# Patient Record
Sex: FEMALE | Race: WHITE | Employment: FULL TIME | URBAN - METROPOLITAN AREA
[De-identification: names, ages, dates, MRNs, and addresses within clinical notes are randomized per-mention and may not be internally consistent; named-entity substitution may affect disease eponyms.]

---

## 2017-01-02 ENCOUNTER — APPOINTMENT (OUTPATIENT)
Dept: CT IMAGING | Facility: HOSPITAL | Age: 37
End: 2017-01-02
Attending: EMERGENCY MEDICINE

## 2017-01-02 ENCOUNTER — HOSPITAL ENCOUNTER (EMERGENCY)
Facility: HOSPITAL | Age: 37
Discharge: HOME | End: 2017-01-02
Attending: EMERGENCY MEDICINE

## 2017-01-02 VITALS
BODY MASS INDEX: 38.51 KG/M2 | RESPIRATION RATE: 17 BRPM | HEART RATE: 76 BPM | DIASTOLIC BLOOD PRESSURE: 72 MMHG | SYSTOLIC BLOOD PRESSURE: 100 MMHG | HEIGHT: 69 IN | TEMPERATURE: 98 F | OXYGEN SATURATION: 98 % | WEIGHT: 260 LBS

## 2017-01-02 DIAGNOSIS — R10.9 ABDOMINAL PAIN, RIGHT LATERAL: Primary | ICD-10-CM

## 2017-01-02 LAB
ALBUMIN SERPL-MCNC: 3.8 G/DL (ref 3.5–4.8)
ALP LIVER SERPL-CCNC: 94 U/L (ref 37–98)
ALT SERPL-CCNC: 17 U/L (ref 14–54)
AST SERPL-CCNC: 10 U/L (ref 15–41)
BASOPHILS # BLD AUTO: 0.04 X10(3) UL (ref 0–0.1)
BASOPHILS NFR BLD AUTO: 0.4 %
BILIRUB SERPL-MCNC: 0.3 MG/DL (ref 0.1–2)
BILIRUB UR QL STRIP.AUTO: NEGATIVE
BUN BLD-MCNC: 8 MG/DL (ref 8–20)
CALCIUM BLD-MCNC: 8.8 MG/DL (ref 8.3–10.3)
CHLORIDE: 104 MMOL/L (ref 101–111)
CLARITY UR REFRACT.AUTO: CLEAR
CO2: 29 MMOL/L (ref 22–32)
COLOR UR AUTO: YELLOW
CREAT BLD-MCNC: 0.69 MG/DL (ref 0.55–1.02)
EOSINOPHIL # BLD AUTO: 0.09 X10(3) UL (ref 0–0.3)
EOSINOPHIL NFR BLD AUTO: 0.8 %
ERYTHROCYTE [DISTWIDTH] IN BLOOD BY AUTOMATED COUNT: 12.1 % (ref 11.5–16)
GLUCOSE BLD-MCNC: 105 MG/DL (ref 70–99)
GLUCOSE UR STRIP.AUTO-MCNC: NEGATIVE MG/DL
HCT VFR BLD AUTO: 43.9 % (ref 34–50)
HGB BLD-MCNC: 14.7 G/DL (ref 12–16)
IMMATURE GRANULOCYTE COUNT: 0.02 X10(3) UL (ref 0–1)
IMMATURE GRANULOCYTE RATIO %: 0.2 %
KETONES UR STRIP.AUTO-MCNC: NEGATIVE MG/DL
LEUKOCYTE ESTERASE UR QL STRIP.AUTO: NEGATIVE
LIPASE: 234 U/L (ref 73–393)
LYMPHOCYTES # BLD AUTO: 1.91 X10(3) UL (ref 0.9–4)
LYMPHOCYTES NFR BLD AUTO: 17.6 %
M PROTEIN MFR SERPL ELPH: 8 G/DL (ref 6.1–8.3)
MCH RBC QN AUTO: 30.6 PG (ref 27–33.2)
MCHC RBC AUTO-ENTMCNC: 33.5 G/DL (ref 31–37)
MCV RBC AUTO: 91.3 FL (ref 81–100)
MONOCYTES # BLD AUTO: 0.51 X10(3) UL (ref 0.1–0.6)
MONOCYTES NFR BLD AUTO: 4.7 %
NEUTROPHIL ABS PRELIM: 8.28 X10 (3) UL (ref 1.3–6.7)
NEUTROPHILS # BLD AUTO: 8.28 X10(3) UL (ref 1.3–6.7)
NEUTROPHILS NFR BLD AUTO: 76.3 %
NITRITE UR QL STRIP.AUTO: NEGATIVE
PH UR STRIP.AUTO: 8 [PH] (ref 4.5–8)
PLATELET # BLD AUTO: 336 10(3)UL (ref 150–450)
POTASSIUM SERPL-SCNC: 3.8 MMOL/L (ref 3.6–5.1)
PROT UR STRIP.AUTO-MCNC: NEGATIVE MG/DL
RBC # BLD AUTO: 4.81 X10(6)UL (ref 3.8–5.1)
RBC UR QL AUTO: NEGATIVE
RED CELL DISTRIBUTION WIDTH-SD: 40.4 FL (ref 35.1–46.3)
SODIUM SERPL-SCNC: 139 MMOL/L (ref 136–144)
SP GR UR STRIP.AUTO: 1.02 (ref 1–1.03)
UROBILINOGEN UR STRIP.AUTO-MCNC: <2 MG/DL
WBC # BLD AUTO: 10.9 X10(3) UL (ref 4–13)

## 2017-01-02 PROCEDURE — 85025 COMPLETE CBC W/AUTO DIFF WBC: CPT | Performed by: EMERGENCY MEDICINE

## 2017-01-02 PROCEDURE — 74177 CT ABD & PELVIS W/CONTRAST: CPT

## 2017-01-02 PROCEDURE — 96361 HYDRATE IV INFUSION ADD-ON: CPT

## 2017-01-02 PROCEDURE — 96374 THER/PROPH/DIAG INJ IV PUSH: CPT

## 2017-01-02 PROCEDURE — 80053 COMPREHEN METABOLIC PANEL: CPT | Performed by: EMERGENCY MEDICINE

## 2017-01-02 PROCEDURE — 99284 EMERGENCY DEPT VISIT MOD MDM: CPT

## 2017-01-02 PROCEDURE — 99285 EMERGENCY DEPT VISIT HI MDM: CPT

## 2017-01-02 PROCEDURE — 96375 TX/PRO/DX INJ NEW DRUG ADDON: CPT

## 2017-01-02 PROCEDURE — 96376 TX/PRO/DX INJ SAME DRUG ADON: CPT

## 2017-01-02 PROCEDURE — 83690 ASSAY OF LIPASE: CPT | Performed by: EMERGENCY MEDICINE

## 2017-01-02 PROCEDURE — 81003 URINALYSIS AUTO W/O SCOPE: CPT | Performed by: EMERGENCY MEDICINE

## 2017-01-02 RX ORDER — ONDANSETRON 2 MG/ML
4 INJECTION INTRAMUSCULAR; INTRAVENOUS ONCE
Status: COMPLETED | OUTPATIENT
Start: 2017-01-02 | End: 2017-01-02

## 2017-01-02 RX ORDER — LEVOFLOXACIN 500 MG/1
500 TABLET, FILM COATED ORAL DAILY
Qty: 10 TABLET | Refills: 0 | Status: SHIPPED | OUTPATIENT
Start: 2017-01-02 | End: 2017-01-12

## 2017-01-02 RX ORDER — HYDROCODONE BITARTRATE AND ACETAMINOPHEN 5; 325 MG/1; MG/1
1 TABLET ORAL ONCE
Status: COMPLETED | OUTPATIENT
Start: 2017-01-02 | End: 2017-01-02

## 2017-01-02 RX ORDER — HYDROMORPHONE HYDROCHLORIDE 1 MG/ML
1 INJECTION, SOLUTION INTRAMUSCULAR; INTRAVENOUS; SUBCUTANEOUS EVERY 30 MIN PRN
Status: COMPLETED | OUTPATIENT
Start: 2017-01-02 | End: 2017-01-02

## 2017-01-02 RX ORDER — METRONIDAZOLE 500 MG/1
500 TABLET ORAL 3 TIMES DAILY
Qty: 30 TABLET | Refills: 0 | Status: SHIPPED | OUTPATIENT
Start: 2017-01-02 | End: 2017-01-12

## 2017-01-02 RX ORDER — ONDANSETRON 4 MG/1
8 TABLET, ORALLY DISINTEGRATING ORAL EVERY 8 HOURS PRN
Qty: 10 TABLET | Refills: 0 | Status: SHIPPED | OUTPATIENT
Start: 2017-01-02 | End: 2017-01-09

## 2017-01-02 RX ORDER — HYDROCODONE BITARTRATE AND ACETAMINOPHEN 5; 325 MG/1; MG/1
1-2 TABLET ORAL EVERY 4 HOURS PRN
Qty: 20 TABLET | Refills: 0 | Status: SHIPPED | OUTPATIENT
Start: 2017-01-02 | End: 2017-01-06

## 2017-01-02 NOTE — ED PROVIDER NOTES
Patient Seen in: [facility] Emergency Department    History   Patient presents with:  Abdomen/Flank Pain (GI/)    Stated Complaint: Abdominal pain    HPI    20-year-old female presents for evaluation of upper abdominal pain.   Patient has had persi SURGICAL HISTORY      Comment laparoscopies x2    SURGICAL STENT Bilateral March 2015.     Comment Bilateral iliac stents    ANKLE SCOPE,PART DEBRIDEMENT Left 6/19/2015    Comment Procedure: ARTHROSCOPY ANKLE WITH DEBRIDEMENT;  Surgeon: Albin Pester, Landrum Merlin Inhalation Aerosol,  Inhale 2 puffs into the lungs 2 (two) times daily.    TIROSINT 125 MCG Oral Cap,  Take 125 mcg by mouth every morning before breakfast.   OMEPRAZOLE 40 MG Oral Capsule Delayed Release,  TAKE 1 CAPSULE BY MOUTH DAILY   Cholecalciferol (V Pupils equal reactive. Extraocular motions intact. Oropharynx clear. Neck: Supple  Lungs: Clear to auscultation bilaterally. Heart: Regular rate and rhythm.   Abdomen: Soft, right upper quadrant tenderness, no rebound, no guarding, no tenderness at [facility] 0)     Spoke with Dr Vernell Du. He has reviewed the patient's history, current labs and suggest CT abdomen with IV contrast.  If no significant pathology, patient may be discharged to follow-up with their office.     CT of the abdomen and pelvis shows mi

## 2017-01-06 ENCOUNTER — OFFICE VISIT (OUTPATIENT)
Dept: FAMILY MEDICINE CLINIC | Facility: CLINIC | Age: 37
End: 2017-01-06

## 2017-01-06 ENCOUNTER — HOSPITAL ENCOUNTER (EMERGENCY)
Age: 37
Discharge: HOME | End: 2017-01-07
Attending: EMERGENCY MEDICINE
Payer: COMMERCIAL

## 2017-01-06 VITALS
HEART RATE: 112 BPM | WEIGHT: 265 LBS | TEMPERATURE: 98 F | BODY MASS INDEX: 39.25 KG/M2 | DIASTOLIC BLOOD PRESSURE: 70 MMHG | SYSTOLIC BLOOD PRESSURE: 98 MMHG | HEIGHT: 69 IN

## 2017-01-06 DIAGNOSIS — R10.84 ABDOMINAL PAIN, CHRONIC, GENERALIZED: Primary | ICD-10-CM

## 2017-01-06 DIAGNOSIS — G89.29 ABDOMINAL PAIN, CHRONIC, GENERALIZED: Primary | ICD-10-CM

## 2017-01-06 DIAGNOSIS — F11.21 HISTORY OF NARCOTIC ADDICTION (HCC): ICD-10-CM

## 2017-01-06 DIAGNOSIS — T50.905A DRUG-INDUCED NAUSEA AND VOMITING: Primary | ICD-10-CM

## 2017-01-06 DIAGNOSIS — R52 PAIN MANAGEMENT: ICD-10-CM

## 2017-01-06 DIAGNOSIS — R11.2 DRUG-INDUCED NAUSEA AND VOMITING: Primary | ICD-10-CM

## 2017-01-06 LAB
ALBUMIN SERPL-MCNC: 3.5 G/DL (ref 3.5–4.8)
ALP LIVER SERPL-CCNC: 75 U/L (ref 37–98)
ALT SERPL-CCNC: 31 U/L (ref 14–54)
AST SERPL-CCNC: 27 U/L (ref 15–41)
BASOPHILS # BLD AUTO: 0.04 X10(3) UL (ref 0–0.1)
BASOPHILS NFR BLD AUTO: 0.6 %
BILIRUB SERPL-MCNC: 0.2 MG/DL (ref 0.1–2)
BUN BLD-MCNC: 7 MG/DL (ref 8–20)
CALCIUM BLD-MCNC: 8.3 MG/DL (ref 8.3–10.3)
CHLORIDE: 104 MMOL/L (ref 101–111)
CO2: 30 MMOL/L (ref 22–32)
CREAT BLD-MCNC: 0.72 MG/DL (ref 0.55–1.02)
EOSINOPHIL # BLD AUTO: 0.19 X10(3) UL (ref 0–0.3)
EOSINOPHIL NFR BLD AUTO: 2.8 %
ERYTHROCYTE [DISTWIDTH] IN BLOOD BY AUTOMATED COUNT: 12.3 % (ref 11.5–16)
GLUCOSE BLD-MCNC: 99 MG/DL (ref 70–99)
HCT VFR BLD AUTO: 40.5 % (ref 34–50)
HGB BLD-MCNC: 13.2 G/DL (ref 12–16)
IMMATURE GRANULOCYTE COUNT: 0.01 X10(3) UL (ref 0–1)
IMMATURE GRANULOCYTE RATIO %: 0.1 %
LIPASE: 152 U/L (ref 73–393)
LYMPHOCYTES # BLD AUTO: 2.63 X10(3) UL (ref 0.9–4)
LYMPHOCYTES NFR BLD AUTO: 38.3 %
M PROTEIN MFR SERPL ELPH: 7 G/DL (ref 6.1–8.3)
MCH RBC QN AUTO: 30.2 PG (ref 27–33.2)
MCHC RBC AUTO-ENTMCNC: 32.6 G/DL (ref 31–37)
MCV RBC AUTO: 92.7 FL (ref 81–100)
MONOCYTES # BLD AUTO: 0.55 X10(3) UL (ref 0.1–0.6)
MONOCYTES NFR BLD AUTO: 8 %
NEUTROPHIL ABS PRELIM: 3.45 X10 (3) UL (ref 1.3–6.7)
NEUTROPHILS # BLD AUTO: 3.45 X10(3) UL (ref 1.3–6.7)
NEUTROPHILS NFR BLD AUTO: 50.2 %
PLATELET # BLD AUTO: 305 10(3)UL (ref 150–450)
POTASSIUM SERPL-SCNC: 3.9 MMOL/L (ref 3.6–5.1)
RBC # BLD AUTO: 4.37 X10(6)UL (ref 3.8–5.1)
RED CELL DISTRIBUTION WIDTH-SD: 42 FL (ref 35.1–46.3)
SODIUM SERPL-SCNC: 141 MMOL/L (ref 136–144)
WBC # BLD AUTO: 6.9 X10(3) UL (ref 4–13)

## 2017-01-06 PROCEDURE — 85025 COMPLETE CBC W/AUTO DIFF WBC: CPT | Performed by: EMERGENCY MEDICINE

## 2017-01-06 PROCEDURE — 99284 EMERGENCY DEPT VISIT MOD MDM: CPT | Performed by: EMERGENCY MEDICINE

## 2017-01-06 PROCEDURE — 96374 THER/PROPH/DIAG INJ IV PUSH: CPT | Performed by: EMERGENCY MEDICINE

## 2017-01-06 PROCEDURE — 96375 TX/PRO/DX INJ NEW DRUG ADDON: CPT | Performed by: EMERGENCY MEDICINE

## 2017-01-06 PROCEDURE — 80053 COMPREHEN METABOLIC PANEL: CPT | Performed by: EMERGENCY MEDICINE

## 2017-01-06 PROCEDURE — 83690 ASSAY OF LIPASE: CPT | Performed by: EMERGENCY MEDICINE

## 2017-01-06 PROCEDURE — 99214 OFFICE O/P EST MOD 30 MIN: CPT | Performed by: FAMILY MEDICINE

## 2017-01-06 RX ORDER — HALOPERIDOL 5 MG/ML
5 INJECTION INTRAMUSCULAR ONCE
Status: COMPLETED | OUTPATIENT
Start: 2017-01-06 | End: 2017-01-06

## 2017-01-06 RX ORDER — HYDROCODONE BITARTRATE AND ACETAMINOPHEN 5; 325 MG/1; MG/1
1 TABLET ORAL EVERY 4 HOURS PRN
Qty: 5 TABLET | Refills: 0 | Status: SHIPPED | OUTPATIENT
Start: 2017-01-06 | End: 2017-01-13

## 2017-01-06 RX ORDER — ONDANSETRON 2 MG/ML
4 INJECTION INTRAMUSCULAR; INTRAVENOUS ONCE
Status: COMPLETED | OUTPATIENT
Start: 2017-01-06 | End: 2017-01-06

## 2017-01-06 RX ORDER — LORAZEPAM 2 MG/ML
1 INJECTION INTRAMUSCULAR ONCE
Status: COMPLETED | OUTPATIENT
Start: 2017-01-06 | End: 2017-01-06

## 2017-01-06 RX ORDER — ONDANSETRON 2 MG/ML
INJECTION INTRAMUSCULAR; INTRAVENOUS
Status: COMPLETED
Start: 2017-01-06 | End: 2017-01-06

## 2017-01-06 RX ORDER — DICYCLOMINE HCL 20 MG
TABLET ORAL
Refills: 0 | COMMUNITY
Start: 2016-12-29 | End: 2017-01-06

## 2017-01-06 RX ORDER — HYDROMORPHONE HYDROCHLORIDE 1 MG/ML
1 INJECTION, SOLUTION INTRAMUSCULAR; INTRAVENOUS; SUBCUTANEOUS EVERY 30 MIN PRN
Status: DISCONTINUED | OUTPATIENT
Start: 2017-01-06 | End: 2017-01-07

## 2017-01-06 NOTE — PATIENT INSTRUCTIONS
SCHEDULING [facility] LAB APPOINTMENTS ONLINE    Lab appointments can now be scheduled online at [email or web address]    · Go to [email or web address]  · In Search type Lab  · Click \"Lab services\"  · Click \"Schedule Your Test Online\"  · Follow the prompts  · If you

## 2017-01-06 NOTE — PROGRESS NOTES
Alis Herndaez is a 39year old female. HPI:   Follow up on ER visit on Monday 1/2/17. He had a CT which was suspicious for colitis also elevated neutrophil count otherwise normal.  Tuesday started the antibiotics presumed colitis.   No C. difficile within the inferior pole of the left kidney which is too small to characterize but may represent a small cyst.            Dictated by: Gurjit Kumar MD on 1/02/2017   Component      Latest Ref Rng 1/2/2017   WBC      4.0-13.0 x10(3) uL 10.9   RBC      3. Negative   PH, URINE      4.5-8.0 8.0   PROTEIN (URINE DIPSTICK)      Negative mg/dl Negative   UROBILINOGEN,SEMI-QN      0.2-2.0 mg/dL <2.0   NITRITE, URINE      Negative Negative   LEUKOCYTES      Negative Negative   MICROSCOPIC EXAMINATION       Microsc • Organic hypersomnia, unspecified DMG DX 11-2-12     AHI 2 RDI 2 REM AHI 6 SaO2 curtis 89%   • Unspecified disorder of thyroid    • PONV (postoperative nausea and vomiting)    • Endometriosis    • OTHER DISEASES      rectal bleeding   • Anxiety    • Pneu management  History of narcotic addiction (hcc)    No orders of the defined types were placed in this encounter.        Meds & Refills for this Visit:  Signed Prescriptions Disp Refills    HYDROcodone-acetaminophen 5-325 MG Oral Tab 5 tablet 0      Sig: Og Cristobal

## 2017-01-07 VITALS
BODY MASS INDEX: 35 KG/M2 | TEMPERATURE: 98 F | SYSTOLIC BLOOD PRESSURE: 110 MMHG | HEART RATE: 78 BPM | WEIGHT: 240 LBS | RESPIRATION RATE: 18 BRPM | OXYGEN SATURATION: 98 % | DIASTOLIC BLOOD PRESSURE: 70 MMHG

## 2017-01-07 PROBLEM — R10.84 ABDOMINAL PAIN, CHRONIC, GENERALIZED: Status: ACTIVE | Noted: 2017-01-07

## 2017-01-07 PROBLEM — G89.29 ABDOMINAL PAIN, CHRONIC, GENERALIZED: Status: ACTIVE | Noted: 2017-01-07

## 2017-01-07 NOTE — ED PROVIDER NOTES
Patient Seen in: THE [facility] Emergency Department In [place]    History   Patient presents with:  Abdomen/Flank Pain (GI/)  Nausea/vomiting    Stated Complaint: Abd pain, N/V    HPI    Patient who has had cholecystectomy in the past and recurrent episode Comment Bilateral iliac stents    ANKLE SCOPE,PART DEBRIDEMENT Left 6/19/2015    Comment Procedure: ARTHROSCOPY ANKLE WITH DEBRIDEMENT;  Surgeon: Ryan Brock MD;  Location: [facility]    GASTROCNEMIUS RECESSION Left 6/19/2015    Comment Proce Capsule Delayed Release,  TAKE 1 CAPSULE BY MOUTH DAILY   Cholecalciferol (VITAMIN D) 1000 UNITS Oral Tab,  Take 1 tablet by mouth daily. Vitamin C 500 MG Oral Tab,  Take 500 mg by mouth daily.    alprazolam 0.25 MG Oral Tab,  Take 1 tablet by mouth judy murmur  Abdomen: No distention. Vague tenderness in the epigastrium and right upper quadrant. No guarding or rebound tenderness. No mass or HSM. No hernia. Extremities: No peripheral edema or evidence of DVT.        ED Course     Labs Reviewed   COMP M

## 2017-01-10 ENCOUNTER — OFFICE VISIT (OUTPATIENT)
Dept: FAMILY MEDICINE CLINIC | Facility: CLINIC | Age: 37
End: 2017-01-10

## 2017-01-10 VITALS
DIASTOLIC BLOOD PRESSURE: 76 MMHG | SYSTOLIC BLOOD PRESSURE: 100 MMHG | HEART RATE: 100 BPM | HEIGHT: 69 IN | WEIGHT: 263 LBS | BODY MASS INDEX: 38.95 KG/M2

## 2017-01-10 DIAGNOSIS — R10.84 ABDOMINAL PAIN, CHRONIC, GENERALIZED: Primary | ICD-10-CM

## 2017-01-10 DIAGNOSIS — R19.7 DIARRHEA, UNSPECIFIED TYPE: ICD-10-CM

## 2017-01-10 DIAGNOSIS — G89.29 ABDOMINAL PAIN, CHRONIC, GENERALIZED: Primary | ICD-10-CM

## 2017-01-10 DIAGNOSIS — F11.21 HISTORY OF NARCOTIC ADDICTION (HCC): ICD-10-CM

## 2017-01-10 DIAGNOSIS — R52 PAIN MANAGEMENT: ICD-10-CM

## 2017-01-10 DIAGNOSIS — E66.9 OBESITY (BMI 35.0-39.9 WITHOUT COMORBIDITY): ICD-10-CM

## 2017-01-10 PROCEDURE — 99214 OFFICE O/P EST MOD 30 MIN: CPT | Performed by: FAMILY MEDICINE

## 2017-01-10 RX ORDER — HYDROCODONE BITARTRATE AND ACETAMINOPHEN 5; 325 MG/1; MG/1
TABLET ORAL EVERY 6 HOURS PRN
Qty: 10 TABLET | Refills: 0 | Status: SHIPPED | OUTPATIENT
Start: 2017-01-10 | End: 2017-01-24

## 2017-01-10 NOTE — PROGRESS NOTES
Marcello Alvarenga is a 39year old female. HPI:   Patient is in for follow-up on severe abdominal pain that she has been experiencing.   Patient had been taking Flagyl and Levaquin for questionable colitis and ended up in the emergency room after going to metRONIDAZOLE 500 MG Oral Tab Take 1 tablet (500 mg total) by mouth 3 (three) times daily. Disp: 30 tablet Rfl: 0   calciTRIOL 0.25 MCG Oral Cap Take 1 capsule (0.25 mcg total) by mouth daily.  Disp: 60 capsule Rfl: 2   Levothyroxine Sodium (TIROSINT) 150 feels well otherwise  SKIN: denies any unusual skin lesions or rashes  RESPIRATORY: denies shortness of breath with exertion  CARDIOVASCULAR: denies chest pain on exertion  GI:see above  NEURO: denies headaches  Musculoskeletal: No motor deficits    EXAM: patient is to avoid using Narco unless severe pain. Did again discuss the pain mechanism that she is experiencing which is exaggerated due to her history of Norco  addiction.   Patient has lowered her Norco use down to 1 daily just at night so she can slee

## 2017-01-10 NOTE — PATIENT INSTRUCTIONS
SCHEDULING [facility] APPOINTMENTS ONLINE    Lab appointments can now be scheduled online at [email or web address]    · Go to [email or web address]  · In Search type Lab  · Click \"Lab services\"  · Click \"Schedule Your Test Online\"  · Follow the prompts  · If you It is advanced from the throat through the upper digestive tract, to the common bile duct opening. The endoscope lets the doctor see the common bile and pancreatic ducts on a video screen.   · A cut may be made where the common bile duct opens to the duoden

## 2017-01-13 ENCOUNTER — TELEPHONE (OUTPATIENT)
Dept: FAMILY MEDICINE CLINIC | Facility: CLINIC | Age: 37
End: 2017-01-13

## 2017-01-14 ENCOUNTER — APPOINTMENT (OUTPATIENT)
Dept: LAB | Facility: HOSPITAL | Age: 37
End: 2017-01-14
Attending: EMERGENCY MEDICINE
Payer: COMMERCIAL

## 2017-01-18 PROCEDURE — 87015 SPECIMEN INFECT AGNT CONCNTJ: CPT | Performed by: EMERGENCY MEDICINE

## 2017-01-18 PROCEDURE — 87427 SHIGA-LIKE TOXIN AG IA: CPT | Performed by: EMERGENCY MEDICINE

## 2017-01-18 PROCEDURE — 82272 OCCULT BLD FECES 1-3 TESTS: CPT | Performed by: EMERGENCY MEDICINE

## 2017-01-18 PROCEDURE — 87077 CULTURE AEROBIC IDENTIFY: CPT | Performed by: EMERGENCY MEDICINE

## 2017-01-18 PROCEDURE — 87045 FECES CULTURE AEROBIC BACT: CPT | Performed by: EMERGENCY MEDICINE

## 2017-01-18 PROCEDURE — 87046 STOOL CULTR AEROBIC BACT EA: CPT | Performed by: EMERGENCY MEDICINE

## 2017-01-19 NOTE — ED NOTES
Asif Sousa from lab called and states that unable to run C. Diff testing on stool because sample was formed.

## 2017-01-19 NOTE — ED NOTES
Attempted to contact Brian AMEZQUITA office, office closed for the day. Patient called, let her know that unable to run C. Diff sample on stool provided d/t it being formed and only able to run this test on diarrhea.  Made aware that typically C Diff is

## 2017-01-23 ENCOUNTER — TELEPHONE (OUTPATIENT)
Dept: FAMILY MEDICINE CLINIC | Facility: CLINIC | Age: 37
End: 2017-01-23

## 2017-01-23 NOTE — TELEPHONE ENCOUNTER
Lynn Arias from THE [facility] ER called to let us know that the order for c.dif was cancelled because the sample that was brought in was a formed stool  VERITO Suarez

## 2017-01-23 NOTE — ED NOTES
[address] [place], [place] office & spoke to [employer]. Discussed that lab canceled c-diff panel due to the patients formed stool specimen.  Herbie Bowen will notify Anisha Prieto [facility]

## 2017-01-24 ENCOUNTER — OFFICE VISIT (OUTPATIENT)
Dept: FAMILY MEDICINE CLINIC | Facility: CLINIC | Age: 37
End: 2017-01-24

## 2017-01-24 VITALS
DIASTOLIC BLOOD PRESSURE: 70 MMHG | WEIGHT: 268 LBS | HEIGHT: 69 IN | HEART RATE: 92 BPM | BODY MASS INDEX: 39.69 KG/M2 | SYSTOLIC BLOOD PRESSURE: 98 MMHG

## 2017-01-24 DIAGNOSIS — E66.9 OBESITY (BMI 35.0-39.9 WITHOUT COMORBIDITY): ICD-10-CM

## 2017-01-24 DIAGNOSIS — M94.0 COSTOCHONDRITIS: Primary | ICD-10-CM

## 2017-01-24 DIAGNOSIS — Z71.3 WEIGHT LOSS COUNSELING, ENCOUNTER FOR: ICD-10-CM

## 2017-01-24 DIAGNOSIS — G43.A1 INTRACTABLE CYCLICAL VOMITING, PRESENCE OF NAUSEA NOT SPECIFIED: ICD-10-CM

## 2017-01-24 DIAGNOSIS — F11.21 HISTORY OF NARCOTIC ADDICTION (HCC): ICD-10-CM

## 2017-01-24 DIAGNOSIS — F41.9 ANXIETY: ICD-10-CM

## 2017-01-24 PROCEDURE — 99214 OFFICE O/P EST MOD 30 MIN: CPT | Performed by: FAMILY MEDICINE

## 2017-01-24 RX ORDER — HYDROCODONE BITARTRATE AND ACETAMINOPHEN 5; 325 MG/1; MG/1
0.5 TABLET ORAL EVERY 6 HOURS PRN
Qty: 5 TABLET | Refills: 0 | Status: SHIPPED | OUTPATIENT
Start: 2017-01-24 | End: 2017-02-08 | Stop reason: ALTCHOICE

## 2017-01-24 RX ORDER — LIDOCAINE 50 MG/G
1 PATCH TOPICAL EVERY 24 HOURS
Qty: 30 PATCH | Refills: 0 | Status: SHIPPED | OUTPATIENT
Start: 2017-01-24 | End: 2017-02-07 | Stop reason: ALTCHOICE

## 2017-01-24 NOTE — PROGRESS NOTES
Lucien James is a 39year old female. HPI:   #1 follow-up abdominal pain and episodic emesis  Still emesis once weekly once it starts does not stop no help with Zofran and pancreatic enzymes yet but is probably not taking it correctly.   Saw torie no fevers chills or body aches. Has nasal congestion no cough yet. Current Outpatient Prescriptions:  Probiotic Product (SOLUBLE FIBER/PROBIOTICS OR) Take 1 capsule by mouth daily.  Disp:  Rfl:    lidocaine 5 % External Patch Place 1 patch onto the sk • OTHER DISEASES      rectal bleeding   • Anxiety    • Pneumonia august 2014   • Extrinsic asthma, unspecified      2014 was in ER and was hospitalized over night   • Shortness of breath    • Reflux    • Arthritis    • Asthma    • Esophageal reflux    • cyclical vomiting, presence of nausea not specified  History of narcotic addiction (hcc)  Anxiety  Obesity (bmi 35.0-39.9 without comorbidity)  Weight loss counseling, encounter for    No orders of the defined types were placed in this encounter.        Med and phentermine. Reviewed medication benefits and side effects. Patient was advised of side effects of phentermine including risk of pulmonary hypertension. Patient is to watch for side effects edema, shortness of breath, anxiety or chest pain.  Call off

## 2017-01-24 NOTE — PATIENT INSTRUCTIONS
SCHEDULING [facility] APPOINTMENTS ONLINE    Lab appointments can now be scheduled online at [email or web address]    · Go to [email or web address]  · In Search type Lab  · Click \"Lab services\"  · Click \"Schedule Your Test Online\"  · Follow the prompts  · If you serious side effects. We reviewed the research and talked to the experts to identify five things you need to know if you are considering taking an opioid for pain.    1. They don’t work well against long-term pain  Opioid drugs work very well to [place] of sleep-disordered breathing, like sleep apnea. 7. Opioid induced hyperalgesia(increased sensitivity to pain), which is when opioids contribute to brain sensitization and may actually worsen pain control in some patients    What to do:  For some types of unused pills. ). If you resume taking opioids after a break, talk to your doctor about starting with a lower dose.     3. Your nightly glass of wine should be off-limits  Many people who take an opioid pain killer don’t give much thought to what they combine longer and are typically stronger than short-acting opioids. The drugs allow patients to take fewer pills and help prevent breakthrough pain because of a missed dose.  Many doctors also believe that long-acting drugs are less likely to cause a drug “high” a anything where it’s important that you be fully alert until you know how an opioid will affect you. That’s especially important when you first start taking an opioid or whenever you change the type or dosage. .  • Put opioids in a locked drawer or cabinet t were dependent on the drugs. Factors that increase the risk of dependence include being younger, in poor health, or in severe pain, according to the study authors.  In addition, the study supports other research showing that several mental-health factors improve function. But it’s best to avoid high-impact activities, such as running or tennis, that might aggravate your symptoms. ? Fibromyalgia. Regular exercise can help reduce pain and fatigue.  Other options to consider include cognitive behavioral thera

## 2017-01-25 PROBLEM — Z71.3 WEIGHT LOSS COUNSELING, ENCOUNTER FOR: Status: ACTIVE | Noted: 2017-01-25

## 2017-01-25 PROBLEM — R11.15 INTRACTABLE CYCLICAL VOMITING: Status: ACTIVE | Noted: 2017-01-25

## 2017-01-26 ENCOUNTER — TELEPHONE (OUTPATIENT)
Dept: FAMILY MEDICINE CLINIC | Facility: CLINIC | Age: 37
End: 2017-01-26

## 2017-01-26 DIAGNOSIS — R10.11 CHRONIC RUQ PAIN: Primary | ICD-10-CM

## 2017-01-26 DIAGNOSIS — G89.29 CHRONIC RUQ PAIN: Primary | ICD-10-CM

## 2017-01-27 ENCOUNTER — TELEPHONE (OUTPATIENT)
Dept: SURGERY | Facility: CLINIC | Age: 37
End: 2017-01-27

## 2017-01-30 ENCOUNTER — OFFICE VISIT (OUTPATIENT)
Dept: FAMILY MEDICINE CLINIC | Facility: CLINIC | Age: 37
End: 2017-01-30

## 2017-01-30 VITALS
HEIGHT: 69 IN | RESPIRATION RATE: 18 BRPM | BODY MASS INDEX: 39.1 KG/M2 | HEART RATE: 88 BPM | TEMPERATURE: 98 F | SYSTOLIC BLOOD PRESSURE: 114 MMHG | WEIGHT: 264 LBS | DIASTOLIC BLOOD PRESSURE: 68 MMHG

## 2017-01-30 DIAGNOSIS — J01.10 ACUTE FRONTAL SINUSITIS, RECURRENCE NOT SPECIFIED: Primary | ICD-10-CM

## 2017-01-30 DIAGNOSIS — R05.9 COUGH: ICD-10-CM

## 2017-01-30 PROCEDURE — 99214 OFFICE O/P EST MOD 30 MIN: CPT | Performed by: FAMILY MEDICINE

## 2017-01-30 RX ORDER — AMOXICILLIN AND CLAVULANATE POTASSIUM 875; 125 MG/1; MG/1
1 TABLET, FILM COATED ORAL 2 TIMES DAILY
Qty: 14 TABLET | Refills: 0 | Status: SHIPPED | OUTPATIENT
Start: 2017-01-30 | End: 2017-03-29

## 2017-01-30 NOTE — PATIENT INSTRUCTIONS
--start augmentin  -tessalon as needed  -advil as needed  -humidifier, honey, lemon, rest  -call if not improving or worsening

## 2017-01-30 NOTE — PROGRESS NOTES
CC:  Marcello Alvarenga is a 39year old female here for Patient presents with:  Sinus Problem: The patient states that she has a sinus headache for a week, and ear pain on & off      HPI:     URI  -started about 1 wk ago  -associated with headache and ear Tab Take 1 tablet by mouth daily as needed. Disp:  Rfl: 0   aspirin 81 MG Oral Tab Take 81 mg by mouth daily. Disp:  Rfl:    Zolpidem Tartrate ER (AMBIEN CR) 12.5 MG Oral Tab CR Take 12.5 mg by mouth nightly.  Disp:  Rfl:    Escitalopram Oxalate (LEXAPRO) 2 • Esophageal reflux    • Pneumonia, organism unspecified    • Calculus of kidney    • Pancreatitis           Past Surgical History          D & C      Comment x4    THYROIDECTOMY  2011    OTHER  HYSTEROSCOPY    OTHER  NECK SCAR REVISION    TO Kiran Pizarro M.D.       Social History:      Smoking Status: Never Smoker                      Smokeless Status: Never Used                        Alcohol Use: No                    EXAM:   /68 mmHg  Pulse 88  Temp(Src) 97.8 °F (36.6 °C) (Oral)  Resp 1

## 2017-01-31 ENCOUNTER — TELEPHONE (OUTPATIENT)
Dept: FAMILY MEDICINE CLINIC | Facility: CLINIC | Age: 37
End: 2017-01-31

## 2017-02-03 NOTE — TELEPHONE ENCOUNTER
PA for Lidocaine patch was denied. [facility]#[id number]  lmom for pt regarding this and asked her to try the OTC Salonpas 4% as was advised previously. Asked pt after reviewing message to call office with any questions.

## 2017-02-07 ENCOUNTER — APPOINTMENT (OUTPATIENT)
Dept: GENERAL RADIOLOGY | Age: 37
End: 2017-02-07
Attending: EMERGENCY MEDICINE
Payer: COMMERCIAL

## 2017-02-07 ENCOUNTER — APPOINTMENT (OUTPATIENT)
Dept: ULTRASOUND IMAGING | Age: 37
End: 2017-02-07
Attending: EMERGENCY MEDICINE
Payer: COMMERCIAL

## 2017-02-07 ENCOUNTER — HOSPITAL ENCOUNTER (EMERGENCY)
Age: 37
Discharge: HOME | End: 2017-02-07
Attending: EMERGENCY MEDICINE
Payer: COMMERCIAL

## 2017-02-07 VITALS
BODY MASS INDEX: 38.51 KG/M2 | HEIGHT: 69 IN | OXYGEN SATURATION: 100 % | SYSTOLIC BLOOD PRESSURE: 105 MMHG | DIASTOLIC BLOOD PRESSURE: 70 MMHG | TEMPERATURE: 98 F | RESPIRATION RATE: 16 BRPM | WEIGHT: 260 LBS | HEART RATE: 76 BPM

## 2017-02-07 DIAGNOSIS — R10.9 FLANK PAIN: Primary | ICD-10-CM

## 2017-02-07 LAB
ALBUMIN SERPL-MCNC: 3.8 G/DL (ref 3.5–4.8)
ALP LIVER SERPL-CCNC: 72 U/L (ref 37–98)
ALT SERPL-CCNC: 19 U/L (ref 14–54)
AST SERPL-CCNC: 11 U/L (ref 15–41)
BASOPHILS # BLD AUTO: 0.03 X10(3) UL (ref 0–0.1)
BASOPHILS NFR BLD AUTO: 0.3 %
BILIRUB SERPL-MCNC: 0.4 MG/DL (ref 0.1–2)
BILIRUB UR QL STRIP.AUTO: NEGATIVE
BUN BLD-MCNC: 11 MG/DL (ref 8–20)
CALCIUM BLD-MCNC: 8.6 MG/DL (ref 8.3–10.3)
CHLORIDE: 105 MMOL/L (ref 101–111)
CLARITY UR REFRACT.AUTO: CLEAR
CO2: 26 MMOL/L (ref 22–32)
COLOR UR AUTO: YELLOW
CREAT BLD-MCNC: 0.79 MG/DL (ref 0.55–1.02)
EOSINOPHIL # BLD AUTO: 0.05 X10(3) UL (ref 0–0.3)
EOSINOPHIL NFR BLD AUTO: 0.5 %
ERYTHROCYTE [DISTWIDTH] IN BLOOD BY AUTOMATED COUNT: 12.6 % (ref 11.5–16)
GLUCOSE BLD-MCNC: 95 MG/DL (ref 70–99)
GLUCOSE UR STRIP.AUTO-MCNC: NEGATIVE MG/DL
HCT VFR BLD AUTO: 36.9 % (ref 34–50)
HGB BLD-MCNC: 12.4 G/DL (ref 12–16)
IMMATURE GRANULOCYTE COUNT: 0.01 X10(3) UL (ref 0–1)
IMMATURE GRANULOCYTE RATIO %: 0.1 %
KETONES UR STRIP.AUTO-MCNC: NEGATIVE MG/DL
LEUKOCYTE ESTERASE UR QL STRIP.AUTO: NEGATIVE
LIPASE: 226 U/L (ref 73–393)
LYMPHOCYTES # BLD AUTO: 1.92 X10(3) UL (ref 0.9–4)
LYMPHOCYTES NFR BLD AUTO: 20.8 %
M PROTEIN MFR SERPL ELPH: 7.5 G/DL (ref 6.1–8.3)
MCH RBC QN AUTO: 30.5 PG (ref 27–33.2)
MCHC RBC AUTO-ENTMCNC: 33.6 G/DL (ref 31–37)
MCV RBC AUTO: 90.7 FL (ref 81–100)
MONOCYTES # BLD AUTO: 0.43 X10(3) UL (ref 0.1–0.6)
MONOCYTES NFR BLD AUTO: 4.7 %
NEUTROPHIL ABS PRELIM: 6.8 X10 (3) UL (ref 1.3–6.7)
NEUTROPHILS # BLD AUTO: 6.8 X10(3) UL (ref 1.3–6.7)
NEUTROPHILS NFR BLD AUTO: 73.6 %
NITRITE UR QL STRIP.AUTO: NEGATIVE
PH UR STRIP.AUTO: 5.5 [PH] (ref 4.5–8)
PLATELET # BLD AUTO: 249 10(3)UL (ref 150–450)
POTASSIUM SERPL-SCNC: 3.9 MMOL/L (ref 3.6–5.1)
PROT UR STRIP.AUTO-MCNC: NEGATIVE MG/DL
RBC # BLD AUTO: 4.07 X10(6)UL (ref 3.8–5.1)
RBC UR QL AUTO: NEGATIVE
RED CELL DISTRIBUTION WIDTH-SD: 41.2 FL (ref 35.1–46.3)
SODIUM SERPL-SCNC: 140 MMOL/L (ref 136–144)
SP GR UR STRIP.AUTO: <=1.005 (ref 1–1.03)
UROBILINOGEN UR STRIP.AUTO-MCNC: 0.2 MG/DL
WBC # BLD AUTO: 9.2 X10(3) UL (ref 4–13)

## 2017-02-07 PROCEDURE — 96375 TX/PRO/DX INJ NEW DRUG ADDON: CPT

## 2017-02-07 PROCEDURE — 93975 VASCULAR STUDY: CPT

## 2017-02-07 PROCEDURE — 96361 HYDRATE IV INFUSION ADD-ON: CPT

## 2017-02-07 PROCEDURE — 83690 ASSAY OF LIPASE: CPT | Performed by: EMERGENCY MEDICINE

## 2017-02-07 PROCEDURE — 96376 TX/PRO/DX INJ SAME DRUG ADON: CPT

## 2017-02-07 PROCEDURE — 80053 COMPREHEN METABOLIC PANEL: CPT | Performed by: EMERGENCY MEDICINE

## 2017-02-07 PROCEDURE — 74000 XR ABDOMEN (KUB) (1 AP VIEW)  (CPT=74000): CPT

## 2017-02-07 PROCEDURE — 85025 COMPLETE CBC W/AUTO DIFF WBC: CPT | Performed by: EMERGENCY MEDICINE

## 2017-02-07 PROCEDURE — 76830 TRANSVAGINAL US NON-OB: CPT

## 2017-02-07 PROCEDURE — 76775 US EXAM ABDO BACK WALL LIM: CPT

## 2017-02-07 PROCEDURE — 99285 EMERGENCY DEPT VISIT HI MDM: CPT

## 2017-02-07 PROCEDURE — 76856 US EXAM PELVIC COMPLETE: CPT

## 2017-02-07 PROCEDURE — 81003 URINALYSIS AUTO W/O SCOPE: CPT | Performed by: EMERGENCY MEDICINE

## 2017-02-07 PROCEDURE — 96374 THER/PROPH/DIAG INJ IV PUSH: CPT

## 2017-02-07 RX ORDER — HYDROCODONE BITARTRATE AND ACETAMINOPHEN 5; 325 MG/1; MG/1
1 TABLET ORAL ONCE
Status: COMPLETED | OUTPATIENT
Start: 2017-02-07 | End: 2017-02-07

## 2017-02-07 RX ORDER — KETOROLAC TROMETHAMINE 30 MG/ML
30 INJECTION, SOLUTION INTRAMUSCULAR; INTRAVENOUS ONCE
Status: COMPLETED | OUTPATIENT
Start: 2017-02-07 | End: 2017-02-07

## 2017-02-07 RX ORDER — ONDANSETRON 2 MG/ML
4 INJECTION INTRAMUSCULAR; INTRAVENOUS ONCE
Status: COMPLETED | OUTPATIENT
Start: 2017-02-07 | End: 2017-02-07

## 2017-02-07 RX ORDER — HYDROMORPHONE HYDROCHLORIDE 1 MG/ML
0.5 INJECTION, SOLUTION INTRAMUSCULAR; INTRAVENOUS; SUBCUTANEOUS EVERY 30 MIN PRN
Status: DISCONTINUED | OUTPATIENT
Start: 2017-02-07 | End: 2017-02-07

## 2017-02-07 RX ORDER — HYDROMORPHONE HYDROCHLORIDE 1 MG/ML
1 INJECTION, SOLUTION INTRAMUSCULAR; INTRAVENOUS; SUBCUTANEOUS ONCE
Status: COMPLETED | OUTPATIENT
Start: 2017-02-07 | End: 2017-02-07

## 2017-02-07 NOTE — ED PROVIDER NOTES
Patient Seen in: [facility] Emergency Department In [place]    History   Patient presents with:  Abdomen/Flank Pain (GI/)  Nausea/Vomiting/Diarrhea (gastrointestinal)    Stated Complaint: abd pain/vomiting/flank pain    HPI    Patient is a 51-year-old w subsequent scar revision    OTHER SURGICAL HISTORY      Comment laparoscopies x2    SURGICAL STENT Bilateral March 2015.     Comment Bilateral iliac stents    ANKLE SCOPE,PART DEBRIDEMENT Left 6/19/2015    Comment Procedure: ARTHROSCOPY ANKLE WITH DEBRIDEME CR,  Take 12.5 mg by mouth nightly. HYDROcodone-acetaminophen (NORCO) 5-325 MG Oral Tab,  Take 0.5 tablets by mouth every 6 (six) hours as needed for Pain (taper off). Levothyroxine Sodium (TIROSINT) 150 MCG Oral Cap,  Take 1 tablet by mouth daily.    a No edema.   Skin: warm and dry, no diaphoresis    ED Course     Labs Reviewed   COMP METABOLIC PANEL (14) - Abnormal; Notable for the following:     AST 11 (*)     All other components within normal limits   CBC W/ DIFFERENTIAL - Abnormal; Notable for the f limits. Patient was given Toradol, IV fluids, Dilaudid, Zofran, norco  MDM     39year-old with right flank/abdominal pain with vomiting onset today. Urinalysis without blood or infection.   CBC with normal white count, normal hemoglobin  LFTs and b L PA [facility] MELODY [id number] [facility] [id number] 9/29/1980 1/6/2017 HYDROCODONE-ACETAMINOPHEN 5MG-325MG 5/1 Insurance [facility] CO./ BLANCA OLIVER [id number] [facility] 9/29/1980 1/6/2017 ROX Tobias Loya [address] [address] [id number] 9/29/1980 11/17/2016 ZOLPIDEM TARTRATE 12.5MG 30/30 Insurance [facility] CO./ BLANCA Loya [id number] [address] [phone] 9/29/1980 11/3/2016 TRAMADOL 50MG 60/15 Priv

## 2017-02-08 ENCOUNTER — OFFICE VISIT (OUTPATIENT)
Dept: FAMILY MEDICINE CLINIC | Facility: CLINIC | Age: 37
End: 2017-02-08

## 2017-02-08 VITALS
RESPIRATION RATE: 18 BRPM | DIASTOLIC BLOOD PRESSURE: 66 MMHG | WEIGHT: 267 LBS | SYSTOLIC BLOOD PRESSURE: 128 MMHG | HEIGHT: 69 IN | HEART RATE: 94 BPM | BODY MASS INDEX: 39.55 KG/M2

## 2017-02-08 DIAGNOSIS — R10.9 RIGHT FLANK PAIN: Primary | ICD-10-CM

## 2017-02-08 DIAGNOSIS — R21 RASH: ICD-10-CM

## 2017-02-08 PROCEDURE — 99214 OFFICE O/P EST MOD 30 MIN: CPT | Performed by: FAMILY MEDICINE

## 2017-02-08 NOTE — PROGRESS NOTES
Moises Tong is a 39year old female here for Patient presents with:  ER F/U: 02/07/2017 for Right side back pain, nausea & vomiting   Rash: on forehead, painful       HPI:     Back pain  -started yesterday in right mid-lower back  -comes and goes  - DEBRIDEMENT Left 6/19/2015    Comment Procedure: ARTHROSCOPY ANKLE WITH DEBRIDEMENT;  Surgeon: Qian Mon MD;  Location: [place] [address]    GASTROCNEMIUS RECESSION Left 6/19/2015    Comment Procedure: GASTROC RECESSION FOOT;  Surgeon: Liane Chin Disp: 60 capsule Rfl: 2   OMEPRAZOLE 40 MG Oral Capsule Delayed Release TAKE 1 CAPSULE BY MOUTH DAILY Disp: 90 capsule Rfl: 3   Cholecalciferol (VITAMIN D) 1000 UNITS Oral Tab Take 1 tablet by mouth daily.    Disp:  Rfl:    Vitamin C 500 MG Oral Tab Take 50 back with getting up and down off exam table  Skin: fine papular rash in forehead, more marked on right side but also on left crossing the midline - no surrounding erythema or crusting    ASSESSMENT/PLAN:     Right flank pain  -suspect msk in origin  -reas

## 2017-02-08 NOTE — PATIENT INSTRUCTIONS
-- benadryl as needed for rash  -- limit any makeup or product and allow rash to heal on its own  -- if lesions starting to get bigger and then crust over, then let me know  -- start heating pads and stretching for back  -- continue ibuprofen 600mg up to 4

## 2017-02-14 RX ORDER — PHENTERMINE HYDROCHLORIDE 15 MG/1
30 CAPSULE ORAL EVERY MORNING
Qty: 60 CAPSULE | Refills: 0 | OUTPATIENT
Start: 2017-02-14 | End: 2017-03-06 | Stop reason: DRUGHIGH

## 2017-02-14 NOTE — TELEPHONE ENCOUNTER
Chacha Blair, patient has been taking 15 mg bid since the 15 mg not working. She ran out of the 15 mg early. Do you want to prescribe the 37.5 mg or keep her on the 15 mg bid?   Patient does need a new rx

## 2017-02-14 NOTE — TELEPHONE ENCOUNTER
Pt called and said she is out of Phentermine 15mg. She said she spoke to Adriano and told her the 15mg wasn't working so Adriano told her to take 2 and when she was out to call the office for a refill. She is out of the medication and needs a refill.

## 2017-02-21 ENCOUNTER — OFFICE VISIT (OUTPATIENT)
Dept: FAMILY MEDICINE CLINIC | Facility: CLINIC | Age: 37
End: 2017-02-21

## 2017-02-21 VITALS
BODY MASS INDEX: 38.95 KG/M2 | HEIGHT: 69 IN | HEART RATE: 96 BPM | SYSTOLIC BLOOD PRESSURE: 96 MMHG | WEIGHT: 263 LBS | DIASTOLIC BLOOD PRESSURE: 70 MMHG

## 2017-02-21 DIAGNOSIS — N63.20 BREAST MASS, LEFT: ICD-10-CM

## 2017-02-21 DIAGNOSIS — E66.01 SEVERE OBESITY (BMI 35.0-39.9): Primary | ICD-10-CM

## 2017-02-21 DIAGNOSIS — Z80.3 FH: BREAST CANCER: ICD-10-CM

## 2017-02-21 DIAGNOSIS — Z71.3 WEIGHT LOSS COUNSELING, ENCOUNTER FOR: ICD-10-CM

## 2017-02-21 PROCEDURE — 99214 OFFICE O/P EST MOD 30 MIN: CPT | Performed by: FAMILY MEDICINE

## 2017-02-21 RX ORDER — PHENTERMINE HYDROCHLORIDE 37.5 MG/1
37.5 TABLET ORAL
Qty: 30 TABLET | Refills: 0 | Status: SHIPPED | OUTPATIENT
Start: 2017-02-21 | End: 2017-04-11 | Stop reason: ALTCHOICE

## 2017-02-21 RX ORDER — AMOXICILLIN AND CLAVULANATE POTASSIUM 875; 125 MG/1; MG/1
TABLET, FILM COATED ORAL
Refills: 0 | COMMUNITY
Start: 2017-01-30 | End: 2017-02-21 | Stop reason: ALTCHOICE

## 2017-02-21 RX ORDER — LEVOTHYROXINE SODIUM 150 UG/1
1 CAPSULE ORAL DAILY
Refills: 1 | COMMUNITY
Start: 2017-01-31 | End: 2017-03-31

## 2017-02-21 NOTE — PROGRESS NOTES
Alis Hernadez is a 39year old female. HPI:   Patient is in for follow-up on obesity and weight loss medication. She is essentially interested in doing bariatric surgery and is going to call for a consult with Dr Philip Darling a bariatric surgeon.  Patient before breakfast. Disp: 30 tablet Rfl: 0   Phentermine HCl 15 MG Oral Cap Take 2 capsules (30 mg total) by mouth every morning. Disp: 60 capsule Rfl: 0   Probiotic Product (SOLUBLE FIBER/PROBIOTICS OR) Take 1 capsule by mouth daily.  Disp:  Rfl:    calciTRI Status: Never Used                        Alcohol Use: No                 REVIEW OF SYSTEMS:   GENERAL HEALTH: feels well otherwise  SKIN: denies any unusual skin lesions or rashes  RESPIRATORY: denies shortness of breath with exertion  CARDIOVASCULAR: den benefits and side effects. Patient was advised of side effects of phentermine including risk of pulmonary hypertension. Patient is to watch for side effects edema, shortness of breath, anxiety or chest pain.  Call office if  experiences any of these sympt

## 2017-02-22 ENCOUNTER — TELEPHONE (OUTPATIENT)
Dept: FAMILY MEDICINE CLINIC | Facility: CLINIC | Age: 37
End: 2017-02-22

## 2017-02-22 PROBLEM — N63.20 BREAST MASS, LEFT: Status: ACTIVE | Noted: 2017-02-22

## 2017-02-22 NOTE — TELEPHONE ENCOUNTER
Amira Roque from [employer] is questioning medication fentermine was ordered yesterday. Pt recently rcvd this medication - overlapping?

## 2017-02-22 NOTE — TELEPHONE ENCOUNTER
We cahnged it to the 37.5 mg she is going to take that instead and will save other for when she tapers off

## 2017-02-22 NOTE — TELEPHONE ENCOUNTER
Spoke to Anthony and she was questioning the two phentermine scripts just got one on 2/14/17 for 15mg capsules and then another one on 2/21/17 for the tablets. Are you ok with this?   I believe she was told to take 2 of the 15mg capsules until she came in t

## 2017-02-24 ENCOUNTER — APPOINTMENT (OUTPATIENT)
Dept: LAB | Age: 37
End: 2017-02-24
Attending: OTOLARYNGOLOGY
Payer: COMMERCIAL

## 2017-02-24 DIAGNOSIS — E07.9 THYROID DYSFUNCTION: ICD-10-CM

## 2017-02-24 LAB
FREE T4: 1.2 NG/DL (ref 0.9–1.8)
TSI SER-ACNC: 0.25 MIU/ML (ref 0.35–5.5)

## 2017-02-24 PROCEDURE — 36415 COLL VENOUS BLD VENIPUNCTURE: CPT

## 2017-02-24 PROCEDURE — 84443 ASSAY THYROID STIM HORMONE: CPT

## 2017-02-24 PROCEDURE — 84439 ASSAY OF FREE THYROXINE: CPT

## 2017-02-25 NOTE — PROGRESS NOTES
Quick Note:    Please inform tsh ft4 look great keep with current dosing and repeat tsh ft4 in 6 months  ______

## 2017-03-01 ENCOUNTER — HOSPITAL ENCOUNTER (EMERGENCY)
Age: 37
Discharge: HOME | End: 2017-03-01
Payer: COMMERCIAL

## 2017-03-01 ENCOUNTER — APPOINTMENT (OUTPATIENT)
Dept: GENERAL RADIOLOGY | Age: 37
End: 2017-03-01
Attending: PHYSICIAN ASSISTANT
Payer: COMMERCIAL

## 2017-03-01 ENCOUNTER — TELEPHONE (OUTPATIENT)
Dept: FAMILY MEDICINE CLINIC | Facility: CLINIC | Age: 37
End: 2017-03-01

## 2017-03-01 VITALS
RESPIRATION RATE: 20 BRPM | SYSTOLIC BLOOD PRESSURE: 110 MMHG | HEIGHT: 69 IN | TEMPERATURE: 98 F | DIASTOLIC BLOOD PRESSURE: 53 MMHG | OXYGEN SATURATION: 100 % | BODY MASS INDEX: 25.03 KG/M2 | HEART RATE: 105 BPM | WEIGHT: 169 LBS

## 2017-03-01 DIAGNOSIS — R07.89 CHEST PAIN, ATYPICAL: Primary | ICD-10-CM

## 2017-03-01 LAB
ALBUMIN SERPL-MCNC: 3.3 G/DL (ref 3.5–4.8)
ALP LIVER SERPL-CCNC: 74 U/L (ref 37–98)
ALT SERPL-CCNC: 14 U/L (ref 14–54)
AST SERPL-CCNC: 7 U/L (ref 15–41)
ATRIAL RATE: 105 BPM
BASOPHILS # BLD AUTO: 0.05 X10(3) UL (ref 0–0.1)
BASOPHILS NFR BLD AUTO: 0.6 %
BILIRUB SERPL-MCNC: 0.2 MG/DL (ref 0.1–2)
BUN BLD-MCNC: 9 MG/DL (ref 8–20)
CALCIUM BLD-MCNC: 7.9 MG/DL (ref 8.3–10.3)
CHLORIDE: 107 MMOL/L (ref 101–111)
CO2: 28 MMOL/L (ref 22–32)
CREAT BLD-MCNC: 0.72 MG/DL (ref 0.55–1.02)
D-DIMER: <0.27 UG/ML FEU (ref 0–0.49)
EOSINOPHIL # BLD AUTO: 0.09 X10(3) UL (ref 0–0.3)
EOSINOPHIL NFR BLD AUTO: 1 %
ERYTHROCYTE [DISTWIDTH] IN BLOOD BY AUTOMATED COUNT: 12.6 % (ref 11.5–16)
GLUCOSE BLD-MCNC: 105 MG/DL (ref 70–99)
HCT VFR BLD AUTO: 38.6 % (ref 34–50)
HGB BLD-MCNC: 13 G/DL (ref 12–16)
IMMATURE GRANULOCYTE COUNT: 0.03 X10(3) UL (ref 0–1)
IMMATURE GRANULOCYTE RATIO %: 0.3 %
LYMPHOCYTES # BLD AUTO: 1.73 X10(3) UL (ref 0.9–4)
LYMPHOCYTES NFR BLD AUTO: 19.8 %
M PROTEIN MFR SERPL ELPH: 6.8 G/DL (ref 6.1–8.3)
MCH RBC QN AUTO: 30.4 PG (ref 27–33.2)
MCHC RBC AUTO-ENTMCNC: 33.7 G/DL (ref 31–37)
MCV RBC AUTO: 90.4 FL (ref 81–100)
MONOCYTES # BLD AUTO: 0.52 X10(3) UL (ref 0.1–0.6)
MONOCYTES NFR BLD AUTO: 5.9 %
NEUTROPHIL ABS PRELIM: 6.32 X10 (3) UL (ref 1.3–6.7)
NEUTROPHILS # BLD AUTO: 6.32 X10(3) UL (ref 1.3–6.7)
NEUTROPHILS NFR BLD AUTO: 72.4 %
P AXIS: 50 DEGREES
P-R INTERVAL: 144 MS
PLATELET # BLD AUTO: 263 10(3)UL (ref 150–450)
POTASSIUM SERPL-SCNC: 3.5 MMOL/L (ref 3.6–5.1)
Q-T INTERVAL: 364 MS
QRS DURATION: 116 MS
QTC CALCULATION (BEZET): 481 MS
R AXIS: -15 DEGREES
RBC # BLD AUTO: 4.27 X10(6)UL (ref 3.8–5.1)
RED CELL DISTRIBUTION WIDTH-SD: 41.2 FL (ref 35.1–46.3)
SODIUM SERPL-SCNC: 141 MMOL/L (ref 136–144)
T AXIS: 25 DEGREES
TROPONIN: <0.046 NG/ML (ref ?–0.05)
VENTRICULAR RATE: 105 BPM
WBC # BLD AUTO: 8.7 X10(3) UL (ref 4–13)

## 2017-03-01 PROCEDURE — 96374 THER/PROPH/DIAG INJ IV PUSH: CPT

## 2017-03-01 PROCEDURE — 84484 ASSAY OF TROPONIN QUANT: CPT | Performed by: PHYSICIAN ASSISTANT

## 2017-03-01 PROCEDURE — 96375 TX/PRO/DX INJ NEW DRUG ADDON: CPT

## 2017-03-01 PROCEDURE — 71020 XR CHEST PA + LAT CHEST (CPT=71020): CPT

## 2017-03-01 PROCEDURE — 93010 ELECTROCARDIOGRAM REPORT: CPT

## 2017-03-01 PROCEDURE — 80053 COMPREHEN METABOLIC PANEL: CPT | Performed by: PHYSICIAN ASSISTANT

## 2017-03-01 PROCEDURE — 99285 EMERGENCY DEPT VISIT HI MDM: CPT

## 2017-03-01 PROCEDURE — 96376 TX/PRO/DX INJ SAME DRUG ADON: CPT

## 2017-03-01 PROCEDURE — 84484 ASSAY OF TROPONIN QUANT: CPT | Performed by: EMERGENCY MEDICINE

## 2017-03-01 PROCEDURE — 93005 ELECTROCARDIOGRAM TRACING: CPT

## 2017-03-01 PROCEDURE — 94640 AIRWAY INHALATION TREATMENT: CPT

## 2017-03-01 PROCEDURE — 85378 FIBRIN DEGRADE SEMIQUANT: CPT | Performed by: PHYSICIAN ASSISTANT

## 2017-03-01 PROCEDURE — 85025 COMPLETE CBC W/AUTO DIFF WBC: CPT | Performed by: PHYSICIAN ASSISTANT

## 2017-03-01 PROCEDURE — 96361 HYDRATE IV INFUSION ADD-ON: CPT

## 2017-03-01 RX ORDER — PANTOPRAZOLE SODIUM 40 MG/1
40 TABLET, DELAYED RELEASE ORAL DAILY
Qty: 10 TABLET | Refills: 0 | Status: SHIPPED | OUTPATIENT
Start: 2017-03-01 | End: 2017-03-06 | Stop reason: ALTCHOICE

## 2017-03-01 RX ORDER — KETOROLAC TROMETHAMINE 30 MG/ML
30 INJECTION, SOLUTION INTRAMUSCULAR; INTRAVENOUS ONCE
Status: DISCONTINUED | OUTPATIENT
Start: 2017-03-01 | End: 2017-03-01

## 2017-03-01 RX ORDER — IBUPROFEN 600 MG/1
600 TABLET ORAL ONCE
Status: COMPLETED | OUTPATIENT
Start: 2017-03-01 | End: 2017-03-01

## 2017-03-01 RX ORDER — ONDANSETRON 2 MG/ML
4 INJECTION INTRAMUSCULAR; INTRAVENOUS ONCE
Status: COMPLETED | OUTPATIENT
Start: 2017-03-01 | End: 2017-03-01

## 2017-03-01 RX ORDER — IBUPROFEN 600 MG/1
600 TABLET ORAL EVERY 8 HOURS PRN
Qty: 30 TABLET | Refills: 0 | Status: SHIPPED | OUTPATIENT
Start: 2017-03-01 | End: 2017-03-08

## 2017-03-01 RX ORDER — MORPHINE SULFATE 4 MG/ML
4 INJECTION, SOLUTION INTRAMUSCULAR; INTRAVENOUS ONCE
Status: COMPLETED | OUTPATIENT
Start: 2017-03-01 | End: 2017-03-01

## 2017-03-01 RX ORDER — ALBUTEROL SULFATE 90 UG/1
AEROSOL, METERED RESPIRATORY (INHALATION) EVERY 6 HOURS PRN
COMMUNITY
End: 2017-07-28

## 2017-03-01 RX ORDER — ALBUTEROL SULFATE 2.5 MG/3ML
2.5 SOLUTION RESPIRATORY (INHALATION) ONCE
Status: COMPLETED | OUTPATIENT
Start: 2017-03-01 | End: 2017-03-01

## 2017-03-01 NOTE — ED PROVIDER NOTES
Patient Seen in: THE [facility] Emergency Department In [place]    History   Patient presents with:  Dyspnea EMILIA SOB (respiratory)    Stated Complaint: emilia/chest pain/left arm pain    HPI        Past Medical History   Diagnosis Date   • Hypothyroidism    • An Lizette Galan DO;  Location: [address] [place] [address] BY [facility] [address] [facility] [address]  N SVC 5+ YR Bilateral 12/14/2015    Comment Procedure: FACET INJECTION UNDER FLUOROSCOPY;  Surgeon: Lizette Galan DO;  Location: [place] [address] 40 mg by mouth nightly.          Family History   Problem Relation Age of Onset   • Other[other] [OTHER] Mother      ALLIE   • Heart Disorder Father    • Heart Disease Father    • Diabetes Paternal Grandmother    • Heart Disorder Paternal Grandmother    • Hea found.    Disposition:  There is no disposition on file for this visit. Follow-up:  No follow-up provider specified.     Medications Prescribed:  Current Discharge Medication List                The patient's case was discussed with me by physician's ass

## 2017-03-01 NOTE — TELEPHONE ENCOUNTER
Left message for patient recommending that she go to the ER based upon what she relayed to our office.   Asked her to call back if she had any questions

## 2017-03-01 NOTE — ED INITIAL ASSESSMENT (HPI)
Pt has had some sob and progressed to chest pain for a couple days. Pt states it worsened last night. No recent travel or leg pain.

## 2017-03-01 NOTE — ED PROVIDER NOTES
Patient Seen in: THE [facility] Emergency Department In [place]    History   Patient presents with:  Dyspnea EMILIA SOB (respiratory)    Stated Complaint: emilia/chest pain/left arm pain    HPI    51-year-old female presents to the emergency department for evaluati HYSTERECTOMY      COLONOSCOPY,DIAGNOSTIC  10/22/2013    Comment Procedure: COLONOSCOPY, POSSIBLE BIOPSY, POSSIBLE POLYPECTOMY 19771;  Surgeon: Baltazar Brennan MD;  Location: [facility]      OTHER SURGICAL HISTORY      Comment part (eight) hours as needed for Pain or Fever. Pantoprazole Sodium 40 MG Oral Tab EC,  Take 1 tablet (40 mg total) by mouth daily. Levothyroxine Sodium (TIROSINT) 150 MCG Oral Cap,  Take 1 tablet by mouth daily.    TIROSINT 150 MCG Oral Cap,  Take 1 capsule 03/01/17 1545 111/56 mmHg   Pulse 03/01/17 1428 116   Resp 03/01/17 1428 20   Temp 03/01/17 1428 97.9 °F (36.6 °C)   Temp src 03/01/17 1428 Temporal   SpO2 03/01/17 1428 100 %   O2 Device 03/01/17 1428 None (Room air)       Current:/53 mmHg  Pulse 10 (FEU)    3rd Trimester:  0.13-1.70 ug/mL (FEU)    In pregnant females, refer to the chart above.   No studies have been performed  on pregnant females exclusively to validate the 95% negative predictive value  for venous thromboembolism when the D-Dimer is Patient reevaluated and was well-appearing. She demonstrates good understanding of treatment plan and follow-up. Follow-up with primary care doctor in 2 days. Return to this department for new or worsening symptoms.   All questions answered prior to disc

## 2017-03-02 LAB
ATRIAL RATE: 105 BPM
P AXIS: 55 DEGREES
P-R INTERVAL: 138 MS
Q-T INTERVAL: 368 MS
QRS DURATION: 114 MS
QTC CALCULATION (BEZET): 486 MS
R AXIS: -17 DEGREES
T AXIS: 11 DEGREES
VENTRICULAR RATE: 105 BPM

## 2017-03-06 ENCOUNTER — OFFICE VISIT (OUTPATIENT)
Dept: FAMILY MEDICINE CLINIC | Facility: CLINIC | Age: 37
End: 2017-03-06

## 2017-03-06 VITALS
BODY MASS INDEX: 38.8 KG/M2 | RESPIRATION RATE: 20 BRPM | HEART RATE: 110 BPM | HEIGHT: 69 IN | WEIGHT: 262 LBS | DIASTOLIC BLOOD PRESSURE: 70 MMHG | OXYGEN SATURATION: 98 % | SYSTOLIC BLOOD PRESSURE: 116 MMHG

## 2017-03-06 DIAGNOSIS — R06.02 SHORTNESS OF BREATH: ICD-10-CM

## 2017-03-06 DIAGNOSIS — R07.89 ATYPICAL CHEST PAIN: Primary | ICD-10-CM

## 2017-03-06 PROCEDURE — 99214 OFFICE O/P EST MOD 30 MIN: CPT | Performed by: FAMILY MEDICINE

## 2017-03-06 NOTE — PATIENT INSTRUCTIONS
-- increase omeprazole to 40mg 2x/day for next 2 wks  -- continue to monitor symptoms - call us if worsening despite above  -- continue albuterol as needed  -- I will touch base with [facility] tomorrow

## 2017-03-06 NOTE — PROGRESS NOTES
Marcello Alvarenga is a 39year old female here for Patient presents with:  ER F/U: 03/01/2017 ER for chest pain & Left arm pain   Breathing Problem: The patient states that she has been using her inhailer 4x daily       HPI:     Chest pain  -had chest nella subsequent scar revision    OTHER SURGICAL HISTORY      Comment laparoscopies x2    SURGICAL STENT Bilateral March 2015.     Comment Bilateral iliac stents    ANKLE SCOPE,PART DEBRIDEMENT Left 6/19/2015    Comment Procedure: ARTHROSCOPY ANKLE WITH DEBRIDEME Prescriptions:  Albuterol Sulfate HFA (PROAIR HFA) 108 (90 Base) MCG/ACT Inhalation Aero Soln Inhale into the lungs every 6 (six) hours as needed for Wheezing.  Disp:  Rfl:    ibuprofen 600 MG Oral Tab Take 1 tablet (600 mg total) by mouth every 8 (eight) h Denies  --HEME/LYMPH/IMMUN: Denies  --ENDO: Denies  --SKIN: Denies  All other systems reviewed are negative    PHYSICAL EXAM:   /70 mmHg  Pulse 110  Resp 20  Ht 69\"  Wt 262 lb  BMI 38.67 kg/m2  SpO2 98%  LMP 03/03/2013    Gen: NAD, alert and oriente

## 2017-03-14 ENCOUNTER — TELEPHONE (OUTPATIENT)
Dept: FAMILY MEDICINE CLINIC | Facility: CLINIC | Age: 37
End: 2017-03-14

## 2017-03-14 NOTE — TELEPHONE ENCOUNTER
Pt called and said she has had a headache for 8 days. She has taken Ibuprofen and tylenol and also Excedrin migraine meds. She wants any other suggestions please.

## 2017-03-15 ENCOUNTER — OFFICE VISIT (OUTPATIENT)
Dept: FAMILY MEDICINE CLINIC | Facility: CLINIC | Age: 37
End: 2017-03-15

## 2017-03-15 VITALS
HEART RATE: 104 BPM | DIASTOLIC BLOOD PRESSURE: 66 MMHG | HEIGHT: 69 IN | TEMPERATURE: 97 F | WEIGHT: 265 LBS | BODY MASS INDEX: 39.25 KG/M2 | SYSTOLIC BLOOD PRESSURE: 114 MMHG | RESPIRATION RATE: 18 BRPM

## 2017-03-15 DIAGNOSIS — G43.019 INTRACTABLE MIGRAINE WITHOUT AURA AND WITHOUT STATUS MIGRAINOSUS: Primary | ICD-10-CM

## 2017-03-15 PROCEDURE — 99214 OFFICE O/P EST MOD 30 MIN: CPT | Performed by: FAMILY MEDICINE

## 2017-03-15 RX ORDER — SUMATRIPTAN 50 MG/1
50 TABLET, FILM COATED ORAL EVERY 2 HOUR PRN
Qty: 6 TABLET | Refills: 0 | Status: SHIPPED | OUTPATIENT
Start: 2017-03-15 | End: 2017-03-18

## 2017-03-15 RX ORDER — INDOMETHACIN 50 MG/1
50 CAPSULE ORAL 2 TIMES DAILY WITH MEALS
Qty: 30 CAPSULE | Refills: 0 | Status: SHIPPED | OUTPATIENT
Start: 2017-03-15 | End: 2017-04-11

## 2017-03-15 NOTE — PATIENT INSTRUCTIONS
-- take one dose of sumatriptan 50mg when you get home, can repeat in 2 hours if still having symptoms  -- start indomethacin up to 2-3x/day with food (start when due for next dose of ibuprofen)  -- call or followup if not improving in next couple of days

## 2017-03-15 NOTE — PROGRESS NOTES
Marcello Valentespencer is a 39year old female here for Patient presents with:  Headache: Left side started 9 days ago       HPI:     Headaches  -started 9 days ago  -started as a regular headache mostly in the front  -then progressed to primarily left sided - Procedure: ARTHROSCOPY ANKLE WITH DEBRIDEMENT;  Surgeon: Caprice Alcantar MD;  Location: [facility]    GASTROCNEMIUS RECESSION Left 6/19/2015    Comment Procedure: GASTROC RECESSION FOOT;  Surgeon: Caprice Alcantar MD;  Location: [facility] Oral Cap Take 1 capsule (50 mg total) by mouth 2 (two) times daily with meals. Disp: 30 capsule Rfl: 0   Albuterol Sulfate HFA (PROAIR HFA) 108 (90 Base) MCG/ACT Inhalation Aero Soln Inhale into the lungs every 6 (six) hours as needed for Wheezing.  Disp: EXAM:   /66 mmHg  Pulse 104  Temp(Src) 97 °F (36.1 °C) (Oral)  Resp 18  Ht 69\"  Wt 265 lb  BMI 39.12 kg/m2  LMP 03/03/2013    Gen: NAD, alert and oriented x 3  Head: NCAT, pupils equal and round  Pulm: CTAB, no wheezing  CV: RRR, no murmurs  Ext: fu

## 2017-03-16 ENCOUNTER — PATIENT MESSAGE (OUTPATIENT)
Dept: FAMILY MEDICINE CLINIC | Facility: CLINIC | Age: 37
End: 2017-03-16

## 2017-03-16 RX ORDER — AMITRIPTYLINE HYDROCHLORIDE 25 MG/1
25 TABLET, FILM COATED ORAL NIGHTLY
Qty: 30 TABLET | Refills: 0 | Status: SHIPPED | OUTPATIENT
Start: 2017-03-16 | End: 2017-05-15

## 2017-03-16 NOTE — TELEPHONE ENCOUNTER
Regarding: FW: Other  Contact: [phone]  Elavil 25 mg at night   ----- Message -----     From: Steve Westfall MD     Sent: 3/16/2017   2:11 PM       To:  Rene Jay PA-C  Subject: Other                                            ----- Message f

## 2017-03-16 NOTE — TELEPHONE ENCOUNTER
From: Marva Head  To: Aliza Fernandez PA-C  Sent: 3/16/2017 12:38 PM CDT  Subject: Other    Otis Moore,   I saw Dr. Laverne Rodríguez yesterday and he believes that I am experiencing a migraine.  He prescribed me Imitrex to take and also a stronger anti-inf

## 2017-03-17 ENCOUNTER — TELEPHONE (OUTPATIENT)
Dept: FAMILY MEDICINE CLINIC | Facility: CLINIC | Age: 37
End: 2017-03-17

## 2017-03-17 RX ORDER — ELETRIPTAN HYDROBROMIDE 40 MG/1
TABLET, FILM COATED ORAL
Qty: 10 TABLET | Refills: 0 | Status: SHIPPED | OUTPATIENT
Start: 2017-03-17 | End: 2017-03-29 | Stop reason: ALTCHOICE

## 2017-03-17 NOTE — TELEPHONE ENCOUNTER
Change from Imitrex  to Relpax 40 mg take one now and repeat 2 hours later if needed max is 2 in 24 hours. If not working then Medrol dose gilda to get the inflammation down.

## 2017-03-17 NOTE — TELEPHONE ENCOUNTER
Spoke to patient and she said she saw Dr. Cortez Smart for headache on Wednesday and got imitrex and anti inflammatories. Then patient called yesterday and she got Amitriptylline and she took one last night and woke up this AM with worse headache then ever.   Chester Hoffman

## 2017-03-18 ENCOUNTER — APPOINTMENT (OUTPATIENT)
Dept: CT IMAGING | Age: 37
End: 2017-03-18
Attending: EMERGENCY MEDICINE
Payer: COMMERCIAL

## 2017-03-18 ENCOUNTER — HOSPITAL ENCOUNTER (EMERGENCY)
Age: 37
Discharge: HOME | End: 2017-03-18
Attending: EMERGENCY MEDICINE
Payer: COMMERCIAL

## 2017-03-18 VITALS
BODY MASS INDEX: 38.51 KG/M2 | DIASTOLIC BLOOD PRESSURE: 59 MMHG | RESPIRATION RATE: 16 BRPM | SYSTOLIC BLOOD PRESSURE: 95 MMHG | WEIGHT: 260 LBS | TEMPERATURE: 99 F | HEIGHT: 69 IN | HEART RATE: 64 BPM | OXYGEN SATURATION: 100 %

## 2017-03-18 DIAGNOSIS — G43.809 OTHER MIGRAINE WITHOUT STATUS MIGRAINOSUS, NOT INTRACTABLE: Primary | ICD-10-CM

## 2017-03-18 LAB
ALBUMIN SERPL-MCNC: 3.3 G/DL (ref 3.5–4.8)
ALP LIVER SERPL-CCNC: 82 U/L (ref 37–98)
ALT SERPL-CCNC: 19 U/L (ref 14–54)
AST SERPL-CCNC: 14 U/L (ref 15–41)
BASOPHILS # BLD AUTO: 0.02 X10(3) UL (ref 0–0.1)
BASOPHILS NFR BLD AUTO: 0.3 %
BILIRUB SERPL-MCNC: 0.3 MG/DL (ref 0.1–2)
BUN BLD-MCNC: 11 MG/DL (ref 8–20)
CALCIUM BLD-MCNC: 8.3 MG/DL (ref 8.3–10.3)
CHLORIDE: 106 MMOL/L (ref 101–111)
CO2: 28 MMOL/L (ref 22–32)
CREAT BLD-MCNC: 0.71 MG/DL (ref 0.55–1.02)
EOSINOPHIL # BLD AUTO: 0.09 X10(3) UL (ref 0–0.3)
EOSINOPHIL NFR BLD AUTO: 1.3 %
ERYTHROCYTE [DISTWIDTH] IN BLOOD BY AUTOMATED COUNT: 12.6 % (ref 11.5–16)
GLUCOSE BLD-MCNC: 104 MG/DL (ref 70–99)
HCT VFR BLD AUTO: 41.7 % (ref 34–50)
HGB BLD-MCNC: 13.9 G/DL (ref 12–16)
IMMATURE GRANULOCYTE COUNT: 0.01 X10(3) UL (ref 0–1)
IMMATURE GRANULOCYTE RATIO %: 0.1 %
LYMPHOCYTES # BLD AUTO: 2 X10(3) UL (ref 0.9–4)
LYMPHOCYTES NFR BLD AUTO: 29.7 %
M PROTEIN MFR SERPL ELPH: 7 G/DL (ref 6.1–8.3)
MCH RBC QN AUTO: 30 PG (ref 27–33.2)
MCHC RBC AUTO-ENTMCNC: 33.3 G/DL (ref 31–37)
MCV RBC AUTO: 90.1 FL (ref 81–100)
MONOCYTES # BLD AUTO: 0.39 X10(3) UL (ref 0.1–0.6)
MONOCYTES NFR BLD AUTO: 5.8 %
NEUTROPHIL ABS PRELIM: 4.23 X10 (3) UL (ref 1.3–6.7)
NEUTROPHILS # BLD AUTO: 4.23 X10(3) UL (ref 1.3–6.7)
NEUTROPHILS NFR BLD AUTO: 62.8 %
PLATELET # BLD AUTO: 300 10(3)UL (ref 150–450)
POTASSIUM SERPL-SCNC: 3.9 MMOL/L (ref 3.6–5.1)
RBC # BLD AUTO: 4.63 X10(6)UL (ref 3.8–5.1)
RED CELL DISTRIBUTION WIDTH-SD: 41.2 FL (ref 35.1–46.3)
SODIUM SERPL-SCNC: 141 MMOL/L (ref 136–144)
WBC # BLD AUTO: 6.7 X10(3) UL (ref 4–13)

## 2017-03-18 PROCEDURE — 96376 TX/PRO/DX INJ SAME DRUG ADON: CPT

## 2017-03-18 PROCEDURE — 99284 EMERGENCY DEPT VISIT MOD MDM: CPT

## 2017-03-18 PROCEDURE — 96375 TX/PRO/DX INJ NEW DRUG ADDON: CPT

## 2017-03-18 PROCEDURE — 85025 COMPLETE CBC W/AUTO DIFF WBC: CPT | Performed by: EMERGENCY MEDICINE

## 2017-03-18 PROCEDURE — 96361 HYDRATE IV INFUSION ADD-ON: CPT

## 2017-03-18 PROCEDURE — 70450 CT HEAD/BRAIN W/O DYE: CPT

## 2017-03-18 PROCEDURE — 96374 THER/PROPH/DIAG INJ IV PUSH: CPT

## 2017-03-18 PROCEDURE — 80053 COMPREHEN METABOLIC PANEL: CPT | Performed by: EMERGENCY MEDICINE

## 2017-03-18 RX ORDER — HYDROMORPHONE HYDROCHLORIDE 1 MG/ML
1 INJECTION, SOLUTION INTRAMUSCULAR; INTRAVENOUS; SUBCUTANEOUS ONCE
Status: COMPLETED | OUTPATIENT
Start: 2017-03-18 | End: 2017-03-18

## 2017-03-18 RX ORDER — SODIUM CHLORIDE 9 MG/ML
INJECTION, SOLUTION INTRAVENOUS ONCE
Status: DISCONTINUED | OUTPATIENT
Start: 2017-03-18 | End: 2017-03-18

## 2017-03-18 RX ORDER — HYDROMORPHONE HYDROCHLORIDE 1 MG/ML
0.5 INJECTION, SOLUTION INTRAMUSCULAR; INTRAVENOUS; SUBCUTANEOUS ONCE
Status: COMPLETED | OUTPATIENT
Start: 2017-03-18 | End: 2017-03-18

## 2017-03-18 RX ORDER — DEXAMETHASONE SODIUM PHOSPHATE 4 MG/ML
10 VIAL (ML) INJECTION ONCE
Status: COMPLETED | OUTPATIENT
Start: 2017-03-18 | End: 2017-03-18

## 2017-03-18 RX ORDER — ONDANSETRON 2 MG/ML
4 INJECTION INTRAMUSCULAR; INTRAVENOUS ONCE
Status: COMPLETED | OUTPATIENT
Start: 2017-03-18 | End: 2017-03-18

## 2017-03-18 RX ORDER — SODIUM CHLORIDE 9 MG/ML
INJECTION, SOLUTION INTRAVENOUS ONCE
Status: COMPLETED | OUTPATIENT
Start: 2017-03-18 | End: 2017-03-18

## 2017-03-18 NOTE — ED PROVIDER NOTES
Patient Seen in: THE [facility] Emergency Department In [place]    History   Patient presents with:  Headache (neurologic)    Stated Complaint: migraine    HPI    Patient is a 51-year-old female with medical history as noted below who presents emergency room POSSIBLE BIOPSY, POSSIBLE POLYPECTOMY 15633;  Surgeon: Annella Homans, MD;  Location: [address] [place] [address]      OTHER SURGICAL HISTORY      Comment partial thyroidectomy with subsequent scar revision    OTHER SURGICAL HISTORY      Comment l times daily with meals. Albuterol Sulfate HFA (PROAIR HFA) 108 (90 Base) MCG/ACT Inhalation Aero Soln,  Inhale into the lungs every 6 (six) hours as needed for Wheezing. TIROSINT 150 MCG Oral Cap,  Take 1 capsule by mouth daily.    Phentermine HCl 37.5 03/18/17 1402 99 %   O2 Device 03/18/17 1402 None (Room air)       Current:BP 95/59 mmHg  Pulse 64  Temp(Src) 99.1 °F (37.3 °C) (Temporal)  Resp 16  Ht 175.3 cm (5' 9\")  Wt 117.935 kg  BMI 38.38 kg/m2  SpO2 100%  LMP 03/03/2013        Physical Exam    GEN -----------         ------                     CBC W/ DIFFERENTIAL[[id number]]                              Final result                 Please view results for these tests on the individual orders.    RAINBOW DRAW BLUE   RAINBOW DRAW GOLD   RAINBOW DRAW LAV Sherlyn Mcgraw [address]  [phone]    Call in 2 days  [facility]      Medications Prescribed:  Current Discharge Medication List

## 2017-03-19 ENCOUNTER — TELEPHONE (OUTPATIENT)
Dept: FAMILY MEDICINE CLINIC | Facility: CLINIC | Age: 37
End: 2017-03-19

## 2017-03-20 NOTE — TELEPHONE ENCOUNTER
Felice Rodrigues PA-C on-call, telephone call received 3/18/17 from patient in regards to headache.   She was in the emergency room got  Solu-Medrol and pain medication but wants to know what she is to do now she has no pain medication at home but has predn

## 2017-03-27 ENCOUNTER — HOSPITAL ENCOUNTER (OUTPATIENT)
Dept: MAMMOGRAPHY | Age: 37
Discharge: HOME | End: 2017-03-27
Attending: FAMILY MEDICINE
Payer: COMMERCIAL

## 2017-03-27 ENCOUNTER — HOSPITAL ENCOUNTER (OUTPATIENT)
Dept: ULTRASOUND IMAGING | Age: 37
Discharge: HOME | End: 2017-03-27
Attending: FAMILY MEDICINE
Payer: COMMERCIAL

## 2017-03-27 DIAGNOSIS — Z80.3 FH: BREAST CANCER: ICD-10-CM

## 2017-03-27 DIAGNOSIS — N63.20 BREAST MASS, LEFT: ICD-10-CM

## 2017-03-27 PROCEDURE — 76642 ULTRASOUND BREAST LIMITED: CPT

## 2017-03-27 PROCEDURE — 77062 BREAST TOMOSYNTHESIS BI: CPT

## 2017-03-27 PROCEDURE — 77066 DX MAMMO INCL CAD BI: CPT

## 2017-03-29 ENCOUNTER — OFFICE VISIT (OUTPATIENT)
Dept: FAMILY MEDICINE CLINIC | Facility: CLINIC | Age: 37
End: 2017-03-29

## 2017-03-29 VITALS
SYSTOLIC BLOOD PRESSURE: 118 MMHG | RESPIRATION RATE: 16 BRPM | DIASTOLIC BLOOD PRESSURE: 70 MMHG | HEIGHT: 69 IN | OXYGEN SATURATION: 98 % | BODY MASS INDEX: 40.43 KG/M2 | HEART RATE: 102 BPM | TEMPERATURE: 97 F | WEIGHT: 273 LBS

## 2017-03-29 DIAGNOSIS — J01.10 ACUTE FRONTAL SINUSITIS, RECURRENCE NOT SPECIFIED: Primary | ICD-10-CM

## 2017-03-29 DIAGNOSIS — R05.9 COUGH: ICD-10-CM

## 2017-03-29 PROCEDURE — 99214 OFFICE O/P EST MOD 30 MIN: CPT | Performed by: FAMILY MEDICINE

## 2017-03-29 RX ORDER — AMOXICILLIN AND CLAVULANATE POTASSIUM 875; 125 MG/1; MG/1
1 TABLET, FILM COATED ORAL 2 TIMES DAILY
Qty: 14 TABLET | Refills: 0 | Status: SHIPPED | OUTPATIENT
Start: 2017-03-29 | End: 2017-04-05

## 2017-03-29 NOTE — PROGRESS NOTES
CC:  Nia Yuan is a 39year old female here for Patient presents with:  URI: cough, congestion  Shortness Of Breath: x 1 week      HPI:     URI  -started 1 wk ago  -son was sick at that time  -associated with initially was congested, then progress mouth daily. Disp:  Rfl:    Zolpidem Tartrate ER (AMBIEN CR) 12.5 MG Oral Tab CR Take 12.5 mg by mouth nightly. Disp:  Rfl:    Escitalopram Oxalate (LEXAPRO) 20 MG Oral Tab Take 40 mg by mouth nightly.    Disp:  Rfl:        Allergies:    Cephalosporins COLONOSCOPY,DIAGNOSTIC  10/22/2013    Comment Procedure: COLONOSCOPY, POSSIBLE BIOPSY, POSSIBLE POLYPECTOMY 44025;  Surgeon: Clay Franco MD;  Location: [address] [place] [address]      OTHER SURGICAL HISTORY      Comment partial thyroidectomy BMI 40.30 kg/m2  SpO2 98%  LMP 03/03/2013    GENERAL: A&O well developed, well nourished,in no apparent distress  HEENT: atraumatic, pupils round and reactive to light, MMM, pharyngeal erythema without edema or exudates, TMs normal  NECK: supple, no thyrom

## 2017-03-29 NOTE — PATIENT INSTRUCTIONS
-- start otc plain mucinex-DM (without pseudophed or phenylephrine) as needed for congestion or cough - try the pills instead of the syrup  -- tessalon as needed for cough  -- if not improving, start prednisone 40-60mg daily for at least 3 days  -- if stil

## 2017-04-11 ENCOUNTER — OFFICE VISIT (OUTPATIENT)
Dept: FAMILY MEDICINE CLINIC | Facility: CLINIC | Age: 37
End: 2017-04-11

## 2017-04-11 VITALS
HEIGHT: 69 IN | BODY MASS INDEX: 41.18 KG/M2 | SYSTOLIC BLOOD PRESSURE: 104 MMHG | WEIGHT: 278 LBS | DIASTOLIC BLOOD PRESSURE: 78 MMHG | HEART RATE: 96 BPM

## 2017-04-11 DIAGNOSIS — Z90.710 H/O: HYSTERECTOMY: ICD-10-CM

## 2017-04-11 DIAGNOSIS — R60.1 GENERALIZED EDEMA: ICD-10-CM

## 2017-04-11 DIAGNOSIS — Z87.42 HISTORY OF ENDOMETRIOSIS: ICD-10-CM

## 2017-04-11 DIAGNOSIS — Z71.3 WEIGHT LOSS COUNSELING, ENCOUNTER FOR: ICD-10-CM

## 2017-04-11 DIAGNOSIS — E66.01 MORBID OBESITY, UNSPECIFIED OBESITY TYPE (HCC): Primary | ICD-10-CM

## 2017-04-11 PROCEDURE — 99214 OFFICE O/P EST MOD 30 MIN: CPT | Performed by: FAMILY MEDICINE

## 2017-04-11 PROCEDURE — 81003 URINALYSIS AUTO W/O SCOPE: CPT | Performed by: FAMILY MEDICINE

## 2017-04-11 RX ORDER — ELETRIPTAN HYDROBROMIDE 40 MG/1
TABLET, FILM COATED ORAL
Refills: 0 | COMMUNITY
Start: 2017-03-29 | End: 2017-06-01

## 2017-04-11 RX ORDER — SUMATRIPTAN 50 MG/1
TABLET, FILM COATED ORAL
Refills: 0 | COMMUNITY
Start: 2017-03-15 | End: 2017-04-11 | Stop reason: ALTCHOICE

## 2017-04-11 RX ORDER — HYDROCHLOROTHIAZIDE 12.5 MG/1
CAPSULE, GELATIN COATED ORAL DAILY
Qty: 10 CAPSULE | Refills: 0 | Status: SHIPPED | OUTPATIENT
Start: 2017-04-11 | End: 2017-05-15

## 2017-04-11 NOTE — PROGRESS NOTES
Lucien James is a 39year old female. HPI:   Scheduled for consultation with bariatric surgeon Dr Khushboo Dunlap. Has not had success with weight loss due to health problems.   Patient had to be on prednisone for migraine that would not resolve then shailesh HEADACHE. MAY REPEAT 1 T IN 2 HOURS. MAX DOSE 2 TS / 24 HOURS Disp:  Rfl: 0   hydrochlorothiazide 12.5 MG Oral Cap Take 1-2 capsules (12.5-25 mg total) by mouth daily. Disp: 10 capsule Rfl: 0   TIROSINT 150 MCG Oral Cap Take 1 capsule by mouth daily.  Disp: • Pneumonia, organism unspecified    • Calculus of kidney    • Pancreatitis    • Migraines       Social History:    Smoking Status: Never Smoker                      Smokeless Status: Never Used                        Alcohol Use: No                 REVI LEUKOCYTES Negative Negative   APPEARANCE Clear Clear   URINE-COLOR Pale Yellow Yellow   Multistix Lot# [id number] Numeric   Multistix Expiration Date 03/31/2018 Date     Wt Readings from Last 6 Encounters:  04/11/17 : 278 lb  03/29/17 : 273 lb  03/18/17 : 2 lower calorie diet  3.  History of endometriosis   H/O: hysterectomy  Per patient request wanted to check status of ovary functioning  Patient follows Dr. Elise Martinez for surgical consults has wanted to have ovary removed secondary to endometrial pain in the pas

## 2017-04-12 ENCOUNTER — APPOINTMENT (OUTPATIENT)
Dept: LAB | Age: 37
End: 2017-04-12
Attending: FAMILY MEDICINE
Payer: COMMERCIAL

## 2017-04-12 DIAGNOSIS — Z87.42 HISTORY OF ENDOMETRIOSIS: ICD-10-CM

## 2017-04-12 DIAGNOSIS — Z90.710 H/O: HYSTERECTOMY: ICD-10-CM

## 2017-04-12 DIAGNOSIS — R60.1 GENERALIZED EDEMA: ICD-10-CM

## 2017-04-12 PROBLEM — N63.20 BREAST MASS, LEFT: Status: RESOLVED | Noted: 2017-02-22 | Resolved: 2017-04-12

## 2017-04-12 PROBLEM — E66.01 MORBID OBESITY (HCC): Status: ACTIVE | Noted: 2017-04-12

## 2017-04-12 PROCEDURE — 36415 COLL VENOUS BLD VENIPUNCTURE: CPT

## 2017-04-12 PROCEDURE — 82670 ASSAY OF TOTAL ESTRADIOL: CPT

## 2017-04-12 PROCEDURE — 83001 ASSAY OF GONADOTROPIN (FSH): CPT

## 2017-04-12 PROCEDURE — 80053 COMPREHEN METABOLIC PANEL: CPT

## 2017-04-12 PROCEDURE — 83002 ASSAY OF GONADOTROPIN (LH): CPT

## 2017-04-13 NOTE — PROGRESS NOTES
Quick Note:    Kidney function looks great.  Hormones are working and ovaries still seem functional.   Sent to my chart  ______

## 2017-04-21 ENCOUNTER — TELEPHONE (OUTPATIENT)
Dept: SURGERY | Facility: CLINIC | Age: 37
End: 2017-04-21

## 2017-04-21 NOTE — TELEPHONE ENCOUNTER
4/21/17 @ 8:56am Spoke to Deny at [facility], [phone]. [facility]#[id number]. She verified that patient has following benefits for Bariatric services: No wgt mngmnt criteria. No ESAU/Blue Disctionction req'd.   ARNIE ([facility]#[id number]) DX E66.01 Pt has benefits for Die

## 2017-04-25 ENCOUNTER — APPOINTMENT (OUTPATIENT)
Dept: CT IMAGING | Age: 37
End: 2017-04-25
Attending: EMERGENCY MEDICINE
Payer: COMMERCIAL

## 2017-04-25 ENCOUNTER — TELEPHONE (OUTPATIENT)
Dept: FAMILY MEDICINE CLINIC | Facility: CLINIC | Age: 37
End: 2017-04-25

## 2017-04-25 ENCOUNTER — HOSPITAL ENCOUNTER (EMERGENCY)
Age: 37
Discharge: HOME | End: 2017-04-26
Attending: EMERGENCY MEDICINE
Payer: COMMERCIAL

## 2017-04-25 DIAGNOSIS — R10.9 ABDOMINAL PAIN OF UNKNOWN ETIOLOGY: Primary | ICD-10-CM

## 2017-04-25 PROCEDURE — 96375 TX/PRO/DX INJ NEW DRUG ADDON: CPT

## 2017-04-25 PROCEDURE — 80053 COMPREHEN METABOLIC PANEL: CPT | Performed by: EMERGENCY MEDICINE

## 2017-04-25 PROCEDURE — 96374 THER/PROPH/DIAG INJ IV PUSH: CPT

## 2017-04-25 PROCEDURE — 96376 TX/PRO/DX INJ SAME DRUG ADON: CPT

## 2017-04-25 PROCEDURE — 96361 HYDRATE IV INFUSION ADD-ON: CPT

## 2017-04-25 PROCEDURE — 81003 URINALYSIS AUTO W/O SCOPE: CPT | Performed by: EMERGENCY MEDICINE

## 2017-04-25 PROCEDURE — 85025 COMPLETE CBC W/AUTO DIFF WBC: CPT | Performed by: EMERGENCY MEDICINE

## 2017-04-25 PROCEDURE — 99284 EMERGENCY DEPT VISIT MOD MDM: CPT

## 2017-04-25 PROCEDURE — 74177 CT ABD & PELVIS W/CONTRAST: CPT

## 2017-04-25 PROCEDURE — 83690 ASSAY OF LIPASE: CPT | Performed by: EMERGENCY MEDICINE

## 2017-04-25 RX ORDER — HALOPERIDOL 5 MG/ML
5 INJECTION INTRAMUSCULAR ONCE
Status: DISCONTINUED | OUTPATIENT
Start: 2017-04-25 | End: 2017-04-26

## 2017-04-25 RX ORDER — HYDROMORPHONE HYDROCHLORIDE 1 MG/ML
1 INJECTION, SOLUTION INTRAMUSCULAR; INTRAVENOUS; SUBCUTANEOUS EVERY 30 MIN PRN
Status: DISCONTINUED | OUTPATIENT
Start: 2017-04-25 | End: 2017-04-26

## 2017-04-25 RX ORDER — ONDANSETRON 2 MG/ML
4 INJECTION INTRAMUSCULAR; INTRAVENOUS ONCE
Status: COMPLETED | OUTPATIENT
Start: 2017-04-25 | End: 2017-04-25

## 2017-04-25 RX ORDER — LORAZEPAM 2 MG/ML
1 INJECTION INTRAMUSCULAR ONCE
Status: COMPLETED | OUTPATIENT
Start: 2017-04-25 | End: 2017-04-25

## 2017-04-25 NOTE — Clinical Note
Rx called  In. Vipin Simmons! You do always get the interesting patients! Thank you for referring Ms. Sammi Shah for rheumatologic evaluation. Please see the discussion portion of my note and let me know if you have any questions.    Tony Dean,  [facility] 12/29/2022

## 2017-04-25 NOTE — TELEPHONE ENCOUNTER
Per Coleen Cancino PA-C, the patient was contacted in order to complete a repeat ACT over-the-phone. The patient scored a 21.

## 2017-04-26 VITALS
WEIGHT: 275 LBS | RESPIRATION RATE: 16 BRPM | DIASTOLIC BLOOD PRESSURE: 73 MMHG | BODY MASS INDEX: 40.73 KG/M2 | SYSTOLIC BLOOD PRESSURE: 121 MMHG | HEIGHT: 69 IN | HEART RATE: 78 BPM | OXYGEN SATURATION: 98 % | TEMPERATURE: 99 F

## 2017-04-26 RX ORDER — ONDANSETRON 8 MG/1
8 TABLET, ORALLY DISINTEGRATING ORAL EVERY 8 HOURS PRN
Qty: 10 TABLET | Refills: 0 | Status: SHIPPED | OUTPATIENT
Start: 2017-04-26 | End: 2017-05-03

## 2017-04-26 RX ORDER — HYDROCODONE BITARTRATE AND ACETAMINOPHEN 10; 325 MG/1; MG/1
1-2 TABLET ORAL EVERY 4 HOURS PRN
Qty: 20 TABLET | Refills: 0 | Status: SHIPPED | OUTPATIENT
Start: 2017-04-26 | End: 2017-05-03

## 2017-04-26 NOTE — ED PROVIDER NOTES
Patient Seen in: THE [facility] Emergency Department In [place]    History   Patient presents with:  Abdomen/Flank Pain (GI/)    Stated Complaint: Abd pain    HPI    Patient presents with pain in the left upper quadrant since morning.   Pain is been crampy i GASTROCNEMIUS RECESSION Left 6/19/2015    Comment Procedure: GASTROC RECESSION FOOT;  Surgeon: Kristen Kim MD;  Location: [facility]    OTHER      Comment stent to iliac crest bone    INJ PARAVERT F JNT L/S 1 LEV Bilateral 12/14/2015    Comment daily.   calciTRIOL 0.25 MCG Oral Cap,  Take 1 capsule (0.25 mcg total) by mouth daily. OMEPRAZOLE 40 MG Oral Capsule Delayed Release,  TAKE 1 CAPSULE BY MOUTH DAILY   Cholecalciferol (VITAMIN D) 1000 UNITS Oral Tab,  Take 1 tablet by mouth daily.      Vi Oropharynx moist  Neck: Supple without adenopathy  Lungs: Clear  Abdomen: Bowel sounds active. Vague tenderness left upper quadrant without guarding or rebound tenderness. No mass or HSM. No hernia. No CVA tenderness.   Extremities: No joint tenderness as needed for Pain., Script printed, Disp-20 tablet, R-0    ondansetron 8 MG Oral Tablet Dispersible  Take 1 tablet (8 mg total) by mouth every 8 (eight) hours as needed., Normal, Disp-10 tablet, R-0

## 2017-05-02 ENCOUNTER — TELEPHONE (OUTPATIENT)
Dept: SURGERY | Facility: CLINIC | Age: 37
End: 2017-05-02

## 2017-05-02 ENCOUNTER — OFFICE VISIT (OUTPATIENT)
Dept: SURGERY | Facility: CLINIC | Age: 37
End: 2017-05-02

## 2017-05-02 VITALS
HEIGHT: 68.5 IN | RESPIRATION RATE: 16 BRPM | BODY MASS INDEX: 41.5 KG/M2 | HEART RATE: 104 BPM | SYSTOLIC BLOOD PRESSURE: 135 MMHG | DIASTOLIC BLOOD PRESSURE: 87 MMHG | WEIGHT: 277 LBS

## 2017-05-02 NOTE — H&P
[address] [place] [address]  Dept: [phone]    5/2/2017  Bariatric New Patient Evaluation    Chief Complaint:  obesity     History of Present Illness:  Ronel Tamayo OTHER DISEASES      rectal bleeding   • Anxiety    • Pneumonia august 2014   • Extrinsic asthma, unspecified      2014 was in ER and was hospitalized over night   • Shortness of breath    • Reflux    • Arthritis    • Asthma    • Esophageal reflux    • Pneu [address]    HYSTEROSCOPY  2011    LAPAROSCOPIC CHOLECYSTECTOMY N/A 11/23/2016    Comment Procedure: LAPAROSCOPIC CHOLECYSTECTOMY;  Surgeon: John Jasso MD;  Location: [facility] MAIN OR    REMOVAL GALLBLADDER      UPPER GI ENDOSCOPY PERFORMED  01/ Release, TAKE 1 CAPSULE BY MOUTH DAILY, Disp: 90 capsule, Rfl: 3  •  Cholecalciferol (VITAMIN D) 1000 UNITS Oral Tab, Take 1 tablet by mouth daily. , Disp: , Rfl:   •  Vitamin C 500 MG Oral Tab, Take 500 mg by mouth daily. , Disp: , Rfl:   •  alprazolam 0. kg/m2  BMI:  Body mass index is 41.5 kg/(m^2). Patient looks well she can get onto the table easily she has no scleral icterus no cervical or supraclavicular lymphadenopathy no thyromegaly no carotid bruits she does have a well-healed cervical incision.

## 2017-05-11 ENCOUNTER — HOSPITAL ENCOUNTER (EMERGENCY)
Age: 37
Discharge: HOME | End: 2017-05-11
Attending: EMERGENCY MEDICINE
Payer: COMMERCIAL

## 2017-05-11 ENCOUNTER — APPOINTMENT (OUTPATIENT)
Dept: CT IMAGING | Age: 37
End: 2017-05-11
Attending: EMERGENCY MEDICINE
Payer: COMMERCIAL

## 2017-05-11 VITALS
OXYGEN SATURATION: 97 % | HEIGHT: 69 IN | RESPIRATION RATE: 16 BRPM | TEMPERATURE: 98 F | WEIGHT: 265 LBS | SYSTOLIC BLOOD PRESSURE: 125 MMHG | HEART RATE: 79 BPM | DIASTOLIC BLOOD PRESSURE: 67 MMHG | BODY MASS INDEX: 39.25 KG/M2

## 2017-05-11 DIAGNOSIS — R10.32 ABDOMINAL PAIN, LEFT LOWER QUADRANT: Primary | ICD-10-CM

## 2017-05-11 PROCEDURE — 96375 TX/PRO/DX INJ NEW DRUG ADDON: CPT

## 2017-05-11 PROCEDURE — 85025 COMPLETE CBC W/AUTO DIFF WBC: CPT | Performed by: EMERGENCY MEDICINE

## 2017-05-11 PROCEDURE — 83690 ASSAY OF LIPASE: CPT | Performed by: EMERGENCY MEDICINE

## 2017-05-11 PROCEDURE — 96374 THER/PROPH/DIAG INJ IV PUSH: CPT

## 2017-05-11 PROCEDURE — 99284 EMERGENCY DEPT VISIT MOD MDM: CPT

## 2017-05-11 PROCEDURE — 80053 COMPREHEN METABOLIC PANEL: CPT | Performed by: EMERGENCY MEDICINE

## 2017-05-11 PROCEDURE — 81025 URINE PREGNANCY TEST: CPT

## 2017-05-11 PROCEDURE — 74177 CT ABD & PELVIS W/CONTRAST: CPT | Performed by: EMERGENCY MEDICINE

## 2017-05-11 PROCEDURE — 81003 URINALYSIS AUTO W/O SCOPE: CPT | Performed by: EMERGENCY MEDICINE

## 2017-05-11 PROCEDURE — 96361 HYDRATE IV INFUSION ADD-ON: CPT

## 2017-05-11 RX ORDER — HYDROMORPHONE HYDROCHLORIDE 1 MG/ML
1 INJECTION, SOLUTION INTRAMUSCULAR; INTRAVENOUS; SUBCUTANEOUS EVERY 30 MIN PRN
Status: DISCONTINUED | OUTPATIENT
Start: 2017-05-11 | End: 2017-05-12

## 2017-05-11 RX ORDER — ONDANSETRON 4 MG/1
4 TABLET, ORALLY DISINTEGRATING ORAL EVERY 4 HOURS PRN
Qty: 10 TABLET | Refills: 0 | Status: SHIPPED | OUTPATIENT
Start: 2017-05-11 | End: 2017-05-18

## 2017-05-11 RX ORDER — POTASSIUM CHLORIDE 20 MEQ/1
40 TABLET, EXTENDED RELEASE ORAL ONCE
Status: COMPLETED | OUTPATIENT
Start: 2017-05-11 | End: 2017-05-11

## 2017-05-11 RX ORDER — ONDANSETRON 2 MG/ML
4 INJECTION INTRAMUSCULAR; INTRAVENOUS ONCE
Status: COMPLETED | OUTPATIENT
Start: 2017-05-11 | End: 2017-05-11

## 2017-05-11 RX ORDER — HYDROCODONE BITARTRATE AND ACETAMINOPHEN 5; 325 MG/1; MG/1
1-2 TABLET ORAL EVERY 4 HOURS PRN
Qty: 20 TABLET | Refills: 0 | Status: SHIPPED | OUTPATIENT
Start: 2017-05-11 | End: 2017-05-18

## 2017-05-12 NOTE — ED NOTES
Patient is resting on cart, appears comfortable, conversing easily w/RN. States pain at 6/10 after Dilaudid, c/o slight nausea remains after Zofran.

## 2017-05-12 NOTE — ED NOTES
Pt laying on cart, moaning, requesting additional pain medication prior to discharge. Pt informed previously by MD, no further IV medication would be provided in ER.  Pt informed by RN, no additional pain med in ER per MD, but that a Rx for pain medication

## 2017-05-12 NOTE — ED PROVIDER NOTES
Patient Seen in: THE [facility] Emergency Department In [place]    History   Patient presents with:  Abdomen/Flank Pain (GI/)  Nausea/Vomiting/Diarrhea (gastrointestinal)    Stated Complaint: lower ABD pain and vomiting, tingling down leg, \"ON and OFF\" \" laparoscopies x2    SURGICAL STENT Bilateral March 2015.     Comment Bilateral iliac stents    ANKLE SCOPE,PART DEBRIDEMENT Left 6/19/2015    Comment Procedure: ARTHROSCOPY ANKLE WITH DEBRIDEMENT;  Surgeon: Kristyn Fountain MD;  Location: [facility] Take 1 tablet (25 mg total) by mouth nightly. Albuterol Sulfate HFA (PROAIR HFA) 108 (90 Base) MCG/ACT Inhalation Aero Soln,  Inhale into the lungs every 6 (six) hours as needed for Wheezing.    Probiotic Product (SOLUBLE FIBER/PROBIOTICS OR),  Take 1 cap 05/11/17 2020 None (Room air)       Current:/67 mmHg  Pulse 88  Temp(Src) 98.4 °F (36.9 °C) (Temporal)  Resp 20  Ht 175.3 cm (5' 9\")  Wt 120.203 kg  BMI 39.12 kg/m2  SpO2 100%        Physical Exam    General: Alert, oriented  HEENT: Atraumatic, norm (primary encounter diagnosis)    Disposition:  There is no disposition on file for this visit.     Follow-up:  Madhu Mitchell 126  [facility]  [phone]    Call in 1 day        Medications Prescribed:  Current Dischar

## 2017-05-17 PROBLEM — M62.461 GASTROCNEMIUS EQUINUS, RIGHT: Status: ACTIVE | Noted: 2017-05-17

## 2017-05-17 PROBLEM — M21.861 GASTROCNEMIUS EQUINUS, RIGHT: Status: ACTIVE | Noted: 2017-05-17

## 2017-06-01 ENCOUNTER — OFFICE VISIT (OUTPATIENT)
Dept: FAMILY MEDICINE CLINIC | Facility: CLINIC | Age: 37
End: 2017-06-01

## 2017-06-01 VITALS
BODY MASS INDEX: 40.43 KG/M2 | HEART RATE: 108 BPM | DIASTOLIC BLOOD PRESSURE: 72 MMHG | TEMPERATURE: 98 F | SYSTOLIC BLOOD PRESSURE: 98 MMHG | WEIGHT: 273 LBS | HEIGHT: 69 IN

## 2017-06-01 DIAGNOSIS — Z01.818 PREOP EXAMINATION: Primary | ICD-10-CM

## 2017-06-01 DIAGNOSIS — F11.21 HISTORY OF NARCOTIC ADDICTION (HCC): ICD-10-CM

## 2017-06-01 DIAGNOSIS — M21.861 GASTROCNEMIUS EQUINUS, RIGHT: ICD-10-CM

## 2017-06-01 DIAGNOSIS — Z87.42 HISTORY OF ENDOMETRIOSIS: ICD-10-CM

## 2017-06-01 DIAGNOSIS — G89.29 ABDOMINAL PAIN, CHRONIC, GENERALIZED: ICD-10-CM

## 2017-06-01 DIAGNOSIS — R10.84 ABDOMINAL PAIN, CHRONIC, GENERALIZED: ICD-10-CM

## 2017-06-01 DIAGNOSIS — J45.20 MILD INTERMITTENT ASTHMA WITHOUT COMPLICATION: ICD-10-CM

## 2017-06-01 PROBLEM — R11.15 INTRACTABLE CYCLICAL VOMITING: Status: RESOLVED | Noted: 2017-01-25 | Resolved: 2017-06-01

## 2017-06-01 PROCEDURE — 99243 OFF/OP CNSLTJ NEW/EST LOW 30: CPT | Performed by: FAMILY MEDICINE

## 2017-06-01 RX ORDER — HYDROCODONE BITARTRATE AND ACETAMINOPHEN 10; 325 MG/1; MG/1
1-2 TABLET ORAL EVERY 4 HOURS PRN
Qty: 40 TABLET | Refills: 0 | Status: SHIPPED | OUTPATIENT
Start: 2017-06-01 | End: 2017-06-05

## 2017-06-01 RX ORDER — ELETRIPTAN HYDROBROMIDE 40 MG/1
TABLET, FILM COATED ORAL
Qty: 9 TABLET | Refills: 1 | Status: SHIPPED | OUTPATIENT
Start: 2017-06-01 | End: 2017-07-28 | Stop reason: ALTCHOICE

## 2017-06-01 RX ORDER — CELECOXIB 200 MG/1
CAPSULE ORAL
Refills: 0 | COMMUNITY
Start: 2017-06-02 | End: 2017-06-03

## 2017-06-01 NOTE — H&P
Sky Caldwell is a 39year old female who presents for a Pre-Op Exam: Right Gastroc Slide to be performed by Cathi Ascencio. Bruno Hope M.D. at the [address] [place] [address] on 06/06/2017    Prior anesthesia no prior reaction tolerates well.  Is Probiotic Product (SOLUBLE FIBER/PROBIOTICS OR) Take 1 capsule by mouth daily. Disp:  Rfl:    calciTRIOL 0.25 MCG Oral Cap Take 1 capsule (0.25 mcg total) by mouth daily.  Disp: 60 capsule Rfl: 2   OMEPRAZOLE 40 MG Oral Capsule Delayed Release TAKE 1 CAPS Shortness of breath    • Reflux    • Arthritis    • Asthma    • Esophageal reflux    • Pneumonia, organism unspecified(486)    • Calculus of kidney    • Pancreatitis    • Migraines    • Cancer [facility])           Past Surgical History          [facility] MAIN OR    REMOVAL GALLBLADDER      UPPER GI ENDOSCOPY PERFORMED  01/25/2017    Comment Sarah Nguyen M.D.       Family History   Problem Relation Age of Onset   • Other[other] [OTHER] Mother      ALLIE   • Heart Disorder Father    • Heart Disease Fathe deferred  MUSCULOSKELETAL: back is not tender,FROM of the back  EXTREMITIES: no cyanosis, clubbing or edema  NEURO: Oriented times three,cranial nerves are intact,motor and sensory are grossly intact    ASSESSMENT AND PLAN:   Lorena Catherine is a 39 yea medication out of hospital will be managed by primary care provider. Patient will start with Norco 10 mg conservative use advised 1-1-1/2 every 4-6 hours as needed for pain prescription provided #40 NR  will be in charge of dispensing medication.

## 2017-06-02 ENCOUNTER — TELEPHONE (OUTPATIENT)
Dept: FAMILY MEDICINE CLINIC | Facility: CLINIC | Age: 37
End: 2017-06-02

## 2017-06-02 ENCOUNTER — HOSPITAL (OUTPATIENT)
Dept: OTHER | Age: 37
End: 2017-06-02
Attending: OBSTETRICS & GYNECOLOGY

## 2017-06-02 ENCOUNTER — CHARTING TRANS (OUTPATIENT)
Dept: OTHER | Age: 37
End: 2017-06-02

## 2017-06-05 RX ORDER — HYDROCODONE BITARTRATE AND ACETAMINOPHEN 10; 325 MG/1; MG/1
1-2 TABLET ORAL EVERY 4 HOURS PRN
Qty: 40 TABLET | Refills: 0 | Status: SHIPPED | OUTPATIENT
Start: 2017-06-05 | End: 2017-06-13

## 2017-06-05 NOTE — TELEPHONE ENCOUNTER
in for Norcorefill for his wife he manages the pain RX at the house she is s/p ovary removal on 6/2/17 she is taking Norco 10 every 4 hours takes 1.5-2 and has 12.5 left needs RX for the week.  Surgery on for tomorrow for her foot we are managing al

## 2017-06-08 ENCOUNTER — OFFICE VISIT (OUTPATIENT)
Dept: FAMILY MEDICINE CLINIC | Facility: CLINIC | Age: 37
End: 2017-06-08

## 2017-06-08 VITALS — TEMPERATURE: 99 F | DIASTOLIC BLOOD PRESSURE: 64 MMHG | HEART RATE: 104 BPM | SYSTOLIC BLOOD PRESSURE: 92 MMHG

## 2017-06-08 DIAGNOSIS — R52 PAIN MANAGEMENT: ICD-10-CM

## 2017-06-08 DIAGNOSIS — IMO0002: Primary | ICD-10-CM

## 2017-06-08 DIAGNOSIS — M21.861 GASTROCNEMIUS EQUINUS, RIGHT: ICD-10-CM

## 2017-06-08 PROCEDURE — 99214 OFFICE O/P EST MOD 30 MIN: CPT | Performed by: FAMILY MEDICINE

## 2017-06-08 RX ORDER — HYDROCODONE BITARTRATE AND ACETAMINOPHEN 10; 325 MG/1; MG/1
1-2 TABLET ORAL EVERY 4 HOURS PRN
Qty: 22 TABLET | Refills: 0 | Status: SHIPPED | OUTPATIENT
Start: 2017-06-10 | End: 2017-06-12

## 2017-06-08 NOTE — PROGRESS NOTES
Galilea Vazquez is a 39year old female. HPI:   Issa Stuart follow up   --Has RX written from Dr Cristopher Sethi who did surgery on 6/6/17   --Also had ovary removed last week 6/1/17.     Pain levels abdomen feels less pain then it was before the surgery the origina (NORCO)  MG Oral Tab Take 1-2 tablets by mouth every 4 (four) hours as needed for Pain (she is s/p surgery). Disp: 40 tablet Rfl: 0   RELPAX 40 MG Oral Tab TK 1 T PO AOS OF HEADACHE. MAY REPEAT 1 T IN 2 HOURS.  MAX DOSE 2 TS / 24 HOURS Disp: 9 tablet • Calculus of kidney    • Pancreatitis    • Migraines    • Cancer [facility])       Social History:    Smoking Status: Never Smoker                      Smokeless Status: Never Used                        Alcohol Use: No                 REVIEW OF SYSTEMS:   GE Refills    HYDROcodone-acetaminophen (NORCO)  MG Oral Tab 22 tablet 0      Sig: Take 1-2 tablets by mouth every 4 (four) hours as needed for Pain (has had 2 surguries in 1 week will be tapering off RX). Imaging & Consults:  None  1.  Status

## 2017-06-12 ENCOUNTER — OFFICE VISIT (OUTPATIENT)
Dept: FAMILY MEDICINE CLINIC | Facility: CLINIC | Age: 37
End: 2017-06-12

## 2017-06-12 ENCOUNTER — OFFICE VISIT (OUTPATIENT)
Dept: SURGERY | Facility: CLINIC | Age: 37
End: 2017-06-12

## 2017-06-12 VITALS — BODY MASS INDEX: 42.01 KG/M2 | WEIGHT: 277.19 LBS | HEIGHT: 68 IN

## 2017-06-12 VITALS
WEIGHT: 273 LBS | DIASTOLIC BLOOD PRESSURE: 80 MMHG | HEART RATE: 76 BPM | SYSTOLIC BLOOD PRESSURE: 118 MMHG | BODY MASS INDEX: 42 KG/M2

## 2017-06-12 DIAGNOSIS — Z98.890 STATUS POST RIGHT FOOT SURGERY: ICD-10-CM

## 2017-06-12 DIAGNOSIS — R52 PAIN MANAGEMENT: Primary | ICD-10-CM

## 2017-06-12 DIAGNOSIS — IMO0002: ICD-10-CM

## 2017-06-12 DIAGNOSIS — E66.01 MORBID OBESITY WITH BMI OF 40.0-44.9, ADULT (HCC): Primary | ICD-10-CM

## 2017-06-12 DIAGNOSIS — F11.21 HISTORY OF NARCOTIC ADDICTION (HCC): ICD-10-CM

## 2017-06-12 PROCEDURE — 97802 MEDICAL NUTRITION INDIV IN: CPT | Performed by: DIETITIAN, REGISTERED

## 2017-06-12 PROCEDURE — 99213 OFFICE O/P EST LOW 20 MIN: CPT | Performed by: FAMILY MEDICINE

## 2017-06-12 RX ORDER — HYDROCODONE BITARTRATE AND ACETAMINOPHEN 10; 325 MG/1; MG/1
1 TABLET ORAL EVERY 4 HOURS PRN
Qty: 13 TABLET | Refills: 0 | Status: SHIPPED | OUTPATIENT
Start: 2017-06-12 | End: 2017-06-19 | Stop reason: ALTCHOICE

## 2017-06-12 NOTE — PATIENT INSTRUCTIONS
Norco 10 mg taper down to one every 4 hours for next 2 days (monday and Tuesday) take 800 mg Ibuprofen every 6-8 hours with food.    Wednesday every 6 hours

## 2017-06-12 NOTE — PROGRESS NOTES
[facility]  [address] [place] [address]  Dept: [phone]  Loc: [phone]    06/12/2017    Bariatric Initial Nutrition Assessment    Sky Caldwell is a 39year old female. Migraines    • Cancer (HCC)        Weight history:  Struggled with weight  most of adult life, Pt's highest adult weight 280 lbs at 38 y/o, Pt's lowest adult weight 170 lbs at 26 y/o and Reason given for weight gain:  Poor food choices, Sedentary lifestyle lb  06/01/17 : 273 lb  05/15/17 : 270 lb  05/11/17 : 265 lb      IBW:  Ideal body weight: 140 lb 14 oz  Adjusted ideal body weight: 195 lb 6.5 oz  BMI: Body mass index is 42.16 kg/(m^2).  Obesity Grade III, greater than or equal to 40    Diet Rx: 1400 marquis w (she is s/p surgery). , Disp: 40 tablet, Rfl: 0  •  RELPAX 40 MG Oral Tab, TK 1 T PO AOS OF HEADACHE. MAY REPEAT 1 T IN 2 HOURS. MAX DOSE 2 TS / 24 HOURS, Disp: 9 tablet, Rfl: 1  •  TIROSINT 150 MCG Oral Cap, Take 1 capsule by mouth daily. , Disp: 90 capsule cals  Excessive in: calories, fat, simple sugars and fast foods  Inadequate in:  milk, fruits, vegetables and fiber   Trigger Foods: sweets  Patient currently consumes caffeine: more often than 3 times/week  Patient currently consumes soda: 1-3 times/week labs, hx low Vit D    Additional RD visits required to review concepts? Yes, to review goals  Patient understands protein requirements? Introduced, set as goal  Patient understand fluid requirements (amount and method of intake)?  Introduced, needs to AMELIE Allen

## 2017-06-12 NOTE — PROGRESS NOTES
Rosanna Soto is a 39year old female. HPI:   Right foot surgery still has to be in the boot for 1 more week. Pain increases with the boot. Does feel when she gets off the boot she may have a lower amount of pain.   Has a history of Norco addiction mg by mouth daily. Disp:  Rfl:    alprazolam 0.25 MG Oral Tab Take 1 tablet by mouth daily as needed. Disp:  Rfl: 0   aspirin 81 MG Oral Tab Take 81 mg by mouth daily.  Disp:  Rfl:    Zolpidem Tartrate ER (AMBIEN CR) 12.5 MG Oral Tab CR Take 12.5 mg by mout briefly to examine sutures EXTREMITIES: no cyanosis, clubbing or edema  Musculoskeletal: And for swelling. Sutures are clean dry and intact no signs of infection tenderness to the soft tissue without signs of infection.   No calf tenderness negative Homans

## 2017-06-13 ENCOUNTER — OFFICE VISIT (OUTPATIENT)
Dept: NUTRITION/OBESITY MEDICINE | Facility: HOSPITAL | Age: 37
End: 2017-06-13
Attending: SURGERY
Payer: COMMERCIAL

## 2017-06-13 ENCOUNTER — OFFICE VISIT (OUTPATIENT)
Dept: SURGERY | Facility: CLINIC | Age: 37
End: 2017-06-13

## 2017-06-13 VITALS
RESPIRATION RATE: 16 BRPM | DIASTOLIC BLOOD PRESSURE: 81 MMHG | HEART RATE: 88 BPM | HEIGHT: 68 IN | WEIGHT: 274.63 LBS | BODY MASS INDEX: 41.62 KG/M2 | SYSTOLIC BLOOD PRESSURE: 122 MMHG

## 2017-06-13 DIAGNOSIS — E66.01 MORBID OBESITY DUE TO EXCESS CALORIES (HCC): Primary | ICD-10-CM

## 2017-06-13 PROCEDURE — 96101 PSYCL TSTG PR HR F2F TIME W/PT: CPT | Performed by: OTHER

## 2017-06-13 NOTE — PROGRESS NOTES
[address], [place] [address] Estelle Viveros [place] [address] [facility],[address] Floor  Dept: [phone]    6/13/2017    BARIATRIC EXISTING PATIENT/FOLLOW UP    HPI:  Angela Goodwin is a 39year old-year old female w operations available from a weight loss perspective either operation would be good. From a reflux perspective a gastric bypass may be better. My concern in this patient is her history of abdominal pain in various GI complaints.   I am concerned that after

## 2017-06-13 NOTE — PROGRESS NOTES
Psychological Evaluation    Patient Name: Nelly Espinal  Visit Date:  2017  :   1980    Reason for Referral:    Jian Dowell is a 39year old   female who was referred for a psychological evaluation prior to being scheduled for programs. Concerning medical issues, Ronel Tamayo noted that she suffers from endometriosis, spondylosis, hyperlipidemia, GERD, and hypothyroidism. Her current medications include: Omeprazole, Hydrocodone, Relpax, Tirosint, Cholecalciferol, and Zolpidem.  Reg conclusion that it was the best decision for her. She was able to inform this writer about what the surgery entailed, stating that she attended the seminar with her , and why she was interested in pursuing it.   Regarding the latter, she stated that decreased sleep, decreased appetite, and fatigue. It is of importance to note that she endorsed these items as being minimally problematic. Chanel Shea completed the [employer] Inventory (DIANNA).   The DIANNA was developed to address the need for an instrument is designed specifically to help providers perform a short dietary assessment as a part of the medical history at an initial or annual visit.   It includes 27 frequency questions answered with usually/often, sometimes, rarely/never, or does not apply to me, Helping, Love, Friends, Children, Relatives, Home, Neighborhood and Community. Leonce Seip endorsed items that indicate she is satisfied with all of the aforementioned facets, aside from her health.     Clinical Impression:    Leonce Seip agreed to participate i her lifestyle in an effort to prepare for the surgery. While she denied bingeing and emotional eating as being problematic, she related that her choices are generally the issue.   This was evidenced by her performance on the REAP, as she endorsed some probl

## 2017-06-15 ENCOUNTER — OFFICE VISIT (OUTPATIENT)
Dept: FAMILY MEDICINE CLINIC | Facility: CLINIC | Age: 37
End: 2017-06-15

## 2017-06-15 VITALS
HEIGHT: 68 IN | DIASTOLIC BLOOD PRESSURE: 60 MMHG | WEIGHT: 270 LBS | BODY MASS INDEX: 40.92 KG/M2 | SYSTOLIC BLOOD PRESSURE: 98 MMHG | HEART RATE: 88 BPM

## 2017-06-15 DIAGNOSIS — IMO0002: Primary | ICD-10-CM

## 2017-06-15 DIAGNOSIS — F11.21 HISTORY OF NARCOTIC ADDICTION (HCC): ICD-10-CM

## 2017-06-15 DIAGNOSIS — R52 PAIN MANAGEMENT: ICD-10-CM

## 2017-06-15 DIAGNOSIS — Z98.890 STATUS POST RIGHT FOOT SURGERY: ICD-10-CM

## 2017-06-15 PROCEDURE — 99213 OFFICE O/P EST LOW 20 MIN: CPT | Performed by: FAMILY MEDICINE

## 2017-06-15 RX ORDER — HYDROCODONE BITARTRATE AND ACETAMINOPHEN 5; 325 MG/1; MG/1
1 TABLET ORAL EVERY 6 HOURS PRN
Qty: 12 TABLET | Refills: 0 | Status: SHIPPED | OUTPATIENT
Start: 2017-06-15 | End: 2017-06-27

## 2017-06-15 RX ORDER — HYDROCODONE BITARTRATE AND ACETAMINOPHEN 7.5; 325 MG/1; MG/1
1 TABLET ORAL EVERY 6 HOURS PRN
Qty: 8 TABLET | Refills: 0 | Status: SHIPPED | OUTPATIENT
Start: 2017-06-15 | End: 2017-06-19 | Stop reason: ALTCHOICE

## 2017-06-15 NOTE — PROGRESS NOTES
Nia Yuan is a 39year old female. HPI:   Follow-up on pain management. Patient has reduced her Narco down from every 4 hours to every 6 hours. She has 3-1/2 left from last visit.  still is dispensing medication for her.   Has a history MG Oral Tab Take 1 tablet by mouth daily as needed. Disp:  Rfl: 0   aspirin 81 MG Oral Tab Take 81 mg by mouth daily. Disp:  Rfl:    Zolpidem Tartrate ER (AMBIEN CR) 12.5 MG Oral Tab CR Take 12.5 mg by mouth nightly.  Disp:  Rfl:    Escitalopram Oxalate (LE no cyanosis, clubbing or edema  Musculoskeletal: Using a right walking boot calf examined as well as incision no calf pain negative Homans sign.   Neurological: nerves II through XII grossly intact no sensorimotor deficit  Psychological: Mood and affect are

## 2017-06-19 ENCOUNTER — OFFICE VISIT (OUTPATIENT)
Dept: SURGERY | Facility: CLINIC | Age: 37
End: 2017-06-19

## 2017-06-19 VITALS
DIASTOLIC BLOOD PRESSURE: 77 MMHG | BODY MASS INDEX: 41.27 KG/M2 | HEART RATE: 90 BPM | RESPIRATION RATE: 18 BRPM | WEIGHT: 275.44 LBS | HEIGHT: 68.5 IN | SYSTOLIC BLOOD PRESSURE: 114 MMHG

## 2017-06-19 DIAGNOSIS — K21.9 GASTROESOPHAGEAL REFLUX DISEASE, ESOPHAGITIS PRESENCE NOT SPECIFIED: Primary | ICD-10-CM

## 2017-06-19 DIAGNOSIS — M19.90 OSTEOARTHRITIS, UNSPECIFIED OSTEOARTHRITIS TYPE, UNSPECIFIED SITE: ICD-10-CM

## 2017-06-19 DIAGNOSIS — E66.01 MORBID OBESITY WITH BMI OF 40.0-44.9, ADULT (HCC): ICD-10-CM

## 2017-06-19 DIAGNOSIS — R63.2 BINGE EATING: ICD-10-CM

## 2017-06-19 PROCEDURE — 99242 OFF/OP CONSLTJ NEW/EST SF 20: CPT | Performed by: INTERNAL MEDICINE

## 2017-06-19 NOTE — PROGRESS NOTES
The [facility]ness [facility] Consultation Note       Date of Consult:  2017    Patient:  Raheem Esteves  :      1980  MRN:      [id number]    Referring Provider: Dr. Scotty Aaron       Chief Complaint:  Patient presents with:  Consult  Pre-Op [START ON 7/19/2017] Lisdexamfetamine Dimesylate (VYVANSE) 20 MG Oral Cap Take 1 capsule (20 mg total) by mouth daily.  Disp: 30 capsule Rfl: 0   HYDROcodone-acetaminophen (NORCO) 5-325 MG Oral Tab Take 1 tablet by mouth every 6 (six) hours as needed for No    Sexual Activity: Not on file   Not on file  Other Topics Concern    Caffeine Concern No    Exercise No    Comment: active at work (on hold)     Social History Narrative    ** Merged History Encounter **         Surgical History:        Past Surgical Huber Sam MD;  Location: [facility] MAIN OR    REMOVAL GALLBLADDER      UPPER GI ENDOSCOPY PERFORMED  01/25/2017    Comment Luis Manuel Reddy M.D.        Family History:    Family History   Problem Relation Age of Onset   • Other[other] [OTHER] Mother atraumatic  Eyes: conjunctivae/corneas clear. PERRL, EOM's intact. Fundi benign.   Lungs: clear to auscultation bilaterally  Heart: S1, S2 normal, no murmur, click, rub or gallop, regular rate and rhythm  Abdomen: soft, non-tender; bowel sounds normal; no m

## 2017-06-20 ENCOUNTER — OFFICE VISIT (OUTPATIENT)
Dept: FAMILY MEDICINE CLINIC | Facility: CLINIC | Age: 37
End: 2017-06-20

## 2017-06-20 VITALS
BODY MASS INDEX: 42 KG/M2 | SYSTOLIC BLOOD PRESSURE: 108 MMHG | WEIGHT: 277 LBS | HEART RATE: 84 BPM | DIASTOLIC BLOOD PRESSURE: 62 MMHG

## 2017-06-20 DIAGNOSIS — R19.8 UMBILICUS DISCHARGE: Primary | ICD-10-CM

## 2017-06-20 DIAGNOSIS — Z98.890 STATUS POST RIGHT FOOT SURGERY: ICD-10-CM

## 2017-06-20 DIAGNOSIS — R52 PAIN MANAGEMENT: ICD-10-CM

## 2017-06-20 DIAGNOSIS — F11.21 HISTORY OF NARCOTIC ADDICTION (HCC): ICD-10-CM

## 2017-06-20 DIAGNOSIS — IMO0002: ICD-10-CM

## 2017-06-20 PROBLEM — G89.29 ABDOMINAL PAIN, CHRONIC, GENERALIZED: Status: RESOLVED | Noted: 2017-01-07 | Resolved: 2017-06-20

## 2017-06-20 PROBLEM — R10.84 ABDOMINAL PAIN, CHRONIC, GENERALIZED: Status: RESOLVED | Noted: 2017-01-07 | Resolved: 2017-06-20

## 2017-06-20 PROCEDURE — 87205 SMEAR GRAM STAIN: CPT | Performed by: FAMILY MEDICINE

## 2017-06-20 PROCEDURE — 87186 SC STD MICRODIL/AGAR DIL: CPT | Performed by: FAMILY MEDICINE

## 2017-06-20 PROCEDURE — 99213 OFFICE O/P EST LOW 20 MIN: CPT | Performed by: FAMILY MEDICINE

## 2017-06-20 PROCEDURE — 87070 CULTURE OTHR SPECIMN AEROBIC: CPT | Performed by: FAMILY MEDICINE

## 2017-06-20 RX ORDER — HYDROCODONE BITARTRATE AND ACETAMINOPHEN 5; 325 MG/1; MG/1
1 TABLET ORAL EVERY 8 HOURS PRN
Qty: 21 TABLET | Refills: 0 | Status: SHIPPED | OUTPATIENT
Start: 2017-06-20 | End: 2017-07-06 | Stop reason: ALTCHOICE

## 2017-06-20 NOTE — PROGRESS NOTES
Marcello Alvarenga is a 39year old female. HPI:   Patient is in for pain medication control. Patient has been on Norco for the last 3 weeks tapering down dose since she has had 2 surgeries.   One was a laparoscopy for ovary removal other was a right fo Disp: 60 capsule Rfl: 2   OMEPRAZOLE 40 MG Oral Capsule Delayed Release TAKE 1 CAPSULE BY MOUTH DAILY Disp: 90 capsule Rfl: 3   Cholecalciferol (VITAMIN D) 1000 UNITS Oral Tab Take 1 tablet by mouth daily.    Disp:  Rfl:    Vitamin C 500 MG Oral Tab Take 50 distress  SKIN: no rashes,no suspicious lesions  EYES: PERRLA, EOM intact, sclera clear without injection  NECK: supple,no adenopathy, thyroid normal to palpation  LUNGS: clear to auscultation no rales, rhonchi or wheezes  CARDIO: RRR without murmur  GI: N to the plan. The patient is asked to return in 1 week.

## 2017-06-22 ENCOUNTER — TELEPHONE (OUTPATIENT)
Dept: FAMILY MEDICINE CLINIC | Facility: CLINIC | Age: 37
End: 2017-06-22

## 2017-06-22 NOTE — TELEPHONE ENCOUNTER
Patient called stating that she is having tingling and numbness which starts in the knee area and goes into her toes. She is also complaining of pain traveling into her big toe.  She is wondering if this is normal because of the recent surgery and just remov

## 2017-06-22 NOTE — TELEPHONE ENCOUNTER
Patient is able to flex and point her toes.  Advised patient that if she did develop foot drop she should contact ortho

## 2017-06-23 ENCOUNTER — TELEPHONE (OUTPATIENT)
Dept: FAMILY MEDICINE CLINIC | Facility: CLINIC | Age: 37
End: 2017-06-23

## 2017-06-23 NOTE — TELEPHONE ENCOUNTER
Notes Recorded by Yesenia Ward RN on 6/23/2017 at 1:27 PM  Spoke to patient with results and she said she has had some shooting pains from umbilical area mild discomfort.  The surgery was 6/2/17. Kimberly Jessee started with diarrhea as well but not sure if relate

## 2017-06-23 NOTE — TELEPHONE ENCOUNTER
----- Message from Candy Narayan PA-C sent at 6/22/2017  7:06 PM CDT -----  MDRO--multidrug-resistant E. coli. Sensitive to only IV drugs. Patient is status post laparoscopy noticed odor @ umbilicus without pain or discharge.   Call infectious disease

## 2017-06-23 NOTE — PROGRESS NOTES
Quick Note:    MDRO--multidrug-resistant E. coli. Sensitive to only IV drugs. Patient is status post laparoscopy noticed odor @ umbilicus without pain or discharge. Call infectious disease for advice on treatment.  Notify patient after.  ______

## 2017-06-27 ENCOUNTER — OFFICE VISIT (OUTPATIENT)
Dept: FAMILY MEDICINE CLINIC | Facility: CLINIC | Age: 37
End: 2017-06-27

## 2017-06-27 VITALS
SYSTOLIC BLOOD PRESSURE: 90 MMHG | DIASTOLIC BLOOD PRESSURE: 60 MMHG | BODY MASS INDEX: 40.29 KG/M2 | HEART RATE: 92 BPM | WEIGHT: 272 LBS | HEIGHT: 69 IN

## 2017-06-27 DIAGNOSIS — Z16.24 MULTIPLE DRUG RESISTANT ORGANISM (MDRO) CULTURE POSITIVE: ICD-10-CM

## 2017-06-27 DIAGNOSIS — R52 PAIN MANAGEMENT: ICD-10-CM

## 2017-06-27 DIAGNOSIS — Z98.890 STATUS POST RIGHT FOOT SURGERY: ICD-10-CM

## 2017-06-27 DIAGNOSIS — F11.21 HISTORY OF NARCOTIC ADDICTION (HCC): ICD-10-CM

## 2017-06-27 DIAGNOSIS — R19.7 DIARRHEA, UNSPECIFIED TYPE: Primary | ICD-10-CM

## 2017-06-27 DIAGNOSIS — IMO0001: ICD-10-CM

## 2017-06-27 DIAGNOSIS — IMO0002: ICD-10-CM

## 2017-06-27 PROCEDURE — 99214 OFFICE O/P EST MOD 30 MIN: CPT | Performed by: FAMILY MEDICINE

## 2017-06-27 RX ORDER — HYDROCODONE BITARTRATE AND ACETAMINOPHEN 5; 325 MG/1; MG/1
TABLET ORAL
Qty: 9 TABLET | Refills: 0 | Status: SHIPPED | OUTPATIENT
Start: 2017-06-27 | End: 2017-07-06 | Stop reason: ALTCHOICE

## 2017-06-27 NOTE — PROGRESS NOTES
Alis Hernadez is a 39year old female. HPI:   #1 patient is in for follow-up on pain medication she admits that she overdid it yesterday and now has pain in her right leg postsurgical.    Pain in leg was bad due to yesterday overdoing it yesterday.  Davida Love neutrophils seen    Aerobic Smear Occasional Gram Negative Rods    *Culture results found, see result in Chart Review               Current Outpatient Prescriptions:  HYDROcodone-acetaminophen (NORCO) 5-325 MG Oral Tab Take 5 mg three times per day for 2 d Endometriosis    • Esophageal reflux    • Extrinsic asthma, unspecified     2014 was in ER and was hospitalized over night   • Hypothyroid    • Hypothyroidism    • Infertility, female    • Migraines    • Organic hypersomnia, unspecified DMG DX 11-2-12    A neurological: nerves II through XII grossly intact no sensorimotor deficit  Psychological: Mood and affect are normal.  Good communication skills.   ASSESSMENT AND PLAN:   Diarrhea, unspecified type  (primary encounter diagnosis)  Multiple drug resistant MG Oral Tab; Take 5 mg three times per day for 2 days then 1/2 three times per day through Friday. Dispense: 9 tablet; Refill: 0    6.  Status post right foot surgery  Healing well no complications besides localized pain managed with Norco and ibuprofen

## 2017-06-28 PROBLEM — R19.7 DIARRHEA: Status: ACTIVE | Noted: 2017-06-28

## 2017-06-28 PROBLEM — T81.49XA INFECTED SURGICAL WOUND: Status: ACTIVE | Noted: 2017-06-28

## 2017-06-28 PROBLEM — Z16.24 MULTIPLE DRUG RESISTANT ORGANISM (MDRO) CULTURE POSITIVE: Status: ACTIVE | Noted: 2017-06-28

## 2017-06-30 ENCOUNTER — OFFICE VISIT (OUTPATIENT)
Dept: FAMILY MEDICINE CLINIC | Facility: CLINIC | Age: 37
End: 2017-06-30

## 2017-06-30 VITALS
DIASTOLIC BLOOD PRESSURE: 70 MMHG | WEIGHT: 272 LBS | HEART RATE: 84 BPM | BODY MASS INDEX: 40.29 KG/M2 | HEIGHT: 69 IN | SYSTOLIC BLOOD PRESSURE: 104 MMHG

## 2017-06-30 DIAGNOSIS — F11.21 HISTORY OF NARCOTIC ADDICTION (HCC): ICD-10-CM

## 2017-06-30 DIAGNOSIS — Z98.890 STATUS POST RIGHT FOOT SURGERY: ICD-10-CM

## 2017-06-30 DIAGNOSIS — R52 PAIN MANAGEMENT: ICD-10-CM

## 2017-06-30 DIAGNOSIS — IMO0002: ICD-10-CM

## 2017-06-30 DIAGNOSIS — Z16.24 MULTIPLE DRUG RESISTANT ORGANISM (MDRO) CULTURE POSITIVE: ICD-10-CM

## 2017-06-30 DIAGNOSIS — R19.8 UMBILICAL DISCHARGE: Primary | ICD-10-CM

## 2017-06-30 PROBLEM — R19.7 DIARRHEA: Status: RESOLVED | Noted: 2017-06-28 | Resolved: 2017-06-30

## 2017-06-30 PROCEDURE — 87205 SMEAR GRAM STAIN: CPT | Performed by: FAMILY MEDICINE

## 2017-06-30 PROCEDURE — 99214 OFFICE O/P EST MOD 30 MIN: CPT | Performed by: FAMILY MEDICINE

## 2017-06-30 PROCEDURE — 87070 CULTURE OTHR SPECIMN AEROBIC: CPT | Performed by: FAMILY MEDICINE

## 2017-06-30 RX ORDER — CIPROFLOXACIN 500 MG/1
TABLET, FILM COATED ORAL
Refills: 0 | COMMUNITY
Start: 2017-06-27 | End: 2017-07-06 | Stop reason: ALTCHOICE

## 2017-06-30 RX ORDER — HYDROCODONE BITARTRATE AND ACETAMINOPHEN 5; 325 MG/1; MG/1
TABLET ORAL
Qty: 7 TABLET | Refills: 0 | Status: SHIPPED | OUTPATIENT
Start: 2017-06-30 | End: 2017-07-06 | Stop reason: ALTCHOICE

## 2017-06-30 NOTE — PROGRESS NOTES
Branden Larios is a 39year old female.   HPI:   Pain follow up   --some limping from the right leg pain is improving and has no withdrawal sxs  --Norco using 5 mg 1/2 three times per day through today no withdrawals   -- 5 mg take 1/2 twice daily will daily. Disp:  Rfl:    alprazolam 0.25 MG Oral Tab Take 1 tablet by mouth daily as needed. Disp:  Rfl: 0   aspirin 81 MG Oral Tab Take 81 mg by mouth daily. Disp:  Rfl:    Zolpidem Tartrate ER (AMBIEN CR) 12.5 MG Oral Tab CR Take 12.5 mg by mouth nightly.  D the area no longer has erythema no discharge mild odor mild tenderness along the umbilicus no guarding, rigidity or rebound very nonspecific discomfort.     NECK: supple,no adenopathy, thyroid normal to palpation  LUNGS: clear to auscultation no rales, rhon

## 2017-07-03 NOTE — PROGRESS NOTES
Light growth of normal bacteria do not start antibiotic call if any problems. Do light pressure  irrigation as discussed in office visit if still with odor.   Sent to my chart

## 2017-07-05 ENCOUNTER — HOSPITAL ENCOUNTER (EMERGENCY)
Age: 37
Discharge: HOME | End: 2017-07-06
Attending: EMERGENCY MEDICINE
Payer: COMMERCIAL

## 2017-07-05 ENCOUNTER — TELEPHONE (OUTPATIENT)
Dept: FAMILY MEDICINE CLINIC | Facility: CLINIC | Age: 37
End: 2017-07-05

## 2017-07-05 DIAGNOSIS — G43.809 OTHER MIGRAINE WITHOUT STATUS MIGRAINOSUS, NOT INTRACTABLE: Primary | ICD-10-CM

## 2017-07-05 PROCEDURE — 96376 TX/PRO/DX INJ SAME DRUG ADON: CPT

## 2017-07-05 PROCEDURE — 96375 TX/PRO/DX INJ NEW DRUG ADDON: CPT

## 2017-07-05 PROCEDURE — 96361 HYDRATE IV INFUSION ADD-ON: CPT

## 2017-07-05 PROCEDURE — 99285 EMERGENCY DEPT VISIT HI MDM: CPT

## 2017-07-05 PROCEDURE — 99284 EMERGENCY DEPT VISIT MOD MDM: CPT

## 2017-07-05 PROCEDURE — 96374 THER/PROPH/DIAG INJ IV PUSH: CPT

## 2017-07-05 RX ORDER — DIPHENHYDRAMINE HYDROCHLORIDE 50 MG/ML
25 INJECTION INTRAMUSCULAR; INTRAVENOUS ONCE
Status: COMPLETED | OUTPATIENT
Start: 2017-07-05 | End: 2017-07-06

## 2017-07-05 RX ORDER — ONDANSETRON 2 MG/ML
4 INJECTION INTRAMUSCULAR; INTRAVENOUS ONCE
Status: COMPLETED | OUTPATIENT
Start: 2017-07-05 | End: 2017-07-06

## 2017-07-05 RX ORDER — DEXAMETHASONE SODIUM PHOSPHATE 4 MG/ML
10 VIAL (ML) INJECTION ONCE
Status: COMPLETED | OUTPATIENT
Start: 2017-07-05 | End: 2017-07-06

## 2017-07-06 VITALS
SYSTOLIC BLOOD PRESSURE: 122 MMHG | HEIGHT: 68 IN | WEIGHT: 272 LBS | HEART RATE: 78 BPM | RESPIRATION RATE: 20 BRPM | DIASTOLIC BLOOD PRESSURE: 74 MMHG | TEMPERATURE: 98 F | BODY MASS INDEX: 41.22 KG/M2 | OXYGEN SATURATION: 99 %

## 2017-07-06 RX ORDER — ONDANSETRON 4 MG/1
4 TABLET, ORALLY DISINTEGRATING ORAL EVERY 4 HOURS PRN
Qty: 10 TABLET | Refills: 0 | Status: SHIPPED | OUTPATIENT
Start: 2017-07-06 | End: 2017-07-10

## 2017-07-06 RX ORDER — ONDANSETRON 2 MG/ML
4 INJECTION INTRAMUSCULAR; INTRAVENOUS ONCE
Status: COMPLETED | OUTPATIENT
Start: 2017-07-06 | End: 2017-07-06

## 2017-07-06 RX ORDER — HYDROMORPHONE HYDROCHLORIDE 1 MG/ML
1 INJECTION, SOLUTION INTRAMUSCULAR; INTRAVENOUS; SUBCUTANEOUS ONCE
Status: COMPLETED | OUTPATIENT
Start: 2017-07-06 | End: 2017-07-06

## 2017-07-06 RX ORDER — BUTALBITAL, ACETAMINOPHEN AND CAFFEINE 50; 325; 40 MG/1; MG/1; MG/1
1-2 TABLET ORAL EVERY 4 HOURS PRN
Qty: 20 TABLET | Refills: 0 | Status: SHIPPED | OUTPATIENT
Start: 2017-07-06 | End: 2017-07-10

## 2017-07-06 NOTE — TELEPHONE ENCOUNTER
Ibuprofen 800 mg every 6 hours?    Immediate care center or appointment here if not resolving may need medrol dose gilda

## 2017-07-06 NOTE — TELEPHONE ENCOUNTER
Spoke to patient with instructions.   Pt verbalized understanding and she said she has an appt on Monday 7/10 to discuss this with Reta Bumpers

## 2017-07-06 NOTE — ED NOTES
Medications administered slowly IV, pt denies discomfort. POC explained to pt/family. Room lights reduced for pt comfort. SR up x2.

## 2017-07-06 NOTE — ED INITIAL ASSESSMENT (HPI)
Onset of headache on Saturday, states intensified today w/nausea.  States no longer takes Norco, states MD \"weaned\" her off it

## 2017-07-06 NOTE — ED PROVIDER NOTES
Patient Seen in: THE [facility] Emergency Department In [place]    History   Patient presents with:  Headache (neurologic)    Stated Complaint: migraine    HPI    51-year-old female with a history of anxiety, history of asthma, arthritis, kidney calculus, his COLONOSCOPY,DIAGNOSTIC      Comment: Procedure: COLONOSCOPY, POSSIBLE BIOPSY,                POSSIBLE POLYPECTOMY 88053;  Surgeon: Tae Garcia MD;  Location: [address] [place] [address] date: D & C      Comment: x4  6/19/2015: Jacquelyn Sherman Dispersible,  Take 1 tablet (4 mg total) by mouth every 4 (four) hours as needed for Nausea. Butalbital-APAP-Caffeine -40 MG Oral Tab,  Take 1-2 tablets by mouth every 4 (four) hours as needed for Pain.    Budesonide-Formoterol Fumarate (SYMBICORT) Comment: Rarely      Review of Systems    Positive for stated complaint: migraine  Other systems are as noted in HPI. Constitutional and vital signs reviewed. All other systems reviewed and negative except as noted above.     PSFH elements revi purposes of treating  [acute migraine headache]. The patient had good response to these medications. Patient continued to be observed here in the emergency department. Patient remained stable throughout the emergency department observation period.

## 2017-07-10 ENCOUNTER — OFFICE VISIT (OUTPATIENT)
Dept: SURGERY | Facility: CLINIC | Age: 37
End: 2017-07-10

## 2017-07-10 ENCOUNTER — OFFICE VISIT (OUTPATIENT)
Dept: FAMILY MEDICINE CLINIC | Facility: CLINIC | Age: 37
End: 2017-07-10

## 2017-07-10 VITALS — BODY MASS INDEX: 42.03 KG/M2 | HEIGHT: 68 IN | WEIGHT: 277.31 LBS

## 2017-07-10 VITALS
BODY MASS INDEX: 41.03 KG/M2 | DIASTOLIC BLOOD PRESSURE: 66 MMHG | SYSTOLIC BLOOD PRESSURE: 94 MMHG | WEIGHT: 277 LBS | HEART RATE: 104 BPM | HEIGHT: 69 IN

## 2017-07-10 DIAGNOSIS — G44.209 MUSCLE TENSION HEADACHE: Primary | ICD-10-CM

## 2017-07-10 DIAGNOSIS — E66.01 MORBID OBESITY WITH BMI OF 40.0-44.9, ADULT (HCC): Primary | ICD-10-CM

## 2017-07-10 DIAGNOSIS — Z86.69 HISTORY OF MIGRAINE: ICD-10-CM

## 2017-07-10 PROCEDURE — 97803 MED NUTRITION INDIV SUBSEQ: CPT | Performed by: DIETITIAN, REGISTERED

## 2017-07-10 PROCEDURE — 0358T BIA WHOLE BODY: CPT | Performed by: DIETITIAN, REGISTERED

## 2017-07-10 PROCEDURE — 99214 OFFICE O/P EST MOD 30 MIN: CPT | Performed by: FAMILY MEDICINE

## 2017-07-10 RX ORDER — METHYLPREDNISOLONE 4 MG/1
TABLET ORAL
Qty: 1 KIT | Refills: 0 | Status: SHIPPED | OUTPATIENT
Start: 2017-07-10 | End: 2017-07-18

## 2017-07-10 RX ORDER — CYCLOBENZAPRINE HCL 10 MG
10 TABLET ORAL 3 TIMES DAILY
Qty: 30 TABLET | Refills: 1 | Status: SHIPPED | OUTPATIENT
Start: 2017-07-10 | End: 2017-08-19

## 2017-07-10 RX ORDER — BUTALBITAL, ACETAMINOPHEN AND CAFFEINE 50; 325; 40 MG/1; MG/1; MG/1
1-2 TABLET ORAL EVERY 4 HOURS PRN
Qty: 20 TABLET | Refills: 1 | Status: SHIPPED | OUTPATIENT
Start: 2017-07-10 | End: 2017-07-17

## 2017-07-10 NOTE — PROGRESS NOTES
[facility]  [address] [place] [address]  Dept: [phone]  Loc: [phone]    07/10/17      Bariatric Follow-up Nutrition Session    Lucien Erika is a 39year old female. 12/31/2015       Lab Results  Component Value Date   VITD 39.3 12/31/2015     No results found for: THIAMINE   No results found for: VITB1    Meds:     Current Outpatient Prescriptions:   •  methylPREDNISolone (MEDROL) 4 MG Oral Tablet Therapy Pack, As dir nightly.  , Disp: , Rfl:   •  Escitalopram Oxalate (LEXAPRO) 20 MG Oral Tab, Take 40 mg by mouth nightly.  , Disp: , Rfl:     Height:  Ht Readings from Last 1 Encounters:  07/10/17 : 5' 9\" (1.753 m)    Weight:  Wt Readings from Last 6 Encounters:  07/10/1 daily  3.  Walk 2-3 x week, do wt lifting per booklet 2 x week (goal 2 reps 15)      Monitor/Evaluate     Anthropometric measurements, Food/fluid intake/choices, Food intolerances, Activity level, Vitamin/mineral supplementation, Reinforce goals, Calorie/pr

## 2017-07-10 NOTE — PROGRESS NOTES
Jef Betancourt is a 39year old female. HPI:   HA for 8 days  --trying Relpax, ibuprofen with no relief.   --Was given prescription from the emergency room on Wednesday for Fioricet does seem to be helping.  --location top of the head L>R   --pain scal MCG Oral Cap Take 1 capsule (0.25 mcg total) by mouth daily.  Disp: 60 capsule Rfl: 2   OMEPRAZOLE 40 MG Oral Capsule Delayed Release TAKE 1 CAPSULE BY MOUTH DAILY Disp: 90 capsule Rfl: 3   Cholecalciferol (VITAMIN D) 1000 UNITS Oral Tab Take 1 tablet by mo exertion  CARDIOVASCULAR: denies chest pain on exertion  GI: denies abdominal pain and denies heartburn  NEURO: See above headaches  Musculoskeletal: See above  EXAM:   BP 94/66 (BP Location: Left arm, Patient Position: Sitting, Cuff Size: large)   Pulse 1 the make sure she takes it with food, cyclobenzaprine 10 mg at night 5 mg during the day  Conservative use of Fioricet instructions given and taking medication patient is going away on vacation for a week and would like medication just in case headache perla

## 2017-07-17 ENCOUNTER — TELEPHONE (OUTPATIENT)
Dept: FAMILY MEDICINE CLINIC | Facility: CLINIC | Age: 37
End: 2017-07-17

## 2017-07-17 NOTE — TELEPHONE ENCOUNTER
Nancy Drilling last week for headaches and put on a steroid, she went on vacation and now she is home, she is still having the headaches and they seem more intense because she is done with steroid.  Please call Dayami Greenfield at [phone]

## 2017-07-17 NOTE — TELEPHONE ENCOUNTER
Patient states that her migraine is getting worse since she completed the steroid on Saturday. She did take 2 fioricet today but did not get relief. Only takes muscle relaxer at night because it makes her sleepy.  She states that the HA is not sinus related

## 2017-07-18 ENCOUNTER — HOSPITAL ENCOUNTER (EMERGENCY)
Age: 37
Discharge: HOME | End: 2017-07-18
Attending: EMERGENCY MEDICINE
Payer: COMMERCIAL

## 2017-07-18 VITALS
HEART RATE: 96 BPM | SYSTOLIC BLOOD PRESSURE: 125 MMHG | RESPIRATION RATE: 18 BRPM | DIASTOLIC BLOOD PRESSURE: 72 MMHG | HEIGHT: 67 IN | OXYGEN SATURATION: 99 % | TEMPERATURE: 98 F | WEIGHT: 225 LBS | BODY MASS INDEX: 35.31 KG/M2

## 2017-07-18 DIAGNOSIS — G43.101 MIGRAINE WITH AURA AND WITH STATUS MIGRAINOSUS, NOT INTRACTABLE: Primary | ICD-10-CM

## 2017-07-18 PROCEDURE — 96376 TX/PRO/DX INJ SAME DRUG ADON: CPT

## 2017-07-18 PROCEDURE — 96365 THER/PROPH/DIAG IV INF INIT: CPT

## 2017-07-18 PROCEDURE — 99284 EMERGENCY DEPT VISIT MOD MDM: CPT

## 2017-07-18 PROCEDURE — 96375 TX/PRO/DX INJ NEW DRUG ADDON: CPT

## 2017-07-18 RX ORDER — DIPHENHYDRAMINE HYDROCHLORIDE 50 MG/ML
25 INJECTION INTRAMUSCULAR; INTRAVENOUS ONCE
Status: COMPLETED | OUTPATIENT
Start: 2017-07-18 | End: 2017-07-18

## 2017-07-18 RX ORDER — ONDANSETRON 2 MG/ML
4 INJECTION INTRAMUSCULAR; INTRAVENOUS ONCE
Status: COMPLETED | OUTPATIENT
Start: 2017-07-18 | End: 2017-07-18

## 2017-07-18 RX ORDER — MAGNESIUM SULFATE HEPTAHYDRATE 500 MG/ML
1 INJECTION, SOLUTION INTRAMUSCULAR; INTRAVENOUS ONCE
Status: COMPLETED | OUTPATIENT
Start: 2017-07-18 | End: 2017-07-18

## 2017-07-18 RX ORDER — METHYLPREDNISOLONE 4 MG/1
TABLET ORAL
Qty: 1 KIT | Refills: 0 | Status: SHIPPED | OUTPATIENT
Start: 2017-07-18 | End: 2017-07-28 | Stop reason: ALTCHOICE

## 2017-07-18 RX ORDER — DEXAMETHASONE SODIUM PHOSPHATE 4 MG/ML
10 VIAL (ML) INJECTION ONCE
Status: COMPLETED | OUTPATIENT
Start: 2017-07-18 | End: 2017-07-18

## 2017-07-18 RX ORDER — ONDANSETRON 4 MG/1
4 TABLET, ORALLY DISINTEGRATING ORAL EVERY 4 HOURS PRN
Qty: 10 TABLET | Refills: 0 | Status: SHIPPED | OUTPATIENT
Start: 2017-07-18 | End: 2017-07-25

## 2017-07-18 NOTE — ED NOTES
Patient crying in room stating that her pain is not getting any better. Lights turned off per request. Dr. Janelle MCKINNEY made aware patient's pain is not getting any better.

## 2017-07-18 NOTE — ED PROVIDER NOTES
Patient Seen in: [facility] In [place]    History   Patient presents with:  Headache (neurologic)    Stated Complaint: headache w/vomiting     HPI    Patient is a 49-year-old with a history of anxiety, depression, GERD, endometriosis, Comment: Procedure: ARTHROSCOPY ANKLE WITH DEBRIDEMENT;               Surgeon: Basilia Oneal MD;  Location: [facility] date:   10/22/2013: COLONOSCOPY,DIAGNOSTIC      Comment: Procedure: COLONOSCOPY, POSSIBLE BIOPSY, POSSIBLE POLYPECTOMY 78488;                 Surgeon: Anusha Herrmann MD;  Location: [address] [place] [address]    Medications :   methylPREDNISolone (MEDROL) 4 MG Oral Tablet Therapy Pack,  As directed.    ondansetron 4 MG Oral Tablet Disper Cancer Maternal Grandmother 76   • Cancer Neg    • Stroke Neg        Smoking status: Never Smoker                                                              Smokeless tobacco: Never Used                      Alcohol use:  No               Comment: Rarely depakote  ============================================================  [facility]     Patient reevaluated at 1815 no significant improvements with additional medications were added    Patient reevaluated at 1900.   Still having nausea Zofran and additional Vinhr

## 2017-07-18 NOTE — TELEPHONE ENCOUNTER
Left message for patient that Uriel Torres wants her to repeat the medrol dose pack. Rx will be sent to [facility] in [place].   Asked patient to call back if she has any questions    Patient had also noted that she was seeing \"jumping dots\" in her visual f

## 2017-07-18 NOTE — ED INITIAL ASSESSMENT (HPI)
Patient to ER with c/o headache for the past 10 days. Patient states that she just finished a medrol dose pack on Sunday and her PMD called in another RX for medrol dose pack today to be started.  Patient states that she is taking Fioricet for the pain and

## 2017-07-19 NOTE — ED NOTES
Patient updated with additional orders. Denies any questions or concerns at this time. Patient denies any questions or concerns at this time.

## 2017-07-25 ENCOUNTER — OFFICE VISIT (OUTPATIENT)
Dept: SURGERY | Facility: CLINIC | Age: 37
End: 2017-07-25

## 2017-07-25 VITALS
DIASTOLIC BLOOD PRESSURE: 82 MMHG | WEIGHT: 279 LBS | HEART RATE: 95 BPM | RESPIRATION RATE: 16 BRPM | HEIGHT: 68 IN | SYSTOLIC BLOOD PRESSURE: 115 MMHG | BODY MASS INDEX: 42.28 KG/M2

## 2017-07-25 NOTE — PROGRESS NOTES
[address], [address] [place]  [address]  Dept: [phone]    7/25/2017    BARIATRIC EXISTING PATIENT/FOLLOW UP    HPI:  Agusto Jaimes is a 39year old-year old female w infection, conversion to an open procedure very small chance of damage to surrounding structures leak or catastrophic outcome.     Plan: Patient is going to follow-up with me in 1 month she wishes to have surgery during her E[place] break likely December 1

## 2017-07-28 ENCOUNTER — OFFICE VISIT (OUTPATIENT)
Dept: FAMILY MEDICINE CLINIC | Facility: CLINIC | Age: 37
End: 2017-07-28

## 2017-07-28 DIAGNOSIS — M54.2 CHRONIC NECK PAIN: ICD-10-CM

## 2017-07-28 DIAGNOSIS — J45.31 MILD PERSISTENT ASTHMA WITH ACUTE EXACERBATION: ICD-10-CM

## 2017-07-28 DIAGNOSIS — G89.29 CHRONIC NECK PAIN: ICD-10-CM

## 2017-07-28 DIAGNOSIS — G44.209 TENSION HEADACHE: Primary | ICD-10-CM

## 2017-07-28 PROCEDURE — 99214 OFFICE O/P EST MOD 30 MIN: CPT | Performed by: FAMILY MEDICINE

## 2017-07-28 RX ORDER — ALBUTEROL SULFATE 90 UG/1
2 AEROSOL, METERED RESPIRATORY (INHALATION) EVERY 4 HOURS PRN
Qty: 1 G | Refills: 0 | Status: SHIPPED | OUTPATIENT
Start: 2017-07-28 | End: 2017-10-10

## 2017-07-28 NOTE — PROGRESS NOTES
Galilea Vazquez is a 39year old female.   HPI:   Patient presents with:  ER F/U: Rm. 10: Jn. The patient had an asthma attack on Sunday, 07/23/17 went to [facility]  Migraine    Patient was admitted into [facility] for Capsule Delayed Release TAKE 1 CAPSULE BY MOUTH DAILY Disp: 90 capsule Rfl: 3   Cholecalciferol (VITAMIN D) 1000 UNITS Oral Tab Take 1 tablet by mouth daily. Disp:  Rfl:    Vitamin C 500 MG Oral Tab Take 500 mg by mouth daily.  Disp:  Rfl:    alprazolam 0 heartburn  NEURO: denies headaches  Musculoskeletal: No motor deficits  EXAM:   /60 (BP Location: Right arm, Patient Position: Sitting, Cuff Size: large)   Pulse 80   Ht 68\"   Wt 280 lb   SpO2 98%    L/min   BMI 42.57 kg/m²    GENERAL: well de Symbicort has prescription at home rinse mouth after use advised to use for the next 2-4 weeks continue through allergy season if symptoms return.   Flonase 2 sprays to each nostril daily    The patient indicates understanding of these issues and agrees to

## 2017-07-30 VITALS
SYSTOLIC BLOOD PRESSURE: 108 MMHG | BODY MASS INDEX: 42.44 KG/M2 | HEART RATE: 80 BPM | OXYGEN SATURATION: 98 % | DIASTOLIC BLOOD PRESSURE: 60 MMHG | WEIGHT: 280 LBS | HEIGHT: 68 IN

## 2017-07-30 PROBLEM — Z16.24 MULTIPLE DRUG RESISTANT ORGANISM (MDRO) CULTURE POSITIVE: Status: RESOLVED | Noted: 2017-06-28 | Resolved: 2017-07-30

## 2017-07-30 PROBLEM — T81.49XA INFECTED SURGICAL WOUND: Status: RESOLVED | Noted: 2017-06-28 | Resolved: 2017-07-30

## 2017-07-31 ENCOUNTER — TELEPHONE (OUTPATIENT)
Dept: FAMILY MEDICINE CLINIC | Facility: CLINIC | Age: 37
End: 2017-07-31

## 2017-07-31 ENCOUNTER — HOSPITAL ENCOUNTER (OUTPATIENT)
Dept: ULTRASOUND IMAGING | Age: 37
Discharge: HOME | End: 2017-07-31
Attending: OTOLARYNGOLOGY
Payer: COMMERCIAL

## 2017-07-31 ENCOUNTER — LAB ENCOUNTER (OUTPATIENT)
Dept: LAB | Age: 37
End: 2017-07-31
Attending: OTOLARYNGOLOGY
Payer: COMMERCIAL

## 2017-07-31 ENCOUNTER — OFFICE VISIT (OUTPATIENT)
Dept: SURGERY | Facility: CLINIC | Age: 37
End: 2017-07-31

## 2017-07-31 VITALS
BODY MASS INDEX: 42.45 KG/M2 | DIASTOLIC BLOOD PRESSURE: 87 MMHG | HEIGHT: 68 IN | WEIGHT: 280.13 LBS | HEART RATE: 111 BPM | OXYGEN SATURATION: 99 % | SYSTOLIC BLOOD PRESSURE: 117 MMHG

## 2017-07-31 DIAGNOSIS — E07.9 THYROID DYSFUNCTION: ICD-10-CM

## 2017-07-31 DIAGNOSIS — E89.0 S/P THYROIDECTOMY: ICD-10-CM

## 2017-07-31 DIAGNOSIS — K21.9 GASTROESOPHAGEAL REFLUX DISEASE, ESOPHAGITIS PRESENCE NOT SPECIFIED: Primary | ICD-10-CM

## 2017-07-31 DIAGNOSIS — E83.51 HYPOCALCEMIA: ICD-10-CM

## 2017-07-31 DIAGNOSIS — C73 PAPILLARY THYROID CARCINOMA (HCC): ICD-10-CM

## 2017-07-31 DIAGNOSIS — M19.90 OSTEOARTHRITIS, UNSPECIFIED OSTEOARTHRITIS TYPE, UNSPECIFIED SITE: ICD-10-CM

## 2017-07-31 DIAGNOSIS — E78.2 MIXED HYPERLIPIDEMIA: ICD-10-CM

## 2017-07-31 DIAGNOSIS — E66.01 MORBID OBESITY WITH BMI OF 40.0-44.9, ADULT (HCC): ICD-10-CM

## 2017-07-31 LAB
FREE T4: 1.2 NG/DL (ref 0.9–1.8)
TSI SER-ACNC: 1.87 MIU/ML (ref 0.35–5.5)

## 2017-07-31 PROCEDURE — 99214 OFFICE O/P EST MOD 30 MIN: CPT | Performed by: INTERNAL MEDICINE

## 2017-07-31 PROCEDURE — 84439 ASSAY OF FREE THYROXINE: CPT

## 2017-07-31 PROCEDURE — 76536 US EXAM OF HEAD AND NECK: CPT | Performed by: OTOLARYNGOLOGY

## 2017-07-31 PROCEDURE — 84443 ASSAY THYROID STIM HORMONE: CPT

## 2017-07-31 PROCEDURE — 36415 COLL VENOUS BLD VENIPUNCTURE: CPT

## 2017-07-31 RX ORDER — BUTALBITAL, ACETAMINOPHEN AND CAFFEINE 50; 325; 40 MG/1; MG/1; MG/1
TABLET ORAL
Qty: 15 TABLET | Refills: 0 | OUTPATIENT
Start: 2017-07-31

## 2017-07-31 RX ORDER — BUTALBITAL, ACETAMINOPHEN AND CAFFEINE 50; 325; 40 MG/1; MG/1; MG/1
TABLET ORAL
Qty: 15 TABLET | Refills: 0 | Status: SHIPPED | OUTPATIENT
Start: 2017-07-31 | End: 2017-08-25

## 2017-07-31 RX ORDER — BUTALBITAL, ACETAMINOPHEN AND CAFFEINE 50; 325; 40 MG/1; MG/1; MG/1
1-2 TABLET ORAL EVERY 4 HOURS PRN
Qty: 20 TABLET | Refills: 1 | Status: CANCELLED | OUTPATIENT
Start: 2017-07-31 | End: 2017-08-07

## 2017-07-31 RX ORDER — TOPIRAMATE 25 MG/1
25 TABLET ORAL EVERY EVENING
Qty: 30 TABLET | Refills: 1 | Status: SHIPPED | OUTPATIENT
Start: 2017-07-31 | End: 2017-09-11

## 2017-07-31 NOTE — TELEPHONE ENCOUNTER
Smitha Lopez is calling for the status on her refill request for her BUTALBITAL-APAP-CAFFEINE -40 MG Oral Tab

## 2017-07-31 NOTE — TELEPHONE ENCOUNTER
Pt got #20 on 7/10/17 with 1 refill. Spoke to patient and yes she already refilled the 1 additional as well. Ok to refill?

## 2017-07-31 NOTE — TELEPHONE ENCOUNTER
This does have a controlled substance quality she can have #15 more but needs to be takes more conservatively for the HA

## 2017-07-31 NOTE — PROGRESS NOTES
[address], [address] [facility] [address]  Dept: [phone]     Date:   2017    Patient:  Schuyler Guevara  :      1980  MRN:      [id number]    Chief Complaint Lisdexamfetamine Dimesylate (VYVANSE) 30 MG Oral Cap Take 1 capsule (30 mg total) by mouth daily. Disp: 30 capsule Rfl: 0   topiramate 25 MG Oral Tab Take 1 tablet (25 mg total) by mouth every evening.  Disp: 30 tablet Rfl: 1   Albuterol Sulfate HFA (PROAIR Never Used    Alcohol use No    Comment: Rarely    Drug use: No    Sexual activity: Not on file     Other Topics Concern    Caffeine Concern Yes    Comment: 1 iced tea a day    Exercise No    Comment: active at work (on hold)     Social History Narrative 2015.: SURGICAL STENT Bilateral      Comment: Bilateral iliac stents  4/2011: THYROIDECTOMY  No date: TOTAL ABDOM HYSTERECTOMY  12/15: UPPER GI ENDO NO BARRETTS      Comment: normal  01/25/2017: UPPER GI ENDOSCOPY PERFORMED      Comment: Shelia Caballero manage cues and Eat slowly and take 20 to 30 minutes to complete each meal    Exercise Goals Reviewed and Discussed    Walk for  45 Minutes    ROS:    Constitutional: positive for fatigue  Respiratory: negative  Cardiovascular: negative  Gastrointestinal: exercises, walk 10 minutes per day. 4. Increase fruit and vegetable servings to 5-6 per day. Tolerating vyvanse, but does not feel like it is strong enough.   Will increase to 30 mg    Has good support at home    Excited about going back to work  Team[employer]

## 2017-08-01 NOTE — PROGRESS NOTES
Please inform tsh is 1.87 goal is tsh less than 1.0 recommend if still on tirosint 150mcg increase to 175mcg and repeat tsh ft4 in 6 weeks due for tumor marker with next lab draw as well,

## 2017-08-01 NOTE — PROGRESS NOTES
LMOM per HIPAA with lab results and recommendations. Requested patient to call back to confirm current dosing.

## 2017-08-15 ENCOUNTER — OFFICE VISIT (OUTPATIENT)
Dept: PHYSICAL THERAPY | Age: 37
End: 2017-08-15
Attending: FAMILY MEDICINE
Payer: COMMERCIAL

## 2017-08-15 DIAGNOSIS — M54.2 CHRONIC NECK PAIN: ICD-10-CM

## 2017-08-15 DIAGNOSIS — G89.29 CHRONIC NECK PAIN: ICD-10-CM

## 2017-08-15 DIAGNOSIS — G44.209 TENSION HEADACHE: ICD-10-CM

## 2017-08-15 PROCEDURE — 97110 THERAPEUTIC EXERCISES: CPT | Performed by: PHYSICAL THERAPIST

## 2017-08-15 PROCEDURE — 97162 PT EVAL MOD COMPLEX 30 MIN: CPT | Performed by: PHYSICAL THERAPIST

## 2017-08-15 RX ORDER — ELETRIPTAN HYDROBROMIDE 40 MG/1
TABLET, FILM COATED ORAL
Qty: 9 TABLET | Refills: 0 | Status: SHIPPED | OUTPATIENT
Start: 2017-08-15 | End: 2017-10-10 | Stop reason: ALTCHOICE

## 2017-08-15 NOTE — PROGRESS NOTES
SPINE EVALUATION:   Referring Provider: Anisha Prieto PA-C  Diagnosis: Tension headache (G44.209), Chronic neck pain (M54.2,G89.29) Date of Service: 8/15/2017     PATIENT SUMMARY   Musa Ty is a 39year old y/o female who presents to [facility]  LF - L     WNL   Increased right neck/ UT pain 4/10, NW  Rotation - R    75%   Increased right neck/ UT pain  4/10, NW  Rotation- L    WNL   NE  Retraction and Rotation- R  75%   Increased right neck pain 4/10, NW  Repeated Retraction and LF - R  75% ROM to allow her to look up, bend, and turn head to drive without difficulty or pain (8 visits)  Patient will be independent with home program for symptom management and recovery of function (8 visits)    Frequency / Duration: Patient will be seen for 2 x/

## 2017-08-17 ENCOUNTER — OFFICE VISIT (OUTPATIENT)
Dept: PHYSICAL THERAPY | Age: 37
End: 2017-08-17
Attending: FAMILY MEDICINE
Payer: COMMERCIAL

## 2017-08-17 PROCEDURE — 97140 MANUAL THERAPY 1/> REGIONS: CPT | Performed by: PHYSICAL THERAPIST

## 2017-08-17 PROCEDURE — 97110 THERAPEUTIC EXERCISES: CPT | Performed by: PHYSICAL THERAPIST

## 2017-08-17 NOTE — PROGRESS NOTES
Dx: Tension headache (G44.209), Chronic neck pain (M54.2,G89.29) Authorized # of Visits:  2/8 Next MD visit: none scheduled  Fall Risk: standard Precautions: n/a    Subjective: Patient reports bilateral occipital and temporal headache 4/10 and right UT nella

## 2017-08-21 RX ORDER — CYCLOBENZAPRINE HCL 10 MG
TABLET ORAL
Qty: 30 TABLET | Refills: 0 | Status: SHIPPED | OUTPATIENT
Start: 2017-08-21 | End: 2017-10-10 | Stop reason: ALTCHOICE

## 2017-08-22 ENCOUNTER — OFFICE VISIT (OUTPATIENT)
Dept: PHYSICAL THERAPY | Age: 37
End: 2017-08-22
Attending: FAMILY MEDICINE
Payer: COMMERCIAL

## 2017-08-22 ENCOUNTER — OFFICE VISIT (OUTPATIENT)
Dept: SURGERY | Facility: CLINIC | Age: 37
End: 2017-08-22

## 2017-08-22 ENCOUNTER — TELEPHONE (OUTPATIENT)
Dept: SURGERY | Facility: CLINIC | Age: 37
End: 2017-08-22

## 2017-08-22 VITALS
BODY MASS INDEX: 41.08 KG/M2 | HEART RATE: 93 BPM | SYSTOLIC BLOOD PRESSURE: 114 MMHG | RESPIRATION RATE: 16 BRPM | WEIGHT: 274.19 LBS | HEIGHT: 68.5 IN | DIASTOLIC BLOOD PRESSURE: 83 MMHG

## 2017-08-22 PROCEDURE — 97110 THERAPEUTIC EXERCISES: CPT | Performed by: PHYSICAL THERAPIST

## 2017-08-22 PROCEDURE — 97140 MANUAL THERAPY 1/> REGIONS: CPT | Performed by: PHYSICAL THERAPIST

## 2017-08-22 NOTE — PROGRESS NOTES
Dx: Tension headache (G44.209), Chronic neck pain (M54.2,G89.29) Authorized # of Visits: 3/8 Next MD visit: none scheduled  Fall Risk: standard Precautions: n/a    Subjective: Patient reports occipital pain today 4.5/10, frontal 3/10 and right UT pain 2.5/

## 2017-08-22 NOTE — PROGRESS NOTES
[address], [place] [address] Estelle Red  [address]  Dept: [phone]    8/22/2017    BARIATRIC EXISTING PATIENT/FOLLOW UP    HPI:  Lucien James is a 39year old-year old female w sleeve gastrectomy. If this were the case she is comfortable with that.     Plan: Follow-up with me in 1 month she is scheduled for Mingo-en-Y gastric bypass December 18    Mega Charles MD  8/22/2017

## 2017-08-24 ENCOUNTER — OFFICE VISIT (OUTPATIENT)
Dept: PHYSICAL THERAPY | Age: 37
End: 2017-08-24
Attending: FAMILY MEDICINE
Payer: COMMERCIAL

## 2017-08-24 PROCEDURE — 97140 MANUAL THERAPY 1/> REGIONS: CPT | Performed by: PHYSICAL THERAPIST

## 2017-08-24 PROCEDURE — 97110 THERAPEUTIC EXERCISES: CPT | Performed by: PHYSICAL THERAPIST

## 2017-08-24 NOTE — PROGRESS NOTES
Dx: Tension headache (G44.209), Chronic neck pain (M54.2,G89.29) Authorized # of Visits: 4/8 Next MD visit: none scheduled  Fall Risk: standard Precautions: n/a    Subjective: Patient reports \"stress headache\" in frontal/ parietal region 2.5/10 that krystal Diagnosis and Therapy (MDT) of cervical spine, manual traction with repeated movements, TPM    Charges: Lynette 2 (35 min) MT 1 (15 min)  Total Timed Treatment: 50 min Total Treatment Time: 50 min

## 2017-08-25 ENCOUNTER — APPOINTMENT (OUTPATIENT)
Dept: CT IMAGING | Age: 37
End: 2017-08-25
Attending: EMERGENCY MEDICINE
Payer: COMMERCIAL

## 2017-08-25 ENCOUNTER — TELEPHONE (OUTPATIENT)
Dept: FAMILY MEDICINE CLINIC | Facility: CLINIC | Age: 37
End: 2017-08-25

## 2017-08-25 ENCOUNTER — HOSPITAL ENCOUNTER (EMERGENCY)
Age: 37
Discharge: HOME | End: 2017-08-25
Attending: EMERGENCY MEDICINE
Payer: COMMERCIAL

## 2017-08-25 VITALS
TEMPERATURE: 97 F | WEIGHT: 271 LBS | HEART RATE: 88 BPM | DIASTOLIC BLOOD PRESSURE: 72 MMHG | OXYGEN SATURATION: 96 % | BODY MASS INDEX: 41 KG/M2 | SYSTOLIC BLOOD PRESSURE: 110 MMHG | RESPIRATION RATE: 16 BRPM

## 2017-08-25 DIAGNOSIS — G89.29 CHRONIC ABDOMINAL PAIN: Primary | ICD-10-CM

## 2017-08-25 DIAGNOSIS — R10.9 CHRONIC ABDOMINAL PAIN: Primary | ICD-10-CM

## 2017-08-25 LAB
BASOPHILS # BLD AUTO: 0.04 X10(3) UL (ref 0–0.1)
BASOPHILS NFR BLD AUTO: 0.5 %
BILIRUB UR QL STRIP.AUTO: NEGATIVE
BUN BLD-MCNC: 12 MG/DL (ref 8–20)
CALCIUM BLD-MCNC: 8.6 MG/DL (ref 8.3–10.3)
CHLORIDE: 108 MMOL/L (ref 101–111)
CO2: 28 MMOL/L (ref 22–32)
COLOR UR AUTO: YELLOW
CREAT BLD-MCNC: 0.72 MG/DL (ref 0.55–1.02)
EOSINOPHIL # BLD AUTO: 0.1 X10(3) UL (ref 0–0.3)
EOSINOPHIL NFR BLD AUTO: 1.2 %
ERYTHROCYTE [DISTWIDTH] IN BLOOD BY AUTOMATED COUNT: 13.1 % (ref 11.5–16)
GLUCOSE BLD-MCNC: 114 MG/DL (ref 70–99)
GLUCOSE UR STRIP.AUTO-MCNC: NEGATIVE MG/DL
HCT VFR BLD AUTO: 39.8 % (ref 34–50)
HGB BLD-MCNC: 13.7 G/DL (ref 12–16)
IMMATURE GRANULOCYTE COUNT: 0.04 X10(3) UL (ref 0–1)
IMMATURE GRANULOCYTE RATIO %: 0.5 %
KETONES UR STRIP.AUTO-MCNC: NEGATIVE MG/DL
LEUKOCYTE ESTERASE UR QL STRIP.AUTO: NEGATIVE
LYMPHOCYTES # BLD AUTO: 1.79 X10(3) UL (ref 0.9–4)
LYMPHOCYTES NFR BLD AUTO: 21.6 %
MCH RBC QN AUTO: 30.9 PG (ref 27–33.2)
MCHC RBC AUTO-ENTMCNC: 34.4 G/DL (ref 31–37)
MCV RBC AUTO: 89.8 FL (ref 81–100)
MONOCYTES # BLD AUTO: 0.48 X10(3) UL (ref 0.1–0.6)
MONOCYTES NFR BLD AUTO: 5.8 %
NEUTROPHIL ABS PRELIM: 5.85 X10 (3) UL (ref 1.3–6.7)
NEUTROPHILS # BLD AUTO: 5.85 X10(3) UL (ref 1.3–6.7)
NEUTROPHILS NFR BLD AUTO: 70.4 %
NITRITE UR QL STRIP.AUTO: NEGATIVE
PH UR STRIP.AUTO: 5 [PH] (ref 4.5–8)
PLATELET # BLD AUTO: 299 10(3)UL (ref 150–450)
POTASSIUM SERPL-SCNC: 3.9 MMOL/L (ref 3.6–5.1)
PROT UR STRIP.AUTO-MCNC: NEGATIVE MG/DL
RBC # BLD AUTO: 4.43 X10(6)UL (ref 3.8–5.1)
RBC UR QL AUTO: NEGATIVE
RED CELL DISTRIBUTION WIDTH-SD: 43 FL (ref 35.1–46.3)
SODIUM SERPL-SCNC: 142 MMOL/L (ref 136–144)
SP GR UR STRIP.AUTO: <=1.005 (ref 1–1.03)
UROBILINOGEN UR STRIP.AUTO-MCNC: 0.2 MG/DL
WBC # BLD AUTO: 8.3 X10(3) UL (ref 4–13)

## 2017-08-25 PROCEDURE — 96361 HYDRATE IV INFUSION ADD-ON: CPT

## 2017-08-25 PROCEDURE — 96374 THER/PROPH/DIAG INJ IV PUSH: CPT

## 2017-08-25 PROCEDURE — 74176 CT ABD & PELVIS W/O CONTRAST: CPT | Performed by: EMERGENCY MEDICINE

## 2017-08-25 PROCEDURE — 87086 URINE CULTURE/COLONY COUNT: CPT | Performed by: EMERGENCY MEDICINE

## 2017-08-25 PROCEDURE — 85025 COMPLETE CBC W/AUTO DIFF WBC: CPT | Performed by: EMERGENCY MEDICINE

## 2017-08-25 PROCEDURE — 80048 BASIC METABOLIC PNL TOTAL CA: CPT | Performed by: EMERGENCY MEDICINE

## 2017-08-25 PROCEDURE — 99284 EMERGENCY DEPT VISIT MOD MDM: CPT

## 2017-08-25 PROCEDURE — 96375 TX/PRO/DX INJ NEW DRUG ADDON: CPT

## 2017-08-25 PROCEDURE — 81003 URINALYSIS AUTO W/O SCOPE: CPT | Performed by: EMERGENCY MEDICINE

## 2017-08-25 RX ORDER — DIPHENHYDRAMINE HYDROCHLORIDE 50 MG/ML
25 INJECTION INTRAMUSCULAR; INTRAVENOUS ONCE
Status: COMPLETED | OUTPATIENT
Start: 2017-08-25 | End: 2017-08-25

## 2017-08-25 RX ORDER — ONDANSETRON 2 MG/ML
4 INJECTION INTRAMUSCULAR; INTRAVENOUS ONCE
Status: COMPLETED | OUTPATIENT
Start: 2017-08-25 | End: 2017-08-25

## 2017-08-25 RX ORDER — METOCLOPRAMIDE HYDROCHLORIDE 5 MG/ML
10 INJECTION INTRAMUSCULAR; INTRAVENOUS ONCE
Status: DISCONTINUED | OUTPATIENT
Start: 2017-08-25 | End: 2017-08-25

## 2017-08-25 RX ORDER — METOCLOPRAMIDE 10 MG/1
10 TABLET ORAL 3 TIMES DAILY PRN
Qty: 20 TABLET | Refills: 0 | Status: SHIPPED | OUTPATIENT
Start: 2017-08-25 | End: 2017-09-24

## 2017-08-25 NOTE — ED INITIAL ASSESSMENT (HPI)
States lower back pain right greater than left, nausea onset this morning.  Now is vomiting and worse pain

## 2017-08-25 NOTE — TELEPHONE ENCOUNTER
Garrett Villalobos is calling in regards to Jaú, she is in meetings all day today, but she is having a lot of kidney pain and she thinks it may be a poss kidney stone, she is trying to avoid going to the emergency room because of her busy day, she is just looking fo

## 2017-08-25 NOTE — ED PROVIDER NOTES
Patient Seen in: [facility] In [place]    History   Patient presents with:  Back Pain (musculoskeletal)    Stated Complaint: back pain, vomiting    HPI    27-year-old woman who comes emerged department complaining of right flank pain Kelin [place] MD;  Location: [facility]  No date: HYSTERECTOMY  2011: HYSTEROSCOPY  12/14/2015: INJ PARAVERT F JNT L/S 1 LEV Bilateral      Comment: Procedure: FACET INJECTION UNDER FLUOROSCOPY;                Surgeon: RELPAX 40 MG Oral Tab,  TAKE 1 TABLET BY MOUTH AT ONSET OF HEADACHE. MAY REPEAT 1 TABLET IN 2 HOURS. MAX DOSE 2 TABLETS/ 24 HOURS   TIROSINT 150 MCG Oral Cap,  Take 1 capsule by mouth daily before breakfast with 1 capsule Tirosint 25 mcg to total 175 mcg. Positive for stated complaint: back pain, vomiting  Other systems are as noted in HPI. Constitutional and vital signs reviewed. All other systems reviewed and negative except as noted above.     PSFH elements reviewed from today and agreed except as o RAINBOW DRAW LAVENDER   RAINBOW DRAW LIGHT GREEN   URINE CULTURE, ROUTINE     Us Thyroid (CLO=[id number])    Result Date: 7/31/2017  CONCLUSION:  Thyroidectomy with no residual thyroid tissue identified.  Previously identified nodules within the thyroid bed are

## 2017-08-29 ENCOUNTER — APPOINTMENT (OUTPATIENT)
Dept: PHYSICAL THERAPY | Age: 37
End: 2017-08-29
Attending: FAMILY MEDICINE
Payer: COMMERCIAL

## 2017-08-31 ENCOUNTER — OFFICE VISIT (OUTPATIENT)
Dept: PHYSICAL THERAPY | Age: 37
End: 2017-08-31
Attending: FAMILY MEDICINE
Payer: COMMERCIAL

## 2017-08-31 PROCEDURE — 97110 THERAPEUTIC EXERCISES: CPT | Performed by: PHYSICAL THERAPIST

## 2017-08-31 PROCEDURE — 97140 MANUAL THERAPY 1/> REGIONS: CPT | Performed by: PHYSICAL THERAPIST

## 2017-08-31 RX ORDER — BUTALBITAL, ACETAMINOPHEN AND CAFFEINE 50; 325; 40 MG/1; MG/1; MG/1
TABLET ORAL
Qty: 15 TABLET | Refills: 0 | Status: SHIPPED | OUTPATIENT
Start: 2017-08-31 | End: 2017-10-10 | Stop reason: ALTCHOICE

## 2017-08-31 NOTE — TELEPHONE ENCOUNTER
rx has been faxed to [facility] @ [phone]  Mychart message has been sent to patient regarding need for appt for further refills and #15 should last 2-3 months

## 2017-08-31 NOTE — TELEPHONE ENCOUNTER
Needs follow up for any further this is not for regular use due to the Butalbital only #15 should last 2-3 months

## 2017-08-31 NOTE — PROGRESS NOTES
Dx: Tension headache (G44.209), Chronic neck pain (M54.2,G89.29) Authorized # of Visits: 5/8 Next MD visit: none scheduled  Fall Risk: standard Precautions: n/a    Subjective: Patient reports frontal headache 3/10, right suboccipital pain 2/10 and right UT

## 2017-09-05 ENCOUNTER — APPOINTMENT (OUTPATIENT)
Dept: PHYSICAL THERAPY | Age: 37
End: 2017-09-05
Attending: FAMILY MEDICINE
Payer: COMMERCIAL

## 2017-09-07 ENCOUNTER — APPOINTMENT (OUTPATIENT)
Dept: PHYSICAL THERAPY | Age: 37
End: 2017-09-07
Attending: FAMILY MEDICINE
Payer: COMMERCIAL

## 2017-09-11 ENCOUNTER — OFFICE VISIT (OUTPATIENT)
Dept: SURGERY | Facility: CLINIC | Age: 37
End: 2017-09-11

## 2017-09-11 VITALS
TEMPERATURE: 97 F | OXYGEN SATURATION: 97 % | HEART RATE: 111 BPM | HEIGHT: 68 IN | WEIGHT: 270.5 LBS | BODY MASS INDEX: 41 KG/M2 | RESPIRATION RATE: 18 BRPM | DIASTOLIC BLOOD PRESSURE: 91 MMHG | SYSTOLIC BLOOD PRESSURE: 138 MMHG

## 2017-09-11 DIAGNOSIS — E78.2 MIXED HYPERLIPIDEMIA: Primary | ICD-10-CM

## 2017-09-11 DIAGNOSIS — E66.01 MORBID OBESITY WITH BMI OF 40.0-44.9, ADULT (HCC): ICD-10-CM

## 2017-09-11 DIAGNOSIS — R63.2 BINGE EATING: ICD-10-CM

## 2017-09-11 DIAGNOSIS — K21.9 GASTROESOPHAGEAL REFLUX DISEASE, ESOPHAGITIS PRESENCE NOT SPECIFIED: ICD-10-CM

## 2017-09-11 DIAGNOSIS — R73.09 ABNORMAL BLOOD SUGAR: ICD-10-CM

## 2017-09-11 DIAGNOSIS — E55.9 VITAMIN D DEFICIENCY: ICD-10-CM

## 2017-09-11 PROCEDURE — 99214 OFFICE O/P EST MOD 30 MIN: CPT | Performed by: INTERNAL MEDICINE

## 2017-09-11 RX ORDER — TOPIRAMATE 25 MG/1
25 TABLET ORAL EVERY EVENING
Qty: 30 TABLET | Refills: 1 | Status: SHIPPED | OUTPATIENT
Start: 2017-09-11 | End: 2017-11-22 | Stop reason: ALTCHOICE

## 2017-09-11 NOTE — PROGRESS NOTES
[address], [address] [facility] [address]  Dept: [phone]     Date:   2017    Patient:  Frank Torres  :      1980  MRN:      [id number]    Chief Complaint by mouth every morning. Disp: 30 capsule Rfl: 0   Metoclopramide HCl 10 MG Oral Tab Take 1 tablet (10 mg total) by mouth 3 (three) times daily as needed.  Disp: 20 tablet Rfl: 0   CYCLOBENZAPRINE HCL 10 MG Oral Tab TAKE 1 TABLET(10 MG) BY MOUTH THREE TIMES [Guaifenesin-Codeine]; Chocolate; Doxycycline; Mold; Norco [Apap-Fd&C Yellow #10 [place]jo-Hydrocodone]; Reglan [Metoclopramide]; Toradol [Ketorolac Tromethamine];  Tramadol; Azithromycin     Social History:      Social History  Social History   Marital statu Procedure: FACET INJECTION UNDER FLUOROSCOPY;                Surgeon: Melo Esparza DO;  Location: [address] [place] [address]  HYSTEROSCOPY: OTHER  NECK SCAR REVISION: OTHER  No date: OTHER      Comment: stent to iliac crest bone  No date: carbohydrates to 100 gms per day, Eat 100-200 calories within 1 hour of waking  and Eat 3-4 cups of fresh fruits or vegetables daily    Behavior Modifications Reviewed and Discussed  Eat breakfast, Eat 3 meals per day, Plan meals in advance, Read nutrition topiramate    GERD: The patient believes her reflux is somewhat better of at least no worse on current medication. She was encouraged to avoid caffeine products, elevated head of bed and not eat for 1 hour before bedtime.  No interval changes were made in h

## 2017-09-12 ENCOUNTER — OFFICE VISIT (OUTPATIENT)
Dept: PHYSICAL THERAPY | Age: 37
End: 2017-09-12
Attending: FAMILY MEDICINE
Payer: COMMERCIAL

## 2017-09-12 PROCEDURE — 97110 THERAPEUTIC EXERCISES: CPT | Performed by: PHYSICAL THERAPIST

## 2017-09-12 NOTE — PROGRESS NOTES
Dx: Tension headache (G44.209), Chronic neck pain (M54.2,G89.29) Authorized # of Visits: 6/8 Next MD visit: none scheduled  Fall Risk: standard Precautions: n/a    Subjective: Patient states that she has 1 day left of steroids for sinus infection.   She rep

## 2017-09-18 ENCOUNTER — OFFICE VISIT (OUTPATIENT)
Dept: SURGERY | Facility: CLINIC | Age: 37
End: 2017-09-18

## 2017-09-18 VITALS — BODY MASS INDEX: 41.8 KG/M2 | HEIGHT: 68 IN | WEIGHT: 275.81 LBS

## 2017-09-18 DIAGNOSIS — E66.01 MORBID OBESITY WITH BMI OF 40.0-44.9, ADULT (HCC): Primary | ICD-10-CM

## 2017-09-18 PROCEDURE — 97803 MED NUTRITION INDIV SUBSEQ: CPT | Performed by: DIETITIAN, REGISTERED

## 2017-09-18 NOTE — PROGRESS NOTES
[address] [place] [address] [facility] CLIN[facility]  [address] [place] [address]  Dept: [phone]  Loc: [phone]    09/18/17      Bariatric Follow-up Nutrition Session    Cori Awad is a 39year old female. 12/31/2015       Lab Results  Component Value Date   VITD 39.3 12/31/2015     No results found for: THIAMINE   No results found for: VITB1  No results found for: FOLIC     Meds:     Current Outpatient Prescriptions:   •  Lisdexamfetamine Dimesylate (VYVANS calciTRIOL 0.25 MCG Oral Cap, Take 1 capsule (0.25 mcg total) by mouth daily. , Disp: 60 capsule, Rfl: 2  •  OMEPRAZOLE 40 MG Oral Capsule Delayed Release, TAKE 1 CAPSULE BY MOUTH DAILY, Disp: 90 capsule, Rfl: 3  •  Cholecalciferol (VITAMIN D) 1000 UNITS Or includes carb brks and snacks, though less. Successful at including fruit regularly, less so veggies. Likes protein shake. Reset calorie goal to 2000, will reduce 200 marquis/month to 1600 marquis in November and then start liquid protein diet early December.     Modesta Buchanan

## 2017-09-23 ENCOUNTER — LAB ENCOUNTER (OUTPATIENT)
Dept: LAB | Age: 37
End: 2017-09-23
Attending: INTERNAL MEDICINE
Payer: COMMERCIAL

## 2017-09-23 DIAGNOSIS — E07.9 THYROID DYSFUNCTION: ICD-10-CM

## 2017-09-23 DIAGNOSIS — E78.2 MIXED HYPERLIPIDEMIA: ICD-10-CM

## 2017-09-23 DIAGNOSIS — E66.01 MORBID OBESITY WITH BMI OF 40.0-44.9, ADULT (HCC): ICD-10-CM

## 2017-09-23 DIAGNOSIS — E55.9 VITAMIN D DEFICIENCY: ICD-10-CM

## 2017-09-23 DIAGNOSIS — K21.9 GASTROESOPHAGEAL REFLUX DISEASE, ESOPHAGITIS PRESENCE NOT SPECIFIED: ICD-10-CM

## 2017-09-23 DIAGNOSIS — R73.09 ABNORMAL BLOOD SUGAR: ICD-10-CM

## 2017-09-23 DIAGNOSIS — R63.2 BINGE EATING: ICD-10-CM

## 2017-09-23 LAB
25-HYDROXYVITAMIN D (TOTAL): 31 NG/ML (ref 30–100)
ALBUMIN SERPL-MCNC: 3.4 G/DL (ref 3.5–4.8)
ALP LIVER SERPL-CCNC: 81 U/L (ref 37–98)
ALT SERPL-CCNC: 32 U/L (ref 14–54)
AST SERPL-CCNC: 13 U/L (ref 15–41)
BASOPHILS # BLD AUTO: 0.04 X10(3) UL (ref 0–0.1)
BASOPHILS NFR BLD AUTO: 0.6 %
BILIRUB SERPL-MCNC: 0.4 MG/DL (ref 0.1–2)
BUN BLD-MCNC: 9 MG/DL (ref 8–20)
CALCIUM BLD-MCNC: 8.5 MG/DL (ref 8.3–10.3)
CHLORIDE: 110 MMOL/L (ref 101–111)
CHOLEST SMN-MCNC: 179 MG/DL (ref ?–200)
CO2: 28 MMOL/L (ref 22–32)
CREAT BLD-MCNC: 0.67 MG/DL (ref 0.55–1.02)
EOSINOPHIL # BLD AUTO: 0.22 X10(3) UL (ref 0–0.3)
EOSINOPHIL NFR BLD AUTO: 3.2 %
ERYTHROCYTE [DISTWIDTH] IN BLOOD BY AUTOMATED COUNT: 13 % (ref 11.5–16)
EST. AVERAGE GLUCOSE BLD GHB EST-MCNC: 108 MG/DL (ref 68–126)
FREE T4: 1.1 NG/DL (ref 0.9–1.8)
GLUCOSE BLD-MCNC: 94 MG/DL (ref 70–99)
HAV AB SERPL IA-ACNC: 342 PG/ML (ref 193–986)
HAV IGM SER QL: 2.2 MG/DL (ref 1.7–3)
HBA1C MFR BLD HPLC: 5.4 % (ref ?–5.7)
HCT VFR BLD AUTO: 42.6 % (ref 34–50)
HDLC SERPL-MCNC: 62 MG/DL (ref 45–?)
HDLC SERPL: 2.89 {RATIO} (ref ?–4.44)
HGB BLD-MCNC: 13.9 G/DL (ref 12–16)
IMMATURE GRANULOCYTE COUNT: 0.03 X10(3) UL (ref 0–1)
IMMATURE GRANULOCYTE RATIO %: 0.4 %
IRON SATURATION: 22 % (ref 13–45)
IRON: 75 UG/DL (ref 28–170)
LDLC SERPL CALC-MCNC: 98 MG/DL (ref ?–130)
LDLC SERPL-MCNC: 19 MG/DL (ref 5–40)
LYMPHOCYTES # BLD AUTO: 1.83 X10(3) UL (ref 0.9–4)
LYMPHOCYTES NFR BLD AUTO: 26.7 %
M PROTEIN MFR SERPL ELPH: 7 G/DL (ref 6.1–8.3)
MCH RBC QN AUTO: 29.8 PG (ref 27–33.2)
MCHC RBC AUTO-ENTMCNC: 32.6 G/DL (ref 31–37)
MCV RBC AUTO: 91.2 FL (ref 81–100)
MONOCYTES # BLD AUTO: 0.53 X10(3) UL (ref 0.1–0.6)
MONOCYTES NFR BLD AUTO: 7.7 %
NEUTROPHIL ABS PRELIM: 4.2 X10 (3) UL (ref 1.3–6.7)
NEUTROPHILS # BLD AUTO: 4.2 X10(3) UL (ref 1.3–6.7)
NEUTROPHILS NFR BLD AUTO: 61.4 %
NONHDLC SERPL-MCNC: 117 MG/DL (ref ?–130)
PHOSPHATE SERPL-MCNC: 2.8 MG/DL (ref 2.5–4.9)
PLATELET # BLD AUTO: 285 10(3)UL (ref 150–450)
POTASSIUM SERPL-SCNC: 3.9 MMOL/L (ref 3.6–5.1)
RBC # BLD AUTO: 4.67 X10(6)UL (ref 3.8–5.1)
RED CELL DISTRIBUTION WIDTH-SD: 42.8 FL (ref 35.1–46.3)
SODIUM SERPL-SCNC: 142 MMOL/L (ref 136–144)
TOTAL IRON BINDING CAPACITY: 347 UG/DL (ref 298–536)
TRANSFERRIN: 233 MG/DL (ref 200–360)
TRIGLYCERIDES: 94 MG/DL (ref ?–150)
TSI SER-ACNC: 0.04 MIU/ML (ref 0.35–5.5)
WBC # BLD AUTO: 6.9 X10(3) UL (ref 4–13)

## 2017-09-23 PROCEDURE — 85025 COMPLETE CBC W/AUTO DIFF WBC: CPT

## 2017-09-23 PROCEDURE — 83036 HEMOGLOBIN GLYCOSYLATED A1C: CPT

## 2017-09-23 PROCEDURE — 83735 ASSAY OF MAGNESIUM: CPT

## 2017-09-23 PROCEDURE — 80053 COMPREHEN METABOLIC PANEL: CPT

## 2017-09-23 PROCEDURE — 84425 ASSAY OF VITAMIN B-1: CPT

## 2017-09-23 PROCEDURE — 80061 LIPID PANEL: CPT

## 2017-09-23 PROCEDURE — 86800 THYROGLOBULIN ANTIBODY: CPT

## 2017-09-23 PROCEDURE — 84100 ASSAY OF PHOSPHORUS: CPT

## 2017-09-23 PROCEDURE — 82306 VITAMIN D 25 HYDROXY: CPT

## 2017-09-23 PROCEDURE — 36415 COLL VENOUS BLD VENIPUNCTURE: CPT

## 2017-09-23 PROCEDURE — 82607 VITAMIN B-12: CPT

## 2017-09-23 PROCEDURE — 83550 IRON BINDING TEST: CPT

## 2017-09-23 PROCEDURE — 84439 ASSAY OF FREE THYROXINE: CPT

## 2017-09-23 PROCEDURE — 84443 ASSAY THYROID STIM HORMONE: CPT

## 2017-09-23 PROCEDURE — 83540 ASSAY OF IRON: CPT

## 2017-09-23 PROCEDURE — 84432 ASSAY OF THYROGLOBULIN: CPT

## 2017-09-24 LAB
THYROGLOBULIN AB: <0.9 IU/ML
THYROGLOBULIN, SERUM OR PLASMA: 0.1 NG/ML

## 2017-09-26 ENCOUNTER — OFFICE VISIT (OUTPATIENT)
Dept: PHYSICAL THERAPY | Age: 37
End: 2017-09-26
Attending: FAMILY MEDICINE
Payer: COMMERCIAL

## 2017-09-26 LAB — VITAMIN B1 (THIAMINE), WHOLE B: 172 NMOL/L

## 2017-09-26 PROCEDURE — 97110 THERAPEUTIC EXERCISES: CPT | Performed by: PHYSICAL THERAPIST

## 2017-09-26 PROCEDURE — 97140 MANUAL THERAPY 1/> REGIONS: CPT | Performed by: PHYSICAL THERAPIST

## 2017-09-26 NOTE — PROGRESS NOTES
Dx: Tension headache (G44.209), Chronic neck pain (M54.2,G89.29) Authorized # of Visits: 7/8 Next MD visit: none scheduled  Fall Risk: standard Precautions: n/a    Subjective: Patient reports occasional headaches, none at present.   She reports right UT tig

## 2017-10-02 ENCOUNTER — OFFICE VISIT (OUTPATIENT)
Dept: PHYSICAL THERAPY | Age: 37
End: 2017-10-02
Attending: ORTHOPAEDIC SURGERY
Payer: COMMERCIAL

## 2017-10-02 DIAGNOSIS — M21.861 GASTROCNEMIUS EQUINUS, RIGHT: ICD-10-CM

## 2017-10-02 PROCEDURE — 97162 PT EVAL MOD COMPLEX 30 MIN: CPT | Performed by: PHYSICAL THERAPIST

## 2017-10-02 PROCEDURE — 97110 THERAPEUTIC EXERCISES: CPT | Performed by: PHYSICAL THERAPIST

## 2017-10-02 NOTE — PROGRESS NOTES
LOWER EXTREMITY EVALUATION:   Referring Physician: Dr. Blu Aguirre  Diagnosis:  Gastrocnemius equinus, right (M21.6X1) Date of Service: 10/2/2017     PATIENT SUMMARY   Stephanie Mckeon is a 40year old y/o female who presents to therapy today with complain inversion but after walking within the clinic the tape began to loosen so it was removed. Patient was instructed in and issued a HEP for: calf stretching on step.   She was advised that there should be no lasting pain provocation after exercise, otherwise [phone].     Sincerely,  Electronically signed by therapist: Ric Mackay PT

## 2017-10-09 ENCOUNTER — OFFICE VISIT (OUTPATIENT)
Dept: PHYSICAL THERAPY | Age: 37
End: 2017-10-09
Attending: FAMILY MEDICINE
Payer: COMMERCIAL

## 2017-10-09 PROCEDURE — 97110 THERAPEUTIC EXERCISES: CPT | Performed by: PHYSICAL THERAPIST

## 2017-10-09 PROCEDURE — 97140 MANUAL THERAPY 1/> REGIONS: CPT | Performed by: PHYSICAL THERAPIST

## 2017-10-09 NOTE — PROGRESS NOTES
Dx: Gastrocnemius equinus, right (M21.6X1) Authorized # of Visits: 2/8 Next MD visit: none scheduled  Fall Risk: standard Precautions: n/a    Subjective: Patient reports right heel pain 5/10 with getting out of bed in the morning, 4/10 after prolonged sitt

## 2017-10-10 ENCOUNTER — OFFICE VISIT (OUTPATIENT)
Dept: FAMILY MEDICINE CLINIC | Facility: CLINIC | Age: 37
End: 2017-10-10

## 2017-10-10 ENCOUNTER — OFFICE VISIT (OUTPATIENT)
Dept: SURGERY | Facility: CLINIC | Age: 37
End: 2017-10-10

## 2017-10-10 ENCOUNTER — APPOINTMENT (OUTPATIENT)
Dept: PHYSICAL THERAPY | Age: 37
End: 2017-10-10
Attending: FAMILY MEDICINE
Payer: COMMERCIAL

## 2017-10-10 VITALS
TEMPERATURE: 99 F | HEIGHT: 68 IN | BODY MASS INDEX: 39.4 KG/M2 | OXYGEN SATURATION: 98 % | WEIGHT: 260 LBS | SYSTOLIC BLOOD PRESSURE: 132 MMHG | HEART RATE: 108 BPM | DIASTOLIC BLOOD PRESSURE: 74 MMHG

## 2017-10-10 VITALS
DIASTOLIC BLOOD PRESSURE: 85 MMHG | HEIGHT: 68 IN | SYSTOLIC BLOOD PRESSURE: 120 MMHG | HEART RATE: 95 BPM | WEIGHT: 260.13 LBS | RESPIRATION RATE: 16 BRPM | BODY MASS INDEX: 39.42 KG/M2

## 2017-10-10 DIAGNOSIS — R05.9 COUGH: Primary | ICD-10-CM

## 2017-10-10 DIAGNOSIS — J45.20 MILD INTERMITTENT ASTHMA WITHOUT COMPLICATION: ICD-10-CM

## 2017-10-10 DIAGNOSIS — J20.8 VIRAL BRONCHITIS: ICD-10-CM

## 2017-10-10 PROCEDURE — 99214 OFFICE O/P EST MOD 30 MIN: CPT | Performed by: FAMILY MEDICINE

## 2017-10-10 RX ORDER — BENZONATATE 200 MG/1
200 CAPSULE ORAL 3 TIMES DAILY PRN
Qty: 20 CAPSULE | Refills: 0 | Status: SHIPPED | OUTPATIENT
Start: 2017-10-10 | End: 2017-11-07

## 2017-10-10 RX ORDER — ALBUTEROL SULFATE 90 UG/1
2 AEROSOL, METERED RESPIRATORY (INHALATION) EVERY 4 HOURS PRN
Qty: 1 G | Refills: 0 | Status: SHIPPED | OUTPATIENT
Start: 2017-10-10 | End: 2017-10-29

## 2017-10-10 RX ORDER — BUDESONIDE AND FORMOTEROL FUMARATE DIHYDRATE 160; 4.5 UG/1; UG/1
2 AEROSOL RESPIRATORY (INHALATION) 2 TIMES DAILY PRN
COMMUNITY
End: 2019-03-19

## 2017-10-10 RX ORDER — METHYLPREDNISOLONE 4 MG/1
TABLET ORAL
Qty: 1 KIT | Refills: 0 | Status: SHIPPED | OUTPATIENT
Start: 2017-10-10 | End: 2017-11-07

## 2017-10-10 NOTE — PROGRESS NOTES
CC:  Marva Head is a 40year old female here for Patient presents with:  Cough: Started on Saturday  Chest Pressure  Shortness Of Breath      HPI:     URI  -started 3 days  -associated with dry nagging cough, sob and chest pressure  -previous treat 25 mcg to total 175 mcg. Disp: 30 capsule Rfl: 1   calciTRIOL 0.25 MCG Oral Cap Take 1 capsule (0.25 mcg total) by mouth daily. Disp: 60 capsule Rfl: 2   Cholecalciferol (VITAMIN D) 1000 UNITS Oral Tab Take 1 tablet by mouth daily.    Disp:  Rfl:    Vitamin 11-2-12    AHI 2 RDI 2 REM AHI 6 SaO2 curtis 89%   • OTHER DISEASES     rectal bleeding   • Pancreatitis    • Pneumonia august 2014   • Pneumonia, organism unspecified(486)    • PONV (postoperative nausea and vomiting)    • Reflux    • Shortness of breath STENT Bilateral      Comment: Bilateral iliac stents  4/2011: THYROIDECTOMY  No date: TOTAL ABDOM HYSTERECTOMY  12/15: UPPER GI ENDO NO BARRETTS      Comment: normal  01/25/2017: UPPER GI ENDOSCOPY PERFORMED      Comment: Karyna Gasca M.D.  12/19/20 Cap 20 capsule 0      Sig: Take 1 capsule (200 mg total) by mouth 3 (three) times daily as needed for cough. methylPREDNISolone (MEDROL) 4 MG Oral Tablet Therapy Pack 1 kit 0      Sig: As directed.            Imaging & Referrals:  None     The patient

## 2017-10-10 NOTE — PATIENT INSTRUCTIONS
-- start albuterol inahler (or neb) 3x/day at least, more if needed  -- tessalon as needed  -- start steroids if not improving or worsening  -- call or followup if not improving after that

## 2017-10-10 NOTE — PROGRESS NOTES
[address] [place] [address] Estelle Red  [address]  Dept: [phone]    10/10/2017    BARIATRIC EXISTING PATIENT/FOLLOW UP    HPI:  Bryson Walters is a 40year old-year old female general she looks well she is appropriate and answers all the questions right.   Her breathing is unlabored her pulse is normal she has trace peripheral edema in her lower extremities the abdomen is soft she has faint laparoscopic scars throughout her abdom

## 2017-10-16 ENCOUNTER — OFFICE VISIT (OUTPATIENT)
Dept: PHYSICAL THERAPY | Age: 37
End: 2017-10-16
Attending: FAMILY MEDICINE
Payer: COMMERCIAL

## 2017-10-16 ENCOUNTER — TELEPHONE (OUTPATIENT)
Dept: FAMILY MEDICINE CLINIC | Facility: CLINIC | Age: 37
End: 2017-10-16

## 2017-10-16 DIAGNOSIS — Z90.721 H/O UNILATERAL OOPHORECTOMY: Primary | ICD-10-CM

## 2017-10-16 PROCEDURE — 97140 MANUAL THERAPY 1/> REGIONS: CPT | Performed by: PHYSICAL THERAPIST

## 2017-10-16 PROCEDURE — 97110 THERAPEUTIC EXERCISES: CPT | Performed by: PHYSICAL THERAPIST

## 2017-10-16 NOTE — TELEPHONE ENCOUNTER
Eulogio Marquez is calling to see if Anna Scheuermann can order some bloodwork for her, she had a ovary removed and she would like some levels checked, she would like a phone call before the orders are placed in the system, [phone]

## 2017-10-16 NOTE — PROGRESS NOTES
Dx: Gastrocnemius equinus, right (M21.6X1) Authorized # of Visits: 3/8 Next MD visit: none scheduled  Fall Risk: standard Precautions: n/a    Subjective: Patient reports bilateral heel pain 3/10 with getting out of bed in the morning, 2/10 after prolonged

## 2017-10-19 ENCOUNTER — OFFICE VISIT (OUTPATIENT)
Dept: PHYSICAL THERAPY | Age: 37
End: 2017-10-19
Attending: ORTHOPAEDIC SURGERY
Payer: COMMERCIAL

## 2017-10-19 PROCEDURE — 97140 MANUAL THERAPY 1/> REGIONS: CPT | Performed by: PHYSICAL THERAPIST

## 2017-10-19 PROCEDURE — 97112 NEUROMUSCULAR REEDUCATION: CPT | Performed by: PHYSICAL THERAPIST

## 2017-10-19 PROCEDURE — 97110 THERAPEUTIC EXERCISES: CPT | Performed by: PHYSICAL THERAPIST

## 2017-10-19 RX ORDER — BUTALBITAL, ACETAMINOPHEN AND CAFFEINE 50; 325; 40 MG/1; MG/1; MG/1
TABLET ORAL
Qty: 15 TABLET | Refills: 0 | Status: SHIPPED | OUTPATIENT
Start: 2017-10-19 | End: 2017-11-03

## 2017-10-19 NOTE — TELEPHONE ENCOUNTER
Pt would like a refill on Butalbital/Acetaminophen/caffeine, she said her last refill was on 8/31. She said the headaches are not going away.

## 2017-10-19 NOTE — PROGRESS NOTES
Dx: Gastrocnemius equinus, right (M21.6X1) Authorized # of Visits: 4/8 Next MD visit: none scheduled  Fall Risk: standard Precautions: n/a    Subjective: Patient reports bilateral heel pain 2.5/10 at present.     Assessment: Patient has improved right heel

## 2017-10-20 ENCOUNTER — LAB ENCOUNTER (OUTPATIENT)
Dept: LAB | Age: 37
End: 2017-10-20
Attending: FAMILY MEDICINE
Payer: COMMERCIAL

## 2017-10-20 DIAGNOSIS — Z90.721 H/O UNILATERAL OOPHORECTOMY: ICD-10-CM

## 2017-10-20 PROCEDURE — 82670 ASSAY OF TOTAL ESTRADIOL: CPT | Performed by: FAMILY MEDICINE

## 2017-10-20 PROCEDURE — 83001 ASSAY OF GONADOTROPIN (FSH): CPT | Performed by: FAMILY MEDICINE

## 2017-10-20 PROCEDURE — 83002 ASSAY OF GONADOTROPIN (LH): CPT | Performed by: FAMILY MEDICINE

## 2017-10-20 PROCEDURE — 36415 COLL VENOUS BLD VENIPUNCTURE: CPT | Performed by: FAMILY MEDICINE

## 2017-10-21 NOTE — PROGRESS NOTES
No signs of menopause LH and FSH are normal and estradiol still elevated at a normal level.   Sent to my chart

## 2017-10-26 ENCOUNTER — APPOINTMENT (OUTPATIENT)
Dept: PHYSICAL THERAPY | Age: 37
End: 2017-10-26
Attending: ORTHOPAEDIC SURGERY
Payer: COMMERCIAL

## 2017-11-03 ENCOUNTER — OFFICE VISIT (OUTPATIENT)
Dept: PHYSICAL THERAPY | Age: 37
End: 2017-11-03
Attending: ORTHOPAEDIC SURGERY
Payer: COMMERCIAL

## 2017-11-03 ENCOUNTER — TELEPHONE (OUTPATIENT)
Dept: FAMILY MEDICINE CLINIC | Facility: CLINIC | Age: 37
End: 2017-11-03

## 2017-11-03 PROCEDURE — 97140 MANUAL THERAPY 1/> REGIONS: CPT | Performed by: PHYSICAL THERAPIST

## 2017-11-03 PROCEDURE — 97110 THERAPEUTIC EXERCISES: CPT | Performed by: PHYSICAL THERAPIST

## 2017-11-03 RX ORDER — BUTALBITAL, ACETAMINOPHEN AND CAFFEINE 50; 325; 40 MG/1; MG/1; MG/1
TABLET ORAL
Qty: 15 TABLET | Refills: 0 | Status: CANCELLED
Start: 2017-11-03

## 2017-11-03 NOTE — PROGRESS NOTES
Dx: Gastrocnemius equinus, right (M21.6X1) Authorized # of Visits: 5/8 Next MD visit: none scheduled  Fall Risk: standard Precautions: n/a    Subjective: Patient states that she has had a headache every day for the past 2 weeks,  She reports bilateral johana min Total Treatment Time: 40 min

## 2017-11-03 NOTE — TELEPHONE ENCOUNTER
Ketty is calling the office to see if Katherine Yoder or one of the nurses can call her before we leave today regarding a medication, she did not say what medication it is regarding. Please call her at [phone].

## 2017-11-03 NOTE — TELEPHONE ENCOUNTER
Patient is looking for a refill of the fioricet. Please advise. Patient also sent mychart message.   Last rx 10/19/17 qty 15 NR

## 2017-11-04 RX ORDER — BUTALBITAL, ACETAMINOPHEN AND CAFFEINE 50; 325; 40 MG/1; MG/1; MG/1
TABLET ORAL
Qty: 15 TABLET | Refills: 0 | OUTPATIENT
Start: 2017-11-04 | End: 2017-11-22 | Stop reason: ALTCHOICE

## 2017-11-06 ENCOUNTER — TELEPHONE (OUTPATIENT)
Dept: SURGERY | Facility: CLINIC | Age: 37
End: 2017-11-06

## 2017-11-07 ENCOUNTER — OFFICE VISIT (OUTPATIENT)
Dept: PHYSICAL THERAPY | Age: 37
End: 2017-11-07
Attending: ORTHOPAEDIC SURGERY
Payer: COMMERCIAL

## 2017-11-07 ENCOUNTER — OFFICE VISIT (OUTPATIENT)
Dept: SURGERY | Facility: CLINIC | Age: 37
End: 2017-11-07

## 2017-11-07 VITALS
HEART RATE: 95 BPM | BODY MASS INDEX: 38.75 KG/M2 | HEIGHT: 68 IN | WEIGHT: 255.69 LBS | RESPIRATION RATE: 16 BRPM | DIASTOLIC BLOOD PRESSURE: 91 MMHG | SYSTOLIC BLOOD PRESSURE: 137 MMHG

## 2017-11-07 VITALS — HEIGHT: 68 IN | WEIGHT: 255.69 LBS | BODY MASS INDEX: 38.75 KG/M2

## 2017-11-07 DIAGNOSIS — E66.09 CLASS 2 OBESITY DUE TO EXCESS CALORIES WITHOUT SERIOUS COMORBIDITY WITH BODY MASS INDEX (BMI) OF 38.0 TO 38.9 IN ADULT: Primary | ICD-10-CM

## 2017-11-07 PROCEDURE — 97803 MED NUTRITION INDIV SUBSEQ: CPT | Performed by: DIETITIAN, REGISTERED

## 2017-11-07 PROCEDURE — 97140 MANUAL THERAPY 1/> REGIONS: CPT | Performed by: PHYSICAL THERAPIST

## 2017-11-07 PROCEDURE — 97110 THERAPEUTIC EXERCISES: CPT | Performed by: PHYSICAL THERAPIST

## 2017-11-07 NOTE — PROGRESS NOTES
[facility]  [address] [place] [id number]  Dept: [phone]  Loc: [phone]    11/07/17      Bariatric Follow-up Nutrition Session    Joya Burton is a 40year old female. 09/23/2017        Vitamins/Minerals:    Lab Results  Component Value Date   B12 342 09/23/2017       Lab Results  Component Value Date   VITD 31.0 09/23/2017       Lab Results  Component Value Date/Time   THIAMINE 172 09/23/2017 11:05 AM      No results fo 30 capsule, Rfl: 1  •  calciTRIOL 0.25 MCG Oral Cap, Take 1 capsule (0.25 mcg total) by mouth daily. , Disp: 60 capsule, Rfl: 2  •  Cholecalciferol (VITAMIN D) 1000 UNITS Oral Tab, Take 1 tablet by mouth daily.   , Disp: , Rfl:   •  Vitamin C 500 MG Oral Tab has successfully dropped calorie levels. Getting anxious about upcoming surgery, trying to be as prepared as possible. Gave overall guidelines for liquid protein diet, patient able to assess shakes to meet marquis/pro/carb goals.     Nutrition Diagnosis     Nut

## 2017-11-07 NOTE — PROGRESS NOTES
Dx: Gastrocnemius equinus, right (M21.6X1)  Authorized # of Visits: 6/8  Next MD visit: none scheduled  Fall Risk: standard  Precautions: n/a    Subjective: Patient reports bilateral parietal headache 4/10.   She reports bilateral heel pain 1/10 at present, Progression of exercises, ISATM/ scar mobilization, patient education    Charges: Lynette 1 (15 min), MT 1 (15 min)  Total Timed Treatment: 30 min Total Treatment Time: 40 min

## 2017-11-07 NOTE — PROGRESS NOTES
[address], [address]  Dept: [phone]    11/7/2017    BARIATRIC EXISTING PATIENT/FOLLOW UP    HPI:  Nunu Starkey is a 40year old-year old female w MD  11/7/2017

## 2017-11-07 NOTE — PROGRESS NOTES
Schuyler Guevara is a 40year old female. HPI:   #1 patient wants to discuss bariatric surgery planned for 12/18/17. Patient has a history of hydrocodone addiction and will need pain management after having bariatric surgery.   Patient prefers her prima past couple of weeks and dealing with tension headaches bariatric surgeon suggesteds suggested she call her psychiatrist to get an increase in the Xanax presently is only prescribed 0.25 mg. And given #20 to last 1 month.   Patient does admit the last pres Rfl: 0   TiZANidine HCl 4 MG Oral Cap Take 1 capsule (4 mg total) by mouth nightly as needed for Muscle spasms.  Disp: 30 capsule Rfl: 0   Butalbital-APAP-Caffeine -40 MG Oral Tab TAKE 1  TABLETS BY MOUTH EVERY 6 HOURS AS NEEDED FOR PAIN Disp: 15 tabl unspecified(486)    • PONV (postoperative nausea and vomiting)    • Reflux    • Shortness of breath    • Unspecified disorder of thyroid       Social History:  Smoking status: Never Smoker                                                              Smokel Oral Tab 20 tablet 0      Sig: Take 0.5 tablets (0.25 mg total) by mouth 2 (two) times daily as needed for Sleep or Anxiety (can take 0.5 if panic attack).       TiZANidine HCl 4 MG Oral Cap 30 capsule 0      Sig: Take 1 capsule (4 mg total) by mouth nightl mouth nightly as needed.; Refill: 2    3.  Major depression in remission [facility])  Patient is to continue with psychologist.  She no longer is comfortable with her psychiatrist eScription for Lexapro will be continued at the 40 mg patient has been stable on th was 55 minutes more than 50% was spent on counseling regarding medical conditions and treatment. Reviewed medication benefits and side effects. The patient indicates understanding of these issues and agrees to the plan.   The patient is asked to retur

## 2017-11-08 PROBLEM — G44.221 CHRONIC TENSION-TYPE HEADACHE, INTRACTABLE: Status: ACTIVE | Noted: 2017-11-08

## 2017-11-08 PROBLEM — F41.1 GAD (GENERALIZED ANXIETY DISORDER): Status: ACTIVE | Noted: 2017-11-08

## 2017-11-08 PROBLEM — F32.5 MAJOR DEPRESSION IN REMISSION (HCC): Status: ACTIVE | Noted: 2017-11-08

## 2017-11-08 PROBLEM — F41.1 ANXIETY IN ACUTE STRESS REACTION: Status: ACTIVE | Noted: 2017-11-08

## 2017-11-08 PROBLEM — F32.5 MAJOR DEPRESSION IN REMISSION: Status: ACTIVE | Noted: 2017-11-08

## 2017-11-08 PROBLEM — Z79.899 MEDICATION MANAGEMENT: Status: ACTIVE | Noted: 2017-11-08

## 2017-11-08 PROBLEM — F43.0 ANXIETY IN ACUTE STRESS REACTION: Status: ACTIVE | Noted: 2017-11-08

## 2017-11-08 PROBLEM — Z71.3 WEIGHT LOSS COUNSELING, ENCOUNTER FOR: Status: RESOLVED | Noted: 2017-01-25 | Resolved: 2017-11-08

## 2017-11-13 ENCOUNTER — PATIENT MESSAGE (OUTPATIENT)
Dept: FAMILY MEDICINE CLINIC | Facility: CLINIC | Age: 37
End: 2017-11-13

## 2017-11-13 DIAGNOSIS — F51.01 PRIMARY INSOMNIA: ICD-10-CM

## 2017-11-13 RX ORDER — ZOLPIDEM TARTRATE 6.25 MG/1
6.25 TABLET, FILM COATED, EXTENDED RELEASE ORAL NIGHTLY PRN
Qty: 30 TABLET | Refills: 2 | OUTPATIENT
Start: 2017-11-13 | End: 2017-12-20 | Stop reason: DRUGHIGH

## 2017-11-13 NOTE — TELEPHONE ENCOUNTER
Mariluz Bull has been phoned in to pharmacy qty 30 + 2 refills  Daniela,can you sign off on this rx?

## 2017-11-13 NOTE — TELEPHONE ENCOUNTER
From: Xuan Pastor  To: Gonzalo Huerta PA-C  Sent: 11/13/2017 10:29 AM CST  Subject: Prescription Question    Hi Elva Good,   I wanted to touch base and see if you could call in the Ambien CR 6.25? Will you put refills on it?  I called the pharmacy

## 2017-11-14 ENCOUNTER — TELEPHONE (OUTPATIENT)
Dept: FAMILY MEDICINE CLINIC | Facility: CLINIC | Age: 37
End: 2017-11-14

## 2017-11-14 ENCOUNTER — OFFICE VISIT (OUTPATIENT)
Dept: PHYSICAL THERAPY | Age: 37
End: 2017-11-14
Attending: ORTHOPAEDIC SURGERY
Payer: COMMERCIAL

## 2017-11-14 PROCEDURE — 97112 NEUROMUSCULAR REEDUCATION: CPT | Performed by: PHYSICAL THERAPIST

## 2017-11-14 PROCEDURE — 97140 MANUAL THERAPY 1/> REGIONS: CPT | Performed by: PHYSICAL THERAPIST

## 2017-11-14 PROCEDURE — 97110 THERAPEUTIC EXERCISES: CPT | Performed by: PHYSICAL THERAPIST

## 2017-11-14 RX ORDER — ALBUTEROL SULFATE 2.5 MG/3ML
2.5 SOLUTION RESPIRATORY (INHALATION) 4 TIMES DAILY
Qty: 25 AMPULE | Refills: 2 | Status: SHIPPED | OUTPATIENT
Start: 2017-11-14 | End: 2017-12-26 | Stop reason: ALTCHOICE

## 2017-11-14 NOTE — PROGRESS NOTES
Discharge Summary    Dx: Gastrocnemius equinus, right (M21.6X1)  Authorized # of Visits: 7/8  Next MD visit: none scheduled  Fall Risk: standard  Precautions: n/a    Dear Dr. Morenita Brice,    Thank you for the referral of Marcus Sosa to physical therapy.   She has 8 min TM to 2.8 MPH X 10 min TM to 2.6 MPH X 10 min    Calf stretches on step 20 sec X 3 each left & right Calf stretches on step 20 sec X 3 each left & right Calf stretches on step 20 sec X 2 right, 1 left Calf stretches on step 20 sec X 2 right, 1 left C

## 2017-11-14 NOTE — TELEPHONE ENCOUNTER
Jesus Boo would like to see if Vince Mccoy can call in albuteral for her nebulizer, to Constantin at [phone]

## 2017-11-15 ENCOUNTER — OFFICE VISIT (OUTPATIENT)
Dept: FAMILY MEDICINE CLINIC | Facility: CLINIC | Age: 37
End: 2017-11-15

## 2017-11-15 VITALS
HEIGHT: 68 IN | OXYGEN SATURATION: 96 % | WEIGHT: 256 LBS | HEART RATE: 102 BPM | SYSTOLIC BLOOD PRESSURE: 126 MMHG | DIASTOLIC BLOOD PRESSURE: 72 MMHG | BODY MASS INDEX: 38.8 KG/M2 | TEMPERATURE: 99 F

## 2017-11-15 DIAGNOSIS — R05.9 COUGH: Primary | ICD-10-CM

## 2017-11-15 PROCEDURE — 99214 OFFICE O/P EST MOD 30 MIN: CPT | Performed by: FAMILY MEDICINE

## 2017-11-15 RX ORDER — FLUTICASONE PROPIONATE 50 MCG
2 SPRAY, SUSPENSION (ML) NASAL DAILY
COMMUNITY
End: 2017-11-22 | Stop reason: ALTCHOICE

## 2017-11-15 RX ORDER — BENZONATATE 100 MG/1
200 CAPSULE ORAL 3 TIMES DAILY PRN
COMMUNITY
End: 2017-11-22 | Stop reason: ALTCHOICE

## 2017-11-15 RX ORDER — PREDNISONE 20 MG/1
TABLET ORAL
Refills: 0 | COMMUNITY
Start: 2017-11-13 | End: 2017-11-22 | Stop reason: ALTCHOICE

## 2017-11-15 RX ORDER — ACETAMINOPHEN AND CODEINE PHOSPHATE 300; 30 MG/1; MG/1
1 TABLET ORAL EVERY 8 HOURS PRN
Qty: 20 TABLET | Refills: 0 | Status: SHIPPED | OUTPATIENT
Start: 2017-11-15 | End: 2017-11-22 | Stop reason: ALTCHOICE

## 2017-11-15 RX ORDER — OXYBUTYNIN CHLORIDE 5 MG/1
TABLET ORAL
Refills: 0 | COMMUNITY
Start: 2017-11-13 | End: 2017-11-15 | Stop reason: ALTCHOICE

## 2017-11-15 RX ORDER — ACETAMINOPHEN 325 MG/1
325 TABLET ORAL EVERY 6 HOURS PRN
COMMUNITY
End: 2017-11-22 | Stop reason: ALTCHOICE

## 2017-11-15 RX ORDER — IBUPROFEN 400 MG/1
400 TABLET ORAL EVERY 6 HOURS PRN
COMMUNITY
End: 2017-11-22 | Stop reason: ALTCHOICE

## 2017-11-15 RX ORDER — PREDNISONE 20 MG/1
TABLET ORAL
Qty: 15 TABLET | Refills: 0 | Status: SHIPPED | OUTPATIENT
Start: 2017-11-15 | End: 2017-11-22 | Stop reason: ALTCHOICE

## 2017-11-15 RX ORDER — GUAIFENESIN AND CODEINE PHOSPHATE 100; 10 MG/5ML; MG/5ML
5 SOLUTION ORAL 3 TIMES DAILY PRN
Qty: 118 ML | Refills: 0 | Status: SHIPPED | OUTPATIENT
Start: 2017-11-15 | End: 2017-11-15 | Stop reason: ALTCHOICE

## 2017-11-15 NOTE — PROGRESS NOTES
CC:  Joya Burton is a 40year old female here for Patient presents with:  Cough: dry cough with green phlegm       HPI:     URI  -started 5 days ago  -associated with congested, green phlegm, sore throat  -went to IC 2 days ago - given steroid and c needed.  Disp: 30 tablet Rfl: 2   CALCITRIOL 0.25 MCG Oral Cap TAKE 1 CAPSULE(0.25 MCG) BY MOUTH TWICE DAILY Disp: 60 capsule Rfl: 11   VYVANSE 50 MG Oral Cap TK 1 C PO D Disp:  Rfl: 0   ALPRAZolam 0.5 MG Oral Tab Take 0.5 tablets (0.25 mg total) by mouth 2 Comment:Reglan with benadryl, feels like she is going to             jump out of her skin  Toradol [Ketorolac *    Rash  Tramadol                Rash  Azithromycin            Restless    Comment:tingling    Family History   Problem Relation Age of Onse HYSTERECTOMY  2011: HYSTEROSCOPY  12/14/2015: INJ PARAVERT F JNT L/S 1 LEV Bilateral      Comment: Procedure: FACET INJECTION UNDER FLUOROSCOPY;                Surgeon: Jaime Melchor DO;  Location: [address] [place] [address]  11/23/2016: ASHLEY distress  HEENT: atraumatic, pupils round and reactive to light, MMM, pharyngeal erythema without edema or exudates, TMs normal  NECK: supple, no thyromegaly, no LAD  LUNGS: clear to auscultation bilateraly, no c/w/r  CARDIO: RRR without g/m/r  EXTREMITIES

## 2017-11-22 ENCOUNTER — OFFICE VISIT (OUTPATIENT)
Dept: SURGERY | Facility: CLINIC | Age: 37
End: 2017-11-22

## 2017-11-22 VITALS
RESPIRATION RATE: 18 BRPM | HEART RATE: 93 BPM | OXYGEN SATURATION: 99 % | SYSTOLIC BLOOD PRESSURE: 115 MMHG | BODY MASS INDEX: 39.25 KG/M2 | DIASTOLIC BLOOD PRESSURE: 73 MMHG | HEIGHT: 68 IN | WEIGHT: 259 LBS | TEMPERATURE: 97 F

## 2017-11-22 DIAGNOSIS — F32.5 MAJOR DEPRESSION IN REMISSION (HCC): ICD-10-CM

## 2017-11-22 DIAGNOSIS — F51.01 PRIMARY INSOMNIA: ICD-10-CM

## 2017-11-22 DIAGNOSIS — E78.2 MIXED HYPERLIPIDEMIA: Primary | ICD-10-CM

## 2017-11-22 DIAGNOSIS — R63.2 BINGE EATING: ICD-10-CM

## 2017-11-22 DIAGNOSIS — E66.01 MORBID OBESITY WITH BMI OF 40.0-44.9, ADULT (HCC): ICD-10-CM

## 2017-11-22 PROCEDURE — 99214 OFFICE O/P EST MOD 30 MIN: CPT | Performed by: INTERNAL MEDICINE

## 2017-11-22 NOTE — PROGRESS NOTES
[address], [address] [facility] [address]  Dept: [phone]     Date:   2017    Patient:  Cara Travis  :      1980  MRN:      [id number]    Chief Complaint 6.25 MG Oral Tab CR Take 1 tablet (6.25 mg total) by mouth nightly as needed.  Disp: 30 tablet Rfl: 2   CALCITRIOL 0.25 MCG Oral Cap TAKE 1 CAPSULE(0.25 MCG) BY MOUTH TWICE DAILY Disp: 60 capsule Rfl: 11   ALPRAZolam 0.5 MG Oral Tab Take 0.5 tablets (0.25 m Topics Concern    Caffeine Concern Yes    Comment: 1 iced tea a day    Exercise Yes    Comment: 11,000 steps daily     Social History Narrative    ** Merged History Encounter **         Surgical History:  Past Surgical History:  6/19/2015: Tiny Prasad HYSTERECTOMY  12/15: UPPER GI ENDO NO BARRETTS      Comment: normal  01/25/2017: UPPER GI ENDOSCOPY PERFORMED      Comment: Norman Delacruz M.D.  12/19/2015: UPPER GI ENDOSCOPY,BIOPSY N/A      Comment: Procedure: ESOPHAGOGASTRODUODENOSCOPY, 39 Minutes    ROS:    Constitutional: positive for fatigue  Respiratory: negative  Cardiovascular: negative  Gastrointestinal: negative  Musculoskeletal:negative  Neurological: negative  Behavioral/Psych: negative  Endocrine: negative  All other systems w surgery in December  Saw surgeon  Saw psych  Saw SARA  Saw pulmonary (cleared)  Saw cards (She is cleared for surgery)    Lower vyvanse to 30   Then stop two weeks prior to surgery    Blood work done      Victoria Kilpatrick MD

## 2017-11-23 DIAGNOSIS — F43.0 ANXIETY IN ACUTE STRESS REACTION: ICD-10-CM

## 2017-11-23 DIAGNOSIS — F41.1 ANXIETY IN ACUTE STRESS REACTION: ICD-10-CM

## 2017-11-24 RX ORDER — ALPRAZOLAM 0.5 MG/1
TABLET ORAL
Qty: 20 TABLET | Refills: 0 | Status: SHIPPED | OUTPATIENT
Start: 2017-11-24 | End: 2017-12-28

## 2017-11-24 NOTE — TELEPHONE ENCOUNTER
Tell her the goal is #20 every 20-30 days and as she gets done with surgery we will try to start weaning her off.

## 2017-11-28 NOTE — PROGRESS NOTES
Spent 60 minutes with patient - (4 15-minute increments for f/u MNT).     Enrico Paredes, MPH, RD/LDN

## 2017-12-04 ENCOUNTER — TELEPHONE (OUTPATIENT)
Dept: SURGERY | Facility: CLINIC | Age: 37
End: 2017-12-04

## 2017-12-07 ENCOUNTER — TELEPHONE (OUTPATIENT)
Dept: SURGERY | Facility: CLINIC | Age: 37
End: 2017-12-07

## 2017-12-11 RX ORDER — ACETAMINOPHEN AND CODEINE PHOSPHATE 300; 30 MG/1; MG/1
TABLET ORAL
Refills: 0 | COMMUNITY
Start: 2017-11-15 | End: 2017-12-12

## 2017-12-14 ENCOUNTER — LAB ENCOUNTER (OUTPATIENT)
Dept: LAB | Age: 37
End: 2017-12-14
Attending: SURGERY
Payer: COMMERCIAL

## 2017-12-14 DIAGNOSIS — Z01.818 PREOP TESTING: ICD-10-CM

## 2017-12-14 PROCEDURE — 86901 BLOOD TYPING SEROLOGIC RH(D): CPT

## 2017-12-14 PROCEDURE — 86900 BLOOD TYPING SEROLOGIC ABO: CPT

## 2017-12-14 PROCEDURE — 86850 RBC ANTIBODY SCREEN: CPT

## 2017-12-14 PROCEDURE — 36415 COLL VENOUS BLD VENIPUNCTURE: CPT

## 2017-12-18 ENCOUNTER — ANESTHESIA EVENT (OUTPATIENT)
Dept: SURGERY | Facility: HOSPITAL | Age: 37
End: 2017-12-18

## 2017-12-18 ENCOUNTER — HOSPITAL ENCOUNTER (INPATIENT)
Facility: HOSPITAL | Age: 37
LOS: 2 days | Discharge: HOME | DRG: 621 | End: 2017-12-20
Attending: SURGERY | Admitting: SURGERY
Payer: COMMERCIAL

## 2017-12-18 ENCOUNTER — ANESTHESIA (OUTPATIENT)
Dept: SURGERY | Facility: HOSPITAL | Age: 37
End: 2017-12-18

## 2017-12-18 ENCOUNTER — SURGERY (OUTPATIENT)
Age: 37
End: 2017-12-18

## 2017-12-18 DIAGNOSIS — Z01.818 PREOP TESTING: Primary | ICD-10-CM

## 2017-12-18 DIAGNOSIS — E66.01 MORBID OBESITY (HCC): ICD-10-CM

## 2017-12-18 PROBLEM — E66.9 OBESITY: Status: ACTIVE | Noted: 2017-12-18

## 2017-12-18 PROCEDURE — 0DJ08ZZ INSPECTION OF UPPER INTESTINAL TRACT, VIA NATURAL OR ARTIFICIAL OPENING ENDOSCOPIC: ICD-10-PCS | Performed by: SURGERY

## 2017-12-18 PROCEDURE — 0D164ZA BYPASS STOMACH TO JEJUNUM, PERCUTANEOUS ENDOSCOPIC APPROACH: ICD-10-PCS | Performed by: SURGERY

## 2017-12-18 PROCEDURE — 99232 SBSQ HOSP IP/OBS MODERATE 35: CPT | Performed by: HOSPITALIST

## 2017-12-18 RX ORDER — HYDROMORPHONE HYDROCHLORIDE 1 MG/ML
0.4 INJECTION, SOLUTION INTRAMUSCULAR; INTRAVENOUS; SUBCUTANEOUS EVERY 5 MIN PRN
Status: DISCONTINUED | OUTPATIENT
Start: 2017-12-18 | End: 2017-12-18 | Stop reason: HOSPADM

## 2017-12-18 RX ORDER — HEPARIN SODIUM 5000 [USP'U]/ML
5000 INJECTION, SOLUTION INTRAVENOUS; SUBCUTANEOUS ONCE
Status: COMPLETED | OUTPATIENT
Start: 2017-12-18 | End: 2017-12-18

## 2017-12-18 RX ORDER — SODIUM CHLORIDE, SODIUM LACTATE, POTASSIUM CHLORIDE, CALCIUM CHLORIDE 600; 310; 30; 20 MG/100ML; MG/100ML; MG/100ML; MG/100ML
INJECTION, SOLUTION INTRAVENOUS CONTINUOUS
Status: DISCONTINUED | OUTPATIENT
Start: 2017-12-18 | End: 2017-12-18 | Stop reason: ALTCHOICE

## 2017-12-18 RX ORDER — HYDROMORPHONE HYDROCHLORIDE 1 MG/ML
0.6 INJECTION, SOLUTION INTRAMUSCULAR; INTRAVENOUS; SUBCUTANEOUS EVERY 5 MIN PRN
Status: DISCONTINUED | OUTPATIENT
Start: 2017-12-18 | End: 2017-12-18 | Stop reason: HOSPADM

## 2017-12-18 RX ORDER — LEVOTHYROXINE SODIUM 175 UG/1
175 TABLET ORAL
Status: DISCONTINUED | OUTPATIENT
Start: 2017-12-19 | End: 2017-12-20

## 2017-12-18 RX ORDER — BUPIVACAINE HYDROCHLORIDE 2.5 MG/ML
INJECTION, SOLUTION EPIDURAL; INFILTRATION; INTRACAUDAL AS NEEDED
Status: DISCONTINUED | OUTPATIENT
Start: 2017-12-18 | End: 2017-12-18 | Stop reason: HOSPADM

## 2017-12-18 RX ORDER — HYDROCODONE BITARTRATE AND ACETAMINOPHEN 5; 325 MG/1; MG/1
2 TABLET ORAL AS NEEDED
Status: DISCONTINUED | OUTPATIENT
Start: 2017-12-18 | End: 2017-12-18

## 2017-12-18 RX ORDER — CLINDAMYCIN PHOSPHATE 900 MG/50ML
900 INJECTION INTRAVENOUS ONCE
Status: DISCONTINUED | OUTPATIENT
Start: 2017-12-18 | End: 2017-12-18 | Stop reason: HOSPADM

## 2017-12-18 RX ORDER — ONDANSETRON 2 MG/ML
4 INJECTION INTRAMUSCULAR; INTRAVENOUS EVERY 6 HOURS PRN
Status: DISCONTINUED | OUTPATIENT
Start: 2017-12-18 | End: 2017-12-20

## 2017-12-18 RX ORDER — DEXAMETHASONE SODIUM PHOSPHATE 4 MG/ML
VIAL (ML) INJECTION AS NEEDED
Status: DISCONTINUED | OUTPATIENT
Start: 2017-12-18 | End: 2017-12-18 | Stop reason: SURG

## 2017-12-18 RX ORDER — CALCITRIOL 0.25 UG/1
0.25 CAPSULE, LIQUID FILLED ORAL 2 TIMES DAILY
Status: DISCONTINUED | OUTPATIENT
Start: 2017-12-19 | End: 2017-12-20

## 2017-12-18 RX ORDER — GLYCOPYRROLATE 0.2 MG/ML
INJECTION INTRAMUSCULAR; INTRAVENOUS AS NEEDED
Status: DISCONTINUED | OUTPATIENT
Start: 2017-12-18 | End: 2017-12-18 | Stop reason: SURG

## 2017-12-18 RX ORDER — ESCITALOPRAM OXALATE 10 MG/1
40 TABLET ORAL EVERY MORNING
Status: DISCONTINUED | OUTPATIENT
Start: 2017-12-19 | End: 2017-12-20

## 2017-12-18 RX ORDER — SODIUM CHLORIDE, SODIUM LACTATE, POTASSIUM CHLORIDE, CALCIUM CHLORIDE 600; 310; 30; 20 MG/100ML; MG/100ML; MG/100ML; MG/100ML
INJECTION, SOLUTION INTRAVENOUS CONTINUOUS
Status: DISCONTINUED | OUTPATIENT
Start: 2017-12-18 | End: 2017-12-18 | Stop reason: HOSPADM

## 2017-12-18 RX ORDER — NEOSTIGMINE METHYLSULFATE 0.5 MG/ML
INJECTION INTRAVENOUS AS NEEDED
Status: DISCONTINUED | OUTPATIENT
Start: 2017-12-18 | End: 2017-12-18 | Stop reason: SURG

## 2017-12-18 RX ORDER — HYDROMORPHONE HYDROCHLORIDE 1 MG/ML
0.4 INJECTION, SOLUTION INTRAMUSCULAR; INTRAVENOUS; SUBCUTANEOUS EVERY 2 HOUR PRN
Status: DISCONTINUED | OUTPATIENT
Start: 2017-12-18 | End: 2017-12-20

## 2017-12-18 RX ORDER — ALBUTEROL SULFATE 90 UG/1
1 AEROSOL, METERED RESPIRATORY (INHALATION) EVERY 6 HOURS PRN
Status: DISCONTINUED | OUTPATIENT
Start: 2017-12-18 | End: 2017-12-20

## 2017-12-18 RX ORDER — ACETAMINOPHEN 500 MG
1000 TABLET ORAL ONCE
Status: COMPLETED | OUTPATIENT
Start: 2017-12-18 | End: 2017-12-18

## 2017-12-18 RX ORDER — HYDROMORPHONE HYDROCHLORIDE 1 MG/ML
0.2 INJECTION, SOLUTION INTRAMUSCULAR; INTRAVENOUS; SUBCUTANEOUS EVERY 5 MIN PRN
Status: DISCONTINUED | OUTPATIENT
Start: 2017-12-18 | End: 2017-12-18 | Stop reason: HOSPADM

## 2017-12-18 RX ORDER — HYDROMORPHONE HYDROCHLORIDE 1 MG/ML
0.2 INJECTION, SOLUTION INTRAMUSCULAR; INTRAVENOUS; SUBCUTANEOUS EVERY 2 HOUR PRN
Status: DISCONTINUED | OUTPATIENT
Start: 2017-12-18 | End: 2017-12-20

## 2017-12-18 RX ORDER — FAMOTIDINE 20 MG/1
20 TABLET ORAL ONCE
Status: DISCONTINUED | OUTPATIENT
Start: 2017-12-18 | End: 2017-12-18 | Stop reason: HOSPADM

## 2017-12-18 RX ORDER — CLINDAMYCIN PHOSPHATE 150 MG/ML
INJECTION, SOLUTION INTRAVENOUS AS NEEDED
Status: DISCONTINUED | OUTPATIENT
Start: 2017-12-18 | End: 2017-12-18 | Stop reason: SURG

## 2017-12-18 RX ORDER — MORPHINE SULFATE 4 MG/ML
4 INJECTION, SOLUTION INTRAMUSCULAR; INTRAVENOUS EVERY 10 MIN PRN
Status: DISCONTINUED | OUTPATIENT
Start: 2017-12-18 | End: 2017-12-18 | Stop reason: HOSPADM

## 2017-12-18 RX ORDER — KETOROLAC TROMETHAMINE 15 MG/ML
15 INJECTION, SOLUTION INTRAMUSCULAR; INTRAVENOUS EVERY 6 HOURS
Status: COMPLETED | OUTPATIENT
Start: 2017-12-18 | End: 2017-12-20

## 2017-12-18 RX ORDER — ROCURONIUM BROMIDE 10 MG/ML
INJECTION, SOLUTION INTRAVENOUS AS NEEDED
Status: DISCONTINUED | OUTPATIENT
Start: 2017-12-18 | End: 2017-12-18 | Stop reason: SURG

## 2017-12-18 RX ORDER — MIDAZOLAM HYDROCHLORIDE 1 MG/ML
INJECTION INTRAMUSCULAR; INTRAVENOUS AS NEEDED
Status: DISCONTINUED | OUTPATIENT
Start: 2017-12-18 | End: 2017-12-18 | Stop reason: SURG

## 2017-12-18 RX ORDER — HYDROMORPHONE HYDROCHLORIDE 1 MG/ML
0.8 INJECTION, SOLUTION INTRAMUSCULAR; INTRAVENOUS; SUBCUTANEOUS EVERY 2 HOUR PRN
Status: DISCONTINUED | OUTPATIENT
Start: 2017-12-18 | End: 2017-12-20

## 2017-12-18 RX ORDER — SODIUM CHLORIDE 0.9 % (FLUSH) 0.9 %
10 SYRINGE (ML) INJECTION AS NEEDED
Status: DISCONTINUED | OUTPATIENT
Start: 2017-12-18 | End: 2017-12-20

## 2017-12-18 RX ORDER — HEPARIN SODIUM 5000 [USP'U]/ML
5000 INJECTION, SOLUTION INTRAVENOUS; SUBCUTANEOUS EVERY 8 HOURS SCHEDULED
Status: DISCONTINUED | OUTPATIENT
Start: 2017-12-18 | End: 2017-12-20

## 2017-12-18 RX ORDER — ALBUTEROL SULFATE 2.5 MG/3ML
2.5 SOLUTION RESPIRATORY (INHALATION) EVERY 4 HOURS PRN
Status: DISCONTINUED | OUTPATIENT
Start: 2017-12-18 | End: 2017-12-20

## 2017-12-18 RX ORDER — HALOPERIDOL 5 MG/ML
0.25 INJECTION INTRAMUSCULAR ONCE AS NEEDED
Status: DISCONTINUED | OUTPATIENT
Start: 2017-12-18 | End: 2017-12-18 | Stop reason: HOSPADM

## 2017-12-18 RX ORDER — LIDOCAINE HYDROCHLORIDE 10 MG/ML
INJECTION, SOLUTION EPIDURAL; INFILTRATION; INTRACAUDAL; PERINEURAL AS NEEDED
Status: DISCONTINUED | OUTPATIENT
Start: 2017-12-18 | End: 2017-12-18 | Stop reason: SURG

## 2017-12-18 RX ORDER — KETOROLAC TROMETHAMINE 30 MG/ML
30 INJECTION, SOLUTION INTRAMUSCULAR; INTRAVENOUS EVERY 6 HOURS
Status: COMPLETED | OUTPATIENT
Start: 2017-12-18 | End: 2017-12-20

## 2017-12-18 RX ORDER — MORPHINE SULFATE 2 MG/ML
2 INJECTION, SOLUTION INTRAMUSCULAR; INTRAVENOUS EVERY 10 MIN PRN
Status: DISCONTINUED | OUTPATIENT
Start: 2017-12-18 | End: 2017-12-18 | Stop reason: HOSPADM

## 2017-12-18 RX ORDER — ONDANSETRON 2 MG/ML
4 INJECTION INTRAMUSCULAR; INTRAVENOUS ONCE AS NEEDED
Status: DISCONTINUED | OUTPATIENT
Start: 2017-12-18 | End: 2017-12-18 | Stop reason: HOSPADM

## 2017-12-18 RX ORDER — FAMOTIDINE 10 MG/ML
20 INJECTION, SOLUTION INTRAVENOUS 2 TIMES DAILY
Status: DISCONTINUED | OUTPATIENT
Start: 2017-12-18 | End: 2017-12-20

## 2017-12-18 RX ORDER — BACITRACIN 50000 [USP'U]/1
INJECTION, POWDER, LYOPHILIZED, FOR SOLUTION INTRAMUSCULAR AS NEEDED
Status: DISCONTINUED | OUTPATIENT
Start: 2017-12-18 | End: 2017-12-18 | Stop reason: HOSPADM

## 2017-12-18 RX ORDER — ACETAMINOPHEN 10 MG/ML
1000 INJECTION, SOLUTION INTRAVENOUS EVERY 6 HOURS
Status: COMPLETED | OUTPATIENT
Start: 2017-12-18 | End: 2017-12-19

## 2017-12-18 RX ORDER — ONDANSETRON 2 MG/ML
INJECTION INTRAMUSCULAR; INTRAVENOUS AS NEEDED
Status: DISCONTINUED | OUTPATIENT
Start: 2017-12-18 | End: 2017-12-18 | Stop reason: SURG

## 2017-12-18 RX ORDER — SODIUM CHLORIDE, SODIUM LACTATE, POTASSIUM CHLORIDE, CALCIUM CHLORIDE 600; 310; 30; 20 MG/100ML; MG/100ML; MG/100ML; MG/100ML
INJECTION, SOLUTION INTRAVENOUS CONTINUOUS
Status: DISCONTINUED | OUTPATIENT
Start: 2017-12-18 | End: 2017-12-20

## 2017-12-18 RX ORDER — MORPHINE SULFATE 10 MG/ML
6 INJECTION, SOLUTION INTRAMUSCULAR; INTRAVENOUS EVERY 10 MIN PRN
Status: DISCONTINUED | OUTPATIENT
Start: 2017-12-18 | End: 2017-12-18 | Stop reason: HOSPADM

## 2017-12-18 RX ORDER — HYDROCODONE BITARTRATE AND ACETAMINOPHEN 5; 325 MG/1; MG/1
1 TABLET ORAL AS NEEDED
Status: DISCONTINUED | OUTPATIENT
Start: 2017-12-18 | End: 2017-12-18

## 2017-12-18 RX ORDER — CLINDAMYCIN PHOSPHATE 900 MG/50ML
900 INJECTION INTRAVENOUS EVERY 8 HOURS
Status: COMPLETED | OUTPATIENT
Start: 2017-12-18 | End: 2017-12-19

## 2017-12-18 RX ORDER — NALOXONE HYDROCHLORIDE 0.4 MG/ML
80 INJECTION, SOLUTION INTRAMUSCULAR; INTRAVENOUS; SUBCUTANEOUS AS NEEDED
Status: DISCONTINUED | OUTPATIENT
Start: 2017-12-18 | End: 2017-12-18 | Stop reason: HOSPADM

## 2017-12-18 RX ADMIN — MIDAZOLAM HYDROCHLORIDE 2 MG: 1 INJECTION INTRAMUSCULAR; INTRAVENOUS at 12:27:00

## 2017-12-18 RX ADMIN — ROCURONIUM BROMIDE 40 MG: 10 INJECTION, SOLUTION INTRAVENOUS at 12:40:00

## 2017-12-18 RX ADMIN — LIDOCAINE HYDROCHLORIDE 50 MG: 10 INJECTION, SOLUTION EPIDURAL; INFILTRATION; INTRACAUDAL; PERINEURAL at 12:34:00

## 2017-12-18 RX ADMIN — ROCURONIUM BROMIDE 10 MG: 10 INJECTION, SOLUTION INTRAVENOUS at 12:58:00

## 2017-12-18 RX ADMIN — GLYCOPYRROLATE 0.4 MG: 0.2 INJECTION INTRAMUSCULAR; INTRAVENOUS at 14:01:00

## 2017-12-18 RX ADMIN — CLINDAMYCIN PHOSPHATE 900 MG: 150 INJECTION, SOLUTION INTRAVENOUS at 12:51:00

## 2017-12-18 RX ADMIN — SODIUM CHLORIDE, SODIUM LACTATE, POTASSIUM CHLORIDE, CALCIUM CHLORIDE: 600; 310; 30; 20 INJECTION, SOLUTION INTRAVENOUS at 14:17:00

## 2017-12-18 RX ADMIN — ONDANSETRON 4 MG: 2 INJECTION INTRAMUSCULAR; INTRAVENOUS at 14:01:00

## 2017-12-18 RX ADMIN — SODIUM CHLORIDE, SODIUM LACTATE, POTASSIUM CHLORIDE, CALCIUM CHLORIDE: 600; 310; 30; 20 INJECTION, SOLUTION INTRAVENOUS at 13:23:00

## 2017-12-18 RX ADMIN — GLYCOPYRROLATE 0.2 MG: 0.2 INJECTION INTRAMUSCULAR; INTRAVENOUS at 12:29:00

## 2017-12-18 RX ADMIN — NEOSTIGMINE METHYLSULFATE 3 MG: 0.5 INJECTION INTRAVENOUS at 14:01:00

## 2017-12-18 RX ADMIN — SODIUM CHLORIDE, SODIUM LACTATE, POTASSIUM CHLORIDE, CALCIUM CHLORIDE: 600; 310; 30; 20 INJECTION, SOLUTION INTRAVENOUS at 12:29:00

## 2017-12-18 RX ADMIN — ROCURONIUM BROMIDE 10 MG: 10 INJECTION, SOLUTION INTRAVENOUS at 12:34:00

## 2017-12-18 RX ADMIN — ROCURONIUM BROMIDE 10 MG: 10 INJECTION, SOLUTION INTRAVENOUS at 13:35:00

## 2017-12-18 RX ADMIN — DEXAMETHASONE SODIUM PHOSPHATE 8 MG: 4 MG/ML VIAL (ML) INJECTION at 12:48:00

## 2017-12-18 RX ADMIN — ROCURONIUM BROMIDE 10 MG: 10 INJECTION, SOLUTION INTRAVENOUS at 13:14:00

## 2017-12-18 NOTE — ANESTHESIA POSTPROCEDURE EVALUATION
Patient: Mark Priest    Procedure Summary     Date:  12/18/17 Room / Location:  [facility] OR 05 / [facility] OR    Anesthesia Start:  6742 Anesthesia Stop:  8310    Procedure:  BARIATRIC GASTRIC BYPASS LAPAROSCOPIC MARCIO-EN-Y (N/A Abdomen) Diagnosis:  (

## 2017-12-18 NOTE — ANESTHESIA PREPROCEDURE EVALUATION
Anesthesia PreOp Note    HPI:     Angela Goodwin is a 40year old female who presents for preoperative consultation requested by: Delaney Mcgarry MD    Date of Surgery: 12/18/2017    Procedure(s):  BARIATRIC GASTRIC BYPASS LAPAROSCOPIC MARCIO-EN-Y  Ind Endometriosis of pelvic peritoneum         Date Noted: 06/25/2013      Primary insomnia         Date Noted: 08/14/2012        Past Medical History:   Diagnosis Date   • Anxiety    • Anxiety state, unspecified    • Arthritis    • Asthma    • Calculus of kid Precious Baxter MD;  Location: [address] [place] [address] OR  12/14/2015: M-GISELLAJ BY  KARL OLIVER [facility] 5+ YR Bilateral      Comment: Procedure: FACET INJECTION UNDER FLUOROSCOPY;                Surgeon: Binh Landa DO;  Location: [facility] Rfl: 2 12/17/2017 at 2200   CALCITRIOL 0.25 MCG Oral Cap TAKE 1 CAPSULE(0.25 MCG) BY MOUTH TWICE DAILY Disp: 60 capsule Rfl: 11 12/17/2017 at 2200   PROAIR  (90 Base) MCG/ACT Inhalation Aero Soln INHALE 2 PUFFS BY MOUTH EVERY 4 HOURS AS NEEDED FOR W • Diabetes Paternal Grandmother    • Heart Disorder Paternal Grandmother    • Heart Disease Paternal Grandmother    • Breast Cancer Maternal Grandmother 76   • Cancer Neg    • Stroke Neg        Social History  Social History   Marital status:   S Cardiovascular - negative ROS and normal exam  Exercise tolerance: good    Neuro/Psych - negative ROS     GI/Hepatic/Renal - negative ROS     Endo/Other - negative ROS   Abdominal              Anesthesia Plan:   ASA:  2  Plan:   General  Post-op Pain Manag

## 2017-12-18 NOTE — PROGRESS NOTES
[facility] - [facility]    Progress Note    Bryson Walters Patient Status:  Surgery Admit    1980 MRN [id number]   Location [facility] Attending Jennifer Magaña MD   Hosp Day # 0 PCP Stefano Back MD (generalized anxiety disorder)  CONT HOME MEDS.          Results:     Lab Results  Component Value Date   WBC 6.9 09/23/2017   HGB 13.9 09/23/2017   HCT 42.6 09/23/2017   .0 09/23/2017   CREATSERUM 0.67 09/23/2017   BUN 9 09/23/2017    09/23/20

## 2017-12-18 NOTE — H&P
[facility] Patient Status:  Surgery Admit    1980 MRN [id number]   Location [facility] Attending Nolvia Briceño MD   Hosp Day # 0 PCP Jere Lin MD     Uvaldo Comment: Procedure: ARTHROSCOPY ANKLE WITH DEBRIDEMENT;               Surgeon: Heike Rangel MD;  Location: [facility]  No date:   No date: CHOLECYSTECTOMY  10/22/2013: COLONOSCOPY,DIAGNOSTIC      Comment: Procedure: COLO POSSIBLE BIOPSY, POSSIBLE POLYPECTOMY 61137;                 Surgeon: Patti Livingston MD;  Location: [address] [place] [address]  Family History   Problem Relation Age of Onset   • Other[other] [OTHER] Mother      ALLIE   • Heart Dis Disp: 30 tablet Rfl: 2 12/17/2017 at 2200   CALCITRIOL 0.25 MCG Oral Cap TAKE 1 CAPSULE(0.25 MCG) BY MOUTH TWICE DAILY Disp: 60 capsule Rfl: 11 12/17/2017 at 2200   PROAIR  (90 Base) MCG/ACT Inhalation Aero Soln INHALE 2 PUFFS BY MOUTH EVERY 4 HOURS palpable. Extremities:  No lower extremity edema noted. Without clubbing or cyanosis. Skin: Normal texture and turgor. Lymphatic:  No palpable cervical lymphadenopathy. Neurologic: Cranial nerves are grossly intact.   Motor strength and sensory exa acute stress reaction     OMAR (generalized anxiety disorder)      For gastric bypass, discussed operation, anatomy, technique, anticipated recovery (should be ok for Mallory 12/29) and potential risks.   Wishes to proceed    Time spent on counseling/coordinati

## 2017-12-18 NOTE — BRIEF OP NOTE
Pre-Operative Diagnosis: Morbid obesity, hyperthyroid, gerd, infertility      Post-Operative Diagnosis: Morbid obesity, hyperthyroid, gerd, infertility      Procedure Performed:   Procedure(s):  Laparoscopic  magy-en-y gastric bypass     Surgeon(s) and

## 2017-12-18 NOTE — OPERATIVE REPORT
Mayra Palencia / Chris Hameed / Dallin Greene / Digna Dimas / Karina Woodruff / Karen Milian - [phone]  Answering Service [phone]        Preop Diagnosis: Morbid Obesity secondary to excess calories   Postop Diagnosis: Morbid trocar, and a 5 mm right upper quadrant trocar were all inserted under direct vision. We started by elevating the omentum up towards the head, allowing us to elevate the transverse mesocolon. The ligament of Treitz was identified.   It was carried out t lateral trocar site. The stitch was cut leaving only the anvil and the gastric pouch in good position. We then identified our Mingo limb which reached up to this area without any tension. A 2-0 silk antimesenteric stay suture was placed.   Enterotomy w well and was brought to the postanesthesia recovery unit in stable condition. Tano Greenfield

## 2017-12-19 ENCOUNTER — TELEPHONE (OUTPATIENT)
Dept: FAMILY MEDICINE CLINIC | Facility: CLINIC | Age: 37
End: 2017-12-19

## 2017-12-19 PROCEDURE — 99232 SBSQ HOSP IP/OBS MODERATE 35: CPT | Performed by: HOSPITALIST

## 2017-12-19 RX ORDER — BUDESONIDE AND FORMOTEROL FUMARATE DIHYDRATE 160; 4.5 UG/1; UG/1
2 AEROSOL RESPIRATORY (INHALATION)
Status: DISCONTINUED | OUTPATIENT
Start: 2017-12-19 | End: 2017-12-20

## 2017-12-19 RX ORDER — ZOLPIDEM TARTRATE 5 MG/1
5 TABLET ORAL NIGHTLY
Status: DISCONTINUED | OUTPATIENT
Start: 2017-12-19 | End: 2017-12-20

## 2017-12-19 NOTE — TELEPHONE ENCOUNTER
Pt's  called and wants to come in today to  a prescription, there was nothing in the drawer. Please let me know when it's ready so I can call pt's  to come and pick it up.   Harley said Reta Bumpers knows that Maurisio Cabrera is in the hospital but

## 2017-12-19 NOTE — PROGRESS NOTES
[facility] - [facility]    Progress Note    Dotty Castillo Patient Status:  Inpatient    1980 MRN [id number]   Location [facility] 4W/SW/SE Attending Julian Zeng MD   Hosp Day # 1 PCP Dae Henderson MD       Subjective:   Tamie Ordonez (TORADOL) injection  30 mg Intravenous Q6H   • famoTIDine  20 mg Oral BID    Or   • famoTIDine  20 mg Intravenous BID   • calciTRIOL  0.25 mcg Oral BID   • escitalopram  40 mg Oral QAM   • Levothyroxine Sodium  175 mcg Oral Before breakfast             Ass

## 2017-12-19 NOTE — PROGRESS NOTES
Hillary Groves / Paty Echols / Cande Roa / Karen Bae / Yue Enriquez / Chadd [facility] - [phone]  Answering Service [phone]      Progress Note    Frank Torres Patient Status:  Inpatient    1980 MRN  HCl PF (DILAUDID) 1 MG/ML injection 0.8 mg 0.8 mg Intravenous Q2H PRN   ondansetron HCl (ZOFRAN) injection 4 mg 4 mg Intravenous Q6H PRN   famoTIDine (PEPCID) 8mg/ml suspension 20 mg 20 mg Oral BID   Or      famoTIDine (PEPCID) injection 20 mg 20 mg [facility] Carlo

## 2017-12-19 NOTE — TELEPHONE ENCOUNTER
Umang Suarez,   I found out today that I can not be on Er medication right now. That means I can't take the Ambien CR anymore right now. So, can you call in the regular Ambien 6.25? That way I have it when I get home.    Thank you,   Evelia Mtot   This came over

## 2017-12-19 NOTE — PLAN OF CARE
GASTROINTESTINAL - ADULT    • Achieves appropriate nutritional intake (bariatric) Not Progressing          GASTROINTESTINAL - ADULT    • Minimal or absence of nausea and vomiting Progressing    • Maintains or returns to baseline bowel function Progressing

## 2017-12-20 VITALS
TEMPERATURE: 99 F | BODY MASS INDEX: 37.74 KG/M2 | RESPIRATION RATE: 16 BRPM | SYSTOLIC BLOOD PRESSURE: 108 MMHG | OXYGEN SATURATION: 93 % | WEIGHT: 249 LBS | HEIGHT: 68 IN | DIASTOLIC BLOOD PRESSURE: 61 MMHG | HEART RATE: 101 BPM

## 2017-12-20 PROCEDURE — 99239 HOSP IP/OBS DSCHRG MGMT >30: CPT | Performed by: HOSPITALIST

## 2017-12-20 RX ORDER — ZOLPIDEM TARTRATE 5 MG/1
5 TABLET ORAL NIGHTLY PRN
Qty: 30 TABLET | Refills: 0 | OUTPATIENT
Start: 2017-12-20 | End: 2018-01-15

## 2017-12-20 NOTE — TELEPHONE ENCOUNTER
Patient had bariatric surgery and is being released in the AM on 12/20/17. Patient needed 2 doses of liquid Norco in the afternoon on 12/19/17 @ the hospital and prior to that was given Dilaudid. Has follow-up in the office on 12/26/17.   All pain medicat

## 2017-12-20 NOTE — PROGRESS NOTES
Radha Santana / Jen Bronson / Jovana Mirza / Lars Oscar / Mk Chao / Vito [facility] - [phone]  Answering Service [phone]      Progress Note    Nia Yuan Patient Status:  Inpatient    1980 MRN  sulfate (VENTOLIN) (2.5 MG/3ML) 0.083% nebulizer solution 2.5 mg 2.5 mg Nebulization Q4H PRN   calciTRIOL (ROCALTROL) cap 0.25 mcg 0.25 mcg Oral BID   escitalopram (LEXAPRO) tablet 40 mg 40 mg Oral QAM   Albuterol Sulfate  (90 Base) MCG/ACT inhaler

## 2017-12-20 NOTE — DISCHARGE SUMMARY
[facility] ([place], [place]) HOSPITALIST  DISCHARGE SUMMARY     Brandenremi Larios Patient Status:  Inpatient    1980 MRN [id number]   Location [facility] 4W/SW/SE Attending Barak Wagoner MD   Hosp Day # 2 PCP Poornima Lindo MD     Date of Admission: /  (90 Base) MCG/ACT Aers  Generic drug:  Albuterol Sulfate HFA  What changed:  Another medication with the same name was changed. Make sure you understand how and when to take each.       INHALE 2 PUFFS BY MOUTH EVERY 4 HOURS AS NEEDED FOR WHEEZING Vitamin D 1000 units Tabs      Take 1 tablet by mouth daily.    Refills:  0           Where to Get Your Medications      Please  your prescriptions at the location directed by your doctor or nurse    Bring a paper prescription for each of these med food, or eating too fast, may cause unpleasant symptoms; such as nausea, vomiting, or pain in your upper belly (abdomen). You'll need to make lifelong behavior changes to keep off the weight you lost through bariatric surgery.     Changes in How You Eat  · effects of dumping syndrome, which can happen after the gastric bypass procedure. The syndrome refers to abdominal pain, nausea, sweating, and bloating that occur after eating foods high in sugar; such as ice cream and milkshakes.  Diarrhea sometimes happen Ask your doctor about what changes you should make to your medications. · If you use a CPAP or BiPAP machine for sleep apnea, don't stop using it without talking to your doctor. · Learn to take your own pulse. Keep a record of your results.  Ask your doct

## 2017-12-20 NOTE — PAYOR COMM NOTE
REQUESTING 1 ADDITIONAL DAY    DISCHARGE REVIEW    Payor: [employer]  Subscriber #:  [id number]  Authorization Number: [id number]    Admit date: 12/18/17  Admit time:  8868  Discharge Date: 12/20/2017  2:14 PM     Admitting Physician: Ligia Cobb MD [id number] 12/18/17 BARIATRIC GASTRIC BYPASS LAPAROSCOPIC MARCIO-EN-Y Te Wilks MD [facility] MAIN OR Comp      ?        Things to follow up on:  ·     Consultants:  Consultants     Provider Role Specialty    Abrahan Ramos MD Consulting Physician  [facility] Refills:  0     calciTRIOL 0.25 MCG Caps  Commonly known as:  ROCALTROL      TAKE 1 CAPSULE(0.25 MCG) BY MOUTH TWICE DAILY   Quantity:  60 capsule  Refills:  11     escitalopram 20 MG Tabs  Commonly known as:  LEXAPRO      Take 2 tablets (40 mg total) by -----------------------------------------------------------------------------------------------  PATIENT DISCHARGE INSTRUCTIONS:       Other Discharge Instructions:       Discharge Instructions for Bariatric Surgery    You have had a procedure called cristian · Drink liquids in smaller amounts than you used to. This will make it easier for your body to digest liquids. But, it is important that you continue to drink liquids (in small amounts) throughout the day so that you do not become dehydrated.  Aim for 59 ou · Start an exercise program 1 to 2 weeks after discharge. You can benefit from simple activities, such as walking or gardening.  Ask your doctor how and when to get started, or speak to the Exercise Physiologist.  · Ask your doctor when you can expect to re · Persistent pain, nausea, or left shoulder  · Persistent hiccups  · Confusion, depression, or unusual fatigue  · Signs of bladder infection (urinating more often than usual, burning, pain, bleeding, or hesitancy when you urinate)              Hospital Dis Blood pressure (!) 89/50, pulse 85, temperature 98.4 °F (36.9 °C), temperature source Oral, resp. rate 18, height 5' 8\" (1.727 m), weight 249 lb (112.9 kg), SpO2 94 %, not currently breastfeeding.     Physical Exam:    General: No acute distress.    Respir S/P Laparoscopic  magy-en-y gastric bypass, PAIN CONTROL, NPO, IV FLUIDS, MONITOR HEMODYNAMIC STATUS, DVT PROPHYLAXIS,.        Hypothyroidism  CONT HOME MEDS.      OMAR (generalized anxiety disorder)  CONT HOME MEDS.             Quality:  · DVT Prophylaxis

## 2017-12-20 NOTE — PLAN OF CARE
PAIN - ADULT    • Verbalizes/displays adequate comfort level or patient's stated pain goal Not Progressing    Patient continues to experience 7-8/10 pain post operatively, repeatedly asking for IV dilaudid.  Lortab started early at [phone], patient still reque

## 2017-12-20 NOTE — TELEPHONE ENCOUNTER
Constantin called to verify the patient's prescription for Zolpidem 5 mg as the patient recently had Zac SUAZO filled.

## 2017-12-21 ENCOUNTER — TELEPHONE (OUTPATIENT)
Dept: SURGERY | Facility: CLINIC | Age: 37
End: 2017-12-21

## 2017-12-26 ENCOUNTER — OFFICE VISIT (OUTPATIENT)
Dept: FAMILY MEDICINE CLINIC | Facility: CLINIC | Age: 37
End: 2017-12-26

## 2017-12-26 ENCOUNTER — TELEPHONE (OUTPATIENT)
Dept: SURGERY | Facility: CLINIC | Age: 37
End: 2017-12-26

## 2017-12-26 VITALS
BODY MASS INDEX: 37 KG/M2 | HEART RATE: 88 BPM | DIASTOLIC BLOOD PRESSURE: 70 MMHG | WEIGHT: 244 LBS | SYSTOLIC BLOOD PRESSURE: 100 MMHG

## 2017-12-26 DIAGNOSIS — F51.01 PRIMARY INSOMNIA: ICD-10-CM

## 2017-12-26 DIAGNOSIS — Z98.84 HISTORY OF ROUX-EN-Y GASTRIC BYPASS: ICD-10-CM

## 2017-12-26 DIAGNOSIS — R52 PAIN MANAGEMENT: Primary | ICD-10-CM

## 2017-12-26 DIAGNOSIS — Z09 FOLLOW-UP EXAMINATION AFTER GASTROINTESTINAL SURGERY: ICD-10-CM

## 2017-12-26 DIAGNOSIS — F41.1 GAD (GENERALIZED ANXIETY DISORDER): ICD-10-CM

## 2017-12-26 PROBLEM — Z90.3 HISTORY OF SLEEVE GASTRECTOMY: Status: ACTIVE | Noted: 2017-12-26

## 2017-12-26 PROCEDURE — 99214 OFFICE O/P EST MOD 30 MIN: CPT | Performed by: FAMILY MEDICINE

## 2017-12-26 RX ORDER — CODEINE PHOSPHATE AND GUAIFENESIN 10; 100 MG/5ML; MG/5ML
5 SOLUTION ORAL
COMMUNITY
Start: 2017-07-24 | End: 2017-12-26 | Stop reason: ALTCHOICE

## 2017-12-26 RX ORDER — ALPRAZOLAM 0.25 MG/1
TABLET ORAL
Qty: 30 TABLET | Refills: 0 | Status: SHIPPED | OUTPATIENT
Start: 2017-12-26 | End: 2018-01-20

## 2017-12-26 NOTE — TELEPHONE ENCOUNTER
Pt doing well 1 week s/p RYGB. Great fluid intake ~64 oz/day. Tolerating full liquids. Slightly low protein intake (~50 gm from shake and yogurt). Encouraged to increase protein intake from shakes/yogurt as able. Denies any fever, sob, chest pain, tachy.  A

## 2017-12-26 NOTE — PROGRESS NOTES
Xuan Pastor is a 40year old female. HPI:   Follow-up on pain control. Patient is status post bariatric surgery on 12/18/17.   Had gastric bypass laparoscopic Mingo-en-Y procedure with Dr. Lesly Baker at [facility].  Patient is in for pain medi (10 mg total) by mouth nightly as needed for Sleep. Disp: 30 tablet Rfl: 2   NON FORMULARY 2 tablets daily. Disp:  Rfl:    escitalopram (LEXAPRO) 20 MG Oral Tab Take 2 tablets (40 mg total) by mouth nightly.  Disp: 60 tablet Rfl: 1   ALPRAZOLAM 0.5 MG Oral Social History:  Smoking status: Never Smoker                                                              Smokeless tobacco: Never Used                      Alcohol use: Yes           0.0 oz/week     Comment: Rarely       REVIEW OF SYSTEMS:   GENERAL HE for the following 6 days then follow up in the office      ALPRAZolam 0.25 MG Oral Tab 30 tablet 0      Sig: Take 1/2--1 twice daily as needed for anxiety           Imaging & Consults:  None  1.  Pain management  History of Mingo-en-Y gastric bypass  Follow-

## 2017-12-26 NOTE — PROGRESS NOTES
Mayra  / Chris Hameed / Dallin Greene / Digna Dimas / Gabe Burton  Office - [phone]  Answering Service [phone]    HPI:  Liborio Roger is a 40year old-year old female who presents 1.5 weeks s/p laparoscop bit more, but should improve with time. Advance diet per protocol.      OK to advance activity slowly as tolerated.    OK to return to work. Has followup with Dr. Tiara Gutiérrez and dietician. Followup 1 month with Dr. Rosalva Sims.

## 2017-12-27 PROBLEM — E66.01 MORBID OBESITY (HCC): Status: RESOLVED | Noted: 2017-04-12 | Resolved: 2017-12-27

## 2017-12-27 PROBLEM — Z98.84 HISTORY OF ROUX-EN-Y GASTRIC BYPASS: Status: ACTIVE | Noted: 2017-12-27

## 2017-12-27 PROBLEM — Z90.3 HISTORY OF SLEEVE GASTRECTOMY: Status: RESOLVED | Noted: 2017-12-26 | Resolved: 2017-12-27

## 2017-12-28 ENCOUNTER — OFFICE VISIT (OUTPATIENT)
Dept: SURGERY | Facility: CLINIC | Age: 37
End: 2017-12-28

## 2017-12-28 VITALS
HEART RATE: 81 BPM | WEIGHT: 244.19 LBS | HEIGHT: 68 IN | RESPIRATION RATE: 16 BRPM | BODY MASS INDEX: 37.01 KG/M2 | DIASTOLIC BLOOD PRESSURE: 74 MMHG | SYSTOLIC BLOOD PRESSURE: 108 MMHG

## 2017-12-28 DIAGNOSIS — Z90.3 HISTORY OF SLEEVE GASTRECTOMY: Primary | ICD-10-CM

## 2018-01-04 ENCOUNTER — OFFICE VISIT (OUTPATIENT)
Dept: FAMILY MEDICINE CLINIC | Facility: CLINIC | Age: 38
End: 2018-01-04

## 2018-01-04 VITALS
WEIGHT: 238 LBS | SYSTOLIC BLOOD PRESSURE: 90 MMHG | HEART RATE: 88 BPM | HEIGHT: 68.5 IN | BODY MASS INDEX: 35.66 KG/M2 | DIASTOLIC BLOOD PRESSURE: 70 MMHG

## 2018-01-04 DIAGNOSIS — Z98.84 HISTORY OF ROUX-EN-Y GASTRIC BYPASS: ICD-10-CM

## 2018-01-04 DIAGNOSIS — R52 PAIN MANAGEMENT: Primary | ICD-10-CM

## 2018-01-04 DIAGNOSIS — Z09 FOLLOW-UP EXAMINATION AFTER GASTROINTESTINAL SURGERY: ICD-10-CM

## 2018-01-04 DIAGNOSIS — R10.12 LEFT UPPER QUADRANT PAIN: ICD-10-CM

## 2018-01-04 PROCEDURE — 99214 OFFICE O/P EST MOD 30 MIN: CPT | Performed by: FAMILY MEDICINE

## 2018-01-04 NOTE — PROGRESS NOTES
Musa Ty is a 40year old female. HPI:     Patient is status post bariatric surgery on 12/18/17.   Had gastric bypass laparoscopic Mingo-en-Y procedure with Dr. Reather Libman at [facility].  Patient has a history of hydrocodone addiction after be FORMULARY 2 tablets daily. Disp:  Rfl:    escitalopram (LEXAPRO) 20 MG Oral Tab Take 2 tablets (40 mg total) by mouth nightly.  Disp: 60 tablet Rfl: 1   CALCITRIOL 0.25 MCG Oral Cap TAKE 1 CAPSULE(0.25 MCG) BY MOUTH TWICE DAILY Disp: 60 capsule Rfl: 11   UT exertion  CARDIOVASCULAR: denies chest pain on exertion  GI: See above abdominal pain heartburn well controlled with medication    no burning urgency or frequency  NEURO: denies headaches  Musculoskeletal: No motor deficits  EXAM:   BP 90/70 (BP Location mL's every 6-8 hours with a goal of tapering off completely. - Hydrocodone-acetaminophen  MG/15ML Oral Solution; Thursday, Friday and Saturday every 4-6 hours at 7.5 ml, then Sunday, Monday and Tuesday 7.5 ml every 6-8 hours.   Then Wednesday, Thursd

## 2018-01-08 ENCOUNTER — TELEPHONE (OUTPATIENT)
Dept: FAMILY MEDICINE CLINIC | Facility: CLINIC | Age: 38
End: 2018-01-08

## 2018-01-08 NOTE — TELEPHONE ENCOUNTER
Nadege Gross from [facility] is calling to let Dr Jessica Masters know that she has tried numerous times to get a hold of Shukri Carbine, she has left messages and called numerous times, to offer case management assistance.  If Dr Jessica Masters has any questions for Celicia please call her at

## 2018-01-11 ENCOUNTER — TELEPHONE (OUTPATIENT)
Dept: SURGERY | Facility: CLINIC | Age: 38
End: 2018-01-11

## 2018-01-11 NOTE — TELEPHONE ENCOUNTER
1/11/18 @ 9:14am Spoke to Guzman Nicholson at [facility]. She verified that patient has following benefits for Bariatric services: No weight management criteria, No ESAU/Blue Distinction required.  ARNIE (GN#[id number]) DX E66.01 Pt has benefits for

## 2018-01-16 ENCOUNTER — OFFICE VISIT (OUTPATIENT)
Dept: SURGERY | Facility: CLINIC | Age: 38
End: 2018-01-16

## 2018-01-16 VITALS
SYSTOLIC BLOOD PRESSURE: 113 MMHG | WEIGHT: 230.63 LBS | HEIGHT: 68 IN | BODY MASS INDEX: 34.95 KG/M2 | RESPIRATION RATE: 16 BRPM | HEART RATE: 93 BPM | DIASTOLIC BLOOD PRESSURE: 78 MMHG

## 2018-01-16 NOTE — PROGRESS NOTES
[address], [address]  Dept: [phone]    1/16/2018    BARIATRIC EXISTING PATIENT/FOLLOW UP    HPI:  Nilson Guerrero is a 40year old-year old female w

## 2018-01-16 NOTE — PROGRESS NOTES
Greg Rosales is a 40year old female. HPI:     Patient is in for follow-up on pain management status post bariatric surgery. Patient does have follow-up with her bariatric surgeon tomorrow 1/16/18.   States she was getting some redness on one of her Rfl: 0   OMEPRAZOLE 40 MG Oral Capsule Delayed Release TAKE 1 CAPSULE(40 MG) BY MOUTH EVERY DAY Disp: 90 capsule Rfl: 3   UNITHROID 175 MCG Oral Tab Take 1 tablet (175 mcg total) by mouth before breakfast. Disp: 30 tablet Rfl: 5   Budesonide-Formoterol Fum distress  SKIN: no rashes,no suspicious lesions  EYES: PERRLA, EOM intact, sclera clear without injection  NECK: supple,no adenopathy, thyroid normal to palpation  LUNGS: clear to auscultation no rales, rhonchi or wheezes  CARDIO: RRR without murmur  GI: N patient will talk with bariatric surgeon further to see if this is appropriate  Reviewed diet patient does need to maintain a certain caloric expectation right now as below at probably 500-600 marquis per day. Reviewed modality of small frequent meals  2.  Ana

## 2018-01-18 ENCOUNTER — TELEPHONE (OUTPATIENT)
Dept: SURGERY | Facility: CLINIC | Age: 38
End: 2018-01-18

## 2018-01-18 ENCOUNTER — HOSPITAL ENCOUNTER (EMERGENCY)
Age: 38
Discharge: HOME | End: 2018-01-18
Attending: EMERGENCY MEDICINE
Payer: COMMERCIAL

## 2018-01-18 ENCOUNTER — APPOINTMENT (OUTPATIENT)
Dept: CT IMAGING | Age: 38
End: 2018-01-18
Attending: EMERGENCY MEDICINE
Payer: COMMERCIAL

## 2018-01-18 VITALS
WEIGHT: 228 LBS | BODY MASS INDEX: 34.56 KG/M2 | TEMPERATURE: 98 F | DIASTOLIC BLOOD PRESSURE: 58 MMHG | HEART RATE: 72 BPM | HEIGHT: 68 IN | RESPIRATION RATE: 24 BRPM | OXYGEN SATURATION: 100 % | SYSTOLIC BLOOD PRESSURE: 102 MMHG

## 2018-01-18 DIAGNOSIS — R11.2 NAUSEA AND VOMITING, INTRACTABILITY OF VOMITING NOT SPECIFIED, UNSPECIFIED VOMITING TYPE: Primary | ICD-10-CM

## 2018-01-18 LAB
ALBUMIN SERPL-MCNC: 3.4 G/DL (ref 3.5–4.8)
ALP LIVER SERPL-CCNC: 76 U/L (ref 37–98)
ALT SERPL-CCNC: 41 U/L (ref 14–54)
AST SERPL-CCNC: 22 U/L (ref 15–41)
BASOPHILS # BLD AUTO: 0.04 X10(3) UL (ref 0–0.1)
BASOPHILS NFR BLD AUTO: 0.6 %
BILIRUB SERPL-MCNC: 0.2 MG/DL (ref 0.1–2)
BUN BLD-MCNC: 7 MG/DL (ref 8–20)
CALCIUM BLD-MCNC: 8.3 MG/DL (ref 8.3–10.3)
CHLORIDE: 108 MMOL/L (ref 101–111)
CLARITY UR REFRACT.AUTO: CLEAR
CO2: 27 MMOL/L (ref 22–32)
COLOR UR AUTO: YELLOW
CREAT BLD-MCNC: 0.62 MG/DL (ref 0.55–1.02)
EOSINOPHIL # BLD AUTO: 0.21 X10(3) UL (ref 0–0.3)
EOSINOPHIL NFR BLD AUTO: 3.3 %
ERYTHROCYTE [DISTWIDTH] IN BLOOD BY AUTOMATED COUNT: 13.1 % (ref 11.5–16)
GLUCOSE BLD-MCNC: 86 MG/DL (ref 70–99)
GLUCOSE UR STRIP.AUTO-MCNC: NEGATIVE MG/DL
HCT VFR BLD AUTO: 40.1 % (ref 34–50)
HGB BLD-MCNC: 13.4 G/DL (ref 12–16)
IMMATURE GRANULOCYTE COUNT: 0.02 X10(3) UL (ref 0–1)
IMMATURE GRANULOCYTE RATIO %: 0.3 %
LEUKOCYTE ESTERASE UR QL STRIP.AUTO: NEGATIVE
LYMPHOCYTES # BLD AUTO: 1.52 X10(3) UL (ref 0.9–4)
LYMPHOCYTES NFR BLD AUTO: 24.1 %
M PROTEIN MFR SERPL ELPH: 6.7 G/DL (ref 6.1–8.3)
MCH RBC QN AUTO: 29.8 PG (ref 27–33.2)
MCHC RBC AUTO-ENTMCNC: 33.4 G/DL (ref 31–37)
MCV RBC AUTO: 89.1 FL (ref 81–100)
MONOCYTES # BLD AUTO: 0.58 X10(3) UL (ref 0.1–0.6)
MONOCYTES NFR BLD AUTO: 9.2 %
NEUTROPHIL ABS PRELIM: 3.95 X10 (3) UL (ref 1.3–6.7)
NEUTROPHILS # BLD AUTO: 3.95 X10(3) UL (ref 1.3–6.7)
NEUTROPHILS NFR BLD AUTO: 62.5 %
NITRITE UR QL STRIP.AUTO: NEGATIVE
PH UR STRIP.AUTO: 5 [PH] (ref 4.5–8)
PLATELET # BLD AUTO: 232 10(3)UL (ref 150–450)
POTASSIUM SERPL-SCNC: 3.6 MMOL/L (ref 3.6–5.1)
PROT UR STRIP.AUTO-MCNC: NEGATIVE MG/DL
RBC # BLD AUTO: 4.5 X10(6)UL (ref 3.8–5.1)
RBC UR QL AUTO: NEGATIVE
RED CELL DISTRIBUTION WIDTH-SD: 42.5 FL (ref 35.1–46.3)
SODIUM SERPL-SCNC: 141 MMOL/L (ref 136–144)
SP GR UR STRIP.AUTO: 1.02 (ref 1–1.03)
TROPONIN: <0.046 NG/ML (ref ?–0.05)
UROBILINOGEN UR STRIP.AUTO-MCNC: 1 MG/DL
WBC # BLD AUTO: 6.3 X10(3) UL (ref 4–13)

## 2018-01-18 PROCEDURE — 84484 ASSAY OF TROPONIN QUANT: CPT | Performed by: EMERGENCY MEDICINE

## 2018-01-18 PROCEDURE — 99285 EMERGENCY DEPT VISIT HI MDM: CPT

## 2018-01-18 PROCEDURE — 96374 THER/PROPH/DIAG INJ IV PUSH: CPT

## 2018-01-18 PROCEDURE — 71275 CT ANGIOGRAPHY CHEST: CPT | Performed by: EMERGENCY MEDICINE

## 2018-01-18 PROCEDURE — 93010 ELECTROCARDIOGRAM REPORT: CPT

## 2018-01-18 PROCEDURE — 96376 TX/PRO/DX INJ SAME DRUG ADON: CPT

## 2018-01-18 PROCEDURE — 96361 HYDRATE IV INFUSION ADD-ON: CPT

## 2018-01-18 PROCEDURE — 74177 CT ABD & PELVIS W/CONTRAST: CPT | Performed by: EMERGENCY MEDICINE

## 2018-01-18 PROCEDURE — 85025 COMPLETE CBC W/AUTO DIFF WBC: CPT | Performed by: EMERGENCY MEDICINE

## 2018-01-18 PROCEDURE — 80053 COMPREHEN METABOLIC PANEL: CPT | Performed by: EMERGENCY MEDICINE

## 2018-01-18 PROCEDURE — 93005 ELECTROCARDIOGRAM TRACING: CPT

## 2018-01-18 PROCEDURE — 81003 URINALYSIS AUTO W/O SCOPE: CPT | Performed by: EMERGENCY MEDICINE

## 2018-01-18 RX ORDER — ONDANSETRON 2 MG/ML
4 INJECTION INTRAMUSCULAR; INTRAVENOUS ONCE
Status: COMPLETED | OUTPATIENT
Start: 2018-01-18 | End: 2018-01-18

## 2018-01-18 RX ORDER — ACETAMINOPHEN 500 MG
1000 TABLET ORAL ONCE
Status: COMPLETED | OUTPATIENT
Start: 2018-01-18 | End: 2018-01-18

## 2018-01-18 NOTE — TELEPHONE ENCOUNTER
Appointment cancelled    Light headed    Does not feel well.   Does not feel safe driving    No pain    Met with Dr Giles Witt earlier this week  Some dry heaving    No diarrhea since Sunday    Took Zofran with no relief    December 18 surgery    Recommend goi

## 2018-01-18 NOTE — TELEPHONE ENCOUNTER
Patient feeling light headed since Monday of this week. She was here on Tuesday for a f/u with Dr. Blair Ramirez still feeling the same.  please advise

## 2018-01-18 NOTE — ED PROVIDER NOTES
Patient Seen in: [facility] Emergency Department In [facility]    History   Patient presents with:  Dizziness (neurologic)    Stated Complaint: gastric bypass on 12/18 now with nauseated, dizzy, light headed, 'tingling all *    HPI    This is a pleasant 44-y Marilou Aceves MD;  Location: [address] [place] [address]  No date: D & C      Comment: x4  12/18/2017: GASTRIC BYPASS,OBESE<100CM MARCIO-EN-Y      Comment: DR. Chano Gong  6/19/2015: GASTROCNEMIUS RECESSION Left      Comment: Procedure: GASTROC RECESSION FOOT;  Surgeon: Used                      Alcohol use: Yes           0.0 oz/week     Comment: Rarely      Review of Systems    Positive for stated complaint: gastric bypass on 12/18 now with nauseated, dizzy, light headed, 'tingling all *  Other systems are as noted in HP as noted on EKG Report. Rate: 79  Rhythm: Sinus Rhythm  Reading: Right bundle branch block        MDM   CT scan was done to rule out pulmonary embolism which there is known as well as gastric bypass rupture which we did not see.   There is no signs of obst

## 2018-01-18 NOTE — ED INITIAL ASSESSMENT (HPI)
C/o dizziness/lightheaded and tingling sensation to whole body since Monday. Had gastric bypass 12/18.    \"I just don't feel good\"     states she thinks she ate too much on Sunday and began with vomiting and diarrhea and has not felt well since

## 2018-01-19 ENCOUNTER — OFFICE VISIT (OUTPATIENT)
Dept: SURGERY | Facility: CLINIC | Age: 38
End: 2018-01-19

## 2018-01-19 VITALS
DIASTOLIC BLOOD PRESSURE: 60 MMHG | HEIGHT: 68 IN | HEART RATE: 80 BPM | RESPIRATION RATE: 16 BRPM | SYSTOLIC BLOOD PRESSURE: 110 MMHG | BODY MASS INDEX: 34.77 KG/M2 | OXYGEN SATURATION: 98 % | WEIGHT: 229.44 LBS

## 2018-01-19 DIAGNOSIS — Z98.84 HISTORY OF ROUX-EN-Y GASTRIC BYPASS: ICD-10-CM

## 2018-01-19 DIAGNOSIS — R11.0 NAUSEA: ICD-10-CM

## 2018-01-19 DIAGNOSIS — E44.1 MILD PROTEIN-CALORIE MALNUTRITION (HCC): ICD-10-CM

## 2018-01-19 DIAGNOSIS — E86.0 DEHYDRATION: Primary | ICD-10-CM

## 2018-01-19 LAB
ATRIAL RATE: 79 BPM
P AXIS: 4 DEGREES
P-R INTERVAL: 126 MS
Q-T INTERVAL: 402 MS
QRS DURATION: 120 MS
QTC CALCULATION (BEZET): 460 MS
R AXIS: -9 DEGREES
T AXIS: 10 DEGREES
VENTRICULAR RATE: 79 BPM

## 2018-01-19 PROCEDURE — 99214 OFFICE O/P EST MOD 30 MIN: CPT | Performed by: INTERNAL MEDICINE

## 2018-01-19 NOTE — PROGRESS NOTES
[address], [address] [address]  Dept: [phone]    Date: 2018    Patient:  Cara Travis  :      1980  MRN:      [id number]    Referring Provider (90 Base) MCG/ACT Inhalation Aero Soln, INHALE 2 PUFFS BY MOUTH EVERY 4 HOURS AS NEEDED FOR WHEEZING, Disp: 8.5 g, Rfl: 0  •  OMEPRAZOLE 40 MG Oral Capsule Delayed Release, TAKE 1 CAPSULE(40 MG) BY MOUTH EVERY DAY, Disp: 90 capsule, Rfl: 3  •  UNITHROID 17 [address]  11/23/2016: LAPAROSCOPIC CHOLECYSTECTOMY N/A      Comment: Procedure: LAPAROSCOPIC CHOLECYSTECTOMY;                 Surgeon: Alexey Person MD;  Location: [facility]                MAIN OR  12/14/2015: ABIGAIL BY RENITA AHUMADA 5+ YR B EOM's intact. Fundi benign.   Lungs: clear to auscultation bilaterally  Heart: S1, S2 normal, no murmur, click, rub or gallop, regular rate and rhythm  Abdomen: soft, incision on top of umbilicus red and inflammed  Extremities: extremities normal, atraumati

## 2018-01-20 DIAGNOSIS — F41.1 GAD (GENERALIZED ANXIETY DISORDER): ICD-10-CM

## 2018-01-22 DIAGNOSIS — F41.1 GAD (GENERALIZED ANXIETY DISORDER): ICD-10-CM

## 2018-01-22 RX ORDER — ALPRAZOLAM 0.25 MG/1
TABLET ORAL
Qty: 30 TABLET | Refills: 0 | Status: SHIPPED | OUTPATIENT
Start: 2018-01-22 | End: 2018-02-20

## 2018-01-22 RX ORDER — ALPRAZOLAM 0.25 MG/1
TABLET ORAL
Qty: 30 TABLET | Refills: 0
Start: 2018-01-22

## 2018-01-22 RX ORDER — ESCITALOPRAM OXALATE 20 MG/1
TABLET ORAL
Qty: 60 TABLET | Refills: 1 | Status: SHIPPED | OUTPATIENT
Start: 2018-01-22 | End: 2018-04-02

## 2018-01-22 NOTE — TELEPHONE ENCOUNTER
From: Crystal Ding  Sent: 1/22/2018 10:59 AM CST  Subject: Medication Renewal Request    Debbi Johnson would like a refill of the following medications:     ALPRAZolam 0.25 MG Oral Tab Jamison Xie, ONEAL]   Patient Comment: I sent it through to the [facility] and Lexapro went through but this one is still pending approval     Preferred pharmacy: [address] [place] [address], [phone], [phone]

## 2018-01-23 ENCOUNTER — OFFICE VISIT (OUTPATIENT)
Dept: SURGERY | Facility: CLINIC | Age: 38
End: 2018-01-23

## 2018-01-23 VITALS — HEIGHT: 68 IN | BODY MASS INDEX: 34.42 KG/M2 | WEIGHT: 227.13 LBS

## 2018-01-23 DIAGNOSIS — E66.09 CLASS 1 OBESITY DUE TO EXCESS CALORIES WITH SERIOUS COMORBIDITY AND BODY MASS INDEX (BMI) OF 34.0 TO 34.9 IN ADULT: Primary | ICD-10-CM

## 2018-01-23 PROCEDURE — 97803 MED NUTRITION INDIV SUBSEQ: CPT | Performed by: DIETITIAN, REGISTERED

## 2018-01-23 NOTE — PROGRESS NOTES
[facility]  [address] [place] [id number]  Dept: [phone]  Loc: [phone]    01/23/18      Bariatric Follow-up Nutrition Session    Marcello Alvarenga is a 40year old female. 09/23/2017   VLDL 19 09/23/2017   TCHDLRATIO 2.89 09/23/2017   NONHDLC 117 09/23/2017        Vitamins/Minerals:    Lab Results  Component Value Date   B12 342 09/23/2017       Lab Results  Component Value Date   VITD 31.0 09/23/2017       Lab Results  Comp Encounters:  01/23/18 : 227 lb 1.6 oz  01/19/18 : 229 lb 7 oz  01/18/18 : 228 lb  01/16/18 : 230 lb 9.6 oz  01/15/18 : 230 lb  01/04/18 : 238 lb      Weight change:  Down 21.9 lbs since day of surgery  Post-Op Excess Body Weight Loss: 25.9% EBWL  BMI: Body for [place] IV infusion clinic if needed  3. Add calcium to daily ProCare  4.  Rest as needed per low energy level & nausea, for the future resume active schedule      Monitor/Evaluate     Anthropometric measurements, Food/fluid intake/choices, Food intole

## 2018-01-26 ENCOUNTER — TELEPHONE (OUTPATIENT)
Dept: SURGERY | Facility: CLINIC | Age: 38
End: 2018-01-26

## 2018-01-27 ENCOUNTER — APPOINTMENT (OUTPATIENT)
Dept: CT IMAGING | Facility: HOSPITAL | Age: 38
DRG: 392 | End: 2018-01-27
Attending: EMERGENCY MEDICINE
Payer: COMMERCIAL

## 2018-01-27 ENCOUNTER — HOSPITAL ENCOUNTER (INPATIENT)
Facility: HOSPITAL | Age: 38
LOS: 3 days | Discharge: HOME | DRG: 392 | End: 2018-01-30
Attending: EMERGENCY MEDICINE | Admitting: INTERNAL MEDICINE
Payer: COMMERCIAL

## 2018-01-27 DIAGNOSIS — K28.9 ULCER AT SITE OF SURGICAL ANASTOMOSIS FOLLOWING BYPASS OF STOMACH: Primary | ICD-10-CM

## 2018-01-27 DIAGNOSIS — R10.11 ABDOMINAL PAIN, RIGHT UPPER QUADRANT: ICD-10-CM

## 2018-01-27 DIAGNOSIS — R11.0 NAUSEA: ICD-10-CM

## 2018-01-27 DIAGNOSIS — R10.9 INTRACTABLE ABDOMINAL PAIN: ICD-10-CM

## 2018-01-27 DIAGNOSIS — T85.898A ULCER AT SITE OF SURGICAL ANASTOMOSIS FOLLOWING BYPASS OF STOMACH: Primary | ICD-10-CM

## 2018-01-27 LAB
ALBUMIN SERPL BCP-MCNC: 4 G/DL (ref 3.5–4.8)
ALP SERPL-CCNC: 70 U/L (ref 32–100)
ALT SERPL-CCNC: 28 U/L (ref 14–54)
ANION GAP SERPL CALC-SCNC: 9 MMOL/L (ref 0–18)
AST SERPL-CCNC: 26 U/L (ref 15–41)
BASOPHILS # BLD: 0.1 K/UL (ref 0–0.2)
BASOPHILS NFR BLD: 1 %
BILIRUB DIRECT SERPL-MCNC: 0 MG/DL (ref 0–0.2)
BILIRUB SERPL-MCNC: 0.5 MG/DL (ref 0.3–1.2)
BILIRUB UR QL: NEGATIVE
BUN SERPL-MCNC: 5 MG/DL (ref 8–20)
BUN/CREAT SERPL: 7.6 (ref 10–20)
CALCIUM SERPL-MCNC: 9.2 MG/DL (ref 8.5–10.5)
CHLORIDE SERPL-SCNC: 106 MMOL/L (ref 95–110)
CO2 SERPL-SCNC: 24 MMOL/L (ref 22–32)
COLOR UR: YELLOW
CREAT SERPL-MCNC: 0.66 MG/DL (ref 0.5–1.5)
EOSINOPHIL # BLD: 0.1 K/UL (ref 0–0.7)
EOSINOPHIL NFR BLD: 1 %
ERYTHROCYTE [DISTWIDTH] IN BLOOD BY AUTOMATED COUNT: 14.4 % (ref 11–15)
GLUCOSE SERPL-MCNC: 109 MG/DL (ref 70–99)
GLUCOSE UR-MCNC: NEGATIVE MG/DL
HCT VFR BLD AUTO: 43.7 % (ref 35–48)
HGB BLD-MCNC: 14.5 G/DL (ref 12–16)
HGB UR QL STRIP.AUTO: NEGATIVE
KETONES UR-MCNC: NEGATIVE MG/DL
LEUKOCYTE ESTERASE UR QL STRIP.AUTO: NEGATIVE
LIPASE SERPL-CCNC: 59 U/L (ref 22–51)
LYMPHOCYTES # BLD: 1.8 K/UL (ref 1–4)
LYMPHOCYTES NFR BLD: 24 %
MCH RBC QN AUTO: 29.8 PG (ref 27–32)
MCHC RBC AUTO-ENTMCNC: 33.2 G/DL (ref 32–37)
MCV RBC AUTO: 89.8 FL (ref 80–100)
MONOCYTES # BLD: 0.7 K/UL (ref 0–1)
MONOCYTES NFR BLD: 9 %
NEUTROPHILS # BLD AUTO: 5 K/UL (ref 1.8–7.7)
NEUTROPHILS NFR BLD: 65 %
NITRITE UR QL STRIP.AUTO: NEGATIVE
OSMOLALITY UR CALC.SUM OF ELEC: 286 MOSM/KG (ref 275–295)
PH UR: 6 [PH] (ref 5–8)
PLATELET # BLD AUTO: 251 K/UL (ref 140–400)
PMV BLD AUTO: 10.1 FL (ref 7.4–10.3)
POTASSIUM SERPL-SCNC: 3.6 MMOL/L (ref 3.3–5.1)
PROT SERPL-MCNC: 6.9 G/DL (ref 5.9–8.4)
PROT UR-MCNC: NEGATIVE MG/DL
RBC # BLD AUTO: 4.87 M/UL (ref 3.7–5.4)
RBC #/AREA URNS AUTO: 1 /HPF
SODIUM SERPL-SCNC: 139 MMOL/L (ref 136–144)
SP GR UR STRIP: 1.01 (ref 1–1.03)
UROBILINOGEN UR STRIP-ACNC: <2
VIT C UR-MCNC: 40 MG/DL
WBC # BLD AUTO: 7.6 K/UL (ref 4–11)
WBC #/AREA URNS AUTO: <1 /HPF

## 2018-01-27 PROCEDURE — 99221 1ST HOSP IP/OBS SF/LOW 40: CPT | Performed by: INTERNAL MEDICINE

## 2018-01-27 PROCEDURE — 74177 CT ABD & PELVIS W/CONTRAST: CPT | Performed by: EMERGENCY MEDICINE

## 2018-01-27 RX ORDER — METOCLOPRAMIDE HYDROCHLORIDE 5 MG/ML
10 INJECTION INTRAMUSCULAR; INTRAVENOUS EVERY 8 HOURS PRN
Status: DISCONTINUED | OUTPATIENT
Start: 2018-01-27 | End: 2018-01-30

## 2018-01-27 RX ORDER — DEXTROSE AND SODIUM CHLORIDE 5; .45 G/100ML; G/100ML
INJECTION, SOLUTION INTRAVENOUS CONTINUOUS
Status: ACTIVE | OUTPATIENT
Start: 2018-01-27 | End: 2018-01-28

## 2018-01-27 RX ORDER — SODIUM CHLORIDE 9 MG/ML
125 INJECTION, SOLUTION INTRAVENOUS CONTINUOUS
Status: DISCONTINUED | OUTPATIENT
Start: 2018-01-27 | End: 2018-01-30

## 2018-01-27 RX ORDER — CALCITRIOL 0.25 UG/1
0.25 CAPSULE, LIQUID FILLED ORAL 2 TIMES DAILY
Status: DISCONTINUED | OUTPATIENT
Start: 2018-01-27 | End: 2018-01-30

## 2018-01-27 RX ORDER — MORPHINE SULFATE 2 MG/ML
2 INJECTION, SOLUTION INTRAMUSCULAR; INTRAVENOUS ONCE
Status: COMPLETED | OUTPATIENT
Start: 2018-01-27 | End: 2018-01-27

## 2018-01-27 RX ORDER — MORPHINE SULFATE 2 MG/ML
1 INJECTION, SOLUTION INTRAMUSCULAR; INTRAVENOUS EVERY 2 HOUR PRN
Status: DISCONTINUED | OUTPATIENT
Start: 2018-01-27 | End: 2018-01-30

## 2018-01-27 RX ORDER — SODIUM CHLORIDE 9 MG/ML
INJECTION, SOLUTION INTRAVENOUS
Status: DISPENSED
Start: 2018-01-27 | End: 2018-01-28

## 2018-01-27 RX ORDER — SODIUM CHLORIDE 0.9 % (FLUSH) 0.9 %
3 SYRINGE (ML) INJECTION AS NEEDED
Status: DISCONTINUED | OUTPATIENT
Start: 2018-01-27 | End: 2018-01-30

## 2018-01-27 RX ORDER — DEXTROSE AND SODIUM CHLORIDE 5; .45 G/100ML; G/100ML
INJECTION, SOLUTION INTRAVENOUS CONTINUOUS
Status: DISCONTINUED | OUTPATIENT
Start: 2018-01-27 | End: 2018-01-30

## 2018-01-27 RX ORDER — ZOLPIDEM TARTRATE 5 MG/1
5 TABLET ORAL NIGHTLY PRN
Status: DISCONTINUED | OUTPATIENT
Start: 2018-01-27 | End: 2018-01-30

## 2018-01-27 RX ORDER — CHLORPROMAZINE HYDROCHLORIDE 25 MG/ML
12.5 INJECTION INTRAMUSCULAR ONCE
Status: COMPLETED | OUTPATIENT
Start: 2018-01-27 | End: 2018-01-27

## 2018-01-27 RX ORDER — ONDANSETRON 2 MG/ML
4 INJECTION INTRAMUSCULAR; INTRAVENOUS ONCE
Status: COMPLETED | OUTPATIENT
Start: 2018-01-27 | End: 2018-01-27

## 2018-01-27 RX ORDER — HEPARIN SODIUM 5000 [USP'U]/ML
5000 INJECTION, SOLUTION INTRAVENOUS; SUBCUTANEOUS EVERY 12 HOURS SCHEDULED
Status: DISCONTINUED | OUTPATIENT
Start: 2018-01-27 | End: 2018-01-30

## 2018-01-27 RX ORDER — ONDANSETRON 2 MG/ML
4 INJECTION INTRAMUSCULAR; INTRAVENOUS EVERY 6 HOURS PRN
Status: DISCONTINUED | OUTPATIENT
Start: 2018-01-27 | End: 2018-01-30

## 2018-01-27 RX ORDER — MORPHINE SULFATE 4 MG/ML
4 INJECTION, SOLUTION INTRAMUSCULAR; INTRAVENOUS EVERY 2 HOUR PRN
Status: DISCONTINUED | OUTPATIENT
Start: 2018-01-27 | End: 2018-01-30

## 2018-01-27 RX ORDER — MORPHINE SULFATE 2 MG/ML
2 INJECTION, SOLUTION INTRAMUSCULAR; INTRAVENOUS EVERY 2 HOUR PRN
Status: DISCONTINUED | OUTPATIENT
Start: 2018-01-27 | End: 2018-01-30

## 2018-01-27 RX ORDER — SODIUM CHLORIDE 9 MG/ML
1000 INJECTION, SOLUTION INTRAVENOUS ONCE
Status: COMPLETED | OUTPATIENT
Start: 2018-01-27 | End: 2018-01-27

## 2018-01-27 RX ORDER — ACETAMINOPHEN 325 MG/1
650 TABLET ORAL EVERY 6 HOURS PRN
Status: DISCONTINUED | OUTPATIENT
Start: 2018-01-27 | End: 2018-01-30

## 2018-01-27 RX ORDER — SUCRALFATE ORAL 1 G/10ML
1 SUSPENSION ORAL ONCE
Status: COMPLETED | OUTPATIENT
Start: 2018-01-27 | End: 2018-01-27

## 2018-01-27 RX ORDER — ESCITALOPRAM OXALATE 10 MG/1
40 TABLET ORAL EVERY EVENING
Status: DISCONTINUED | OUTPATIENT
Start: 2018-01-27 | End: 2018-01-30

## 2018-01-27 RX ORDER — ONDANSETRON 2 MG/ML
4 INJECTION INTRAMUSCULAR; INTRAVENOUS EVERY 4 HOURS PRN
Status: COMPLETED | OUTPATIENT
Start: 2018-01-27 | End: 2018-01-29

## 2018-01-27 NOTE — ED PROVIDER NOTES
1647: Case d/w bariatric surgery Dr. Stefan Loya - given ongoing pain/emesis, will admit for ongoing management and GI evaluation for possible ulcerative change to GJ anastomotic junction. Recent [facility] admission to hospitalist service - admitted to Dr. Delia Baugh KIDNEYS:   Symmetric enhancement is seen without evidence of hydronephrosis or underlying solid masses. Along the course of the distal right ureter, there is a calcification which is difficult to separate from the right ureteral course.  GI/MESENTERY:  The uropathy/hydroureteronephrosis. 3. Postoperative changes of Mingo-en-Y gastric bypass. 4. Nonspecific fluid-filled loops of small bowel may represent gastritis appropriate clinical setting.   5. Status post cholecystectomy without significant hepatobiliary

## 2018-01-27 NOTE — ED PROVIDER NOTES
Patient Seen in: [facility] AND [facility] Emergency Department    History   Patient presents with:  Abdomen/Flank Pain (GI/)  Nausea/Vomiting/Diarrhea (gastrointestinal)    Stated Complaint: vomitng ruq pain    HPI    Patient is a 66-year-old female who under Left      Comment: Procedure: ARTHROSCOPY ANKLE WITH DEBRIDEMENT;               Surgeon: Nicolle Cardenas MD;  Location: [facility]  No date:   No date: CHOLECYSTECTOMY  10/22/2013: COLONOSCOPY,DIAGNOSTIC      Comment: Procedur UPPER GI ENDOSCOPY,BIOPSY N/A      Comment: Procedure: ESOPHAGOGASTRODUODENOSCOPY,                POSSIBLE BIOPSY, POSSIBLE POLYPECTOMY 38486;                 Surgeon: Jorge Luis Marcus MD;  Location: [address] [place] [address] Dr        Smoking No motor os sensory defecits noted Coordination normal.   Skin: Skin is warm and dry. Psychiatric: Normal mood and affect. Behavior is normal. Judgment and thought content normal.   Nursing note and vitals reviewed.           ED Course     Labs Reviewed possible for a visit in 2 days          Medications Prescribed:  Current Discharge Medication List

## 2018-01-27 NOTE — PROGRESS NOTES
Received call from patient regarding worsening nausea, vomiting and PO intolerance for the past week. She is about 5 weeks s/p RYGB, has lost around 20 lbs, and was doing pretty well post-operatively so this was a significant change.   Pt's complaint sort

## 2018-01-27 NOTE — H&P
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    CALCITRIOL 0.25 MCG Oral Cap   No No   Sig: TAKE 1 CAPSULE(0.25 MCG) BY MOUTH TWICE DAILY   ESCITALOPRAM 20 MG Oral Tab   No No   Sig: TAKE 2 TABLETS(40 MG) BY MOUTH EVERY NIGHT atraumatic. Neck:  Supple, non-tender, no carotid bruit, no jugular venous distention, no lymphadenopathy, no thyromegaly.   Respiratory:  Lungs are clear to auscultation, respirations are non-labored, breath sounds are equal, symmetrical chest wall expans examination quality is degraded by beam hardening artifact throughout the imaged volume secondary to patient body habitus and contact of the right lateral body wall with the scanner gantry. LUNG BASES: The heart is normal in size.  There is dependent subse thoracolumbar spine is present. There are mild multilevel degenerative changes of the spine. ABDOMINAL WALL: No mass or hernia is perceived.    OTHER: No free air or fluid is seen in the abdomen or pelvis.          Dictated by (CST): Tayler Guerrero MD on profile    Anxiety and depression  Continue home meds    Insomnia  Continue Ambien      Prophylaxis  Subcutaneous heparin    CODE STATUS  Full    Primary care physician  Darryle Mons, MD    Disposition  Clinical course will dictate outcome      Lance Michel Is

## 2018-01-27 NOTE — ED NOTES
Pt given 50 ml of oral barium. Waiting for lab results. Then will give 30 ml more of barium just prior to cat scan.

## 2018-01-28 LAB
ALBUMIN SERPL BCP-MCNC: 3 G/DL (ref 3.5–4.8)
ALBUMIN/GLOB SERPL: 1.4 {RATIO} (ref 1–2)
ALP SERPL-CCNC: 54 U/L (ref 32–100)
ALT SERPL-CCNC: 24 U/L (ref 14–54)
ANION GAP SERPL CALC-SCNC: 1 MMOL/L (ref 0–18)
AST SERPL-CCNC: 28 U/L (ref 15–41)
BASOPHILS # BLD: 0 K/UL (ref 0–0.2)
BASOPHILS NFR BLD: 1 %
BILIRUB SERPL-MCNC: 0.5 MG/DL (ref 0.3–1.2)
BUN SERPL-MCNC: 3 MG/DL (ref 8–20)
BUN/CREAT SERPL: 4.3 (ref 10–20)
CALCIUM SERPL-MCNC: 8.1 MG/DL (ref 8.5–10.5)
CHLORIDE SERPL-SCNC: 112 MMOL/L (ref 95–110)
CHOLEST SERPL-MCNC: 106 MG/DL (ref 110–200)
CO2 SERPL-SCNC: 27 MMOL/L (ref 22–32)
CREAT SERPL-MCNC: 0.7 MG/DL (ref 0.5–1.5)
EOSINOPHIL # BLD: 0.2 K/UL (ref 0–0.7)
EOSINOPHIL NFR BLD: 5 %
ERYTHROCYTE [DISTWIDTH] IN BLOOD BY AUTOMATED COUNT: 14.1 % (ref 11–15)
ERYTHROCYTE [SEDIMENTATION RATE] IN BLOOD: 9 MM/HR (ref 0–20)
GLOBULIN PLAS-MCNC: 2.2 G/DL (ref 2.5–3.7)
GLUCOSE SERPL-MCNC: 85 MG/DL (ref 70–99)
HCT VFR BLD AUTO: 37.6 % (ref 35–48)
HDLC SERPL-MCNC: 37 MG/DL
HGB BLD-MCNC: 12.4 G/DL (ref 12–16)
INR BLD: 1.1 (ref 0.9–1.2)
LDLC SERPL CALC-MCNC: 52 MG/DL (ref 0–99)
LYMPHOCYTES # BLD: 1.8 K/UL (ref 1–4)
LYMPHOCYTES NFR BLD: 39 %
MAGNESIUM SERPL-MCNC: 1.8 MG/DL (ref 1.8–2.5)
MCH RBC QN AUTO: 29.8 PG (ref 27–32)
MCHC RBC AUTO-ENTMCNC: 32.9 G/DL (ref 32–37)
MCV RBC AUTO: 90.5 FL (ref 80–100)
MONOCYTES # BLD: 0.5 K/UL (ref 0–1)
MONOCYTES NFR BLD: 10 %
NEUTROPHILS # BLD AUTO: 2.1 K/UL (ref 1.8–7.7)
NEUTROPHILS NFR BLD: 45 %
NONHDLC SERPL-MCNC: 69 MG/DL
OSMOLALITY UR CALC.SUM OF ELEC: 286 MOSM/KG (ref 275–295)
PLATELET # BLD AUTO: 181 K/UL (ref 140–400)
PMV BLD AUTO: 9.9 FL (ref 7.4–10.3)
POTASSIUM SERPL-SCNC: 3.5 MMOL/L (ref 3.3–5.1)
POTASSIUM SERPL-SCNC: 4.1 MMOL/L (ref 3.3–5.1)
PROT SERPL-MCNC: 5.2 G/DL (ref 5.9–8.4)
PROTHROMBIN TIME: 13.9 SECONDS (ref 11.8–14.5)
RBC # BLD AUTO: 4.15 M/UL (ref 3.7–5.4)
SODIUM SERPL-SCNC: 140 MMOL/L (ref 136–144)
T3 SERPL-MCNC: 0.78 NG/ML (ref 0.87–1.78)
T4 FREE SERPL-MCNC: 1.3 NG/DL (ref 0.58–1.64)
TRIGL SERPL-MCNC: 84 MG/DL (ref 1–149)
TSH SERPL-ACNC: 0.01 UIU/ML (ref 0.45–5.33)
WBC # BLD AUTO: 4.6 K/UL (ref 4–11)

## 2018-01-28 PROCEDURE — 99232 SBSQ HOSP IP/OBS MODERATE 35: CPT | Performed by: HOSPITALIST

## 2018-01-28 PROCEDURE — 99254 IP/OBS CNSLTJ NEW/EST MOD 60: CPT | Performed by: INTERNAL MEDICINE

## 2018-01-28 RX ORDER — 0.9 % SODIUM CHLORIDE 0.9 %
VIAL (ML) INJECTION
Status: COMPLETED
Start: 2018-01-28 | End: 2018-01-28

## 2018-01-28 RX ORDER — ALPRAZOLAM 0.25 MG/1
0.25 TABLET ORAL NIGHTLY PRN
Status: DISCONTINUED | OUTPATIENT
Start: 2018-01-28 | End: 2018-01-30

## 2018-01-28 RX ORDER — ASPIRIN 81 MG/1
81 TABLET ORAL DAILY
Status: DISCONTINUED | OUTPATIENT
Start: 2018-01-28 | End: 2018-01-30

## 2018-01-28 RX ORDER — POTASSIUM CHLORIDE 20 MEQ/1
40 TABLET, EXTENDED RELEASE ORAL EVERY 4 HOURS
Status: COMPLETED | OUTPATIENT
Start: 2018-01-28 | End: 2018-01-28

## 2018-01-28 RX ORDER — ALBUTEROL SULFATE 90 UG/1
1 AEROSOL, METERED RESPIRATORY (INHALATION) EVERY 4 HOURS PRN
Status: DISCONTINUED | OUTPATIENT
Start: 2018-01-28 | End: 2018-01-30

## 2018-01-28 RX ORDER — ZOLPIDEM TARTRATE 5 MG/1
5 TABLET ORAL NIGHTLY PRN
Status: DISCONTINUED | OUTPATIENT
Start: 2018-01-28 | End: 2018-01-28

## 2018-01-28 RX ORDER — SODIUM CHLORIDE 9 MG/ML
INJECTION, SOLUTION INTRAVENOUS
Status: DISPENSED
Start: 2018-01-28 | End: 2018-01-28

## 2018-01-28 NOTE — PROGRESS NOTES
[facility]    Progress Note    Lenice Muss Patient Status:  Inpatient    1980 MRN [id number]   Location [facility] 4W/SW/SE Attending Abdirashid Lacy Orin Day # 1 PCP Stefano Back MD     Subjective: STATUS  Full     Primary care physician  Jane Prince MD     Disposition  Poss dc after scope    Ct images reviewed  28 min spent on pt of which 15 min spent coordinating care with gi and counseling pt and family with his permission about ulcers

## 2018-01-28 NOTE — PLAN OF CARE
PAIN - ADULT    • Verbalizes/displays adequate comfort level or patient's stated pain goal Progressing    Abdominal pain- controlled with morphine IV-intermit      Patient/Family Goals    • Patient/Family Long Term Goal Progressing    • Patient/Family Yenifer Score

## 2018-01-28 NOTE — PLAN OF CARE
This RN took over care at approximately 1500, VSS, Morphine given for PRN pain control, zofran for Nausea, clear liquid diet, plan for scope in AM. IV fluids as ordered.

## 2018-01-28 NOTE — CONSULTS
[facility]  Report of GI Consultation    Stephanie Mckeon Patient Status:  Emergency    1980 MRN [id number]   Location [address] Attending Shakira Reyes MD   Hosp Day # 0 PCP Nicole Ramirez MD     Date o obstruction. Postsurgical changes described with no acute finding. ?? \"There is oval infiltration of the fat measuring 3.4 x 2.7 x 1 cm along the mesenteric aspect of the hepatic flexure of the colon either representing fat necrosis related to previous s with severe postprandial nausea for approximately 4-6 weeks. The patient also describes fatty food intolerance, postprandial abdominal bloating, early satiety and diarrhea. Extensive workup thus far has been negative.   The patient was seen in consultatio assess for patency of the surgical anastomoses, anastomotic ulceration  · Consider increasing omeprazole dose to 40 mg twice daily after EGD examination. · If no culprit findings on EGD examination, may need to consider trial of a promotility medication. [address]  No date: D & C      Comment: x4  12/18/2017: GASTRIC BYPASS,OBESE<100CM MARCIO-EN-Y      Comment: DR. Naseem Vázquez  6/19/2015: GASTROCNEMIUS RECESSION Left      Comment: Procedure: GASTROC RECESSION FOOT;  Surgeon:                Svetlana Moreland Disease Father    • Diabetes Paternal Grandmother    • Heart Disorder Paternal Grandmother    • Heart Disease Paternal Grandmother    • Breast Cancer Maternal Grandmother 76   • Cancer Neg    • Stroke Neg        Social History  Patient Guardian Status    M WBC 7.6 01/27/2018   HGB 14.5 01/27/2018    01/27/2018   CREATSERUM 0.66 01/27/2018   BUN 5 (L) 01/27/2018    01/27/2018   K 3.6 01/27/2018   CO2 24 01/27/2018   CA 9.2 01/27/2018   ALB 4.0 01/27/2018   ALKPHO 70 01/27/2018   TP 6.9 01/27/20

## 2018-01-28 NOTE — CONSULTS
Carlo Neil / Leida Hare / Kamlesh Leo / Tanner Soto / Mervat Perera / Fernando Bryantin - [phone]  Answering Service [phone]        Rosanna Soto Patient Status:  Inpatient    1980 MRN [id number]   Jailene Filter complication         Date Noted: 06/01/2017      Sx 6/6/17, CPT 97665, Right Gastroc Slide w/ Dr. Verduzco Randolph Exp 9/4/17         Date Noted: 05/17/2017      Status post laparoscopic cholecystectomy         Date Noted: 12/20/2016      Pain management ANKLE SCOPE,PART DEBRIDEMENT Left      Comment: Procedure: ARTHROSCOPY ANKLE WITH DEBRIDEMENT;               Surgeon: Shonda Perez MD;  Location: [facility]  No date:   No date: CHOLECYSTECTOMY  10/22/2013: Kiley Khan Yassine Bullard M.D.  12/19/2015: UPPER GI ENDOSCOPY,BIOPSY N/A      Comment: Procedure: ESOPHAGOGASTRODUODENOSCOPY,                POSSIBLE BIOPSY, POSSIBLE POLYPECTOMY 73947;                 Surgeon: Henri Bansal MD;  Location: [facility]               SURGICAL Comment:Reglan with benadryl, feels like she is going to             jump out of her skin  Tramadol                Rash  Azithromycin            Restless    Comment:tingling      Smoking status: Never Smoker    Smokeless tobacco: Never Used    Alcohol use internal hernia, leak, bowel obstruction, or abscess. However, CT is unable to give much information regarding marginal ulcer or gastrojejunal anastomotic stricture.    Review of office notes suggest she was perhaps starting to have issues with solids at l

## 2018-01-29 ENCOUNTER — ANESTHESIA (OUTPATIENT)
Dept: ENDOSCOPY | Facility: HOSPITAL | Age: 38
DRG: 392 | End: 2018-01-29
Payer: COMMERCIAL

## 2018-01-29 ENCOUNTER — ANESTHESIA EVENT (OUTPATIENT)
Dept: ENDOSCOPY | Facility: HOSPITAL | Age: 38
DRG: 392 | End: 2018-01-29
Payer: COMMERCIAL

## 2018-01-29 LAB
ANION GAP SERPL CALC-SCNC: 7 MMOL/L (ref 0–18)
BASOPHILS # BLD: 0 K/UL (ref 0–0.2)
BASOPHILS NFR BLD: 1 %
BUN SERPL-MCNC: 2 MG/DL (ref 8–20)
BUN/CREAT SERPL: 3.1 (ref 10–20)
CALCIUM SERPL-MCNC: 8.1 MG/DL (ref 8.5–10.5)
CHLORIDE SERPL-SCNC: 102 MMOL/L (ref 95–110)
CO2 SERPL-SCNC: 28 MMOL/L (ref 22–32)
CREAT SERPL-MCNC: 0.65 MG/DL (ref 0.5–1.5)
EOSINOPHIL # BLD: 0.2 K/UL (ref 0–0.7)
EOSINOPHIL NFR BLD: 4 %
ERYTHROCYTE [DISTWIDTH] IN BLOOD BY AUTOMATED COUNT: 14.1 % (ref 11–15)
GLUCOSE SERPL-MCNC: 93 MG/DL (ref 70–99)
HCT VFR BLD AUTO: 37 % (ref 35–48)
HGB BLD-MCNC: 12.3 G/DL (ref 12–16)
LYMPHOCYTES # BLD: 1.5 K/UL (ref 1–4)
LYMPHOCYTES NFR BLD: 31 %
MAGNESIUM SERPL-MCNC: 1.5 MG/DL (ref 1.8–2.5)
MCH RBC QN AUTO: 29.9 PG (ref 27–32)
MCHC RBC AUTO-ENTMCNC: 33.3 G/DL (ref 32–37)
MCV RBC AUTO: 89.9 FL (ref 80–100)
MONOCYTES # BLD: 0.5 K/UL (ref 0–1)
MONOCYTES NFR BLD: 10 %
NEUTROPHILS # BLD AUTO: 2.6 K/UL (ref 1.8–7.7)
NEUTROPHILS NFR BLD: 54 %
OSMOLALITY UR CALC.SUM OF ELEC: 280 MOSM/KG (ref 275–295)
PHOSPHATE SERPL-MCNC: 3.7 MG/DL (ref 2.4–4.7)
PLATELET # BLD AUTO: 183 K/UL (ref 140–400)
PMV BLD AUTO: 9.6 FL (ref 7.4–10.3)
POTASSIUM SERPL-SCNC: 3.5 MMOL/L (ref 3.3–5.1)
RBC # BLD AUTO: 4.11 M/UL (ref 3.7–5.4)
SODIUM SERPL-SCNC: 137 MMOL/L (ref 136–144)
WBC # BLD AUTO: 4.8 K/UL (ref 4–11)

## 2018-01-29 PROCEDURE — 0DBA8ZX EXCISION OF JEJUNUM, VIA NATURAL OR ARTIFICIAL OPENING ENDOSCOPIC, DIAGNOSTIC: ICD-10-PCS | Performed by: INTERNAL MEDICINE

## 2018-01-29 PROCEDURE — 99232 SBSQ HOSP IP/OBS MODERATE 35: CPT | Performed by: HOSPITALIST

## 2018-01-29 PROCEDURE — 43239 EGD BIOPSY SINGLE/MULTIPLE: CPT | Performed by: INTERNAL MEDICINE

## 2018-01-29 RX ORDER — NALOXONE HYDROCHLORIDE 0.4 MG/ML
80 INJECTION, SOLUTION INTRAMUSCULAR; INTRAVENOUS; SUBCUTANEOUS AS NEEDED
Status: DISCONTINUED | OUTPATIENT
Start: 2018-01-29 | End: 2018-01-29 | Stop reason: HOSPADM

## 2018-01-29 RX ORDER — HYDROMORPHONE HYDROCHLORIDE 1 MG/ML
0.6 INJECTION, SOLUTION INTRAMUSCULAR; INTRAVENOUS; SUBCUTANEOUS EVERY 5 MIN PRN
Status: DISCONTINUED | OUTPATIENT
Start: 2018-01-29 | End: 2018-01-29 | Stop reason: HOSPADM

## 2018-01-29 RX ORDER — HYDROMORPHONE HYDROCHLORIDE 1 MG/ML
0.2 INJECTION, SOLUTION INTRAMUSCULAR; INTRAVENOUS; SUBCUTANEOUS EVERY 5 MIN PRN
Status: DISCONTINUED | OUTPATIENT
Start: 2018-01-29 | End: 2018-01-29 | Stop reason: HOSPADM

## 2018-01-29 RX ORDER — ONDANSETRON 2 MG/ML
4 INJECTION INTRAMUSCULAR; INTRAVENOUS ONCE AS NEEDED
Status: DISCONTINUED | OUTPATIENT
Start: 2018-01-29 | End: 2018-01-29 | Stop reason: HOSPADM

## 2018-01-29 RX ORDER — MORPHINE SULFATE 10 MG/ML
6 INJECTION, SOLUTION INTRAMUSCULAR; INTRAVENOUS EVERY 10 MIN PRN
Status: DISCONTINUED | OUTPATIENT
Start: 2018-01-29 | End: 2018-01-29 | Stop reason: HOSPADM

## 2018-01-29 RX ORDER — MIDAZOLAM HYDROCHLORIDE 1 MG/ML
INJECTION INTRAMUSCULAR; INTRAVENOUS AS NEEDED
Status: DISCONTINUED | OUTPATIENT
Start: 2018-01-29 | End: 2018-01-29 | Stop reason: SURG

## 2018-01-29 RX ORDER — LIDOCAINE HYDROCHLORIDE 10 MG/ML
INJECTION, SOLUTION EPIDURAL; INFILTRATION; INTRACAUDAL; PERINEURAL AS NEEDED
Status: DISCONTINUED | OUTPATIENT
Start: 2018-01-29 | End: 2018-01-29 | Stop reason: SURG

## 2018-01-29 RX ORDER — HALOPERIDOL 5 MG/ML
0.25 INJECTION INTRAMUSCULAR ONCE AS NEEDED
Status: DISCONTINUED | OUTPATIENT
Start: 2018-01-29 | End: 2018-01-29 | Stop reason: HOSPADM

## 2018-01-29 RX ORDER — SODIUM CHLORIDE, SODIUM LACTATE, POTASSIUM CHLORIDE, CALCIUM CHLORIDE 600; 310; 30; 20 MG/100ML; MG/100ML; MG/100ML; MG/100ML
INJECTION, SOLUTION INTRAVENOUS CONTINUOUS
Status: DISCONTINUED | OUTPATIENT
Start: 2018-01-29 | End: 2018-01-30

## 2018-01-29 RX ORDER — MORPHINE SULFATE 4 MG/ML
4 INJECTION, SOLUTION INTRAMUSCULAR; INTRAVENOUS EVERY 10 MIN PRN
Status: DISCONTINUED | OUTPATIENT
Start: 2018-01-29 | End: 2018-01-29 | Stop reason: HOSPADM

## 2018-01-29 RX ORDER — HYDROCODONE BITARTRATE AND ACETAMINOPHEN 5; 325 MG/1; MG/1
2 TABLET ORAL AS NEEDED
Status: DISCONTINUED | OUTPATIENT
Start: 2018-01-29 | End: 2018-01-29 | Stop reason: HOSPADM

## 2018-01-29 RX ORDER — HYDROMORPHONE HYDROCHLORIDE 1 MG/ML
0.4 INJECTION, SOLUTION INTRAMUSCULAR; INTRAVENOUS; SUBCUTANEOUS EVERY 5 MIN PRN
Status: DISCONTINUED | OUTPATIENT
Start: 2018-01-29 | End: 2018-01-29 | Stop reason: HOSPADM

## 2018-01-29 RX ORDER — MORPHINE SULFATE 4 MG/ML
2 INJECTION, SOLUTION INTRAMUSCULAR; INTRAVENOUS EVERY 10 MIN PRN
Status: DISCONTINUED | OUTPATIENT
Start: 2018-01-29 | End: 2018-01-29 | Stop reason: HOSPADM

## 2018-01-29 RX ORDER — SUCRALFATE ORAL 1 G/10ML
1 SUSPENSION ORAL
Status: DISCONTINUED | OUTPATIENT
Start: 2018-01-29 | End: 2018-01-30

## 2018-01-29 RX ORDER — HYDROCODONE BITARTRATE AND ACETAMINOPHEN 5; 325 MG/1; MG/1
1 TABLET ORAL AS NEEDED
Status: DISCONTINUED | OUTPATIENT
Start: 2018-01-29 | End: 2018-01-29 | Stop reason: HOSPADM

## 2018-01-29 RX ORDER — SODIUM CHLORIDE, SODIUM LACTATE, POTASSIUM CHLORIDE, CALCIUM CHLORIDE 600; 310; 30; 20 MG/100ML; MG/100ML; MG/100ML; MG/100ML
INJECTION, SOLUTION INTRAVENOUS CONTINUOUS PRN
Status: DISCONTINUED | OUTPATIENT
Start: 2018-01-29 | End: 2018-01-29 | Stop reason: SURG

## 2018-01-29 RX ADMIN — SODIUM CHLORIDE, SODIUM LACTATE, POTASSIUM CHLORIDE, CALCIUM CHLORIDE: 600; 310; 30; 20 INJECTION, SOLUTION INTRAVENOUS at 14:44:00

## 2018-01-29 RX ADMIN — LIDOCAINE HYDROCHLORIDE 25 MG: 10 INJECTION, SOLUTION EPIDURAL; INFILTRATION; INTRACAUDAL; PERINEURAL at 14:44:00

## 2018-01-29 RX ADMIN — MIDAZOLAM HYDROCHLORIDE 2 MG: 1 INJECTION INTRAMUSCULAR; INTRAVENOUS at 14:52:00

## 2018-01-29 NOTE — OPERATIVE REPORT
EGD PROCEDURE REPORT    DATE OF PROCEDURE:  1/29/2018    PCP: Wilfredo Alexander MD     PREOPERATIVE DIAGNOSIS:  Post prandial vomiting, epigastric abdominal pain     POSTOPERATIVE DIAGNOSIS:  See impression. SURGEON:  [facility]en [facility] je[facility]. No culprit findings on today's exam for such severe pain and vomiting. RECOMMENDATIONS:    · Add Carafate to the PPI. · Increase PPI to every 12 hours, twice daily dosing  · Start low-fat diet  · Followup above jejunal mucosal biopsy results.

## 2018-01-29 NOTE — ANESTHESIA POSTPROCEDURE EVALUATION
Patient: Ben Kendall    Procedure Summary     Date:  01/29/18 Room / Location:  [address] [place] [address] ENDOSCOPY 01 / [address] [place] [address] ENDOSCOPY    Anesthesia Start:  9341 Anesthesia Stop:      Procedure:  ESOPHAGOGASTRODUODENOSCOPY (EGD) (N/A ) Diagnosis:  (anastomotic ulcer)

## 2018-01-29 NOTE — PROGRESS NOTES
Bell De La O / Peggy Espinoza / Chaim Warren / Carrie Vega / Amanda Bee / Oj Liner - [phone]  Answering Service [phone]        Vimal White Patient Status:  Inpatient    1980 MRN [id number]   Pierre Love Rash  Azithromycin            Restless    Comment:tingling  Principal Problem:    Nausea  Active Problems:    Abdominal pain, right upper quadrant    Intractable abdominal pain    Blood pressure 115/67, pulse 100, temperature 98.4 °F (36.9 °C), temperature

## 2018-01-29 NOTE — ANESTHESIA PREPROCEDURE EVALUATION
Anesthesia PreOp Note    HPI:     Raheem Esteves is a 40year old female who presents for preoperative consultation requested by: Román Timmons MD    Date of Surgery: 1/27/2018 - 1/29/2018    Procedure(s):  ESOPHAGOGASTRODUODENOSCOPY (EGD) hypertrophy         Date Noted: 02/23/2016      Morbid obesity with BMI of 40.0-44.9, adult [facility])         Date Noted: 12/09/2015      History of endometriosis         Date Noted: 12/09/2015      Lumbosacral spondylosis without myelopathy         Date Noted GASTRIC BYPASS,OBESE<100CM MARCIO-EN-Y      Comment: DR. Mae Cornea  6/19/2015: GASTROCNEMIUS RECESSION Left      Comment: Procedure: GASTROC RECESSION FOOT;  Surgeon:                Mercedes Thomas MD;  Location: [facility]  No date: [facility] TABLET BY MOUTH TWICE DAILY AS NEEDED FOR ANXIETY Disp: 30 tablet Rfl: 0    Zolpidem Tartrate 5 MG Oral Tab Take 1 tablet (5 mg total) by mouth nightly as needed for Sleep.  Disp: 30 tablet Rfl: 2 Taking   Ondansetron HCl (ZOFRAN) 4 mg tablet Take 1 tablet ondansetron HCl (ZOFRAN) injection 4 mg 4 mg Intravenous Q4H PRN Omi Martínez MD 4 mg at 01/28/18 0854    Normal Saline Flush 0.9 % injection 3 mL 3 mL Intravenous PRN Erlinda Altman MD 3 mL at 01/28/18 1723    dextrose 5 %-0.45 % NaCl infusion  Intr jump out of her skin  Tramadol                Rash  Azithromycin            Restless    Comment:tingling    Family History   Problem Relation Age of Onset   • Other[other] [OTHER] Mother      ALLIE   • Heart Disorder Father    • Heart Disease Father Signs: Body mass index is 33.91 kg/m². height is 1.727 m (5' 8\") and weight is 101.2 kg (223 lb). Her oral temperature is 98.4 °F (36.9 °C). Her blood pressure is 108/75 and her pulse is 81. Her respiration is 10 and oxygen saturation is 98%.     01/28/

## 2018-01-29 NOTE — PROGRESS NOTES
[facility]    Progress Note    Frank Torres Patient Status:  Inpatient    1980 MRN [id number]   Location [facility] 4W/SW/SE Attending Abdirashid Lacy Day # 2 PCP Jere Lin MD     Subjective: codeine, no problems with morphine, try lortab        Prophylaxis  Subcutaneous heparin     CODE STATUS  Full     Primary care physician  Stefano Back MD     Disposition  [facility] tues     26 min spent on pt of which 15 min spent coordinating care with dragan Cain Mcmullen MD  1/29/2018

## 2018-01-30 VITALS
WEIGHT: 223 LBS | HEART RATE: 70 BPM | SYSTOLIC BLOOD PRESSURE: 119 MMHG | BODY MASS INDEX: 33.8 KG/M2 | TEMPERATURE: 98 F | DIASTOLIC BLOOD PRESSURE: 71 MMHG | RESPIRATION RATE: 17 BRPM | HEIGHT: 68 IN | OXYGEN SATURATION: 97 %

## 2018-01-30 LAB
ANION GAP SERPL CALC-SCNC: 7 MMOL/L (ref 0–18)
BASOPHILS # BLD: 0 K/UL (ref 0–0.2)
BASOPHILS NFR BLD: 1 %
BUN SERPL-MCNC: 2 MG/DL (ref 8–20)
BUN/CREAT SERPL: 2.8 (ref 10–20)
CALCIUM SERPL-MCNC: 8.4 MG/DL (ref 8.5–10.5)
CHLORIDE SERPL-SCNC: 104 MMOL/L (ref 95–110)
CO2 SERPL-SCNC: 28 MMOL/L (ref 22–32)
CREAT SERPL-MCNC: 0.72 MG/DL (ref 0.5–1.5)
EOSINOPHIL # BLD: 0.2 K/UL (ref 0–0.7)
EOSINOPHIL NFR BLD: 5 %
ERYTHROCYTE [DISTWIDTH] IN BLOOD BY AUTOMATED COUNT: 13.6 % (ref 11–15)
GLUCOSE SERPL-MCNC: 100 MG/DL (ref 70–99)
HCT VFR BLD AUTO: 39.5 % (ref 35–48)
HGB BLD-MCNC: 13.2 G/DL (ref 12–16)
LYMPHOCYTES # BLD: 1.2 K/UL (ref 1–4)
LYMPHOCYTES NFR BLD: 30 %
MAGNESIUM SERPL-MCNC: 1.8 MG/DL (ref 1.8–2.5)
MCH RBC QN AUTO: 29.9 PG (ref 27–32)
MCHC RBC AUTO-ENTMCNC: 33.5 G/DL (ref 32–37)
MCV RBC AUTO: 89.3 FL (ref 80–100)
MONOCYTES # BLD: 0.5 K/UL (ref 0–1)
MONOCYTES NFR BLD: 12 %
NEUTROPHILS # BLD AUTO: 2.2 K/UL (ref 1.8–7.7)
NEUTROPHILS NFR BLD: 53 %
OSMOLALITY UR CALC.SUM OF ELEC: 284 MOSM/KG (ref 275–295)
PLATELET # BLD AUTO: 199 K/UL (ref 140–400)
PMV BLD AUTO: 9.6 FL (ref 7.4–10.3)
POTASSIUM SERPL-SCNC: 3.3 MMOL/L (ref 3.3–5.1)
POTASSIUM SERPL-SCNC: 3.3 MMOL/L (ref 3.3–5.1)
RBC # BLD AUTO: 4.42 M/UL (ref 3.7–5.4)
SODIUM SERPL-SCNC: 139 MMOL/L (ref 136–144)
WBC # BLD AUTO: 4.2 K/UL (ref 4–11)

## 2018-01-30 PROCEDURE — 99239 HOSP IP/OBS DSCHRG MGMT >30: CPT | Performed by: HOSPITALIST

## 2018-01-30 RX ORDER — ONDANSETRON 4 MG/1
4 TABLET, FILM COATED ORAL EVERY 6 HOURS PRN
Qty: 30 TABLET | Refills: 2 | Status: SHIPPED | OUTPATIENT
Start: 2018-01-30 | End: 2018-02-13 | Stop reason: ALTCHOICE

## 2018-01-30 RX ORDER — SUCRALFATE ORAL 1 G/10ML
1 SUSPENSION ORAL
Qty: 1000 ML | Refills: 0 | Status: SHIPPED | OUTPATIENT
Start: 2018-01-30 | End: 2018-02-20

## 2018-01-30 RX ORDER — OMEPRAZOLE 40 MG/1
40 CAPSULE, DELAYED RELEASE ORAL 2 TIMES DAILY WITH MEALS
Qty: 90 CAPSULE | Refills: 3 | Status: SHIPPED | OUTPATIENT
Start: 2018-01-30 | End: 2018-03-05 | Stop reason: DRUGHIGH

## 2018-01-30 RX ORDER — NALOXONE HYDROCHLORIDE 4 MG/.1ML
4 SPRAY, METERED NASAL AS NEEDED
Qty: 1 EACH | Refills: 0 | Status: SHIPPED | OUTPATIENT
Start: 2018-01-30 | End: 2018-02-13 | Stop reason: ALTCHOICE

## 2018-01-30 RX ORDER — MAGNESIUM SULFATE HEPTAHYDRATE 40 MG/ML
2 INJECTION, SOLUTION INTRAVENOUS ONCE
Status: COMPLETED | OUTPATIENT
Start: 2018-01-30 | End: 2018-01-30

## 2018-01-30 NOTE — BH NUTRITION NOTE
Bariatric Inpatient Nutrition Note      Vimal White is a 40year old female.  Emeses 6 weeks s/p RYGB     Procedure: s/p 6 weeks  Laparoscopic gastric bypass surgery  Surgery Date: 12/18/2017  Medical Diagnosis: Morbid obesity    Height:  Ht Readi 01/30/2018       Phosphorus (mg/dL)   Date Value   01/29/2018 3.7   09/23/2017 2.8   ----------    HGBA1C:    Lab Results  Component Value Date   A1C 5.4 09/23/2017    09/23/2017       Vitamins/Minerals:    Lab Results  Component Value Date   B12 34 (ISOVUE-M) 76 % injection 100 mL 100 mL Intravenous ONCE PRN   [COMPLETED] morphINE sulfate (PF) 2 MG/ML injection 2 mg 2 mg Intravenous Once   [COMPLETED] ondansetron HCl (ZOFRAN) injection 4 mg 4 mg Intravenous Once   [COMPLETED] Pantoprazole Sodium (PRO dietitian for questions and return to clinic 2-3 weeks post-op with food record and noted intolerances     Monitor/Evaluate: Monitor/Evaluate: Anthropometric measurements, Food/fluid intake/choices, Food intolerances, Activity level, Vitamin/mineral supple

## 2018-01-30 NOTE — PROGRESS NOTES
Mayra  / Chris Common  Primo Hameed / Dallin Greene / Digna Dimas / Karina Woodruff / Karen Maicol  [phone]  Answering Service [phone]      Progress Note    Laura Garcia Patient Status:  Inpatient    1980 MRN  Continuous   Normal Saline Flush 0.9 % injection 3 mL 3 mL Intravenous PRN   dextrose 5 %-0.45 % NaCl infusion  Intravenous Continuous   Heparin Sodium (Porcine) 5000 UNIT/ML injection 5,000 Units 5,000 Units Subcutaneous 2 times per day   acetaminophen (T

## 2018-01-30 NOTE — DISCHARGE SUMMARY
More than 30 min spent on dc  Dc summary # [id number]  Hospital Discharge Diagnoses: emesis post gastric bypass    Lace+ Score: 61  59-90 High Risk  29-58 Medium Risk  0-28   Low Risk. TCM Follow-Up Recommendation:  LACE 29-58:  Moderate Risk of readmissio

## 2018-01-31 ENCOUNTER — TELEPHONE (OUTPATIENT)
Dept: FAMILY MEDICINE CLINIC | Facility: CLINIC | Age: 38
End: 2018-01-31

## 2018-01-31 ENCOUNTER — PATIENT OUTREACH (OUTPATIENT)
Dept: CASE MANAGEMENT | Age: 38
End: 2018-01-31

## 2018-01-31 DIAGNOSIS — E86.0 DEHYDRATION: ICD-10-CM

## 2018-01-31 DIAGNOSIS — R10.11 ABDOMINAL PAIN, RIGHT UPPER QUADRANT: ICD-10-CM

## 2018-01-31 DIAGNOSIS — Z98.84 HISTORY OF ROUX-EN-Y GASTRIC BYPASS: ICD-10-CM

## 2018-01-31 DIAGNOSIS — R11.0 NAUSEA: ICD-10-CM

## 2018-01-31 NOTE — TELEPHONE ENCOUNTER
Patient discharged home from [facility] on 1/30/18 and recommended to have a TCM HFU by 2/13/18. NCM attempted to schedule TCM HFU patient states she will call herself to schedule.     Triage: Please notify Dr. Kelsy Velazquez and then try to call the patient

## 2018-01-31 NOTE — PROGRESS NOTES
Initial Post Discharge Follow Up   Discharge Date: 1/30/18  Contact Date: 1/31/2018    Consent Verification:  Assessment Completed With: Patient  HIPAA Verified?   Yes    Discharge Dx:   Nausea, vomiting, RUQ pain    General:   • How have you been since Rfl: 0   Naloxone HCl 4 MG/0.1ML Nasal Liquid 4 mg by Nasal route as needed. Disp: 1 each Rfl: 0   scopolamine 1 MG/3DAYS Transdermal Patch 72 Hr Place 1 patch onto the skin every third day.  Disp: 10 patch Rfl: 1   ESCITALOPRAM 20 MG Oral Tab TAKE 2 TABLET addressed before your next visit with your PCP?  (DME, meds, disease concerns, Etc): No     Follow up appointments:       Your appointments     Date & Time Appointment Department [facility])    Feb 20, 2018  4:00 PM CST Physical - Established Patient with [facility] understanding of these. NCM instructed patient to call PCP with any questions or needs, she states s will. [x]  Discharge Summary, Discharge medications reviewed/discussed/and reconciled with patient, and orders reviewed and discussed.  Any changes or up

## 2018-02-01 ENCOUNTER — TELEPHONE (OUTPATIENT)
Dept: FAMILY MEDICINE CLINIC | Facility: CLINIC | Age: 38
End: 2018-02-01

## 2018-02-01 NOTE — TELEPHONE ENCOUNTER
Orin Timothy called and said she can't take any more time off of work. She said you offered her an appt but she ill be in school at that time. She is out of sick days.

## 2018-02-01 NOTE — DISCHARGE SUMMARY
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 Patient warned about that. CONDITION UPON DISCHARGE:  Stable. CODE STATUS:  FULL CODE. DIET:  Full liquid.     ACTIVITY:  No he

## 2018-02-01 NOTE — TELEPHONE ENCOUNTER
[id number] Zuleima Montalvo noted for the hospital f/u/TCM vist but  Pt does have an appt on 2/20/18 for a physical with David Spotted

## 2018-02-13 ENCOUNTER — OFFICE VISIT (OUTPATIENT)
Dept: FAMILY MEDICINE CLINIC | Facility: CLINIC | Age: 38
End: 2018-02-13

## 2018-02-13 VITALS
DIASTOLIC BLOOD PRESSURE: 66 MMHG | WEIGHT: 216 LBS | BODY MASS INDEX: 32.74 KG/M2 | HEART RATE: 66 BPM | SYSTOLIC BLOOD PRESSURE: 124 MMHG | HEIGHT: 68 IN

## 2018-02-13 DIAGNOSIS — E44.1 MILD PROTEIN-CALORIE MALNUTRITION (HCC): Primary | ICD-10-CM

## 2018-02-13 DIAGNOSIS — G44.209 ACUTE NON INTRACTABLE TENSION-TYPE HEADACHE: ICD-10-CM

## 2018-02-13 DIAGNOSIS — R11.0 NAUSEA: ICD-10-CM

## 2018-02-13 PROCEDURE — 99214 OFFICE O/P EST MOD 30 MIN: CPT | Performed by: FAMILY MEDICINE

## 2018-02-13 NOTE — PROGRESS NOTES
Deadra Barthel is a 40year old female here for Patient presents with:  Headache      HPI:     Headache/Fatigue/Abd pain/Nausea  -had gastric bypass surgery 2 months ago  -since then, still hasn't been able to eat more than 2 bites of anything  -has be [address]  No date: D & C      Comment: x4  No date: DILATION/CURETTAGE,DIAGNOSTIC  12/18/2017: GASTRIC BYPASS,OBESE<100CM MARCIO-EN-Y      Comment: DR. Kevyn Song  6/19/2015: GASTROCNEMIUS RECESSION Left      Comment: Procedure: Jean Carlos Low Mother      ALLIE   • Heart Disorder Father    • Heart Disease Father    • Diabetes Paternal Grandmother    • Heart Disorder Paternal Grandmother    • Heart Disease Paternal Grandmother    • Breast Cancer Maternal Grandmother 76   • Cancer Neg    • Stroke Ne Comment:Verified by skin testing  Reglan [Metoclopram*        Comment:Reglan with benadryl, feels like she is going to             jump out of her skin  Toradol [Ketorolac *    Rash  Tramadol                Rash  Azithromycin            Res

## 2018-02-20 ENCOUNTER — OFFICE VISIT (OUTPATIENT)
Dept: FAMILY MEDICINE CLINIC | Facility: CLINIC | Age: 38
End: 2018-02-20

## 2018-02-20 ENCOUNTER — OFFICE VISIT (OUTPATIENT)
Dept: SURGERY | Facility: CLINIC | Age: 38
End: 2018-02-20

## 2018-02-20 VITALS
HEIGHT: 68.54 IN | HEART RATE: 76 BPM | BODY MASS INDEX: 32.06 KG/M2 | WEIGHT: 214 LBS | DIASTOLIC BLOOD PRESSURE: 62 MMHG | SYSTOLIC BLOOD PRESSURE: 100 MMHG

## 2018-02-20 VITALS
BODY MASS INDEX: 32.46 KG/M2 | HEIGHT: 68 IN | DIASTOLIC BLOOD PRESSURE: 81 MMHG | RESPIRATION RATE: 16 BRPM | SYSTOLIC BLOOD PRESSURE: 120 MMHG | HEART RATE: 102 BPM | WEIGHT: 214.19 LBS

## 2018-02-20 DIAGNOSIS — F50.89 PSYCHOGENIC VOMITING WITH NAUSEA: ICD-10-CM

## 2018-02-20 DIAGNOSIS — F41.1 GAD (GENERALIZED ANXIETY DISORDER): ICD-10-CM

## 2018-02-20 DIAGNOSIS — E46 CALORIC MALNUTRITION (HCC): ICD-10-CM

## 2018-02-20 DIAGNOSIS — Z00.00 LABORATORY EXAMINATION ORDERED AS PART OF A ROUTINE GENERAL MEDICAL EXAMINATION: ICD-10-CM

## 2018-02-20 DIAGNOSIS — E89.0 H/O THYROIDECTOMY: ICD-10-CM

## 2018-02-20 DIAGNOSIS — Z85.850 HISTORY OF THYROID CANCER: ICD-10-CM

## 2018-02-20 DIAGNOSIS — Z98.84 H/O GASTRIC BYPASS: ICD-10-CM

## 2018-02-20 DIAGNOSIS — R68.81 EARLY SATIETY: ICD-10-CM

## 2018-02-20 DIAGNOSIS — Z00.00 WELL FEMALE EXAM WITHOUT GYNECOLOGICAL EXAM: Primary | ICD-10-CM

## 2018-02-20 DIAGNOSIS — G44.221 CHRONIC TENSION-TYPE HEADACHE, INTRACTABLE: ICD-10-CM

## 2018-02-20 PROCEDURE — 99213 OFFICE O/P EST LOW 20 MIN: CPT | Performed by: FAMILY MEDICINE

## 2018-02-20 PROCEDURE — 99395 PREV VISIT EST AGE 18-39: CPT | Performed by: FAMILY MEDICINE

## 2018-02-20 RX ORDER — ONDANSETRON 4 MG/1
1 TABLET, FILM COATED ORAL EVERY 8 HOURS PRN
Refills: 2 | COMMUNITY
Start: 2018-02-10 | End: 2018-03-25

## 2018-02-20 RX ORDER — BUTALBITAL, ACETAMINOPHEN AND CAFFEINE 50; 325; 40 MG/1; MG/1; MG/1
1 TABLET ORAL EVERY 6 HOURS PRN
Qty: 20 TABLET | Refills: 0 | Status: SHIPPED | OUTPATIENT
Start: 2018-02-20 | End: 2018-03-13 | Stop reason: ALTCHOICE

## 2018-02-20 RX ORDER — ALPRAZOLAM 0.25 MG/1
TABLET ORAL
Qty: 45 TABLET | Refills: 2 | Status: SHIPPED | OUTPATIENT
Start: 2018-02-20 | End: 2018-05-01 | Stop reason: DRUGHIGH

## 2018-02-20 NOTE — PROGRESS NOTES
[address], [address]  Dept: [phone]    2/20/2018    BARIATRIC EXISTING PATIENT/FOLLOW UP    HPI:  Cori Awad is a 40year old-year old female w again counseled the patient on different forms of protein the normal amount of food to eat. I encouraged her to keep a food journal so that we can review this at the next visit.   She does have follow-up with the dietitian in 1 month and she will see me at

## 2018-02-20 NOTE — PROGRESS NOTES
HPI:   Crow Lopez is a 40year old female who presents for a complete physical exam.   Symptoms: is S/P ALBINO c unilateral BSO. Still has one ovary.   Abnormal pap never abnormal  Sexually active no issues with her   Last colonoscopy no FH col 136 - 144 mmol/L   Potassium 3.6 3.3 - 5.1 mmol/L   Chloride 106 95 - 110 mmol/L   CO2 24 22 - 32 mmol/L   BUN 5 (L) 8 - 20 mg/dL   Creatinine 0.66 0.50 - 1.50 mg/dL   Calcium, Total 9.2 8.5 - 10.5 mg/dL   BUN/CREA Ratio 7.6 (L) 10.0 - 20.0   Anion Gap 9 0 Ratio 1.4 1.0 - 2.0   Anion Gap 1 0 - 18 mmol/L   BUN/CREA Ratio 4.3 (L) 10.0 - 20.0   Calculated Osmolality 286 275 - 295 mOsm/kg   GFR, Non-African American >60 >=60   GFR, -American >60 >=60   -LIPID PANEL   Result Value Ref Range   HDL Cholester mg/dL   Calcium, Total 8.4 (L) 8.5 - 10.5 mg/dL   BUN/CREA Ratio 2.8 (L) 10.0 - 20.0   Anion Gap 7 0 - 18 mmol/L   Calculated Osmolality 284 275 - 295 mOsm/kg   GFR, Non-African American >60 >=60   GFR, -American >60 >=60   -MAGNESIUM   Result Value entirely in cassette A1.  (aa)  Deandra Champion M.D./[facility] Cesilia Nipple REMOVED]    Interpretation Benign    -RAINBOW DRAW BLUE   Result Value Ref Range   Hold Blue Auto Resulted    -RAINBOW DRAW DARK GREEN   Result Value Ref Range   Hold Lt Green Auto Resulte Neutrophil Absolute 2.6 1.8 - 7.7 K/UL   Lymphocyte Absolute 1.5 1.0 - 4.0 K/UL   Monocyte Absolute 0.5 0.0 - 1.0 K/UL   Eosinophil Absolute 0.2 0.0 - 0.7 K/UL   Basophil Absolute 0.0 0.0 - 0.2 K/UL   -CBC W/ DIFFERENTIAL   Result Value Ref Range   WBC 4 8.5 g Rfl: 0   aspirin 81 MG Oral Tab Take 81 mg by mouth daily. Disp:  Rfl:    Ondansetron HCl (ZOFRAN) 4 mg tablet Take 1 tablet by mouth every 8 (eight) hours as needed.  Disp:  Rfl: 2   Budesonide-Formoterol Fumarate 160-4.5 MCG/ACT Inhalation Aerosol I HYSTEROSCOPY  12/14/2015: INJ PARAVERT F JNT L/S 1 LEV Bilateral      Comment: Procedure: FACET INJECTION UNDER FLUOROSCOPY;                Surgeon: Hien Diamond DO;  Location: [address] [place] [address]  11/23/2016: Moises Parrish tobacco: Never Used                      Alcohol use: No              Occ: Teacher. :  yes. Children: 2. Exercise: none.   Diet: doesn't watch and Difficulty eating secondary to recent gastric bypass     REVIEW OF SYSTEMS:   GENERAL: denies fevers, RRR without murmur normal S1S2  ABD:  normal bowel sounds,soft, non tender, no masses, HSM or tenderness  MUSCULOSKELETAL: gait ritesh,l no gross M/S defect.   EXTREMITIES: no clubbing, cyanosis, or edema  NEURO: oriented times three, cranial nerves are karena Tab; TAKE 1/2 TO 1 TABLET BY MOUTH TWICE DAILY AS NEEDED FOR ANXIETY  Dispense: 45 tablet; Refill: 2    3. Chronic tension-type headache, intractable  Use, risks, benefits and precautions of fioricet discussed.  Including not to drive, operate machinery or

## 2018-02-21 PROBLEM — E46 CALORIC MALNUTRITION (HCC): Status: ACTIVE | Noted: 2018-01-19

## 2018-02-21 PROBLEM — F50.89 PSYCHOGENIC VOMITING WITH NAUSEA: Status: ACTIVE | Noted: 2018-02-21

## 2018-02-21 PROBLEM — Z90.89 H/O THYROIDECTOMY: Status: ACTIVE | Noted: 2018-02-21

## 2018-02-21 PROBLEM — R11.0 NAUSEA: Status: RESOLVED | Noted: 2018-01-19 | Resolved: 2018-02-21

## 2018-02-21 PROBLEM — R68.81 EARLY SATIETY: Status: ACTIVE | Noted: 2018-02-21

## 2018-02-21 PROBLEM — R10.9 INTRACTABLE ABDOMINAL PAIN: Status: RESOLVED | Noted: 2018-01-27 | Resolved: 2018-02-21

## 2018-02-21 PROBLEM — E89.0 H/O THYROIDECTOMY: Status: ACTIVE | Noted: 2018-02-21

## 2018-02-21 PROBLEM — Z85.850 HISTORY OF THYROID CANCER: Status: ACTIVE | Noted: 2018-02-21

## 2018-02-21 PROBLEM — R10.11 ABDOMINAL PAIN, RIGHT UPPER QUADRANT: Status: RESOLVED | Noted: 2018-01-27 | Resolved: 2018-02-21

## 2018-02-21 PROBLEM — Z98.890 H/O THYROIDECTOMY: Status: ACTIVE | Noted: 2018-02-21

## 2018-03-05 ENCOUNTER — OFFICE VISIT (OUTPATIENT)
Dept: SURGERY | Facility: CLINIC | Age: 38
End: 2018-03-05

## 2018-03-05 ENCOUNTER — NURSE ONLY (OUTPATIENT)
Dept: SURGERY | Facility: CLINIC | Age: 38
End: 2018-03-05

## 2018-03-05 ENCOUNTER — APPOINTMENT (OUTPATIENT)
Dept: LAB | Facility: HOSPITAL | Age: 38
End: 2018-03-05
Attending: INTERNAL MEDICINE
Payer: COMMERCIAL

## 2018-03-05 VITALS
SYSTOLIC BLOOD PRESSURE: 133 MMHG | RESPIRATION RATE: 16 BRPM | HEART RATE: 90 BPM | WEIGHT: 210 LBS | OXYGEN SATURATION: 98 % | HEIGHT: 68 IN | DIASTOLIC BLOOD PRESSURE: 81 MMHG | BODY MASS INDEX: 31.83 KG/M2

## 2018-03-05 DIAGNOSIS — E55.9 VITAMIN D DEFICIENCY: ICD-10-CM

## 2018-03-05 DIAGNOSIS — E55.9 VITAMIN D DEFICIENCY: Primary | ICD-10-CM

## 2018-03-05 DIAGNOSIS — R11.2 NAUSEA AND VOMITING, INTRACTABILITY OF VOMITING NOT SPECIFIED, UNSPECIFIED VOMITING TYPE: ICD-10-CM

## 2018-03-05 DIAGNOSIS — E87.8 ELECTROLYTE ABNORMALITY: ICD-10-CM

## 2018-03-05 DIAGNOSIS — R19.7 DIARRHEA, UNSPECIFIED TYPE: ICD-10-CM

## 2018-03-05 DIAGNOSIS — E51.9 THIAMINE DEFICIENCY: ICD-10-CM

## 2018-03-05 DIAGNOSIS — E46 CALORIC MALNUTRITION (HCC): ICD-10-CM

## 2018-03-05 DIAGNOSIS — E46 CALORIC MALNUTRITION (HCC): Primary | ICD-10-CM

## 2018-03-05 LAB — VIT B12 SERPL-MCNC: 685 PG/ML (ref 181–914)

## 2018-03-05 PROCEDURE — 82607 VITAMIN B-12: CPT

## 2018-03-05 PROCEDURE — 96372 THER/PROPH/DIAG INJ SC/IM: CPT | Performed by: INTERNAL MEDICINE

## 2018-03-05 PROCEDURE — 36415 COLL VENOUS BLD VENIPUNCTURE: CPT

## 2018-03-05 PROCEDURE — 99214 OFFICE O/P EST MOD 30 MIN: CPT | Performed by: INTERNAL MEDICINE

## 2018-03-05 PROCEDURE — 84425 ASSAY OF VITAMIN B-1: CPT

## 2018-03-05 PROCEDURE — 82306 VITAMIN D 25 HYDROXY: CPT

## 2018-03-05 RX ORDER — ONDANSETRON 8 MG/1
8 TABLET, ORALLY DISINTEGRATING ORAL EVERY 8 HOURS PRN
Qty: 90 TABLET | Refills: 3 | Status: SHIPPED | OUTPATIENT
Start: 2018-03-05 | End: 2018-04-04

## 2018-03-05 RX ORDER — THIAMINE HYDROCHLORIDE 100 MG/ML
100 INJECTION, SOLUTION INTRAMUSCULAR; INTRAVENOUS ONCE
Status: COMPLETED | OUTPATIENT
Start: 2018-03-05 | End: 2018-03-05

## 2018-03-05 RX ORDER — PANTOPRAZOLE SODIUM 40 MG/1
40 TABLET, DELAYED RELEASE ORAL
Qty: 60 TABLET | Refills: 3 | Status: SHIPPED | OUTPATIENT
Start: 2018-03-05 | End: 2018-06-27

## 2018-03-05 RX ORDER — CYANOCOBALAMIN 1000 UG/ML
1000 INJECTION INTRAMUSCULAR; SUBCUTANEOUS ONCE
Status: COMPLETED | OUTPATIENT
Start: 2018-03-05 | End: 2018-03-05

## 2018-03-05 RX ADMIN — CYANOCOBALAMIN 1000 MCG: 1000 INJECTION INTRAMUSCULAR; SUBCUTANEOUS at 16:05:00

## 2018-03-05 RX ADMIN — THIAMINE HYDROCHLORIDE 100 MG: 100 INJECTION, SOLUTION INTRAMUSCULAR; INTRAVENOUS at 16:09:00

## 2018-03-05 NOTE — PROGRESS NOTES
[address], [address]  Dept: [phone]        Patient:  Bryson Walters  :      1980  MRN:      [id number]    Referring Provider: Mildred Nicholson TWICE DAILY AS NEEDED FOR ANXIETY, Disp: 45 tablet, Rfl: 2  •  UNITHROID 150 MCG Oral Tab, Take one tab Monday, Tuesday, Wednesday, Thursday, Friday, Disp: 30 tablet, Rfl: 2  •  UNITHROID 175 MCG Oral Tab, Take one tab on Saturday and Sunday only, Disp: 30 DILATION/CURETTAGE,DIAGNOSTIC  12/18/2017: GASTRIC BYPASS,OBESE<100CM MARCIO-EN-Y      Comment: DR. Giovani Ventura  6/19/2015: GASTROCNEMIUS RECESSION Left      Comment: Procedure: GASTROC RECESSION FOOT;  Surgeon:                Niesha Abraham MD;  Location: S Heart Disease Father    • Diabetes Paternal Grandmother    • Heart Disorder Paternal Grandmother    • Heart Disease Paternal Grandmother    • Breast Cancer Maternal Grandmother 76   • Cancer Neg    • Stroke Neg        Physical Exam:  Vital signs: Blood pre

## 2018-03-07 LAB — 25(OH)D3 SERPL-MCNC: 39.6 NG/ML

## 2018-03-09 LAB — VITAMIN B1 (THIAMINE), WHOLE B: 193 NMOL/L

## 2018-03-11 ENCOUNTER — HOSPITAL ENCOUNTER (EMERGENCY)
Age: 38
Discharge: HOME | End: 2018-03-12
Attending: EMERGENCY MEDICINE
Payer: COMMERCIAL

## 2018-03-11 ENCOUNTER — APPOINTMENT (OUTPATIENT)
Dept: CT IMAGING | Age: 38
End: 2018-03-11
Attending: EMERGENCY MEDICINE
Payer: COMMERCIAL

## 2018-03-11 VITALS
WEIGHT: 209 LBS | TEMPERATURE: 98 F | SYSTOLIC BLOOD PRESSURE: 114 MMHG | HEIGHT: 68 IN | BODY MASS INDEX: 31.67 KG/M2 | DIASTOLIC BLOOD PRESSURE: 65 MMHG | RESPIRATION RATE: 16 BRPM | OXYGEN SATURATION: 97 % | HEART RATE: 83 BPM

## 2018-03-11 DIAGNOSIS — K56.0 PARALYTIC ILEUS (HCC): Primary | ICD-10-CM

## 2018-03-11 LAB
BASOPHILS # BLD AUTO: 0.04 X10(3) UL (ref 0–0.1)
BASOPHILS NFR BLD AUTO: 0.4 %
BILIRUB UR QL STRIP.AUTO: NEGATIVE
BUN BLD-MCNC: 9 MG/DL (ref 8–20)
CALCIUM BLD-MCNC: 8.2 MG/DL (ref 8.3–10.3)
CHLORIDE: 107 MMOL/L (ref 101–111)
CLARITY UR REFRACT.AUTO: CLEAR
CO2: 26 MMOL/L (ref 22–32)
COLOR UR AUTO: YELLOW
CREAT BLD-MCNC: 0.61 MG/DL (ref 0.55–1.02)
EOSINOPHIL # BLD AUTO: 0.22 X10(3) UL (ref 0–0.3)
EOSINOPHIL NFR BLD AUTO: 2.4 %
ERYTHROCYTE [DISTWIDTH] IN BLOOD BY AUTOMATED COUNT: 13.2 % (ref 11.5–16)
GLUCOSE BLD-MCNC: 91 MG/DL (ref 70–99)
GLUCOSE UR STRIP.AUTO-MCNC: NEGATIVE MG/DL
HCT VFR BLD AUTO: 38.4 % (ref 34–50)
HGB BLD-MCNC: 13.1 G/DL (ref 12–16)
IMMATURE GRANULOCYTE COUNT: 0.03 X10(3) UL (ref 0–1)
IMMATURE GRANULOCYTE RATIO %: 0.3 %
KETONES UR STRIP.AUTO-MCNC: NEGATIVE MG/DL
LEUKOCYTE ESTERASE UR QL STRIP.AUTO: NEGATIVE
LYMPHOCYTES # BLD AUTO: 2.1 X10(3) UL (ref 0.9–4)
LYMPHOCYTES NFR BLD AUTO: 22.5 %
MCH RBC QN AUTO: 30.4 PG (ref 27–33.2)
MCHC RBC AUTO-ENTMCNC: 34.1 G/DL (ref 31–37)
MCV RBC AUTO: 89.1 FL (ref 81–100)
MONOCYTES # BLD AUTO: 0.66 X10(3) UL (ref 0.1–1)
MONOCYTES NFR BLD AUTO: 7.1 %
NEUTROPHIL ABS PRELIM: 6.3 X10 (3) UL (ref 1.3–6.7)
NEUTROPHILS # BLD AUTO: 6.3 X10(3) UL (ref 1.3–6.7)
NEUTROPHILS NFR BLD AUTO: 67.3 %
NITRITE UR QL STRIP.AUTO: NEGATIVE
PH UR STRIP.AUTO: 6 [PH] (ref 4.5–8)
PLATELET # BLD AUTO: 265 10(3)UL (ref 150–450)
POTASSIUM SERPL-SCNC: 3.5 MMOL/L (ref 3.6–5.1)
PROT UR STRIP.AUTO-MCNC: NEGATIVE MG/DL
RBC # BLD AUTO: 4.31 X10(6)UL (ref 3.8–5.1)
RBC UR QL AUTO: NEGATIVE
RED CELL DISTRIBUTION WIDTH-SD: 43.3 FL (ref 35.1–46.3)
SODIUM SERPL-SCNC: 141 MMOL/L (ref 136–144)
SP GR UR STRIP.AUTO: <=1.005 (ref 1–1.03)
UROBILINOGEN UR STRIP.AUTO-MCNC: 0.2 MG/DL
WBC # BLD AUTO: 9.4 X10(3) UL (ref 4–13)

## 2018-03-11 PROCEDURE — 80048 BASIC METABOLIC PNL TOTAL CA: CPT | Performed by: EMERGENCY MEDICINE

## 2018-03-11 PROCEDURE — 96375 TX/PRO/DX INJ NEW DRUG ADDON: CPT

## 2018-03-11 PROCEDURE — 85025 COMPLETE CBC W/AUTO DIFF WBC: CPT

## 2018-03-11 PROCEDURE — 99285 EMERGENCY DEPT VISIT HI MDM: CPT

## 2018-03-11 PROCEDURE — 80048 BASIC METABOLIC PNL TOTAL CA: CPT

## 2018-03-11 PROCEDURE — 99284 EMERGENCY DEPT VISIT MOD MDM: CPT

## 2018-03-11 PROCEDURE — 96374 THER/PROPH/DIAG INJ IV PUSH: CPT

## 2018-03-11 PROCEDURE — 81003 URINALYSIS AUTO W/O SCOPE: CPT | Performed by: EMERGENCY MEDICINE

## 2018-03-11 PROCEDURE — 74177 CT ABD & PELVIS W/CONTRAST: CPT | Performed by: EMERGENCY MEDICINE

## 2018-03-11 PROCEDURE — 81003 URINALYSIS AUTO W/O SCOPE: CPT

## 2018-03-11 PROCEDURE — 85025 COMPLETE CBC W/AUTO DIFF WBC: CPT | Performed by: EMERGENCY MEDICINE

## 2018-03-11 RX ORDER — MORPHINE SULFATE 4 MG/ML
4 INJECTION, SOLUTION INTRAMUSCULAR; INTRAVENOUS EVERY 30 MIN PRN
Status: DISCONTINUED | OUTPATIENT
Start: 2018-03-11 | End: 2018-03-12

## 2018-03-11 RX ORDER — ONDANSETRON 2 MG/ML
4 INJECTION INTRAMUSCULAR; INTRAVENOUS ONCE
Status: COMPLETED | OUTPATIENT
Start: 2018-03-11 | End: 2018-03-11

## 2018-03-11 RX ORDER — MORPHINE SULFATE 2 MG/ML
2 INJECTION, SOLUTION INTRAMUSCULAR; INTRAVENOUS ONCE
Status: COMPLETED | OUTPATIENT
Start: 2018-03-11 | End: 2018-03-11

## 2018-03-12 ENCOUNTER — TELEPHONE (OUTPATIENT)
Dept: SURGERY | Facility: CLINIC | Age: 38
End: 2018-03-12

## 2018-03-12 RX ORDER — HYDROCODONE BITARTRATE AND ACETAMINOPHEN 5; 325 MG/1; MG/1
1-2 TABLET ORAL EVERY 4 HOURS PRN
Qty: 20 TABLET | Refills: 0 | Status: SHIPPED | OUTPATIENT
Start: 2018-03-12 | End: 2018-03-13 | Stop reason: ALTCHOICE

## 2018-03-12 NOTE — ED INITIAL ASSESSMENT (HPI)
Mid lower abdominal pain since Monday, worse with bloating in past few hours. Denies urinary symptoms.  Had gastric bypass surgery in december

## 2018-03-12 NOTE — ED PROVIDER NOTES
Patient Seen in: [facility] [place]    History   Patient presents with:  Abdomen/Flank Pain (GI/)    Stated Complaint: Abd pain    HPI    Patient is a 27-year-old woman with abdominal pain going on for about a week.   She had a ga Comment: Procedure: GASTROC RECESSION FOOT;  Surgeon:                Niesha Abraham MD;  Location: [facility] date: HYSTERECTOMY  2011: HYSTEROSCOPY  12/14/2015: INJ PARAVERT F JNT L/S 1 LEV Bilateral      Comment: Procedure: Constitutional and vital signs reviewed. All other systems reviewed and negative except as noted above.     Physical Exam   ED Triage Vitals [03/11/18 2120]  BP: 133/78  Pulse: 97  Resp: 14  Temp: 98.1 °F (36.7 °C)  Temp src: Oral  SpO2: 96 %  O2 Devic CBC W/ DIFFERENTIAL[[id number]]                              Final result                 Please view results for these tests on the individual orders.    RAINBOW DRAW BLUE   RAINBOW DRAW LAVENDER   RAINBOW DRAW LIGHT GREEN   RAINBOW DRAW GOLD   CBC W/ DIFFE abdominal pain since Monday. Worse bloating in past few hours. History of     gastric bypass in DEC.           CONTRAST USED:  100cc of Omnipaque 350         FINDINGS:    LUNG BASE:  Atelectasis/scarring    LIVER:  Fatty infiltration of the liver along th Clinical Impression:  Paralytic ileus [facility])  (primary encounter diagnosis)    Disposition:  Discharge  3/12/2018 12:20 am    Follow-up:  Billy Rebolledo MD  [address] [place] [address]  [phone]    In 1 day  Return to the ER if you f

## 2018-03-13 ENCOUNTER — TELEPHONE (OUTPATIENT)
Dept: FAMILY MEDICINE CLINIC | Facility: CLINIC | Age: 38
End: 2018-03-13

## 2018-03-13 ENCOUNTER — TELEPHONE (OUTPATIENT)
Dept: SURGERY | Facility: CLINIC | Age: 38
End: 2018-03-13

## 2018-03-13 ENCOUNTER — TELEPHONE (OUTPATIENT)
Dept: GASTROENTEROLOGY | Facility: CLINIC | Age: 38
End: 2018-03-13

## 2018-03-13 NOTE — TELEPHONE ENCOUNTER
Pt called and said she would like to speak directly to [facility]. She said there has been some changes in her health. She said she had a CT Scan and they found something \"funky\" on the CT Scan and they want to send her to gastro.   She would like to to

## 2018-03-13 NOTE — TELEPHONE ENCOUNTER
Pt states she has Enlarged Spleen and inflammation of intestine. Currently has appt to see CB in June but requesting to be seen sooner. Pls call. Thank you.

## 2018-03-13 NOTE — TELEPHONE ENCOUNTER
Spoke to the pt. appt scheduled for tomorrow. Pt states Dr Param López and Luis Angel Butler would like her seen as soon as posible.     Future Appointments  Date Time Provider Abdirashid Kumari   3/14/2018 10:30 AM Orest Habermann, MD [facility] Jeet SCHNEIDER

## 2018-03-14 ENCOUNTER — OFFICE VISIT (OUTPATIENT)
Dept: GASTROENTEROLOGY | Facility: CLINIC | Age: 38
End: 2018-03-14

## 2018-03-14 ENCOUNTER — APPOINTMENT (OUTPATIENT)
Dept: LAB | Facility: HOSPITAL | Age: 38
End: 2018-03-14
Attending: INTERNAL MEDICINE
Payer: COMMERCIAL

## 2018-03-14 VITALS
SYSTOLIC BLOOD PRESSURE: 106 MMHG | WEIGHT: 213 LBS | HEART RATE: 81 BPM | BODY MASS INDEX: 32.28 KG/M2 | HEIGHT: 68 IN | DIASTOLIC BLOOD PRESSURE: 70 MMHG

## 2018-03-14 DIAGNOSIS — R19.7 DIARRHEA, UNSPECIFIED TYPE: ICD-10-CM

## 2018-03-14 DIAGNOSIS — Z98.84 HISTORY OF ROUX-EN-Y GASTRIC BYPASS: Primary | ICD-10-CM

## 2018-03-14 DIAGNOSIS — R11.2 NON-INTRACTABLE VOMITING WITH NAUSEA, UNSPECIFIED VOMITING TYPE: ICD-10-CM

## 2018-03-14 LAB — C DIFF TOX B STL QL: NEGATIVE

## 2018-03-14 PROCEDURE — 87338 HPYLORI STOOL AG IA: CPT

## 2018-03-14 PROCEDURE — 99215 OFFICE O/P EST HI 40 MIN: CPT | Performed by: INTERNAL MEDICINE

## 2018-03-14 PROCEDURE — 87493 C DIFF AMPLIFIED PROBE: CPT

## 2018-03-14 PROCEDURE — 99212 OFFICE O/P EST SF 10 MIN: CPT | Performed by: INTERNAL MEDICINE

## 2018-03-14 NOTE — PROGRESS NOTES
HPI:    Patient ID: Schuyler Guevara is a 40year old woman recovering from recent bariatric gastric bypass surgery December 18, 2017 as below, known to me from recent  inpatient consultation 1/27/2018 regarding severe vomiting and pain.   Reassuring EGD mother and wife. Last saw Dr. Mahesh Cm 3/5/2018. Not currently on weight loss medication. Abdominal pain became severe and she returned to the ER on 3/11/2018.     By my count, Dayami Greenfield has undergone 7 CT scans of her chest, abdomen/pelvis here since [place] normal BMP and renal function, normal LFTs and albumin 4.0; normal CBC with WBC 7600 hemoglobin 14.5 g.     Uneventful admission so far.   Ms. Effie Cohen is feeling much better fasting, on clear liquids only.     CTA of the chest was performed on the initial postprandial nausea, apparently symptoms reproduced by CCK injection. She describes normal intraoperative cholangiogram with free flow of contrast into the duodenum.     From Dr Jaelyn Melton op report: \"History of Present Illness:     The patient is a 40-year appendicitis of uncertain significance.     Previous history of cholecystectomy for similar symptoms November 4082 of uncertain significance.     Recommendations:  · Both recent CT scans and labs reviewed as above.   · Continue PPI medication, clear liquid GE junction: Entirely normal esophagus and GE junction. No reflux changes. Stomach: 6 cm gastric remnant with an end-to-side anastomosis of the gastric pouch against a limb of jejunum. Healthy anastomosis. No stenosis whatsoever.   There is a very thin total) by mouth every 8 (eight) hours as needed for Nausea. Disp: 90 tablet Rfl: 3   Pantoprazole Sodium 40 MG Oral Tab EC Take 1 tablet (40 mg total) by mouth 2 (two) times daily before meals.  Disp: 60 tablet Rfl: 3   ALPRAZolam 0.25 MG Oral Tab TAKE 1/2 and IgA serologies 12/2/2015  Over 10 normal lipase levels on review of labs here. No documented pancreatitis here. Suggest:    1. Trial of rifaximin antibiotic 550 mg twice daily ×14 days.   I explained the rationale and benefits of antibiotic for pos

## 2018-03-15 LAB — HELICOBACTER PYLORI AG, FECAL: NEGATIVE

## 2018-03-16 ENCOUNTER — TELEPHONE (OUTPATIENT)
Dept: GASTROENTEROLOGY | Facility: CLINIC | Age: 38
End: 2018-03-16

## 2018-03-20 ENCOUNTER — TELEPHONE (OUTPATIENT)
Dept: SURGERY | Facility: CLINIC | Age: 38
End: 2018-03-20

## 2018-03-20 NOTE — TELEPHONE ENCOUNTER
Spoke to patient    She states her pain medicine spilled in her work bag, due to the pharmacy not closing it properly. She has pain, nausea. Work up done by several teams. I reassured her.     Advised her to get off her pain medicine and lets obser

## 2018-03-20 NOTE — TELEPHONE ENCOUNTER
OV 03/14/18    4. We discussed colonoscopy examination to evaluate this diffuse left upper quadrant, periumbilical/upper abdominal pain complaint and the diarrhea; nonspecific colonic abnormalities on previous imaging.   This would be to look for fairly un

## 2018-03-20 NOTE — TELEPHONE ENCOUNTER
GI RN's - please advise on orders for pt, she is calling for a CLN. Looks like it was discussed with CB before, but no exact orders were placed. Thank you!

## 2018-03-21 NOTE — TELEPHONE ENCOUNTER
Schedule colonoscopy exam at [facility] ([facility]    BMI 32    This patient IS NOT appropriate for the [facility] endoscopy center.     MAC anesthesia    Suprep small volume bowel prep (sent in) if covered by insurance, otherwis

## 2018-03-23 RX ORDER — BUTALBITAL, ACETAMINOPHEN AND CAFFEINE 50; 325; 40 MG/1; MG/1; MG/1
TABLET ORAL
Qty: 20 TABLET | Refills: 0 | OUTPATIENT
Start: 2018-03-23

## 2018-03-23 NOTE — TELEPHONE ENCOUNTER
Scheduled for: Colonoscopy 38423   Provider Name: Dr. Cathrine Cranker  Date:  3/29/18  Location:  [facility]  Sedation:  MAC  Time:  0800 (pt is aware that [facility] will call the day before to confirm arrival time)   Prep:  Suprep, sent via [address] today  Meds/Allergies Reconc

## 2018-03-25 ENCOUNTER — HOSPITAL ENCOUNTER (EMERGENCY)
Age: 38
Discharge: HOME | End: 2018-03-25
Attending: EMERGENCY MEDICINE
Payer: COMMERCIAL

## 2018-03-25 ENCOUNTER — TELEPHONE (OUTPATIENT)
Dept: GASTROENTEROLOGY | Facility: CLINIC | Age: 38
End: 2018-03-25

## 2018-03-25 VITALS
HEART RATE: 65 BPM | WEIGHT: 202 LBS | TEMPERATURE: 98 F | HEIGHT: 68 IN | RESPIRATION RATE: 18 BRPM | DIASTOLIC BLOOD PRESSURE: 50 MMHG | SYSTOLIC BLOOD PRESSURE: 101 MMHG | BODY MASS INDEX: 30.62 KG/M2 | OXYGEN SATURATION: 100 %

## 2018-03-25 DIAGNOSIS — R11.2 NAUSEA AND VOMITING, INTRACTABILITY OF VOMITING NOT SPECIFIED, UNSPECIFIED VOMITING TYPE: Primary | ICD-10-CM

## 2018-03-25 DIAGNOSIS — R10.13 EPIGASTRIC PAIN: ICD-10-CM

## 2018-03-25 LAB
ALBUMIN SERPL-MCNC: 3.2 G/DL (ref 3.5–4.8)
ALP LIVER SERPL-CCNC: 80 U/L (ref 37–98)
ALT SERPL-CCNC: 17 U/L (ref 14–54)
AST SERPL-CCNC: 12 U/L (ref 15–41)
BASOPHILS # BLD AUTO: 0.05 X10(3) UL (ref 0–0.1)
BASOPHILS NFR BLD AUTO: 0.7 %
BILIRUB SERPL-MCNC: 0.2 MG/DL (ref 0.1–2)
BUN BLD-MCNC: 10 MG/DL (ref 8–20)
CALCIUM BLD-MCNC: 8.2 MG/DL (ref 8.3–10.3)
CHLORIDE: 107 MMOL/L (ref 101–111)
CO2: 26 MMOL/L (ref 22–32)
CREAT BLD-MCNC: 0.66 MG/DL (ref 0.55–1.02)
EOSINOPHIL # BLD AUTO: 0.26 X10(3) UL (ref 0–0.3)
EOSINOPHIL NFR BLD AUTO: 3.7 %
ERYTHROCYTE [DISTWIDTH] IN BLOOD BY AUTOMATED COUNT: 13.6 % (ref 11.5–16)
GLUCOSE BLD-MCNC: 88 MG/DL (ref 70–99)
HCT VFR BLD AUTO: 39 % (ref 34–50)
HGB BLD-MCNC: 13.1 G/DL (ref 12–16)
IMMATURE GRANULOCYTE COUNT: 0.01 X10(3) UL (ref 0–1)
IMMATURE GRANULOCYTE RATIO %: 0.1 %
LIPASE: 417 U/L (ref 73–393)
LYMPHOCYTES # BLD AUTO: 2.16 X10(3) UL (ref 0.9–4)
LYMPHOCYTES NFR BLD AUTO: 31 %
M PROTEIN MFR SERPL ELPH: 6.4 G/DL (ref 6.1–8.3)
MCH RBC QN AUTO: 30.4 PG (ref 27–33.2)
MCHC RBC AUTO-ENTMCNC: 33.6 G/DL (ref 31–37)
MCV RBC AUTO: 90.5 FL (ref 81–100)
MONOCYTES # BLD AUTO: 0.65 X10(3) UL (ref 0.1–1)
MONOCYTES NFR BLD AUTO: 9.3 %
NEUTROPHIL ABS PRELIM: 3.83 X10 (3) UL (ref 1.3–6.7)
NEUTROPHILS # BLD AUTO: 3.83 X10(3) UL (ref 1.3–6.7)
NEUTROPHILS NFR BLD AUTO: 55.2 %
PLATELET # BLD AUTO: 277 10(3)UL (ref 150–450)
POTASSIUM SERPL-SCNC: 3.8 MMOL/L (ref 3.6–5.1)
RBC # BLD AUTO: 4.31 X10(6)UL (ref 3.8–5.1)
RED CELL DISTRIBUTION WIDTH-SD: 45.3 FL (ref 35.1–46.3)
SODIUM SERPL-SCNC: 140 MMOL/L (ref 136–144)
WBC # BLD AUTO: 7 X10(3) UL (ref 4–13)

## 2018-03-25 PROCEDURE — 80053 COMPREHEN METABOLIC PANEL: CPT | Performed by: EMERGENCY MEDICINE

## 2018-03-25 PROCEDURE — 85025 COMPLETE CBC W/AUTO DIFF WBC: CPT | Performed by: EMERGENCY MEDICINE

## 2018-03-25 PROCEDURE — 96375 TX/PRO/DX INJ NEW DRUG ADDON: CPT

## 2018-03-25 PROCEDURE — 96361 HYDRATE IV INFUSION ADD-ON: CPT

## 2018-03-25 PROCEDURE — 96376 TX/PRO/DX INJ SAME DRUG ADON: CPT

## 2018-03-25 PROCEDURE — 99284 EMERGENCY DEPT VISIT MOD MDM: CPT

## 2018-03-25 PROCEDURE — 96374 THER/PROPH/DIAG INJ IV PUSH: CPT

## 2018-03-25 PROCEDURE — 83690 ASSAY OF LIPASE: CPT | Performed by: EMERGENCY MEDICINE

## 2018-03-25 RX ORDER — ONDANSETRON 2 MG/ML
4 INJECTION INTRAMUSCULAR; INTRAVENOUS ONCE
Status: COMPLETED | OUTPATIENT
Start: 2018-03-25 | End: 2018-03-25

## 2018-03-25 RX ORDER — METOCLOPRAMIDE HYDROCHLORIDE 5 MG/ML
10 INJECTION INTRAMUSCULAR; INTRAVENOUS ONCE
Status: COMPLETED | OUTPATIENT
Start: 2018-03-25 | End: 2018-03-25

## 2018-03-25 RX ORDER — DIPHENHYDRAMINE HYDROCHLORIDE 50 MG/ML
25 INJECTION INTRAMUSCULAR; INTRAVENOUS ONCE
Status: COMPLETED | OUTPATIENT
Start: 2018-03-25 | End: 2018-03-25

## 2018-03-25 RX ORDER — MORPHINE SULFATE 4 MG/ML
4 INJECTION, SOLUTION INTRAMUSCULAR; INTRAVENOUS EVERY 30 MIN PRN
Status: DISCONTINUED | OUTPATIENT
Start: 2018-03-25 | End: 2018-03-26

## 2018-03-26 NOTE — ED NOTES
Patient states that she feels like she crawls out of her skin with reglan, but symptoms go away with benadryl.  Patient ok to try benadryl first and wait 10 minutes for reglan to be given

## 2018-03-26 NOTE — ED NOTES
Patient denies feeling like she is crawling out of her skin. States that she does not have any symptoms from the Reglan. Patient resting comfortably on cart watching TV. Awaiting further orders.

## 2018-03-26 NOTE — TELEPHONE ENCOUNTER
Last OV 03/14/18. Pt scheduled for colonoscopy      3/29/2018 8:00 AM STEFAN, PROCEDURE ECWMOGIPROC None   Pt was in ER last night for continued N/V. And epigastric pain. Do you want to add an EGD to the Colonoscopy?  Pt was wondering if you can add

## 2018-03-26 NOTE — ED PROVIDER NOTES
Patient Seen in: [facility] Emergency Department In [place]    History   Patient presents with:  Nausea/Vomiting/Diarrhea (gastrointestinal)    Stated Complaint: VOMITING / ABD PAIN    HPI    Patient is a 40-year-old female who has a history of gastric by ARTHROSCOPY ANKLE WITH DEBRIDEMENT;               Surgeon: Adalberto Ordonez MD;  Location: [facility]  No date:   No date: CHOLECYSTECTOMY  10/22/2013: COLONOSCOPY,DIAGNOSTIC      Comment: Procedure: COLONOSCOPY, POSSIBLE BIOPS Hawk Schulte M.D.  12/19/2015: UPPER GI ENDOSCOPY,BIOPSY N/A      Comment: Procedure: ESOPHAGOGASTRODUODENOSCOPY,                POSSIBLE BIOPSY, POSSIBLE POLYPECTOMY 77873;                 Surgeon: Juan Conway MD;  Location: [facility] within normal limits   LIPASE - Abnormal; Notable for the following:     Lipase 417 (*)     All other components within normal limits   CBC WITH DIFFERENTIAL WITH PLATELET    Narrative:      The following orders were created for panel order CBC W

## 2018-03-26 NOTE — ED INITIAL ASSESSMENT (HPI)
Patient to er with c/o left upper abdominal pain with vomiting. Patient states that she has been vomiting daily or twice a day for a while now, but today has been vomiting more.

## 2018-03-26 NOTE — TELEPHONE ENCOUNTER
Patient of CB, presented to [facility] with n/v abdominal pain. I spoke with Dr. Farzana King who feels patient can go home without admission.  Follow up phone call or visit to CB recommended this week

## 2018-03-27 ENCOUNTER — TELEPHONE (OUTPATIENT)
Dept: SURGERY | Facility: CLINIC | Age: 38
End: 2018-03-27

## 2018-03-27 ENCOUNTER — HOSPITAL ENCOUNTER (EMERGENCY)
Facility: HOSPITAL | Age: 38
Discharge: HOME | End: 2018-03-28
Attending: EMERGENCY MEDICINE
Payer: COMMERCIAL

## 2018-03-27 ENCOUNTER — OFFICE VISIT (OUTPATIENT)
Dept: SURGERY | Facility: CLINIC | Age: 38
End: 2018-03-27

## 2018-03-27 ENCOUNTER — APPOINTMENT (OUTPATIENT)
Dept: GENERAL RADIOLOGY | Facility: HOSPITAL | Age: 38
End: 2018-03-27
Attending: EMERGENCY MEDICINE
Payer: COMMERCIAL

## 2018-03-27 ENCOUNTER — TELEPHONE (OUTPATIENT)
Dept: GASTROENTEROLOGY | Facility: CLINIC | Age: 38
End: 2018-03-27

## 2018-03-27 ENCOUNTER — LAB ENCOUNTER (OUTPATIENT)
Dept: LAB | Facility: HOSPITAL | Age: 38
End: 2018-03-27
Attending: OTOLARYNGOLOGY
Payer: COMMERCIAL

## 2018-03-27 VITALS — HEIGHT: 68 IN | WEIGHT: 205.5 LBS | BODY MASS INDEX: 31.14 KG/M2

## 2018-03-27 DIAGNOSIS — R10.9 CHRONIC ABDOMINAL PAIN: ICD-10-CM

## 2018-03-27 DIAGNOSIS — E78.2 MIXED HYPERLIPIDEMIA: ICD-10-CM

## 2018-03-27 DIAGNOSIS — E07.9 THYROID DYSFUNCTION: Primary | ICD-10-CM

## 2018-03-27 DIAGNOSIS — E66.09 CLASS 1 OBESITY DUE TO EXCESS CALORIES WITH SERIOUS COMORBIDITY AND BODY MASS INDEX (BMI) OF 31.0 TO 31.9 IN ADULT: Primary | ICD-10-CM

## 2018-03-27 DIAGNOSIS — K21.9 GASTROESOPHAGEAL REFLUX DISEASE, ESOPHAGITIS PRESENCE NOT SPECIFIED: ICD-10-CM

## 2018-03-27 DIAGNOSIS — C73 MALIGNANT NEOPLASM OF THYROID GLAND (HCC): ICD-10-CM

## 2018-03-27 DIAGNOSIS — E66.01 MORBID OBESITY (HCC): ICD-10-CM

## 2018-03-27 DIAGNOSIS — G89.29 CHRONIC ABDOMINAL PAIN: ICD-10-CM

## 2018-03-27 DIAGNOSIS — Z00.00 LABORATORY EXAMINATION ORDERED AS PART OF A ROUTINE GENERAL MEDICAL EXAMINATION: ICD-10-CM

## 2018-03-27 DIAGNOSIS — R11.2 NAUSEA AND VOMITING IN ADULT: Primary | ICD-10-CM

## 2018-03-27 LAB
ALBUMIN SERPL BCP-MCNC: 3.4 G/DL (ref 3.5–4.8)
ALBUMIN SERPL BCP-MCNC: 3.9 G/DL (ref 3.5–4.8)
ALBUMIN/GLOB SERPL: 1.4 {RATIO} (ref 1–2)
ALP SERPL-CCNC: 64 U/L (ref 32–100)
ALP SERPL-CCNC: 77 U/L (ref 32–100)
ALT SERPL-CCNC: 14 U/L (ref 14–54)
ALT SERPL-CCNC: 17 U/L (ref 14–54)
ANION GAP SERPL CALC-SCNC: 8 MMOL/L (ref 0–18)
ANION GAP SERPL CALC-SCNC: 8 MMOL/L (ref 0–18)
AST SERPL-CCNC: 19 U/L (ref 15–41)
AST SERPL-CCNC: 26 U/L (ref 15–41)
BASOPHILS # BLD: 0.1 K/UL (ref 0–0.2)
BASOPHILS NFR BLD: 1 %
BILIRUB DIRECT SERPL-MCNC: 0.1 MG/DL (ref 0–0.2)
BILIRUB SERPL-MCNC: 0.3 MG/DL (ref 0.3–1.2)
BILIRUB SERPL-MCNC: 0.3 MG/DL (ref 0.3–1.2)
BUN SERPL-MCNC: 7 MG/DL (ref 8–20)
BUN SERPL-MCNC: 9 MG/DL (ref 8–20)
BUN/CREAT SERPL: 10.6 (ref 10–20)
BUN/CREAT SERPL: 11.4 (ref 10–20)
CALCIUM SERPL-MCNC: 8.5 MG/DL (ref 8.5–10.5)
CALCIUM SERPL-MCNC: 8.9 MG/DL (ref 8.5–10.5)
CHLORIDE SERPL-SCNC: 102 MMOL/L (ref 95–110)
CHLORIDE SERPL-SCNC: 104 MMOL/L (ref 95–110)
CO2 SERPL-SCNC: 26 MMOL/L (ref 22–32)
CO2 SERPL-SCNC: 29 MMOL/L (ref 22–32)
CREAT SERPL-MCNC: 0.66 MG/DL (ref 0.5–1.5)
CREAT SERPL-MCNC: 0.79 MG/DL (ref 0.5–1.5)
EOSINOPHIL # BLD: 0.2 K/UL (ref 0–0.7)
EOSINOPHIL NFR BLD: 3 %
ERYTHROCYTE [DISTWIDTH] IN BLOOD BY AUTOMATED COUNT: 14.4 % (ref 11–15)
FOLATE SERPL-MCNC: 12.7 NG/ML
GLOBULIN PLAS-MCNC: 2.5 G/DL (ref 2.5–3.7)
GLUCOSE SERPL-MCNC: 74 MG/DL (ref 70–99)
GLUCOSE SERPL-MCNC: 75 MG/DL (ref 70–99)
HCT VFR BLD AUTO: 41.7 % (ref 35–48)
HGB BLD-MCNC: 14.2 G/DL (ref 12–16)
LIPASE SERPL-CCNC: 45 U/L (ref 22–51)
LYMPHOCYTES # BLD: 2.6 K/UL (ref 1–4)
LYMPHOCYTES NFR BLD: 30 %
MAGNESIUM SERPL-MCNC: 1.6 MG/DL (ref 1.8–2.5)
MCH RBC QN AUTO: 31.1 PG (ref 27–32)
MCHC RBC AUTO-ENTMCNC: 34.1 G/DL (ref 32–37)
MCV RBC AUTO: 91.3 FL (ref 80–100)
MONOCYTES # BLD: 0.8 K/UL (ref 0–1)
MONOCYTES NFR BLD: 10 %
NEUTROPHILS # BLD AUTO: 5 K/UL (ref 1.8–7.7)
NEUTROPHILS NFR BLD: 58 %
OSMOLALITY UR CALC.SUM OF ELEC: 283 MOSM/KG (ref 275–295)
OSMOLALITY UR CALC.SUM OF ELEC: 285 MOSM/KG (ref 275–295)
PATIENT FASTING: NO
PLATELET # BLD AUTO: 280 K/UL (ref 140–400)
PMV BLD AUTO: 9.8 FL (ref 7.4–10.3)
POTASSIUM SERPL-SCNC: 3.7 MMOL/L (ref 3.3–5.1)
POTASSIUM SERPL-SCNC: 4.1 MMOL/L (ref 3.3–5.1)
PROT SERPL-MCNC: 5.9 G/DL (ref 5.9–8.4)
PROT SERPL-MCNC: 6.8 G/DL (ref 5.9–8.4)
RBC # BLD AUTO: 4.57 M/UL (ref 3.7–5.4)
SODIUM SERPL-SCNC: 138 MMOL/L (ref 136–144)
SODIUM SERPL-SCNC: 139 MMOL/L (ref 136–144)
T3FREE SERPL-MCNC: 3.22 PG/ML (ref 2.53–4.29)
T4 FREE SERPL-MCNC: 1.15 NG/DL (ref 0.58–1.64)
TSH SERPL-ACNC: 0.02 UIU/ML (ref 0.45–5.33)
WBC # BLD AUTO: 8.6 K/UL (ref 4–11)

## 2018-03-27 PROCEDURE — 83735 ASSAY OF MAGNESIUM: CPT

## 2018-03-27 PROCEDURE — 36415 COLL VENOUS BLD VENIPUNCTURE: CPT

## 2018-03-27 PROCEDURE — 97803 MED NUTRITION INDIV SUBSEQ: CPT | Performed by: DIETITIAN, REGISTERED

## 2018-03-27 PROCEDURE — 82306 VITAMIN D 25 HYDROXY: CPT

## 2018-03-27 PROCEDURE — 96374 THER/PROPH/DIAG INJ IV PUSH: CPT

## 2018-03-27 PROCEDURE — 84481 FREE ASSAY (FT-3): CPT

## 2018-03-27 PROCEDURE — 82248 BILIRUBIN DIRECT: CPT

## 2018-03-27 PROCEDURE — 80053 COMPREHEN METABOLIC PANEL: CPT

## 2018-03-27 PROCEDURE — 83690 ASSAY OF LIPASE: CPT | Performed by: EMERGENCY MEDICINE

## 2018-03-27 PROCEDURE — 74021 RADEX ABDOMEN 3+ VIEWS: CPT | Performed by: EMERGENCY MEDICINE

## 2018-03-27 PROCEDURE — 82746 ASSAY OF FOLIC ACID SERUM: CPT

## 2018-03-27 PROCEDURE — 84443 ASSAY THYROID STIM HORMONE: CPT

## 2018-03-27 PROCEDURE — 96375 TX/PRO/DX INJ NEW DRUG ADDON: CPT

## 2018-03-27 PROCEDURE — 99284 EMERGENCY DEPT VISIT MOD MDM: CPT

## 2018-03-27 PROCEDURE — 84439 ASSAY OF FREE THYROXINE: CPT

## 2018-03-27 PROCEDURE — 96361 HYDRATE IV INFUSION ADD-ON: CPT

## 2018-03-27 PROCEDURE — 80050 GENERAL HEALTH PANEL: CPT

## 2018-03-27 RX ORDER — ONDANSETRON 2 MG/ML
4 INJECTION INTRAMUSCULAR; INTRAVENOUS EVERY 6 HOURS PRN
Status: DISCONTINUED | OUTPATIENT
Start: 2018-03-27 | End: 2018-03-28

## 2018-03-27 RX ORDER — DIPHENHYDRAMINE HYDROCHLORIDE 50 MG/ML
50 INJECTION INTRAMUSCULAR; INTRAVENOUS ONCE
Status: COMPLETED | OUTPATIENT
Start: 2018-03-27 | End: 2018-03-27

## 2018-03-27 RX ORDER — ONDANSETRON 2 MG/ML
INJECTION INTRAMUSCULAR; INTRAVENOUS
Status: COMPLETED
Start: 2018-03-27 | End: 2018-03-27

## 2018-03-27 RX ORDER — METOCLOPRAMIDE HYDROCHLORIDE 5 MG/ML
5 INJECTION INTRAMUSCULAR; INTRAVENOUS ONCE
Status: COMPLETED | OUTPATIENT
Start: 2018-03-27 | End: 2018-03-27

## 2018-03-27 NOTE — TELEPHONE ENCOUNTER
I called this patient and L/M to inform her that we will be adding an EGD to her procedure and that the date (3/29/18) will not change, she is also to call the [facility] (gave the number) if they do not contact her by 3pm and ask for the arrival and procedure ti

## 2018-03-27 NOTE — TELEPHONE ENCOUNTER
Please advise Patient to all PCP, if PCP is out of town, another physician should be covering for them. If headache is worsening or intolerable, please advise Ariana Tariq to go to ER.  Thank you

## 2018-03-27 NOTE — TELEPHONE ENCOUNTER
Call was given to me from [facility].      Patient has had a headache since last Thursday, today is the worst. Not a migrane headache but tension. On a scale from 1-10, 10 the worst pain is a 6.    Her migranes are taken care by her PCP Guy Tabares)  but she is out

## 2018-03-27 NOTE — PROGRESS NOTES
[facility]  [address] [place] [address]  Dept: [phone]  Loc: [phone]    03/27/18      Bariatric Follow-up Nutrition Session    Kiara Carolina is a 40year old female. 84 01/28/2018   HDL 37 01/28/2018   LDL 52 01/28/2018   VLDL 19 09/23/2017   TCHDLRATIO 2.89 09/23/2017   NONHDLC 69 01/28/2018        Vitamins/Minerals:    Lab Results  Component Value Date   B12 685 03/05/2018       Lab Results  Component Value Date   VI 2  •  NON FORMULARY, Take 1 tablet by mouth daily.   , Disp: , Rfl:   •  CALCITRIOL 0.25 MCG Oral Cap, TAKE 1 CAPSULE(0.25 MCG) BY MOUTH TWICE DAILY, Disp: 60 capsule, Rfl: 11  •  PROAIR  (90 Base) MCG/ACT Inhalation Aero Soln, INHALE 2 PUFFS BY MOUT Last ED visit 2 days ago, with Reglan and IV felt better. Working with GI doctor to assess possible diagnoses to explain continued nausea and vomiting. Rate of weight loss high for 3 mos post-op at 51.4%, goal to reach 65-80% at the end of the first year.

## 2018-03-28 VITALS
RESPIRATION RATE: 16 BRPM | SYSTOLIC BLOOD PRESSURE: 93 MMHG | HEART RATE: 69 BPM | DIASTOLIC BLOOD PRESSURE: 50 MMHG | OXYGEN SATURATION: 97 % | TEMPERATURE: 98 F

## 2018-03-28 LAB — 25(OH)D3 SERPL-MCNC: 36 NG/ML

## 2018-03-28 NOTE — ED PROVIDER NOTES
Patient Seen in: [facility] Emergency Department    History   Patient presents with:  Abdomen/Flank Pain (GI/)    Stated Complaint: abd pain    HPI    Patient presents emergency department complaining of abdominal pain and intractable vomiting. Dameon Almaraz MD;  Location: [facility]  No date: HYSTERECTOMY  2011: HYSTEROSCOPY  12/14/2015: INJ PARAVERT F JNT L/S 1 LEV Bilateral      Comment: Procedure: FACET INJECTION UNDER FLUOROSCOPY;                Surgeon: Anthony Lord DO; noted above.     Physical Exam   ED Triage Vitals [03/27/18 2100]  BP: 144/80  Pulse: 118  Resp: 20  Temp: 98.3 °F (36.8 °C)  Temp src: Oral  SpO2: 96 %  O2 Device: None (Room air)    Current:/80   Pulse 91   Temp 98.3 °F (36.8 °C) (Oral)   Resp 16 0001  ------------------------------------------------------------       MDM     Pulse Ox: 98%, Normal,     Cardiac Monitor: Pulse Readings from Last 1 Encounters:  03/27/18 : 91  , sinus,      Radiology findings: Xr Abdomen, Obstructive Series (cpt=74021)

## 2018-03-28 NOTE — PROGRESS NOTES
Per Erin Rodrigues PA-C, called and spoke to pt to inform of test results and recommendations. Pt verbalized understanding and stated she is still taking a magnesium supplement but is unsure of the dosage. She will forward information via my chart.    Pt expr

## 2018-03-28 NOTE — ED INITIAL ASSESSMENT (HPI)
Abd pain and vomiting. Recent gastric bypass surgery. Seen at [facility] ED on Sunday for same symptoms.

## 2018-03-28 NOTE — TELEPHONE ENCOUNTER
ON CALL NOTE:    -Patient called complaining of vomiting for 2 hours, she is seeking some specs of blood. Feels dehydrated. -She feels quite miserable, despite taking zofran. I asked her to go to ER for evaluation, fluids and anti-emetics.  May need admiss

## 2018-03-28 NOTE — PROGRESS NOTES
Please inform thyroid levels look great repeat in 1 year, mg level is low, please make sure patient is taking supplementation and please confirm dose she is taking

## 2018-03-28 NOTE — PROGRESS NOTES
CMP normal albumin is improving. Keep trying to get adequate protein/calories. Calcium normal as well as liver and kidney function.   Sent to my chart

## 2018-03-29 ENCOUNTER — LAB REQUISITION (OUTPATIENT)
Dept: LAB | Facility: HOSPITAL | Age: 38
End: 2018-03-29
Payer: COMMERCIAL

## 2018-03-29 ENCOUNTER — HOSPITAL ENCOUNTER (EMERGENCY)
Facility: HOSPITAL | Age: 38
Discharge: HOME | End: 2018-03-29
Attending: EMERGENCY MEDICINE
Payer: COMMERCIAL

## 2018-03-29 ENCOUNTER — TELEPHONE (OUTPATIENT)
Dept: GASTROENTEROLOGY | Facility: CLINIC | Age: 38
End: 2018-03-29

## 2018-03-29 ENCOUNTER — TELEPHONE (OUTPATIENT)
Dept: FAMILY MEDICINE CLINIC | Facility: CLINIC | Age: 38
End: 2018-03-29

## 2018-03-29 ENCOUNTER — APPOINTMENT (OUTPATIENT)
Dept: CT IMAGING | Facility: HOSPITAL | Age: 38
End: 2018-03-29
Attending: EMERGENCY MEDICINE
Payer: COMMERCIAL

## 2018-03-29 VITALS
DIASTOLIC BLOOD PRESSURE: 80 MMHG | RESPIRATION RATE: 18 BRPM | HEART RATE: 56 BPM | SYSTOLIC BLOOD PRESSURE: 118 MMHG | OXYGEN SATURATION: 100 %

## 2018-03-29 DIAGNOSIS — Z01.89 ENCOUNTER FOR OTHER SPECIFIED SPECIAL EXAMINATIONS: ICD-10-CM

## 2018-03-29 DIAGNOSIS — R10.9 ABDOMINAL PAIN, ACUTE: Primary | ICD-10-CM

## 2018-03-29 LAB
ALBUMIN SERPL BCP-MCNC: 3.3 G/DL (ref 3.5–4.8)
ALP SERPL-CCNC: 64 U/L (ref 32–100)
ALT SERPL-CCNC: 17 U/L (ref 14–54)
ANION GAP SERPL CALC-SCNC: 5 MMOL/L (ref 0–18)
AST SERPL-CCNC: 21 U/L (ref 15–41)
BASOPHILS # BLD: 0 K/UL (ref 0–0.2)
BASOPHILS NFR BLD: 1 %
BILIRUB DIRECT SERPL-MCNC: 0.1 MG/DL (ref 0–0.2)
BILIRUB SERPL-MCNC: 0.4 MG/DL (ref 0.3–1.2)
BUN SERPL-MCNC: 3 MG/DL (ref 8–20)
BUN/CREAT SERPL: 4.7 (ref 10–20)
CALCIUM SERPL-MCNC: 8.2 MG/DL (ref 8.5–10.5)
CHLORIDE SERPL-SCNC: 108 MMOL/L (ref 95–110)
CO2 SERPL-SCNC: 27 MMOL/L (ref 22–32)
CREAT SERPL-MCNC: 0.64 MG/DL (ref 0.5–1.5)
EOSINOPHIL # BLD: 0.2 K/UL (ref 0–0.7)
EOSINOPHIL NFR BLD: 4 %
ERYTHROCYTE [DISTWIDTH] IN BLOOD BY AUTOMATED COUNT: 14.4 % (ref 11–15)
GLUCOSE SERPL-MCNC: 92 MG/DL (ref 70–99)
HCT VFR BLD AUTO: 38.1 % (ref 35–48)
HGB BLD-MCNC: 12.7 G/DL (ref 12–16)
LYMPHOCYTES # BLD: 1.9 K/UL (ref 1–4)
LYMPHOCYTES NFR BLD: 41 %
MCH RBC QN AUTO: 30.5 PG (ref 27–32)
MCHC RBC AUTO-ENTMCNC: 33.3 G/DL (ref 32–37)
MCV RBC AUTO: 91.6 FL (ref 80–100)
MONOCYTES # BLD: 0.4 K/UL (ref 0–1)
MONOCYTES NFR BLD: 9 %
NEUTROPHILS # BLD AUTO: 2.1 K/UL (ref 1.8–7.7)
NEUTROPHILS NFR BLD: 45 %
OSMOLALITY UR CALC.SUM OF ELEC: 286 MOSM/KG (ref 275–295)
PLATELET # BLD AUTO: 218 K/UL (ref 140–400)
PMV BLD AUTO: 9.6 FL (ref 7.4–10.3)
POTASSIUM SERPL-SCNC: 4.1 MMOL/L (ref 3.3–5.1)
PROT SERPL-MCNC: 5.5 G/DL (ref 5.9–8.4)
RBC # BLD AUTO: 4.16 M/UL (ref 3.7–5.4)
SODIUM SERPL-SCNC: 140 MMOL/L (ref 136–144)
WBC # BLD AUTO: 4.8 K/UL (ref 4–11)

## 2018-03-29 PROCEDURE — 96376 TX/PRO/DX INJ SAME DRUG ADON: CPT

## 2018-03-29 PROCEDURE — 80048 BASIC METABOLIC PNL TOTAL CA: CPT | Performed by: EMERGENCY MEDICINE

## 2018-03-29 PROCEDURE — 80076 HEPATIC FUNCTION PANEL: CPT

## 2018-03-29 PROCEDURE — 74176 CT ABD & PELVIS W/O CONTRAST: CPT | Performed by: EMERGENCY MEDICINE

## 2018-03-29 PROCEDURE — 85025 COMPLETE CBC W/AUTO DIFF WBC: CPT | Performed by: EMERGENCY MEDICINE

## 2018-03-29 PROCEDURE — 80048 BASIC METABOLIC PNL TOTAL CA: CPT

## 2018-03-29 PROCEDURE — 80076 HEPATIC FUNCTION PANEL: CPT | Performed by: EMERGENCY MEDICINE

## 2018-03-29 PROCEDURE — 88312 SPECIAL STAINS GROUP 1: CPT | Performed by: INTERNAL MEDICINE

## 2018-03-29 PROCEDURE — 99284 EMERGENCY DEPT VISIT MOD MDM: CPT

## 2018-03-29 PROCEDURE — 96374 THER/PROPH/DIAG INJ IV PUSH: CPT

## 2018-03-29 PROCEDURE — 88305 TISSUE EXAM BY PATHOLOGIST: CPT | Performed by: INTERNAL MEDICINE

## 2018-03-29 PROCEDURE — 85025 COMPLETE CBC W/AUTO DIFF WBC: CPT

## 2018-03-29 RX ORDER — MORPHINE SULFATE 4 MG/ML
4 INJECTION, SOLUTION INTRAMUSCULAR; INTRAVENOUS ONCE
Status: COMPLETED | OUTPATIENT
Start: 2018-03-29 | End: 2018-03-29

## 2018-03-29 NOTE — ED NOTES
Pt received via ambulance, pt came from Dr. Cruz Hearing office and had EGD and colonoscopy. Pt is c/o upper abdominal pain which she states is new pain. Pt is awake and alert. Pt reports being here in the ER Tuesday for abdominal pain, nausea, and vomiting.  P

## 2018-03-29 NOTE — ED INITIAL ASSESSMENT (HPI)
Via medics from outpatient GI lab--patient had endoscopy and colonoscopy done today, now with LUQ abdominal and nausea    r/o perf.  MD requesting CT scan

## 2018-03-29 NOTE — PROGRESS NOTES
Per Filemon Parra PA-C, called and spoke to pt's  to inform of recommendations.  Per Dr Hodgson Shape recommendation goal is to have her tsh less than 2.0 but he wants to keep her ft4 greater than 1.0 she is currently at 1.15, we can try 150mcg daily and

## 2018-03-29 NOTE — TELEPHONE ENCOUNTER
Pt called c/o 6-7/10 LUQ abd pain. Situation had gastric bypass done 12/18/18. Then has been having pain so and EGD and c=scope were done today.  Unsure of results of these, but pt states the scope was inserted further than in the past with pain after the p

## 2018-03-29 NOTE — TELEPHONE ENCOUNTER
[facility] note  Late entry    EGD and CLN exams w gastric/jejunal/ileum biopsies performed this morning. Unremarkable exams except for post bariatric surgery gastric remnant/anatomy.     Ms Blanco Nagy awoke from MAC sedation crying in pain, hol

## 2018-03-29 NOTE — ED PROVIDER NOTES
Patient Seen in: [facility] Emergency Department    History   Patient presents with:  Abdominal Pain    Stated Complaint:     HPI    Patient is a 77-year-old female who presents to the emergency department after having an endoscopy and gastroscopy MYLA                ASC  No date: HYSTERECTOMY  2011: HYSTEROSCOPY  12/14/2015: INJ PARAVERT F JNT L/S 1 LEV Bilateral      Comment: Procedure: FACET INJECTION UNDER FLUOROSCOPY;                Surgeon: Danny Escalona DO;  Location: [facility] [place] [place] Triage Vitals [03/29/18 1310]  BP: 124/82  Pulse: 65  Resp: 18  Temp: n/a  Temp src: n/a  SpO2: 99 %  O2 Device: None (Room air)    Current:/80   Pulse 56   Resp 18   SpO2 100%         Physical Exam   Constitutional: She is oriented to person, place, ED Course as of Mar 29 1447  ------------------------------------------------------------  CT scan appears normal without evidence of perforation.   Review of patient's records reveals long history of recurrent abdominal pain and significant amount of

## 2018-03-29 NOTE — ED NOTES
Patient came up to this RN and states she removed her own IV, requesting bandaid. Catheter intact per patient.

## 2018-03-29 NOTE — PROGRESS NOTES
Per Dr Daquan Brown recommendation goal is to have her tsh less than 2.0 but he wants to keep her ft4 greater than 1.0 she is currently at 1.15, we can try 150mcg daily and see how she feels on that dose with repeat tsh ft3 ft4 in 6 weeks to re evaluate what

## 2018-03-30 ENCOUNTER — PATIENT MESSAGE (OUTPATIENT)
Dept: FAMILY MEDICINE CLINIC | Facility: CLINIC | Age: 38
End: 2018-03-30

## 2018-03-30 RX ORDER — ACETAMINOPHEN AND CODEINE PHOSPHATE 300; 30 MG/1; MG/1
1 TABLET ORAL 3 TIMES DAILY PRN
Qty: 20 TABLET | Refills: 0 | OUTPATIENT
Start: 2018-03-30 | End: 2018-04-03

## 2018-03-30 NOTE — TELEPHONE ENCOUNTER
From: Rosanna Soto  To: Abraham Elizabeth MD  Sent: 3/30/2018 11:51 AM CDT  Subject: Prescription Question    dr. Darylene Hews  I had an egd/ colonoscopy yesterday For consistent throwing up since the bypass surgery.  The ended up having to transfer my to the

## 2018-04-03 RX ORDER — ACETAMINOPHEN AND CODEINE PHOSPHATE 300; 30 MG/1; MG/1
1 TABLET ORAL 3 TIMES DAILY PRN
Qty: 20 TABLET | Refills: 0
Start: 2018-04-03

## 2018-04-03 RX ORDER — ZOLPIDEM TARTRATE 5 MG/1
TABLET ORAL
Qty: 30 TABLET | Refills: 0 | Status: SHIPPED | OUTPATIENT
Start: 2018-04-03 | End: 2018-04-27

## 2018-04-03 RX ORDER — ESCITALOPRAM OXALATE 20 MG/1
TABLET ORAL
Qty: 60 TABLET | Refills: 0 | Status: SHIPPED | OUTPATIENT
Start: 2018-04-03 | End: 2018-04-27

## 2018-04-04 RX ORDER — ACETAMINOPHEN AND CODEINE PHOSPHATE 300; 30 MG/1; MG/1
TABLET ORAL
Qty: 20 TABLET | Refills: 0 | OUTPATIENT
Start: 2018-04-04 | End: 2018-04-10 | Stop reason: ALTCHOICE

## 2018-04-10 ENCOUNTER — OFFICE VISIT (OUTPATIENT)
Dept: FAMILY MEDICINE CLINIC | Facility: CLINIC | Age: 38
End: 2018-04-10

## 2018-04-10 VITALS
TEMPERATURE: 99 F | BODY MASS INDEX: 30.26 KG/M2 | OXYGEN SATURATION: 98 % | SYSTOLIC BLOOD PRESSURE: 110 MMHG | DIASTOLIC BLOOD PRESSURE: 68 MMHG | HEART RATE: 96 BPM | HEIGHT: 68.5 IN | WEIGHT: 202 LBS

## 2018-04-10 DIAGNOSIS — R11.15 INTRACTABLE CYCLICAL VOMITING WITH NAUSEA: Primary | ICD-10-CM

## 2018-04-10 DIAGNOSIS — F41.1 ANXIETY IN ACUTE STRESS REACTION: ICD-10-CM

## 2018-04-10 DIAGNOSIS — J06.9 URI WITH COUGH AND CONGESTION: ICD-10-CM

## 2018-04-10 DIAGNOSIS — F41.1 GAD (GENERALIZED ANXIETY DISORDER): ICD-10-CM

## 2018-04-10 DIAGNOSIS — F43.0 ANXIETY IN ACUTE STRESS REACTION: ICD-10-CM

## 2018-04-10 DIAGNOSIS — R10.13 INTERMITTENT EPIGASTRIC ABDOMINAL PAIN: ICD-10-CM

## 2018-04-10 PROCEDURE — 99214 OFFICE O/P EST MOD 30 MIN: CPT | Performed by: FAMILY MEDICINE

## 2018-04-10 RX ORDER — ALPRAZOLAM 0.5 MG/1
0.5 TABLET ORAL 2 TIMES DAILY PRN
Qty: 20 TABLET | Refills: 0 | Status: SHIPPED | OUTPATIENT
Start: 2018-04-10 | End: 2018-04-16

## 2018-04-10 RX ORDER — ACETAMINOPHEN AND CODEINE PHOSPHATE 300; 30 MG/1; MG/1
TABLET ORAL EVERY 6 HOURS PRN
Qty: 15 TABLET | Refills: 0 | Status: SHIPPED | OUTPATIENT
Start: 2018-04-10 | End: 2018-05-01 | Stop reason: ALTCHOICE

## 2018-04-10 RX ORDER — ONDANSETRON 8 MG/1
1 TABLET, ORALLY DISINTEGRATING ORAL 2 TIMES DAILY
Refills: 3 | COMMUNITY
Start: 2018-03-25 | End: 2018-05-01

## 2018-04-10 NOTE — PROGRESS NOTES
Alis Hernadez is a 40year old female. HPI:   Patient is status post gastric bypass and has been having chronic left-sided umbilical pain with episodes of nausea and vomiting multiple times a week since surgery.   Patient has been to the emergency [facility] 20 tablet Rfl: 0   Acetaminophen-Codeine 300-30 MG Oral Tab Take 0.5-1 tablets by mouth every 6 (six) hours as needed for Pain.  Disp: 15 tablet Rfl: 0   ZOLPIDEM TARTRATE 5 MG Oral Tab TAKE 1 TABLET BY MOUTH EVERY DAY AT BEDTIME AS NEEDED FOR SLEEP Disp: 3 SaO2 curtis 89%   • OTHER DISEASES     rectal bleeding   • Pancreatitis    • Pneumonia august 2014   • Pneumonia, organism unspecified(486)    • PONV (postoperative nausea and vomiting)    • Reflux    • Unspecified disorder of thyroid    • Visual impairment (generalized anxiety disorder)  Joshua Iniguez with cough and congestion    No orders of the defined types were placed in this encounter.       Meds & Refills for this Visit:  Signed Prescriptions Disp Refills    ALPRAZolam (XANAX) 0.5 MG Oral Tab 20 tablet 0      Sig

## 2018-04-12 ENCOUNTER — TELEPHONE (OUTPATIENT)
Dept: GASTROENTEROLOGY | Facility: CLINIC | Age: 38
End: 2018-04-12

## 2018-04-12 NOTE — TELEPHONE ENCOUNTER
Candida Goodson MD  P  Gi Clinical Staff             GI RNs - 1.  Please print and mail this letter to patient      Letter mailed out to pt and sent via [address]

## 2018-04-16 ENCOUNTER — OFFICE VISIT (OUTPATIENT)
Dept: SURGERY | Facility: CLINIC | Age: 38
End: 2018-04-16

## 2018-04-16 VITALS
WEIGHT: 202 LBS | HEIGHT: 68 IN | DIASTOLIC BLOOD PRESSURE: 82 MMHG | SYSTOLIC BLOOD PRESSURE: 116 MMHG | BODY MASS INDEX: 30.62 KG/M2

## 2018-04-16 DIAGNOSIS — R11.2 NON-INTRACTABLE VOMITING WITH NAUSEA, UNSPECIFIED VOMITING TYPE: ICD-10-CM

## 2018-04-16 DIAGNOSIS — E46 CALORIC MALNUTRITION (HCC): ICD-10-CM

## 2018-04-16 DIAGNOSIS — K21.9 GASTROESOPHAGEAL REFLUX DISEASE, ESOPHAGITIS PRESENCE NOT SPECIFIED: Primary | ICD-10-CM

## 2018-04-16 DIAGNOSIS — Z98.84 HISTORY OF ROUX-EN-Y GASTRIC BYPASS: ICD-10-CM

## 2018-04-16 PROBLEM — R63.2 BINGE EATING: Status: RESOLVED | Noted: 2017-06-19 | Resolved: 2018-04-16

## 2018-04-16 PROCEDURE — 99214 OFFICE O/P EST MOD 30 MIN: CPT | Performed by: INTERNAL MEDICINE

## 2018-04-16 RX ORDER — ONDANSETRON HYDROCHLORIDE 8 MG/1
8 TABLET, FILM COATED ORAL EVERY 8 HOURS PRN
Qty: 90 TABLET | Refills: 2 | Status: SHIPPED | OUTPATIENT
Start: 2018-04-16 | End: 2018-09-24

## 2018-04-16 RX ORDER — ALPRAZOLAM 0.5 MG/1
0.5 TABLET ORAL 2 TIMES DAILY PRN
Qty: 40 TABLET | Refills: 0 | Status: SHIPPED | OUTPATIENT
Start: 2018-04-16 | End: 2018-05-01

## 2018-04-16 NOTE — TELEPHONE ENCOUNTER
Xanax helping for nausea, vomiting and abdominal pain. Refill sent to pharmacy [facility].

## 2018-04-16 NOTE — PROGRESS NOTES
[address], [address]  Dept: [phone]        Patient:  Cara Travis  :      1980  MRN:      [id number]    Referring Provider: Sri Hong tablet, Rfl: 0  •  Pantoprazole Sodium 40 MG Oral Tab EC, Take 1 tablet (40 mg total) by mouth 2 (two) times daily before meals. , Disp: 60 tablet, Rfl: 3  •  ALPRAZolam 0.25 MG Oral Tab, TAKE 1/2 TO 1 TABLET BY MOUTH TWICE DAILY AS NEEDED FOR ANXIETY, Disp GASTRIC BYPASS,OBESE<100CM MARCIO-EN-Y      Comment: DR. Darnell Ruffin  6/19/2015: GASTROCNEMIUS RECESSION Left      Comment: Procedure: GASTROC RECESSION FOOT;  Surgeon:                Kristen Kim MD;  Location: [facility] Resources date: [facility] Grandmother    • Heart Disorder Paternal Grandmother    • Heart Disease Paternal Grandmother    • Breast Cancer Maternal Grandmother 76   • Cancer Neg    • Stroke Neg        Physical Exam:  Vital signs: Blood pressure 116/82, height 5' 8\" (1.727 m), weigh

## 2018-04-17 ENCOUNTER — LAB ENCOUNTER (OUTPATIENT)
Dept: LAB | Facility: HOSPITAL | Age: 38
End: 2018-04-17
Attending: FAMILY MEDICINE
Payer: COMMERCIAL

## 2018-04-17 ENCOUNTER — OFFICE VISIT (OUTPATIENT)
Dept: SURGERY | Facility: CLINIC | Age: 38
End: 2018-04-17

## 2018-04-17 VITALS
RESPIRATION RATE: 16 BRPM | WEIGHT: 200.31 LBS | BODY MASS INDEX: 30.36 KG/M2 | HEIGHT: 68 IN | SYSTOLIC BLOOD PRESSURE: 114 MMHG | HEART RATE: 76 BPM | DIASTOLIC BLOOD PRESSURE: 71 MMHG

## 2018-04-17 DIAGNOSIS — Z00.00 LABORATORY EXAMINATION ORDERED AS PART OF A ROUTINE GENERAL MEDICAL EXAMINATION: ICD-10-CM

## 2018-04-17 PROCEDURE — 80061 LIPID PANEL: CPT

## 2018-04-17 PROCEDURE — 36415 COLL VENOUS BLD VENIPUNCTURE: CPT

## 2018-04-17 RX ORDER — VALACYCLOVIR HYDROCHLORIDE 1 G/1
TABLET, FILM COATED ORAL
Refills: 0 | COMMUNITY
Start: 2018-04-12 | End: 2018-05-15

## 2018-04-17 NOTE — PROGRESS NOTES
[address], [place] [address] Estelle Red  [address]  Dept: [phone]    4/17/2018    BARIATRIC EXISTING PATIENT/FOLLOW UP    HPI:  Xuan Pastor is a 40year old-year old female w believe there is a significant anxiety component to many of her symptoms. She has a well-documented history of visiting the emergency room for various complaints she has had multiple CAT scans of many parts of her body over the last couple of years.   When

## 2018-04-22 RX ORDER — BUTALBITAL, ACETAMINOPHEN AND CAFFEINE 50; 325; 40 MG/1; MG/1; MG/1
TABLET ORAL
Qty: 20 TABLET | Refills: 0 | Status: CANCELLED
Start: 2018-04-22

## 2018-04-24 RX ORDER — BUTALBITAL, ACETAMINOPHEN AND CAFFEINE 50; 325; 40 MG/1; MG/1; MG/1
TABLET ORAL
Qty: 20 TABLET | Refills: 0 | Status: SHIPPED | OUTPATIENT
Start: 2018-04-24 | End: 2018-05-01

## 2018-04-24 NOTE — TELEPHONE ENCOUNTER
She is not to take this more than 2-3 times per week total of #3 per week   Tell her that and ok to refill   A [facility]   Patient Medication Renewal Request Pool Yesterday (2:15 PM)         Jesus Harris would like a refill of the following medicati

## 2018-04-28 ENCOUNTER — HOSPITAL ENCOUNTER (EMERGENCY)
Facility: HOSPITAL | Age: 38
Discharge: HOME | End: 2018-04-28
Attending: EMERGENCY MEDICINE
Payer: COMMERCIAL

## 2018-04-28 ENCOUNTER — TELEPHONE (OUTPATIENT)
Dept: FAMILY MEDICINE CLINIC | Facility: CLINIC | Age: 38
End: 2018-04-28

## 2018-04-28 VITALS
BODY MASS INDEX: 30.31 KG/M2 | DIASTOLIC BLOOD PRESSURE: 62 MMHG | TEMPERATURE: 98 F | HEIGHT: 68 IN | WEIGHT: 200 LBS | SYSTOLIC BLOOD PRESSURE: 110 MMHG | RESPIRATION RATE: 17 BRPM | HEART RATE: 78 BPM | OXYGEN SATURATION: 99 %

## 2018-04-28 DIAGNOSIS — B02.9 HERPES ZOSTER WITHOUT COMPLICATION: Primary | ICD-10-CM

## 2018-04-28 PROCEDURE — 99283 EMERGENCY DEPT VISIT LOW MDM: CPT

## 2018-04-28 RX ORDER — PREGABALIN 75 MG/1
75 CAPSULE ORAL 2 TIMES DAILY
Qty: 28 CAPSULE | Refills: 0 | Status: SHIPPED | OUTPATIENT
Start: 2018-04-28 | End: 2018-05-15

## 2018-04-28 RX ORDER — ACETAMINOPHEN AND CODEINE PHOSPHATE 300; 30 MG/1; MG/1
2 TABLET ORAL ONCE
Status: COMPLETED | OUTPATIENT
Start: 2018-04-28 | End: 2018-04-28

## 2018-04-28 RX ORDER — ACETAMINOPHEN AND CODEINE PHOSPHATE 300; 30 MG/1; MG/1
1-2 TABLET ORAL EVERY 6 HOURS PRN
Qty: 15 TABLET | Refills: 0 | Status: SHIPPED | OUTPATIENT
Start: 2018-04-28 | End: 2018-05-01 | Stop reason: ALTCHOICE

## 2018-04-28 NOTE — ED INITIAL ASSESSMENT (HPI)
Pt to er with anshul collinsual changes pt states she has shingles, pt on lyrica and started having symptoms after taking medication.

## 2018-04-28 NOTE — ED PROVIDER NOTES
Patient Seen in: [facility] Emergency Department    History   Patient presents with:   Eye Visual Problem (opthalmic)    Stated Complaint: double vision     HPI    Patient is a 70-year-old female who presents for evaluation of binocular diplopia, ons Comment: Procedure: ARTHROSCOPY ANKLE WITH DEBRIDEMENT;               Surgeon: Gina Junior MD;  Location: [facility]  No date:   No date: CHOLECYSTECTOMY  10/22/2013: COLONOSCOPY,DIAGNOSTIC      Comment: Procedure: Auther Claude PERFORMED      Comment: Deshawn Vieyra M.D.  12/19/2015: UPPER GI ENDOSCOPY,BIOPSY N/A      Comment: Procedure: ESOPHAGOGASTRODUODENOSCOPY,                POSSIBLE BIOPSY, POSSIBLE POLYPECTOMY 69388;                 Surgeon: Dawit Pereira MD;  L suggestive of superinfection. Psychiatric: She has a normal mood and affect. Nursing note and vitals reviewed.          ED Course   Labs Reviewed - No data to display    ED Course as of Apr 28 0157  ------------------------------------------------------ capsule Refills: 0    !! Acetaminophen-Codeine #3 300-30 MG Oral Tab  Take 1-2 tablets by mouth every 6 (six) hours as needed for Pain. Qty: 15 tablet Refills: 0    !! - Potential duplicate medications found. Please discuss with provider.

## 2018-04-28 NOTE — TELEPHONE ENCOUNTER
Called by patient on 4/27 at 11pm  -diagnosed with shingles  -started on lyrica for neuropathic pain  -at 75mg bid had visual changes which resolved  -increased to 150mg bid for increasing pain  -since then, notes increased vision changes, especially after

## 2018-04-30 RX ORDER — ZOLPIDEM TARTRATE 5 MG/1
TABLET ORAL
Qty: 30 TABLET | Refills: 0 | Status: SHIPPED | OUTPATIENT
Start: 2018-04-30 | End: 2018-05-28

## 2018-04-30 RX ORDER — ESCITALOPRAM OXALATE 20 MG/1
TABLET ORAL
Qty: 180 TABLET | Refills: 0 | Status: SHIPPED | OUTPATIENT
Start: 2018-04-30 | End: 2018-07-17

## 2018-05-01 ENCOUNTER — TELEPHONE (OUTPATIENT)
Dept: SURGERY | Facility: CLINIC | Age: 38
End: 2018-05-01

## 2018-05-01 ENCOUNTER — OFFICE VISIT (OUTPATIENT)
Dept: FAMILY MEDICINE CLINIC | Facility: CLINIC | Age: 38
End: 2018-05-01

## 2018-05-01 VITALS
WEIGHT: 205 LBS | DIASTOLIC BLOOD PRESSURE: 70 MMHG | TEMPERATURE: 99 F | BODY MASS INDEX: 31.07 KG/M2 | HEIGHT: 68 IN | SYSTOLIC BLOOD PRESSURE: 92 MMHG

## 2018-05-01 DIAGNOSIS — F41.1 ANXIETY IN ACUTE STRESS REACTION: ICD-10-CM

## 2018-05-01 DIAGNOSIS — R21 FACIAL RASH: ICD-10-CM

## 2018-05-01 DIAGNOSIS — F41.1 GAD (GENERALIZED ANXIETY DISORDER): ICD-10-CM

## 2018-05-01 DIAGNOSIS — F43.0 ANXIETY IN ACUTE STRESS REACTION: ICD-10-CM

## 2018-05-01 DIAGNOSIS — M79.2 NEUROPATHIC PAIN: Primary | ICD-10-CM

## 2018-05-01 DIAGNOSIS — G44.221 CHRONIC TENSION-TYPE HEADACHE, INTRACTABLE: ICD-10-CM

## 2018-05-01 DIAGNOSIS — Z79.899 MEDICATION MANAGEMENT: ICD-10-CM

## 2018-05-01 PROCEDURE — 99215 OFFICE O/P EST HI 40 MIN: CPT | Performed by: FAMILY MEDICINE

## 2018-05-01 RX ORDER — BUTALBITAL, ACETAMINOPHEN AND CAFFEINE 50; 325; 40 MG/1; MG/1; MG/1
TABLET ORAL
Qty: 20 TABLET | Refills: 0 | Status: SHIPPED | OUTPATIENT
Start: 2018-05-01 | End: 2018-06-06 | Stop reason: ALTCHOICE

## 2018-05-01 RX ORDER — ALPRAZOLAM 0.5 MG/1
0.5 TABLET ORAL 2 TIMES DAILY PRN
Qty: 40 TABLET | Refills: 0 | Status: SHIPPED | OUTPATIENT
Start: 2018-05-01 | End: 2018-05-21

## 2018-05-01 NOTE — TELEPHONE ENCOUNTER
Spoke to patient    On lyrica    States shes gaining weight with Lyrica    Upset with weight gain. I reassured her again. Patient understands she can not continue to lose weight every day. I explained there will be cycles when losing weight.     She un

## 2018-05-01 NOTE — PROGRESS NOTES
Greg Rosales is a 40year old female. HPI:   Patient is in for evaluation of what she has been diagnosed with as shingles. Patient states that she started experiencing a rash with blistering on the right side of her lip on 4/12/18.   Due to the benjie Butalbital-APAP-Caffeine -40 MG Oral Tab TAKE 1  TABLET BY MOUTH up to 5 TIMES PER WEEK FOR TOTAL OF 6 TABLETS PER WEEK.  Disp: 20 tablet Rfl: 0   ALPRAZolam (XANAX) 0.5 MG Oral Tab Take 1 tablet (0.5 mg total) by mouth 2 (two) times daily as needed unspecified     2014 was in ER and was hospitalized over night   • Hx of gastric bypass 12/18/2017   • Hypothyroid    • Hypothyroidism    • Infertility, female    • Organic hypersomnia, unspecified DMG DX 11-2-12    AHI 2 RDI 2 REM AHI 6 SaO2 curtis 89%   • management    No orders of the defined types were placed in this encounter.       Meds & Refills for this Visit:  Signed Prescriptions Disp Refills    Lidocaine Viscous 2 % Mouth/Throat Solution 200 mL 1      Sig: Take 10 mL by mouth every 3 (three) hours a neuropathic pain will presume shingles/neuropathy and try lidocaine.  - Lidocaine Viscous 2 % Mouth/Throat Solution; Take 10 mL by mouth every 3 (three) hours as needed for Pain.   Dispense: 200 mL; Refill: 1  - Diclofenac Sodium (VOLTAREN) 1 % Transdermal

## 2018-05-02 PROBLEM — R11.2 NAUSEA & VOMITING: Status: RESOLVED | Noted: 2018-01-19 | Resolved: 2018-05-02

## 2018-05-02 PROBLEM — E87.8 ELECTROLYTE ABNORMALITY: Status: RESOLVED | Noted: 2018-03-05 | Resolved: 2018-05-02

## 2018-05-02 PROBLEM — F41.1 ANXIETY IN ACUTE STRESS REACTION: Status: RESOLVED | Noted: 2017-11-08 | Resolved: 2018-05-02

## 2018-05-02 PROBLEM — F43.0 ANXIETY IN ACUTE STRESS REACTION: Status: RESOLVED | Noted: 2017-11-08 | Resolved: 2018-05-02

## 2018-05-02 PROBLEM — E86.0 DEHYDRATION: Status: RESOLVED | Noted: 2018-01-19 | Resolved: 2018-05-02

## 2018-05-02 PROBLEM — M21.861 GASTROCNEMIUS EQUINUS, RIGHT: Status: RESOLVED | Noted: 2017-05-17 | Resolved: 2018-05-02

## 2018-05-02 PROBLEM — M62.461 GASTROCNEMIUS EQUINUS, RIGHT: Status: RESOLVED | Noted: 2017-05-17 | Resolved: 2018-05-02

## 2018-05-02 PROBLEM — E46 CALORIC MALNUTRITION (HCC): Status: RESOLVED | Noted: 2018-01-19 | Resolved: 2018-05-02

## 2018-05-02 NOTE — PROGRESS NOTES
Per pharmacist at [facility], they will check into the patient's insurance coverage for Acyclovir and if covered will typically prescribe that separate from the compounded Gabapentin and Lidocaine compounded topical therapy.   If the patient does not have coverage

## 2018-05-03 ENCOUNTER — TELEPHONE (OUTPATIENT)
Dept: FAMILY MEDICINE CLINIC | Facility: CLINIC | Age: 38
End: 2018-05-03

## 2018-05-03 NOTE — TELEPHONE ENCOUNTER
Per Elkin Mcneal, a Pharmacist at [facility], [facility] is not contracted with them. She stated that she could transfer the prescription to another compunding pharmacy, but [employer] cannot even run a claim to check into pricing since they're not contracted.   They d

## 2018-05-03 NOTE — TELEPHONE ENCOUNTER
Suresh Villagomez at [facility] was notified that Antionette Lopes would like the prescription to be forwarded to another compounding company.

## 2018-05-12 ENCOUNTER — HOSPITAL ENCOUNTER (EMERGENCY)
Facility: HOSPITAL | Age: 38
Discharge: HOME | End: 2018-05-12
Attending: EMERGENCY MEDICINE
Payer: COMMERCIAL

## 2018-05-12 ENCOUNTER — APPOINTMENT (OUTPATIENT)
Dept: GENERAL RADIOLOGY | Facility: HOSPITAL | Age: 38
End: 2018-05-12
Attending: EMERGENCY MEDICINE
Payer: COMMERCIAL

## 2018-05-12 VITALS
SYSTOLIC BLOOD PRESSURE: 127 MMHG | BODY MASS INDEX: 29.86 KG/M2 | OXYGEN SATURATION: 99 % | RESPIRATION RATE: 19 BRPM | HEART RATE: 89 BPM | DIASTOLIC BLOOD PRESSURE: 82 MMHG | HEIGHT: 68 IN | TEMPERATURE: 99 F | WEIGHT: 197 LBS

## 2018-05-12 DIAGNOSIS — M25.511 ACUTE PAIN OF RIGHT SHOULDER: ICD-10-CM

## 2018-05-12 DIAGNOSIS — I73.00 RAYNAUD'S PHENOMENON WITHOUT GANGRENE: Primary | ICD-10-CM

## 2018-05-12 PROCEDURE — 36415 COLL VENOUS BLD VENIPUNCTURE: CPT

## 2018-05-12 PROCEDURE — 82550 ASSAY OF CK (CPK): CPT | Performed by: EMERGENCY MEDICINE

## 2018-05-12 PROCEDURE — 99284 EMERGENCY DEPT VISIT MOD MDM: CPT

## 2018-05-12 PROCEDURE — 80048 BASIC METABOLIC PNL TOTAL CA: CPT | Performed by: EMERGENCY MEDICINE

## 2018-05-12 PROCEDURE — 73030 X-RAY EXAM OF SHOULDER: CPT | Performed by: EMERGENCY MEDICINE

## 2018-05-12 RX ORDER — LIDOCAINE 50 MG/G
1 PATCH TOPICAL ONCE
Status: DISCONTINUED | OUTPATIENT
Start: 2018-05-12 | End: 2018-05-12

## 2018-05-12 NOTE — ED NOTES
Patient reports right 5th finger \"swelling sensation\" since 3 pm today that progressed to shooting pain up the right arm to the right shoulder. The patient denies chest pain, shortness of breath or palpitations.

## 2018-05-12 NOTE — ED PROVIDER NOTES
Patient Seen in: [facility] Emergency Department    History   Patient presents with:  Pain (neurologic)    Stated Complaint: right 5th finger numbness and discoloration     HPI    41 yo F with PMH thyroid CA s/p thyroidectomy, anxiety presenting for DILATION/CURETTAGE,DIAGNOSTIC  12/18/2017: GASTRIC BYPASS,OBESE<100CM MARCIO-EN-Y      Comment: DR. Kristen Molina  6/19/2015: GASTROCNEMIUS RECESSION Left      Comment: Procedure: GASTROC RECESSION FOOT;  Surgeon:                Rafaela Woodruff MD;  Location: S Sodium (VOLTAREN) 1 % Transdermal Gel,  Apply 2 g topically 4 (four) times daily. Butalbital-APAP-Caffeine -40 MG Oral Tab,  TAKE 1  TABLET BY MOUTH up to 5 TIMES PER WEEK FOR TOTAL OF 6 TABLETS PER WEEK.    ALPRAZolam (XANAX) 0.5 MG Oral Tab,  Take Never Used                      Alcohol use: No                Review of Systems :  Constitutional: As per HPI  Musculoskeletal: Negative for joint swelling and arthralgias. (+) right fifth finger pallor. Skin: Negative for rash.   No itching      Positive (CPT=73030)  COMPARISON: None. INDICATIONS: Generalized right shoulder pain today. No trauma. TECHNIQUE:  3 views were obtained. FINDINGS:  BONES: Normal.  No significant arthropathy, fracture, or acute abnormality. SOFT TISSUES: Negative.   No visible ONEAL  [address] [facility], [address] [facility]  [phone]    Call  For followup and re-evaluation.       Medications Prescribed:  Discharge Medication List as of 5/12/2018  6:12 PM    START taking these medications    acetaminophen-codeine 120-12 MG/

## 2018-05-15 ENCOUNTER — OFFICE VISIT (OUTPATIENT)
Dept: FAMILY MEDICINE CLINIC | Facility: CLINIC | Age: 38
End: 2018-05-15

## 2018-05-15 VITALS
HEIGHT: 68.15 IN | TEMPERATURE: 99 F | BODY MASS INDEX: 29.55 KG/M2 | WEIGHT: 195 LBS | DIASTOLIC BLOOD PRESSURE: 60 MMHG | SYSTOLIC BLOOD PRESSURE: 92 MMHG | HEART RATE: 96 BPM

## 2018-05-15 DIAGNOSIS — S46.811A STRAIN OF RIGHT TRAPEZIUS MUSCLE, INITIAL ENCOUNTER: ICD-10-CM

## 2018-05-15 DIAGNOSIS — M54.12 CERVICAL RADICULOPATHY AT C7: Primary | ICD-10-CM

## 2018-05-15 PROCEDURE — 99213 OFFICE O/P EST LOW 20 MIN: CPT | Performed by: FAMILY MEDICINE

## 2018-05-15 RX ORDER — ACETAMINOPHEN AND CODEINE PHOSPHATE 300; 30 MG/1; MG/1
TABLET ORAL
Refills: 0 | COMMUNITY
Start: 2018-04-28 | End: 2018-05-15

## 2018-05-15 NOTE — PROGRESS NOTES
Rosanna Soto is a 40year old female. HPI:   Patient was seen in the emergency room for right neck, shoulder pain along with fifth finger turning blue.   Patient states that the pain was more located in the shoulder than in the fingers but because th tablet Rfl: 3   UNITHROID 150 MCG Oral Tab Take one tab Monday, Tuesday, Wednesday, Thursday, Friday (Patient taking differently: Take 150 mcg by mouth before breakfast.  ) Disp: 30 tablet Rfl: 2   NON FORMULARY Take 1 tablet by mouth daily.    Disp:  Rfl: headaches  Musculoskeletal: see above  EXAM:   BP 92/60 (BP Location: Left arm, Patient Position: Sitting, Cuff Size: adult)   Pulse 96   Temp 98.5 °F (36.9 °C) (Oral)   Ht 68.15\"   Wt 195 lb   BMI 29.52 kg/m²   GENERAL: well developed, well nourished,in

## 2018-05-21 DIAGNOSIS — F41.1 GAD (GENERALIZED ANXIETY DISORDER): ICD-10-CM

## 2018-05-22 DIAGNOSIS — F41.1 GAD (GENERALIZED ANXIETY DISORDER): ICD-10-CM

## 2018-05-22 RX ORDER — ALPRAZOLAM 0.5 MG/1
TABLET ORAL
Qty: 40 TABLET | Refills: 0 | Status: SHIPPED | OUTPATIENT
Start: 2018-05-22 | End: 2018-06-15 | Stop reason: DRUGHIGH

## 2018-05-22 RX ORDER — ALPRAZOLAM 0.5 MG/1
0.5 TABLET ORAL 2 TIMES DAILY PRN
Qty: 40 TABLET | Refills: 0
Start: 2018-05-22

## 2018-05-23 NOTE — TELEPHONE ENCOUNTER
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

## 2018-05-29 RX ORDER — ZOLPIDEM TARTRATE 5 MG/1
TABLET ORAL
Qty: 30 TABLET | Refills: 0 | Status: SHIPPED | OUTPATIENT
Start: 2018-05-29 | End: 2018-06-24

## 2018-06-05 ENCOUNTER — OFFICE VISIT (OUTPATIENT)
Dept: SURGERY | Facility: CLINIC | Age: 38
End: 2018-06-05

## 2018-06-05 ENCOUNTER — TELEPHONE (OUTPATIENT)
Dept: FAMILY MEDICINE CLINIC | Facility: CLINIC | Age: 38
End: 2018-06-05

## 2018-06-05 ENCOUNTER — HOSPITAL ENCOUNTER (EMERGENCY)
Age: 38
Discharge: HOME | End: 2018-06-05
Attending: EMERGENCY MEDICINE
Payer: COMMERCIAL

## 2018-06-05 ENCOUNTER — APPOINTMENT (OUTPATIENT)
Dept: CT IMAGING | Age: 38
End: 2018-06-05
Attending: EMERGENCY MEDICINE
Payer: COMMERCIAL

## 2018-06-05 VITALS
BODY MASS INDEX: 28.55 KG/M2 | RESPIRATION RATE: 16 BRPM | DIASTOLIC BLOOD PRESSURE: 74 MMHG | SYSTOLIC BLOOD PRESSURE: 117 MMHG | HEIGHT: 68 IN | HEART RATE: 106 BPM | WEIGHT: 188.38 LBS

## 2018-06-05 VITALS
OXYGEN SATURATION: 100 % | DIASTOLIC BLOOD PRESSURE: 53 MMHG | TEMPERATURE: 98 F | BODY MASS INDEX: 28.49 KG/M2 | HEIGHT: 68 IN | RESPIRATION RATE: 14 BRPM | HEART RATE: 66 BPM | WEIGHT: 188 LBS | SYSTOLIC BLOOD PRESSURE: 95 MMHG

## 2018-06-05 VITALS — WEIGHT: 188.5 LBS | BODY MASS INDEX: 28.57 KG/M2 | HEIGHT: 68 IN

## 2018-06-05 DIAGNOSIS — K59.00 CONSTIPATION, UNSPECIFIED CONSTIPATION TYPE: ICD-10-CM

## 2018-06-05 DIAGNOSIS — E66.3 OVERWEIGHT (BMI 25.0-29.9): Primary | ICD-10-CM

## 2018-06-05 DIAGNOSIS — N83.201 CYST OF RIGHT OVARY: ICD-10-CM

## 2018-06-05 DIAGNOSIS — R10.9 ABDOMINAL PAIN, ACUTE: Primary | ICD-10-CM

## 2018-06-05 PROCEDURE — 96361 HYDRATE IV INFUSION ADD-ON: CPT

## 2018-06-05 PROCEDURE — 83690 ASSAY OF LIPASE: CPT | Performed by: EMERGENCY MEDICINE

## 2018-06-05 PROCEDURE — 74177 CT ABD & PELVIS W/CONTRAST: CPT | Performed by: EMERGENCY MEDICINE

## 2018-06-05 PROCEDURE — 99284 EMERGENCY DEPT VISIT MOD MDM: CPT

## 2018-06-05 PROCEDURE — 81003 URINALYSIS AUTO W/O SCOPE: CPT | Performed by: EMERGENCY MEDICINE

## 2018-06-05 PROCEDURE — 99285 EMERGENCY DEPT VISIT HI MDM: CPT

## 2018-06-05 PROCEDURE — 97803 MED NUTRITION INDIV SUBSEQ: CPT | Performed by: DIETITIAN, REGISTERED

## 2018-06-05 PROCEDURE — 80053 COMPREHEN METABOLIC PANEL: CPT | Performed by: EMERGENCY MEDICINE

## 2018-06-05 PROCEDURE — 85025 COMPLETE CBC W/AUTO DIFF WBC: CPT | Performed by: EMERGENCY MEDICINE

## 2018-06-05 PROCEDURE — 96375 TX/PRO/DX INJ NEW DRUG ADDON: CPT

## 2018-06-05 PROCEDURE — 96374 THER/PROPH/DIAG INJ IV PUSH: CPT

## 2018-06-05 PROCEDURE — 0358T BIA WHOLE BODY: CPT | Performed by: DIETITIAN, REGISTERED

## 2018-06-05 RX ORDER — DOCUSATE SODIUM 100 MG/1
100 CAPSULE, LIQUID FILLED ORAL 2 TIMES DAILY
Qty: 30 CAPSULE | Refills: 0 | Status: SHIPPED | OUTPATIENT
Start: 2018-06-05 | End: 2018-06-15 | Stop reason: ALTCHOICE

## 2018-06-05 RX ORDER — MULTIVITAMIN WITH IRON
250 TABLET ORAL DAILY
COMMUNITY

## 2018-06-05 RX ORDER — ONDANSETRON 2 MG/ML
4 INJECTION INTRAMUSCULAR; INTRAVENOUS ONCE
Status: COMPLETED | OUTPATIENT
Start: 2018-06-05 | End: 2018-06-05

## 2018-06-05 RX ORDER — AMOXICILLIN 250 MG
1 CAPSULE ORAL DAILY
COMMUNITY
End: 2018-10-01 | Stop reason: ALTCHOICE

## 2018-06-05 RX ORDER — HYDROMORPHONE HYDROCHLORIDE 1 MG/ML
1 INJECTION, SOLUTION INTRAMUSCULAR; INTRAVENOUS; SUBCUTANEOUS ONCE
Status: COMPLETED | OUTPATIENT
Start: 2018-06-05 | End: 2018-06-05

## 2018-06-05 NOTE — PROGRESS NOTES
[address], [place] [address] Estelle Red  [address]  Dept: [phone]    6/5/2018    BARIATRIC EXISTING PATIENT/FOLLOW UP    HPI:  Agusto Jaimes is a 40year old-year old female wh

## 2018-06-05 NOTE — TELEPHONE ENCOUNTER
Spoke to patient and discussed about having her come in to see Dr. Cecilio Ragsdale tomorrow. Pt agreed and did make an 8am appt but she states she also wants to keep the appt on Thursday with Vince Mccoy because it is totally a separate thing.   Did advise that if florencio

## 2018-06-05 NOTE — PROGRESS NOTES
[address] [place] [address] [facility]  [address] [place] [id number]  Dept: [phone]  Loc: [phone]    06/05/18      Bariatric Follow-up Nutrition Session    Agusto Jaimes is a 40year old female. 72 04/17/2018   HDL 61 04/17/2018   LDL 68 04/17/2018   VLDL 19 09/23/2017   TCHDLRATIO 2.89 09/23/2017   NONHDLC 82 04/17/2018        Vitamins/Minerals:    Lab Results  Component Value Date   B12 685 03/05/2018       Lab Results  Component Value Date   VI PROAIR  (90 Base) MCG/ACT Inhalation Aero Soln, INHALE 2 PUFFS BY MOUTH EVERY 4 HOURS AS NEEDED FOR WHEEZING, Disp: 8.5 g, Rfl: 0  •  Budesonide-Formoterol Fumarate 160-4.5 MCG/ACT Inhalation Aerosol, Inhale 2 puffs into the lungs 2 (two) times stevie sources of protein.  Redid body comp and found drop in BMR from 1643 cals to 1496, percentage body fat from 53.2% to 39% and drop in skeletal muscle mass from 72.5 lbs to 62.4 lbs; upper body strength dropped approx 5%, trunk 3% and legs actually increased

## 2018-06-05 NOTE — TELEPHONE ENCOUNTER
Pt called about her Left Side pain below her ribs. States the pain started yesterday and is getting worse. She is doing what she was instructed to do but none of those things are working and patient does not want to go to the ER.  Pt has an appointment Wetzel Barthel

## 2018-06-06 ENCOUNTER — OFFICE VISIT (OUTPATIENT)
Dept: FAMILY MEDICINE CLINIC | Facility: CLINIC | Age: 38
End: 2018-06-06

## 2018-06-06 ENCOUNTER — LABORATORY ENCOUNTER (OUTPATIENT)
Dept: LAB | Age: 38
End: 2018-06-06
Attending: OTOLARYNGOLOGY
Payer: COMMERCIAL

## 2018-06-06 VITALS
TEMPERATURE: 98 F | BODY MASS INDEX: 28.64 KG/M2 | DIASTOLIC BLOOD PRESSURE: 60 MMHG | HEIGHT: 68 IN | HEART RATE: 88 BPM | SYSTOLIC BLOOD PRESSURE: 118 MMHG | WEIGHT: 189 LBS

## 2018-06-06 DIAGNOSIS — R10.31 RIGHT LOWER QUADRANT ABDOMINAL PAIN: Primary | ICD-10-CM

## 2018-06-06 DIAGNOSIS — R11.0 NAUSEA: ICD-10-CM

## 2018-06-06 DIAGNOSIS — E07.9 THYROID DISORDER: ICD-10-CM

## 2018-06-06 PROCEDURE — 84481 FREE ASSAY (FT-3): CPT

## 2018-06-06 PROCEDURE — 36415 COLL VENOUS BLD VENIPUNCTURE: CPT

## 2018-06-06 PROCEDURE — 99214 OFFICE O/P EST MOD 30 MIN: CPT | Performed by: FAMILY MEDICINE

## 2018-06-06 PROCEDURE — 84443 ASSAY THYROID STIM HORMONE: CPT

## 2018-06-06 PROCEDURE — 84439 ASSAY OF FREE THYROXINE: CPT

## 2018-06-06 RX ORDER — ACETAMINOPHEN AND CODEINE PHOSPHATE 300; 30 MG/1; MG/1
1 TABLET ORAL EVERY 8 HOURS PRN
Qty: 20 TABLET | Refills: 0 | Status: SHIPPED | OUTPATIENT
Start: 2018-06-06 | End: 2018-06-15 | Stop reason: ALTCHOICE

## 2018-06-06 NOTE — PATIENT INSTRUCTIONS
-- tylenol with codeine as needed only for most severe pain  -- miralax daily - can space out if getting loose stools  -- followup with coco tomorrow  -- if pain not improving or worsening, let us know - would consider pelvic US if needed

## 2018-06-06 NOTE — ED PROVIDER NOTES
Patient Seen in: [facility] In [place]    History   Patient presents with:  Abdomen/Flank Pain (GI/)    Stated Complaint: abdominal pain    HPI    71-year-old female presents reporting left-sided abdominal pain that began 1 day ago. Natalie Irby MD;  Location: [address] [place] [address]  No date: D & C      Comment: x4  No date: DILATION/CURETTAGE,DIAGNOSTIC  12/18/2017: GASTRIC BYPASS,OBESE<100CM MARCIO-EN-Y      Comment: DR. Steffanie Vivar  6/19/2015: GASTROCNEMIUS RECESSION Left [address]        Smoking status: Never Smoker                                                              Smokeless tobacco: Never Used                      Alcohol use:  No                Review of Systems    Positive for stated complaint: terrence ------                     CBC W/ DIFFERENTIAL[[id number]]                              Final result                 Please view results for these tests on the individual orders.    CBC W/ DIFFERENTIAL       ED Course as of Jun 05 2212  ---------------- hydronephrosis or focal renal mass. ADRENALS:  Normal. AORTA/VASCULAR:  No aortic aneurysm. Bilateral common iliac vein stents. RETROPERITONEUM:  No enlarged adenopathy. BOWEL/MESENTERY:  Normal caliber appendix. No dilated small bowel loops.   There is a reasonable clinical confidence and prior to discharge, that an emergency medical condition was not or was no longer present. There was no indication for further evaluation, treatment or admission on an emergency basis.   Comprehensive verbal and written di

## 2018-06-07 ENCOUNTER — OFFICE VISIT (OUTPATIENT)
Dept: FAMILY MEDICINE CLINIC | Facility: CLINIC | Age: 38
End: 2018-06-07

## 2018-06-07 VITALS
HEART RATE: 68 BPM | HEIGHT: 68 IN | DIASTOLIC BLOOD PRESSURE: 64 MMHG | WEIGHT: 187 LBS | BODY MASS INDEX: 28.34 KG/M2 | SYSTOLIC BLOOD PRESSURE: 110 MMHG

## 2018-06-07 DIAGNOSIS — G44.221 CHRONIC TENSION-TYPE HEADACHE, INTRACTABLE: Primary | ICD-10-CM

## 2018-06-07 DIAGNOSIS — Z79.899 MEDICATION MANAGEMENT: ICD-10-CM

## 2018-06-07 DIAGNOSIS — F41.1 GAD (GENERALIZED ANXIETY DISORDER): ICD-10-CM

## 2018-06-07 PROCEDURE — 99214 OFFICE O/P EST MOD 30 MIN: CPT | Performed by: FAMILY MEDICINE

## 2018-06-07 RX ORDER — BUTALBITAL, ACETAMINOPHEN AND CAFFEINE 50; 325; 40 MG/1; MG/1; MG/1
TABLET ORAL
Qty: 20 TABLET | Refills: 2 | Status: SHIPPED | OUTPATIENT
Start: 2018-06-07 | End: 2018-08-10 | Stop reason: ALTCHOICE

## 2018-06-07 RX ORDER — ALPRAZOLAM 0.25 MG/1
TABLET ORAL 2 TIMES DAILY PRN
Qty: 120 TABLET | Refills: 1 | Status: SHIPPED | OUTPATIENT
Start: 2018-06-07 | End: 2018-07-07

## 2018-06-07 NOTE — PROGRESS NOTES
Please inform tsh ft4 is normal ft3 is just slightly low please find out how patient is doing, recommend repeat tsh ft3 ft4 in 6months to closely monitor

## 2018-06-07 NOTE — PROGRESS NOTES
Nia Yuan is a 40year old female. HPI:   #1 anxiety follow-up  Patient is in for follow-up on anxiety was increased to 0.5 mg Xanax twice a day secondary to increased abdominal pain caused by psychological anxiety in regards to food.   Eating is TABLET BY MOUTH ON MONDAY THROUGH FRIDAY AS DIRECTED Disp: 30 tablet Rfl: 0   ALPRAZOLAM 0.5 MG Oral Tab TAKE 1 TABLET BY MOUTH TWICE DAILY AS NEEDED FOR ANXIETY, FOR NAUSEA AND VOMITING Disp: 40 tablet Rfl: 0   Diclofenac Sodium (VOLTAREN) 1 % Transdermal • Visual impairment     glasses      Social History:  Smoking status: Never Smoker                                                              Smokeless tobacco: Never Used                      Alcohol use:  No                 REVIEW OF SYSTEMS:   GENERA Butalbital-APAP-Caffeine -40 MG Oral Tab 20 tablet 2      Sig: TAKE 1-2  TABLET BY MOUTH up to 3-4 TIMES PER WEEK FOR TOTAL OF 6 TABLETS PER WEEK. Imaging & Consults:  None  1.  Chronic tension-type headache, intractable  Use, risks, benefit

## 2018-06-08 NOTE — PROGRESS NOTES
Per phone consent [phone] notified and verbalized understanding. 20 lb weight loss in 2 months due to weight loss surgery. light headed and fatigue recent 2 months.    Refill per pt request #30/5    Pt request for KO to consult with Aydin Crowley regarding labs

## 2018-06-10 NOTE — PROGRESS NOTES
Kiara Carolina is a 40year old female here for Patient presents with:  ER F/U: abdominal pain, nausea       HPI:       1. Right lower quadrant abdominal pain  2.  Nausea  -here for ER followup  -had CT scan which showed ruptured ovarian cyst on right GASTROCNEMIUS RECESSION Left      Comment: Procedure: GASTROC RECESSION FOOT;  Surgeon:                Janneth White MD;  Location: [facility]  No date: HYSTERECTOMY  2011: HYSTEROSCOPY  12/14/2015: INJ PARAVERT F JNT L/S 1 LEV Bila Grandmother    • Heart Disease Paternal Grandmother    • Breast Cancer Maternal Grandmother 76   • Cancer Neg    • Stroke Neg       Social History: Smoking status: Never Smoker                                                              Smokeless tobacco: (two) times daily as needed. Disp:  Rfl:    aspirin 81 MG Oral Tab Take 81 mg by mouth daily.  Disp:  Rfl:    UNITHROID 150 MCG Oral Tab Take 1 tablet (150 mcg total) by mouth before breakfast. Disp: 30 tablet Rfl: 5   ALPRAZolam (XANAX) 0.25 MG Oral Tab helps  -f/u with PCP as planned for recheck            The patient is asked to return in 1 wk, sooner prn. Orders This Visit:  No orders of the defined types were placed in this encounter.       Meds This Visit:    Signed Prescriptions Disp Refills    Ac

## 2018-06-11 NOTE — PROGRESS NOTES
Since levels overall look stable recommend to continue on current dose and repeat tsh ft3 ft4 in 3 months

## 2018-06-15 ENCOUNTER — LAB ENCOUNTER (OUTPATIENT)
Dept: LAB | Age: 38
End: 2018-06-15
Attending: FAMILY MEDICINE
Payer: COMMERCIAL

## 2018-06-15 ENCOUNTER — OFFICE VISIT (OUTPATIENT)
Dept: FAMILY MEDICINE CLINIC | Facility: CLINIC | Age: 38
End: 2018-06-15

## 2018-06-15 VITALS
TEMPERATURE: 98 F | DIASTOLIC BLOOD PRESSURE: 60 MMHG | HEART RATE: 92 BPM | BODY MASS INDEX: 26.8 KG/M2 | WEIGHT: 183 LBS | HEIGHT: 69.29 IN | SYSTOLIC BLOOD PRESSURE: 90 MMHG

## 2018-06-15 DIAGNOSIS — R10.12 ABDOMINAL PAIN, LUQ: ICD-10-CM

## 2018-06-15 DIAGNOSIS — R11.2 NON-INTRACTABLE VOMITING WITH NAUSEA, UNSPECIFIED VOMITING TYPE: ICD-10-CM

## 2018-06-15 DIAGNOSIS — Z98.84 HISTORY OF GASTRIC BYPASS: ICD-10-CM

## 2018-06-15 DIAGNOSIS — F41.1 GAD (GENERALIZED ANXIETY DISORDER): ICD-10-CM

## 2018-06-15 DIAGNOSIS — M94.0 COSTOCHONDRITIS: ICD-10-CM

## 2018-06-15 DIAGNOSIS — R10.12 ABDOMINAL PAIN, LUQ: Primary | ICD-10-CM

## 2018-06-15 PROCEDURE — 99214 OFFICE O/P EST MOD 30 MIN: CPT | Performed by: FAMILY MEDICINE

## 2018-06-15 PROCEDURE — 80053 COMPREHEN METABOLIC PANEL: CPT | Performed by: FAMILY MEDICINE

## 2018-06-15 PROCEDURE — 83690 ASSAY OF LIPASE: CPT | Performed by: FAMILY MEDICINE

## 2018-06-15 PROCEDURE — 81003 URINALYSIS AUTO W/O SCOPE: CPT | Performed by: FAMILY MEDICINE

## 2018-06-15 PROCEDURE — 36415 COLL VENOUS BLD VENIPUNCTURE: CPT | Performed by: FAMILY MEDICINE

## 2018-06-15 RX ORDER — DICYCLOMINE HYDROCHLORIDE 10 MG/1
10 CAPSULE ORAL 2 TIMES DAILY
Refills: 0 | COMMUNITY
Start: 2018-06-13 | End: 2018-07-09 | Stop reason: ALTCHOICE

## 2018-06-15 RX ORDER — DIAZEPAM 5 MG/1
5 TABLET ORAL EVERY 12 HOURS PRN
Qty: 10 TABLET | Refills: 0 | Status: SHIPPED | OUTPATIENT
Start: 2018-06-15 | End: 2018-06-20

## 2018-06-15 NOTE — PATIENT INSTRUCTIONS
Lidocaine patch OTC   Bentyl 10 mg take two three times per day  Valium 5 mg twice daily no xanax while on this.           Abdominal Pain, Adhesions from Surgery  Surgery on the abdomen can cause bands of fibrous scar tissue (also known as adhesions) to for lower abdomen  · New or worsening vomiting or diarrhea  · Swelling of the abdomen  · Unable to pass stool for more than 3 days  · New fever over 100.4ºF (38ºC), or rising fever  · Blood in vomit or bowel movements (dark red or black color)  · Weakness, diz

## 2018-06-15 NOTE — PROGRESS NOTES
Mark Priest is a 40year old female. HPI:   Pain under the left rib for 1.5 week Dr Yogi Irvin last week took Miralax and BM are now loose 4 times per day.   Patient had also prior been seen in the emergency room had a CT scan which was essentially ritesh by kristin rahman svc 5+ yr Bilateral 12/14/2015    Procedure: FACET INJECTION UNDER FLUOROSCOPY;  Surgeon: Danny Escalona DO;  Location: [address] [place] [address]   • Other  HYSTEROSCOPY   • Other  NECK SCAR REVISION   • Other      stent to iliac crest tablet Rfl: 0   Diclofenac Sodium (VOLTAREN) 1 % Transdermal Gel Apply 2 g topically 4 (four) times daily.  Disp: 2 Tube Rfl: 2   ESCITALOPRAM 20 MG Oral Tab TAKE 2 TABLETS(40 MG) BY MOUTH EVERY NIGHT Disp: 180 tablet Rfl: 0   Ondansetron HCl (ZOFRAN) 8 MG Alcohol use:  No                 REVIEW OF SYSTEMS:   GENERAL HEALTH: feels well otherwise  SKIN: denies any unusual skin lesions or rashes  RESPIRATORY: denies shortness of breath with exertion  CARDIOVASCULAR: denies chest pain on exertion  GI:see above mg 3  times a day as needed patient was made aware of side effects including cut  Use, risks, benefits and precautions of valium discussed. Including not to drive, operate machinery or drink alcohol when taking this medication.   STOP xanax while on Valium

## 2018-06-16 NOTE — PROGRESS NOTES
CMP and lipase are normal take medications as directed follow-up next week with update. Also  lidocaine patches and heating pad sent to my chart.

## 2018-06-25 RX ORDER — ZOLPIDEM TARTRATE 5 MG/1
TABLET ORAL
Qty: 30 TABLET | Refills: 0 | Status: SHIPPED | OUTPATIENT
Start: 2018-06-25 | End: 2018-07-17

## 2018-06-28 RX ORDER — PANTOPRAZOLE SODIUM 40 MG/1
TABLET, DELAYED RELEASE ORAL
Qty: 60 TABLET | Refills: 0 | Status: SHIPPED | OUTPATIENT
Start: 2018-06-28 | End: 2018-07-22

## 2018-07-09 ENCOUNTER — OFFICE VISIT (OUTPATIENT)
Dept: FAMILY MEDICINE CLINIC | Facility: CLINIC | Age: 38
End: 2018-07-09

## 2018-07-09 VITALS
TEMPERATURE: 98 F | DIASTOLIC BLOOD PRESSURE: 64 MMHG | HEIGHT: 69.29 IN | HEART RATE: 80 BPM | SYSTOLIC BLOOD PRESSURE: 122 MMHG | BODY MASS INDEX: 26.21 KG/M2 | WEIGHT: 179 LBS | OXYGEN SATURATION: 98 %

## 2018-07-09 DIAGNOSIS — J40 BRONCHITIS: Primary | ICD-10-CM

## 2018-07-09 PROCEDURE — 99214 OFFICE O/P EST MOD 30 MIN: CPT | Performed by: FAMILY MEDICINE

## 2018-07-09 RX ORDER — METHYLPREDNISOLONE 4 MG/1
TABLET ORAL
Qty: 1 KIT | Refills: 0 | Status: SHIPPED | OUTPATIENT
Start: 2018-07-09 | End: 2018-07-30 | Stop reason: ALTCHOICE

## 2018-07-09 RX ORDER — OXYBUTYNIN CHLORIDE 5 MG/1
TABLET ORAL
Refills: 0 | COMMUNITY
Start: 2018-06-28 | End: 2018-07-30

## 2018-07-09 RX ORDER — ALBUTEROL SULFATE 90 UG/1
AEROSOL, METERED RESPIRATORY (INHALATION)
Qty: 8.5 G | Refills: 2 | Status: SHIPPED | OUTPATIENT
Start: 2018-07-09 | End: 2018-09-23

## 2018-07-09 RX ORDER — BENZONATATE 200 MG/1
CAPSULE ORAL
Refills: 0 | COMMUNITY
Start: 2018-07-03 | End: 2018-07-30

## 2018-07-09 RX ORDER — DEXTROMETHORPHAN HYDROBROMIDE AND PROMETHAZINE HYDROCHLORIDE 15; 6.25 MG/5ML; MG/5ML
SYRUP ORAL
Refills: 0 | COMMUNITY
Start: 2018-07-03 | End: 2018-07-30

## 2018-07-09 RX ORDER — HYDROCODONE POLISTIREX AND CHLORPHENIRAMINE POLISTIREX 10; 8 MG/5ML; MG/5ML
SUSPENSION, EXTENDED RELEASE ORAL
Refills: 0 | COMMUNITY
Start: 2018-07-05 | End: 2018-07-30

## 2018-07-09 NOTE — PATIENT INSTRUCTIONS
-- start medrol dose pack  -- continue albuterol inhaler as needed  --honey as needed  --otc throat lozenges as needed  -- start symbicort again on last 2 days of steroid pack (can use sooner on as needed basis if needed)  -- call or followup later this

## 2018-07-09 NOTE — PROGRESS NOTES
CC:  Bryson Walters is a 40year old female here for Patient presents with:  URI: cough, nasal and chest congestion x 2 weeks      HPI:     URI  -started almost 2 wks ago  -started with cough and sore throat  -was given flonase and cough syrup (states Rfl: 2   Magnesium 100 MG Oral Tab Take 1 tablet by mouth daily. Disp:  Rfl:    Multiple Vitamins-Minerals (BIOTIN PLUS/CALCIUM/VIT D3) Oral Tab Take 1 tablet by mouth daily.    Disp:  Rfl:    Diclofenac Sodium (VOLTAREN) 1 % Transdermal Gel Apply 2 g top Cancer Maternal Grandmother 76   • Cancer Neg    • Stroke Neg       Past Medical History:   Diagnosis Date   • Anxiety    • Anxiety state, unspecified    • Arthritis    • Asthma    • Calculus of kidney    • Cancer ([facility] [address]     thyroid   • Diverticulosis of la Comment: Procedure: LAPAROSCOPIC CHOLECYSTECTOMY;                 Surgeon: Red Weiss MD;  Location: [address] [place] [address]                MAIN OR  No date: LAPAROSCOPY,DIAGNOSTIC  12/14/2015: M-ELAN BY RENITA OLIVER [facility] 5+ YR Bilateral      Comment: Procedure: FACE g/m/r  EXTREMITIES: no cyanosis, clubbing or edema    ASSESSMENT AND PLAN:     URI   - likely bronchitis given length of symptoms, exam unremarkable  - encouraged supportive care (rest, fluids, honey, humidifier, tylenol prn)  - continue albuterol prn  -st

## 2018-07-16 ENCOUNTER — PATIENT MESSAGE (OUTPATIENT)
Dept: FAMILY MEDICINE CLINIC | Facility: CLINIC | Age: 38
End: 2018-07-16

## 2018-07-17 RX ORDER — ESCITALOPRAM OXALATE 20 MG/1
TABLET ORAL
Qty: 180 TABLET | Refills: 0 | Status: SHIPPED | OUTPATIENT
Start: 2018-07-17 | End: 2018-09-11

## 2018-07-17 RX ORDER — ZOLPIDEM TARTRATE 5 MG/1
TABLET ORAL
Qty: 30 TABLET | Refills: 0 | Status: SHIPPED | OUTPATIENT
Start: 2018-07-17 | End: 2018-08-12

## 2018-07-19 NOTE — TELEPHONE ENCOUNTER
----- Message from Bertis Litten, PA-C sent at 7/16/2018  2:30 PM CDT -----  Regarding: FW: Prescription Question  Contact: [phone]  OK to refill early due to vacation   ----- Message -----  From: Avelino Gong RN  Sent: 7/16/2018   8:47 AM  To:  Henry

## 2018-07-24 RX ORDER — PANTOPRAZOLE SODIUM 40 MG/1
TABLET, DELAYED RELEASE ORAL
Qty: 60 TABLET | Refills: 0 | Status: SHIPPED | OUTPATIENT
Start: 2018-07-24 | End: 2018-08-26

## 2018-07-30 DIAGNOSIS — G44.221 CHRONIC TENSION-TYPE HEADACHE, INTRACTABLE: ICD-10-CM

## 2018-07-30 RX ORDER — BUTALBITAL, ACETAMINOPHEN AND CAFFEINE 50; 325; 40 MG/1; MG/1; MG/1
TABLET ORAL
Qty: 20 TABLET | Refills: 0 | OUTPATIENT
Start: 2018-07-30

## 2018-07-30 NOTE — TELEPHONE ENCOUNTER
rx not approved per Eddie Costa  An rx for muscle relaxer was sent today at [facility]

## 2018-07-30 NOTE — PROGRESS NOTES
Shante Dyson is a 40year old female. HPI:   Patient is in for follow-up on anxiety. Has been using Xanax 0.25 mg usually needing to twice a day.   Patient states that with the Xanax she is able to eat slightly better without as much abdominal pain needed.  Disp: 60 tablet Rfl: 1   PANTOPRAZOLE SODIUM 40 MG Oral Tab EC TAKE 1 TABLET(40 MG) BY MOUTH TWICE DAILY BEFORE MEALS Disp: 60 tablet Rfl: 0   ESCITALOPRAM 20 MG Oral Tab TAKE 2 TABLETS(40 MG) BY MOUTH EVERY NIGHT Disp: 180 tablet Rfl: 0   ZOLPIDEM unspecified DMG DX 11-2-12    AHI 2 RDI 2 REM AHI 6 SaO2 curtis 89%   • OTHER DISEASES     rectal bleeding   • Pancreatitis    • Pneumonia august 2014   • Pneumonia, organism unspecified(486)    • PONV (postoperative nausea and vomiting)    • Reflux    • Un encounter. Meds & Refills for this Visit:  Signed Prescriptions Disp Refills    ALPRAZolam (XANAX) 0.5 MG Oral Tab 60 tablet 0      Sig: Take 0.5-1 tablets (0.25-0.5 mg total) by mouth 2 (two) times daily as needed for Anxiety.  Taper down as tolerated

## 2018-08-08 ENCOUNTER — HOSPITAL (OUTPATIENT)
Dept: OTHER | Age: 38
End: 2018-08-08
Attending: OBSTETRICS & GYNECOLOGY

## 2018-08-08 ENCOUNTER — CHARTING TRANS (OUTPATIENT)
Dept: OTHER | Age: 38
End: 2018-08-08

## 2018-08-10 ENCOUNTER — OFFICE VISIT (OUTPATIENT)
Dept: FAMILY MEDICINE CLINIC | Facility: CLINIC | Age: 38
End: 2018-08-10
Payer: COMMERCIAL

## 2018-08-10 VITALS
SYSTOLIC BLOOD PRESSURE: 86 MMHG | HEART RATE: 88 BPM | BODY MASS INDEX: 26.51 KG/M2 | WEIGHT: 179 LBS | DIASTOLIC BLOOD PRESSURE: 60 MMHG | TEMPERATURE: 99 F | HEIGHT: 69 IN

## 2018-08-10 DIAGNOSIS — N80.3 ENDOMETRIOSIS OF PELVIC PERITONEUM: Primary | Chronic | ICD-10-CM

## 2018-08-10 DIAGNOSIS — R10.9 POSTOPERATIVE ABDOMINAL PAIN: ICD-10-CM

## 2018-08-10 DIAGNOSIS — Z98.890 STATUS POST LAPAROSCOPIC PROCEDURE: ICD-10-CM

## 2018-08-10 DIAGNOSIS — R52 PAIN MANAGEMENT: ICD-10-CM

## 2018-08-10 DIAGNOSIS — F11.21 HISTORY OF NARCOTIC ADDICTION (HCC): ICD-10-CM

## 2018-08-10 DIAGNOSIS — G89.18 POSTOPERATIVE ABDOMINAL PAIN: ICD-10-CM

## 2018-08-10 PROCEDURE — 99214 OFFICE O/P EST MOD 30 MIN: CPT | Performed by: FAMILY MEDICINE

## 2018-08-10 RX ORDER — CIPROFLOXACIN 500 MG/1
1 TABLET, FILM COATED ORAL 2 TIMES DAILY
Refills: 0 | COMMUNITY
Start: 2018-08-09 | End: 2018-08-18 | Stop reason: ALTCHOICE

## 2018-08-10 RX ORDER — HYDROCODONE BITARTRATE AND ACETAMINOPHEN 10; 325 MG/1; MG/1
1 TABLET ORAL EVERY 4 HOURS PRN
Qty: 39 TABLET | Refills: 0 | Status: SHIPPED | OUTPATIENT
Start: 2018-08-10 | End: 2018-08-18 | Stop reason: ALTCHOICE

## 2018-08-10 NOTE — PROGRESS NOTES
Musa Ty is a 40year old female. HPI:   Patient is in for postop medication management of narcotic. Patient had surgery on Wednesday at noon had multiple endo-metria ptosis adhesions removed.   Patient in general has poor pain tolerance and is HYDROcodone-acetaminophen (NORCO)  MG Oral Tab Take 1 tablet by mouth every 4 (four) hours as needed (s/p surgery).  Taper down as instructed Disp: 39 tablet Rfl: 0   ALPRAZolam (XANAX) 0.5 MG Oral Tab Take 0.5-1 tablets (0.25-0.5 mg total) by mouth Diverticulosis of large intestine    • Endometriosis    • Esophageal reflux    • Extrinsic asthma, unspecified     2014 was in ER and was hospitalized over night   • Hx of gastric bypass 12/18/2017   • Hypothyroid    • Hypothyroidism    • Infertility, fema well-healed patient has bandages present there is no surrounding swelling, erythema or tenderness superficially.     EXTREMITIES: no cyanosis, clubbing or edema  Musculoskeletal: No gross deficit  Neurological: nerves II through XII grossly intact no sensor (four) hours as needed (s/p surgery). Taper down as instructed  Dispense: 39 tablet; Refill: 0  Time spent was 30 minutes more than 50% was spent on counseling regarding medical conditions and treatment.    The patient indicates understanding of these issue

## 2018-08-10 NOTE — PATIENT INSTRUCTIONS
Friday, Saturday, Sunday and Monday every 4 hours max 6 per day  Tuesday, Wed, Thursday every 6 hours 4 max per day  Friday every 8 hours 3 max per day

## 2018-08-11 PROBLEM — R10.9 POSTOPERATIVE ABDOMINAL PAIN: Status: ACTIVE | Noted: 2018-08-11

## 2018-08-11 PROBLEM — Z98.890 STATUS POST LAPAROSCOPIC PROCEDURE: Status: ACTIVE | Noted: 2018-08-11

## 2018-08-11 PROBLEM — G89.18 POSTOPERATIVE ABDOMINAL PAIN: Status: ACTIVE | Noted: 2018-08-11

## 2018-08-13 RX ORDER — ZOLPIDEM TARTRATE 5 MG/1
TABLET ORAL
Qty: 30 TABLET | Refills: 0 | Status: SHIPPED | OUTPATIENT
Start: 2018-08-13 | End: 2018-09-11

## 2018-08-18 ENCOUNTER — OFFICE VISIT (OUTPATIENT)
Dept: FAMILY MEDICINE CLINIC | Facility: CLINIC | Age: 38
End: 2018-08-18
Payer: COMMERCIAL

## 2018-08-18 VITALS
HEIGHT: 69 IN | WEIGHT: 180.63 LBS | SYSTOLIC BLOOD PRESSURE: 118 MMHG | BODY MASS INDEX: 26.75 KG/M2 | HEART RATE: 88 BPM | RESPIRATION RATE: 16 BRPM | DIASTOLIC BLOOD PRESSURE: 64 MMHG

## 2018-08-18 DIAGNOSIS — G89.18 POSTOPERATIVE ABDOMINAL PAIN: ICD-10-CM

## 2018-08-18 DIAGNOSIS — F41.1 GAD (GENERALIZED ANXIETY DISORDER): ICD-10-CM

## 2018-08-18 DIAGNOSIS — R10.9 POSTOPERATIVE ABDOMINAL PAIN: ICD-10-CM

## 2018-08-18 DIAGNOSIS — Z98.890 STATUS POST LAPAROSCOPIC PROCEDURE: Primary | ICD-10-CM

## 2018-08-18 DIAGNOSIS — R19.8 UMBILICAL DISCHARGE: ICD-10-CM

## 2018-08-18 DIAGNOSIS — R52 ENCOUNTER FOR PAIN MANAGEMENT: ICD-10-CM

## 2018-08-18 PROCEDURE — 99213 OFFICE O/P EST LOW 20 MIN: CPT | Performed by: FAMILY MEDICINE

## 2018-08-18 RX ORDER — MUPIROCIN CALCIUM 20 MG/G
CREAM TOPICAL
Qty: 15 G | Refills: 0 | Status: SHIPPED | OUTPATIENT
Start: 2018-08-18 | End: 2018-09-11 | Stop reason: ALTCHOICE

## 2018-08-18 RX ORDER — HYDROCODONE BITARTRATE AND ACETAMINOPHEN 5; 325 MG/1; MG/1
1 TABLET ORAL EVERY 8 HOURS PRN
Qty: 23 TABLET | Refills: 0 | Status: SHIPPED | OUTPATIENT
Start: 2018-08-18 | End: 2018-09-11 | Stop reason: ALTCHOICE

## 2018-08-18 NOTE — PROGRESS NOTES
Cara Travis is a 40year old female. HPI:     Patient is in for postop medication management of narcotic. Patient had surgery on Wednesday 8/8/18 for  multiple endometriosis  adhesions removed.   Patient in general has poor pain tolerance and is wo 0   Mupirocin Calcium 2 % External Cream Thin film three times a day for 5 days Disp: 15 g Rfl: 0   ZOLPIDEM TARTRATE 5 MG Oral Tab TAKE 1 TABLET BY MOUTH EVERY DAY AT BEDTIME AS NEEDED FOR SLEEP Disp: 30 tablet Rfl: 0   ALPRAZolam (XANAX) 0.5 MG Oral Tab reflux    • Extrinsic asthma, unspecified     2014 was in ER and was hospitalized over night   • Hx of gastric bypass 12/18/2017   • Hypothyroid    • Hypothyroidism    • Infertility, female    • Organic hypersomnia, unspecified DMG DX 11-2-12    AHI 2 RDI edema  Musculoskeletal: No gross deficit  Neurological: nerves II through XII grossly intact no sensorimotor deficit  Psychological: Mood improved patient is smiling and happy moving around the room more easily is able to get up on the table without wincin

## 2018-08-18 NOTE — PATIENT INSTRUCTIONS
SAT, Sunday, Monday 5 mg every 6 hours  Tues and Wednesday every 8 hours   Thursday and Friday every 12 hours  Saturday one then done.

## 2018-08-26 ENCOUNTER — TELEPHONE (OUTPATIENT)
Dept: FAMILY MEDICINE CLINIC | Facility: CLINIC | Age: 38
End: 2018-08-26

## 2018-08-26 NOTE — TELEPHONE ENCOUNTER
Coleen Cancino PA-C on call 8/25/18 patient called the has some blurring of the vision since taking Norflex for headache. Told her this is a possible side effect to dilation of pupils. Discontinue medication.

## 2018-08-27 ENCOUNTER — TELEPHONE (OUTPATIENT)
Dept: FAMILY MEDICINE CLINIC | Facility: CLINIC | Age: 38
End: 2018-08-27

## 2018-08-27 ENCOUNTER — PATIENT MESSAGE (OUTPATIENT)
Dept: FAMILY MEDICINE CLINIC | Facility: CLINIC | Age: 38
End: 2018-08-27

## 2018-08-27 RX ORDER — ALPRAZOLAM 0.25 MG/1
TABLET ORAL
Qty: 120 TABLET | Refills: 0 | Status: SHIPPED | OUTPATIENT
Start: 2018-08-27 | End: 2018-09-23

## 2018-08-27 RX ORDER — PANTOPRAZOLE SODIUM 40 MG/1
TABLET, DELAYED RELEASE ORAL
Qty: 60 TABLET | Refills: 0 | Status: SHIPPED | OUTPATIENT
Start: 2018-08-27 | End: 2018-09-23

## 2018-08-27 NOTE — TELEPHONE ENCOUNTER
From: Rosanna Soto  To: Vicky Rosales PA-C  Sent: 8/27/2018 10:05 AM CDT  Subject: Prescription Question    Umang Alvarado,     I wanted to let you know that I put in a request for Xanax . 25, I think I am ready to go down to 1 twice a day.  I want t

## 2018-08-27 NOTE — TELEPHONE ENCOUNTER
Carol Reddy is calling to see if she can get her prescription for her xanex sent over to her [address] on file.

## 2018-08-31 ENCOUNTER — PATIENT MESSAGE (OUTPATIENT)
Dept: FAMILY MEDICINE CLINIC | Facility: CLINIC | Age: 38
End: 2018-08-31

## 2018-08-31 DIAGNOSIS — R10.12 LUQ ABDOMINAL PAIN: Primary | ICD-10-CM

## 2018-08-31 NOTE — TELEPHONE ENCOUNTER
From: Marva Head  To: Aliza Fernandez PA-C  Sent: 8/31/2018 11:33 AM CDT  Subject: Non-Urgent Medical Question    Otis Moore,     I am wondering if you can put in an order for me to have my Lipase checked?  For the last 3-4 days, I have had a lo

## 2018-09-06 ENCOUNTER — LAB ENCOUNTER (OUTPATIENT)
Dept: LAB | Age: 38
End: 2018-09-06
Attending: FAMILY MEDICINE
Payer: COMMERCIAL

## 2018-09-06 ENCOUNTER — TELEPHONE (OUTPATIENT)
Dept: SURGERY | Facility: CLINIC | Age: 38
End: 2018-09-06

## 2018-09-06 DIAGNOSIS — C73 THYROID CANCER (HCC): ICD-10-CM

## 2018-09-06 DIAGNOSIS — E07.9 THYROID DYSFUNCTION: ICD-10-CM

## 2018-09-06 DIAGNOSIS — R10.12 LUQ ABDOMINAL PAIN: ICD-10-CM

## 2018-09-06 DIAGNOSIS — C73 MALIGNANT NEOPLASM OF THYROID GLAND (HCC): ICD-10-CM

## 2018-09-06 LAB
LIPASE: 190 U/L (ref 73–393)
T3FREE SERPL-MCNC: 2.66 PG/ML (ref 2.3–4.2)
T4 FREE SERPL-MCNC: 1.4 NG/DL (ref 0.9–1.8)
TSI SER-ACNC: 0.07 MIU/ML (ref 0.35–5.5)

## 2018-09-06 PROCEDURE — 83690 ASSAY OF LIPASE: CPT | Performed by: FAMILY MEDICINE

## 2018-09-06 NOTE — TELEPHONE ENCOUNTER
Patient had bariatric surgery, has been experiencing fatigue and easily bruises. Please follow up with her, phone# [phone].

## 2018-09-07 LAB
THYROGLOBULIN AB: <0.9 IU/ML
THYROGLOBULIN, SERUM OR PLASMA: 0.1 NG/ML

## 2018-09-07 NOTE — TELEPHONE ENCOUNTER
Spoke to patient    Increased fatigue    Blood work checked recently    I recommend she waits till December when she is at her one year suzette after bariatric surgery.     Also recommend she increases PO protein intake

## 2018-09-10 RX ORDER — ZOLPIDEM TARTRATE 5 MG/1
TABLET ORAL
Qty: 30 TABLET | Refills: 0 | OUTPATIENT
Start: 2018-09-10

## 2018-09-11 ENCOUNTER — OFFICE VISIT (OUTPATIENT)
Dept: FAMILY MEDICINE CLINIC | Facility: CLINIC | Age: 38
End: 2018-09-11
Payer: COMMERCIAL

## 2018-09-11 VITALS
HEART RATE: 80 BPM | TEMPERATURE: 98 F | HEIGHT: 68.47 IN | DIASTOLIC BLOOD PRESSURE: 64 MMHG | SYSTOLIC BLOOD PRESSURE: 102 MMHG | BODY MASS INDEX: 25.32 KG/M2 | WEIGHT: 169 LBS

## 2018-09-11 DIAGNOSIS — G44.221 CHRONIC TENSION-TYPE HEADACHE, INTRACTABLE: Primary | ICD-10-CM

## 2018-09-11 DIAGNOSIS — R10.9 CHRONIC ABDOMINAL PAIN: ICD-10-CM

## 2018-09-11 DIAGNOSIS — F51.01 PRIMARY INSOMNIA: ICD-10-CM

## 2018-09-11 DIAGNOSIS — F41.1 GAD (GENERALIZED ANXIETY DISORDER): ICD-10-CM

## 2018-09-11 DIAGNOSIS — F50.89 PSYCHOGENIC VOMITING WITH NAUSEA: ICD-10-CM

## 2018-09-11 DIAGNOSIS — K66.0 ABDOMINAL ADHESIONS: ICD-10-CM

## 2018-09-11 DIAGNOSIS — G89.29 CHRONIC ABDOMINAL PAIN: ICD-10-CM

## 2018-09-11 PROCEDURE — 99214 OFFICE O/P EST MOD 30 MIN: CPT | Performed by: FAMILY MEDICINE

## 2018-09-11 RX ORDER — BUTALBITAL, ACETAMINOPHEN AND CAFFEINE 50; 325; 40 MG/1; MG/1; MG/1
TABLET ORAL
Qty: 20 TABLET | Refills: 0 | Status: SHIPPED | OUTPATIENT
Start: 2018-09-11 | End: 2018-10-20

## 2018-09-11 RX ORDER — ESCITALOPRAM OXALATE 20 MG/1
30 TABLET ORAL DAILY
Qty: 180 TABLET | Refills: 0 | COMMUNITY
Start: 2018-09-11 | End: 2018-10-20 | Stop reason: ALTCHOICE

## 2018-09-11 RX ORDER — HYDROCODONE BITARTRATE AND ACETAMINOPHEN 5; 325 MG/1; MG/1
1-2 TABLET ORAL
COMMUNITY
Start: 2018-09-02 | End: 2018-09-11 | Stop reason: ALTCHOICE

## 2018-09-11 RX ORDER — ZOLPIDEM TARTRATE 5 MG/1
TABLET ORAL NIGHTLY
Qty: 30 TABLET | Refills: 2 | Status: SHIPPED | OUTPATIENT
Start: 2018-09-11 | End: 2018-11-23

## 2018-09-11 NOTE — PROGRESS NOTES
Cara Travis is a 40year old female.   HPI:   Patient presents with:  #1 fatigue  Thyroid testing recently done no dose changes needed  Spoke with bariatric center who feels her multivitamins are adequate and her last labs in April were normal  Patie TAKE 1 TABLET(40 MG) BY MOUTH TWICE DAILY BEFORE MEALS Disp: 60 tablet Rfl: 0   ALPRAZOLAM 0.25 MG Oral Tab TAKE 1 TO 2 TABLETS BY MOUTH TWICE DAILY AS NEEDED FOR ANXIETY Disp: 120 tablet Rfl: 0   Albuterol Sulfate HFA (PROAIR HFA) 108 (90 Base) MCG/ACT In Social History:  Social History    Tobacco Use      Smoking status: Never Smoker      Smokeless tobacco: Never Used    Alcohol use: No      Alcohol/week: 0.0 oz      Frequency: Never      Binge frequency: Never    Drug use: No       REVIEW OF SYSTEMS: BY MOUTH up to 5 TIMES PER WEEK FOR TOTAL OF 6 TABLETS PER WEEK. Imaging & Consults:  OP REFERRAL PAIN MANGEMENT  1.  Chronic tension-type headache, intractable  Patient agreed to see Dr. Milka Parnell psychologist pain management therapist.    Dominique Sapp regarding medical conditions and treatment. The patient indicates understanding of these issues and agrees to the plan. The patient is asked to return in 2-3 months.

## 2018-09-18 ENCOUNTER — TELEPHONE (OUTPATIENT)
Dept: FAMILY MEDICINE CLINIC | Facility: CLINIC | Age: 38
End: 2018-09-18

## 2018-09-18 RX ORDER — PREGABALIN 50 MG/1
CAPSULE ORAL
Qty: 60 CAPSULE | Refills: 1 | OUTPATIENT
Start: 2018-09-18 | End: 2018-10-20

## 2018-09-18 NOTE — TELEPHONE ENCOUNTER
Per pt she spoke to [facility] via [facility] and [facility] will be sending Lyrica in for her but pt wants it to be sent to [facility] on [address] in [place].

## 2018-09-24 RX ORDER — ALBUTEROL SULFATE 90 UG/1
AEROSOL, METERED RESPIRATORY (INHALATION)
Qty: 8.5 G | Refills: 0 | Status: SHIPPED | OUTPATIENT
Start: 2018-09-24 | End: 2019-07-29

## 2018-09-24 RX ORDER — PANTOPRAZOLE SODIUM 40 MG/1
TABLET, DELAYED RELEASE ORAL
Qty: 60 TABLET | Refills: 0 | Status: SHIPPED | OUTPATIENT
Start: 2018-09-24 | End: 2018-10-16

## 2018-09-24 RX ORDER — ALPRAZOLAM 0.25 MG/1
TABLET ORAL
Qty: 120 TABLET | Refills: 0 | Status: SHIPPED | OUTPATIENT
Start: 2018-09-24 | End: 2018-10-16

## 2018-09-28 ENCOUNTER — TELEPHONE (OUTPATIENT)
Dept: GASTROENTEROLOGY | Facility: CLINIC | Age: 38
End: 2018-09-28

## 2018-09-28 ENCOUNTER — HOSPITAL ENCOUNTER (EMERGENCY)
Age: 38
Discharge: HOME | End: 2018-09-28
Attending: EMERGENCY MEDICINE
Payer: COMMERCIAL

## 2018-09-28 VITALS
HEIGHT: 68 IN | SYSTOLIC BLOOD PRESSURE: 90 MMHG | RESPIRATION RATE: 16 BRPM | TEMPERATURE: 99 F | DIASTOLIC BLOOD PRESSURE: 48 MMHG | BODY MASS INDEX: 25.31 KG/M2 | HEART RATE: 76 BPM | WEIGHT: 167 LBS | OXYGEN SATURATION: 97 %

## 2018-09-28 DIAGNOSIS — R11.2 NON-INTRACTABLE VOMITING WITH NAUSEA, UNSPECIFIED VOMITING TYPE: Primary | ICD-10-CM

## 2018-09-28 PROCEDURE — 96374 THER/PROPH/DIAG INJ IV PUSH: CPT

## 2018-09-28 PROCEDURE — 99285 EMERGENCY DEPT VISIT HI MDM: CPT

## 2018-09-28 PROCEDURE — S0028 INJECTION, FAMOTIDINE, 20 MG: HCPCS | Performed by: EMERGENCY MEDICINE

## 2018-09-28 PROCEDURE — 81003 URINALYSIS AUTO W/O SCOPE: CPT | Performed by: EMERGENCY MEDICINE

## 2018-09-28 PROCEDURE — 85025 COMPLETE CBC W/AUTO DIFF WBC: CPT | Performed by: EMERGENCY MEDICINE

## 2018-09-28 PROCEDURE — 83690 ASSAY OF LIPASE: CPT | Performed by: EMERGENCY MEDICINE

## 2018-09-28 PROCEDURE — 93010 ELECTROCARDIOGRAM REPORT: CPT

## 2018-09-28 PROCEDURE — 80053 COMPREHEN METABOLIC PANEL: CPT | Performed by: EMERGENCY MEDICINE

## 2018-09-28 PROCEDURE — 96375 TX/PRO/DX INJ NEW DRUG ADDON: CPT

## 2018-09-28 PROCEDURE — 93005 ELECTROCARDIOGRAM TRACING: CPT

## 2018-09-28 PROCEDURE — 96361 HYDRATE IV INFUSION ADD-ON: CPT

## 2018-09-28 RX ORDER — MORPHINE SULFATE 4 MG/ML
4 INJECTION, SOLUTION INTRAMUSCULAR; INTRAVENOUS ONCE
Status: COMPLETED | OUTPATIENT
Start: 2018-09-28 | End: 2018-09-28

## 2018-09-28 RX ORDER — MAGNESIUM HYDROXIDE/ALUMINUM HYDROXICE/SIMETHICONE 120; 1200; 1200 MG/30ML; MG/30ML; MG/30ML
30 SUSPENSION ORAL ONCE
Status: COMPLETED | OUTPATIENT
Start: 2018-09-28 | End: 2018-09-28

## 2018-09-28 RX ORDER — ONDANSETRON 2 MG/ML
4 INJECTION INTRAMUSCULAR; INTRAVENOUS ONCE
Status: COMPLETED | OUTPATIENT
Start: 2018-09-28 | End: 2018-09-28

## 2018-09-28 RX ORDER — FAMOTIDINE 10 MG/ML
20 INJECTION, SOLUTION INTRAVENOUS ONCE
Status: COMPLETED | OUTPATIENT
Start: 2018-09-28 | End: 2018-09-28

## 2018-09-28 RX ORDER — SUCRALFATE ORAL 1 G/10ML
1 SUSPENSION ORAL EVERY 6 HOURS
Qty: 560 ML | Refills: 0 | Status: ON HOLD | OUTPATIENT
Start: 2018-09-28 | End: 2018-10-03

## 2018-09-28 RX ORDER — ESCITALOPRAM OXALATE 20 MG/1
TABLET ORAL
Qty: 180 TABLET | Refills: 0 | OUTPATIENT
Start: 2018-09-28

## 2018-09-28 NOTE — ED PROVIDER NOTES
Patient Seen in: [address] [facility] In [place]    History   Patient presents with:  GI Bleeding (gastrointestinal)    Stated Complaint: abd pain, vomiting     HPI    Patient is a 51-year-old female with a history of gastric bypass surgery Dec • Reflux    • Unspecified disorder of thyroid    • Visual impairment     glasses       Past Surgical History:  6/19/2015: ANKLE SCOPE,PART DEBRIDEMENT;  Left      Comment:  Procedure: ARTHROSCOPY ANKLE WITH DEBRIDEMENT;  Surgeon:               Bin Overall Verena Mccoy MD;  Location: [facility] date: HYSTERECTOMY  2011: HYSTEROSCOPY  12/14/2015: INJ PARAVERT F JNT L/S 1 LEV; Bilateral      Comment:  Procedure: FACET INJECTION UNDER FLUOROSCOPY;  Surgeon:                Barbra Soliman DO;  Location:  Location: [address] [place] [address]        Social History    Tobacco Use      Smoking status: Never Smoker      Smokeless tobacco: Never Used    Alcohol use: No      Alcohol/week: 0.0 oz      Frequency: Never      Binge frequency: Never    Drug use:  No METABOLIC PANEL (14) - Abnormal; Notable for the following components:       Result Value    Calcium, Total 8.2 (*)     Alkaline Phosphatase 102 (*)     All other components within normal limits   LIPASE - Normal   URINALYSIS WITH CULTURE REFLEX   CBC WITH vomiting seems very dramatic, but there is only a small amount of saliva coming up. She has continued to be nontoxic in appearance. I did give her 1 dose of IV morphine. She was wanting to avoid CT imaging.   So I did discuss the patient with her GI do

## 2018-09-29 NOTE — TELEPHONE ENCOUNTER
[facility] paged me today to inform of Ms. Tracy Velazquez presentation over there. Presented to ED in [place] today complaining of severe epigastric abdominal pain, loud violent retching. Retching repeatedly in ED, not bringing much up.     A

## 2018-09-29 NOTE — TELEPHONE ENCOUNTER
[place]  database:                        Last Name First Name Date of Birth Date Filled Label Name Strength Metric Qty/Days [facility] Prescriber Name   Joan Quiñones 9/29/1980 9/25/2018 VIRTUSSIN A/C 10 MG/5 ML-100 MG/5  120/2 [employer] [facility] 9/29/1980 6/28/2018 VIRTUSSIN A/C 10 MG/5 ML-100 MG/5  180/9 [employer] BLANCA LAGUNA MD   [facility] 9/29/1980 6/15/2018 DIAZEPAM 5MG 10/5 [employer] LAURA SOLO   [facility] 9/29/1980 [facility] 9/29/1980 4/18/2018 ALPRAZOLAM 0.5MG 40/20 [facility]./ LAURA SOLO   [facility] 9/29/1980 4/16/2018 LYRICA 75MG 20/15 [facility] MOIRA KNOWLES   [facility] 9/29/1980 4/15/2018 LYRICA 75MG

## 2018-10-01 ENCOUNTER — OFFICE VISIT (OUTPATIENT)
Dept: FAMILY MEDICINE CLINIC | Facility: CLINIC | Age: 38
End: 2018-10-01
Payer: COMMERCIAL

## 2018-10-01 ENCOUNTER — TELEPHONE (OUTPATIENT)
Dept: SURGERY | Facility: CLINIC | Age: 38
End: 2018-10-01

## 2018-10-01 VITALS
HEART RATE: 58 BPM | SYSTOLIC BLOOD PRESSURE: 122 MMHG | BODY MASS INDEX: 25.92 KG/M2 | HEIGHT: 68.47 IN | DIASTOLIC BLOOD PRESSURE: 66 MMHG | WEIGHT: 173 LBS

## 2018-10-01 DIAGNOSIS — R11.2 NON-INTRACTABLE VOMITING WITH NAUSEA, UNSPECIFIED VOMITING TYPE: Primary | ICD-10-CM

## 2018-10-01 PROCEDURE — 99215 OFFICE O/P EST HI 40 MIN: CPT | Performed by: FAMILY MEDICINE

## 2018-10-01 NOTE — TELEPHONE ENCOUNTER
Spoke with patient over the phone      Nausea at school    Throwing up \"bile with blood\"  Light headed and dizzy    She spoke to her PCP and went to ER Rachael Cruz)    Continued to throw up blood while in ER    ECG done in ER  zofran given       Currently na

## 2018-10-01 NOTE — PATIENT INSTRUCTIONS
-- 1-2 cups oatmeal to 1-2 cups milk, 3-4 tablespoons yogurt, then any seasoning (brown sugar, honey, cinnamon, protein powder if interested)  -- mix, leave in fridge overnight  -- add fruit, granola  -- try small amounts frequently throughout the day

## 2018-10-01 NOTE — PROGRESS NOTES
Vimal White is a 45year old female here for Patient presents with:  Vomiting      HPI:       1.  Non-intractable vomiting with nausea, unspecified vomiting type  -she is here to followup recent ER visit  -she was vomiting small amounts of blood afte MARCIO-EN-Y; N/A      Comment:  Performed by Billy Rebolledo MD at [address] [place] [address] MAIN OR  No date:   No date: CHOLECYSTECTOMY  10/22/2013: COLONOSCOPY, POSSIBLE BIOPSY, POSSIBLE POLYPECTOMY 12271;    N/A      Comment:  Performed by Mendoza Russo MD at [facility] SURG LAPAROSCOPIC CHOLECYSTECTOMY; N/A      Comment:  Performed by Natalie Ma MD at [address]  No date: LAPAROSCOPY,DIAGNOSTIC  2/18/2015: LAPAROSCOPY-GYNE; N/A      Comment:  Performed by Karyle Stanley, MD at [address]  12/14/2015: ABIGAIL BY  Smokeless tobacco: Never Used    Alcohol use: No      Alcohol/week: 0.0 oz      Frequency: Never      Binge frequency: Never    Drug use: No       Medications (Active prior to today's visit):    Current Outpatient Medications:  ALPRAZOLAM 0.25 MG Oral Tab mL Rfl: 0       Allergies:    Cephalosporins          HIVES  Cheratussin Ac [Gua*    NAUSEA ONLY  Chocolate               HIVES    Comment:Dark chocolate  Doxycycline                 Comment:Hives and fever  Mold                        Comment:Verified by No prescriptions requested or ordered in this encounter       Imaging & Referrals:  None     Dae Henderson MD    I spent a total of 40 minutes, half of which was spent counseling/coordinating care regarding nausea and vomiting

## 2018-10-02 ENCOUNTER — TELEPHONE (OUTPATIENT)
Dept: GASTROENTEROLOGY | Facility: CLINIC | Age: 38
End: 2018-10-02

## 2018-10-02 ENCOUNTER — TELEPHONE (OUTPATIENT)
Dept: MEDSURG UNIT | Facility: HOSPITAL | Age: 38
End: 2018-10-02

## 2018-10-02 NOTE — TELEPHONE ENCOUNTER
Dr Jefferson Casillas,    Did you want this pt to come and see you or do you want her to schedule an EGD. See phone encounter from 09/28/18.  You spoke with the ER MD at [facility]    Please advise

## 2018-10-02 NOTE — TELEPHONE ENCOUNTER
Called patient, no answer, left message. She spoke with my partner last night Rx for phenergen called in.   I also left [facility] with Dr Priscilla Varela to discuss pt

## 2018-10-02 NOTE — TELEPHONE ENCOUNTER
Pt states she was in the ER 9/28 at [facility] and on 10/1 at [address] [place] [address] Dr. Blair Ramirez recommended pt have EGD with CB due to hiatal hernia.  Please call thank you [phone]

## 2018-10-02 NOTE — TELEPHONE ENCOUNTER
Thank you Ghassan Rebolledo for taking the phone call. Ms. Kramer  needs to come see me for an office visit next 2-4 weeks. I would not proceed directly to EGD exam.    Unfortunately, this is chronic pain and it is not an emergency.   I will need to explain to her

## 2018-10-02 NOTE — TELEPHONE ENCOUNTER
Spoke with sunil, feeling a bit better. Went to ed at [facility] last night and had another CT- now she states she has a hiatal henria. Her  will bring in CT for me to see. I reviewed her last ct here 6/2018, maybe small h/h there.   Told her t

## 2018-10-02 NOTE — TELEPHONE ENCOUNTER
Patient called again this morning, was seen in the ED and would like to add more information. She can be reached at [phone].

## 2018-10-03 ENCOUNTER — APPOINTMENT (OUTPATIENT)
Dept: GENERAL RADIOLOGY | Facility: HOSPITAL | Age: 38
End: 2018-10-03
Attending: EMERGENCY MEDICINE
Payer: COMMERCIAL

## 2018-10-03 ENCOUNTER — HOSPITAL ENCOUNTER (OUTPATIENT)
Facility: HOSPITAL | Age: 38
Setting detail: OBSERVATION
Discharge: HOME | End: 2018-10-05
Attending: EMERGENCY MEDICINE | Admitting: HOSPITALIST
Payer: COMMERCIAL

## 2018-10-03 DIAGNOSIS — R11.2 INTRACTABLE VOMITING WITH NAUSEA, UNSPECIFIED VOMITING TYPE: Primary | ICD-10-CM

## 2018-10-03 PROCEDURE — 74246 X-RAY XM UPR GI TRC 2CNTRST: CPT | Performed by: EMERGENCY MEDICINE

## 2018-10-03 PROCEDURE — 99220 INITIAL OBSERVATION CARE,LEVL III: CPT | Performed by: HOSPITALIST

## 2018-10-03 PROCEDURE — 99244 OFF/OP CNSLTJ NEW/EST MOD 40: CPT | Performed by: INTERNAL MEDICINE

## 2018-10-03 RX ORDER — ONDANSETRON 2 MG/ML
4 INJECTION INTRAMUSCULAR; INTRAVENOUS ONCE
Status: COMPLETED | OUTPATIENT
Start: 2018-10-03 | End: 2018-10-03

## 2018-10-03 RX ORDER — LORAZEPAM 2 MG/ML
0.5 INJECTION INTRAMUSCULAR EVERY 4 HOURS PRN
Status: DISCONTINUED | OUTPATIENT
Start: 2018-10-03 | End: 2018-10-05

## 2018-10-03 RX ORDER — ONDANSETRON 2 MG/ML
4 INJECTION INTRAMUSCULAR; INTRAVENOUS EVERY 6 HOURS PRN
Status: DISCONTINUED | OUTPATIENT
Start: 2018-10-03 | End: 2018-10-05

## 2018-10-03 RX ORDER — MORPHINE SULFATE 2 MG/ML
1 INJECTION, SOLUTION INTRAMUSCULAR; INTRAVENOUS EVERY 2 HOUR PRN
Status: DISCONTINUED | OUTPATIENT
Start: 2018-10-03 | End: 2018-10-05

## 2018-10-03 RX ORDER — MORPHINE SULFATE 4 MG/ML
4 INJECTION, SOLUTION INTRAMUSCULAR; INTRAVENOUS EVERY 2 HOUR PRN
Status: DISCONTINUED | OUTPATIENT
Start: 2018-10-03 | End: 2018-10-04

## 2018-10-03 RX ORDER — MORPHINE SULFATE 4 MG/ML
4 INJECTION, SOLUTION INTRAMUSCULAR; INTRAVENOUS ONCE
Status: COMPLETED | OUTPATIENT
Start: 2018-10-03 | End: 2018-10-03

## 2018-10-03 RX ORDER — LORAZEPAM 2 MG/ML
1 INJECTION INTRAMUSCULAR ONCE
Status: COMPLETED | OUTPATIENT
Start: 2018-10-03 | End: 2018-10-03

## 2018-10-03 RX ORDER — MORPHINE SULFATE 2 MG/ML
2 INJECTION, SOLUTION INTRAMUSCULAR; INTRAVENOUS EVERY 2 HOUR PRN
Status: DISCONTINUED | OUTPATIENT
Start: 2018-10-03 | End: 2018-10-04

## 2018-10-03 RX ORDER — SODIUM CHLORIDE 0.9 % (FLUSH) 0.9 %
3 SYRINGE (ML) INJECTION AS NEEDED
Status: DISCONTINUED | OUTPATIENT
Start: 2018-10-03 | End: 2018-10-05

## 2018-10-03 RX ORDER — SODIUM CHLORIDE, SODIUM LACTATE, POTASSIUM CHLORIDE, CALCIUM CHLORIDE 600; 310; 30; 20 MG/100ML; MG/100ML; MG/100ML; MG/100ML
INJECTION, SOLUTION INTRAVENOUS CONTINUOUS
Status: DISCONTINUED | OUTPATIENT
Start: 2018-10-03 | End: 2018-10-05

## 2018-10-03 RX ORDER — ACETAMINOPHEN 325 MG/1
650 TABLET ORAL EVERY 6 HOURS PRN
Status: DISCONTINUED | OUTPATIENT
Start: 2018-10-03 | End: 2018-10-05

## 2018-10-03 NOTE — ED PROVIDER NOTES
Pt given to me in signout. Pt refused initial xray. Given ativan for re-attempt at second xray but pt still unable to tolerate contrast and xray. D/w dr Inge Han, will place in observation for IVF, protonix IV.  D/w dr Ulices Sutton, likely will scope patient if a

## 2018-10-03 NOTE — ED INITIAL ASSESSMENT (HPI)
Pt c/o upper abdominal pain with nausea. States, \"This is my third ER visit this week for this. I can't keep anything down\". History of bariatric surgery.

## 2018-10-03 NOTE — TELEPHONE ENCOUNTER
Dr. Erwin Solo -     I will keep an eye on this pt's chart. If she gets discharged, I will call her to schedule an EGD with MAC at [address] [place] [address]. Thanks!

## 2018-10-03 NOTE — TELEPHONE ENCOUNTER
Mr Tamra Telles is back in the ED today. There is a good chance that she will be admitted due to multiple ED visits in the past week. If admitted, we will likely do the EGD examination Thursday or Friday.       If they do discharge her home, reasonable to se

## 2018-10-03 NOTE — ED PROVIDER NOTES
Patient Seen in: [facility] Emergency Department    History   Patient presents with:  Abdomen/Flank Pain (GI/)    Stated Complaint:     HPI    60-year-old female with past medical history significant for anxiety, arthritis, asthma, thyroid cance [facility]  6/19/2015: ARTHROSCOPY ANKLE WITH DEBRIDEMENT;  Left      Comment:  Performed by Rad Woodson MD at [place] [address]  12/18/2017: BARIATRIC GASTRIC BYPASS LAPAROSCOPIC MARCIO-EN-Y; N/A      Comment:  Performed by Ronen Lyn MD at [facility]  11/23/2016: LAPAROSCOPIC CHOLECYSTECTOMY; N/A      Comment:  Procedure: LAPAROSCOPIC CHOLECYSTECTOMY;  Surgeon:                Phillip Arce MD;  Location: [address] [place] [address] OR  11/23/2016: LAPAROSCOPIC CHOLECYSTECTOMY; N/A      Comment:  Performed by [employer]    Positive for stated complaint:   Other systems are as noted in HPI. Constitutional and vital signs reviewed. All other systems reviewed and negative except as noted above.     Physical Exam     ED Triage Vitals [10/03/18 1157]   /88 URINALYSIS WITH CULTURE REFLEX   CBC WITH DIFFERENTIAL WITH PLATELET    Narrative: The following orders were created for panel order CBC WITH DIFFERENTIAL WITH PLATELET.   Procedure                               Abnormality         Status disposition patient. Disposition and Plan     Clinical Impression:  Intractable vomiting with nausea, unspecified vomiting type  (primary encounter diagnosis)    Disposition:  There is no disposition on file for this visit.   There is no disposit

## 2018-10-03 NOTE — TELEPHONE ENCOUNTER
Dr Jean Baptiste Listen,    The pt does not want to wait for an OV. Pt appears anxious    Dr Rachana Montanez told her yesterday she needs an EGD. See his note below:      Spoke with sunil, feeling a bit better.   Went to ed at [facility] last night and had another CT- now

## 2018-10-03 NOTE — TELEPHONE ENCOUNTER
Spoke to patient    Has hiatal hernia according to CT scan    States she spoke to surgical team.    Still has nausea, exhausted and frustrated.        Plans to have EGD, needs to speak with GI team.

## 2018-10-04 ENCOUNTER — ANESTHESIA (OUTPATIENT)
Dept: ENDOSCOPY | Facility: HOSPITAL | Age: 38
End: 2018-10-04
Payer: COMMERCIAL

## 2018-10-04 ENCOUNTER — ANESTHESIA EVENT (OUTPATIENT)
Dept: ENDOSCOPY | Facility: HOSPITAL | Age: 38
End: 2018-10-04
Payer: COMMERCIAL

## 2018-10-04 PROCEDURE — 99226 SUBSEQUENT OBSERVATION CARE: CPT | Performed by: HOSPITALIST

## 2018-10-04 PROCEDURE — 43239 EGD BIOPSY SINGLE/MULTIPLE: CPT | Performed by: INTERNAL MEDICINE

## 2018-10-04 PROCEDURE — 0DB68ZX EXCISION OF STOMACH, VIA NATURAL OR ARTIFICIAL OPENING ENDOSCOPIC, DIAGNOSTIC: ICD-10-PCS | Performed by: INTERNAL MEDICINE

## 2018-10-04 RX ORDER — NALOXONE HYDROCHLORIDE 0.4 MG/ML
80 INJECTION, SOLUTION INTRAMUSCULAR; INTRAVENOUS; SUBCUTANEOUS AS NEEDED
Status: DISCONTINUED | OUTPATIENT
Start: 2018-10-04 | End: 2018-10-04 | Stop reason: HOSPADM

## 2018-10-04 RX ORDER — MAGNESIUM SULFATE HEPTAHYDRATE 40 MG/ML
2 INJECTION, SOLUTION INTRAVENOUS ONCE
Status: COMPLETED | OUTPATIENT
Start: 2018-10-04 | End: 2018-10-04

## 2018-10-04 RX ORDER — SODIUM CHLORIDE, SODIUM LACTATE, POTASSIUM CHLORIDE, CALCIUM CHLORIDE 600; 310; 30; 20 MG/100ML; MG/100ML; MG/100ML; MG/100ML
INJECTION, SOLUTION INTRAVENOUS CONTINUOUS
Status: DISCONTINUED | OUTPATIENT
Start: 2018-10-04 | End: 2018-10-04

## 2018-10-04 RX ORDER — LIDOCAINE HYDROCHLORIDE 10 MG/ML
INJECTION, SOLUTION EPIDURAL; INFILTRATION; INTRACAUDAL; PERINEURAL AS NEEDED
Status: DISCONTINUED | OUTPATIENT
Start: 2018-10-04 | End: 2018-10-04 | Stop reason: SURG

## 2018-10-04 RX ADMIN — LIDOCAINE HYDROCHLORIDE 25 MG: 10 INJECTION, SOLUTION EPIDURAL; INFILTRATION; INTRACAUDAL; PERINEURAL at 13:00:00

## 2018-10-04 NOTE — CONSULTS
[facility]    PATIENT'S NAME: Davi Langley ESTEFANÍA   ATTENDING PHYSICIAN: Zara Hodges MD   CONSULTING PHYSICIAN: Dena Raya.  Lars Oscar MD   PATIENT ACCOUNT#:   [id number]    LOCATION:  [address] [place] [address] Loop #:   [id number]       DATE OF BIRTH: and Toradol. SOCIAL HISTORY:  She denies smoking, drinking, or illicit drug use. She notes recently taking a pulse steroid pack for some bronchitis, but this was back in August, a couple months ago.     PHYSICAL EXAMINATION:    GENERAL:  She is awake, a Giselle Butler MD  d: 10/04/2018 10:43:04  t: 10/04/2018 10:48:00  Job [id number]  MSC/    cc: Yovani Rush MD

## 2018-10-04 NOTE — ANESTHESIA PREPROCEDURE EVALUATION
Anesthesia PreOp Note    HPI:     Lucien James is a 45year old female who presents for preoperative consultation requested by: Connie Hobson MD    Date of Surgery: 10/3/2018 - 10/4/2018    Procedure(s):  ESOPHAGOGASTRODUODENOSCOPY (EGD) Date Noted: 04/17/2016      History of endometriosis         Date Noted: 12/09/2015      Lumbosacral spondylosis without myelopathy         Date Noted: 12/02/2015      ANS (autonomic nervous system) disease         Date Noted: 04/17/2014      Endomet Maggie Galo MD;  Location:                [address] [place] [address]  No date: D & C      Comment:  x4  No date: DILATION/CURETTAGE,DIAGNOSTIC  1/29/2018: ESOPHAGOGASTRODUODENOSCOPY (EGD); N/A      Comment:  Performed by Celeste Whitaker MD at [facility], [facility]  HYSTEROSCOPY: OTHER  NECK SCAR REVISION: OTHER  No date: OTHER      Comment:  stent to iliac crest bone  No date: OTHER      Comment:  gastric bypass  No date: OTHER SURGICAL HISTORY      Comment:  partial thyroidectomy with subsequent scar re mouth before breakfast. Disp: 30 tablet Rfl: 5 10/3/2018 at Unknown time   Magnesium 100 MG Oral Tab Take 1 tablet by mouth daily. Disp:  Rfl:  10/3/2018 at Unknown time   NON FORMULARY Take 1 tablet by mouth daily.    Disp:  Rfl:  10/3/2018 at Unknown ti Intravenous Q2H PRN Estela Serrano MD 4 mg at 10/04/18 0911    Pantoprazole Sodium (PROTONIX) 40 mg in Sodium Chloride 0.9 % 10 mL IV push 40 mg Intravenous Daily Estela Serrano MD 40 mg at 10/04/18 1042    LORazepam (ATIVAN) injection 0.5 mg 0.5 mg In Smoker      Smokeless tobacco: Never Used    Substance and Sexual Activity      Alcohol use: No        Alcohol/week: 0.0 oz        Frequency: Never        Binge frequency: Never      Drug use: No      Sexual activity: Not on file    Other Topics      Amber Her oral temperature is 98 °F (36.7 °C). Her blood pressure is 105/41 and her pulse is 61. Her respiration is 10 and oxygen saturation is 99%.     10/03/18  1949 10/04/18  0118 10/04/18  0830 10/04/18  1120   BP: 102/57 105/73 108/67 105/41   BP Location: L

## 2018-10-04 NOTE — OPERATIVE REPORT
EGD PROCEDURE REPORT    DATE OF PROCEDURE:  10/4/2018    PCP: Gerry Gallego MD     PREOPERATIVE DIAGNOSIS:  Epigastric abdominal pain, intractable nausea/vomiting; history gastric bariatric surgery     POSTOPERATIVE DIAGNOSIS:  See impression.      Joaquin Roger tolerated, continue anti-emetics. · Wean off narcotic pain medications. Doris Thomas has received 20 mg of morphine IV since 10 PM last night. · See previous EGD examination procedure reports of 1/29/2018 and 3/29/2018.   · Discharge planning

## 2018-10-04 NOTE — PLAN OF CARE
Problem: Patient/Family Goals  Goal: Patient/Family Short Term Goal  Patient's Short Term Goal: No pain    Interventions:   - PRN pain meds  - Evaluate for effectiveness of pain medicine  -Encourage use of relaxation techniques    - See additional Care Urmila and/or relaxation techniques  - Monitor for opioid side effects  - Notify MD/LIP if interventions unsuccessful or patient reports new pain  - Anticipate increased pain with activity and pre-medicate as appropriate  Outcome: Progressing

## 2018-10-04 NOTE — H&P
[facility]    PATIENT'S NAME: Idris Hills   ATTENDING PHYSICIAN: Petra Gutiérrez MD   PATIENT ACCOUNT#:   [id number]    LOCATION:  [address] [place] [address] RECORD #:   [id number]       YOB: 1980  ADMISSION DATE:       10/03/ intake for the last 4 to 5 days. Denies NSAID use over the counter. No fever or chills. She had her last bowel movement today. Other 12-point review of systems negative.       PHYSICAL EXAMINATION:    GENERAL:  Alert and oriented to time, place, and per

## 2018-10-04 NOTE — ANESTHESIA POSTPROCEDURE EVALUATION
Patient: Stephanie Mckeon    Procedure Summary     Date:  10/04/18 Room / Location:  [address] [place] [address] ENDOSCOPY 05 / [address] [place] [address] ENDOSCOPY    Anesthesia Start:  5707 Anesthesia Stop:      Procedure:  ESOPHAGOGASTRODUODENOSCOPY (EGD) (N/A ) Diagnosis:  (gastritis)    Surgeon:

## 2018-10-04 NOTE — CONSULTS
[facility]  Report of GI Consultation    Agusto Jaimes Patient Status:  Observation    1980 MRN [id number]   Location [facility] 5SW/SE Attending Wayne El MD   Hosp Day # 0 PCP Rosemarie Reveles MD     Date of Admi In the past week, Ms Sukh Crook describes severe daily vomiting. She states that she has been taking 24 mg of ondansetron per day without relief.   She describes some blood, hematemesis with the vomiting and worsening upper abdominal epigastric pain after t I was paged last week by the [place] [facility] when she was over there with especially loud and violent retching, making loud vomiting sounds but not bringing much up. She was discharged from the ED. She has been in touch with Primo pate · Imaging studies reviewed. Most recent CT scan here was reassuring on 6/5/2018. Apparently CT scan at [facility] on 10/1/2018 was reassuring, with minor incidental findings only. · Reassuring CBC and CMP today.   Reassuring albumin level of 3 TREY OLIVER 9/29/1980 8/18/2018 HYDROCODON-ACETAMINOPHEN 5MG-325MG 23/7 [place]./ LAURA LARA 9/29/1980 8/13/2018 ZOLPIDEM TARTRATE 5MG 30/ 30 [place] LAURA SOLO • Hypothyroidism    • Infertility, female    • Organic hypersomnia, unspecified DMG DX 11-2-12    AHI 2 RDI 2 REM AHI 6 SaO2 curtis 89%   • OTHER DISEASES     rectal bleeding   • Pancreatitis    • Pneumonia august 2014   • Pneumonia, organism unspecified(48 Comment:  Performed by Ted Saeed MD at [address] [place] [address],[address]  6/19/2015: [facility]; Left      Comment:  Performed by Ted Saeed MD at [place] [address]  [facility]/19/[facility]: [facility];  Left      Comment: 12/15: UPPER GI ENDO NO BARRETTS      Comment:  normal  01/25/2017: UPPER GI ENDOSCOPY PERFORMED      Comment:  Carol Mills M.D.  12/19/2015: UPPER GI ENDOSCOPY,BIOPSY; N/A      Comment:  Procedure: ESOPHAGOGASTRODUODENOSCOPY, POSSIBLE BIOPSY, ALPRAZOLAM 0.25 MG Oral Tab TAKE 1 OR 2 TABLETS BY MOUTH TWICE DAILY AS NEEDED FOR ANXIETY   PANTOPRAZOLE SODIUM 40 MG Oral Tab EC TAKE 1 TABLET(40 MG) BY MOUTH TWICE DAILY BEFORE MEALS   Albuterol Sulfate HFA (PROAIR HFA) 108 (90 Base) MCG/ACT Inhalation Review of Systems:     Some hematemesis with the vomiting. Baseline of constipation. No fevers. No neurologic events. 12 point review of systems is as above otherwise negative.     Physical Exam:   Blood pressure 102/57, pulse 80, temperature 98.5 °F (3 CONCLUSION:   * The patient could not tolerate the examination. * Patient could not drink contrast material. * Suggest additional attempt when the patient's clinical condition permits. * Postoperative changes are seen in the upper abdomen.  * Bilateral comm

## 2018-10-04 NOTE — TELEPHONE ENCOUNTER
Thanks Vinicius Casarez did end up getting admitted and we will do her EGD examination on 10/4/2018.

## 2018-10-04 NOTE — PLAN OF CARE
Problem: Patient/Family Goals  Goal: Patient/Family Long Term Goal  Patient's Long Term Goal: no nausea/vomitting    Interventions:  - Assess tolerance to diet  -GI consult  -Zofran as needed  -EGD  - See additional Care Plan goals for specific interventio Minimal or absence of nausea and vomiting  INTERVENTIONS:  - Maintain adequate hydration with IV or PO as ordered and tolerated  - Evaluate effectiveness of ordered antiemetic medications  - Provide nonpharmacologic comfort measures as appropriate  - Chas rounding performed.

## 2018-10-04 NOTE — BH PROGRESS NOTE
Behavioral Health Note  CHIEF COMPLAINT  Intractable nausea and vomiting    REASON FOR ADMISSION  Intractable nausea and vomiting    SOCIAL HISTORY  Ketty lives with  and 2 children, but is currently living with her parents while her home is being the injury and reports issues with dependence and addiction at that time.    No SI/HI  MENTAL STATUS EXAM    Appearance: normal grooming  Attitude/Coorperation: coorperative  Behavior: no psychomotor abnormality  Speech: normal rate, rhythm, and volume  Moo

## 2018-10-05 VITALS
TEMPERATURE: 98 F | BODY MASS INDEX: 25.89 KG/M2 | OXYGEN SATURATION: 100 % | HEART RATE: 58 BPM | WEIGHT: 170.81 LBS | RESPIRATION RATE: 18 BRPM | SYSTOLIC BLOOD PRESSURE: 110 MMHG | DIASTOLIC BLOOD PRESSURE: 62 MMHG | HEIGHT: 68 IN

## 2018-10-05 PROCEDURE — 99217 OBSERVATION CARE DISCHARGE: CPT | Performed by: HOSPITALIST

## 2018-10-05 PROCEDURE — 90792 PSYCH DIAG EVAL W/MED SRVCS: CPT | Performed by: OTHER

## 2018-10-05 RX ORDER — ONDANSETRON HYDROCHLORIDE 8 MG/1
8 TABLET, FILM COATED ORAL EVERY 8 HOURS PRN
Qty: 30 TABLET | Refills: 0 | Status: SHIPPED | OUTPATIENT
Start: 2018-10-05 | End: 2019-02-06

## 2018-10-05 RX ORDER — MAGNESIUM SULFATE HEPTAHYDRATE 40 MG/ML
2 INJECTION, SOLUTION INTRAVENOUS ONCE
Status: COMPLETED | OUTPATIENT
Start: 2018-10-05 | End: 2018-10-05

## 2018-10-05 NOTE — PLAN OF CARE
Problem: Patient/Family Goals  Goal: Patient/Family Long Term Goal  Patient's Long Term Goal: no nausea/vomitting    Interventions:  - Assess tolerance to diet  -GI consult  -Zofran as needed  -EGD  - See additional Care Plan goals for specific interventio Discharge      Problem: GASTROINTESTINAL - ADULT  Goal: Minimal or absence of nausea and vomiting  INTERVENTIONS:  - Maintain adequate hydration with IV or PO as ordered and tolerated  - Evaluate effectiveness of ordered antiemetic medications  - Provide n

## 2018-10-05 NOTE — PLAN OF CARE
Problem: Patient/Family Goals  Goal: Patient/Family Long Term Goal  Patient's Long Term Goal: no nausea/vomitting    Interventions:  - Assess tolerance to diet  -GI consult  -Zofran as needed  -EGD  - See additional Care Plan goals for specific interventio ordered and tolerated  - Evaluate effectiveness of GI medications  - Encourage mobilization and activity  - Obtain nutritional consult as needed  - Establish a toileting routine/schedule  - Consider collaborating with pharmacy to review patient's medicatio

## 2018-10-05 NOTE — PROGRESS NOTES
[facility]  Hospitalist Progress Note     Ben Kendall Patient Status:  Observation    1980  45year old [facility] [id number]   Location 527/527-A Attending Nate Suresh MD   Hosp Day # 0 PCP Arlene Feliz MD     ASSESSMENT/PLAN 10/05/18   0543   GLU  86  90  81   --    BUN  11  5*  5*   --    CREATSERUM  0.78  0.79  0.77   --    GFRAA  112  >60  >60   --    GFRNAA  97  >60  >60   --    CA  8.2*  8.5  8.0*   --    ALB  3.6  3.7   --    --    NA  142  140  137   --    K  3.6  4.0

## 2018-10-05 NOTE — PROGRESS NOTES
[facility]    Progress Note    Alis Hernadez Patient Status:  Observation    1980 MRN [id number]   Location [facility] 5SW/SE Attending Gorge Guardado MD   Hosp Day # 0 PCP [place] MD       SUBJECTIVE:  Very * Bilateral common iliac artery stents noted. * Moderate amount of fecal material is seen throughout the colon. No evidence of obstruction. .         Dictated by (CST): Steph Torrez MD on 10/03/2018 at 16:22     Approved by (CST):  Steph Torrez MD with nausea     Intractable vomiting with nausea, unspecified vomiting type      Plan:     Intractable nausea and vomiting  - GI and Bariatric surgery consulted  - s/p endoscopy today with no acute findings  - may be a component of psychiatric etiology  -

## 2018-10-06 NOTE — CONSULTS
[facility] HOSP - [facility]    Report of Consultation    Ben Madsenma Patient Status:  Observation    1980 MRN [id number]   Location [facility] 5SW/SE Attending Nate Suresh MD   Hosp Day # 0 PCP Arlene Feliz MD     Date of Admis worry, difficulty sleeping, occasional panicky and emotional.  Patient reported that she believe she went to through a lot of traumatic event including fall at work and school and becoming addicted on narcotic medication which she does not take anymore. Anxiety state, unspecified    • Arthritis    • Asthma    • Calculus of kidney    • Cancer [facility])     thyroid   • Diverticulosis of large intestine    • Endometriosis    • Esophageal reflux    • Extrinsic asthma, unspecified     2014 was in ER and was hospit ENDOSCOPY  12/19/2015: ESOPHAGOGASTRODUODENOSCOPY, POSSIBLE BIOPSY, POSSIBLE   POLYPECTOMY 90036; N/A      Comment:  Performed by Branden Juarez MD at [address] [place] [address],[address]  12/14/2015: FACET INJECTION UNDER FLUOROSCOPY;  Bilateral date: OTHER SURGICAL HISTORY      Comment:  laparoscopies x2  No date: REMOVAL GALLBLADDER  6/26/2013: ROBOT-ASSISTED LAPAROSCOPIC HYSTERECTOMY; N/A      Comment:  Performed by Gamal Rhodes MD at [address]  March 2015.: SURGICAL STENT; Bilateral Comment:Reglan with benadryl, feels like she is going to             jump out of her skin  Toradol [Ketorolac *    RASH  Tramadol                RASH  Azithromycin            RESTLESSNESS    Comment:tingling      Review of Systems:     As by Admitting/Atte but cooperative with no psychomotor agitation or retardation. Patient was very talkative with circumstantial thought process.   The patient otherwise admitted that she has been anxious and her anxiety presented and physical symptoms such vomiting and abdom Pathology, Tissue      Meds This Visit:  Requested Prescriptions     Signed Prescriptions Disp Refills   • Ondansetron HCl (ZOFRAN) 8 MG tablet 30 tablet 0     Sig: Take 1 tablet (8 mg total) by mouth every 8 (eight) hours as needed for Nausea.            G

## 2018-10-08 ENCOUNTER — PATIENT OUTREACH (OUTPATIENT)
Dept: CASE MANAGEMENT | Age: 38
End: 2018-10-08

## 2018-10-08 ENCOUNTER — PATIENT MESSAGE (OUTPATIENT)
Dept: CASE MANAGEMENT | Age: 38
End: 2018-10-08

## 2018-10-09 ENCOUNTER — TELEPHONE (OUTPATIENT)
Dept: FAMILY MEDICINE CLINIC | Facility: CLINIC | Age: 38
End: 2018-10-09

## 2018-10-09 RX ORDER — MIRTAZAPINE 7.5 MG/1
7.5 TABLET, FILM COATED ORAL NIGHTLY
Qty: 30 TABLET | Refills: 1 | Status: SHIPPED | OUTPATIENT
Start: 2018-10-09 | End: 2018-11-23

## 2018-10-10 NOTE — DISCHARGE SUMMARY
[place] HOSPITALIST  DISCHARGE SUMMARY     Dotty Castillo Patient Status:  Observation    1980 MRN [id number]   Location [facility] 5SW/SE Attending Tito Burris MD   Hosp Day # 0 PCP Dae Henderson MD     DATE OF ADMISSION: 10/3/2018  D HEENT: NCAT, neck supple, no carotid bruit. CV: RRR, S1S2, and intact distal pulses. No gallop, rub, murmur. Pulm: Effort and breath sounds normal. No distress, wheezes, rales, rhonchi. Abd: Soft, NTND, BS normal, no mass, no HSM, no rebound/guarding. Magnesium 100 MG Tabs      Take 1 tablet by mouth daily. Refills:  0     NON FORMULARY      Take 1 tablet by mouth daily.    Refills:  0     Ondansetron HCl 8 MG tablet  Commonly known as:  ZOFRAN  Notes to patient:  May take anytime when needed      Ta symptoms. Greater than 30 minutes spent on discharge.  -----------------------    Hospital Discharge Diagnoses:   Intractable vomiting with nausea    Lace+ Score: 77  59-90 High Risk  29-58 Medium Risk  0-28   Low Risk.     TCM Follow-Up Recommendation:  L

## 2018-10-16 RX ORDER — PANTOPRAZOLE SODIUM 40 MG/1
TABLET, DELAYED RELEASE ORAL
Qty: 60 TABLET | Refills: 0 | Status: SHIPPED | OUTPATIENT
Start: 2018-10-16 | End: 2018-11-15

## 2018-10-18 RX ORDER — ALPRAZOLAM 0.25 MG/1
TABLET ORAL
Qty: 120 TABLET | Refills: 0 | OUTPATIENT
Start: 2018-10-18

## 2018-10-18 RX ORDER — ALPRAZOLAM 0.25 MG/1
TABLET ORAL 2 TIMES DAILY PRN
Qty: 120 TABLET | Refills: 0 | Status: SHIPPED | OUTPATIENT
Start: 2018-10-23 | End: 2018-10-20

## 2018-10-20 ENCOUNTER — OFFICE VISIT (OUTPATIENT)
Dept: FAMILY MEDICINE CLINIC | Facility: CLINIC | Age: 38
End: 2018-10-20
Payer: COMMERCIAL

## 2018-10-20 VITALS
HEART RATE: 78 BPM | BODY MASS INDEX: 26.67 KG/M2 | HEIGHT: 68.47 IN | OXYGEN SATURATION: 98 % | SYSTOLIC BLOOD PRESSURE: 118 MMHG | DIASTOLIC BLOOD PRESSURE: 72 MMHG | RESPIRATION RATE: 16 BRPM | WEIGHT: 178 LBS | TEMPERATURE: 98 F

## 2018-10-20 DIAGNOSIS — R10.9 CHRONIC ABDOMINAL PAIN: ICD-10-CM

## 2018-10-20 DIAGNOSIS — F41.1 GAD (GENERALIZED ANXIETY DISORDER): ICD-10-CM

## 2018-10-20 DIAGNOSIS — G44.221 CHRONIC TENSION-TYPE HEADACHE, INTRACTABLE: Primary | ICD-10-CM

## 2018-10-20 DIAGNOSIS — F41.9 ANXIETY WITH SOMATIZATION: ICD-10-CM

## 2018-10-20 DIAGNOSIS — F45.0 ANXIETY WITH SOMATIZATION: ICD-10-CM

## 2018-10-20 DIAGNOSIS — G89.29 CHRONIC ABDOMINAL PAIN: ICD-10-CM

## 2018-10-20 PROCEDURE — 99214 OFFICE O/P EST MOD 30 MIN: CPT | Performed by: FAMILY MEDICINE

## 2018-10-20 RX ORDER — PREGABALIN 25 MG/1
25 CAPSULE ORAL 2 TIMES DAILY
Qty: 60 CAPSULE | Refills: 1 | Status: SHIPPED | OUTPATIENT
Start: 2018-10-20 | End: 2018-12-18

## 2018-10-20 RX ORDER — BUTALBITAL, ACETAMINOPHEN AND CAFFEINE 50; 325; 40 MG/1; MG/1; MG/1
TABLET ORAL
Qty: 18 TABLET | Refills: 1 | Status: SHIPPED | OUTPATIENT
Start: 2018-10-20 | End: 2018-11-23

## 2018-10-20 RX ORDER — ALPRAZOLAM 0.25 MG/1
TABLET ORAL 2 TIMES DAILY PRN
Qty: 120 TABLET | Refills: 0 | Status: SHIPPED | OUTPATIENT
Start: 2018-10-22 | End: 2018-11-15

## 2018-10-20 RX ORDER — ALPRAZOLAM 0.25 MG/1
TABLET ORAL 2 TIMES DAILY PRN
Qty: 120 TABLET | Refills: 0 | Status: SHIPPED | OUTPATIENT
Start: 2018-10-23 | End: 2018-10-20

## 2018-10-22 ENCOUNTER — TELEPHONE (OUTPATIENT)
Dept: FAMILY MEDICINE CLINIC | Facility: CLINIC | Age: 38
End: 2018-10-22

## 2018-10-22 NOTE — TELEPHONE ENCOUNTER
Tammy Gaxiola    Per Cover my meds either of these 2 meds will have a better outcome: Will you change the lyrica to either: gabapentin or Duloxetine? Please advise.   thanks

## 2018-10-22 NOTE — TELEPHONE ENCOUNTER
Spoke to Herbert and she put in for a dosage change/override request for this medication to account management. If they approve this she said they will contact the pharmacy directly to have it filled.    The Case# is [id number]

## 2018-11-06 ENCOUNTER — OFFICE VISIT (OUTPATIENT)
Dept: FAMILY MEDICINE CLINIC | Facility: CLINIC | Age: 38
End: 2018-11-06
Payer: COMMERCIAL

## 2018-11-06 VITALS
SYSTOLIC BLOOD PRESSURE: 118 MMHG | HEART RATE: 81 BPM | BODY MASS INDEX: 28.02 KG/M2 | DIASTOLIC BLOOD PRESSURE: 70 MMHG | HEIGHT: 68.47 IN | OXYGEN SATURATION: 96 % | TEMPERATURE: 98 F | WEIGHT: 187 LBS | RESPIRATION RATE: 16 BRPM

## 2018-11-06 DIAGNOSIS — J45.901 MILD ASTHMA EXACERBATION: Primary | ICD-10-CM

## 2018-11-06 PROCEDURE — 99214 OFFICE O/P EST MOD 30 MIN: CPT | Performed by: FAMILY MEDICINE

## 2018-11-06 RX ORDER — ACETAMINOPHEN AND CODEINE PHOSPHATE 300; 30 MG/1; MG/1
1 TABLET ORAL EVERY 8 HOURS PRN
Qty: 10 TABLET | Refills: 0 | Status: SHIPPED | OUTPATIENT
Start: 2018-11-06 | End: 2018-11-23 | Stop reason: ALTCHOICE

## 2018-11-06 NOTE — PATIENT INSTRUCTIONS
-- increase albuterol to 3x/day during the day for next 2-3 days  -- tylenol with codeine for most severe coughing  -- if worsening despite above, start medrol dose pack  -- followup with coco in 2 wks as planned, sooner if needed

## 2018-11-06 NOTE — PROGRESS NOTES
CC:  Lucien James is a 45year old female here for Patient presents with:  ER F/U: patient went to Er for asthma attack, patient states that she feels a little better      HPI:     URI  -started several weeks ago  -associated with coughing and wheezi Tab Take 0.5-1 tablets (2.5-5 mg total) by mouth nightly. Disp: 30 tablet Rfl: 2   UNITHROID 150 MCG Oral Tab Take 1 tablet (150 mcg total) by mouth before breakfast. Disp: 30 tablet Rfl: 5   Magnesium 100 MG Oral Tab Take 1 tablet by mouth daily.    Disp: 12/18/2017   • Hypothyroid    • Hypothyroidism    • Infertility, female    • Organic hypersomnia, unspecified DMG DX 11-2-12    AHI 2 RDI 2 REM AHI 6 SaO2 curtis 89%   • OTHER DISEASES     rectal bleeding   • Pancreatitis    • Pneumonia august 2014   • Pneu Monster Ray MD at [facility] MAIN OR   • OTHER  HYSTEROSCOPY   • OTHER  NECK SCAR REVISION   • OTHER      stent to iliac crest bone   • OTHER      gastric bypass   • OTHER SURGICAL HISTORY      partial thyroidectomy with subsequent scar revision   • OTHER SURGICAL Visit:  Requested Prescriptions     Signed Prescriptions Disp Refills   • Acetaminophen-Codeine (TYLENOL WITH CODEINE #3) 300-30 MG Oral Tab 10 tablet 0     Sig: Take 1 tablet by mouth every 8 (eight) hours as needed for Pain.        Imaging & Referrals:  N

## 2018-11-15 DIAGNOSIS — G89.29 CHRONIC ABDOMINAL PAIN: ICD-10-CM

## 2018-11-15 DIAGNOSIS — F41.1 GAD (GENERALIZED ANXIETY DISORDER): ICD-10-CM

## 2018-11-15 DIAGNOSIS — R10.9 CHRONIC ABDOMINAL PAIN: ICD-10-CM

## 2018-11-15 RX ORDER — PANTOPRAZOLE SODIUM 40 MG/1
TABLET, DELAYED RELEASE ORAL
Qty: 60 TABLET | Refills: 0 | Status: SHIPPED | OUTPATIENT
Start: 2018-11-15 | End: 2018-12-11

## 2018-11-16 RX ORDER — ALPRAZOLAM 0.25 MG/1
TABLET ORAL
Qty: 120 TABLET | Refills: 0 | Status: SHIPPED | OUTPATIENT
Start: 2018-11-16 | End: 2019-01-07

## 2018-11-23 ENCOUNTER — OFFICE VISIT (OUTPATIENT)
Dept: FAMILY MEDICINE CLINIC | Facility: CLINIC | Age: 38
End: 2018-11-23
Payer: COMMERCIAL

## 2018-11-23 VITALS
WEIGHT: 181 LBS | HEART RATE: 96 BPM | DIASTOLIC BLOOD PRESSURE: 60 MMHG | SYSTOLIC BLOOD PRESSURE: 106 MMHG | HEIGHT: 68.47 IN | BODY MASS INDEX: 27.12 KG/M2

## 2018-11-23 DIAGNOSIS — G44.221 CHRONIC TENSION-TYPE HEADACHE, INTRACTABLE: Primary | ICD-10-CM

## 2018-11-23 DIAGNOSIS — F45.0 ANXIETY WITH SOMATIZATION: ICD-10-CM

## 2018-11-23 DIAGNOSIS — F41.1 GAD (GENERALIZED ANXIETY DISORDER): ICD-10-CM

## 2018-11-23 DIAGNOSIS — F32.5 MAJOR DEPRESSION IN REMISSION (HCC): ICD-10-CM

## 2018-11-23 DIAGNOSIS — F51.01 PRIMARY INSOMNIA: ICD-10-CM

## 2018-11-23 DIAGNOSIS — F41.9 ANXIETY WITH SOMATIZATION: ICD-10-CM

## 2018-11-23 DIAGNOSIS — Z79.899 MEDICATION MANAGEMENT: ICD-10-CM

## 2018-11-23 DIAGNOSIS — Z87.09 HX OF ACUTE BRONCHITIS WITH BRONCHOSPASM: ICD-10-CM

## 2018-11-23 PROCEDURE — 99215 OFFICE O/P EST HI 40 MIN: CPT | Performed by: FAMILY MEDICINE

## 2018-11-23 RX ORDER — IPRATROPIUM BROMIDE AND ALBUTEROL SULFATE 2.5; .5 MG/3ML; MG/3ML
SOLUTION RESPIRATORY (INHALATION)
Refills: 0 | COMMUNITY
Start: 2018-10-29 | End: 2018-12-07 | Stop reason: ALTCHOICE

## 2018-11-23 RX ORDER — BUTALBITAL, ACETAMINOPHEN AND CAFFEINE 50; 325; 40 MG/1; MG/1; MG/1
TABLET ORAL
Qty: 18 TABLET | Refills: 1 | Status: SHIPPED | OUTPATIENT
Start: 2018-11-23 | End: 2018-12-17

## 2018-11-23 RX ORDER — MIRTAZAPINE 7.5 MG/1
7.5 TABLET, FILM COATED ORAL NIGHTLY
Qty: 30 TABLET | Refills: 5 | Status: SHIPPED | OUTPATIENT
Start: 2018-11-23 | End: 2019-05-15

## 2018-11-23 RX ORDER — ZOLPIDEM TARTRATE 5 MG/1
TABLET ORAL NIGHTLY
Qty: 30 TABLET | Refills: 2 | Status: SHIPPED | OUTPATIENT
Start: 2018-11-23 | End: 2019-02-28

## 2018-11-23 RX ORDER — METHYLPREDNISOLONE 4 MG/1
TABLET ORAL
Refills: 0 | COMMUNITY
Start: 2018-10-29 | End: 2018-12-07 | Stop reason: ALTCHOICE

## 2018-11-23 RX ORDER — AMOXICILLIN AND CLAVULANATE POTASSIUM 875; 125 MG/1; MG/1
1 TABLET, FILM COATED ORAL 2 TIMES DAILY
Refills: 0 | COMMUNITY
Start: 2018-11-18 | End: 2018-12-07 | Stop reason: ALTCHOICE

## 2018-11-23 RX ORDER — ESCITALOPRAM OXALATE 20 MG/1
10 TABLET ORAL DAILY
COMMUNITY
Start: 2016-02-25 | End: 2019-05-31

## 2018-11-23 NOTE — PROGRESS NOTES
Marva Prasad is a 45year old female. HPI:   #1 follow-up on anxiety, depression remission, panic attack, and  insomnia  Patient is seen to psychologist now 1 for pain management another for routine psychology counseling.   She is a regular psycholog and anxiety. #4 gets intermittent pain left ovary near ovary. Believes it is from the endometriosis and ovulation is on a cyclic basis now does not disrupt her life when it occurs.     #5 tension/stress headaches possible rebound headaches  Patient has Soln INHALE 2 PUFFS BY MOUTH EVERY 4 HOURS AS NEEDED FOR WHEEZING Disp: 8.5 g Rfl: 0   UNITHROID 150 MCG Oral Tab Take 1 tablet (150 mcg total) by mouth before breakfast. Disp: 30 tablet Rfl: 5   Magnesium 100 MG Oral Tab Take 1 tablet by mouth daily.    Nasreen Cortés Size: adult)   Pulse 96   Ht 68.47\"   Wt 181 lb   BMI 27.14 kg/m²   GENERAL: well developed, well nourished,in no apparent distress no active coughing during office visit  SKIN: no rashes,no suspicious lesions  HEENT: TM clear bilaterally, nose clear shelia continue at a conservative level for her headaches.   It advised that this can trigger rebound headaches if done regularly patient verbalized understanding.  - Butalbital-APAP-Caffeine -40 MG Oral Tab; TAKE 1  TABLET BY MOUTH up to 5 TIMES PER WEEK FO

## 2018-11-24 PROBLEM — R11.2 INTRACTABLE VOMITING WITH NAUSEA: Status: RESOLVED | Noted: 2018-10-03 | Resolved: 2018-11-24

## 2018-11-24 PROBLEM — Z98.890 STATUS POST RIGHT FOOT SURGERY: Status: RESOLVED | Noted: 2017-06-12 | Resolved: 2018-11-24

## 2018-11-24 PROBLEM — R10.9 POSTOPERATIVE ABDOMINAL PAIN: Status: RESOLVED | Noted: 2018-08-11 | Resolved: 2018-11-24

## 2018-11-24 PROBLEM — E66.9 OBESITY: Status: RESOLVED | Noted: 2017-12-18 | Resolved: 2018-11-24

## 2018-11-24 PROBLEM — R11.2 INTRACTABLE VOMITING WITH NAUSEA, UNSPECIFIED VOMITING TYPE: Status: RESOLVED | Noted: 2018-10-03 | Resolved: 2018-11-24

## 2018-11-24 PROBLEM — G89.18 POSTOPERATIVE ABDOMINAL PAIN: Status: RESOLVED | Noted: 2018-08-11 | Resolved: 2018-11-24

## 2018-11-29 ENCOUNTER — PATIENT MESSAGE (OUTPATIENT)
Dept: FAMILY MEDICINE CLINIC | Facility: CLINIC | Age: 38
End: 2018-11-29

## 2018-11-30 RX ORDER — ALPRAZOLAM 1 MG/1
TABLET ORAL
Qty: 60 TABLET | Refills: 0 | OUTPATIENT
Start: 2018-11-30 | End: 2019-01-15 | Stop reason: ALTCHOICE

## 2018-11-30 NOTE — TELEPHONE ENCOUNTER
Jan Allan PA-C 11/30/2018 8:27 AM CST    She has a recent RX of the 0.25 mg did she use all of it I am fine with changing RX to xanax 1 mg twice daily for 1 month then she will need to taper down. Give her only #60 of the 1 mg BID NO REFILL.    ---

## 2018-12-03 ENCOUNTER — PATIENT MESSAGE (OUTPATIENT)
Dept: FAMILY MEDICINE CLINIC | Facility: CLINIC | Age: 38
End: 2018-12-03

## 2018-12-03 RX ORDER — BUTALBITAL, ACETAMINOPHEN AND CAFFEINE 300; 40; 50 MG/1; MG/1; MG/1
1 CAPSULE ORAL 3 TIMES DAILY PRN
Qty: 40 CAPSULE | Refills: 0 | OUTPATIENT
Start: 2018-12-03 | End: 2018-12-17

## 2018-12-03 NOTE — TELEPHONE ENCOUNTER
Tram Quevedo PA-C 12/3/2018 2:34 PM CST    For 2 weeks only ok to take the Fioricet (Acetaminophen / Butalbital / Caffeine) 3 times a day if needed for 2 weeks. See if she is ok to change to the acetaminophen from the one with aspirin.  If she is ta

## 2018-12-04 ENCOUNTER — PATIENT MESSAGE (OUTPATIENT)
Dept: FAMILY MEDICINE CLINIC | Facility: CLINIC | Age: 38
End: 2018-12-04

## 2018-12-04 NOTE — PROGRESS NOTES
Spoke to patient with Daniela's recommendations and she states she will take the rest of the week off from work and that she will need a note for when she goes back. She said she will go back on Monday 12/10/18.   She will send a message thru her mychart

## 2018-12-07 ENCOUNTER — TELEPHONE (OUTPATIENT)
Dept: FAMILY MEDICINE CLINIC | Facility: CLINIC | Age: 38
End: 2018-12-07

## 2018-12-07 ENCOUNTER — OFFICE VISIT (OUTPATIENT)
Dept: FAMILY MEDICINE CLINIC | Facility: CLINIC | Age: 38
End: 2018-12-07
Payer: COMMERCIAL

## 2018-12-07 VITALS
HEART RATE: 92 BPM | DIASTOLIC BLOOD PRESSURE: 64 MMHG | HEIGHT: 68.47 IN | BODY MASS INDEX: 27.27 KG/M2 | SYSTOLIC BLOOD PRESSURE: 94 MMHG | WEIGHT: 182 LBS | TEMPERATURE: 98 F

## 2018-12-07 DIAGNOSIS — F45.0 ANXIETY WITH SOMATIZATION: ICD-10-CM

## 2018-12-07 DIAGNOSIS — E16.1 HYPOGLYCEMIC REACTION: ICD-10-CM

## 2018-12-07 DIAGNOSIS — F41.9 ANXIETY WITH SOMATIZATION: ICD-10-CM

## 2018-12-07 DIAGNOSIS — S06.0X0A CONCUSSION WITHOUT LOSS OF CONSCIOUSNESS, INITIAL ENCOUNTER: Primary | ICD-10-CM

## 2018-12-07 PROCEDURE — 1111F DSCHRG MED/CURRENT MED MERGE: CPT | Performed by: FAMILY MEDICINE

## 2018-12-07 PROCEDURE — 99215 OFFICE O/P EST HI 40 MIN: CPT | Performed by: FAMILY MEDICINE

## 2018-12-07 RX ORDER — BLOOD-GLUCOSE METER
1 KIT MISCELLANEOUS AS NEEDED
Qty: 1 KIT | Refills: 0 | Status: SHIPPED | OUTPATIENT
Start: 2018-12-07 | End: 2019-02-18 | Stop reason: ALTCHOICE

## 2018-12-07 RX ORDER — BLOOD SUGAR DIAGNOSTIC
STRIP MISCELLANEOUS
Qty: 100 STRIP | Refills: 0 | Status: SHIPPED | OUTPATIENT
Start: 2018-12-07 | End: 2019-02-18 | Stop reason: ALTCHOICE

## 2018-12-07 NOTE — PROGRESS NOTES
Angela Goodwin is a 45year old female. HPI:   Follow-up concussion  This past Sunday patient was out to lunch with her mom went to the bathroom due to nausea states that she just ate a little bit too much.   While in the bathroom she threw up, got up multiple testing. Patient's anxiety usually exacerbates the discomfort or symptoms of nausea and vomiting. Patient has been told multiple times to avoid emergency room visits and is to tell providers that she cannot do any more CT scans.     Patient has i Cap Take 1 capsule (25 mg total) by mouth 2 (two) times daily.  Disp: 60 capsule Rfl: 1   CALCITRIOL 0.25 MCG Oral Cap TAKE 1 CAPSULE(0.25 MCG) BY MOUTH TWICE DAILY Disp: 60 capsule Rfl: 0   Ondansetron HCl (ZOFRAN) 8 MG tablet Take 1 tablet (8 mg total) by Alcohol use: No      Alcohol/week: 0.0 oz      Frequency: Never      Binge frequency: Never    Drug use: No       REVIEW OF SYSTEMS:   GENERAL HEALTH: feels well otherwise  SKIN: denies any unusual skin lesions or rashes  RESPIRATORY: denies shortness of b (FREESTYLE TEST) In Vitro Strip 100 strip 0     Sig: Use as directed for hypoglycemia       Imaging & Consults:  None  1.  Concussion without loss of consciousness, initial encounter  Patient is to rest throughout the weekend limit activity, limit electroni to return in 1 month.

## 2018-12-07 NOTE — TELEPHONE ENCOUNTER
[facility] called stating they needed to know how often patient test a day and need to change the strips to Contour due to insurance.

## 2018-12-08 PROBLEM — S06.0X0A CONCUSSION WITH NO LOSS OF CONSCIOUSNESS: Status: ACTIVE | Noted: 2018-12-08

## 2018-12-08 PROBLEM — E16.1 HYPOGLYCEMIC REACTION: Status: ACTIVE | Noted: 2018-12-08

## 2018-12-10 ENCOUNTER — TELEPHONE (OUTPATIENT)
Dept: FAMILY MEDICINE CLINIC | Facility: CLINIC | Age: 38
End: 2018-12-10

## 2018-12-11 ENCOUNTER — OFFICE VISIT (OUTPATIENT)
Dept: SURGERY | Facility: CLINIC | Age: 38
End: 2018-12-11
Payer: COMMERCIAL

## 2018-12-11 VITALS
HEART RATE: 90 BPM | BODY MASS INDEX: 27.96 KG/M2 | DIASTOLIC BLOOD PRESSURE: 74 MMHG | WEIGHT: 184.5 LBS | SYSTOLIC BLOOD PRESSURE: 118 MMHG | RESPIRATION RATE: 16 BRPM | HEIGHT: 68 IN

## 2018-12-11 RX ORDER — PANTOPRAZOLE SODIUM 40 MG/1
TABLET, DELAYED RELEASE ORAL
Qty: 60 TABLET | Refills: 0 | Status: SHIPPED | OUTPATIENT
Start: 2018-12-11 | End: 2019-03-11

## 2018-12-11 NOTE — PROGRESS NOTES
[address], [place] [address] Estelle Red  [address]  Dept: [phone]    12/11/2018    BARIATRIC EXISTING PATIENT/FOLLOW UP    HPI:  Xuan Pastor is a 45year old-year old female index is 28.05 kg/m².   Patient looks and feels good she moves easily the abdomen is soft her incisions are clean and healed    ASSESSMENT:  Status post gastric bypass in general doing quite well at this time I think this is the best she is looks from a phy

## 2018-12-13 ENCOUNTER — PATIENT MESSAGE (OUTPATIENT)
Dept: FAMILY MEDICINE CLINIC | Facility: CLINIC | Age: 38
End: 2018-12-13

## 2018-12-14 RX ORDER — BUTALBITAL, ACETAMINOPHEN AND CAFFEINE 300; 40; 50 MG/1; MG/1; MG/1
1 CAPSULE ORAL 2 TIMES DAILY PRN
Qty: 20 CAPSULE | Refills: 0 | Status: CANCELLED | OUTPATIENT
Start: 2018-12-14 | End: 2018-12-24

## 2018-12-14 NOTE — TELEPHONE ENCOUNTER
Perry Greenfield PA-C 12/14/2018 8:43 AM CST    Give her BID with the tablets for 10 days she needs to ween off she is probably already getting rebound headaches.   Thanks,   Anthony Giang     ----- Message -----  From: Allen Campbell  Sent: 12/14/201

## 2018-12-16 RX ORDER — BUTALBITAL, ACETAMINOPHEN AND CAFFEINE 50; 325; 40 MG/1; MG/1; MG/1
1 TABLET ORAL 2 TIMES DAILY PRN
Qty: 20 TABLET | Refills: 0 | OUTPATIENT
Start: 2018-12-16 | End: 2019-01-15

## 2018-12-17 ENCOUNTER — OFFICE VISIT (OUTPATIENT)
Dept: SURGERY | Facility: CLINIC | Age: 38
End: 2018-12-17
Payer: COMMERCIAL

## 2018-12-17 VITALS
BODY MASS INDEX: 28.05 KG/M2 | DIASTOLIC BLOOD PRESSURE: 82 MMHG | WEIGHT: 185.06 LBS | HEIGHT: 68 IN | SYSTOLIC BLOOD PRESSURE: 113 MMHG | RESPIRATION RATE: 18 BRPM | OXYGEN SATURATION: 99 % | HEART RATE: 92 BPM

## 2018-12-17 DIAGNOSIS — E55.9 VITAMIN D DEFICIENCY: ICD-10-CM

## 2018-12-17 DIAGNOSIS — Z98.84 HISTORY OF ROUX-EN-Y GASTRIC BYPASS: ICD-10-CM

## 2018-12-17 DIAGNOSIS — L30.4 INTERTRIGO: ICD-10-CM

## 2018-12-17 DIAGNOSIS — E66.3 OVERWEIGHT (BMI 25.0-29.9): ICD-10-CM

## 2018-12-17 DIAGNOSIS — K21.9 GASTROESOPHAGEAL REFLUX DISEASE, ESOPHAGITIS PRESENCE NOT SPECIFIED: Primary | ICD-10-CM

## 2018-12-17 PROCEDURE — 99214 OFFICE O/P EST MOD 30 MIN: CPT | Performed by: INTERNAL MEDICINE

## 2018-12-17 RX ORDER — PHENTERMINE HYDROCHLORIDE 37.5 MG/1
37.5 TABLET ORAL
Qty: 30 TABLET | Refills: 2 | Status: SHIPPED | OUTPATIENT
Start: 2018-12-17 | End: 2019-03-18 | Stop reason: ALTCHOICE

## 2018-12-17 NOTE — PROGRESS NOTES
[address], [address]  Dept: [phone]        Patient:  Alis Hernadez  :      1980  MRN:      [id number]    Referring Provider: Crystal Santos UNITHROID 150 MCG Oral Tab, TAKE 1 TABLET BY MOUTH EVERY DAY BEFORE BREAKFAST, Disp: 90 tablet, Rfl: 3  •  ALPRAZolam 1 MG Oral Tab, Take 1 tablet twice daily, Disp: 60 tablet, Rfl: 0  •  escitalopram 20 MG Oral Tab, Take 10 mg by mouth daily. , Disp: , Rfl Past Surgical History:   Procedure Laterality Date   • ARTHROSCOPY ANKLE WITH DEBRIDEMENT Left 6/19/2015    Performed by Caprice Alcantar MD at [address] [place] [address] N/A 12/18/2017    Performed by Corby Dixon • SURGICAL STENT Bilateral March 2015.     Bilateral iliac stents   • THYROIDECTOMY  4/2011   • THYROIDECTOMY Left 8/8/2016    Performed by Rene Mena MD at [facility] MAIN OR   • TOTAL ABDOM HYSTERECTOMY     • UPPER GI ENDO NO BARRETTS  12/15    normal and vomiting   zofran to 8 mg [id number]    Continue PPI    Increased xanax by PCP   May help with nausea    Orders Placed This Encounter      Vitamin B12          Standing Status: Future          Standing Expiration Date: 12/17/2019      Vitamin D, 25-Hydrox

## 2018-12-18 DIAGNOSIS — F41.1 GAD (GENERALIZED ANXIETY DISORDER): ICD-10-CM

## 2018-12-18 DIAGNOSIS — G44.221 CHRONIC TENSION-TYPE HEADACHE, INTRACTABLE: ICD-10-CM

## 2018-12-27 ENCOUNTER — OFFICE VISIT (OUTPATIENT)
Dept: SURGERY | Facility: CLINIC | Age: 38
End: 2018-12-27
Payer: COMMERCIAL

## 2018-12-27 VITALS — HEIGHT: 68 IN | BODY MASS INDEX: 27.22 KG/M2 | WEIGHT: 179.63 LBS

## 2018-12-27 DIAGNOSIS — E66.3 OVERWEIGHT (BMI 25.0-29.9): Primary | ICD-10-CM

## 2018-12-27 PROCEDURE — 97803 MED NUTRITION INDIV SUBSEQ: CPT | Performed by: DIETITIAN, REGISTERED

## 2018-12-27 PROCEDURE — 0358T BIA WHOLE BODY: CPT | Performed by: DIETITIAN, REGISTERED

## 2018-12-27 NOTE — PROGRESS NOTES
[address] [facility]  [address] [place] [address]  Dept: [phone]  Loc: [phone]    12/27/18      Bariatric Follow-up Nutrition Session    [place] Christopher is a 45year old female. HDL 61 04/17/2018    LDL 68 04/17/2018    VLDL 19 09/23/2017    TCHDLRATIO 2.89 09/23/2017    NONHDLC 82 04/17/2018        Vitamins/Minerals:  Lab Results   Component Value Date    B12 685 03/05/2018     Lab Results   Component Value Date    VITD 31.0 0 2  •  ALPRAZOLAM 0.25 MG Oral Tab, TAKE 1-2 TABLETS BY MOUTH TWICE DAILY AS NEEDED FOR ANXIETY, Disp: 120 tablet, Rfl: 0  •  CALCITRIOL 0.25 MCG Oral Cap, TAKE 1 CAPSULE(0.25 MCG) BY MOUTH TWICE DAILY, Disp: 60 capsule, Rfl: 0  •  Ondansetron HCl (ZOFRAN) Activity Level: active  · Type: walk  · Duration:  minutes  · Frequency: work day    Other:  Very active in job.  No wt lifting, but with repeat body comp analysis improved all strength percentages, lowered body fat, and increased BMR due to incre

## 2019-01-07 DIAGNOSIS — F41.1 GAD (GENERALIZED ANXIETY DISORDER): ICD-10-CM

## 2019-01-07 DIAGNOSIS — R10.9 CHRONIC ABDOMINAL PAIN: ICD-10-CM

## 2019-01-07 DIAGNOSIS — G89.29 CHRONIC ABDOMINAL PAIN: ICD-10-CM

## 2019-01-07 NOTE — TELEPHONE ENCOUNTER
Daniela,please advise refill  Last rx for 0.25 mg 11/19/2018  Patient also was given xanax 1 mg on 11/30/18 qty 60 NR

## 2019-01-08 DIAGNOSIS — G89.29 CHRONIC ABDOMINAL PAIN: ICD-10-CM

## 2019-01-08 DIAGNOSIS — R10.9 CHRONIC ABDOMINAL PAIN: ICD-10-CM

## 2019-01-08 DIAGNOSIS — F41.1 GAD (GENERALIZED ANXIETY DISORDER): ICD-10-CM

## 2019-01-08 RX ORDER — ALPRAZOLAM 0.25 MG/1
TABLET ORAL
Qty: 120 TABLET | Refills: 0 | Status: CANCELLED | OUTPATIENT
Start: 2019-01-08

## 2019-01-08 RX ORDER — ALPRAZOLAM 0.25 MG/1
TABLET ORAL
Qty: 120 TABLET | Refills: 0 | OUTPATIENT
Start: 2019-01-08 | End: 2019-02-01

## 2019-01-08 NOTE — TELEPHONE ENCOUNTER
Asked patient to call office to confirm that she is ready to reduce xanax back down to the 0.25 mg dosage      Patient confirmed that she is ready to go back to the 0.25 mg xanax.  Rx has been phoned in to pharmacy qty [phone] Miguel Vieira you sign off on

## 2019-01-11 DIAGNOSIS — G44.221 CHRONIC TENSION-TYPE HEADACHE, INTRACTABLE: ICD-10-CM

## 2019-01-11 DIAGNOSIS — F41.1 GAD (GENERALIZED ANXIETY DISORDER): ICD-10-CM

## 2019-01-12 DIAGNOSIS — G44.221 CHRONIC TENSION-TYPE HEADACHE, INTRACTABLE: ICD-10-CM

## 2019-01-12 DIAGNOSIS — F41.1 GAD (GENERALIZED ANXIETY DISORDER): ICD-10-CM

## 2019-01-14 RX ORDER — PREGABALIN 25 MG/1
25 CAPSULE ORAL 2 TIMES DAILY
Qty: 60 CAPSULE | Refills: 0 | Status: SHIPPED | OUTPATIENT
Start: 2019-01-14 | End: 2019-01-15

## 2019-01-15 ENCOUNTER — OFFICE VISIT (OUTPATIENT)
Dept: FAMILY MEDICINE CLINIC | Facility: CLINIC | Age: 39
End: 2019-01-15
Payer: COMMERCIAL

## 2019-01-15 ENCOUNTER — TELEPHONE (OUTPATIENT)
Dept: FAMILY MEDICINE CLINIC | Facility: CLINIC | Age: 39
End: 2019-01-15

## 2019-01-15 VITALS
HEART RATE: 72 BPM | SYSTOLIC BLOOD PRESSURE: 104 MMHG | BODY MASS INDEX: 26.52 KG/M2 | DIASTOLIC BLOOD PRESSURE: 70 MMHG | HEIGHT: 68.7 IN | WEIGHT: 177 LBS

## 2019-01-15 DIAGNOSIS — G44.221 CHRONIC TENSION-TYPE HEADACHE, INTRACTABLE: Primary | ICD-10-CM

## 2019-01-15 DIAGNOSIS — J45.20 MILD INTERMITTENT ASTHMA WITHOUT COMPLICATION: ICD-10-CM

## 2019-01-15 DIAGNOSIS — Z28.21 INFLUENZA VACCINE REFUSED: ICD-10-CM

## 2019-01-15 DIAGNOSIS — M62.838 TRAPEZIUS MUSCLE SPASM: ICD-10-CM

## 2019-01-15 DIAGNOSIS — F41.1 GAD (GENERALIZED ANXIETY DISORDER): ICD-10-CM

## 2019-01-15 DIAGNOSIS — G56.91 NEUROPATHY, ARM, RIGHT: ICD-10-CM

## 2019-01-15 PROBLEM — S06.0X0A CONCUSSION WITH NO LOSS OF CONSCIOUSNESS: Status: RESOLVED | Noted: 2018-12-08 | Resolved: 2019-01-15

## 2019-01-15 PROBLEM — F50.89 PSYCHOGENIC VOMITING WITH NAUSEA: Status: RESOLVED | Noted: 2018-02-21 | Resolved: 2019-01-15

## 2019-01-15 PROBLEM — R68.81 EARLY SATIETY: Status: RESOLVED | Noted: 2018-02-21 | Resolved: 2019-01-15

## 2019-01-15 PROCEDURE — 99214 OFFICE O/P EST MOD 30 MIN: CPT | Performed by: FAMILY MEDICINE

## 2019-01-15 RX ORDER — METHYLPREDNISOLONE 4 MG/1
TABLET ORAL
Refills: 0 | COMMUNITY
Start: 2019-01-08 | End: 2019-01-15 | Stop reason: ALTCHOICE

## 2019-01-15 RX ORDER — BUTALBITAL, ACETAMINOPHEN AND CAFFEINE 50; 325; 40 MG/1; MG/1; MG/1
1 TABLET ORAL 2 TIMES DAILY PRN
Qty: 40 TABLET | Refills: 0 | Status: SHIPPED | OUTPATIENT
Start: 2019-01-15 | End: 2019-03-08

## 2019-01-15 RX ORDER — OXYBUTYNIN CHLORIDE 5 MG/1
TABLET ORAL
Refills: 0 | COMMUNITY
Start: 2019-01-08 | End: 2019-01-10

## 2019-01-15 RX ORDER — PREGABALIN 50 MG/1
50 CAPSULE ORAL 2 TIMES DAILY
Qty: 60 CAPSULE | Refills: 5 | Status: SHIPPED | OUTPATIENT
Start: 2019-01-15 | End: 2019-02-04

## 2019-01-15 NOTE — TELEPHONE ENCOUNTER
[facility] called states they have the Lyrica 25MG on file should they disregard that one since pt is filling Lyrica 50MG today or should they store Lyrica 25MG on file. Maria G with [facility] can be reached at [phone].

## 2019-01-15 NOTE — PROGRESS NOTES
Cori Awad is a 45year old female.   HPI:   Patient presents with:  Wrist Pain:rigth side>left wrist pain  Shoulder Pain: R shoulder for months   Hand Pain: R hand    Has neck pain right trapezius and now wrist pain started past 2 weeks comes and g TABLET(40 MG) BY MOUTH TWICE DAILY BEFORE MEALS Disp: 60 tablet Rfl: 0   NEYDA MICROLET LANCETS Does not apply Misc Use as directed to check blood sugars as needed Disp: 1 Box Rfl: 0   FREESTYLE SYSTEM Does not apply Kit 1 each by Does not apply route as n over night   • Hx of gastric bypass 12/18/2017   • Hypothyroid    • Hypothyroidism    • Infertility, female    • Organic hypersomnia, unspecified DMG DX 11-2-12    AHI 2 RDI 2 REM AHI 6 SaO2 curtis 89%   • OTHER DISEASES     rectal bleeding   • Pancreatitis (primary encounter diagnosis)  Neuropathy, arm, right  Trapezius muscle spasm  Dmitriy (generalized anxiety disorder)  Mild intermittent asthma without complication  Influenza vaccine refused    No orders of the defined types were placed in this encounter. spent on counseling regarding medical conditions and treatment. The patient indicates understanding of these issues and agrees to the plan. The patient is asked to return in 1 week recheck.

## 2019-01-21 ENCOUNTER — APPOINTMENT (OUTPATIENT)
Dept: LAB | Age: 39
End: 2019-01-21
Attending: INTERNAL MEDICINE
Payer: COMMERCIAL

## 2019-01-21 DIAGNOSIS — K21.9 GASTROESOPHAGEAL REFLUX DISEASE, ESOPHAGITIS PRESENCE NOT SPECIFIED: ICD-10-CM

## 2019-01-21 DIAGNOSIS — L30.4 INTERTRIGO: ICD-10-CM

## 2019-01-21 DIAGNOSIS — Z98.84 HISTORY OF ROUX-EN-Y GASTRIC BYPASS: ICD-10-CM

## 2019-01-21 DIAGNOSIS — E66.3 OVERWEIGHT (BMI 25.0-29.9): ICD-10-CM

## 2019-01-21 DIAGNOSIS — E55.9 VITAMIN D DEFICIENCY: ICD-10-CM

## 2019-01-21 PROBLEM — M79.644 PAIN OF RIGHT THUMB: Status: ACTIVE | Noted: 2019-01-21

## 2019-01-21 PROBLEM — R20.0 BILATERAL HAND NUMBNESS: Status: ACTIVE | Noted: 2019-01-21

## 2019-01-21 PROBLEM — M77.11 LATERAL EPICONDYLITIS OF RIGHT ELBOW: Status: ACTIVE | Noted: 2019-01-21

## 2019-01-21 LAB
HAV AB SERPL IA-ACNC: 561 PG/ML (ref 193–986)
VIT D+METAB SERPL-MCNC: 41.3 NG/ML (ref 30–100)

## 2019-01-21 PROCEDURE — 87070 CULTURE OTHR SPECIMN AEROBIC: CPT | Performed by: FAMILY MEDICINE

## 2019-01-21 PROCEDURE — 36415 COLL VENOUS BLD VENIPUNCTURE: CPT | Performed by: INTERNAL MEDICINE

## 2019-01-21 PROCEDURE — 82607 VITAMIN B-12: CPT | Performed by: INTERNAL MEDICINE

## 2019-01-21 PROCEDURE — 87077 CULTURE AEROBIC IDENTIFY: CPT | Performed by: FAMILY MEDICINE

## 2019-01-21 PROCEDURE — 84425 ASSAY OF VITAMIN B-1: CPT | Performed by: INTERNAL MEDICINE

## 2019-01-21 PROCEDURE — 87205 SMEAR GRAM STAIN: CPT | Performed by: FAMILY MEDICINE

## 2019-01-21 PROCEDURE — 82306 VITAMIN D 25 HYDROXY: CPT | Performed by: INTERNAL MEDICINE

## 2019-01-21 NOTE — PROGRESS NOTES
Kiara Carolina is a 45year old female.   HPI:   #1 right arm pain  Patient is in for follow-up from orthopedic for right arm pain has carpal tunnel syndrome, and arthritis in the right first MCP joint, lateral epicondylitis and possible cervical radicu necessary the patient has a hard time differentiating medical necessity due to pain intolerance. Is seeing Dr. Jean Moreno a pain psychologist is helping with biofeedback to control pain and anxiety reactions. Presently is on 10 mg of Lexapro, Xanax 0. Disp: 100 each Rfl: 0   UNITHROID 150 MCG Oral Tab TAKE 1 TABLET BY MOUTH EVERY DAY BEFORE BREAKFAST Disp: 90 tablet Rfl: 3   escitalopram 20 MG Oral Tab Take 10 mg by mouth daily.  Disp:  Rfl:    mirtazapine 7.5 MG Oral Tab Take 1 tablet (7.5 mg total) by History:  Social History    Tobacco Use      Smoking status: Never Smoker      Smokeless tobacco: Never Used    Alcohol use: No      Alcohol/week: 0.0 oz      Frequency: Never      Binge frequency: Never    Drug use: No       REVIEW OF SYSTEMS:   GENERAL H odor  Medication management    Orders Placed This Encounter      Aerobic Bacterial Culture [E]      Meds & Refills for this Visit:  Requested Prescriptions     Signed Prescriptions Disp Refills   • TraMADol HCl 50 MG Oral Tab 10 tablet 0     Sig: Take 0.5-

## 2019-01-24 ENCOUNTER — PATIENT MESSAGE (OUTPATIENT)
Dept: FAMILY MEDICINE CLINIC | Facility: CLINIC | Age: 39
End: 2019-01-24

## 2019-01-24 RX ORDER — ACETAMINOPHEN AND CODEINE PHOSPHATE 300; 30 MG/1; MG/1
TABLET ORAL
Qty: 10 TABLET | Refills: 0 | Status: SHIPPED | OUTPATIENT
Start: 2019-01-24 | End: 2019-02-06

## 2019-01-24 NOTE — TELEPHONE ENCOUNTER
Regarding: FW: Visit Follow-up Question  Contact: [phone]  #10 only of the tylenol #3 at night take 1/2-1 at night  ----- Message -----  From: Manisha Patterson  Sent: 1/24/2019   8:31 AM  To:  Felice Rodrigues PA-C  Subject: FW: Visit Follow-up Edinson Gibson

## 2019-01-25 LAB — VITAMIN B1 (THIAMINE), WHOLE B: 185 NMOL/L

## 2019-01-25 NOTE — PROGRESS NOTES
Culture from the umbilical area just shows normal skin simeon no suggestion of infection  Sent to my chart

## 2019-01-28 ENCOUNTER — APPOINTMENT (OUTPATIENT)
Dept: PHYSICAL THERAPY | Age: 39
End: 2019-01-28
Attending: PHYSICIAN ASSISTANT
Payer: COMMERCIAL

## 2019-01-28 NOTE — PROGRESS NOTES
UPPER EXTREMITY EVALUATION:   Referring Physician: Dr. Meyer Cowden  Diagnosis: Lateral epicondylitis of right elbow (M77.11)  Primary osteoarthritis of first carpometacarpal joint of right hand (M18.11)  Date of Service: 1/28/2019     PATIENT SUMMARY   Riya Duration: Patient will be seen for *** x/week or a total of *** visits over a 90 day period. Treatment will include: Manual Therapy; Therapeutic Exercises; Neuromuscular Re-education; Therapeutic Activity; Electrical Stim; Ultrasound;  Pt education; Home ex

## 2019-01-30 ENCOUNTER — APPOINTMENT (OUTPATIENT)
Dept: PHYSICAL THERAPY | Age: 39
End: 2019-01-30
Attending: PHYSICIAN ASSISTANT
Payer: COMMERCIAL

## 2019-02-01 DIAGNOSIS — F41.1 GAD (GENERALIZED ANXIETY DISORDER): ICD-10-CM

## 2019-02-01 DIAGNOSIS — R10.9 CHRONIC ABDOMINAL PAIN: ICD-10-CM

## 2019-02-01 DIAGNOSIS — G89.29 CHRONIC ABDOMINAL PAIN: ICD-10-CM

## 2019-02-01 RX ORDER — ALPRAZOLAM 0.25 MG/1
TABLET ORAL 2 TIMES DAILY PRN
Qty: 120 TABLET | Refills: 0 | Status: SHIPPED | OUTPATIENT
Start: 2019-02-01 | End: 2019-03-01

## 2019-02-04 ENCOUNTER — OFFICE VISIT (OUTPATIENT)
Dept: PHYSICAL THERAPY | Age: 39
End: 2019-02-04
Attending: PHYSICIAN ASSISTANT
Payer: COMMERCIAL

## 2019-02-04 ENCOUNTER — PATIENT MESSAGE (OUTPATIENT)
Dept: FAMILY MEDICINE CLINIC | Facility: CLINIC | Age: 39
End: 2019-02-04

## 2019-02-04 DIAGNOSIS — M77.11 LATERAL EPICONDYLITIS OF RIGHT ELBOW: ICD-10-CM

## 2019-02-04 DIAGNOSIS — M18.11 PRIMARY OSTEOARTHRITIS OF FIRST CARPOMETACARPAL JOINT OF RIGHT HAND: ICD-10-CM

## 2019-02-04 PROCEDURE — 97162 PT EVAL MOD COMPLEX 30 MIN: CPT

## 2019-02-04 PROCEDURE — 97110 THERAPEUTIC EXERCISES: CPT

## 2019-02-04 RX ORDER — PREGABALIN 75 MG/1
75 CAPSULE ORAL 2 TIMES DAILY
Qty: 60 CAPSULE | Refills: 0 | OUTPATIENT
Start: 2019-02-04 | End: 2019-02-28

## 2019-02-04 NOTE — TELEPHONE ENCOUNTER
The Lyrica 50mg tabs were cancelled and 75mg BID was called in to pharmacy.  Script pended to sign and once approved can discard paper copy

## 2019-02-04 NOTE — TELEPHONE ENCOUNTER
Regarding: FW: Prescription Question  Contact: [phone]  Lyrica 75 mg BID for 1 month  ----- Message -----  From: Lilly Giang RN  Sent: 2/4/2019   9:22 AM  To:  Peter Sexton PA-C  Subject: FW: Prescription Question                            -----

## 2019-02-04 NOTE — PROGRESS NOTES
UPPER EXTREMITY EVALUATION:   Referring Physician: Dr. Greg Ramirez  Diagnosis:  Lateral epicondylitis of right elbow (M77.11)  Primary osteoarthritis of first carpometacarpal joint of right hand (M18.11) Date of Service: 2/4/2019     PATIENT SUMMARY   Doris Thomas somatization, depression, hypothyroidism, ANS.     Zeeshan Castrejon presents with positive R lateral epicondylitis testing, minimal tenderness on R lateral epicondyle and ECRB, severe tenderness on R EDC, decreased and painful R wrist ext, RD, UD AROM, we negative  Resisted R wrist ext, index finger and middle finger (ECRB tendinopathy) - (+) pain  Resisted R wrist ext, index finger through small finger (EDC tendinopathy) - (+) pain  Resisted R wrist ext, middle finger only (radial nerve) - negative  Resist program instructions.     Education or treatment limitation: co-morbidities  Rehab Potential:good for above goals    FOTO: (elbow) 44/100  FOTO: (wrist/thumb) 43/100      Patient/Family/Caregiver was advised of these findings, precautions, and treatment opt

## 2019-02-06 ENCOUNTER — OFFICE VISIT (OUTPATIENT)
Dept: SURGERY | Facility: CLINIC | Age: 39
End: 2019-02-06
Payer: COMMERCIAL

## 2019-02-06 ENCOUNTER — OFFICE VISIT (OUTPATIENT)
Dept: PHYSICAL THERAPY | Age: 39
End: 2019-02-06
Attending: PHYSICIAN ASSISTANT
Payer: COMMERCIAL

## 2019-02-06 VITALS — HEIGHT: 68 IN | WEIGHT: 179.38 LBS | BODY MASS INDEX: 27.19 KG/M2

## 2019-02-06 DIAGNOSIS — M18.11 PRIMARY OSTEOARTHRITIS OF FIRST CARPOMETACARPAL JOINT OF RIGHT HAND: ICD-10-CM

## 2019-02-06 DIAGNOSIS — R10.9 CHRONIC ABDOMINAL PAIN: Primary | ICD-10-CM

## 2019-02-06 DIAGNOSIS — N64.81 BREAST PTOSIS: ICD-10-CM

## 2019-02-06 DIAGNOSIS — G89.29 CHRONIC ABDOMINAL PAIN: Primary | ICD-10-CM

## 2019-02-06 DIAGNOSIS — M77.11 LATERAL EPICONDYLITIS OF RIGHT ELBOW: ICD-10-CM

## 2019-02-06 DIAGNOSIS — E65 ABDOMINAL PANNUS: ICD-10-CM

## 2019-02-06 PROCEDURE — 99243 OFF/OP CNSLTJ NEW/EST LOW 30: CPT | Performed by: SURGERY

## 2019-02-06 PROCEDURE — 97112 NEUROMUSCULAR REEDUCATION: CPT

## 2019-02-06 PROCEDURE — 97035 APP MDLTY 1+ULTRASOUND EA 15: CPT

## 2019-02-06 PROCEDURE — 97140 MANUAL THERAPY 1/> REGIONS: CPT

## 2019-02-06 NOTE — CONSULTS
New Patient Consultation    Chief Complaint:  Patient presents with:  Consult: Excess skin removal, Body Contouring, post Gastric Bypass.  Dr. Maycol Bundy referring      History of Present Illness:   Alis Hernadez is a 45year old female referred for post w Performed by Tonio Abraham MD at [address] [place] [address]   • D & C      x4   • DILATION/CURETTAGE,DIAGNOSTIC     • ESOPHAGOGASTRODUODENOSCOPY (EGD) N/A 10/4/2018    Performed by Elpidio Wilson MD at [facility] ENDOSCOPY   • ESOPHAGOGASTRODUODENOSCOPY (E Oral Cap Take 1 capsule (75 mg total) by mouth 2 (two) times daily. Disp: 60 capsule Rfl: 0   ALPRAZolam 0.25 MG Oral Tab Take 1-2 tablets (0.25-0.5 mg total) by mouth 2 (two) times daily as needed for Sleep or Anxiety.  Disp: 120 tablet Rfl: 0   Butalbital Inhalation Aerosol Inhale 2 puffs into the lungs 2 (two) times daily as needed. Disp:  Rfl:    aspirin 81 MG Oral Tab Take 81 mg by mouth daily. Disp:  Rfl:      No current facility-administered medications on file prior to visit.        Allergies:      C of Systems:    A 13 point review of systems was performed on the intake sheet.   The patient reports see HPI  The patient reports rashes, dry skin changes of moles easy bruising and bleeding and anxiety all other review of systems are negative   General: blood clots, or pulmonary embolism.    Gynecologic:  The patient denies irregular menses, pelvic pain, pain with intercourse, painful menses, or pregnancy  Musculoskeletal:  The patient denies muscle aches/pain, joint pain, stiff joints, neck pain, back nella after the weight loss procedure. I therefore recommend that she undergoes an abdominoplasty to excise the excess skin from her abdomen and also recommend a mastopexy bilaterally.   The patient would like the breast to be slightly smaller and therefore she

## 2019-02-06 NOTE — PROGRESS NOTES
Dx:  Lateral epicondylitis of right elbow (M77.11)  Primary osteoarthritis of first carpometacarpal joint of right hand (M18.11)       Authorized # of Visits: n/a       Next MD visit: none scheduled  Fall Risk: standard         Precautions: n/a application of kinesiotape with discussion of precautions and proper care    Charges: NR 1(8 min) MT 1 (15  min) US (8 min)  Total Timed Treatment: 31 min     Total Treatment Time: 32 min

## 2019-02-06 NOTE — PROGRESS NOTES
The following message was sent to our billing department on behalf of Dr. Sudhir Poe: \"Hi,     Dr. Sudhir Poe would like you to provide insurance determination for:    1. Abdominoplasty    2. Bilateral mastopexy     3.  Bilateral breast reduction     Thank you,

## 2019-02-13 ENCOUNTER — OFFICE VISIT (OUTPATIENT)
Dept: PHYSICAL THERAPY | Age: 39
End: 2019-02-13
Attending: PHYSICIAN ASSISTANT
Payer: COMMERCIAL

## 2019-02-13 DIAGNOSIS — M18.11 PRIMARY OSTEOARTHRITIS OF FIRST CARPOMETACARPAL JOINT OF RIGHT HAND: ICD-10-CM

## 2019-02-13 DIAGNOSIS — M77.11 LATERAL EPICONDYLITIS OF RIGHT ELBOW: ICD-10-CM

## 2019-02-13 PROCEDURE — 97112 NEUROMUSCULAR REEDUCATION: CPT

## 2019-02-13 PROCEDURE — 97035 APP MDLTY 1+ULTRASOUND EA 15: CPT

## 2019-02-13 PROCEDURE — 97140 MANUAL THERAPY 1/> REGIONS: CPT

## 2019-02-13 NOTE — PROGRESS NOTES
Dx:  Lateral epicondylitis of right elbow (M77.11)  Primary osteoarthritis of first carpometacarpal joint of right hand (M18.11)       Authorized # of Visits: n/a       Next MD visit: none scheduled  Fall Risk: standard         Precautions: n/a Tx#: 6/ Date: Tx#: 7/ Date:    Tx#: 8/   Transverse friction massage ECRB and EDC proximal attachment x 15 min    X 15 min        Wrist extensor stretch 5 sec x 10 5 sec x 10        US x 8' R ECRB and EDC proximal attachment @ 1.5 w/cm2 US x 8' R ECRB a

## 2019-02-18 ENCOUNTER — LAB ENCOUNTER (OUTPATIENT)
Dept: LAB | Age: 39
End: 2019-02-18
Attending: FAMILY MEDICINE
Payer: COMMERCIAL

## 2019-02-18 ENCOUNTER — OFFICE VISIT (OUTPATIENT)
Dept: PHYSICAL THERAPY | Age: 39
End: 2019-02-18
Attending: PHYSICIAN ASSISTANT
Payer: COMMERCIAL

## 2019-02-18 ENCOUNTER — OFFICE VISIT (OUTPATIENT)
Dept: FAMILY MEDICINE CLINIC | Facility: CLINIC | Age: 39
End: 2019-02-18
Payer: COMMERCIAL

## 2019-02-18 VITALS
HEART RATE: 108 BPM | TEMPERATURE: 98 F | DIASTOLIC BLOOD PRESSURE: 80 MMHG | HEIGHT: 68 IN | SYSTOLIC BLOOD PRESSURE: 102 MMHG | OXYGEN SATURATION: 98 % | WEIGHT: 184 LBS | BODY MASS INDEX: 27.89 KG/M2

## 2019-02-18 DIAGNOSIS — J20.9 ACUTE TRACHEOBRONCHITIS: ICD-10-CM

## 2019-02-18 DIAGNOSIS — M18.11 PRIMARY OSTEOARTHRITIS OF FIRST CARPOMETACARPAL JOINT OF RIGHT HAND: ICD-10-CM

## 2019-02-18 DIAGNOSIS — J45.31 MILD PERSISTENT ASTHMA WITH EXACERBATION: Primary | ICD-10-CM

## 2019-02-18 DIAGNOSIS — D64.9 LOW HEMATOCRIT: ICD-10-CM

## 2019-02-18 DIAGNOSIS — M77.11 LATERAL EPICONDYLITIS OF RIGHT ELBOW: ICD-10-CM

## 2019-02-18 DIAGNOSIS — J20.9 BRONCHITIS WITH BRONCHOSPASM: ICD-10-CM

## 2019-02-18 LAB
DEPRECATED HBV CORE AB SER IA-ACNC: 18.5 NG/ML (ref 12–160)
IRON SATURATION: 32 % (ref 15–50)
IRON SERPL-MCNC: 120 UG/DL (ref 50–170)
TOTAL IRON BINDING CAPACITY: 370 UG/DL (ref 240–450)
TRANSFERRIN SERPL-MCNC: 248 MG/DL (ref 200–360)

## 2019-02-18 PROCEDURE — 36415 COLL VENOUS BLD VENIPUNCTURE: CPT | Performed by: FAMILY MEDICINE

## 2019-02-18 PROCEDURE — 97035 APP MDLTY 1+ULTRASOUND EA 15: CPT

## 2019-02-18 PROCEDURE — 97112 NEUROMUSCULAR REEDUCATION: CPT

## 2019-02-18 PROCEDURE — 83550 IRON BINDING TEST: CPT | Performed by: FAMILY MEDICINE

## 2019-02-18 PROCEDURE — 82728 ASSAY OF FERRITIN: CPT | Performed by: FAMILY MEDICINE

## 2019-02-18 PROCEDURE — 99214 OFFICE O/P EST MOD 30 MIN: CPT | Performed by: FAMILY MEDICINE

## 2019-02-18 PROCEDURE — 97140 MANUAL THERAPY 1/> REGIONS: CPT

## 2019-02-18 PROCEDURE — 83540 ASSAY OF IRON: CPT | Performed by: FAMILY MEDICINE

## 2019-02-18 RX ORDER — OXYBUTYNIN CHLORIDE 5 MG/1
TABLET ORAL
Refills: 0 | COMMUNITY
Start: 2019-02-15 | End: 2019-02-18

## 2019-02-18 RX ORDER — AMOXICILLIN AND CLAVULANATE POTASSIUM 875; 125 MG/1; MG/1
TABLET, FILM COATED ORAL
Refills: 0 | COMMUNITY
Start: 2019-02-15 | End: 2019-02-18

## 2019-02-18 RX ORDER — PREDNISONE 20 MG/1
20 TABLET ORAL 3 TIMES DAILY
Status: ON HOLD | COMMUNITY
Start: 2019-02-16 | End: 2019-02-25

## 2019-02-18 RX ORDER — ACETAMINOPHEN AND CODEINE PHOSPHATE 300; 30 MG/1; MG/1
TABLET ORAL EVERY 4 HOURS PRN
Qty: 10 TABLET | Refills: 0 | Status: ON HOLD | OUTPATIENT
Start: 2019-02-18 | End: 2019-02-23

## 2019-02-18 RX ORDER — PREDNISONE 20 MG/1
TABLET ORAL
Qty: 8 TABLET | Refills: 0 | Status: ON HOLD | OUTPATIENT
Start: 2019-02-18 | End: 2019-02-25

## 2019-02-18 NOTE — PROGRESS NOTES
HPI:   Shante Dyson is a 45year old female who presents for upper respiratory symptoms for 1 week. Patient reports congestion, dry cough, cough is keeping pt up at night, chest pain from coughing, wheezing, denies fever, denies sinus pain.   Patient 0   Acetaminophen-Codeine 300-30 MG Oral Tab Take 0.5-1 tablets by mouth every 4 (four) hours as needed for Pain. Disp: 10 tablet Rfl: 0   pregabalin (LYRICA) 75 MG Oral Cap Take 1 capsule (75 mg total) by mouth 2 (two) times daily.  Disp: 60 capsule Rfl: 0 2 (two) times daily as needed. Disp:  Rfl:    aspirin 81 MG Oral Tab Take 81 mg by mouth daily.  Disp:  Rfl:       Past Medical History:   Diagnosis Date   • Anxiety    • Anxiety state, unspecified    • Arthritis    • Asthma    • Calculus of kidney    • C BYPASS,OBESE<100CM MARCIO-EN-Y  12/18/2017    DR. SEGAL   • GASTROC RECESSION FOOT Right 6/6/2017    Performed by Bhaskar Anderson MD at [facility]   • [address] [place] [address] [place] Left 6/19/2015    Performed by Bhaskar Anderson MD at [facility] drip, runny nose, no ST  LUNGS: Wheezing, shortness of breath and cough normal CTA of the chest  CARDIOVASCULAR: denies chest pain  GI: denies GI symptoms  NEURO: denies headache/weakness    EXAM:   /80 (BP Location: Right arm, Patient Position: Sitt down from the 60 mg has 2 more days down to 40 mg for 3 days and then 20 mg for 2 days  Albuterol nebulizer every 4 hours  Symbicort compliance advised to be used twice a day year round   Only #10 codeine given she needs to start withdrawing from codeine s

## 2019-02-18 NOTE — PROGRESS NOTES
Dx:  Lateral epicondylitis of right elbow (M77.11)  Primary osteoarthritis of first carpometacarpal joint of right hand (M18.11)       Authorized # of Visits: n/a       Next MD visit: none scheduled  Fall Risk: standard         Precautions: n/a x 10 5 sec x 10 5 sec x 10       US x 8' R ECRB and EDC proximal attachment @ 1.5 w/cm2 US x 8' R ECRB and EDC proximal attachment @ 1.5 w/cm2 X 8' R ECRB and EDC proximal attachment @ 1.5 w/cm2       kinesiotaping for lateral epicondylitis kinesiotaping f

## 2019-02-20 ENCOUNTER — APPOINTMENT (OUTPATIENT)
Dept: PHYSICAL THERAPY | Age: 39
End: 2019-02-20
Attending: PHYSICIAN ASSISTANT
Payer: COMMERCIAL

## 2019-02-20 ENCOUNTER — TELEPHONE (OUTPATIENT)
Dept: PHYSICAL THERAPY | Age: 39
End: 2019-02-20

## 2019-02-22 ENCOUNTER — HOSPITAL ENCOUNTER (INPATIENT)
Facility: HOSPITAL | Age: 39
LOS: 3 days | Discharge: HOME | DRG: 202 | End: 2019-02-26
Attending: EMERGENCY MEDICINE | Admitting: HOSPITALIST
Payer: COMMERCIAL

## 2019-02-22 ENCOUNTER — APPOINTMENT (OUTPATIENT)
Dept: GENERAL RADIOLOGY | Age: 39
DRG: 202 | End: 2019-02-22
Attending: EMERGENCY MEDICINE
Payer: COMMERCIAL

## 2019-02-22 DIAGNOSIS — E87.6 HYPOKALEMIA: ICD-10-CM

## 2019-02-22 DIAGNOSIS — J06.9 VIRAL URI WITH COUGH: ICD-10-CM

## 2019-02-22 DIAGNOSIS — J45.52 SEVERE PERSISTENT ASTHMA WITH STATUS ASTHMATICUS: Primary | ICD-10-CM

## 2019-02-22 LAB
ALBUMIN SERPL-MCNC: 3.6 G/DL (ref 3.4–5)
ALBUMIN/GLOB SERPL: 1 {RATIO} (ref 1–2)
ALP LIVER SERPL-CCNC: 82 U/L (ref 37–98)
ALT SERPL-CCNC: 34 U/L (ref 13–56)
ANION GAP SERPL CALC-SCNC: 10 MMOL/L (ref 0–18)
AST SERPL-CCNC: 13 U/L (ref 15–37)
BASOPHILS # BLD AUTO: 0.02 X10(3) UL (ref 0–0.2)
BASOPHILS NFR BLD AUTO: 0.2 %
BILIRUB SERPL-MCNC: 0.2 MG/DL (ref 0.1–2)
BUN BLD-MCNC: 11 MG/DL (ref 7–18)
BUN/CREAT SERPL: 13.6 (ref 10–20)
CALCIUM BLD-MCNC: 8.5 MG/DL (ref 8.5–10.1)
CHLORIDE SERPL-SCNC: 102 MMOL/L (ref 98–107)
CO2 SERPL-SCNC: 28 MMOL/L (ref 21–32)
CREAT BLD-MCNC: 0.81 MG/DL (ref 0.55–1.02)
DEPRECATED RDW RBC AUTO: 41.2 FL (ref 35.1–46.3)
EOSINOPHIL # BLD AUTO: 0.02 X10(3) UL (ref 0–0.7)
EOSINOPHIL NFR BLD AUTO: 0.2 %
ERYTHROCYTE [DISTWIDTH] IN BLOOD BY AUTOMATED COUNT: 11.9 % (ref 11–15)
GLOBULIN PLAS-MCNC: 3.5 G/DL (ref 2.8–4.4)
GLUCOSE BLD-MCNC: 115 MG/DL (ref 70–99)
HCT VFR BLD AUTO: 40.9 % (ref 35–48)
HGB BLD-MCNC: 13.5 G/DL (ref 12–16)
IMM GRANULOCYTES # BLD AUTO: 0.04 X10(3) UL (ref 0–1)
IMM GRANULOCYTES NFR BLD: 0.3 %
ISTAT BLOOD GAS BASE EXCESS: 8 MMOL/L (ref ?–30)
ISTAT BLOOD GAS FIO2: 30 %
ISTAT BLOOD GAS HCO3: 30.2 MEQ/L (ref 22–26)
ISTAT BLOOD GAS O2 SATURATION: 79 % (ref 60–85)
ISTAT BLOOD GAS PCO2: 34.1 MMHG (ref 38–50)
ISTAT BLOOD GAS PH: 7.55 (ref 7.32–7.43)
ISTAT BLOOD GAS PO2: <70 MMHG (ref 35–40)
ISTAT BLOOD GAS TCO2: 31 MMOL/L (ref 22–32)
ISTAT PATIENT TEMPERATURE: 98 DEGREE
LACTATE SERPL-SCNC: 4.7 MMOL/L (ref 0.4–2)
LYMPHOCYTES # BLD AUTO: 2.63 X10(3) UL (ref 1–4)
LYMPHOCYTES NFR BLD AUTO: 22 %
M PROTEIN MFR SERPL ELPH: 7.1 G/DL (ref 6.4–8.2)
MCH RBC QN AUTO: 31 PG (ref 26–34)
MCHC RBC AUTO-ENTMCNC: 33 G/DL (ref 31–37)
MCV RBC AUTO: 93.8 FL (ref 80–100)
MONOCYTES # BLD AUTO: 0.71 X10(3) UL (ref 0.1–1)
MONOCYTES NFR BLD AUTO: 5.9 %
NEUTROPHILS # BLD AUTO: 8.56 X10 (3) UL (ref 1.5–7.7)
NEUTROPHILS # BLD AUTO: 8.56 X10(3) UL (ref 1.5–7.7)
NEUTROPHILS NFR BLD AUTO: 71.4 %
OSMOLALITY SERPL CALC.SUM OF ELEC: 290 MOSM/KG (ref 275–295)
PLATELET # BLD AUTO: 233 10(3)UL (ref 150–450)
POCT INFLUENZA A: NEGATIVE
POCT INFLUENZA B: NEGATIVE
POTASSIUM SERPL-SCNC: 2.9 MMOL/L (ref 3.5–5.1)
RBC # BLD AUTO: 4.36 X10(6)UL (ref 3.8–5.3)
SODIUM SERPL-SCNC: 140 MMOL/L (ref 136–145)
WBC # BLD AUTO: 12 X10(3) UL (ref 4–11)

## 2019-02-22 PROCEDURE — 71045 X-RAY EXAM CHEST 1 VIEW: CPT | Performed by: EMERGENCY MEDICINE

## 2019-02-22 RX ORDER — MORPHINE SULFATE 4 MG/ML
4 INJECTION, SOLUTION INTRAMUSCULAR; INTRAVENOUS ONCE
Status: COMPLETED | OUTPATIENT
Start: 2019-02-22 | End: 2019-02-22

## 2019-02-22 RX ORDER — MAGNESIUM SULFATE HEPTAHYDRATE 40 MG/ML
2 INJECTION, SOLUTION INTRAVENOUS ONCE
Status: COMPLETED | OUTPATIENT
Start: 2019-02-22 | End: 2019-02-22

## 2019-02-22 RX ORDER — POTASSIUM CHLORIDE 20 MEQ/1
40 TABLET, EXTENDED RELEASE ORAL ONCE
Status: COMPLETED | OUTPATIENT
Start: 2019-02-22 | End: 2019-02-22

## 2019-02-22 RX ORDER — METHYLPREDNISOLONE SODIUM SUCCINATE 125 MG/2ML
125 INJECTION, POWDER, LYOPHILIZED, FOR SOLUTION INTRAMUSCULAR; INTRAVENOUS ONCE
Status: COMPLETED | OUTPATIENT
Start: 2019-02-22 | End: 2019-02-22

## 2019-02-22 RX ORDER — ACETAMINOPHEN 500 MG
1000 TABLET ORAL ONCE
Status: COMPLETED | OUTPATIENT
Start: 2019-02-22 | End: 2019-02-22

## 2019-02-22 RX ORDER — IPRATROPIUM BROMIDE AND ALBUTEROL SULFATE 2.5; .5 MG/3ML; MG/3ML
3 SOLUTION RESPIRATORY (INHALATION) ONCE
Status: COMPLETED | OUTPATIENT
Start: 2019-02-22 | End: 2019-02-22

## 2019-02-22 RX ORDER — LORAZEPAM 2 MG/ML
0.5 INJECTION INTRAMUSCULAR ONCE
Status: COMPLETED | OUTPATIENT
Start: 2019-02-22 | End: 2019-02-22

## 2019-02-23 PROBLEM — J06.9 VIRAL URI WITH COUGH: Status: ACTIVE | Noted: 2019-02-23

## 2019-02-23 PROBLEM — E87.6 HYPOKALEMIA: Status: ACTIVE | Noted: 2019-02-23

## 2019-02-23 LAB
ADENOVIRUS PCR:: NEGATIVE
ANION GAP SERPL CALC-SCNC: 13 MMOL/L (ref 0–18)
B PERT DNA SPEC QL NAA+PROBE: NEGATIVE
BUN BLD-MCNC: 10 MG/DL (ref 7–18)
BUN/CREAT SERPL: 11 (ref 10–20)
C PNEUM DNA SPEC QL NAA+PROBE: NEGATIVE
CALCIUM BLD-MCNC: 8.4 MG/DL (ref 8.5–10.1)
CHLORIDE SERPL-SCNC: 108 MMOL/L (ref 98–107)
CO2 SERPL-SCNC: 21 MMOL/L (ref 21–32)
CORONAVIRUS 229E PCR:: NEGATIVE
CORONAVIRUS HKU1 PCR:: NEGATIVE
CORONAVIRUS NL63 PCR:: NEGATIVE
CORONAVIRUS OC43 PCR:: NEGATIVE
CREAT BLD-MCNC: 0.91 MG/DL (ref 0.55–1.02)
DEPRECATED RDW RBC AUTO: 41.1 FL (ref 35.1–46.3)
ERYTHROCYTE [DISTWIDTH] IN BLOOD BY AUTOMATED COUNT: 12 % (ref 11–15)
FLUAV RNA SPEC QL NAA+PROBE: NEGATIVE
FLUBV RNA SPEC QL NAA+PROBE: NEGATIVE
GLUCOSE BLD-MCNC: 140 MG/DL (ref 70–99)
HAV IGM SER QL: 2 MG/DL (ref 1.6–2.6)
HCT VFR BLD AUTO: 35.9 % (ref 35–48)
HGB BLD-MCNC: 12.4 G/DL (ref 12–16)
LACTATE SERPL-SCNC: 3 MMOL/L (ref 0.4–2)
LACTATE SERPL-SCNC: 6.6 MMOL/L (ref 0.4–2)
LACTATE SERPL-SCNC: 6.9 MMOL/L (ref 0.4–2)
LACTATE SERPL-SCNC: 7.2 MMOL/L (ref 0.4–2)
MCH RBC QN AUTO: 32.5 PG (ref 26–34)
MCHC RBC AUTO-ENTMCNC: 34.5 G/DL (ref 31–37)
MCV RBC AUTO: 94.2 FL (ref 80–100)
METAPNEUMOVIRUS PCR:: NEGATIVE
MYCOPLASMA PNEUMONIA PCR:: NEGATIVE
OSMOLALITY SERPL CALC.SUM OF ELEC: 295 MOSM/KG (ref 275–295)
PARAINFLUENZA 1 PCR:: NEGATIVE
PARAINFLUENZA 2 PCR:: NEGATIVE
PARAINFLUENZA 3 PCR:: NEGATIVE
PARAINFLUENZA 4 PCR:: NEGATIVE
PHOSPHATE SERPL-MCNC: 3.6 MG/DL (ref 2.5–4.9)
PLATELET # BLD AUTO: 202 10(3)UL (ref 150–450)
POTASSIUM SERPL-SCNC: 4.4 MMOL/L (ref 3.5–5.1)
PROCALCITONIN SERPL-MCNC: <0.11 NG/ML
RBC # BLD AUTO: 3.81 X10(6)UL (ref 3.8–5.3)
RHINOVIRUS/ENTERO PCR:: NEGATIVE
RSV RNA SPEC QL NAA+PROBE: NEGATIVE
SODIUM SERPL-SCNC: 142 MMOL/L (ref 136–145)
WBC # BLD AUTO: 12.8 X10(3) UL (ref 4–11)

## 2019-02-23 PROCEDURE — 99291 CRITICAL CARE FIRST HOUR: CPT | Performed by: HOSPITALIST

## 2019-02-23 PROCEDURE — 99291 CRITICAL CARE FIRST HOUR: CPT | Performed by: NURSE PRACTITIONER

## 2019-02-23 PROCEDURE — 99232 SBSQ HOSP IP/OBS MODERATE 35: CPT | Performed by: STUDENT IN AN ORGANIZED HEALTH CARE EDUCATION/TRAINING PROGRAM

## 2019-02-23 RX ORDER — ONDANSETRON 2 MG/ML
4 INJECTION INTRAMUSCULAR; INTRAVENOUS EVERY 6 HOURS PRN
Status: DISCONTINUED | OUTPATIENT
Start: 2019-02-23 | End: 2019-02-26

## 2019-02-23 RX ORDER — POLYETHYLENE GLYCOL 3350 17 G/17G
17 POWDER, FOR SOLUTION ORAL DAILY PRN
Status: DISCONTINUED | OUTPATIENT
Start: 2019-02-23 | End: 2019-02-26

## 2019-02-23 RX ORDER — MIRTAZAPINE 15 MG/1
7.5 TABLET, FILM COATED ORAL NIGHTLY
Status: DISCONTINUED | OUTPATIENT
Start: 2019-02-23 | End: 2019-02-26

## 2019-02-23 RX ORDER — LORAZEPAM 2 MG/ML
1 INJECTION INTRAMUSCULAR EVERY 4 HOURS PRN
Status: DISCONTINUED | OUTPATIENT
Start: 2019-02-23 | End: 2019-02-26

## 2019-02-23 RX ORDER — MORPHINE SULFATE 4 MG/ML
4 INJECTION, SOLUTION INTRAMUSCULAR; INTRAVENOUS EVERY 2 HOUR PRN
Status: DISCONTINUED | OUTPATIENT
Start: 2019-02-23 | End: 2019-02-23

## 2019-02-23 RX ORDER — ACETAMINOPHEN AND CODEINE PHOSPHATE 300; 30 MG/1; MG/1
1 TABLET ORAL EVERY 4 HOURS PRN
Status: DISCONTINUED | OUTPATIENT
Start: 2019-02-23 | End: 2019-02-26

## 2019-02-23 RX ORDER — MORPHINE SULFATE 4 MG/ML
2 INJECTION, SOLUTION INTRAMUSCULAR; INTRAVENOUS EVERY 2 HOUR PRN
Status: DISCONTINUED | OUTPATIENT
Start: 2019-02-23 | End: 2019-02-23

## 2019-02-23 RX ORDER — SODIUM CHLORIDE, SODIUM LACTATE, POTASSIUM CHLORIDE, CALCIUM CHLORIDE 600; 310; 30; 20 MG/100ML; MG/100ML; MG/100ML; MG/100ML
INJECTION, SOLUTION INTRAVENOUS CONTINUOUS
Status: DISCONTINUED | OUTPATIENT
Start: 2019-02-23 | End: 2019-02-26

## 2019-02-23 RX ORDER — BISACODYL 10 MG
10 SUPPOSITORY, RECTAL RECTAL
Status: DISCONTINUED | OUTPATIENT
Start: 2019-02-23 | End: 2019-02-26

## 2019-02-23 RX ORDER — METHYLPREDNISOLONE SODIUM SUCCINATE 125 MG/2ML
60 INJECTION, POWDER, LYOPHILIZED, FOR SOLUTION INTRAMUSCULAR; INTRAVENOUS EVERY 8 HOURS
Status: COMPLETED | OUTPATIENT
Start: 2019-02-23 | End: 2019-02-23

## 2019-02-23 RX ORDER — IPRATROPIUM BROMIDE AND ALBUTEROL SULFATE 2.5; .5 MG/3ML; MG/3ML
SOLUTION RESPIRATORY (INHALATION)
Status: DISPENSED
Start: 2019-02-23 | End: 2019-02-23

## 2019-02-23 RX ORDER — IPRATROPIUM BROMIDE AND ALBUTEROL SULFATE 2.5; .5 MG/3ML; MG/3ML
3 SOLUTION RESPIRATORY (INHALATION)
Status: DISCONTINUED | OUTPATIENT
Start: 2019-02-23 | End: 2019-02-26 | Stop reason: DRUGHIGH

## 2019-02-23 RX ORDER — LEVOTHYROXINE SODIUM 0.1 MG/1
100 TABLET ORAL
Status: DISCONTINUED | OUTPATIENT
Start: 2019-02-23 | End: 2019-02-24

## 2019-02-23 RX ORDER — METHYLPREDNISOLONE SODIUM SUCCINATE 125 MG/2ML
60 INJECTION, POWDER, LYOPHILIZED, FOR SOLUTION INTRAMUSCULAR; INTRAVENOUS EVERY 8 HOURS
Status: DISCONTINUED | OUTPATIENT
Start: 2019-02-23 | End: 2019-02-23

## 2019-02-23 RX ORDER — ESCITALOPRAM OXALATE 10 MG/1
10 TABLET ORAL DAILY
Status: DISCONTINUED | OUTPATIENT
Start: 2019-02-23 | End: 2019-02-26

## 2019-02-23 RX ORDER — MORPHINE SULFATE 4 MG/ML
1 INJECTION, SOLUTION INTRAMUSCULAR; INTRAVENOUS EVERY 2 HOUR PRN
Status: DISCONTINUED | OUTPATIENT
Start: 2019-02-23 | End: 2019-02-23

## 2019-02-23 RX ORDER — BENZONATATE 200 MG/1
200 CAPSULE ORAL 3 TIMES DAILY PRN
Status: DISCONTINUED | OUTPATIENT
Start: 2019-02-23 | End: 2019-02-26

## 2019-02-23 RX ORDER — PREGABALIN 75 MG/1
75 CAPSULE ORAL 2 TIMES DAILY
Status: DISCONTINUED | OUTPATIENT
Start: 2019-02-23 | End: 2019-02-26

## 2019-02-23 RX ORDER — ZOLPIDEM TARTRATE 5 MG/1
TABLET ORAL NIGHTLY
Status: DISCONTINUED | OUTPATIENT
Start: 2019-02-23 | End: 2019-02-26

## 2019-02-23 RX ORDER — IPRATROPIUM BROMIDE AND ALBUTEROL SULFATE 2.5; .5 MG/3ML; MG/3ML
3 SOLUTION RESPIRATORY (INHALATION)
Status: DISCONTINUED | OUTPATIENT
Start: 2019-02-23 | End: 2019-02-23

## 2019-02-23 RX ORDER — ACETAMINOPHEN 325 MG/1
650 TABLET ORAL EVERY 6 HOURS PRN
Status: DISCONTINUED | OUTPATIENT
Start: 2019-02-23 | End: 2019-02-26

## 2019-02-23 RX ORDER — SODIUM CHLORIDE 9 MG/ML
INJECTION, SOLUTION INTRAVENOUS CONTINUOUS
Status: DISCONTINUED | OUTPATIENT
Start: 2019-02-23 | End: 2019-02-23

## 2019-02-23 RX ORDER — IPRATROPIUM BROMIDE AND ALBUTEROL SULFATE 2.5; .5 MG/3ML; MG/3ML
3 SOLUTION RESPIRATORY (INHALATION)
Status: DISCONTINUED | OUTPATIENT
Start: 2019-02-23 | End: 2019-02-26

## 2019-02-23 RX ORDER — MAGNESIUM OXIDE 400 MG (241.3 MG MAGNESIUM) TABLET
100 TABLET DAILY
Status: DISCONTINUED | OUTPATIENT
Start: 2019-02-23 | End: 2019-02-26

## 2019-02-23 RX ORDER — ENOXAPARIN SODIUM 100 MG/ML
40 INJECTION SUBCUTANEOUS DAILY
Status: DISCONTINUED | OUTPATIENT
Start: 2019-02-23 | End: 2019-02-26

## 2019-02-23 RX ORDER — CYCLOBENZAPRINE HCL 10 MG
10 TABLET ORAL 3 TIMES DAILY PRN
Status: DISCONTINUED | OUTPATIENT
Start: 2019-02-23 | End: 2019-02-26

## 2019-02-23 RX ORDER — SODIUM PHOSPHATE, DIBASIC AND SODIUM PHOSPHATE, MONOBASIC 7; 19 G/133ML; G/133ML
1 ENEMA RECTAL ONCE AS NEEDED
Status: DISCONTINUED | OUTPATIENT
Start: 2019-02-23 | End: 2019-02-26

## 2019-02-23 RX ORDER — PANTOPRAZOLE SODIUM 40 MG/1
40 TABLET, DELAYED RELEASE ORAL
Status: DISCONTINUED | OUTPATIENT
Start: 2019-02-23 | End: 2019-02-26

## 2019-02-23 RX ORDER — ACETAMINOPHEN AND CODEINE PHOSPHATE 300; 30 MG/1; MG/1
2 TABLET ORAL EVERY 4 HOURS PRN
Status: DISCONTINUED | OUTPATIENT
Start: 2019-02-23 | End: 2019-02-26

## 2019-02-23 NOTE — H&P
SARAY HOSPITALIST  History and Physical     Nelly Espinal Patient Status:  Inpatient    1980 MRN [id number]   [facility] 4SW-A Attending Tsering Hilton MD   Hosp Day # 0 PCP Gabino Guadarrama MD     Chief Complaint: SOB    Histo unspecified DMG DX 11-2-12    AHI 2 RDI 2 REM AHI 6 SaO2 curtis 89%   • OTHER DISEASES     rectal bleeding   • Pancreatitis    • Pneumonia august 2014   • Pneumonia, organism unspecified(486)    • PONV (postoperative nausea and vomiting)    • Reflux    • Te gastric bypass   • OTHER SURGICAL HISTORY      partial thyroidectomy with subsequent scar revision   • OTHER SURGICAL HISTORY      laparoscopies x2   • REMOVAL GALLBLADDER     • ROBOT-ASSISTED LAPAROSCOPIC HYSTERECTOMY N/A 6/26/2013    Performed by [place] Ipratropium-Albuterol (DUONEB IN) Inhale 1 vial into the lungs every 4 (four) hours as needed. Disp:  Rfl:    predniSONE 20 MG Oral Tab Take 40 mg for 3 days then taper down to 20 mg for 2 days (Patient taking differently: Take 60 mg by mouth daily.  Take tablet by mouth daily. Disp:  Rfl:    NON FORMULARY Take 1 tablet by mouth daily. Disp:  Rfl:    Budesonide-Formoterol Fumarate 160-4.5 MCG/ACT Inhalation Aerosol Inhale 2 puffs into the lungs 2 (two) times daily as needed.    Disp:  Rfl:    aspirin 81 origin exacerbating symptoms. PCT negative  1. Nebs, O2, steroids  2. Resp panel- neg for influenza  3. Can hold abx for now  4. Tessalon   2. Hypoxia- wean O2 as tolerated   3. Hypokalemia- replace  4.  Lactic acidosis- suspect more due to hypoxia, nebs et

## 2019-02-23 NOTE — CONSULTS
Critical Care Consult     Assessment / Plan:  1. Asthma exacerbation - likely triggered by viral infection. May have component of VCD  - IV steroids to po tomorrow  - Breo in lieu of Symbicort.  Educated on the importance of taking as prescribed to prevent nausea and vomiting)    • Reflux    • Tennis elbow    • Unspecified disorder of thyroid    • Visual impairment     glasses       Past Surgical History:   Procedure Laterality Date   • ARTHROSCOPY ANKLE WITH DEBRIDEMENT Left 6/19/2015    Performed by Marty Galvan Performed by Toan Reaves MD at [address] [place] [address]   • SURGICAL STENT Bilateral March 2015.     Bilateral iliac stents   • SURGICAL STENT      stent to iliac crest bone   • THYROIDECTOMY  4/2011   • THYROIDECTOMY Left 8/8/2016    Performed by Cris Daigle Strip Use 1 strip twice daily Disp: 100 each Rfl: 0 Past Week at Unknown time   UNITHROID 150 MCG Oral Tab TAKE 1 TABLET BY MOUTH EVERY DAY BEFORE BREAKFAST Disp: 90 tablet Rfl: 3 2/22/2019 at Unknown time   escitalopram 20 MG Oral Tab Take 10 mg by mouth (two) times daily. Disp: 60 capsule Rfl: 0   ALPRAZolam 0.25 MG Oral Tab Take 1-2 tablets (0.25-0.5 mg total) by mouth 2 (two) times daily as needed for Sleep or Anxiety.  Disp: 120 tablet Rfl: 0   Butalbital-APAP-Caffeine -40 MG Oral Tab Take 1 table HIVES    Comment:Dark chocolate  Doxycycline                 Comment:Hives and fever  Mold                        Comment:Verified by skin testing  Nsaids                  OTHER (SEE COMMENTS)    Comment:Upset stomach             Hx of gastric bypass  Regl Service: Not Asked        Blood Transfusions: Not Asked        Caffeine Concern: Yes          32 oz. of iced tea a day        Occupational Exposure: Not Asked        Hobby Hazards: Not Asked        Sleep Concern: Not Asked        Stress Concern: Not Asked

## 2019-02-23 NOTE — PLAN OF CARE
Pt trans to 5 th floor, Rn given report. Pt transferred by w/c, all questions and  Concerns addressed, support given.  Pt stable

## 2019-02-23 NOTE — PROGRESS NOTES
SARAY HOSPITALIST  Progress Note     Lorena Catherine Patient Status:  Inpatient    1980 MRN [id number]   [facility] 4SW-A Attending Pratima Arredondo MD   Hosp Day # 0 PCP César Richard MD     Chief Complaint: SOB    S: Patient stil Oral QAM AC   • pregabalin  75 mg Oral BID   • Levothyroxine Sodium  100 mcg Oral Before breakfast   • Zolpidem Tartrate  2.5-5 mg Oral Nightly   • MethylPREDNISolone Sodium Succ  60 mg Intravenous Q8H   • [START ON 2/24/2019] predniSONE  60 mg Oral Daily

## 2019-02-23 NOTE — ED PROVIDER NOTES
Patient Seen in: [facility] In [place]    History   Patient presents with:  Dyspnea ELISE SOB (respiratory)    Stated Complaint: asthma    HPI    This is a 28-year-old female who presents in moderate respiratory distress.   She is a past ASC   • BARIATRIC GASTRIC BYPASS LAPAROSCOPIC MARCIO-EN-Y N/A 2017    Performed by Jenna Rollins MD at [address] [place] [address] OR   •      • CHOLECYSTECTOMY     • COLONOSCOPY, POSSIBLE BIOPSY, POSSIBLE POLYPECTOMY 89680 N/A 10/22/2013    Performed by ABDOM HYSTERECTOMY     • UPPER GI ENDO NO BARRETTS  12/15    normal   • UPPER GI ENDOSCOPY PERFORMED  01/25/2017    Nilesh Tesfaye M.D.            Social History    Tobacco Use      Smoking status: Never Smoker      Smokeless tobacco: Never Used    Alc (*)     All other components within normal limits   LACTIC ACID, PLASMA - Abnormal; Notable for the following components:    Lactic Acid 7.2 (*)     All other components within normal limits   POCT ISTAT G3 CARTRIDGE - Abnormal; Notable for the following c Potassium is low at 2.5.  40 mEq was given orally. Influenza A/B was negative here in the ED. Lactic acid was 4.7. X-ray was negative. She continued to have intractable cough throughout the entire ED stay.   Additionally, Ativan 0.5 mg IV push was given

## 2019-02-23 NOTE — PROGRESS NOTES
Patient transferred from ICU to room 519. Patient alert and oriented. Patient complains of back pain, treated with Tylenol #3. SPO2 remains greater then 92% on room air. Lung sounds diminished. Nonproductive cough noted.  IV fluids and steroids continue per

## 2019-02-23 NOTE — ED INITIAL ASSESSMENT (HPI)
Pt was admitted to [address] [place] [address] last wk for asthma, no relief with neb treatment every 2 -3 hours. Non productive cough.

## 2019-02-23 NOTE — PROGRESS NOTES
ICU  Critical Care APRN Progress Note    NAME: Joya Burton - ROOM: [address] [place] [address] - MRN: [id number] - Age: 45year old - C:[birth year]    History Of Present Illness:  Joya Burton is a 45year old female with PMHx significant for asthma (reports jillian PCT normal at <0.11. She has now been admitted to the ICU for close monitoring and management. She reports above symptoms ongoing since last week with minimal improvement and then acute worsening. She remains dyspneic with coughing.   Coughing is const Left 6/19/2015    Procedure: GASTROC RECESSION FOOT;  Surgeon: Joyce Hubbard MD;  Location: [facility]   • Hysteroscopy  2011   • Inj paravert f jnt l/s 1 lev Bilateral 12/14/2015    Procedure: FACET INJECTION UNDER FLUOROSCOPY;  Surgeon: Cory Hensley, Review of Systems:   A comprehensive 10 point review of systems was completed. Pertinent positives and negatives noted in the HPI.     OBJECTIVE  Vitals:  /75 (BP Location: Right arm)   Pulse 110   Temp 98.8 °F (37.1 °C) (Temporal)   Resp (!) 30 bronchospasm--suspect possible viral contributor, though rapid Flu negative for A and B  -Duoneb q4h scheduled and q2h prn  -Continue Solumedrol for now at 60mg IV [id number] convert to PO with rapid taper as patient symptoms allow  -Monitor off antib

## 2019-02-23 NOTE — ED NOTES
Pt states breathing the same, pt states pain same. Will continue to monitor pt. Awaiting for [facility] to transport pt.

## 2019-02-23 NOTE — RESPIRATORY THERAPY NOTE
Pt received in ER with uncontrollable coughing. Pt has history of asthma. Pt BS are perfectly clear throughout all lung fields. Duoneb given to no effect. Continuous ordered and given again with no effect.  Lastly racemic epi was ordered to be given before

## 2019-02-24 LAB
LACTATE SERPL-SCNC: 2.6 MMOL/L (ref 0.4–2)
LACTATE SERPL-SCNC: 5 MMOL/L (ref 0.4–2)
LACTATE SERPL-SCNC: 6.5 MMOL/L (ref 0.4–2)

## 2019-02-24 PROCEDURE — 99232 SBSQ HOSP IP/OBS MODERATE 35: CPT | Performed by: STUDENT IN AN ORGANIZED HEALTH CARE EDUCATION/TRAINING PROGRAM

## 2019-02-24 RX ORDER — ACETAMINOPHEN AND CODEINE PHOSPHATE 300; 30 MG/1; MG/1
1-2 TABLET ORAL EVERY 4 HOURS PRN
Qty: 12 TABLET | Refills: 0 | Status: SHIPPED | OUTPATIENT
Start: 2019-02-24 | End: 2019-02-28

## 2019-02-24 RX ORDER — LEVOTHYROXINE SODIUM 0.15 MG/1
150 TABLET ORAL
Status: DISCONTINUED | OUTPATIENT
Start: 2019-02-24 | End: 2019-02-26

## 2019-02-24 NOTE — PROGRESS NOTES
Pulmonary Progress Note     Assessment / Plan:  1. Asthma exacerbation - respiratory panel negative. May have component of VCD. Better today  - prednisone taper  - Breo in lieu of Symbicort.  Educated on the importance of taking as prescribed to prevent thi

## 2019-02-24 NOTE — PLAN OF CARE
GASTROINTESTINAL - ADULT    • Minimal or absence of nausea and vomiting Progressing          RESPIRATORY - ADULT    • Achieves optimal ventilation and oxygenation Progressing        Assumed care of patient at [address]. Monitor on tele-NSR/ST,  on RA.  IV sol

## 2019-02-24 NOTE — PROGRESS NOTES
SARAY HOSPITALIST  Progress Note     Rosanna Soto Patient Status:  Inpatient    1980 MRN [id number]   [facility] 5NW-A Attending Alannah William MD   Hosp Day # 1 PCP Isela Carlin MD     Chief Complaint: SOB    S: Patient  Sti 7.5 mg Oral Nightly   • Pantoprazole Sodium  40 mg Oral QAM AC   • pregabalin  75 mg Oral BID   • Zolpidem Tartrate  2.5-5 mg Oral Nightly   • predniSONE  60 mg Oral Daily with breakfast   • Fluticasone Furoate-Vilanterol  1 puff Inhalation Daily   • iprat

## 2019-02-25 ENCOUNTER — TELEPHONE (OUTPATIENT)
Dept: PHYSICAL THERAPY | Age: 39
End: 2019-02-25

## 2019-02-25 ENCOUNTER — APPOINTMENT (OUTPATIENT)
Dept: PHYSICAL THERAPY | Age: 39
End: 2019-02-25
Attending: PHYSICIAN ASSISTANT
Payer: COMMERCIAL

## 2019-02-25 LAB
LACTATE SERPL-SCNC: 1.3 MMOL/L (ref 0.4–2)
PROCALCITONIN SERPL-MCNC: <0.11 NG/ML

## 2019-02-25 PROCEDURE — 99232 SBSQ HOSP IP/OBS MODERATE 35: CPT | Performed by: HOSPITALIST

## 2019-02-25 RX ORDER — PREDNISONE 20 MG/1
TABLET ORAL
Qty: 18 TABLET | Refills: 0 | Status: SHIPPED | OUTPATIENT
Start: 2019-02-25 | End: 2019-03-05

## 2019-02-25 NOTE — PLAN OF CARE
GASTROINTESTINAL - ADULT    • Minimal or absence of nausea and vomiting Progressing          RESPIRATORY - ADULT    • Achieves optimal ventilation and oxygenation Progressing        Assumed care of patient at 1900. Monitor tele-NSR/ST,  on RA.  IVF infus

## 2019-02-25 NOTE — CM/SW NOTE
02/25/19 1400   CM/SW Screening   Referral Source    Information Source Chart review;Nursing rounds   Patient's Mental Status Alert;Oriented   Patient's [address]   Patient lives with Spouse   Patient Status Prior to Admission

## 2019-02-25 NOTE — PAYOR COMM NOTE
--------------  ADMISSION REVIEW     [facility]  Subscriber #:  [id number]  Authorization Number: [id number]    Admit date: 2/23/19  Admit time: 8527       Admitting Physician: Bigg Hoffmann MD  Attending Physician:  Taina Muniz MD  Primary Care BP 02/22/19 2152 (!) 138/96   Pulse 02/22/19 2152 (!) 137   Resp 02/22/19 2152 26   Temp 02/22/19 2203 (!) 100.9 °F (38.3 °C)   Temp src 02/22/19 2152 Temporal   SpO2 02/22/19 2152 100 %   O2 Device 02/22/19 2152 None (Room air)       Current:/75 ( All other components within normal limits   POCT FLU TEST - Normal   CBC WITH DIFFERENTIAL WITH PLATELET    Narrative: The following orders were created for panel order CBC WITH DIFFERENTIAL WITH PLATELET.   Procedure                               Abnor allergies. She is transferred via advanced life support embolus directly to ICU for further management.         Admission disposition: 2/22/2019 10:44 PM                 Disposition and Plan     Clinical Impression:  Severe persistent asthma with status as prednisone to 40 mg which he took today. However, symptoms started to worsen. Patient took an extra 20 mg so total of 60 mg today. Symptoms continue to worsen which she called her primary care clinic and directed her to the emergency department.   She do CA  8.5   ALB  3.6   NA  140   K  2.9*   CL  102   CO2  28.0   ALKPHO  82   AST  13*   ALT  34   BILT  0.2   TP  7.1     No results for input(s): PTP, INR in the last 168 hours. No results for input(s): TROP, CK in the last 168 hours.   Imaging: Imaging Furoate-Vilanterol (BREO ELLIPTA) 200-25 MCG/INH inhaler 1 puff     Date Action Dose Route User    2/25/2019 0759 Given 1 puff Inhalation Frank Greenberg RN      ipratropium-albuterol (DUONEB) nebulizer solution 3 mL     Date Action Dose Route User    2/25/

## 2019-02-25 NOTE — PROGRESS NOTES
[facility]    Vimal White Patient Status:  Inpatient    1980 MRN [id number]   [facility] 5NW-A Attending Marco Scales MD   Hosp Day # 2 PCP Lion Gale MD     SUBJECTIVE: Pt states that breathing is improving.   Continue tablet, Oral, Q4H PRN **OR** Acetaminophen-Codeine #3 (TYLENOL #3) 300-30 MG tab 2 tablet, 2 tablet, Oral, Q4H PRN  •  Cyclobenzaprine HCl (cyclobenzaprine) tab 10 mg, 10 mg, Oral, TID PRN  •  predniSONE (DELTASONE) tab 60 mg, 60 mg, Oral, Daily with break tomorrow   - pt to follow up with Dr. Maco De Guzman or APN in 2 weeks    Christel Graham MD  2/25/2019  8:19 AM

## 2019-02-25 NOTE — PROGRESS NOTES
SARAY HOSPITALIST  Progress Note     Xuan Pastor Patient Status:  Inpatient    1980 MRN [id number]   [facility] 5NW-A Attending Aster Brooks MD   Hosp Day # 2 PCP Winnifred Ormond, MD     Chief Complaint: sob    S: Patient denies • Pantoprazole Sodium  40 mg Oral QAM AC   • pregabalin  75 mg Oral BID   • Zolpidem Tartrate  2.5-5 mg Oral Nightly   • predniSONE  60 mg Oral Daily with breakfast   • Fluticasone Furoate-Vilanterol  1 puff Inhalation Daily   • ipratropium-albuterol  3

## 2019-02-26 VITALS
HEIGHT: 68 IN | SYSTOLIC BLOOD PRESSURE: 125 MMHG | TEMPERATURE: 99 F | BODY MASS INDEX: 29.55 KG/M2 | OXYGEN SATURATION: 98 % | DIASTOLIC BLOOD PRESSURE: 82 MMHG | RESPIRATION RATE: 16 BRPM | WEIGHT: 195 LBS | HEART RATE: 98 BPM

## 2019-02-26 PROCEDURE — 99238 HOSP IP/OBS DSCHRG MGMT 30/<: CPT | Performed by: HOSPITALIST

## 2019-02-26 RX ORDER — IPRATROPIUM BROMIDE AND ALBUTEROL SULFATE 2.5; .5 MG/3ML; MG/3ML
3 SOLUTION RESPIRATORY (INHALATION) EVERY 6 HOURS PRN
Status: DISCONTINUED | OUTPATIENT
Start: 2019-02-26 | End: 2019-02-26

## 2019-02-26 NOTE — PROGRESS NOTES
[facility]    Sky Caldwell Patient Status:  Inpatient    1980 MRN [id number]   [facility] 5NW-A Attending Teofilo Brown MD   Hosp Day # 3 PCP Abril Bustamante MD     SUBJECTIVE: Pt states breathing is starting to feel better. tablet, Oral, Q4H PRN **OR** Acetaminophen-Codeine #3 (TYLENOL #3) 300-30 MG tab 2 tablet, 2 tablet, Oral, Q4H PRN  •  Cyclobenzaprine HCl (cyclobenzaprine) tab 10 mg, 10 mg, Oral, TID PRN  •  predniSONE (DELTASONE) tab 60 mg, 60 mg, Oral, Daily with break Nemivant or APN in 2 weeks

## 2019-02-26 NOTE — DISCHARGE SUMMARY
[facility] HOSPITALIST  DISCHARGE SUMMARY     Rosanna Soto Patient Status:  Inpatient    1980 MRN [id number]   [facility] 5NW-A Attending Maria Isabel Lieberman MD   Hosp Day # 3 PCP Isela Carlin MD     Date of Admission: 2019  Date of details   Albuterol Sulfate  (90 Base) MCG/ACT Aers  Commonly known as:  PROAIR HFA      INHALE 2 PUFFS BY MOUTH EVERY 4 HOURS AS NEEDED FOR WHEEZING   Quantity:  8.5 g  Refills:  0     ALPRAZolam 0.25 MG Tabs  Commonly known as:  XANAX      Take 1- 0     Phentermine HCl 37.5 MG Tabs  Commonly known as:  ADIPEX-P      Take 1 tablet (37.5 mg total) by mouth every morning before breakfast.   Quantity:  30 tablet  Refills:  2     pregabalin 75 MG Caps  Commonly known as:  LYRICA      Take 1 capsule (75 2/28/2019  6:00 PM  PT VISIT - BY THERAPIST [9334] 45 min. [employer] in [address] [place] [address]    Location Instructions: Your appointment is scheduled at the [facility] in [place].  The address is [address] [place] [address] the [facility] in [place]. The address is [address] at  [address] on the 2nd floor. The facility is located 1/2 mile [address] on [employer] at the corner of [employer] and  [address].   The telephone n MD Ayush    Time spent:  > 30 minutes

## 2019-02-26 NOTE — PROGRESS NOTES
Spo2% on room air at rest: 95%    Spo2 ambulation on room air: 93%    Patient still feeling very short of breath.

## 2019-02-26 NOTE — PROGRESS NOTES
NURSING DISCHARGE NOTE    Discharged Home via Wheelchair. Accompanied by Spouse  Belongings Taken by patient/family. VSS. IV discontinued. Discharge instructions and prescriptions given to patient. She verbalized understanding.  All questions answer

## 2019-02-27 ENCOUNTER — PATIENT OUTREACH (OUTPATIENT)
Dept: CASE MANAGEMENT | Age: 39
End: 2019-02-27

## 2019-02-27 DIAGNOSIS — Z02.9 ENCOUNTERS FOR UNSPECIFIED ADMINISTRATIVE PURPOSE: ICD-10-CM

## 2019-02-27 DIAGNOSIS — J45.52 SEVERE PERSISTENT ASTHMA WITH STATUS ASTHMATICUS: ICD-10-CM

## 2019-02-27 PROCEDURE — 1111F DSCHRG MED/CURRENT MED MERGE: CPT

## 2019-02-27 NOTE — PROGRESS NOTES
Initial Post Discharge Follow Up   Discharge Date: 2/26/19  Contact Date: 2/27/2019    Consent Verification:  Assessment Completed With:   HIPAA Verified?       Discharge Dx:   Severe persistent asthma with status asthmaticus, viral URI cough, hypokalemi by mouth every 4 (four) hours as needed. Disp: 12 tablet Rfl: 0   NON FORMULARY  Disp:  Rfl:    Ipratropium-Albuterol (DUONEB IN) Inhale 1 vial into the lungs every 4 (four) hours as needed.  Disp:  Rfl:    pregabalin (LYRICA) 75 MG Oral Cap Take 1 capsule Fumarate 160-4.5 MCG/ACT Inhalation Aerosol Inhale 2 puffs into the lungs 2 (two) times daily as needed. Disp:  Rfl:    aspirin 81 MG Oral Tab Take 81 mg by mouth daily.  Disp:  Rfl:      • When you were leaving the hospital were any medication changes di [address] ([address] [facility] [address] [facility] [address])    Mar 11, 2019  4:45 PM CDT Post-Op Follow Up with Mary Ann Irvin, [address], [facility])    Mar 14, 2019 12:00 PM CDT Follow up Visit with LALIT Garsia [address] [facility]  [phone]          PCP TCM/HFU appointment: scheduled at D/C within 7-14 days  yes     NCM Reviewed/scheduled/rescheduled PCP TCM/HFU appointment with pt:  Yes      Have you made all of your follow up

## 2019-02-28 ENCOUNTER — TELEPHONE (OUTPATIENT)
Dept: SURGERY | Facility: CLINIC | Age: 39
End: 2019-02-28

## 2019-02-28 ENCOUNTER — OFFICE VISIT (OUTPATIENT)
Dept: FAMILY MEDICINE CLINIC | Facility: CLINIC | Age: 39
End: 2019-02-28
Payer: COMMERCIAL

## 2019-02-28 ENCOUNTER — TELEPHONE (OUTPATIENT)
Dept: PHYSICAL THERAPY | Age: 39
End: 2019-02-28

## 2019-02-28 ENCOUNTER — LAB ENCOUNTER (OUTPATIENT)
Dept: LAB | Age: 39
End: 2019-02-28
Attending: FAMILY MEDICINE
Payer: COMMERCIAL

## 2019-02-28 ENCOUNTER — APPOINTMENT (OUTPATIENT)
Dept: PHYSICAL THERAPY | Age: 39
End: 2019-02-28
Attending: FAMILY MEDICINE
Payer: COMMERCIAL

## 2019-02-28 VITALS
OXYGEN SATURATION: 98 % | BODY MASS INDEX: 28.64 KG/M2 | SYSTOLIC BLOOD PRESSURE: 102 MMHG | TEMPERATURE: 99 F | DIASTOLIC BLOOD PRESSURE: 64 MMHG | WEIGHT: 189 LBS | HEART RATE: 100 BPM | HEIGHT: 68 IN

## 2019-02-28 DIAGNOSIS — F51.01 PRIMARY INSOMNIA: ICD-10-CM

## 2019-02-28 DIAGNOSIS — R05.8 SPASMODIC COUGH: ICD-10-CM

## 2019-02-28 DIAGNOSIS — G56.91 NEUROPATHY, ARM, RIGHT: ICD-10-CM

## 2019-02-28 DIAGNOSIS — R05.8 SPASMODIC COUGH: Primary | ICD-10-CM

## 2019-02-28 DIAGNOSIS — R79.89 ELEVATED LACTIC ACID LEVEL: ICD-10-CM

## 2019-02-28 DIAGNOSIS — J38.3 VOCAL CORD DYSFUNCTION: ICD-10-CM

## 2019-02-28 DIAGNOSIS — J45.52 SEVERE PERSISTENT ASTHMA WITH STATUS ASTHMATICUS: ICD-10-CM

## 2019-02-28 LAB
BASOPHILS # BLD AUTO: 0.02 X10(3) UL (ref 0–0.2)
BASOPHILS NFR BLD AUTO: 0.1 %
DEPRECATED RDW RBC AUTO: 44.7 FL (ref 35.1–46.3)
EOSINOPHIL # BLD AUTO: 0 X10(3) UL (ref 0–0.7)
EOSINOPHIL NFR BLD AUTO: 0 %
ERYTHROCYTE [DISTWIDTH] IN BLOOD BY AUTOMATED COUNT: 12.4 % (ref 11–15)
HCT VFR BLD AUTO: 41 % (ref 35–48)
HGB BLD-MCNC: 13.5 G/DL (ref 12–16)
IMM GRANULOCYTES # BLD AUTO: 0.07 X10(3) UL (ref 0–1)
IMM GRANULOCYTES NFR BLD: 0.5 %
LACTATE SERPL-SCNC: 3.8 MMOL/L (ref 0.4–2)
LYMPHOCYTES # BLD AUTO: 0.82 X10(3) UL (ref 1–4)
LYMPHOCYTES NFR BLD AUTO: 5.4 %
MCH RBC QN AUTO: 32.4 PG (ref 26–34)
MCHC RBC AUTO-ENTMCNC: 32.9 G/DL (ref 31–37)
MCV RBC AUTO: 98.3 FL (ref 80–100)
MONOCYTES # BLD AUTO: 0.16 X10(3) UL (ref 0.1–1)
MONOCYTES NFR BLD AUTO: 1.1 %
NEUTROPHILS # BLD AUTO: 14.06 X10 (3) UL (ref 1.5–7.7)
NEUTROPHILS # BLD AUTO: 14.06 X10(3) UL (ref 1.5–7.7)
NEUTROPHILS NFR BLD AUTO: 92.9 %
PLATELET # BLD AUTO: 234 10(3)UL (ref 150–450)
RBC # BLD AUTO: 4.17 X10(6)UL (ref 3.8–5.3)
WBC # BLD AUTO: 15.1 X10(3) UL (ref 4–11)

## 2019-02-28 PROCEDURE — 99496 TRANSJ CARE MGMT HIGH F2F 7D: CPT | Performed by: FAMILY MEDICINE

## 2019-02-28 PROCEDURE — 1111F DSCHRG MED/CURRENT MED MERGE: CPT | Performed by: FAMILY MEDICINE

## 2019-02-28 PROCEDURE — 36415 COLL VENOUS BLD VENIPUNCTURE: CPT

## 2019-02-28 PROCEDURE — 85025 COMPLETE CBC W/AUTO DIFF WBC: CPT

## 2019-02-28 PROCEDURE — 83605 ASSAY OF LACTIC ACID: CPT

## 2019-02-28 RX ORDER — ACETAMINOPHEN AND CODEINE PHOSPHATE 300; 30 MG/1; MG/1
1 TABLET ORAL EVERY 4 HOURS PRN
Qty: 40 TABLET | Refills: 0 | Status: SHIPPED | OUTPATIENT
Start: 2019-02-28 | End: 2019-03-05

## 2019-02-28 RX ORDER — CALCIUM CARBONATE 500(1250)
500 TABLET ORAL 2 TIMES DAILY
COMMUNITY
End: 2019-09-30

## 2019-02-28 RX ORDER — PREGABALIN 50 MG/1
50 CAPSULE ORAL 2 TIMES DAILY
Qty: 60 CAPSULE | Refills: 2 | Status: SHIPPED | OUTPATIENT
Start: 2019-02-28 | End: 2019-04-15

## 2019-02-28 RX ORDER — AMITRIPTYLINE HYDROCHLORIDE 10 MG/1
10 TABLET, FILM COATED ORAL NIGHTLY
Qty: 30 TABLET | Refills: 0 | Status: SHIPPED | OUTPATIENT
Start: 2019-02-28 | End: 2019-03-18

## 2019-02-28 RX ORDER — PREDNISONE 20 MG/1
TABLET ORAL
Qty: 8 TABLET | Refills: 0 | Status: SHIPPED | OUTPATIENT
Start: 2019-02-28 | End: 2019-03-18 | Stop reason: ALTCHOICE

## 2019-02-28 NOTE — PROGRESS NOTES
Lactic acid is up do the incentive spirometry every 30 minutes. Take breaths slow if possible. Repeat lactic acid in the morning call pulmonology for appointment as soon as possible still awaiting CBC.   Sent to my chart

## 2019-02-28 NOTE — TELEPHONE ENCOUNTER
Patient has been having asthma attacks for 2 weeks and has been on steroids for quite a while and would like to discuss her condition.  Please call

## 2019-02-28 NOTE — PATIENT INSTRUCTIONS
Prednisone taper 40 mg for 5 days then 20  Mg for 5 days then 10 mg for 5 days     Codeine taper  3 days every 4 hours   3 days every 6 hours  3 days every 8 hours  2 days every 12 hours

## 2019-02-28 NOTE — PROGRESS NOTES
HPI:    Nelly Espinal is a 45year old female here today for hospital follow up.    She was discharged from Inpatient hospital, [facility] to Home   Admission Date: 2/22/19   Discharge Date: 2/26/19  Hospital Discharge Diagnoses (since 1/29/2019 intubation. During hospitalization lactic acid levels did eventually come down to normal.  No antibiotics were started and no signs of infection, respiratory panel for viruses including pertussis was negative.   Patient was seen by pulmonology and released [metoclopramide]; toradol [ketorolac tromethamine]; azithromycin; and tramadol. Current Meds:    Current Outpatient Medications on File Prior to Visit:  Calcium 500 MG Oral Tab Take 500 mg by mouth 2 (two) times daily.    predniSONE 20 MG Oral Tab Take 3 facility-administered medications on file prior to visit.        HISTORY: reconciled and reviewed with patient  She  has a past medical history of Anxiety, Anxiety state, unspecified, Arthritis, Asthma, Calculus of kidney, Cancer ([facility] [address] Diverticulosis of la of onset: 76) in her maternal grandmother; Diabetes in her paternal grandmother; Heart Disease in her father and paternal grandmother; Heart Disorder in her father and paternal grandmother; Other in her mother. She  reports that  has never smoked.  she burnette Regular with tachycardia rhythm  without murmur  MUSCULOSKELETAL: back is not tender, FROM of the extremities no calf tenderness  EXTREMITIES: no cyanosis, clubbing or edema  NEURO: Oriented times three, cranial nerves are intact, motor and sensory are alireza counseling regarding medical conditions and treatment.        Orders Placed This Encounter      Lactic Acid, Plasma      mmm cbc      Lactic Acid, Plasma      Meds & Refills for this Visit:  Requested Prescriptions     Signed Prescriptions Disp Refills   •

## 2019-03-01 ENCOUNTER — TELEPHONE (OUTPATIENT)
Dept: FAMILY MEDICINE CLINIC | Facility: CLINIC | Age: 39
End: 2019-03-01

## 2019-03-01 ENCOUNTER — LAB ENCOUNTER (OUTPATIENT)
Dept: LAB | Age: 39
End: 2019-03-01
Attending: FAMILY MEDICINE
Payer: COMMERCIAL

## 2019-03-01 DIAGNOSIS — R79.89 ELEVATED LACTIC ACID LEVEL: ICD-10-CM

## 2019-03-01 DIAGNOSIS — F41.1 GAD (GENERALIZED ANXIETY DISORDER): ICD-10-CM

## 2019-03-01 DIAGNOSIS — G89.29 CHRONIC ABDOMINAL PAIN: ICD-10-CM

## 2019-03-01 DIAGNOSIS — R10.9 CHRONIC ABDOMINAL PAIN: ICD-10-CM

## 2019-03-01 LAB — LACTATE SERPL-SCNC: 1.4 MMOL/L (ref 0.4–2)

## 2019-03-01 PROCEDURE — 83605 ASSAY OF LACTIC ACID: CPT

## 2019-03-01 PROCEDURE — 36415 COLL VENOUS BLD VENIPUNCTURE: CPT

## 2019-03-01 RX ORDER — ZOLPIDEM TARTRATE 5 MG/1
TABLET ORAL
Qty: 30 TABLET | Refills: 0 | Status: SHIPPED | OUTPATIENT
Start: 2019-03-01 | End: 2019-03-30

## 2019-03-01 NOTE — TELEPHONE ENCOUNTER
----- Message from Coleen Cancino PA-C sent at 3/1/2019 11:39 AM CST -----  Normal Lactic acid   Call her and also sent to my chart    lmom for pt with results. Asked pt after reviewing message to call office with any questions.

## 2019-03-01 NOTE — TELEPHONE ENCOUNTER
Patient informed that her lactic acid level is now normal  Patient still should f/u with pulm  Patient should continue her deep & slow breathing exercises.   Asked patient to call back if she has any questions

## 2019-03-01 NOTE — TELEPHONE ENCOUNTER
Please advise refill  Last rx 11/23/18 qty 30 + 2 refills      Rx has been phoned in to [facility] qty 30 NR

## 2019-03-01 NOTE — PROGRESS NOTES
White blood count has increased as well as neutrophil count indicating possible bacterial infection. Start Augmentin 875 mg twice a day for 10 days. Use prescription recently filled at home. Take with food. Also take probiotic once daily.   Patient noti

## 2019-03-04 RX ORDER — ALPRAZOLAM 0.25 MG/1
TABLET ORAL
Qty: 120 TABLET | Refills: 0 | Status: SHIPPED | OUTPATIENT
Start: 2019-03-04 | End: 2019-03-30

## 2019-03-04 NOTE — PAYOR COMM NOTE
--------------  DISCHARGE REVIEW    Payor: [employer]  Subscriber #:  [id number]  Authorization Number: [id number]    Admit date: 2/23/19  Admit time:  0033  Discharge Date: 2/26/2019  4:05 PM     Admitting Physician: Akosua Mendieta MD  Attending Iram Ornelas Take 1-2 tablets by mouth every 4 (four) hours as needed.    Quantity:  12 tablet  Refills:  0        CHANGE how you take these medications      Instructions Prescription details   predniSONE 20 MG Tabs  Commonly known as:  Mendy Og  What changed:    · how LEXAPRO      Take 10 mg by mouth daily. Refills:  0     Glucose Blood Strp  Commonly known as:  CONTOUR NEXT TEST      Use 1 strip twice daily   Quantity:  100 each  Refills:  0     Magnesium 100 MG Tabs      Take 1 tablet by mouth daily.    Refills:  0 APN    Appointments for Next 30 Days 2/26/2019 - 3/28/2019      Date Arrival Time Visit Type Length Department Provider     2/28/2019  9:30 AM  SERGE FOLLOW UP [0701] 30 min.  [address] [place] [address]    Patient Instructions: Your appointment is scheduled at the [facility] in [place]. The address is [address] at  [address] on the 2nd floor.  The facility is located 1/2 mile [address] [facility] [address] on [address] [facility] at the [facility] °F (36.9 °C)] 98.5 °F (36.9 °C)  Pulse:  [71-98] 98  Resp:  [16-18] 16  BP: (103-125)/(64-82) 125/82    Physical Exam:    General: No acute distress. Respiratory: Clear to auscultation bilaterally. No wheezes. No rhonchi.   Cardiovascular: S1, S2. Regular

## 2019-03-05 ENCOUNTER — OFFICE VISIT (OUTPATIENT)
Dept: FAMILY MEDICINE CLINIC | Facility: CLINIC | Age: 39
End: 2019-03-05
Payer: COMMERCIAL

## 2019-03-05 VITALS
HEART RATE: 104 BPM | DIASTOLIC BLOOD PRESSURE: 70 MMHG | TEMPERATURE: 99 F | SYSTOLIC BLOOD PRESSURE: 106 MMHG | WEIGHT: 192 LBS | HEIGHT: 68 IN | BODY MASS INDEX: 29.1 KG/M2

## 2019-03-05 DIAGNOSIS — F45.0 ANXIETY WITH SOMATIZATION: ICD-10-CM

## 2019-03-05 DIAGNOSIS — R05.8 SPASMODIC COUGH: Primary | ICD-10-CM

## 2019-03-05 DIAGNOSIS — J38.3 VOCAL CORD DYSFUNCTION: ICD-10-CM

## 2019-03-05 DIAGNOSIS — F11.10 OPIOID ABUSE (HCC): ICD-10-CM

## 2019-03-05 DIAGNOSIS — F41.1 GAD (GENERALIZED ANXIETY DISORDER): ICD-10-CM

## 2019-03-05 DIAGNOSIS — F41.9 ANXIETY WITH SOMATIZATION: ICD-10-CM

## 2019-03-05 PROCEDURE — 99215 OFFICE O/P EST HI 40 MIN: CPT | Performed by: FAMILY MEDICINE

## 2019-03-05 RX ORDER — ACETAMINOPHEN AND CODEINE PHOSPHATE 300; 30 MG/1; MG/1
TABLET ORAL
Qty: 20 TABLET | Refills: 0 | Status: SHIPPED | OUTPATIENT
Start: 2019-03-05 | End: 2019-03-18 | Stop reason: ALTCHOICE

## 2019-03-05 NOTE — PROGRESS NOTES
The patient was notified that an appointment was scheduled for a laryngoscopy to be performed by Dr. Yane Emery on Wednesday 03/13/2019 at 4:50 p.m. in [place].

## 2019-03-05 NOTE — PROGRESS NOTES
HPI:   Shante Dyson is a 45year old female who presents for persistent spasmodic cough located in the tracheal area for the past month.   Patient's symptoms got to the point where she hyperventilated ended up in the ER twice and then admitted for lac making a follow-up with Dr. Ghazal Russell psychologist for pain clinic to discuss behaviors    Does see Dr. Elaine Reynoso had question the possibility of vocal cord dysfunction is willing to follow-up with him as well as pulmonology.   We also discussed speech therapy Vitro Strip Use 1 strip twice daily Disp: 100 each Rfl: 0   UNITHROID 150 MCG Oral Tab TAKE 1 TABLET BY MOUTH EVERY DAY BEFORE BREAKFAST Disp: 90 tablet Rfl: 3   escitalopram 20 MG Oral Tab Take 10 mg by mouth daily.  Disp:  Rfl:    mirtazapine 7.5 MG Oral N/A 2017    Performed by Dino Mccall MD at [address] [facility] [place]   •      • CHOLECYSTECTOMY     • COLONOSCOPY, POSSIBLE BIOPSY, POSSIBLE POLYPECTOMY [address]5764 N/A 10/22/2013    Performed by Gracie Rm MD at [address] [place] [address] normal   • UPPER GI ENDOSCOPY PERFORMED  01/25/2017    Michaela Cabezas M.D.       Family History   Problem Relation Age of Onset   • Other (Other[other]) Mother         ALLIE   • Heart Disorder Father    • Heart Disease Father    • Diabetes Paternal Bennett behavior from patterns of her cough and prior multiple hospitalizations and ER visits.   This was brought to her psychologist attention Dr. Vero Esparza who will discuss it with her at the next office visit  ASSESSMENT AND PLAN:   Musa Ty is a 3 will discuss with patient and review need for psychiatrist  Multiple hospitalizations, ER visits and CT scans possible attention seeking behavior i.e. somatizations will have psychologist discussed with patient her behaviors    3.  [facility] ([facility]  Ela

## 2019-03-06 PROBLEM — F11.10 OPIOID ABUSE (HCC): Status: ACTIVE | Noted: 2019-03-06

## 2019-03-06 PROBLEM — R05.8 SPASMODIC COUGH: Status: ACTIVE | Noted: 2019-03-06

## 2019-03-06 PROBLEM — J38.3 VOCAL CORD DYSFUNCTION: Status: ACTIVE | Noted: 2019-03-06

## 2019-03-06 PROBLEM — E87.6 HYPOKALEMIA: Status: RESOLVED | Noted: 2019-02-23 | Resolved: 2019-03-06

## 2019-03-06 PROBLEM — J06.9 VIRAL URI WITH COUGH: Status: RESOLVED | Noted: 2019-02-23 | Resolved: 2019-03-06

## 2019-03-08 DIAGNOSIS — G44.221 CHRONIC TENSION-TYPE HEADACHE, INTRACTABLE: ICD-10-CM

## 2019-03-08 RX ORDER — BUTALBITAL, ACETAMINOPHEN AND CAFFEINE 50; 325; 40 MG/1; MG/1; MG/1
TABLET ORAL
Qty: 40 TABLET | Refills: 0 | Status: SHIPPED | OUTPATIENT
Start: 2019-03-08 | End: 2019-04-22

## 2019-03-08 RX ORDER — BUTALBITAL, ACETAMINOPHEN AND CAFFEINE 50; 325; 40 MG/1; MG/1; MG/1
1 TABLET ORAL 2 TIMES DAILY PRN
Qty: 40 TABLET | Refills: 0 | Status: CANCELLED | OUTPATIENT
Start: 2019-03-08

## 2019-03-11 ENCOUNTER — OFFICE VISIT (OUTPATIENT)
Dept: SURGERY | Facility: CLINIC | Age: 39
End: 2019-03-11
Payer: COMMERCIAL

## 2019-03-11 VITALS
WEIGHT: 191.06 LBS | OXYGEN SATURATION: 100 % | HEIGHT: 68 IN | BODY MASS INDEX: 28.96 KG/M2 | RESPIRATION RATE: 18 BRPM | HEART RATE: 106 BPM | DIASTOLIC BLOOD PRESSURE: 73 MMHG | SYSTOLIC BLOOD PRESSURE: 119 MMHG

## 2019-03-11 DIAGNOSIS — Z98.84 HISTORY OF ROUX-EN-Y GASTRIC BYPASS: ICD-10-CM

## 2019-03-11 DIAGNOSIS — E78.2 MIXED HYPERLIPIDEMIA: Primary | ICD-10-CM

## 2019-03-11 DIAGNOSIS — L30.4 INTERTRIGO: ICD-10-CM

## 2019-03-11 DIAGNOSIS — K21.9 GASTROESOPHAGEAL REFLUX DISEASE, ESOPHAGITIS PRESENCE NOT SPECIFIED: ICD-10-CM

## 2019-03-11 DIAGNOSIS — R63.5 WEIGHT GAIN: ICD-10-CM

## 2019-03-11 DIAGNOSIS — E66.3 OVERWEIGHT (BMI 25.0-29.9): ICD-10-CM

## 2019-03-11 PROCEDURE — 99214 OFFICE O/P EST MOD 30 MIN: CPT | Performed by: INTERNAL MEDICINE

## 2019-03-11 RX ORDER — PANTOPRAZOLE SODIUM 40 MG/1
TABLET, DELAYED RELEASE ORAL
Qty: 60 TABLET | Refills: 0 | Status: SHIPPED | OUTPATIENT
Start: 2019-03-11 | End: 2019-04-09

## 2019-03-11 NOTE — PROGRESS NOTES
[address], [address]  Dept: [phone]        Patient:  Jef Betancourt  :      1980  MRN:      [id number]    Referring Provider: Raven Lui 10 mg for 5 days. , Disp: 8 tablet, Rfl: 0  •  pregabalin (LYRICA) 50 MG Oral Cap, Take 1 capsule (50 mg total) by mouth 2 (two) times daily. , Disp: 60 capsule, Rfl: 2  •  Amitriptyline HCl 10 MG Oral Tab, Take 1 tablet (10 mg total) by mouth nightly.  For p Smoking status: Never Smoker      Smokeless tobacco: Never Used    Alcohol use: No      Alcohol/week: 0.0 oz      Frequency: Never      Binge frequency: Never    Drug use: No    Surgical History:    Past Surgical History:   Procedure Laterality Date   • AR • ROBOT-ASSISTED LAPAROSCOPIC HYSTERECTOMY N/A 6/26/2013    Performed by Samina Thompson MD at [address]   • SURGICAL STENT Bilateral March 2015.     Bilateral iliac stents   • SURGICAL STENT      stent to iliac crest bone   • THYROIDECTOMY  4/201 disease, esophagitis presence not specified  Intertrigo  History of magy-en-y gastric bypass  Overweight (bmi 25.0-29. 9)    Plan     S/P magy en y 12/18/2017      Continue PPI    No orders of the defined types were placed in this encounter.     intertigo no

## 2019-03-14 ENCOUNTER — OFFICE VISIT (OUTPATIENT)
Dept: PHYSICAL THERAPY | Age: 39
End: 2019-03-14
Attending: FAMILY MEDICINE
Payer: COMMERCIAL

## 2019-03-14 PROCEDURE — 97110 THERAPEUTIC EXERCISES: CPT

## 2019-03-14 PROCEDURE — 97140 MANUAL THERAPY 1/> REGIONS: CPT

## 2019-03-14 NOTE — PROGRESS NOTES
Dx:  Lateral epicondylitis of right elbow (M77.11)  Primary osteoarthritis of first carpometacarpal joint of right hand (M18.11)       Authorized # of Visits: n/a       Next MD visit: none scheduled  Fall Risk: standard         Precautions: n/a 1.5 w/cm2 US x 8' R ECRB and EDC proximal attachment @ 1.5 w/cm2 X 8' R ECRB and EDC proximal attachment @ 1.5 w/cm2 Wrist extensor stretch 5 sec x 10 with StM    30 sec x 4      kinesiotaping for lateral epicondylitis kinesiotaping for lateral epicondylit

## 2019-03-18 ENCOUNTER — OFFICE VISIT (OUTPATIENT)
Dept: FAMILY MEDICINE CLINIC | Facility: CLINIC | Age: 39
End: 2019-03-18
Payer: COMMERCIAL

## 2019-03-18 VITALS
DIASTOLIC BLOOD PRESSURE: 64 MMHG | BODY MASS INDEX: 28.95 KG/M2 | HEIGHT: 68 IN | SYSTOLIC BLOOD PRESSURE: 100 MMHG | HEART RATE: 104 BPM | OXYGEN SATURATION: 99 % | WEIGHT: 191 LBS

## 2019-03-18 DIAGNOSIS — F41.1 GAD (GENERALIZED ANXIETY DISORDER): ICD-10-CM

## 2019-03-18 DIAGNOSIS — Z79.899 MEDICATION MANAGEMENT: ICD-10-CM

## 2019-03-18 DIAGNOSIS — F11.10 OPIOID ABUSE (HCC): ICD-10-CM

## 2019-03-18 DIAGNOSIS — J38.3 VOCAL CORD DYSFUNCTION: Primary | ICD-10-CM

## 2019-03-18 PROBLEM — R63.5 WEIGHT GAIN: Status: RESOLVED | Noted: 2019-03-11 | Resolved: 2019-03-18

## 2019-03-18 PROBLEM — R20.0 BILATERAL HAND NUMBNESS: Status: RESOLVED | Noted: 2019-01-21 | Resolved: 2019-03-18

## 2019-03-18 PROBLEM — G56.91 NEUROPATHY, ARM, RIGHT: Status: RESOLVED | Noted: 2019-01-15 | Resolved: 2019-03-18

## 2019-03-18 PROBLEM — M79.644 PAIN OF RIGHT THUMB: Status: RESOLVED | Noted: 2019-01-21 | Resolved: 2019-03-18

## 2019-03-18 PROBLEM — R05.8 SPASMODIC COUGH: Status: RESOLVED | Noted: 2019-03-06 | Resolved: 2019-03-18

## 2019-03-18 PROBLEM — M62.838 TRAPEZIUS MUSCLE SPASM: Status: RESOLVED | Noted: 2019-01-15 | Resolved: 2019-03-18

## 2019-03-18 PROCEDURE — 99214 OFFICE O/P EST MOD 30 MIN: CPT | Performed by: FAMILY MEDICINE

## 2019-03-18 RX ORDER — AMITRIPTYLINE HYDROCHLORIDE 10 MG/1
10 TABLET, FILM COATED ORAL NIGHTLY
Qty: 30 TABLET | Refills: 5 | Status: SHIPPED | OUTPATIENT
Start: 2019-03-18 | End: 2019-04-15

## 2019-03-18 NOTE — PROGRESS NOTES
Schuyler Guevara is a 45year old female. HPI:   Patient is in for follow-up on vocal cord dysfunction and anxiety.   She did follow-up with psychologist Dr. Aparna Degroot and is further discussing her ability to avoid going to emergency room and to talk ZOLPIDEM TARTRATE 5 MG Oral Tab TAKE 1/2 TO 1 TABLET BY MOUTH EVERY EVENING Disp: 30 tablet Rfl: 0   Calcium 500 MG Oral Tab Take 500 mg by mouth 2 (two) times daily.  Disp:  Rfl:    pregabalin (LYRICA) 50 MG Oral Cap Take 1 capsule (50 mg total) by mouth bleeding   • Pancreatitis    • Pneumonia august 2014   • Pneumonia, organism unspecified(486)    • PONV (postoperative nausea and vomiting)    • Reflux    • Tennis elbow    • Unspecified disorder of thyroid    • Visual impairment     glasses      Social Hi Prescriptions Disp Refills   • Amitriptyline HCl 10 MG Oral Tab 30 tablet 5     Sig: Take 1 tablet (10 mg total) by mouth nightly. For possible vocal cord dysfunction       Imaging & Consults:  None  1.  Vocal cord dysfunction  Patient will continue with am

## 2019-03-19 ENCOUNTER — OFFICE VISIT (OUTPATIENT)
Dept: PHYSICAL THERAPY | Age: 39
End: 2019-03-19
Attending: FAMILY MEDICINE
Payer: COMMERCIAL

## 2019-03-19 PROCEDURE — 97110 THERAPEUTIC EXERCISES: CPT

## 2019-03-19 PROCEDURE — 97140 MANUAL THERAPY 1/> REGIONS: CPT

## 2019-03-19 NOTE — PROGRESS NOTES
Dx:  Lateral epicondylitis of right elbow (M77.11)  Primary osteoarthritis of first carpometacarpal joint of right hand (M18.11)       Authorized # of Visits: n/a       Next MD visit: none scheduled  Fall Risk: standard         Precautions: n/a proximal 15 min MT:  STM and HG ECRB and EDc proximal 15 min     US x 8' R ECRB and EDC proximal attachment @ 1.5 w/cm2 US x 8' R ECRB and EDC proximal attachment @ 1.5 w/cm2 X 8' R ECRB and EDC proximal attachment @ 1.5 w/cm2 Wrist extensor stretch 5 sec

## 2019-03-21 ENCOUNTER — OFFICE VISIT (OUTPATIENT)
Dept: PHYSICAL THERAPY | Age: 39
End: 2019-03-21
Attending: FAMILY MEDICINE
Payer: COMMERCIAL

## 2019-03-21 PROCEDURE — 97110 THERAPEUTIC EXERCISES: CPT

## 2019-03-21 PROCEDURE — 97140 MANUAL THERAPY 1/> REGIONS: CPT

## 2019-03-21 NOTE — PROGRESS NOTES
Dx:  Lateral epicondylitis of right elbow (M77.11)  Primary osteoarthritis of first carpometacarpal joint of right hand (M18.11)       Authorized # of Visits: n/a       Next MD visit: none scheduled  Fall Risk: standard         Precautions: n/a attachment @ 1.5 w/cm2 US x 8' R ECRB and EDC proximal attachment @ 1.5 w/cm2 X 8' R ECRB and EDC proximal attachment @ 1.5 w/cm2 Wrist extensor stretch 5 sec x 10 with StM    30 sec x 4 Wrist extensor stretch 5 sec x 10 with StM    30 sec x 4 Wrist extens

## 2019-03-26 ENCOUNTER — OFFICE VISIT (OUTPATIENT)
Dept: PHYSICAL THERAPY | Age: 39
End: 2019-03-26
Attending: FAMILY MEDICINE
Payer: COMMERCIAL

## 2019-03-26 PROCEDURE — 97140 MANUAL THERAPY 1/> REGIONS: CPT

## 2019-03-26 PROCEDURE — 97110 THERAPEUTIC EXERCISES: CPT

## 2019-03-26 NOTE — PROGRESS NOTES
Discharge Summary    Dx: Bailey Parra epicondylitis of right elbow (M77.11)  Primary osteoarthritis of first carpometacarpal joint of right hand (M18.11)       Authorized # of Visits: n/a       Next MD visit: none scheduled  Fall Risk: standard         Precau [de-identified] certification required: Yes  I certify the need for these services furnished under this plan of treatment and while under my care.     X___________________________________________________ Date____________________    Certification From: 7/47/6604 Skilled Services: Manual therapy, application of kinesiotape with discussion of precautions and proper care    Charges: MT1, Ex 2Total Timed Treatment: 45 min     Total Treatment Time: 45 min

## 2019-03-27 ENCOUNTER — TELEPHONE (OUTPATIENT)
Dept: FAMILY MEDICINE CLINIC | Facility: CLINIC | Age: 39
End: 2019-03-27

## 2019-03-27 NOTE — TELEPHONE ENCOUNTER
Spoke to Alexis who is pt's  and was assigned to patient when pt was hospitalized. They are required to reach out to patient 3 times and then if not hear from patient they call the providers office.     There are programs available to alina

## 2019-03-27 NOTE — TELEPHONE ENCOUNTER
Jacob Aj from [employer] is Debbi's  and she is contacting the office to see if Shukri Ibrahim is f/u with her appt and to give Valarie Gardner some information regarding programs that they have, Please call Jacob Aj at [phone].

## 2019-03-28 NOTE — TELEPHONE ENCOUNTER
She does see a pain clinic psychologist and a regular psychologist.  I am fine with her talking with Mariselasteven Boo about her medication management but she does get a long discussion every time she is on the office about reducing her medication and ER visits. Yonatan Forte

## 2019-03-30 DIAGNOSIS — G89.29 CHRONIC ABDOMINAL PAIN: ICD-10-CM

## 2019-03-30 DIAGNOSIS — F41.1 GAD (GENERALIZED ANXIETY DISORDER): ICD-10-CM

## 2019-03-30 DIAGNOSIS — F51.01 PRIMARY INSOMNIA: ICD-10-CM

## 2019-03-30 DIAGNOSIS — R10.9 CHRONIC ABDOMINAL PAIN: ICD-10-CM

## 2019-04-01 DIAGNOSIS — R10.9 CHRONIC ABDOMINAL PAIN: ICD-10-CM

## 2019-04-01 DIAGNOSIS — G89.29 CHRONIC ABDOMINAL PAIN: ICD-10-CM

## 2019-04-01 DIAGNOSIS — F41.1 GAD (GENERALIZED ANXIETY DISORDER): ICD-10-CM

## 2019-04-01 DIAGNOSIS — F51.01 PRIMARY INSOMNIA: ICD-10-CM

## 2019-04-01 RX ORDER — ZOLPIDEM TARTRATE 5 MG/1
TABLET ORAL
Qty: 30 TABLET | Refills: 0 | Status: SHIPPED | OUTPATIENT
Start: 2019-04-01 | End: 2019-04-29

## 2019-04-01 RX ORDER — ZOLPIDEM TARTRATE 5 MG/1
TABLET ORAL
Qty: 30 TABLET | Refills: 0 | Status: CANCELLED | OUTPATIENT
Start: 2019-04-01

## 2019-04-01 RX ORDER — ALPRAZOLAM 0.25 MG/1
TABLET ORAL
Qty: 120 TABLET | Refills: 0 | Status: SHIPPED | OUTPATIENT
Start: 2019-04-01 | End: 2019-04-29

## 2019-04-01 RX ORDER — ALPRAZOLAM 0.25 MG/1
TABLET ORAL
Qty: 120 TABLET | Refills: 0 | Status: CANCELLED | OUTPATIENT
Start: 2019-04-01

## 2019-04-04 ENCOUNTER — HOSPITAL ENCOUNTER (OUTPATIENT)
Dept: ULTRASOUND IMAGING | Age: 39
Discharge: HOME | End: 2019-04-04
Attending: OTOLARYNGOLOGY
Payer: COMMERCIAL

## 2019-04-04 DIAGNOSIS — C73 THYROID CANCER (HCC): ICD-10-CM

## 2019-04-04 PROCEDURE — 76536 US EXAM OF HEAD AND NECK: CPT | Performed by: OTOLARYNGOLOGY

## 2019-04-09 RX ORDER — PANTOPRAZOLE SODIUM 40 MG/1
TABLET, DELAYED RELEASE ORAL
Qty: 60 TABLET | Refills: 0 | Status: SHIPPED | OUTPATIENT
Start: 2019-04-09 | End: 2019-05-05

## 2019-04-11 ENCOUNTER — TELEPHONE (OUTPATIENT)
Dept: SURGERY | Facility: CLINIC | Age: 39
End: 2019-04-11

## 2019-04-11 NOTE — TELEPHONE ENCOUNTER
Returned patient's call. Patient following up on her pre-d, informed patient that case is still pending with [employer].

## 2019-04-11 NOTE — TELEPHONE ENCOUNTER
Patient calling regarding insurance company requesting further documentation from Dr. Gracie Posada for approval for surgery. Call routed to Gerardo Flores  to call patient back.

## 2019-04-15 ENCOUNTER — OFFICE VISIT (OUTPATIENT)
Dept: FAMILY MEDICINE CLINIC | Facility: CLINIC | Age: 39
End: 2019-04-15
Payer: COMMERCIAL

## 2019-04-15 VITALS
HEIGHT: 68 IN | DIASTOLIC BLOOD PRESSURE: 60 MMHG | WEIGHT: 187 LBS | BODY MASS INDEX: 28.34 KG/M2 | HEART RATE: 76 BPM | SYSTOLIC BLOOD PRESSURE: 90 MMHG

## 2019-04-15 DIAGNOSIS — F41.1 GAD (GENERALIZED ANXIETY DISORDER): ICD-10-CM

## 2019-04-15 DIAGNOSIS — F51.01 PRIMARY INSOMNIA: ICD-10-CM

## 2019-04-15 DIAGNOSIS — J45.40 MODERATE PERSISTENT ASTHMA WITHOUT COMPLICATION: ICD-10-CM

## 2019-04-15 DIAGNOSIS — J38.3 VOCAL CORD DYSFUNCTION: ICD-10-CM

## 2019-04-15 DIAGNOSIS — Z98.84 HISTORY OF ROUX-EN-Y GASTRIC BYPASS: ICD-10-CM

## 2019-04-15 DIAGNOSIS — E16.2 HYPOGLYCEMIA: Primary | ICD-10-CM

## 2019-04-15 PROBLEM — J45.52 SEVERE PERSISTENT ASTHMA WITH STATUS ASTHMATICUS (HCC): Status: RESOLVED | Noted: 2019-02-22 | Resolved: 2019-04-15

## 2019-04-15 PROBLEM — J45.20 MILD INTERMITTENT ASTHMA WITHOUT COMPLICATION (HCC): Status: RESOLVED | Noted: 2017-06-01 | Resolved: 2019-04-15

## 2019-04-15 PROBLEM — J45.20 MILD INTERMITTENT ASTHMA WITHOUT COMPLICATION: Status: RESOLVED | Noted: 2017-06-01 | Resolved: 2019-04-15

## 2019-04-15 PROBLEM — R10.9 CHRONIC ABDOMINAL PAIN: Status: RESOLVED | Noted: 2018-09-11 | Resolved: 2019-04-15

## 2019-04-15 PROBLEM — G89.29 CHRONIC ABDOMINAL PAIN: Status: RESOLVED | Noted: 2018-09-11 | Resolved: 2019-04-15

## 2019-04-15 PROBLEM — J45.52 SEVERE PERSISTENT ASTHMA WITH STATUS ASTHMATICUS: Status: RESOLVED | Noted: 2019-02-22 | Resolved: 2019-04-15

## 2019-04-15 PROCEDURE — 99214 OFFICE O/P EST MOD 30 MIN: CPT | Performed by: FAMILY MEDICINE

## 2019-04-15 RX ORDER — LISDEXAMFETAMINE DIMESYLATE 50 MG
CAPSULE ORAL
Refills: 0 | COMMUNITY
Start: 2019-04-10 | End: 2019-05-06

## 2019-04-15 RX ORDER — AMITRIPTYLINE HYDROCHLORIDE 25 MG/1
25 TABLET, FILM COATED ORAL NIGHTLY
Qty: 30 TABLET | Refills: 5 | Status: SHIPPED | OUTPATIENT
Start: 2019-04-15 | End: 2019-09-27

## 2019-04-15 RX ORDER — PREGABALIN 75 MG/1
75 CAPSULE ORAL 2 TIMES DAILY
Qty: 60 CAPSULE | Refills: 5 | Status: SHIPPED | OUTPATIENT
Start: 2019-04-15 | End: 2019-09-30

## 2019-04-15 NOTE — PROGRESS NOTES
Schuyler Guevara is a 45year old female. HPI:   1. Hypoglycemia  Fasting blood sugars have been done daily and also at nights then 1 hour after eating.   Patient states that she does become anxious and obsessed about checking because of the hypoglycemic NEXT TEST) In Vitro Strip Use 1 strip twice daily Disp: 100 each Rfl: 0   PANTOPRAZOLE SODIUM 40 MG Oral Tab EC TAKE 1 TABLET(40 MG) BY MOUTH TWICE DAILY BEFORE MEALS Disp: 60 tablet Rfl: 0   ZOLPIDEM TARTRATE 5 MG Oral Tab TAKE 1/2 TO 1 TABLET BY MOUTH EV unspecified     2014 was in ER and was hospitalized over night   • Hx of gastric bypass 12/18/2017   • Hypothyroid    • Hypothyroidism    • Infertility, female    • Organic hypersomnia, unspecified DMG DX 11-2-12    AHI 2 RDI 2 REM AHI 6 SaO2 curtis 89%   • magy-en-y gastric bypass  Primary insomnia    Orders Placed This Encounter      Insulin      C-Peptide [E]      Hemoglobin A1C [E]      Meds & Refills for this Visit:  Requested Prescriptions     Signed Prescriptions Disp Refills   • pregabalin (LYRICA) 75 consistent with protein intake    6. Primary insomnia  Patient will increase the amitriptyline to 20 mg 1-2-week if does okay we will continue with that dose otherwise will increase to 25 mg.  - Amitriptyline HCl 25 MG Oral Tab;  Take 1 tablet (25 mg total)

## 2019-04-20 ENCOUNTER — LAB ENCOUNTER (OUTPATIENT)
Dept: LAB | Age: 39
End: 2019-04-20
Attending: FAMILY MEDICINE
Payer: COMMERCIAL

## 2019-04-20 DIAGNOSIS — E16.2 HYPOGLYCEMIA: ICD-10-CM

## 2019-04-20 PROCEDURE — 36415 COLL VENOUS BLD VENIPUNCTURE: CPT | Performed by: FAMILY MEDICINE

## 2019-04-20 PROCEDURE — 83036 HEMOGLOBIN GLYCOSYLATED A1C: CPT | Performed by: FAMILY MEDICINE

## 2019-04-20 PROCEDURE — 84681 ASSAY OF C-PEPTIDE: CPT | Performed by: FAMILY MEDICINE

## 2019-04-20 PROCEDURE — 83525 ASSAY OF INSULIN: CPT | Performed by: FAMILY MEDICINE

## 2019-04-22 DIAGNOSIS — G44.221 CHRONIC TENSION-TYPE HEADACHE, INTRACTABLE: ICD-10-CM

## 2019-04-22 RX ORDER — BUTALBITAL, ACETAMINOPHEN AND CAFFEINE 50; 325; 40 MG/1; MG/1; MG/1
TABLET ORAL
Qty: 40 TABLET | Refills: 0 | Status: SHIPPED | OUTPATIENT
Start: 2019-04-22 | End: 2019-06-11 | Stop reason: ALTCHOICE

## 2019-04-22 RX ORDER — BUTALBITAL, ACETAMINOPHEN AND CAFFEINE 50; 325; 40 MG/1; MG/1; MG/1
TABLET ORAL
Qty: 40 TABLET | Refills: 0 | OUTPATIENT
Start: 2019-04-22

## 2019-04-25 DIAGNOSIS — R10.9 CHRONIC ABDOMINAL PAIN: ICD-10-CM

## 2019-04-25 DIAGNOSIS — G89.29 CHRONIC ABDOMINAL PAIN: ICD-10-CM

## 2019-04-25 DIAGNOSIS — F51.01 PRIMARY INSOMNIA: ICD-10-CM

## 2019-04-25 DIAGNOSIS — F41.1 GAD (GENERALIZED ANXIETY DISORDER): ICD-10-CM

## 2019-04-26 RX ORDER — ALPRAZOLAM 0.25 MG/1
TABLET ORAL
Qty: 120 TABLET | Refills: 0 | OUTPATIENT
Start: 2019-04-26

## 2019-04-26 RX ORDER — ZOLPIDEM TARTRATE 5 MG/1
TABLET ORAL
Qty: 30 TABLET | Refills: 0 | OUTPATIENT
Start: 2019-04-26

## 2019-04-29 DIAGNOSIS — R10.9 CHRONIC ABDOMINAL PAIN: ICD-10-CM

## 2019-04-29 DIAGNOSIS — F41.1 GAD (GENERALIZED ANXIETY DISORDER): ICD-10-CM

## 2019-04-29 DIAGNOSIS — G89.29 CHRONIC ABDOMINAL PAIN: ICD-10-CM

## 2019-04-29 DIAGNOSIS — F51.01 PRIMARY INSOMNIA: ICD-10-CM

## 2019-04-29 RX ORDER — ZOLPIDEM TARTRATE 5 MG/1
5 TABLET ORAL NIGHTLY
Qty: 30 TABLET | Refills: 0 | Status: SHIPPED | OUTPATIENT
Start: 2019-04-29 | End: 2019-05-28

## 2019-04-29 RX ORDER — ALPRAZOLAM 0.25 MG/1
0.25 TABLET ORAL 2 TIMES DAILY PRN
Qty: 120 TABLET | Refills: 0 | Status: SHIPPED | OUTPATIENT
Start: 2019-04-29 | End: 2019-06-24

## 2019-05-06 ENCOUNTER — TELEPHONE (OUTPATIENT)
Dept: SURGERY | Facility: CLINIC | Age: 39
End: 2019-05-06

## 2019-05-06 RX ORDER — LISDEXAMFETAMINE DIMESYLATE 50 MG
50 CAPSULE ORAL EVERY MORNING
Qty: 30 CAPSULE | Refills: 0 | Status: SHIPPED | OUTPATIENT
Start: 2019-05-06 | End: 2019-06-10 | Stop reason: DRUGHIGH

## 2019-05-06 RX ORDER — PANTOPRAZOLE SODIUM 40 MG/1
TABLET, DELAYED RELEASE ORAL
Qty: 60 TABLET | Refills: 0 | Status: SHIPPED | OUTPATIENT
Start: 2019-05-06 | End: 2019-06-07

## 2019-05-15 DIAGNOSIS — E16.2 HYPOGLYCEMIA: ICD-10-CM

## 2019-05-16 RX ORDER — MIRTAZAPINE 7.5 MG/1
TABLET, FILM COATED ORAL
Qty: 30 TABLET | Refills: 0 | Status: SHIPPED | OUTPATIENT
Start: 2019-05-16 | End: 2019-06-12

## 2019-05-16 RX ORDER — BLOOD SUGAR DIAGNOSTIC
STRIP MISCELLANEOUS
Qty: 200 STRIP | Refills: 3 | Status: SHIPPED | OUTPATIENT
Start: 2019-05-16

## 2019-05-28 DIAGNOSIS — F51.01 PRIMARY INSOMNIA: ICD-10-CM

## 2019-05-28 RX ORDER — ZOLPIDEM TARTRATE 5 MG/1
5 TABLET ORAL NIGHTLY
Qty: 30 TABLET | Refills: 0 | Status: SHIPPED | OUTPATIENT
Start: 2019-05-28 | End: 2019-06-25

## 2019-05-31 PROBLEM — I83.813 VARICOSE VEINS OF BOTH LOWER EXTREMITIES WITH PAIN: Status: ACTIVE | Noted: 2019-05-31

## 2019-05-31 PROBLEM — K08.89 PAIN IN TOOTH: Status: ACTIVE | Noted: 2019-05-31

## 2019-05-31 NOTE — PROGRESS NOTES
Deadra Barthel is a 45year old female. HPI:   Patient is in for follow-up on vocal cord dysfunction, generalized anxiety disorder, tension headaches worsened with recent nerve issue from tooth cracking.   Patient is still seeing Dr. Julieta Amado and her ot ALPRAZolam 0.25 MG Oral Tab Take 1 tablet (0.25 mg total) by mouth 2 (two) times daily as needed for Sleep.  Disp: 120 tablet Rfl: 0   BUTALBITAL-APAP-CAFFEINE -40 MG Oral Tab TAKE ONE TABLET BY MOUTH TWICE DAILY AS NEEDED FOR PAIN OR HEADACHES MAX curtis 89%   • OTHER DISEASES     rectal bleeding   • Pancreatitis    • Pneumonia august 2014   • Pneumonia, organism unspecified(486)    • PONV (postoperative nausea and vomiting)    • Reflux    • Tennis elbow    • Unspecified disorder of thyroid    • Visu dysfunction  Pain in tooth  History of narcotic addiction (hcc)  Chronic tension-type headache, intractable    No orders of the defined types were placed in this encounter.       Meds & Refills for this Visit:  Requested Prescriptions     Signed Prescriptio

## 2019-06-04 ENCOUNTER — TELEPHONE (OUTPATIENT)
Dept: SURGERY | Facility: CLINIC | Age: 39
End: 2019-06-04

## 2019-06-04 NOTE — TELEPHONE ENCOUNTER
6/3/19 @ 3:20pm Spoke to Keegan at [facility], [phone], DGK#[phone]. She verified that patient has following benefits for Bariatric services:   • [employer]HV (Levine, Susan. \Hospital Has a New Name and Outlook.\""#[id number]) DX E66.3 & E66.9  - Dietitian ([id number]) – Yes. No limit,  no prior auth req’d.   -

## 2019-06-10 ENCOUNTER — OFFICE VISIT (OUTPATIENT)
Dept: SURGERY | Facility: CLINIC | Age: 39
End: 2019-06-10
Payer: COMMERCIAL

## 2019-06-10 VITALS
HEIGHT: 68 IN | DIASTOLIC BLOOD PRESSURE: 81 MMHG | SYSTOLIC BLOOD PRESSURE: 120 MMHG | BODY MASS INDEX: 28.34 KG/M2 | WEIGHT: 187 LBS | RESPIRATION RATE: 16 BRPM

## 2019-06-10 DIAGNOSIS — E78.2 MIXED HYPERLIPIDEMIA: ICD-10-CM

## 2019-06-10 DIAGNOSIS — Z98.84 HISTORY OF ROUX-EN-Y GASTRIC BYPASS: ICD-10-CM

## 2019-06-10 DIAGNOSIS — E66.3 OVERWEIGHT (BMI 25.0-29.9): ICD-10-CM

## 2019-06-10 DIAGNOSIS — K21.9 GASTROESOPHAGEAL REFLUX DISEASE, ESOPHAGITIS PRESENCE NOT SPECIFIED: Primary | ICD-10-CM

## 2019-06-10 PROCEDURE — 99214 OFFICE O/P EST MOD 30 MIN: CPT | Performed by: INTERNAL MEDICINE

## 2019-06-10 RX ORDER — TOPIRAMATE 25 MG/1
25 TABLET ORAL EVERY EVENING
Qty: 30 TABLET | Refills: 2 | Status: SHIPPED | OUTPATIENT
Start: 2019-06-10 | End: 2019-09-04

## 2019-06-10 RX ORDER — PANTOPRAZOLE SODIUM 40 MG/1
TABLET, DELAYED RELEASE ORAL
Qty: 60 TABLET | Refills: 2 | Status: SHIPPED | OUTPATIENT
Start: 2019-06-10 | End: 2019-07-08

## 2019-06-10 NOTE — PROGRESS NOTES
[address], [address]  Dept: [phone]        Patient:  Joya Burton  :      1980  MRN:      [id number]    Referring Provider: Ava Whiting HEADACHES MAX 4 TO 5 TIMES PER WEEK, Disp: 40 tablet, Rfl: 0  •  pregabalin (LYRICA) 75 MG Oral Cap, Take 1 capsule (75 mg total) by mouth 2 (two) times daily. , Disp: 60 capsule, Rfl: 5  •  Amitriptyline HCl 25 MG Oral Tab, Take 1 tablet (25 mg total) by m MD FELIZ at [address] [place] [address] OR   •      • CHOLECYSTECTOMY     • COLONOSCOPY, POSSIBLE BIOPSY, POSSIBLE POLYPECTOMY 50263 N/A 10/22/2013    Performed by Clay Franco MD at [address] [place] [address] Way      x4   • DILATION/CURETTAGE,DIAGNOSTIC     • 01/25/2017    Carol Mills M.D.        Family History:    Family History   Problem Relation Age of Onset   • Other (Other[other]) Mother         ALLIE   • Heart Disorder Father    • Heart Disease Father    • Diabetes Paternal Grandmother    • Heart Dis from from panniculectomy for intertrigo. Increased back pain.        Johann Lee MD

## 2019-06-10 NOTE — PROGRESS NOTES
[address], [place] [address] [address]  [address]  Dept: [phone]    6/10/2019    BARIATRIC EXISTING PATIENT/FOLLOW UP    HPI:  Mark Priest is a 45year old-year old female

## 2019-06-11 ENCOUNTER — OFFICE VISIT (OUTPATIENT)
Dept: SURGERY | Facility: CLINIC | Age: 39
End: 2019-06-11
Payer: COMMERCIAL

## 2019-06-11 ENCOUNTER — OFFICE VISIT (OUTPATIENT)
Dept: FAMILY MEDICINE CLINIC | Facility: CLINIC | Age: 39
End: 2019-06-11
Payer: COMMERCIAL

## 2019-06-11 VITALS
DIASTOLIC BLOOD PRESSURE: 80 MMHG | SYSTOLIC BLOOD PRESSURE: 120 MMHG | HEIGHT: 68 IN | RESPIRATION RATE: 16 BRPM | BODY MASS INDEX: 28.34 KG/M2 | HEART RATE: 92 BPM | WEIGHT: 187 LBS

## 2019-06-11 VITALS
DIASTOLIC BLOOD PRESSURE: 70 MMHG | WEIGHT: 186 LBS | HEART RATE: 100 BPM | SYSTOLIC BLOOD PRESSURE: 100 MMHG | HEIGHT: 68 IN | BODY MASS INDEX: 28.19 KG/M2

## 2019-06-11 DIAGNOSIS — G44.221 CHRONIC TENSION-TYPE HEADACHE, INTRACTABLE: ICD-10-CM

## 2019-06-11 DIAGNOSIS — K08.89 PAIN IN TOOTH: ICD-10-CM

## 2019-06-11 DIAGNOSIS — Z00.00 WELL FEMALE EXAM WITHOUT GYNECOLOGICAL EXAM: Primary | ICD-10-CM

## 2019-06-11 PROCEDURE — 99395 PREV VISIT EST AGE 18-39: CPT | Performed by: FAMILY MEDICINE

## 2019-06-11 RX ORDER — BUTALBITAL/ACETAMINOPHEN 50MG-325MG
50 TABLET ORAL 4 TIMES DAILY PRN
Qty: 50 TABLET | Refills: 0 | OUTPATIENT
Start: 2019-06-11 | End: 2019-06-25

## 2019-06-11 RX ORDER — BUTALBITAL/ACETAMINOPHEN 50MG-325MG
50 TABLET ORAL 4 TIMES DAILY PRN
Qty: 50 TABLET | Refills: 0 | Status: SHIPPED | OUTPATIENT
Start: 2019-06-11 | End: 2019-06-25

## 2019-06-11 NOTE — PROGRESS NOTES
HPI:   Moises Tong is a 45year old female who presents for a complete physical exam.   Symptoms: denies discharge, itching, burning or dysuria.    Patient complains of severe pain in the left upper tooth is getting root canal done but not scheduled the requested time period, some results have not been displayed. A complete set of results can be found in Results Review.           Current Outpatient Medications:  Butalbital-Acetaminophen  MG Oral Tab Take 50 mg by mouth 4 (four) times daily as nee mouth daily. Disp:  Rfl:    NON FORMULARY Take 1 tablet by mouth daily. Disp:  Rfl:    aspirin 81 MG Oral Tab Take 81 mg by mouth daily.  Disp:  Rfl:    MIRTAZAPINE 7.5 MG Oral Tab TAKE 1 TABLET(7.5 MG) BY MOUTH EVERY NIGHT Disp: 30 tablet Rfl: 0      P • FACET INJECTION UNDER FLUOROSCOPY Bilateral 12/14/2015    Performed by Samara Love DO at [address] [facility] MARCIO-EN-Y  12/18/2017    DR. SEGAL   • GASTROC RECESSION FOOT Right 6/6/2017    Performed by Linda Hernandez yes. Children: 2. Exercise: walking.   Diet: watches fats closely and watches sugar closely     REVIEW OF SYSTEMS:   GENERAL: denies fevers, weakness,  Due to pain has trouble sleeping positive weight changes due to bariatric surgery   SKIN: denies any un intact, no gross motor or sensory deficit.   Psych patient is anxious affect is normal.  ASSESSMENT AND PLAN:   Frank Torres is a 45year old female who presents for a complete physical exam.    Well female exam without gynecological exam  (primary en

## 2019-06-12 RX ORDER — MIRTAZAPINE 7.5 MG/1
TABLET, FILM COATED ORAL
Qty: 30 TABLET | Refills: 0 | Status: SHIPPED | OUTPATIENT
Start: 2019-06-12 | End: 2019-07-10

## 2019-06-12 NOTE — TELEPHONE ENCOUNTER
Pt requesting refill of mirtazepine, no protocol , refill approved, sent to pharmacy:       Last Office Visit with PCP: 6/19

## 2019-06-17 ENCOUNTER — TELEPHONE (OUTPATIENT)
Dept: FAMILY MEDICINE CLINIC | Facility: CLINIC | Age: 39
End: 2019-06-17

## 2019-06-17 NOTE — TELEPHONE ENCOUNTER
----- Message from Ketty Trivedi sent at 6/17/2019  3:02 PM CDT -----  Regarding: RE: Non-Urgent Medical Question  Contact: [phone]  Is it normal to feel like I do right now though? I’m in excruciating pain?  I feel 10 times worse then I did befo office doesn’t open till 9:00 so I am just waiting for a call back with a time. I have no idea what they are going to do today. I know you are only there till 12, so I wanted to touch base with you.  The pain is worse today then it was before I had the root

## 2019-06-17 NOTE — TELEPHONE ENCOUNTER
Cliff Willett is calling on behalf of Jian Dowell, he would like to know if something can be called in for Jian Dowell, she had a root canal the other day and she is in a lot of pain, she did e mail Onnhung Donnelly but she has not heard back from her.  Please Contact Harley to let h

## 2019-06-17 NOTE — TELEPHONE ENCOUNTER
----- Message from Ketty [facility] sent at 6/17/2019  2:56 PM CDT -----  Regarding: Non-Urgent Medical Question  Contact: [phone]  Dr Manya Mcardle,  I saw Eddie Costa due to an issue with my tooth since May 9th.  I needed a root canal. i has butalbital for t

## 2019-06-18 ENCOUNTER — TELEPHONE (OUTPATIENT)
Dept: FAMILY MEDICINE CLINIC | Facility: CLINIC | Age: 39
End: 2019-06-18

## 2019-06-21 ENCOUNTER — OFFICE VISIT (OUTPATIENT)
Dept: SURGERY | Facility: CLINIC | Age: 39
End: 2019-06-21
Payer: COMMERCIAL

## 2019-06-21 VITALS
WEIGHT: 184 LBS | HEART RATE: 101 BPM | DIASTOLIC BLOOD PRESSURE: 77 MMHG | TEMPERATURE: 99 F | BODY MASS INDEX: 27.25 KG/M2 | HEIGHT: 69 IN | SYSTOLIC BLOOD PRESSURE: 113 MMHG

## 2019-06-21 DIAGNOSIS — I83.813 VARICOSE VEINS OF BILATERAL LOWER EXTREMITIES WITH PAIN: ICD-10-CM

## 2019-06-21 DIAGNOSIS — I83.813 VARICOSE VEINS OF BOTH LOWER EXTREMITIES WITH PAIN: Primary | ICD-10-CM

## 2019-06-21 DIAGNOSIS — E66.3 OVERWEIGHT (BMI 25.0-29.9): ICD-10-CM

## 2019-06-21 DIAGNOSIS — Z98.84 HISTORY OF ROUX-EN-Y GASTRIC BYPASS: ICD-10-CM

## 2019-06-21 DIAGNOSIS — I83.893 VARICOSE VEINS OF BOTH LOWER EXTREMITIES WITH COMPLICATIONS: ICD-10-CM

## 2019-06-21 PROCEDURE — 99244 OFF/OP CNSLTJ NEW/EST MOD 40: CPT | Performed by: SURGERY

## 2019-06-21 RX ORDER — AMOXICILLIN 500 MG/1
CAPSULE ORAL
Refills: 0 | COMMUNITY
Start: 2019-06-17 | End: 2019-07-08 | Stop reason: ALTCHOICE

## 2019-06-21 RX ORDER — METHYLPREDNISOLONE 4 MG/1
TABLET ORAL
Refills: 0 | COMMUNITY
Start: 2019-06-17 | End: 2019-07-08 | Stop reason: ALTCHOICE

## 2019-06-24 ENCOUNTER — OFFICE VISIT (OUTPATIENT)
Dept: SURGERY | Facility: CLINIC | Age: 39
End: 2019-06-24
Payer: COMMERCIAL

## 2019-06-24 VITALS — HEIGHT: 69 IN | BODY MASS INDEX: 27.25 KG/M2 | WEIGHT: 184 LBS

## 2019-06-24 DIAGNOSIS — F41.1 GAD (GENERALIZED ANXIETY DISORDER): ICD-10-CM

## 2019-06-24 DIAGNOSIS — G89.29 CHRONIC ABDOMINAL PAIN: ICD-10-CM

## 2019-06-24 DIAGNOSIS — R10.9 CHRONIC ABDOMINAL PAIN: ICD-10-CM

## 2019-06-24 DIAGNOSIS — E66.3 OVERWEIGHT (BMI 25.0-29.9): Primary | ICD-10-CM

## 2019-06-24 PROCEDURE — 97803 MED NUTRITION INDIV SUBSEQ: CPT | Performed by: DIETITIAN, REGISTERED

## 2019-06-24 NOTE — PROGRESS NOTES
[address] [place] [address] [facility]  [address] [place] [address]  Dept: [phone]  Loc: [phone]    06/24/19      Bariatric Follow-up Nutrition Session    Lucien Sierraarya is a 45year old female. Panel:  Lab Results   Component Value Date    CHOLEST 143 04/17/2018    TRIG 72 04/17/2018    HDL 61 04/17/2018    LDL 68 04/17/2018    VLDL 19 09/23/2017    TCHDLRATIO 2.89 09/23/2017    NONHDLC 82 04/17/2018        Vitamins/Minerals:  Lab Results   [place] (two) times daily as needed for Sleep., Disp: 120 tablet, Rfl: 0  •  pregabalin (LYRICA) 75 MG Oral Cap, Take 1 capsule (75 mg total) by mouth 2 (two) times daily. , Disp: 60 capsule, Rfl: 5  •  Amitriptyline HCl 25 MG Oral Tab, Take 1 tablet (25 mg total) has made some modifications and adjustments to diet: yes, continues w/ moderate portions  Food intolerances:  Milk, whipped cream, ice cream (lactose?)  Vitamin/mineral supplements:  Other: Bariatric Expert Plus MV + Vit D + calcium BID, Magnesium BID  Pro post-op nutrition labs    Valdez Johnson MS RD RICON  Face-to-face time spent with pt: 30 minutes

## 2019-06-24 NOTE — H&P
New Patient Visit Note       Active Problems      1. Varicose veins of both lower extremities with pain    2. Varicose veins of bilateral lower extremities with pain    3. Varicose veins of both lower extremities with complications    4.  Overweight (BMI 25 [address] [place] [address]   • D & C      x4   • DILATION/CURETTAGE,DIAGNOSTIC     • ESOPHAGOGASTRODUODENOSCOPY (EGD) N/A 10/4/2018    Performed by Rocael Thorne MD at [address] [place] [address] ENDOSCOPY   • ESOPHAGOGASTRODUODENOSCOPY (EGD) N/A 1/29/2018    Performed Smoker      Smokeless tobacco: Never Used    Alcohol use: No      Alcohol/week: 0.0 oz      Frequency: Never      Binge frequency: Never       Current Outpatient Medications:   •  amoxicillin 500 MG Oral Cap, TK 1 C PO Q 6 H UNTIL GONE, Disp: , Rfl: 0  • check blood sugars as needed, Disp: 1 Box, Rfl: 0  •  UNITHROID 150 MCG Oral Tab, TAKE 1 TABLET BY MOUTH EVERY DAY BEFORE BREAKFAST, Disp: 90 tablet, Rfl: 3  •  CALCITRIOL 0.25 MCG Oral Cap, TAKE 1 CAPSULE(0.25 MCG) BY MOUTH TWICE DAILY, Disp: 60 capsule, well-developed and well-nourished. HENT:   Head: Normocephalic and atraumatic. Eyes: Pupils are equal, round, and reactive to light. EOM are normal.   Cardiovascular: Normal rate and regular rhythm.    Pulmonary/Chest: Effort normal and breath sounds no and leg elevation. The patient denies any history of superficial thrombophlebitis or DVT. Plan     The importance of weight management, exercise, avoidance of prolonged standing, leg elevation was reviewed.   Medical grade impression stockings for

## 2019-06-25 DIAGNOSIS — F41.1 GAD (GENERALIZED ANXIETY DISORDER): ICD-10-CM

## 2019-06-25 DIAGNOSIS — R10.9 CHRONIC ABDOMINAL PAIN: ICD-10-CM

## 2019-06-25 DIAGNOSIS — F51.01 PRIMARY INSOMNIA: ICD-10-CM

## 2019-06-25 DIAGNOSIS — G89.29 CHRONIC ABDOMINAL PAIN: ICD-10-CM

## 2019-06-25 RX ORDER — ALPRAZOLAM 0.25 MG/1
0.25 TABLET ORAL 2 TIMES DAILY PRN
Qty: 120 TABLET | Refills: 0 | OUTPATIENT
Start: 2019-06-25

## 2019-06-25 RX ORDER — ALPRAZOLAM 0.25 MG/1
TABLET ORAL
Qty: 120 TABLET | Refills: 0 | Status: SHIPPED | OUTPATIENT
Start: 2019-06-25 | End: 2019-08-18

## 2019-06-25 RX ORDER — ZOLPIDEM TARTRATE 5 MG/1
5 TABLET ORAL NIGHTLY
Qty: 30 TABLET | Refills: 1 | Status: SHIPPED | OUTPATIENT
Start: 2019-06-25 | End: 2019-08-18

## 2019-06-27 ENCOUNTER — TELEPHONE (OUTPATIENT)
Dept: FAMILY MEDICINE CLINIC | Facility: CLINIC | Age: 39
End: 2019-06-27

## 2019-06-27 ENCOUNTER — PATIENT MESSAGE (OUTPATIENT)
Dept: FAMILY MEDICINE CLINIC | Facility: CLINIC | Age: 39
End: 2019-06-27

## 2019-06-27 RX ORDER — BUTALBITAL, ACETAMINOPHEN AND CAFFEINE 50; 325; 40 MG/1; MG/1; MG/1
TABLET ORAL
Qty: 40 TABLET | Refills: 0 | Status: SHIPPED | OUTPATIENT
Start: 2019-06-27 | End: 2019-08-21

## 2019-06-27 NOTE — TELEPHONE ENCOUNTER
Patient is requesting a refill of her butalbital with caffeine. She states she is going on vacation down [address] next week and may need it for headaches. Also, today she is having more dental work and is anticipating pain. States has weaned herself off of  Tylenol #3. OK to refill? It is pended.

## 2019-06-28 RX ORDER — IPRATROPIUM BROMIDE AND ALBUTEROL SULFATE 2.5; .5 MG/3ML; MG/3ML
3 SOLUTION RESPIRATORY (INHALATION) EVERY 6 HOURS PRN
Qty: 1 CONTAINER | Refills: 0 | Status: SHIPPED | OUTPATIENT
Start: 2019-06-28 | End: 2020-04-30 | Stop reason: ALTCHOICE

## 2019-06-28 NOTE — TELEPHONE ENCOUNTER
From: Jef Betancourt  To: Kalin Peterson PA-C  Sent: 6/27/2019 8:57 PM CDT  Subject: Non-Urgent Medical Question    Annalise Castle,  I’m trying to get everything ready for our trip on Tuesday, and now that it’s warm here, it’ll be even warmer there.

## 2019-07-01 ENCOUNTER — TELEPHONE (OUTPATIENT)
Dept: FAMILY MEDICINE CLINIC | Facility: CLINIC | Age: 39
End: 2019-07-01

## 2019-07-01 RX ORDER — IPRATROPIUM BROMIDE AND ALBUTEROL SULFATE 2.5; .5 MG/3ML; MG/3ML
SOLUTION RESPIRATORY (INHALATION)
Qty: 90 ML | Refills: 0 | OUTPATIENT
Start: 2019-07-01

## 2019-07-01 NOTE — TELEPHONE ENCOUNTER
Constantin is calling to get clarification on a prescription for Marcus Ian, please call Constantin at [phone]

## 2019-07-02 ENCOUNTER — PATIENT MESSAGE (OUTPATIENT)
Dept: FAMILY MEDICINE CLINIC | Facility: CLINIC | Age: 39
End: 2019-07-02

## 2019-07-02 NOTE — TELEPHONE ENCOUNTER
From: Xuan Pastor  To:  Gonzalo Huerta PA-C  Sent: 7/2/2019 11:50 AM CDT  Subject: Non-Urgent Medical Question    CHUCK Good, (message cont.)    I wouldn't have gone yesterday as bladder is annoying and pain is dull, but we leave for [place] today an

## 2019-07-08 ENCOUNTER — OFFICE VISIT (OUTPATIENT)
Dept: FAMILY MEDICINE CLINIC | Facility: CLINIC | Age: 39
End: 2019-07-08
Payer: COMMERCIAL

## 2019-07-08 VITALS
BODY MASS INDEX: 27.85 KG/M2 | HEIGHT: 69 IN | SYSTOLIC BLOOD PRESSURE: 98 MMHG | WEIGHT: 188 LBS | HEART RATE: 92 BPM | DIASTOLIC BLOOD PRESSURE: 60 MMHG

## 2019-07-08 DIAGNOSIS — K27.9 PUD (PEPTIC ULCER DISEASE): ICD-10-CM

## 2019-07-08 DIAGNOSIS — R10.13 EPIGASTRIC ABDOMINAL PAIN: Primary | ICD-10-CM

## 2019-07-08 PROCEDURE — 99214 OFFICE O/P EST MOD 30 MIN: CPT | Performed by: FAMILY MEDICINE

## 2019-07-08 RX ORDER — DEXLANSOPRAZOLE 60 MG/1
60 CAPSULE, DELAYED RELEASE ORAL DAILY
Qty: 30 CAPSULE | Refills: 2 | Status: SHIPPED | OUTPATIENT
Start: 2019-07-08 | End: 2019-07-29

## 2019-07-08 RX ORDER — ONDANSETRON HYDROCHLORIDE 8 MG/1
8 TABLET, FILM COATED ORAL EVERY 8 HOURS PRN
Refills: 1 | COMMUNITY
Start: 2019-07-05 | End: 2020-12-09

## 2019-07-08 RX ORDER — NITROFURANTOIN 25; 75 MG/1; MG/1
1 CAPSULE ORAL 2 TIMES DAILY
Refills: 0 | COMMUNITY
Start: 2019-07-01 | End: 2019-07-09

## 2019-07-08 RX ORDER — DEXLANSOPRAZOLE 60 MG/1
60 CAPSULE, DELAYED RELEASE ORAL DAILY
Qty: 30 CAPSULE | Refills: 2 | Status: SHIPPED | OUTPATIENT
Start: 2019-07-08 | End: 2019-07-08

## 2019-07-08 NOTE — PROGRESS NOTES
Joya Burton is a 45year old female. HPI:   Patient is in for concerns over possible peptic ulcer disease had been taking ibuprofen and then was put on prednisone for a tooth nerve issue.   Feels she may have irritated her stomach lining is on Nneka Zolpidem Tartrate 5 MG Oral Tab Take 1 tablet (5 mg total) by mouth nightly. Disp: 30 tablet Rfl: 1   Fluticasone Propionate HFA (FLOVENT HFA) 110 MCG/ACT Inhalation Aerosol Inhale 2 puffs into the lungs 2 (two) times daily.  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 ASSESSMENT AND PLAN:     Epigastric abdominal pain  (primary encounter diagnosis)  Pud (peptic ulcer disease)    No orders of the defined types were placed in this encounter.       Meds & Refills for this Visit:  Requested Prescriptio

## 2019-07-09 ENCOUNTER — PATIENT MESSAGE (OUTPATIENT)
Dept: FAMILY MEDICINE CLINIC | Facility: CLINIC | Age: 39
End: 2019-07-09

## 2019-07-09 NOTE — TELEPHONE ENCOUNTER
From: Marcello Alvarenga  To: Madelyne Hatchet, PA-C  Sent: 7/9/2019 11:09 AM CDT  Subject: Visit Follow-up Question    Martín Harris,  I just wanted to let you know that my stool is not the color of tar or anything, but there is blood in the stool.  It’s li

## 2019-07-10 RX ORDER — MIRTAZAPINE 7.5 MG/1
TABLET, FILM COATED ORAL
Qty: 30 TABLET | Refills: 0 | Status: SHIPPED | OUTPATIENT
Start: 2019-07-10 | End: 2019-08-06

## 2019-07-11 ENCOUNTER — TELEPHONE (OUTPATIENT)
Dept: FAMILY MEDICINE CLINIC | Facility: CLINIC | Age: 39
End: 2019-07-11

## 2019-07-12 NOTE — PROGRESS NOTES
Please call the patient and have them make appointment in the office to review results and follow up

## 2019-07-15 ENCOUNTER — OFFICE VISIT (OUTPATIENT)
Dept: SURGERY | Facility: CLINIC | Age: 39
End: 2019-07-15
Payer: COMMERCIAL

## 2019-07-15 VITALS
RESPIRATION RATE: 16 BRPM | WEIGHT: 183 LBS | HEART RATE: 101 BPM | SYSTOLIC BLOOD PRESSURE: 108 MMHG | HEIGHT: 69 IN | BODY MASS INDEX: 27.11 KG/M2 | DIASTOLIC BLOOD PRESSURE: 76 MMHG

## 2019-07-15 DIAGNOSIS — M79.604 BILATERAL LEG PAIN: ICD-10-CM

## 2019-07-15 DIAGNOSIS — M79.605 BILATERAL LEG PAIN: ICD-10-CM

## 2019-07-15 DIAGNOSIS — I83.813 VARICOSE VEINS OF BILATERAL LOWER EXTREMITIES WITH PAIN: Primary | ICD-10-CM

## 2019-07-15 DIAGNOSIS — I87.1 MAY-THURNER SYNDROME: ICD-10-CM

## 2019-07-15 DIAGNOSIS — I83.893 VARICOSE VEINS OF BOTH LOWER EXTREMITIES WITH COMPLICATIONS: ICD-10-CM

## 2019-07-15 PROCEDURE — 99214 OFFICE O/P EST MOD 30 MIN: CPT | Performed by: SURGERY

## 2019-07-15 NOTE — PROGRESS NOTES
Follow Up Visit Note       Active Problems      1. Varicose veins of bilateral lower extremities with pain    2. Varicose veins of both lower extremities with complications    3.  Bilateral leg pain          Chief Complaint   Patient presents with:  Margarita Gee x4   • DILATION/CURETTAGE,DIAGNOSTIC     • ESOPHAGOGASTRODUODENOSCOPY (EGD) N/A 10/4/2018    Performed by Meryl Sloan MD at [facility] ENDOSCOPY   • ESOPHAGOGASTRODUODENOSCOPY (EGD) N/A 1/29/2018    Performed by Meryl Sloan MD at [facility] E Used    Alcohol use: No      Alcohol/week: 0.0 oz      Frequency: Never      Binge frequency: Never       Current Outpatient Medications:   •  MIRTAZAPINE 7.5 MG Oral Tab, TAKE 1 TABLET(7.5 MG) BY MOUTH EVERY NIGHT, Disp: 30 tablet, Rfl: 0  •  Ondansetron MOUTH EVERY DAY BEFORE BREAKFAST, Disp: 90 tablet, Rfl: 3  •  CALCITRIOL 0.25 MCG Oral Cap, TAKE 1 CAPSULE(0.25 MCG) BY MOUTH TWICE DAILY, Disp: 60 capsule, Rfl: 0  •  Albuterol Sulfate HFA (PROAIR HFA) 108 (90 Base) MCG/ACT Inhalation Aero Soln, INHALE 2 and regular rhythm. Pulmonary/Chest: Effort normal and breath sounds normal.        Abdominal: Soft. Bowel sounds are normal. She exhibits no distension. There is no tenderness. Musculoskeletal: Normal range of motion. She exhibits no deformity.    Sagar Parr 2015.  She currently takes a baby aspirin a day. Josef Solano would be a candidate for bilateral endovenous radiofrequency ablation of the greater saphenous vein for symptomatic varicose vein disease.   She would like to proceed with treatment of the right leg

## 2019-07-16 ENCOUNTER — OFFICE VISIT (OUTPATIENT)
Dept: SURGERY | Facility: CLINIC | Age: 39
End: 2019-07-16
Payer: COMMERCIAL

## 2019-07-16 VITALS
BODY MASS INDEX: 27.74 KG/M2 | WEIGHT: 183 LBS | SYSTOLIC BLOOD PRESSURE: 114 MMHG | HEART RATE: 106 BPM | HEIGHT: 68 IN | DIASTOLIC BLOOD PRESSURE: 77 MMHG | OXYGEN SATURATION: 98 % | RESPIRATION RATE: 16 BRPM

## 2019-07-16 NOTE — PROGRESS NOTES
[address] [place] [address] [address]  [address]  Dept: [phone]    7/16/2019    BARIATRIC EXISTING PATIENT/FOLLOW UP    HPI:  Stephanie Mckeon is a 45year old-year old female helps other considerations include Phenergan for her nausea    Plan: Patient will follow-up with me in a month    Rocio Dior MD  7/16/2019

## 2019-07-23 ENCOUNTER — TELEPHONE (OUTPATIENT)
Dept: SURGERY | Facility: CLINIC | Age: 39
End: 2019-07-23

## 2019-07-23 ENCOUNTER — PATIENT MESSAGE (OUTPATIENT)
Dept: FAMILY MEDICINE CLINIC | Facility: CLINIC | Age: 39
End: 2019-07-23

## 2019-07-23 DIAGNOSIS — R10.13 EPIGASTRIC ABDOMINAL PAIN: ICD-10-CM

## 2019-07-23 DIAGNOSIS — G89.29 CHRONIC ABDOMINAL PAIN: ICD-10-CM

## 2019-07-23 DIAGNOSIS — R10.9 CHRONIC ABDOMINAL PAIN: ICD-10-CM

## 2019-07-23 DIAGNOSIS — K27.9 PUD (PEPTIC ULCER DISEASE): Primary | ICD-10-CM

## 2019-07-23 NOTE — TELEPHONE ENCOUNTER
PT WILL BE SEEING VASCULAR SURGEON AT DR. Raman Sutton. WILL NEED DR. RAE LOV NOTE AND US IMAGING REPORT.  FAXED TO  [phone]

## 2019-07-23 NOTE — TELEPHONE ENCOUNTER
From: Lorena Catherine  To: Tammy Kapadia PA-C  Sent: 7/23/2019 10:47 AM CDT  Subject: Referral Request    London Lat,   I saw Dr. Chayito Dean on Tuesday.  He wants me to stay on the pantoprazole 40mg x 2 a day (instead of Nexium), carafate before eatin

## 2019-07-23 NOTE — TELEPHONE ENCOUNTER
From: Marva Head  To: Aliza Fernandez PA-C  Sent: 7/23/2019 12:11 PM CDT  Subject: Angelina Gonzalez,  I’m getting records to send to the PS to send to Jacinto Martínez to try to get approved for an abdominoplasty.  If approved this would not take place till

## 2019-07-23 NOTE — TELEPHONE ENCOUNTER
PT PHONED OUR OFFICE LEFT MESSAGE TO RETURN CALL REGARDING QUESTIONS-THIS RN PHONED PT, LEFT VOICE MESSAGE FOR PT TO RETURN CALL.

## 2019-07-24 ENCOUNTER — TELEPHONE (OUTPATIENT)
Dept: GASTROENTEROLOGY | Facility: CLINIC | Age: 39
End: 2019-07-24

## 2019-07-24 NOTE — TELEPHONE ENCOUNTER
Dr Kashif Avila,    Pt is calling asking if she can have her EGD. Also states there is a ribbon of blood running through her stools as well as white chuncky matter (not mucous) on the edges of the stool. This has happened twice.     No lower abdominal pain

## 2019-07-24 NOTE — TELEPHONE ENCOUNTER
Pt requesting to scheduled EGD - states Dr Hiwot Espinoza had already spoken to clinical staff. Pls call. Thank you.

## 2019-07-24 NOTE — TELEPHONE ENCOUNTER
Surgery schedulers. [facility] She has to go back to work on the 12th of August.    I gave her CAB's instructions regarding the pantoprazole and Miralax.  She verbalizes understanding

## 2019-07-24 NOTE — TELEPHONE ENCOUNTER
I have performed EGD examinations for Ms. Nathalie Stover on 1/29/2018, 3/29/2018, and 10/4/2018. I performed colonoscopy examination on 3/29/2018 and saw healthy colon and rectum, only small internal hemorrhoids. Please call Ms. Nathalie Stover to advise:    1.

## 2019-07-24 NOTE — TELEPHONE ENCOUNTER
Dr Jillian Rollins:    See pt email from yesterday between pt and Coleen Pelayo is recommending a EGD for the pt. Can you please advise on orders        I saw Dr. Magui Stern on Tuesday.  He wants me to stay on the pantoprazole 40mg x 2 a day (in

## 2019-07-25 NOTE — TELEPHONE ENCOUNTER
[place]  data:    Pages 1-2 of 4          Last Name First Name Date of Birth Date Filled Label Name Strength Metric Qty/Days Supply Prescriber Name   Sharyle Chelo 9/29/1980 6/18/2019 CODEINE PHOSPHATE/APAP 300-30MG 30/5 Sonal BAILEY 25 Johanna AMEZQUITA       First Name [employer] Zip Date of Birth Date Filled Label Name Strength Metric Qty/Days Supply Payment Type ?  Prescriber Name   Timothy Li 9/29/1980 8/27/2018 ALPRAZOLAM 0.25MG 120/ 30 LUARA BAILEY

## 2019-07-25 NOTE — TELEPHONE ENCOUNTER
Dr. iWlliam Xavier--    This patient is going back to work on 8/12/19 and would like to schedule the procedure before than.  Please advise if I can add this patient to 8/1/19 at the [facility], thank you

## 2019-07-26 NOTE — TELEPHONE ENCOUNTER
Scheduled for:  D [id number]  Provider Name: Dr. Ashley Diallo  Date:  8/1/19  Location:  [facility]  Sedation:  MAC  Time:   1115 (pt is aware that [address] will call the day before to confirm arrival time)    Prep:  NPO after midnight  Meds/Allergies Reconciled?:  Physician cesar

## 2019-07-29 ENCOUNTER — OFFICE VISIT (OUTPATIENT)
Dept: FAMILY MEDICINE CLINIC | Facility: CLINIC | Age: 39
End: 2019-07-29
Payer: COMMERCIAL

## 2019-07-29 VITALS
DIASTOLIC BLOOD PRESSURE: 60 MMHG | SYSTOLIC BLOOD PRESSURE: 94 MMHG | OXYGEN SATURATION: 98 % | BODY MASS INDEX: 27.28 KG/M2 | WEIGHT: 180 LBS | HEART RATE: 88 BPM | HEIGHT: 68 IN | TEMPERATURE: 98 F

## 2019-07-29 DIAGNOSIS — J45.40 MODERATE PERSISTENT ASTHMA WITHOUT COMPLICATION: Primary | ICD-10-CM

## 2019-07-29 DIAGNOSIS — J02.9 SORE THROAT: Primary | ICD-10-CM

## 2019-07-29 LAB
CONTROL LINE PRESENT WITH A CLEAR BACKGROUND (YES/NO): YES YES/NO
STREP GRP A CUL-SCR: NEGATIVE

## 2019-07-29 PROCEDURE — 87880 STREP A ASSAY W/OPTIC: CPT | Performed by: FAMILY MEDICINE

## 2019-07-29 PROCEDURE — 99214 OFFICE O/P EST MOD 30 MIN: CPT | Performed by: FAMILY MEDICINE

## 2019-07-29 RX ORDER — AMOXICILLIN AND CLAVULANATE POTASSIUM 875; 125 MG/1; MG/1
1 TABLET, FILM COATED ORAL 2 TIMES DAILY
Qty: 14 TABLET | Refills: 0 | Status: SHIPPED | OUTPATIENT
Start: 2019-07-29 | End: 2019-08-05

## 2019-07-29 RX ORDER — ALBUTEROL SULFATE 90 UG/1
AEROSOL, METERED RESPIRATORY (INHALATION)
Qty: 8.5 G | Refills: 1 | Status: SHIPPED | OUTPATIENT
Start: 2019-07-29 | End: 2019-10-24

## 2019-07-29 NOTE — PATIENT INSTRUCTIONS
--rest, fluids, soups, humidifier, tea with lemon or honey, tylenol/advil as needed for discomfort  -- numbing lozenges as needed    -- start antibiotics if not improving by Thursday or Friday   -- sooner if worsening    -- followup with dentist as planned

## 2019-07-29 NOTE — PROGRESS NOTES
CC:  Alis Hernadez is a 45year old female here for Patient presents with:  Sore Throat: Sore throat with white spots on tonsils. Since yesterday morning.  No fever, patient reports bodyache and chills      HPI:     URI  -started yesterday morning  -as mg total) by mouth every evening. Disp: 30 tablet Rfl: 2   CONTOUR NEXT TEST In Vitro Strip USE TWICE DAILY AS DIRECTED Disp: 200 strip Rfl: 3   pregabalin (LYRICA) 75 MG Oral Cap Take 1 capsule (75 mg total) by mouth 2 (two) times daily.  Disp: 60 capsule Paternal Grandmother    • Breast Cancer Maternal Grandmother 76   • Cancer Neg    • Stroke Neg       Past Medical History:   Diagnosis Date   • Anxiety    • Anxiety state, unspecified    • Arthritis    • Asthma    • Calculus of kidney    • Cancer ([facility] MARCIO-EN-Y  12/18/2017    DR. SEGAL   • GASTROC RECESSION FOOT Right 6/6/2017    Performed by Niesha Abraham MD at [facility]   • [address] [place] [address] [place] Left 6/19/2015    Performed by Niesha Abraham MD at [facility] g/m/r  EXTREMITIES: no cyanosis, clubbing or edema    ASSESSMENT AND PLAN:     URI   - likely viral given length of symptoms, exam unremarkable  -rapid strep negative  - encouraged supportive care (rest, fluids, honey, humidifier, tylenol/ibuprofen prn)  -

## 2019-08-05 ENCOUNTER — TELEPHONE (OUTPATIENT)
Dept: SURGERY | Facility: CLINIC | Age: 39
End: 2019-08-05

## 2019-08-05 NOTE — TELEPHONE ENCOUNTER
Pt called today stating that Dr. Osmany Guevara (Vascular Surgery) gave her clearance to call and make her appointment for ablation. This writer will follow up with Dr. Carlos Feliz office to ask them to fax over clearance so pt can schedule appt.

## 2019-08-05 NOTE — TELEPHONE ENCOUNTER
See previous note: This RN followed up with Dr. Janet Erwin office req. Clearance. No answer - left voicemail with fax number.

## 2019-08-06 RX ORDER — MIRTAZAPINE 7.5 MG/1
TABLET, FILM COATED ORAL
Qty: 90 TABLET | Refills: 0 | Status: SHIPPED | OUTPATIENT
Start: 2019-08-06 | End: 2019-10-28

## 2019-08-18 DIAGNOSIS — R10.9 CHRONIC ABDOMINAL PAIN: ICD-10-CM

## 2019-08-18 DIAGNOSIS — F41.1 GAD (GENERALIZED ANXIETY DISORDER): ICD-10-CM

## 2019-08-18 DIAGNOSIS — G89.29 CHRONIC ABDOMINAL PAIN: ICD-10-CM

## 2019-08-18 DIAGNOSIS — F51.01 PRIMARY INSOMNIA: ICD-10-CM

## 2019-08-19 RX ORDER — ZOLPIDEM TARTRATE 5 MG/1
TABLET ORAL
Qty: 30 TABLET | Refills: 0 | Status: SHIPPED | OUTPATIENT
Start: 2019-08-19 | End: 2019-09-15

## 2019-08-19 RX ORDER — ALPRAZOLAM 0.25 MG/1
TABLET ORAL
Qty: 60 TABLET | Refills: 0 | Status: SHIPPED | OUTPATIENT
Start: 2019-08-19 | End: 2019-09-15

## 2019-08-19 NOTE — TELEPHONE ENCOUNTER
Pt requesting refill of ALPRAZOLAM and ZOLPIDEM     Last Time Medication was Filled:  6/25/19    Last Office Visit with PCP: 7/29/19        No future appointments.

## 2019-08-21 DIAGNOSIS — G44.221 CHRONIC TENSION-TYPE HEADACHE, INTRACTABLE: Primary | ICD-10-CM

## 2019-08-21 RX ORDER — BUTALBITAL, ACETAMINOPHEN AND CAFFEINE 50; 325; 40 MG/1; MG/1; MG/1
1 TABLET ORAL 2 TIMES DAILY PRN
Qty: 40 TABLET | Refills: 0 | Status: SHIPPED | OUTPATIENT
Start: 2019-08-21 | End: 2019-09-09

## 2019-08-21 NOTE — TELEPHONE ENCOUNTER
Pt requesting refill of Butalbital-APAP- CAFF     Last Time Medication was Filled:  6/27/19 qty#40    Last Office Visit with PCP: 6/11/2019      No future appointments.

## 2019-08-28 ENCOUNTER — PATIENT MESSAGE (OUTPATIENT)
Dept: FAMILY MEDICINE CLINIC | Facility: CLINIC | Age: 39
End: 2019-08-28

## 2019-08-28 DIAGNOSIS — J45.40 MODERATE PERSISTENT ASTHMA WITHOUT COMPLICATION: ICD-10-CM

## 2019-08-29 RX ORDER — PANTOPRAZOLE SODIUM 40 MG/1
TABLET, DELAYED RELEASE ORAL
Qty: 60 TABLET | Refills: 0 | OUTPATIENT
Start: 2019-08-29

## 2019-08-29 RX ORDER — ALBUTEROL SULFATE 90 UG/1
AEROSOL, METERED RESPIRATORY (INHALATION)
Qty: 8.5 G | Refills: 0 | OUTPATIENT
Start: 2019-08-29

## 2019-08-29 NOTE — TELEPHONE ENCOUNTER
From: Shante Dyson  To: Bobbetta Sicard, PA-C  Sent: 8/28/2019 2:24 PM CDT  Subject: Non-Urgent Medical Question    Valeriano Mchugh,   I am currently on Flovent for my Asthma instead of the Symbicort.  The thought was that I would stay on the Flovent af

## 2019-09-03 NOTE — TELEPHONE ENCOUNTER
Last 5 Patient Entered Readings                                      Current 30 Day Average: 119/69     Recent Readings 9/3/2019 9/3/2019 9/3/2019 9/3/2019 9/3/2019    SBP (mmHg) 179 171 98 122 142    DBP (mmHg) 88 93 59 70 84    Pulse 96 69 71 77 68        9/3/19: LVMFCB. Reviewed readings. What changed in the past week for readings to elevate again? Consider starting amlodipine 2.5 mg once daily.     Please advise Patient to all PCP, if PCP is out of town, another physician should be covering for them. If headache is worsening or intolerable, please advise Chelsea Phan to go to ER. Called sunil back and read off notes.  She understood and has called the

## 2019-09-04 ENCOUNTER — OFFICE VISIT (OUTPATIENT)
Dept: SURGERY | Facility: CLINIC | Age: 39
End: 2019-09-04
Payer: COMMERCIAL

## 2019-09-04 VITALS
BODY MASS INDEX: 28.34 KG/M2 | SYSTOLIC BLOOD PRESSURE: 126 MMHG | HEIGHT: 68 IN | WEIGHT: 187 LBS | DIASTOLIC BLOOD PRESSURE: 84 MMHG

## 2019-09-04 DIAGNOSIS — E66.3 OVERWEIGHT (BMI 25.0-29.9): ICD-10-CM

## 2019-09-04 DIAGNOSIS — Z51.81 ENCOUNTER FOR THERAPEUTIC DRUG MONITORING: ICD-10-CM

## 2019-09-04 DIAGNOSIS — L30.4 INTERTRIGO: Primary | ICD-10-CM

## 2019-09-04 DIAGNOSIS — Z98.84 HISTORY OF ROUX-EN-Y GASTRIC BYPASS: ICD-10-CM

## 2019-09-04 PROCEDURE — 99214 OFFICE O/P EST MOD 30 MIN: CPT | Performed by: INTERNAL MEDICINE

## 2019-09-04 RX ORDER — TOPIRAMATE 25 MG/1
25 TABLET ORAL EVERY EVENING
Qty: 30 TABLET | Refills: 2 | Status: SHIPPED | OUTPATIENT
Start: 2019-09-04 | End: 2019-09-30

## 2019-09-04 RX ORDER — TOPIRAMATE 25 MG/1
TABLET ORAL
Qty: 30 TABLET | Refills: 0 | OUTPATIENT
Start: 2019-09-04

## 2019-09-04 NOTE — PROGRESS NOTES
[address], [address]  Dept: [phone]        Patient:  Joya Burton  :      1980  MRN:      [id number]    Referring Provider: Ava Whiting TAKE 1 TABLET(7.5 MG) BY MOUTH EVERY NIGHT, Disp: 90 tablet, Rfl: 0  •  ALBUTEROL SULFATE  (90 Base) MCG/ACT Inhalation Aero Soln, INHALE 2 PUFFS BY MOUTH EVERY 4 HOURS AS NEEDED FOR WHEEZING, Disp: 8.5 g, Rfl: 1  •  Ondansetron HCl (ZOFRAN) 8 MG ta Never      Binge frequency: Never    Drug use: No    Surgical History:    Past Surgical History:   Procedure Laterality Date   • ARTHROSCOPY ANKLE WITH DEBRIDEMENT Left 6/19/2015    Performed by Kelin Gates MD at [place] [address]   • BARIATRIC GASTRIC SURGICAL STENT Bilateral March 2015.     Bilateral iliac stents   • SURGICAL STENT      stent to iliac crest bone   • THYROIDECTOMY  4/2011   • THYROIDECTOMY Left 8/8/2016    Performed by Ember Mcleod MD at [facility] MAIN OR   • TOTAL ABDOM HYSTERECTOMY     • were placed in this encounter.     Tolerating vyvanse    Tolerating topiramate    Has no uterus    Working with plastic surgeon in [facility] (Dr Pepe Godinez)    Phil Kessler MD

## 2019-09-05 RX ORDER — BUPROPION HYDROCHLORIDE 150 MG/1
150 TABLET, EXTENDED RELEASE ORAL 2 TIMES DAILY
Qty: 30 TABLET | Refills: 0 | Status: SHIPPED | OUTPATIENT
Start: 2019-09-05 | End: 2019-09-09

## 2019-09-09 ENCOUNTER — PATIENT MESSAGE (OUTPATIENT)
Dept: FAMILY MEDICINE CLINIC | Facility: CLINIC | Age: 39
End: 2019-09-09

## 2019-09-09 DIAGNOSIS — G44.221 CHRONIC TENSION-TYPE HEADACHE, INTRACTABLE: ICD-10-CM

## 2019-09-09 DIAGNOSIS — G44.221 CHRONIC TENSION-TYPE HEADACHE, INTRACTABLE: Primary | ICD-10-CM

## 2019-09-09 RX ORDER — BUTALBITAL, ACETAMINOPHEN AND CAFFEINE 50; 325; 40 MG/1; MG/1; MG/1
1 TABLET ORAL 2 TIMES DAILY PRN
Qty: 40 TABLET | Refills: 0 | Status: SHIPPED | OUTPATIENT
Start: 2019-09-09 | End: 2019-09-30

## 2019-09-09 NOTE — TELEPHONE ENCOUNTER
Pt requesting refill of Butalbital-APAP-Caffeine -40 MG Oral Tab     Last Time Medication was Filled:  8/21/2019    Last Office Visit with PCP: 7/8/2019    No future appointments.

## 2019-09-09 NOTE — TELEPHONE ENCOUNTER
Neurologist for evaluation and treatment daily headaches possible injections for TMJ Botox is used for this refer her to neurologist that does Botox

## 2019-09-09 NOTE — TELEPHONE ENCOUNTER
Due to be filled 8/21/19 we are giving her the maximum quanity as discussed for 1 month.   Is there a reason she is asking for early refill

## 2019-09-09 NOTE — TELEPHONE ENCOUNTER
9/9/19 2:27 PM   Note      Neurologist for evaluation and treatment daily headaches possible injections for TMJ Botox is used for this refer her to neurologist that does Botox              Debbie Limon   to Tram Quevedo PA-C         9/9/19 9

## 2019-09-09 NOTE — TELEPHONE ENCOUNTER
Referral placed and recommendations sent to patient.    Confirmed and all providers at [facility] do botox injections for headaches

## 2019-09-10 ENCOUNTER — TELEPHONE (OUTPATIENT)
Dept: FAMILY MEDICINE CLINIC | Facility: CLINIC | Age: 39
End: 2019-09-10

## 2019-09-15 ENCOUNTER — HOSPITAL ENCOUNTER (EMERGENCY)
Age: 39
Discharge: HOME | End: 2019-09-15
Payer: COMMERCIAL

## 2019-09-15 ENCOUNTER — APPOINTMENT (OUTPATIENT)
Dept: CT IMAGING | Age: 39
End: 2019-09-15
Payer: COMMERCIAL

## 2019-09-15 VITALS
HEIGHT: 69 IN | OXYGEN SATURATION: 100 % | TEMPERATURE: 98 F | WEIGHT: 176 LBS | RESPIRATION RATE: 18 BRPM | SYSTOLIC BLOOD PRESSURE: 99 MMHG | HEART RATE: 72 BPM | BODY MASS INDEX: 26.07 KG/M2 | DIASTOLIC BLOOD PRESSURE: 58 MMHG

## 2019-09-15 DIAGNOSIS — G89.29 CHRONIC ABDOMINAL PAIN: ICD-10-CM

## 2019-09-15 DIAGNOSIS — R10.9 ABDOMINAL PAIN, ACUTE: Primary | ICD-10-CM

## 2019-09-15 DIAGNOSIS — R10.9 CHRONIC ABDOMINAL PAIN: ICD-10-CM

## 2019-09-15 DIAGNOSIS — F51.01 PRIMARY INSOMNIA: ICD-10-CM

## 2019-09-15 DIAGNOSIS — F41.1 GAD (GENERALIZED ANXIETY DISORDER): ICD-10-CM

## 2019-09-15 LAB
ALBUMIN SERPL-MCNC: 3.3 G/DL (ref 3.4–5)
ALBUMIN/GLOB SERPL: 1.1 {RATIO} (ref 1–2)
ALP LIVER SERPL-CCNC: 77 U/L (ref 37–98)
ALT SERPL-CCNC: 18 U/L (ref 13–56)
ANION GAP SERPL CALC-SCNC: 6 MMOL/L (ref 0–18)
AST SERPL-CCNC: 14 U/L (ref 15–37)
BASOPHILS # BLD AUTO: 0.02 X10(3) UL (ref 0–0.2)
BASOPHILS NFR BLD AUTO: 0.4 %
BILIRUB SERPL-MCNC: 0.2 MG/DL (ref 0.1–2)
BILIRUB UR QL STRIP.AUTO: NEGATIVE
BUN BLD-MCNC: 6 MG/DL (ref 7–18)
BUN/CREAT SERPL: 6.9 (ref 10–20)
CALCIUM BLD-MCNC: 8 MG/DL (ref 8.5–10.1)
CHLORIDE SERPL-SCNC: 110 MMOL/L (ref 98–112)
CLARITY UR REFRACT.AUTO: CLEAR
CO2 SERPL-SCNC: 26 MMOL/L (ref 21–32)
COLOR UR AUTO: YELLOW
CREAT BLD-MCNC: 0.87 MG/DL (ref 0.55–1.02)
DEPRECATED RDW RBC AUTO: 45.4 FL (ref 35.1–46.3)
EOSINOPHIL # BLD AUTO: 0.17 X10(3) UL (ref 0–0.7)
EOSINOPHIL NFR BLD AUTO: 3.6 %
ERYTHROCYTE [DISTWIDTH] IN BLOOD BY AUTOMATED COUNT: 12.8 % (ref 11–15)
GLOBULIN PLAS-MCNC: 3.1 G/DL (ref 2.8–4.4)
GLUCOSE BLD-MCNC: 87 MG/DL (ref 70–99)
GLUCOSE UR STRIP.AUTO-MCNC: NEGATIVE MG/DL
HCT VFR BLD AUTO: 39.3 % (ref 35–48)
HGB BLD-MCNC: 13 G/DL (ref 12–16)
IMM GRANULOCYTES # BLD AUTO: 0.01 X10(3) UL (ref 0–1)
IMM GRANULOCYTES NFR BLD: 0.2 %
KETONES UR STRIP.AUTO-MCNC: NEGATIVE MG/DL
LEUKOCYTE ESTERASE UR QL STRIP.AUTO: NEGATIVE
LIPASE SERPL-CCNC: 170 U/L (ref 73–393)
LYMPHOCYTES # BLD AUTO: 2.35 X10(3) UL (ref 1–4)
LYMPHOCYTES NFR BLD AUTO: 49.9 %
M PROTEIN MFR SERPL ELPH: 6.4 G/DL (ref 6.4–8.2)
MCH RBC QN AUTO: 31.7 PG (ref 26–34)
MCHC RBC AUTO-ENTMCNC: 33.1 G/DL (ref 31–37)
MCV RBC AUTO: 95.9 FL (ref 80–100)
MONOCYTES # BLD AUTO: 0.33 X10(3) UL (ref 0.1–1)
MONOCYTES NFR BLD AUTO: 7 %
NEUTROPHILS # BLD AUTO: 1.83 X10 (3) UL (ref 1.5–7.7)
NEUTROPHILS # BLD AUTO: 1.83 X10(3) UL (ref 1.5–7.7)
NEUTROPHILS NFR BLD AUTO: 38.9 %
NITRITE UR QL STRIP.AUTO: NEGATIVE
OSMOLALITY SERPL CALC.SUM OF ELEC: 291 MOSM/KG (ref 275–295)
PH UR STRIP.AUTO: 7.5 [PH] (ref 4.5–8)
PLATELET # BLD AUTO: 191 10(3)UL (ref 150–450)
POTASSIUM SERPL-SCNC: 4.2 MMOL/L (ref 3.5–5.1)
PROT UR STRIP.AUTO-MCNC: NEGATIVE MG/DL
RBC # BLD AUTO: 4.1 X10(6)UL (ref 3.8–5.3)
RBC UR QL AUTO: NEGATIVE
SODIUM SERPL-SCNC: 142 MMOL/L (ref 136–145)
SP GR UR STRIP.AUTO: 1.01 (ref 1–1.03)
UROBILINOGEN UR STRIP.AUTO-MCNC: 0.2 MG/DL
WBC # BLD AUTO: 4.7 X10(3) UL (ref 4–11)

## 2019-09-15 PROCEDURE — 74177 CT ABD & PELVIS W/CONTRAST: CPT

## 2019-09-15 PROCEDURE — 85025 COMPLETE CBC W/AUTO DIFF WBC: CPT

## 2019-09-15 PROCEDURE — 96374 THER/PROPH/DIAG INJ IV PUSH: CPT

## 2019-09-15 PROCEDURE — 80053 COMPREHEN METABOLIC PANEL: CPT

## 2019-09-15 PROCEDURE — 83690 ASSAY OF LIPASE: CPT

## 2019-09-15 PROCEDURE — 96361 HYDRATE IV INFUSION ADD-ON: CPT

## 2019-09-15 PROCEDURE — 99284 EMERGENCY DEPT VISIT MOD MDM: CPT

## 2019-09-15 PROCEDURE — 81003 URINALYSIS AUTO W/O SCOPE: CPT

## 2019-09-15 PROCEDURE — 96375 TX/PRO/DX INJ NEW DRUG ADDON: CPT

## 2019-09-15 RX ORDER — SODIUM CHLORIDE 9 MG/ML
INJECTION, SOLUTION INTRAVENOUS CONTINUOUS
Status: DISCONTINUED | OUTPATIENT
Start: 2019-09-15 | End: 2019-09-15

## 2019-09-15 RX ORDER — HYDROMORPHONE HYDROCHLORIDE 1 MG/ML
0.5 INJECTION, SOLUTION INTRAMUSCULAR; INTRAVENOUS; SUBCUTANEOUS ONCE
Status: COMPLETED | OUTPATIENT
Start: 2019-09-15 | End: 2019-09-15

## 2019-09-15 RX ORDER — HYDROMORPHONE HYDROCHLORIDE 1 MG/ML
0.5 INJECTION, SOLUTION INTRAMUSCULAR; INTRAVENOUS; SUBCUTANEOUS ONCE
Status: DISCONTINUED | OUTPATIENT
Start: 2019-09-15 | End: 2019-09-15

## 2019-09-15 RX ORDER — KETOROLAC TROMETHAMINE 30 MG/ML
10 INJECTION, SOLUTION INTRAMUSCULAR; INTRAVENOUS ONCE
Status: COMPLETED | OUTPATIENT
Start: 2019-09-15 | End: 2019-09-15

## 2019-09-15 RX ORDER — ONDANSETRON 2 MG/ML
4 INJECTION INTRAMUSCULAR; INTRAVENOUS ONCE
Status: COMPLETED | OUTPATIENT
Start: 2019-09-15 | End: 2019-09-15

## 2019-09-15 NOTE — ED INITIAL ASSESSMENT (HPI)
Pt c/o RLQ that's progressively got worse since Friday. Movement and palpation makes the pain worse. Pt sts she doesn't feel good after she eats.

## 2019-09-15 NOTE — ED PROVIDER NOTES
Patient Seen in: [facility] [place]    History   Patient presents with:  Abdomen/Flank Pain (GI/)    Stated Complaint: RLQ pain that's progressively got worse since Friday.     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 Extremities: Warm, well perfused, without edema   Good peripheral color, cap refill . Skin: Unremarkable without lesions or rash.      Neurologic: Awake alert and oriented x 3 with clear speech           ED Course and evaluated during this visit. As a treating physician attending to the patient, I determined, within reasonable clinical confidence and prior to discharge, that an emergency medical condition was not or was no longer present.   There was no indication

## 2019-09-16 DIAGNOSIS — F41.1 GAD (GENERALIZED ANXIETY DISORDER): ICD-10-CM

## 2019-09-16 DIAGNOSIS — R10.9 CHRONIC ABDOMINAL PAIN: ICD-10-CM

## 2019-09-16 DIAGNOSIS — G89.29 CHRONIC ABDOMINAL PAIN: ICD-10-CM

## 2019-09-16 DIAGNOSIS — F51.01 PRIMARY INSOMNIA: ICD-10-CM

## 2019-09-16 RX ORDER — ZOLPIDEM TARTRATE 5 MG/1
TABLET ORAL
Qty: 30 TABLET | Refills: 0 | Status: SHIPPED
Start: 2019-09-16 | End: 2019-10-15

## 2019-09-16 RX ORDER — ALPRAZOLAM 0.25 MG/1
TABLET ORAL
Qty: 60 TABLET | Refills: 0 | Status: SHIPPED
Start: 2019-09-16 | End: 2019-10-15

## 2019-09-16 NOTE — TELEPHONE ENCOUNTER
Pt requesting refill of Alprazolam and Zolpidem     Last Time Medication was Filled:  8/19/19 qty30 days    Last Office Visit with PCP: 7/29/19 for acute visit. And 5/31/19 with  Harriet Camarena for medication follow up     No future appointments.

## 2019-09-17 RX ORDER — ALPRAZOLAM 0.25 MG/1
TABLET ORAL
Qty: 60 TABLET | Refills: 0 | OUTPATIENT
Start: 2019-09-17

## 2019-09-17 RX ORDER — ZOLPIDEM TARTRATE 5 MG/1
5 TABLET ORAL
Qty: 30 TABLET | Refills: 0 | OUTPATIENT
Start: 2019-09-17

## 2019-09-23 ENCOUNTER — PATIENT MESSAGE (OUTPATIENT)
Dept: FAMILY MEDICINE CLINIC | Facility: CLINIC | Age: 39
End: 2019-09-23

## 2019-09-23 DIAGNOSIS — R39.15 URGENCY OF URINATION: Primary | ICD-10-CM

## 2019-09-23 RX ORDER — PANTOPRAZOLE SODIUM 40 MG/1
TABLET, DELAYED RELEASE ORAL
Qty: 60 TABLET | Refills: 0 | OUTPATIENT
Start: 2019-09-23

## 2019-09-25 ENCOUNTER — LAB ENCOUNTER (OUTPATIENT)
Dept: LAB | Age: 39
End: 2019-09-25
Attending: FAMILY MEDICINE
Payer: COMMERCIAL

## 2019-09-25 DIAGNOSIS — R39.15 URGENCY OF URINATION: ICD-10-CM

## 2019-09-25 LAB
BILIRUB UR QL STRIP.AUTO: NEGATIVE
COLOR UR AUTO: YELLOW
GLUCOSE UR STRIP.AUTO-MCNC: NEGATIVE MG/DL
KETONES UR STRIP.AUTO-MCNC: NEGATIVE MG/DL
LEUKOCYTE ESTERASE UR QL STRIP.AUTO: NEGATIVE
NITRITE UR QL STRIP.AUTO: NEGATIVE
PH UR STRIP.AUTO: 8 [PH] (ref 4.5–8)
PROT UR STRIP.AUTO-MCNC: NEGATIVE MG/DL
RBC UR QL AUTO: NEGATIVE
SP GR UR STRIP.AUTO: 1.02 (ref 1–1.03)
UROBILINOGEN UR STRIP.AUTO-MCNC: <2 MG/DL

## 2019-09-25 NOTE — PROGRESS NOTES
Urinalysis is normal follow-up if symptoms are persistent drink adequate water avoid caffeine.   Sent to my chart

## 2019-09-26 DIAGNOSIS — F41.1 GAD (GENERALIZED ANXIETY DISORDER): ICD-10-CM

## 2019-09-27 ENCOUNTER — PATIENT MESSAGE (OUTPATIENT)
Dept: FAMILY MEDICINE CLINIC | Facility: CLINIC | Age: 39
End: 2019-09-27

## 2019-09-27 DIAGNOSIS — J38.3 VOCAL CORD DYSFUNCTION: ICD-10-CM

## 2019-09-27 DIAGNOSIS — F51.01 PRIMARY INSOMNIA: ICD-10-CM

## 2019-09-27 RX ORDER — PREGABALIN 75 MG/1
75 CAPSULE ORAL 2 TIMES DAILY
Qty: 60 CAPSULE | Refills: 5 | OUTPATIENT
Start: 2019-09-27

## 2019-09-30 DIAGNOSIS — F41.1 GAD (GENERALIZED ANXIETY DISORDER): ICD-10-CM

## 2019-09-30 RX ORDER — PANTOPRAZOLE SODIUM 40 MG/1
TABLET, DELAYED RELEASE ORAL
Qty: 60 TABLET | Refills: 2 | Status: SHIPPED | OUTPATIENT
Start: 2019-09-30 | End: 2020-01-02

## 2019-09-30 RX ORDER — AMITRIPTYLINE HYDROCHLORIDE 25 MG/1
25 TABLET, FILM COATED ORAL NIGHTLY
Qty: 30 TABLET | Refills: 0 | Status: SHIPPED | OUTPATIENT
Start: 2019-09-30 | End: 2019-10-28

## 2019-09-30 NOTE — PROGRESS NOTES
Pt reports she has had daily tension headaches since the beginning of August. Pt states pain begins in the occipital are then moves forward increasing in intensity throughout the day. Intermittent nausea. Per Dr. Harley Flores, pt to receive Toradol injection.

## 2019-09-30 NOTE — PROGRESS NOTES
[address]   Neurology; INITIAL CLINIC VISIT  2019, 1:29 PM     Alis Hernadez Patient Status:  No patient class for patient encounter    1980 MRN [id number]   Location [facility], [address] [place] [address] bypass 12/18/2017   • Hypothyroid    • Hypothyroidism    • Infertility, female    • Organic hypersomnia, unspecified DMG DX 11-2-12    AHI 2 RDI 2 REM AHI 6 SaO2 curtis 89%   • OTHER DISEASES     rectal bleeding   • Pancreatitis    • Pneumonia august 2014 2/18/2015    Performed by Sally Verma MD at [facility] MAIN OR   • OTHER  NECK SCAR REVISION   • OTHER      gastric bypass   • OTHER SURGICAL HISTORY      partial thyroidectomy with subsequent scar revision   • OTHER SURGICAL HISTORY      laparoscopies x HCl 25 MG Oral Tab Take 1 tablet (25 mg total) by mouth nightly.  SCHEDULE AN APPOINTMENT FOR FURTHER REFILLS Disp: 30 tablet Rfl: 0   LYRICA 75 MG Oral Cap TAKE ONE CAPSULE BY MOUTH TWICE DAILY Disp: 60 capsule Rfl: 0   Calcium Citrate 250 MG Oral Tab Take BREAKFAST Disp: 90 tablet Rfl: 3   Magnesium 100 MG Oral Tab Take 1 tablet by mouth daily. Disp:  Rfl:    NON FORMULARY Take 1 tablet by mouth daily. Disp:  Rfl:    aspirin 81 MG Oral Tab Take 81 mg by mouth daily.  Disp:  Rfl:    [START ON 10/5/2019] L conjunctiva, no abnormal secretion, normal structure of skull, no tenderness  Neck supple,  No carotid bruit,  thyroid normal  Lungs are clear to auscultation  Heart: normal SR, no murmur  Extremities:  No edema or cyanosis, radial and pedal pulse is ritesh diagnosis)    (F41.9,  F45.0) Anxiety with somatization    (G44.221) Chronic tension-type headache, intractable  Plan: PHYSICAL THERAPY EXTERNAL        Summery:  Clinically, This  patient presented with tension HA, last two months, she is on Fioricet daily

## 2019-09-30 NOTE — TELEPHONE ENCOUNTER
Pt requesting refill of AMITRIPTYLINE HCL 25 MG Oral Tab, passed protocol , refill approved, sent to pharmacy with message for patient to schedule follow up appointment     Last Time Medication was Filled:  4/15/2019 qty30+5 refills     Last Office Visit

## 2019-09-30 NOTE — TELEPHONE ENCOUNTER
Pt requesting refill of LYRICA 75 MG Oral Cap     Last Time Medication was Filled:  4/15/19 qty 60 +5 refills     Last Office Visit with PCP: 7/29/19    No future appointments.

## 2019-09-30 NOTE — TELEPHONE ENCOUNTER
From: Hanna Filler  To: Enzo Capellan PA-C  Sent: 9/27/2019 3:26 PM CDT  Subject: Prescription Question    Taina Mccollum,  I just wanted to check and see why the Lyrica refill was denied.      Thanks,  Chelsie Simmons

## 2019-10-15 DIAGNOSIS — F51.01 PRIMARY INSOMNIA: ICD-10-CM

## 2019-10-15 DIAGNOSIS — F41.1 GAD (GENERALIZED ANXIETY DISORDER): ICD-10-CM

## 2019-10-15 DIAGNOSIS — G89.29 CHRONIC ABDOMINAL PAIN: ICD-10-CM

## 2019-10-15 DIAGNOSIS — R10.9 CHRONIC ABDOMINAL PAIN: ICD-10-CM

## 2019-10-15 RX ORDER — ALPRAZOLAM 0.25 MG/1
TABLET ORAL
Qty: 60 TABLET | Refills: 0 | Status: SHIPPED | OUTPATIENT
Start: 2019-10-15 | End: 2019-11-12

## 2019-10-15 RX ORDER — ZOLPIDEM TARTRATE 5 MG/1
TABLET ORAL
Qty: 30 TABLET | Refills: 0 | Status: SHIPPED | OUTPATIENT
Start: 2019-10-15 | End: 2019-11-12

## 2019-10-15 NOTE — TELEPHONE ENCOUNTER
Pt requesting refill of ZOLPIDEM TARTRATE 5 MG Oral Tab and ALPRAZOLAM 0.25MG TAB     Last Time Medication was Filled: 9/16/19    Last Office Visit with PCP: 9/16/19    Has future appointment 10/19/19

## 2019-10-19 ENCOUNTER — OFFICE VISIT (OUTPATIENT)
Dept: FAMILY MEDICINE CLINIC | Facility: CLINIC | Age: 39
End: 2019-10-19
Payer: COMMERCIAL

## 2019-10-19 VITALS
HEIGHT: 68 IN | SYSTOLIC BLOOD PRESSURE: 118 MMHG | HEART RATE: 108 BPM | BODY MASS INDEX: 26.67 KG/M2 | DIASTOLIC BLOOD PRESSURE: 68 MMHG | TEMPERATURE: 99 F | WEIGHT: 176 LBS

## 2019-10-19 DIAGNOSIS — F41.9 ANXIETY WITH SOMATIZATION: ICD-10-CM

## 2019-10-19 DIAGNOSIS — F45.0 ANXIETY WITH SOMATIZATION: ICD-10-CM

## 2019-10-19 DIAGNOSIS — J38.3 VOCAL CORD DYSFUNCTION: Primary | ICD-10-CM

## 2019-10-19 DIAGNOSIS — J20.8 ACUTE VIRAL BRONCHITIS: ICD-10-CM

## 2019-10-19 DIAGNOSIS — G44.221 CHRONIC TENSION-TYPE HEADACHE, INTRACTABLE: ICD-10-CM

## 2019-10-19 PROBLEM — K08.89 PAIN IN TOOTH: Status: RESOLVED | Noted: 2019-05-31 | Resolved: 2019-10-19

## 2019-10-19 PROBLEM — F11.10 OPIOID ABUSE (HCC): Status: RESOLVED | Noted: 2019-03-06 | Resolved: 2019-10-19

## 2019-10-19 PROCEDURE — 99215 OFFICE O/P EST HI 40 MIN: CPT | Performed by: FAMILY MEDICINE

## 2019-10-19 RX ORDER — BUTALBITAL/ACETAMINOPHEN 50MG-325MG
TABLET ORAL
Qty: 20 TABLET | Refills: 0 | Status: SHIPPED | OUTPATIENT
Start: 2019-10-19 | End: 2019-11-21

## 2019-10-19 RX ORDER — ALBUTEROL SULFATE 90 UG/1
2 AEROSOL, METERED RESPIRATORY (INHALATION) EVERY 4 HOURS PRN
Qty: 1 INHALER | Refills: 0 | Status: SHIPPED | OUTPATIENT
Start: 2019-10-19 | End: 2019-11-01

## 2019-10-19 RX ORDER — METHYLPREDNISOLONE 4 MG/1
TABLET ORAL
Qty: 1 KIT | Refills: 0 | Status: SHIPPED | OUTPATIENT
Start: 2019-10-19 | End: 2019-10-28 | Stop reason: ALTCHOICE

## 2019-10-19 NOTE — PROGRESS NOTES
HPI:   Raheem Esteves is a 44year old female who presents for upper respiratory symptoms for  1  weeks. Patient reports dry cough, prior history of bronchitis, denies fever, denies sinus pain.   History of asthma and vocal cord dysfunction did see pulm fatigue. Tried muscle relaxant made her too tired. Did get a shot of Toradol which did not give her any side effects feels safer now with Toradol. Was advised to start physical therapy which she did not do.   Patient states that she seems to be too busy SCHEDULE AN APPOINTMENT FOR FURTHER REFILLS, Disp: 30 tablet, Rfl: 0  LYRICA 75 MG Oral Cap, TAKE ONE CAPSULE BY MOUTH TWICE DAILY, Disp: 60 capsule, Rfl: 0  Calcium Citrate 250 MG Oral Tab, Take 1 tablet by mouth 2 (two) times daily. , Disp: , Rfl:   Leigh mouth daily. , Disp: , Rfl:   aspirin 81 MG Oral Tab, Take 81 mg by mouth daily. , Disp: , Rfl:   Fluticasone Propionate HFA (FLOVENT HFA) 110 MCG/ACT Inhalation Aerosol, Inhale 2 puffs into the lungs 2 (two) times daily. , Disp: 1 Inhaler, Rfl: 5       Pas FACET INJECTION UNDER FLUOROSCOPY Bilateral 12/14/2015    Performed by Endy Sidhu DO at [address] [employer] [facility] MARCIO-EN-Y  12/18/2017    DR. SEGAL   • GASTROC RECESSION FOOT Right 6/6/2017    Performed by Sheree Irvin, fatigue, no fevers, no chills, no body aches, no lethargy  SKIN: no rashes  EYES: denies change in vision  HENT: No significant nasal congestion, post nasal drip, runny nose,  ST  LUNGS: Complains of chest tightness and reactive cough has not yet had the b Butalbital-Acetaminophen  MG Oral Tab 20 tablet 0     Sig: Take one daily up to 3 times per week   • Albuterol Sulfate HFA (PROAIR HFA) 108 (90 Base) MCG/ACT Inhalation Aero Soln 1 Inhaler 0     Sig: Inhale 2 puffs into the lungs every 4 (four) hours Butalbital-Acetaminophen  MG Oral Tab; Take one daily up to 3 times per week  Dispense: 20 tablet; Refill: 0  Time spent was 45 minutes more than 50% was spent on counseling regarding medical conditions and treatment.    The patient verbalizes underst

## 2019-10-24 ENCOUNTER — OFFICE VISIT (OUTPATIENT)
Dept: FAMILY MEDICINE CLINIC | Facility: CLINIC | Age: 39
End: 2019-10-24
Payer: COMMERCIAL

## 2019-10-24 VITALS
BODY MASS INDEX: 27.43 KG/M2 | SYSTOLIC BLOOD PRESSURE: 100 MMHG | TEMPERATURE: 99 F | DIASTOLIC BLOOD PRESSURE: 66 MMHG | HEART RATE: 96 BPM | HEIGHT: 68 IN | WEIGHT: 181 LBS

## 2019-10-24 DIAGNOSIS — J18.9 PNEUMONIA OF RIGHT LOWER LOBE DUE TO INFECTIOUS ORGANISM: Primary | ICD-10-CM

## 2019-10-24 PROCEDURE — 99213 OFFICE O/P EST LOW 20 MIN: CPT | Performed by: FAMILY MEDICINE

## 2019-10-24 RX ORDER — AMOXICILLIN AND CLAVULANATE POTASSIUM 875; 125 MG/1; MG/1
1 TABLET, FILM COATED ORAL 2 TIMES DAILY
Qty: 20 TABLET | Refills: 0 | Status: SHIPPED | OUTPATIENT
Start: 2019-10-24 | End: 2019-10-24

## 2019-10-24 RX ORDER — ACETAMINOPHEN AND CODEINE PHOSPHATE 300; 30 MG/1; MG/1
TABLET ORAL
Qty: 15 TABLET | Refills: 0 | Status: SHIPPED | OUTPATIENT
Start: 2019-10-24 | End: 2019-11-05 | Stop reason: ALTCHOICE

## 2019-10-24 RX ORDER — AMOXICILLIN AND CLAVULANATE POTASSIUM 875; 125 MG/1; MG/1
1 TABLET, FILM COATED ORAL EVERY 12 HOURS
Qty: 20 TABLET | Refills: 0 | Status: SHIPPED | OUTPATIENT
Start: 2019-10-24 | End: 2019-11-03

## 2019-10-24 NOTE — PROGRESS NOTES
HPI:   Lucien James is a 44year old female who presents for upper respiratory symptoms for  1  weeks. Patient reports cough with green colored sputum, cough is keeping pt up at night.   Mucinex plain on Saturday then Sunday was draining and coughing 1 tablet by mouth 2 (two) times daily. , Disp: , Rfl:   Budesonide-Formoterol Fumarate 160-4.5 MCG/ACT Inhalation Aerosol, Inhale 2 puffs into the lungs 2 (two) times daily. , Disp: , Rfl:   topiramate 25 MG Oral Tab, Increase to 50 mg qpm for two weeks, the Arthritis    • Asthma    • Calculus of kidney    • Cancer [facility])     thyroid   • Diverticulosis of large intestine    • Endometriosis    • Esophageal reflux    • Extrinsic asthma, unspecified     2014 was in ER and was hospitalized over night   • Hx of fernando Performed by Janneth White MD at [place] [address]   • HYSTEROSCOPY  2011   • LAPAROSCOPIC CHOLECYSTECTOMY N/A 11/23/2016    Performed by Iza Harrington MD at [facility] MAIN OR   • [facility]     • LAPAROSCOPY-GYNE N/A 2/18/2015    Performed by Porsha Almendarez Left arm, Patient Position: Sitting, Cuff Size: adult)   Pulse 96   Temp 98.6 °F (37 °C) (Oral)   Ht 68\"   Wt 181 lb (82.1 kg)   BMI 27.52 kg/m²   GENERAL: NAD, pleasant, not ill appearing, not lethargic  SKIN: no visible rashes  EYES: PERRLA, EOMI, conju patient verbalizes understanding of the treatment and agrees with the plan. The patient is asked to return if symptoms persist or worsen. There are no Patient Instructions on file for this visit.

## 2019-10-25 ENCOUNTER — TELEPHONE (OUTPATIENT)
Dept: FAMILY MEDICINE CLINIC | Facility: CLINIC | Age: 39
End: 2019-10-25

## 2019-10-25 DIAGNOSIS — J18.9 PNEUMONIA OF RIGHT LOWER LOBE DUE TO INFECTIOUS ORGANISM: Primary | ICD-10-CM

## 2019-10-25 NOTE — TELEPHONE ENCOUNTER
Phoned patient. She states she feels more like \"someone is sitting on my chest\". Cough is better but feels more SOB and wondering if there is more that can be done or if she is just slow to show improvement.

## 2019-10-25 NOTE — TELEPHONE ENCOUNTER
OK noted continue with deep breaths every few minutes to help with the diaphragm movement.   Chest XR order in to be done is worse over the weekend

## 2019-10-26 ENCOUNTER — HOSPITAL ENCOUNTER (EMERGENCY)
Age: 39
Discharge: HOME | End: 2019-10-26
Attending: EMERGENCY MEDICINE
Payer: COMMERCIAL

## 2019-10-26 ENCOUNTER — HOSPITAL ENCOUNTER (OUTPATIENT)
Dept: GENERAL RADIOLOGY | Age: 39
Discharge: HOME | End: 2019-10-26
Attending: FAMILY MEDICINE
Payer: COMMERCIAL

## 2019-10-26 VITALS
HEART RATE: 96 BPM | DIASTOLIC BLOOD PRESSURE: 83 MMHG | SYSTOLIC BLOOD PRESSURE: 133 MMHG | BODY MASS INDEX: 25.92 KG/M2 | OXYGEN SATURATION: 98 % | RESPIRATION RATE: 16 BRPM | WEIGHT: 175 LBS | HEIGHT: 69 IN | TEMPERATURE: 98 F

## 2019-10-26 DIAGNOSIS — F51.01 PRIMARY INSOMNIA: ICD-10-CM

## 2019-10-26 DIAGNOSIS — J06.9 UPPER RESPIRATORY TRACT INFECTION, UNSPECIFIED TYPE: Primary | ICD-10-CM

## 2019-10-26 DIAGNOSIS — J38.3 VOCAL CORD DYSFUNCTION: ICD-10-CM

## 2019-10-26 DIAGNOSIS — F41.1 GAD (GENERALIZED ANXIETY DISORDER): ICD-10-CM

## 2019-10-26 DIAGNOSIS — J18.9 PNEUMONIA OF RIGHT LOWER LOBE DUE TO INFECTIOUS ORGANISM: ICD-10-CM

## 2019-10-26 PROCEDURE — 71046 X-RAY EXAM CHEST 2 VIEWS: CPT | Performed by: FAMILY MEDICINE

## 2019-10-26 PROCEDURE — 94640 AIRWAY INHALATION TREATMENT: CPT

## 2019-10-26 PROCEDURE — 99283 EMERGENCY DEPT VISIT LOW MDM: CPT

## 2019-10-26 PROCEDURE — 99284 EMERGENCY DEPT VISIT MOD MDM: CPT

## 2019-10-26 RX ORDER — IPRATROPIUM BROMIDE AND ALBUTEROL SULFATE 2.5; .5 MG/3ML; MG/3ML
3 SOLUTION RESPIRATORY (INHALATION) ONCE
Status: COMPLETED | OUTPATIENT
Start: 2019-10-26 | End: 2019-10-26

## 2019-10-26 NOTE — ED INITIAL ASSESSMENT (HPI)
Pt to ed for eval of cough states recent dx of pneumonia Thursday no relief of symptoms pt w/prod cough

## 2019-10-26 NOTE — TELEPHONE ENCOUNTER
Incoming call from patient, patient currently at [place] location for chest x-ray, however chest x-ray order was not in the system. Chest x-ray ordered now. Patient feeling worse today, chest feels heavier. Patient taking medications as instructed.

## 2019-10-26 NOTE — ED PROVIDER NOTES
Patient Seen in: THE [facility] In [place]      History   Patient presents with:  Dyspnea ELISE SOB (respiratory)    Stated Complaint: dyspnea, has pneumonia dx on Thursday    HPI    Patient is a pleasant 12-year-old female, presenting for by Zuly Velazquez MD at [address] [place] [address]   • D & C      x4   • DILATION/CURETTAGE,DIAGNOSTIC     • ESOPHAGOGASTRODUODENOSCOPY (EGD) N/A 10/4/2018    Performed by Nguyen Hermosillo MD at [facility] ENDOSCOPY   • ESOPHAGOGASTRODUODENOSCOPY (EGD) N/A 1/ Used    Alcohol use: No      Alcohol/week: 0.0 standard drinks      Frequency: Never      Binge frequency: Never    Drug use:  No             Review of Systems    Positive for stated complaint: dyspnea, has pneumonia dx on Thursday  Other systems are as not with shoulder pain this morning. History of asthma. FINDINGS:  LUNGS:  No focal consolidation. Normal vascularity. CARDIAC:  Normal size cardiac silhouette. MEDIASTINUM:  Normal. PLEURA:  Normal.  No pleural effusions. BONES:  Normal for age.       CON

## 2019-10-26 NOTE — TELEPHONE ENCOUNTER
Incoming call, patient called back. Completed chest x-ray which is negative. Discussed with patient chest x-ray is nondiagnostic but does not rule out pneumonia.   Patient states since I last spoke with her earlier, symptoms are worsening, feels pressure

## 2019-10-28 ENCOUNTER — LAB ENCOUNTER (OUTPATIENT)
Dept: LAB | Age: 39
End: 2019-10-28
Attending: FAMILY MEDICINE
Payer: COMMERCIAL

## 2019-10-28 ENCOUNTER — OFFICE VISIT (OUTPATIENT)
Dept: FAMILY MEDICINE CLINIC | Facility: CLINIC | Age: 39
End: 2019-10-28
Payer: COMMERCIAL

## 2019-10-28 VITALS
HEIGHT: 69 IN | BODY MASS INDEX: 26.81 KG/M2 | DIASTOLIC BLOOD PRESSURE: 62 MMHG | SYSTOLIC BLOOD PRESSURE: 96 MMHG | WEIGHT: 181 LBS | HEART RATE: 120 BPM | TEMPERATURE: 98 F

## 2019-10-28 DIAGNOSIS — F51.01 PRIMARY INSOMNIA: ICD-10-CM

## 2019-10-28 DIAGNOSIS — J18.9 PNEUMONIA OF RIGHT LOWER LOBE DUE TO INFECTIOUS ORGANISM: Primary | ICD-10-CM

## 2019-10-28 DIAGNOSIS — J45.41 MODERATE PERSISTENT ASTHMA WITH ACUTE EXACERBATION: ICD-10-CM

## 2019-10-28 DIAGNOSIS — J38.3 VOCAL CORD DYSFUNCTION: ICD-10-CM

## 2019-10-28 DIAGNOSIS — R06.02 SHORTNESS OF BREATH: ICD-10-CM

## 2019-10-28 DIAGNOSIS — F41.1 GAD (GENERALIZED ANXIETY DISORDER): ICD-10-CM

## 2019-10-28 PROCEDURE — 85379 FIBRIN DEGRADATION QUANT: CPT | Performed by: FAMILY MEDICINE

## 2019-10-28 PROCEDURE — 36415 COLL VENOUS BLD VENIPUNCTURE: CPT | Performed by: FAMILY MEDICINE

## 2019-10-28 PROCEDURE — 99214 OFFICE O/P EST MOD 30 MIN: CPT | Performed by: FAMILY MEDICINE

## 2019-10-28 PROCEDURE — 85025 COMPLETE CBC W/AUTO DIFF WBC: CPT | Performed by: FAMILY MEDICINE

## 2019-10-28 RX ORDER — MIRTAZAPINE 7.5 MG/1
TABLET, FILM COATED ORAL
Qty: 90 TABLET | Refills: 0 | OUTPATIENT
Start: 2019-10-28

## 2019-10-28 RX ORDER — PREDNISONE 20 MG/1
TABLET ORAL
Qty: 18 TABLET | Refills: 0 | Status: SHIPPED | OUTPATIENT
Start: 2019-10-28 | End: 2019-11-12

## 2019-10-28 RX ORDER — AMITRIPTYLINE HYDROCHLORIDE 25 MG/1
25 TABLET, FILM COATED ORAL NIGHTLY
Qty: 30 TABLET | Refills: 5 | Status: SHIPPED | OUTPATIENT
Start: 2019-10-28 | End: 2020-04-13

## 2019-10-28 RX ORDER — PREGABALIN 75 MG/1
75 CAPSULE ORAL 2 TIMES DAILY
Qty: 60 CAPSULE | Refills: 5 | Status: SHIPPED | OUTPATIENT
Start: 2019-10-28 | End: 2019-11-27

## 2019-10-28 RX ORDER — MIRTAZAPINE 7.5 MG/1
TABLET, FILM COATED ORAL
Qty: 30 TABLET | Refills: 5 | Status: SHIPPED | OUTPATIENT
Start: 2019-10-28 | End: 2020-04-13

## 2019-10-28 RX ORDER — AMITRIPTYLINE HYDROCHLORIDE 25 MG/1
TABLET, FILM COATED ORAL
Qty: 30 TABLET | Refills: 0 | OUTPATIENT
Start: 2019-10-28

## 2019-10-28 NOTE — PROGRESS NOTES
Deadra Barthel is a 44year old female.   HPI:   Pneumonia of right lower lobe due to infectious organism [facility])  Shortness of breath  Asthma exacerbation  Patient is in for follow-up on her cough at the last office visit last week had been placed on Aug ago she has not proceeded on due to her schedule for work.   Has not gotten the typical cough that she gets with the vocal cord dysfunction which is in the throat normally      predniSONE 20 MG Oral Tab, 60mg for 3 days then 40mg for 3 days then 20mg for 3 mg qpm for two weeks, then 100 mg q pm, (Patient taking differently: Take 25 mg by mouth nightly.  Increase to 50 mg qpm for two weeks, then 75 mg qpm for two weeks, then 100 mg q pm, ), Disp: 30 tablet, Rfl: 2  Cyclobenzaprine HCl 10 MG Oral Tab, Take 1 ta hypersomnia, unspecified DMG DX 11-2-12    AHI 2 RDI 2 REM AHI 6 SaO2 curtis 89%   • OTHER DISEASES     rectal bleeding   • Pancreatitis    • Pneumonia august 2014   • Pneumonia, organism unspecified(486)    • PONV (postoperative nausea and vomiting)    • R due to infectious organism (hcc)  (primary encounter diagnosis)  Shortness of breath  Dmitriy (generalized anxiety disorder)  Primary insomnia  Vocal cord dysfunction  Moderate persistent asthma with acute exacerbation    Orders Placed This Encounter      mmm Amitriptyline HCl 25 MG Oral Tab; Take 1 tablet (25 mg total) by mouth nightly. SCHEDULE AN APPOINTMENT FOR FURTHER REFILLS  Dispense: 30 tablet; Refill: 5    5. Vocal cord dysfunction  - Amitriptyline HCl 25 MG Oral Tab;  Take 1 tablet (25 mg total) by art

## 2019-10-28 NOTE — TELEPHONE ENCOUNTER
Pt requesting refill of LYRICA 75 MG Oral Cap, AMITRIPTYLINE HCL 25 MG Oral Tab, MIRTAZAPINE 7.5 MG Oral Tab    Refills have already been sent to 10/28/19

## 2019-11-01 DIAGNOSIS — J20.8 ACUTE VIRAL BRONCHITIS: ICD-10-CM

## 2019-11-04 ENCOUNTER — PATIENT MESSAGE (OUTPATIENT)
Dept: FAMILY MEDICINE CLINIC | Facility: CLINIC | Age: 39
End: 2019-11-04

## 2019-11-05 ENCOUNTER — OFFICE VISIT (OUTPATIENT)
Dept: FAMILY MEDICINE CLINIC | Facility: CLINIC | Age: 39
End: 2019-11-05
Payer: COMMERCIAL

## 2019-11-05 VITALS
DIASTOLIC BLOOD PRESSURE: 68 MMHG | WEIGHT: 185 LBS | OXYGEN SATURATION: 99 % | SYSTOLIC BLOOD PRESSURE: 124 MMHG | BODY MASS INDEX: 28.04 KG/M2 | HEART RATE: 120 BPM | HEIGHT: 68 IN | TEMPERATURE: 98 F

## 2019-11-05 DIAGNOSIS — J38.3 VOCAL CORD DYSFUNCTION: ICD-10-CM

## 2019-11-05 DIAGNOSIS — F41.1 GAD (GENERALIZED ANXIETY DISORDER): ICD-10-CM

## 2019-11-05 DIAGNOSIS — R05.8 SPASMODIC COUGH: Primary | ICD-10-CM

## 2019-11-05 DIAGNOSIS — J45.41 MODERATE PERSISTENT ASTHMA WITH ACUTE EXACERBATION: ICD-10-CM

## 2019-11-05 DIAGNOSIS — R06.02 SHORTNESS OF BREATH: ICD-10-CM

## 2019-11-05 PROCEDURE — 87581 M.PNEUMON DNA AMP PROBE: CPT | Performed by: FAMILY MEDICINE

## 2019-11-05 PROCEDURE — 87633 RESP VIRUS 12-25 TARGETS: CPT | Performed by: FAMILY MEDICINE

## 2019-11-05 PROCEDURE — 87798 DETECT AGENT NOS DNA AMP: CPT | Performed by: FAMILY MEDICINE

## 2019-11-05 PROCEDURE — 99214 OFFICE O/P EST MOD 30 MIN: CPT | Performed by: FAMILY MEDICINE

## 2019-11-05 PROCEDURE — 87486 CHLMYD PNEUM DNA AMP PROBE: CPT | Performed by: FAMILY MEDICINE

## 2019-11-05 RX ORDER — PREDNISONE 20 MG/1
TABLET ORAL
Qty: 27 TABLET | Refills: 0 | Status: SHIPPED | OUTPATIENT
Start: 2019-11-05 | End: 2019-12-12 | Stop reason: ALTCHOICE

## 2019-11-05 RX ORDER — GUAIFENESIN AND CODEINE PHOSPHATE 100; 10 MG/5ML; MG/5ML
SOLUTION ORAL
Qty: 180 ML | Refills: 0 | Status: SHIPPED | OUTPATIENT
Start: 2019-11-05 | End: 2019-11-12

## 2019-11-05 RX ORDER — CLARITHROMYCIN 500 MG/1
500 TABLET, COATED ORAL 2 TIMES DAILY
Qty: 14 TABLET | Refills: 0 | Status: SHIPPED | OUTPATIENT
Start: 2019-11-05 | End: 2019-11-12

## 2019-11-05 NOTE — PROGRESS NOTES
HPI:   Gabi Haddad is a 44year old female who presents for upper respiratory symptoms for  1  months. Patient reports dry cough, cough is keeping pt up at night, prior history of bronchitis, denies fever, denies sinus pain.   Patient is using nebuli mouth 2 (two) times daily.      • Butalbital-Acetaminophen  MG Oral Tab Take one daily up to 3 times per week 20 tablet 0   • ZOLPIDEM TARTRATE 5 MG Oral Tab TAKE 1 TABLET BY MOUTH EVERY EVENING (Patient taking differently: Take 2.5-5 mg by mouth nigh • Anxiety state, unspecified    • Arthritis    • Asthma    • Calculus of kidney    • Cancer [facility])     thyroid   • Diverticulosis of large intestine    • Endometriosis    • Esophageal reflux    • Extrinsic asthma, unspecified     2014 was in ER and was h GASTROC RECESSION FOOT Left 6/19/2015    Performed by Basilia Oneal MD at [place] [address]   • HYSTEROSCOPY  2011   • LAPAROSCOPIC CHOLECYSTECTOMY N/A 11/23/2016    Performed by Kendall Mancuso MD at [facility] MAIN OR   • [facility]     • [facility] headache/weakness    EXAM:   /68 (BP Location: Right arm, Patient Position: Sitting, Cuff Size: adult)   Pulse 120   Temp 98.3 °F (36.8 °C) (Oral)   Ht 68\"   Wt 185 lb (83.9 kg)   SpO2 99%   BMI 28.13 kg/m²   GENERAL: NAD, pleasant, not ill appearin reoccurring issue that she has yearly. Due to increased recent green sputum and symptoms worsening again will start clarithromycin and prednisone at 60 mg dosing with taper as instructed. - RESPIRATORY PANEL FLU EXPANDED;  Future  - clarithromycin 500 MG

## 2019-11-05 NOTE — TELEPHONE ENCOUNTER
From: Laura Garcia  To: Marilin Rivero PA-C  Sent: 11/4/2019 6:28 PM CST  Subject: Non-Urgent Medical Question    BODCIARA,   I was feeling pretty good, but started to go downhill again yesterday afternoon day 2 of 20mg steroid pills.  I'm doing tr

## 2019-11-06 NOTE — PROGRESS NOTES
Negative respiratory panel patient was notified at 7:50 AM on 11/6/2019. She can return to work no pertussis present. Update this morning cough is improved started the prednisone, Carafate and Biaxin.   Has stopped the DuoNeb will use at night if she need

## 2019-11-08 DIAGNOSIS — G43.009 MIGRAINE WITHOUT AURA AND WITHOUT STATUS MIGRAINOSUS, NOT INTRACTABLE: Primary | ICD-10-CM

## 2019-11-08 NOTE — TELEPHONE ENCOUNTER
Called the patient and left a voicemail asking how the patient was taking the prescription. Left office hours and phone number.        Medication: TOPIRAMATE 25 MG Oral Tab    Date of last refill: 09/30/19 (#30/2)  [place]

## 2019-11-11 ENCOUNTER — TELEPHONE (OUTPATIENT)
Dept: FAMILY MEDICINE CLINIC | Facility: CLINIC | Age: 39
End: 2019-11-11

## 2019-11-11 ENCOUNTER — PATIENT MESSAGE (OUTPATIENT)
Dept: FAMILY MEDICINE CLINIC | Facility: CLINIC | Age: 39
End: 2019-11-11

## 2019-11-11 DIAGNOSIS — F41.1 GAD (GENERALIZED ANXIETY DISORDER): ICD-10-CM

## 2019-11-11 DIAGNOSIS — G89.29 CHRONIC ABDOMINAL PAIN: ICD-10-CM

## 2019-11-11 DIAGNOSIS — R10.9 CHRONIC ABDOMINAL PAIN: ICD-10-CM

## 2019-11-11 DIAGNOSIS — F51.01 PRIMARY INSOMNIA: ICD-10-CM

## 2019-11-11 NOTE — TELEPHONE ENCOUNTER
Ray Pereyra PA-C on-call 11/10/2019 patient called has tachycardia going up to 120 when she is active. States that she was on 60 mg of prednisone today reduced to 40 mg.   Was having mild increase in heart rate up to 100 on the 60 but now at the 40 fe

## 2019-11-12 RX ORDER — ALPRAZOLAM 0.25 MG/1
TABLET ORAL
Qty: 60 TABLET | Refills: 0 | OUTPATIENT
Start: 2019-11-12

## 2019-11-12 RX ORDER — ZOLPIDEM TARTRATE 5 MG/1
TABLET ORAL
Qty: 30 TABLET | Refills: 0 | OUTPATIENT
Start: 2019-11-12

## 2019-11-12 NOTE — TELEPHONE ENCOUNTER
Patient requesting refill for zolpidem and alprazolam.  Last refill for both was 10/15/19.  30 for Zolpidem 0 refill, 60 for alprazolam 60 tabs 0 refills. She has appointment today at 2:30.

## 2019-11-12 NOTE — TELEPHONE ENCOUNTER
From: Nia Yuan  To: Candy Narayan PA-C  Sent: 11/11/2019 7:44 PM CST  Subject: Non-Urgent Medical Question    Hi BODØ,   My body has realized that I lowered to 50 mg steroids.  The cough has come back, I also have moderate pain on the righ

## 2019-11-12 NOTE — PROGRESS NOTES
Moises Tong is a 44year old female. HPI:   Patient is in for follow-up on bronchitis and vocal cord dysfunction. Persistent cough and shortness of breath.   Patient was prescribed Augmentin on 10/24/2019, Medrol Dosepak on 10/19/2019 then a taperi x-ray done 10/26/2019   11/5/19 Patient had a negative respiratory panel fluid expanded test including negative pertussis    Needs refill Xanax and Ambien was able to lower down to 2.5 mg until she started the prednisone had to go back up to 5 mg to get sl Inhalation Aero Soln INHALE 2 PUFFS INTO THE LUNGS EVERY 4 HOURS AS NEEDED FOR WHEEZING OR SHORTNESS OF BREATH 8.5 g 0   • UNITHROID 150 MCG Oral Tab TAKE 1 TABLET BY MOUTH EVERY DAY BEFORE BREAKFAST 30 tablet 0   • pregabalin (LYRICA) 75 MG Oral Cap Take mL by nebulization every 4 (four) hours as needed.  ) 1 Container 0   • Fluticasone Propionate HFA (FLOVENT HFA) 110 MCG/ACT Inhalation Aerosol Inhale 2 puffs into the lungs 2 (two) times daily.  1 Inhaler 5      Past Medical History:   Diagnosis Date   • A developed, well nourished,in no apparent distress  SKIN: no rashes,no suspicious lesions  HEENT: TM clear bilaterally, nose no congestion, throat clear no erythema without mass.    EYES: PERRLA, EOM intact, sclera clear without injection  NECK: supple,no ad discussed. Including not to drive, operate machinery or drink alcohol when taking this medication.    - ALPRAZolam 0.25 MG Oral Tab; TAKE 1 TABLET BY MOUTH TWICE DAILY AS NEEDED FOR SLEEP  Dispense: 60 tablet;  Refill: 2  - Zolpidem Tartrate 5 MG Oral Tab;

## 2019-11-16 ENCOUNTER — PATIENT MESSAGE (OUTPATIENT)
Dept: FAMILY MEDICINE CLINIC | Facility: CLINIC | Age: 39
End: 2019-11-16

## 2019-11-16 DIAGNOSIS — J20.8 ACUTE VIRAL BRONCHITIS: ICD-10-CM

## 2019-11-18 NOTE — TELEPHONE ENCOUNTER
Pt requesting refill of PROAIR  (90 Base) MCG/ACT Inhalation Aero Soln, passed protocol , refill approved, sent to pharmacy.

## 2019-11-21 ENCOUNTER — PATIENT MESSAGE (OUTPATIENT)
Dept: FAMILY MEDICINE CLINIC | Facility: CLINIC | Age: 39
End: 2019-11-21

## 2019-11-21 DIAGNOSIS — G44.221 CHRONIC TENSION-TYPE HEADACHE, INTRACTABLE: ICD-10-CM

## 2019-11-21 RX ORDER — BUTALBITAL/ACETAMINOPHEN 50MG-325MG
TABLET ORAL
Qty: 20 TABLET | Refills: 0 | OUTPATIENT
Start: 2019-11-21

## 2019-11-21 RX ORDER — BUTALBITAL/ACETAMINOPHEN 50MG-325MG
TABLET ORAL
Qty: 20 TABLET | Refills: 0 | Status: SHIPPED | OUTPATIENT
Start: 2019-11-21 | End: 2019-12-22

## 2019-11-21 NOTE — TELEPHONE ENCOUNTER
From: Shante Dyson  To: Rene Jay PA-C  Sent: 11/21/2019 9:43 AM CST  Subject: Non-Urgent Medical Question    Maguire Sung,   I was checked Tuesday and have thrush. I am on Nystatin 4 x day for 7 days. It is improving since I began the medicine.

## 2019-11-21 NOTE — TELEPHONE ENCOUNTER
Patient requesting refill of butalbital-acet. Last fill was 10/19/19.  20 tabs. Last OV was 11/12/19. Pended for you.

## 2019-11-29 ENCOUNTER — OFFICE VISIT (OUTPATIENT)
Dept: FAMILY MEDICINE CLINIC | Facility: CLINIC | Age: 39
End: 2019-11-29
Payer: COMMERCIAL

## 2019-11-29 VITALS
DIASTOLIC BLOOD PRESSURE: 64 MMHG | HEART RATE: 90 BPM | RESPIRATION RATE: 17 BRPM | SYSTOLIC BLOOD PRESSURE: 102 MMHG | TEMPERATURE: 98 F | OXYGEN SATURATION: 99 % | HEIGHT: 68 IN | BODY MASS INDEX: 28.85 KG/M2 | WEIGHT: 190.38 LBS

## 2019-11-29 DIAGNOSIS — J45.40 MODERATE PERSISTENT ASTHMA WITHOUT COMPLICATION: ICD-10-CM

## 2019-11-29 DIAGNOSIS — Z09 FOLLOW UP: ICD-10-CM

## 2019-11-29 DIAGNOSIS — J38.3 VOCAL CORD DYSFUNCTION: Primary | ICD-10-CM

## 2019-11-29 PROCEDURE — 99213 OFFICE O/P EST LOW 20 MIN: CPT | Performed by: FAMILY MEDICINE

## 2019-11-29 RX ORDER — ACETAMINOPHEN AND CODEINE PHOSPHATE 300; 30 MG/1; MG/1
1 TABLET ORAL
Qty: 10 TABLET | Refills: 0 | Status: SHIPPED | OUTPATIENT
Start: 2019-11-29 | End: 2019-12-12 | Stop reason: ALTCHOICE

## 2019-11-29 NOTE — PROGRESS NOTES
Nia Yuan is a 44year old female.   HPI:   Patient is in for follow-up on vocal cord dysfunction, bronchitis with asthma exacerbation status post prednisone withdrawal.  Patient's heart rate has improved did take Vyvanse this morning but denies an for 2 days then 20 mg for 2 days then 10 mg for 2 days 27 tablet 0   • UNITHROID 150 MCG Oral Tab TAKE 1 TABLET BY MOUTH EVERY DAY BEFORE BREAKFAST 30 tablet 0   • mirtazapine 7.5 MG Oral Tab TAKE 1 TABLET(7.5 MG) BY MOUTH EVERY NIGHT 30 tablet 5   • Amitr Anxiety    • Anxiety state, unspecified    • Arthritis    • Asthma    • Calculus of kidney    • Cancer [facility])     thyroid   • Diverticulosis of large intestine    • Endometriosis    • Esophageal reflux    • Extrinsic asthma, unspecified     2014 was in ER a reactive cough with exam  CARDIO: RRR without murmur  EXTREMITIES: no cyanosis, clubbing or edema  Musculoskeletal: No gross deficit  Neurological: nerves II through XII grossly intact no sensorimotor deficit  Psychological: Mood anxious and affect is norm

## 2019-11-30 PROBLEM — R06.02 SHORTNESS OF BREATH: Status: RESOLVED | Noted: 2019-10-28 | Resolved: 2019-11-30

## 2019-12-02 DIAGNOSIS — J20.8 ACUTE VIRAL BRONCHITIS: ICD-10-CM

## 2019-12-02 NOTE — TELEPHONE ENCOUNTER
Pt requesting refill of PROAIR  (90 Base) MCG/ACT Inhalation Aero Soln, passed protocol , refill approved, sent to pharmacy

## 2019-12-12 ENCOUNTER — OFFICE VISIT (OUTPATIENT)
Dept: FAMILY MEDICINE CLINIC | Facility: CLINIC | Age: 39
End: 2019-12-12
Payer: COMMERCIAL

## 2019-12-12 VITALS
SYSTOLIC BLOOD PRESSURE: 118 MMHG | OXYGEN SATURATION: 98 % | HEART RATE: 112 BPM | WEIGHT: 186 LBS | DIASTOLIC BLOOD PRESSURE: 78 MMHG | HEIGHT: 68 IN | BODY MASS INDEX: 28.19 KG/M2 | TEMPERATURE: 98 F

## 2019-12-12 DIAGNOSIS — R05.9 COUGH: Primary | ICD-10-CM

## 2019-12-12 DIAGNOSIS — J45.41 MODERATE PERSISTENT ASTHMA WITH ACUTE EXACERBATION: ICD-10-CM

## 2019-12-12 PROBLEM — Z51.81 ENCOUNTER FOR THERAPEUTIC DRUG MONITORING: Status: RESOLVED | Noted: 2017-11-08 | Resolved: 2019-12-12

## 2019-12-12 PROBLEM — M77.11 LATERAL EPICONDYLITIS OF RIGHT ELBOW: Status: RESOLVED | Noted: 2019-01-21 | Resolved: 2019-12-12

## 2019-12-12 PROCEDURE — 99214 OFFICE O/P EST MOD 30 MIN: CPT | Performed by: FAMILY MEDICINE

## 2019-12-12 RX ORDER — MONTELUKAST SODIUM 10 MG/1
10 TABLET ORAL NIGHTLY
Qty: 30 TABLET | Refills: 2 | Status: SHIPPED | OUTPATIENT
Start: 2019-12-12 | End: 2020-01-07 | Stop reason: ALTCHOICE

## 2019-12-12 RX ORDER — ACETAMINOPHEN AND CODEINE PHOSPHATE 300; 30 MG/1; MG/1
TABLET ORAL NIGHTLY PRN
Qty: 10 TABLET | Refills: 0 | Status: SHIPPED | OUTPATIENT
Start: 2019-12-12 | End: 2020-01-07 | Stop reason: ALTCHOICE

## 2019-12-12 NOTE — PROGRESS NOTES
HPI:   Celeste Catherine is a 44year old female who presents for upper respiratory symptoms for  6  days.  Patient reports dry cough, wheezing, OTC cold meds have not been helping, prior history of bronchitis, prior history of pneumonia, denies fever, den Inhalation Aero Soln INHALE 2 PUFFS INTO THE LUNGS EVERY 4 HOURS AS NEEDED FOR WHEEZING OR SHORTNESS OF BREATH 8.5 g 0   • Butalbital-Acetaminophen  MG Oral Tab TAKE 1 TABLET BY MOUTH DAILY UP TO 3 TIMES PER WEEK 20 tablet 0   • ALPRAZolam 0.25 MG Or • aspirin 81 MG Oral Tab Take 81 mg by mouth daily. • Fluticasone Propionate HFA (FLOVENT HFA) 110 MCG/ACT Inhalation Aerosol Inhale 2 puffs into the lungs 2 (two) times daily.  (Patient not taking: Reported on 12/12/2019 ) 1 Inhaler 5      Past Med INJECTION UNDER FLUOROSCOPY Bilateral 12/14/2015    Performed by Leighann Cash DO at [address] [facility] M[facility]  12/18/2017    DR. SEGAL   • GASTROC RECESSION FOOT Right 6/6/2017    Performed by Poncho De Guzman fatigue, no fevers, no chills, no body aches, no lethargy  SKIN: no rashes  EYES: denies change in vision  HENT: no nasal congestion, no post nasal drip, no runny nose, no ST  LUNGS: Cough with shortness of breath and wheezing.   CARDIOVASCULAR: denies ches medication. Take 1/2 at night codeine  - Acetaminophen-Codeine 300-30 MG Oral Tab; Take 0.5-1 tablets by mouth nightly as needed (for cough). Dispense: 10 tablet; Refill: 0    2.  Moderate persistent asthma with acute exacerbation  Stop Symbicort changed

## 2019-12-13 RX ORDER — TOPIRAMATE 25 MG/1
25 TABLET ORAL NIGHTLY
Qty: 30 TABLET | Refills: 0 | OUTPATIENT
Start: 2019-12-13

## 2019-12-13 NOTE — TELEPHONE ENCOUNTER
Per below, pt would contact office if she would be short medication before her upcoming appt. Have not yet heard from pt. Refused Rx at this time, if refill is needed, pt will let office know.

## 2019-12-18 DIAGNOSIS — J20.8 ACUTE VIRAL BRONCHITIS: ICD-10-CM

## 2019-12-19 ENCOUNTER — PATIENT MESSAGE (OUTPATIENT)
Dept: FAMILY MEDICINE CLINIC | Facility: CLINIC | Age: 39
End: 2019-12-19

## 2019-12-19 DIAGNOSIS — G44.221 CHRONIC TENSION-TYPE HEADACHE, INTRACTABLE: ICD-10-CM

## 2019-12-20 NOTE — TELEPHONE ENCOUNTER
From: Musa Ty  To: Anisha Prieto PA-C  Sent: 12/19/2019 4:58 PM CST  Subject: Non-Urgent Medical Question    Vanessa Jaceleandra   My Butalb/Acet is due the week of 12/29 (holiday week).  Would you send a script early to the pharmacy to fill when it's t

## 2019-12-21 DIAGNOSIS — G44.221 CHRONIC TENSION-TYPE HEADACHE, INTRACTABLE: ICD-10-CM

## 2019-12-21 RX ORDER — BUTALBITAL/ACETAMINOPHEN 50MG-325MG
TABLET ORAL
Qty: 20 TABLET | Refills: 0 | Status: CANCELLED | OUTPATIENT
Start: 2019-12-21

## 2019-12-22 RX ORDER — BUTALBITAL/ACETAMINOPHEN 50MG-325MG
1 TABLET ORAL 2 TIMES DAILY PRN
Qty: 20 TABLET | Refills: 0 | Status: SHIPPED | OUTPATIENT
Start: 2019-12-22 | End: 2020-01-27

## 2019-12-23 ENCOUNTER — APPOINTMENT (OUTPATIENT)
Dept: LAB | Facility: HOSPITAL | Age: 39
End: 2019-12-23
Attending: INTERNAL MEDICINE
Payer: COMMERCIAL

## 2019-12-23 ENCOUNTER — OFFICE VISIT (OUTPATIENT)
Dept: SURGERY | Facility: CLINIC | Age: 39
End: 2019-12-23
Payer: COMMERCIAL

## 2019-12-23 VITALS
RESPIRATION RATE: 16 BRPM | HEIGHT: 68 IN | SYSTOLIC BLOOD PRESSURE: 110 MMHG | WEIGHT: 184.63 LBS | HEART RATE: 78 BPM | BODY MASS INDEX: 27.98 KG/M2 | DIASTOLIC BLOOD PRESSURE: 72 MMHG

## 2019-12-23 DIAGNOSIS — Z98.84 HISTORY OF ROUX-EN-Y GASTRIC BYPASS: ICD-10-CM

## 2019-12-23 DIAGNOSIS — E78.2 MIXED HYPERLIPIDEMIA: Primary | ICD-10-CM

## 2019-12-23 DIAGNOSIS — E66.3 OVERWEIGHT (BMI 25.0-29.9): ICD-10-CM

## 2019-12-23 DIAGNOSIS — L30.4 INTERTRIGO: ICD-10-CM

## 2019-12-23 DIAGNOSIS — E44.1 MILD PROTEIN-CALORIE MALNUTRITION (HCC): ICD-10-CM

## 2019-12-23 DIAGNOSIS — E55.9 VITAMIN D DEFICIENCY: ICD-10-CM

## 2019-12-23 DIAGNOSIS — E78.2 MIXED HYPERLIPIDEMIA: ICD-10-CM

## 2019-12-23 PROCEDURE — 82306 VITAMIN D 25 HYDROXY: CPT

## 2019-12-23 PROCEDURE — 36415 COLL VENOUS BLD VENIPUNCTURE: CPT

## 2019-12-23 PROCEDURE — 84425 ASSAY OF VITAMIN B-1: CPT

## 2019-12-23 PROCEDURE — 99214 OFFICE O/P EST MOD 30 MIN: CPT | Performed by: INTERNAL MEDICINE

## 2019-12-23 PROCEDURE — 82607 VITAMIN B-12: CPT

## 2019-12-23 NOTE — PROGRESS NOTES
[address], [address]  Dept: [phone]        Patient:  Shante Dyson  :      1980  MRN:      [id number]    Referring Provider: Nilam Rojas 0  •  fluticasone-salmeterol (ADVAIR HFA) 230-21 MCG/ACT Inhalation Aerosol, Inhale 2 puffs into the lungs 2 (two) times daily. , Disp: 1 Inhaler, Rfl: 1  •  [START ON 2/4/2020] Lisdexamfetamine Dimesylate (VYVANSE) 50 MG Oral Cap, Take 1 capsule (50 mg tot Disp: 200 strip, Rfl: 3  •  NEYDA MICROLET LANCETS Does not apply Misc, Use as directed to check blood sugars as needed, Disp: 1 Box, Rfl: 0  •  Magnesium 100 MG Oral Tab, Take 1 tablet by mouth daily.   , Disp: , Rfl:   •  NON FORMULARY, Take 1 tablet by m [facility]   • GASTROC RECESSION FOOT Left 6/19/2015    Performed by Vonnie Jorgensen MD at [place] [address]   • HYSTEROSCOPY  2011   • LAPAROSCOPIC CHOLECYSTECTOMY N/A 11/23/2016    Performed by Raeann Torres MD at [address]   • [address] [facility] bowel sounds normal; no masses,  no organomegaly  Extremities: extremities normal, atraumatic, no cyanosis or edema  Pulses: 2+ and symmetric  Skin: irritation noted under skin folds     ROS:    Constitutional: negative  Respiratory: negative  Cardiovascul

## 2019-12-31 ENCOUNTER — LAB ENCOUNTER (OUTPATIENT)
Dept: LAB | Age: 39
End: 2019-12-31
Attending: OTOLARYNGOLOGY
Payer: COMMERCIAL

## 2019-12-31 DIAGNOSIS — C73 THYROID CANCER (HCC): ICD-10-CM

## 2019-12-31 DIAGNOSIS — E07.9 DISORDER OF THYROID GLAND: ICD-10-CM

## 2019-12-31 LAB
AFP-TM SERPL-MCNC: 7.7 NG/ML (ref ?–8)
T4 FREE SERPL-MCNC: 0.9 NG/DL (ref 0.8–1.7)
TSI SER-ACNC: 13.4 MIU/ML (ref 0.36–3.74)

## 2019-12-31 PROCEDURE — 82105 ALPHA-FETOPROTEIN SERUM: CPT

## 2019-12-31 PROCEDURE — 36415 COLL VENOUS BLD VENIPUNCTURE: CPT

## 2019-12-31 PROCEDURE — 86800 THYROGLOBULIN ANTIBODY: CPT

## 2019-12-31 PROCEDURE — 84432 ASSAY OF THYROGLOBULIN: CPT

## 2019-12-31 PROCEDURE — 84443 ASSAY THYROID STIM HORMONE: CPT

## 2019-12-31 PROCEDURE — 84439 ASSAY OF FREE THYROXINE: CPT

## 2020-01-02 LAB
THYROGLOBULIN AB: <0.9 IU/ML
THYROGLOBULIN, SERUM OR PLASMA: 0.2 NG/ML

## 2020-01-02 RX ORDER — PANTOPRAZOLE SODIUM 40 MG/1
TABLET, DELAYED RELEASE ORAL
Qty: 60 TABLET | Refills: 2 | Status: SHIPPED | OUTPATIENT
Start: 2020-01-02 | End: 2020-03-30

## 2020-01-02 NOTE — PROGRESS NOTES
Please inform tsh is hypothyroid please confirm she is taking T4 supplementation daily and confirm her dosing she may need an increase. Tumor marker is negative repeat in 1 year.

## 2020-01-04 DIAGNOSIS — J20.8 ACUTE VIRAL BRONCHITIS: ICD-10-CM

## 2020-01-06 NOTE — TELEPHONE ENCOUNTER
Pt requesting refill of PROAIR  (90 Base) MCG/ACT Inhalation Aero Soln    Passed protocol, refill approved, sent to pharmacy

## 2020-01-15 NOTE — TELEPHONE ENCOUNTER
From: Moises Close  To: Ivy Napoles PA-C  Sent: 11/16/2019 8:34 AM CST  Subject: Non-Urgent Medical Question    Hi Hudson Render attached an image of what my hands currently look like.  I wash them a lot and I don’t use perfume lotion with al OT Evaluate and Treat  Activity as tolerated  RLE NWB In [facility]

## 2020-01-22 ENCOUNTER — OFFICE VISIT (OUTPATIENT)
Dept: FAMILY MEDICINE CLINIC | Facility: CLINIC | Age: 40
End: 2020-01-22
Payer: COMMERCIAL

## 2020-01-22 VITALS
BODY MASS INDEX: 27.74 KG/M2 | DIASTOLIC BLOOD PRESSURE: 60 MMHG | TEMPERATURE: 99 F | OXYGEN SATURATION: 98 % | SYSTOLIC BLOOD PRESSURE: 110 MMHG | WEIGHT: 183 LBS | HEIGHT: 68 IN | HEART RATE: 114 BPM

## 2020-01-22 DIAGNOSIS — J01.10 ACUTE FRONTAL SINUSITIS, RECURRENCE NOT SPECIFIED: Primary | ICD-10-CM

## 2020-01-22 DIAGNOSIS — J11.1 INFLUENZA-LIKE ILLNESS: ICD-10-CM

## 2020-01-22 DIAGNOSIS — R05.9 COUGH: ICD-10-CM

## 2020-01-22 PROCEDURE — 99214 OFFICE O/P EST MOD 30 MIN: CPT | Performed by: FAMILY MEDICINE

## 2020-01-22 NOTE — PATIENT INSTRUCTIONS
--rest, fluids, soups, humidifier, tea with lemon or honey, tylenol/advil as needed for discomfort  -- otc generic mucinex  can help loosen up congestion if getting thicker    --If sinus congestion severe, consider:    -- start flonase nasal spray 1 spra

## 2020-01-22 NOTE — PROGRESS NOTES
CC:  Stephanie Mckeon is a 44year old female here for Patient presents with:  Runny Nose: Cough since last night, one episode of diarrhea this morning , runny nose x 3 days. No fever. Patient went to Immediate care and was negative flu.       HPI:     UR daily. 30 capsule 0   • ALPRAZolam 0.25 MG Oral Tab TAKE 1 TABLET BY MOUTH TWICE DAILY AS NEEDED FOR SLEEP 60 tablet 2   • Zolpidem Tartrate 5 MG Oral Tab Take 0.5-1 tablets (2.5-5 mg total) by mouth nightly as needed.  30 tablet 2   • mirtazapine 7.5 MG Or [Metoclopram*        Comment:Reglan with benadryl, feels like she is going to             jump out of her skin  Tramadol                RASH    Family History   Problem Relation Age of Onset   • Other (Other[other]) Mother         ALLIE   • Heart Disorder Fa 1/29/2018    Performed by Tara Manrique MD at [facility] ENDOSCOPY   • ESOPHAGOGASTRODUODENOSCOPY, POSSIBLE BIOPSY, POSSIBLE POLYPECTOMY 11573 N/A 12/19/2015    Performed by Jasmin Arcos MD at [address] [place] [address] Temp 99.2 °F (37.3 °C) (Oral)   Ht 68\"   Wt 183 lb (83 kg)   SpO2 98%   BMI 27.83 kg/m²     GENERAL: A&O well developed, well nourished,in no apparent distress  HEENT: atraumatic, pupils round and reactive to light, MMM, pharyngeal erythema, TMs normal  N

## 2020-01-23 ENCOUNTER — APPOINTMENT (OUTPATIENT)
Dept: SPEECH THERAPY | Facility: HOSPITAL | Age: 40
End: 2020-01-23
Payer: COMMERCIAL

## 2020-01-24 ENCOUNTER — APPOINTMENT (OUTPATIENT)
Dept: GENERAL RADIOLOGY | Facility: HOSPITAL | Age: 40
End: 2020-01-24
Payer: COMMERCIAL

## 2020-01-24 ENCOUNTER — OFFICE VISIT (OUTPATIENT)
Dept: FAMILY MEDICINE CLINIC | Facility: CLINIC | Age: 40
End: 2020-01-24
Payer: COMMERCIAL

## 2020-01-24 ENCOUNTER — HOSPITAL ENCOUNTER (OUTPATIENT)
Facility: HOSPITAL | Age: 40
Setting detail: OBSERVATION
Discharge: HOME | End: 2020-01-25
Attending: EMERGENCY MEDICINE | Admitting: INTERNAL MEDICINE
Payer: COMMERCIAL

## 2020-01-24 DIAGNOSIS — R06.00 DYSPNEA, UNSPECIFIED TYPE: ICD-10-CM

## 2020-01-24 DIAGNOSIS — J06.9 VIRAL URI WITH COUGH: Primary | ICD-10-CM

## 2020-01-24 DIAGNOSIS — Z02.9 ENCOUNTER FOR ADMINISTRATIVE EXAMINATIONS: Primary | ICD-10-CM

## 2020-01-24 PROBLEM — E87.3 METABOLIC ALKALOSIS: Status: ACTIVE | Noted: 2020-01-24

## 2020-01-24 PROBLEM — E87.3 RESPIRATORY ALKALOSIS: Status: ACTIVE | Noted: 2020-01-24

## 2020-01-24 PROBLEM — E87.6 HYPOKALEMIA: Status: ACTIVE | Noted: 2020-01-24

## 2020-01-24 LAB
ADENOVIRUS PCR:: NEGATIVE
ALBUMIN SERPL-MCNC: 3.3 G/DL (ref 3.4–5)
ALBUMIN/GLOB SERPL: 0.9 {RATIO} (ref 1–2)
ALLENS TEST: POSITIVE
ALP LIVER SERPL-CCNC: 94 U/L (ref 37–98)
ALT SERPL-CCNC: 21 U/L (ref 13–56)
ANION GAP SERPL CALC-SCNC: 5 MMOL/L (ref 0–18)
ARTERIAL BLD GAS O2 SATURATION: 95 % (ref 92–100)
ARTERIAL BLOOD GAS BASE EXCESS: 0.7 MMOL/L (ref ?–2)
ARTERIAL BLOOD GAS HCO3: 23.7 MEQ/L (ref 22–26)
ARTERIAL BLOOD GAS PCO2: 33 MM HG (ref 35–45)
ARTERIAL BLOOD GAS PH: 7.48 (ref 7.35–7.45)
ARTERIAL BLOOD GAS PO2: 73 MM HG (ref 80–105)
AST SERPL-CCNC: 15 U/L (ref 15–37)
ATRIAL RATE: 106 BPM
B PERT DNA SPEC QL NAA+PROBE: NEGATIVE
BASOPHILS # BLD AUTO: 0.04 X10(3) UL (ref 0–0.2)
BASOPHILS NFR BLD AUTO: 0.6 %
BILIRUB SERPL-MCNC: 0.1 MG/DL (ref 0.1–2)
BUN BLD-MCNC: 9 MG/DL (ref 7–18)
BUN/CREAT SERPL: 10.6 (ref 10–20)
C PNEUM DNA SPEC QL NAA+PROBE: NEGATIVE
CALCIUM BLD-MCNC: 8 MG/DL (ref 8.5–10.1)
CALCULATED O2 SATURATION: 95 % (ref 92–100)
CARBOXYHEMOGLOBIN: 0.7 % SAT (ref 0–3)
CHLORIDE SERPL-SCNC: 108 MMOL/L (ref 98–112)
CO2 SERPL-SCNC: 28 MMOL/L (ref 21–32)
CORONAVIRUS 229E PCR:: NEGATIVE
CORONAVIRUS HKU1 PCR:: NEGATIVE
CORONAVIRUS NL63 PCR:: NEGATIVE
CORONAVIRUS OC43 PCR:: NEGATIVE
CREAT BLD-MCNC: 0.85 MG/DL (ref 0.55–1.02)
DEPRECATED RDW RBC AUTO: 43 FL (ref 35.1–46.3)
EOSINOPHIL # BLD AUTO: 0.14 X10(3) UL (ref 0–0.7)
EOSINOPHIL NFR BLD AUTO: 2.2 %
ERYTHROCYTE [DISTWIDTH] IN BLOOD BY AUTOMATED COUNT: 12.5 % (ref 11–15)
FIO2: 21 %
FLUAV RNA SPEC QL NAA+PROBE: NEGATIVE
FLUBV RNA SPEC QL NAA+PROBE: NEGATIVE
GLOBULIN PLAS-MCNC: 3.7 G/DL (ref 2.8–4.4)
GLUCOSE BLD-MCNC: 84 MG/DL (ref 70–99)
HCT VFR BLD AUTO: 39.5 % (ref 35–48)
HGB BLD-MCNC: 13.4 G/DL (ref 12–16)
IMM GRANULOCYTES # BLD AUTO: 0.01 X10(3) UL (ref 0–1)
IMM GRANULOCYTES NFR BLD: 0.2 %
IONIZED CALCIUM: 1.1 MMOL/L (ref 1.12–1.32)
LACTIC ACID ARTERIAL: 2.2 MMOL/L (ref 0.5–2)
LYMPHOCYTES # BLD AUTO: 2.12 X10(3) UL (ref 1–4)
LYMPHOCYTES NFR BLD AUTO: 33.8 %
M PROTEIN MFR SERPL ELPH: 7 G/DL (ref 6.4–8.2)
MCH RBC QN AUTO: 31.8 PG (ref 26–34)
MCHC RBC AUTO-ENTMCNC: 33.9 G/DL (ref 31–37)
MCV RBC AUTO: 93.6 FL (ref 80–100)
METAPNEUMOVIRUS PCR:: POSITIVE
METHEMOGLOBIN: 0.5 % SAT (ref 0.4–1.5)
MONOCYTES # BLD AUTO: 0.61 X10(3) UL (ref 0.1–1)
MONOCYTES NFR BLD AUTO: 9.7 %
MYCOPLASMA PNEUMONIA PCR:: NEGATIVE
NEUTROPHILS # BLD AUTO: 3.36 X10 (3) UL (ref 1.5–7.7)
NEUTROPHILS # BLD AUTO: 3.36 X10(3) UL (ref 1.5–7.7)
NEUTROPHILS NFR BLD AUTO: 53.5 %
OSMOLALITY SERPL CALC.SUM OF ELEC: 290 MOSM/KG (ref 275–295)
P AXIS: 39 DEGREES
P-R INTERVAL: 138 MS
P/F RATIO: 339.5 MMHG
PARAINFLUENZA 1 PCR:: NEGATIVE
PARAINFLUENZA 2 PCR:: NEGATIVE
PARAINFLUENZA 3 PCR:: NEGATIVE
PARAINFLUENZA 4 PCR:: NEGATIVE
PATIENT TEMPERATURE: 99.1 F
PLATELET # BLD AUTO: 257 10(3)UL (ref 150–450)
POTASSIUM BLOOD GAS: 3.4 MMOL/L (ref 3.6–5.1)
POTASSIUM SERPL-SCNC: 3.6 MMOL/L (ref 3.5–5.1)
Q-T INTERVAL: 344 MS
QRS DURATION: 114 MS
QTC CALCULATION (BEZET): 456 MS
R AXIS: -6 DEGREES
RBC # BLD AUTO: 4.22 X10(6)UL (ref 3.8–5.3)
RHINOVIRUS/ENTERO PCR:: NEGATIVE
RSV RNA SPEC QL NAA+PROBE: NEGATIVE
SODIUM BLOOD GAS: 140 MMOL/L (ref 136–144)
SODIUM SERPL-SCNC: 141 MMOL/L (ref 136–145)
T AXIS: 18 DEGREES
TOTAL HEMOGLOBIN: 12 G/DL (ref 11.7–15.3)
TROPONIN I SERPL-MCNC: <0.045 NG/ML (ref ?–0.04)
VENTRICULAR RATE: 106 BPM
WBC # BLD AUTO: 6.3 X10(3) UL (ref 4–11)

## 2020-01-24 PROCEDURE — 71046 X-RAY EXAM CHEST 2 VIEWS: CPT

## 2020-01-24 PROCEDURE — 99220 INITIAL OBSERVATION CARE,LEVL III: CPT | Performed by: INTERNAL MEDICINE

## 2020-01-24 RX ORDER — SODIUM CHLORIDE 9 MG/ML
INJECTION, SOLUTION INTRAVENOUS CONTINUOUS
Status: ACTIVE | OUTPATIENT
Start: 2020-01-24 | End: 2020-01-24

## 2020-01-24 RX ORDER — ACETAMINOPHEN 325 MG/1
650 TABLET ORAL EVERY 6 HOURS PRN
Status: DISCONTINUED | OUTPATIENT
Start: 2020-01-24 | End: 2020-01-25

## 2020-01-24 RX ORDER — ACETAMINOPHEN AND CODEINE PHOSPHATE 300; 30 MG/1; MG/1
2 TABLET ORAL EVERY 4 HOURS PRN
Status: DISCONTINUED | OUTPATIENT
Start: 2020-01-24 | End: 2020-01-25

## 2020-01-24 RX ORDER — MIRTAZAPINE 7.5 MG/1
7.5 TABLET, FILM COATED ORAL NIGHTLY
Status: DISCONTINUED | OUTPATIENT
Start: 2020-01-24 | End: 2020-01-25

## 2020-01-24 RX ORDER — AMITRIPTYLINE HYDROCHLORIDE 25 MG/1
25 TABLET, FILM COATED ORAL NIGHTLY
Status: DISCONTINUED | OUTPATIENT
Start: 2020-01-24 | End: 2020-01-25

## 2020-01-24 RX ORDER — BENZONATATE 100 MG/1
100 CAPSULE ORAL 3 TIMES DAILY PRN
Status: DISCONTINUED | OUTPATIENT
Start: 2020-01-24 | End: 2020-01-25

## 2020-01-24 RX ORDER — CYCLOBENZAPRINE HCL 10 MG
10 TABLET ORAL NIGHTLY
Status: DISCONTINUED | OUTPATIENT
Start: 2020-01-24 | End: 2020-01-25

## 2020-01-24 RX ORDER — ONDANSETRON 2 MG/ML
4 INJECTION INTRAMUSCULAR; INTRAVENOUS EVERY 6 HOURS PRN
Status: DISCONTINUED | OUTPATIENT
Start: 2020-01-24 | End: 2020-01-25

## 2020-01-24 RX ORDER — KETOROLAC TROMETHAMINE 30 MG/ML
15 INJECTION, SOLUTION INTRAMUSCULAR; INTRAVENOUS ONCE
Status: COMPLETED | OUTPATIENT
Start: 2020-01-24 | End: 2020-01-24

## 2020-01-24 RX ORDER — BUTALBITAL/ACETAMINOPHEN 50MG-325MG
1 TABLET ORAL 2 TIMES DAILY PRN
Status: DISCONTINUED | OUTPATIENT
Start: 2020-01-24 | End: 2020-01-25

## 2020-01-24 RX ORDER — TOPIRAMATE 25 MG/1
25 TABLET ORAL NIGHTLY
Status: DISCONTINUED | OUTPATIENT
Start: 2020-01-24 | End: 2020-01-25

## 2020-01-24 RX ORDER — CALCIUM CARBONATE 500(1250)
250 TABLET ORAL 2 TIMES DAILY
Status: DISCONTINUED | OUTPATIENT
Start: 2020-01-24 | End: 2020-01-25

## 2020-01-24 RX ORDER — ACETAMINOPHEN AND CODEINE PHOSPHATE 300; 30 MG/1; MG/1
1 TABLET ORAL EVERY 4 HOURS PRN
Status: DISCONTINUED | OUTPATIENT
Start: 2020-01-24 | End: 2020-01-25

## 2020-01-24 RX ORDER — IPRATROPIUM BROMIDE AND ALBUTEROL SULFATE 2.5; .5 MG/3ML; MG/3ML
3 SOLUTION RESPIRATORY (INHALATION) EVERY 4 HOURS PRN
Status: DISCONTINUED | OUTPATIENT
Start: 2020-01-24 | End: 2020-01-25

## 2020-01-24 RX ORDER — IPRATROPIUM BROMIDE AND ALBUTEROL SULFATE 2.5; .5 MG/3ML; MG/3ML
3 SOLUTION RESPIRATORY (INHALATION) ONCE
Status: COMPLETED | OUTPATIENT
Start: 2020-01-24 | End: 2020-01-24

## 2020-01-24 RX ORDER — GUAIFENESIN 600 MG
600 TABLET, EXTENDED RELEASE 12 HR ORAL 2 TIMES DAILY
Status: DISCONTINUED | OUTPATIENT
Start: 2020-01-24 | End: 2020-01-25

## 2020-01-24 RX ORDER — PREGABALIN 75 MG/1
75 CAPSULE ORAL NIGHTLY
Status: DISCONTINUED | OUTPATIENT
Start: 2020-01-24 | End: 2020-01-25

## 2020-01-24 RX ORDER — ZOLPIDEM TARTRATE 5 MG/1
5 TABLET ORAL NIGHTLY PRN
Status: DISCONTINUED | OUTPATIENT
Start: 2020-01-24 | End: 2020-01-25

## 2020-01-24 RX ORDER — PANTOPRAZOLE SODIUM 40 MG/1
40 TABLET, DELAYED RELEASE ORAL
Status: DISCONTINUED | OUTPATIENT
Start: 2020-01-25 | End: 2020-01-25

## 2020-01-24 RX ORDER — ALPRAZOLAM 0.25 MG/1
0.25 TABLET ORAL 2 TIMES DAILY PRN
Status: DISCONTINUED | OUTPATIENT
Start: 2020-01-24 | End: 2020-01-25

## 2020-01-24 RX ORDER — ASPIRIN 81 MG/1
81 TABLET, CHEWABLE ORAL DAILY
Status: DISCONTINUED | OUTPATIENT
Start: 2020-01-24 | End: 2020-01-25

## 2020-01-24 RX ORDER — ENOXAPARIN SODIUM 100 MG/ML
40 INJECTION SUBCUTANEOUS DAILY
Status: DISCONTINUED | OUTPATIENT
Start: 2020-01-25 | End: 2020-01-25

## 2020-01-24 NOTE — PROGRESS NOTES
Patient presents to walk in clinic for shortness of breath and elevated heart rate. She was seen recently by her doctor office and today received a message that she should proceed to the IC for evaluation of cough and congestion.  Patient has a past history

## 2020-01-24 NOTE — H&P
[facility] HOSPITALIST  History and Physical     Vimal White Patient Status:  Emergency    1980 MRN [id number]   Location [address] [facility] Attending No att. providers found   Hosp Day # 0 PCP Lion Gale MD     Chief Com CHOLECYSTECTOMY     • COLONOSCOPY, POSSIBLE BIOPSY, POSSIBLE POLYPECTOMY 88398 N/A 10/22/2013    Performed by Shey Castaneda MD at [address] [place] [address] Way      x4   • DILATION/CURETTAGE,DIAGNOSTIC     • ESOPHAGOGASTRODUODENOSCOPY (EGD) N/A 10/4/201 Social History:  reports that she has never smoked. She has never used smokeless tobacco. She reports previous alcohol use. She reports that she does not use drugs.     Family History:   Family History   Problem Relation Age of Onset   • Other (Rod Serna daily as needed (headache). , Disp: 20 tablet, Rfl: 0  Pseudoeph-Doxylamine-DM-APAP (NYQUIL OR), Take 2 capsules by mouth nightly., Disp: , Rfl:   guaiFENesin (MUCINEX OR), Take 1 tablet by mouth Q12H., Disp: , Rfl:   [START ON 2/4/2020] Lisdexamfetamine Harleen Jamison 81 MG Oral Tab, Take 81 mg by mouth daily. , Disp: , Rfl:         Review of Systems:   A comprehensive 14 point review of systems was completed. Pertinent positives and negatives noted in the HPI.     Physical Exam:    /90   Pulse 97   Temp 99.1 °F Ox  2. Does not need systemic steroids at this time  3. Resume home meds  3. Vocal cord dysfunction which may be contributing to above - Amitryptiline  4. Generalized Anxiety Disorder  5. Hypothyroidism   6. Hx of Bariatric surgery   7.  ADHD - hold stimula

## 2020-01-24 NOTE — ED INITIAL ASSESSMENT (HPI)
Pt reports symptoms started on Saturday, went to IC on Tuesday and was neg for flu/pneumonia. States symptoms has gotten worse, so went to IC and was instructed to go to ER due to rapid HR. Pt reports productive strong cough, sob, and rapid HR.

## 2020-01-24 NOTE — ED PROVIDER NOTES
Patient Seen in: [facility] Emergency Department      History   Patient presents with:  Dyspnea ELISE SOB  Cough/URI  Rapid Heart Beat    Stated Complaint: difficulty breathing, rapid heart rate, cough, no fever    HPI    Patient is a pleasant 39-year Performed by Esteban Galloway MD at [facility] MAIN OR   •      • CHOLECYSTECTOMY     • COLONOSCOPY, POSSIBLE BIOPSY, POSSIBLE POLYPECTOMY 05432 N/A 10/22/2013    Performed by Antony Li MD at [address] [facility] GI ENDOSCOPY PERFORMED  01/25/2017    Shelia Caballero M.D.                     Social History    Tobacco Use      Smoking status: Never Smoker      Smokeless tobacco: Never Used    Alcohol use: Not Currently      Alcohol/week: 0.0 standard drinks      F 3.3 (*)     A/G Ratio 0.9 (*)     All other components within normal limits   ABG PANEL W ELECT AND LACTATE - Abnormal; Notable for the following components:    ABG pH 7.48 (*)     ABG pCO2 33 (*)     ABG pO2 73 (*)     Ionized Calcium 1.10 (*)     Potassi contours are normal.  No focal consolidation. CONCLUSION:  No acute abnormality is seen. If clinical symptoms persist consider followup radiographs or CT.    Dictated by: Tanmay Saeed MD on 1/24/2020 at 14:06     Approved by: Tanmay Saeed MD on 1/24/2020

## 2020-01-25 VITALS
OXYGEN SATURATION: 97 % | BODY MASS INDEX: 25.92 KG/M2 | SYSTOLIC BLOOD PRESSURE: 107 MMHG | HEIGHT: 69 IN | HEART RATE: 80 BPM | RESPIRATION RATE: 20 BRPM | DIASTOLIC BLOOD PRESSURE: 69 MMHG | TEMPERATURE: 99 F | WEIGHT: 175 LBS

## 2020-01-25 LAB
ANION GAP SERPL CALC-SCNC: 3 MMOL/L (ref 0–18)
BASOPHILS # BLD AUTO: 0.03 X10(3) UL (ref 0–0.2)
BASOPHILS NFR BLD AUTO: 0.6 %
BUN BLD-MCNC: 8 MG/DL (ref 7–18)
BUN/CREAT SERPL: 11.8 (ref 10–20)
CALCIUM BLD-MCNC: 7.9 MG/DL (ref 8.5–10.1)
CHLORIDE SERPL-SCNC: 112 MMOL/L (ref 98–112)
CO2 SERPL-SCNC: 28 MMOL/L (ref 21–32)
CREAT BLD-MCNC: 0.68 MG/DL (ref 0.55–1.02)
DEPRECATED RDW RBC AUTO: 44.7 FL (ref 35.1–46.3)
EOSINOPHIL # BLD AUTO: 0.2 X10(3) UL (ref 0–0.7)
EOSINOPHIL NFR BLD AUTO: 3.9 %
ERYTHROCYTE [DISTWIDTH] IN BLOOD BY AUTOMATED COUNT: 12.5 % (ref 11–15)
GLUCOSE BLD-MCNC: 102 MG/DL (ref 70–99)
HCT VFR BLD AUTO: 37.6 % (ref 35–48)
HGB BLD-MCNC: 12.2 G/DL (ref 12–16)
IMM GRANULOCYTES # BLD AUTO: 0.02 X10(3) UL (ref 0–1)
IMM GRANULOCYTES NFR BLD: 0.4 %
LYMPHOCYTES # BLD AUTO: 1.74 X10(3) UL (ref 1–4)
LYMPHOCYTES NFR BLD AUTO: 33.7 %
MCH RBC QN AUTO: 31.5 PG (ref 26–34)
MCHC RBC AUTO-ENTMCNC: 32.4 G/DL (ref 31–37)
MCV RBC AUTO: 97.2 FL (ref 80–100)
MONOCYTES # BLD AUTO: 0.53 X10(3) UL (ref 0.1–1)
MONOCYTES NFR BLD AUTO: 10.3 %
NEUTROPHILS # BLD AUTO: 2.65 X10 (3) UL (ref 1.5–7.7)
NEUTROPHILS # BLD AUTO: 2.65 X10(3) UL (ref 1.5–7.7)
NEUTROPHILS NFR BLD AUTO: 51.1 %
OSMOLALITY SERPL CALC.SUM OF ELEC: 295 MOSM/KG (ref 275–295)
PLATELET # BLD AUTO: 215 10(3)UL (ref 150–450)
POTASSIUM SERPL-SCNC: 3.9 MMOL/L (ref 3.5–5.1)
RBC # BLD AUTO: 3.87 X10(6)UL (ref 3.8–5.3)
SODIUM SERPL-SCNC: 143 MMOL/L (ref 136–145)
WBC # BLD AUTO: 5.2 X10(3) UL (ref 4–11)

## 2020-01-25 PROCEDURE — 99217 OBSERVATION CARE DISCHARGE: CPT | Performed by: HOSPITALIST

## 2020-01-25 RX ORDER — ACETAMINOPHEN AND CODEINE PHOSPHATE 300; 30 MG/1; MG/1
1 TABLET ORAL EVERY 4 HOURS PRN
Qty: 15 TABLET | Refills: 0 | Status: SHIPPED | OUTPATIENT
Start: 2020-01-25 | End: 2020-01-29 | Stop reason: ALTCHOICE

## 2020-01-25 RX ORDER — PREGABALIN 75 MG/1
75 CAPSULE ORAL 2 TIMES DAILY
Status: DISCONTINUED | OUTPATIENT
Start: 2020-01-25 | End: 2020-01-25

## 2020-01-25 NOTE — PROGRESS NOTES
Pt just discharged with  F/u appts and new meds , dc instructions were discussed and she verb understanding. IV dcd

## 2020-01-25 NOTE — PLAN OF CARE
Problem: Patient/Family Goals  Goal: Patient/Family Long Term Goal  Description  Patient's Long Term Goal: Go home     Interventions:  - cough mediation  - rest  - See additional Care Plan goals for specific interventions  Outcome: Progressing  Goal: Agapito Allen

## 2020-01-25 NOTE — PROGRESS NOTES
NURSING ADMISSION NOTE      Patient admitted via Cart  Oriented to room. Safety precautions initiated. Bed in low position. Call light in reach. Pt admitted to room via cart from ER. Received on RA, coughing. SOB, tachycardic.  Pt not c/o pain o

## 2020-01-26 NOTE — DISCHARGE SUMMARY
[facility] HOSPITALIST  DISCHARGE SUMMARY     Jef Betancourt Patient Status:  Observation    1980 MRN [id number]   [facility]NW-A Attending No att. providers found   Hosp Day # 0 PCP Gerry Gallego MD     Date of Admission:  details   Acetaminophen-Codeine #3 300-30 MG Tabs  Commonly known as:  TYLENOL #3      Take 1 tablet by mouth every 4 (four) hours as needed (Cough).    Quantity:  15 tablet  Refills:  0        CHANGE how you take these medications      Instructions Prescri known as:  Tru William  Start taking on:  February 4, 2020      Take 1 capsule (50 mg total) by mouth daily.    Stop taking on:  March 5, 2020  Quantity:  30 capsule  Refills:  0     LYRICA 75 MG Caps  Generic drug:  pregabalin       Refills:  0     Magnesium [address] [address]    Phone:  [phone]   · Acetaminophen-Codeine #3 300-30 MG Tabs         [place]: n[place]    Follow-up appointment:   [address] [facility] [address] [facility] [address] [place] [address]  [phone]    Schedule an appointment as soon a

## 2020-01-27 ENCOUNTER — PATIENT OUTREACH (OUTPATIENT)
Dept: CASE MANAGEMENT | Age: 40
End: 2020-01-27

## 2020-01-27 DIAGNOSIS — Z02.9 ENCOUNTERS FOR UNSPECIFIED ADMINISTRATIVE PURPOSE: ICD-10-CM

## 2020-01-27 DIAGNOSIS — G44.221 CHRONIC TENSION-TYPE HEADACHE, INTRACTABLE: ICD-10-CM

## 2020-01-28 RX ORDER — BUTALBITAL/ACETAMINOPHEN 50MG-325MG
1 TABLET ORAL 2 TIMES DAILY PRN
Qty: 20 TABLET | Refills: 0 | Status: SHIPPED | OUTPATIENT
Start: 2020-01-28 | End: 2020-03-03

## 2020-01-28 NOTE — TELEPHONE ENCOUNTER
Patient requesting refill of Butalbital-Acetaminophen. Last fill was 12/22/19 20 tabs. 0 refills. Last OV with you 12/12/19, Dr. Artie Saleh 1/22/20. Future appointment 1/29/20 with Dr. Artie Saleh.   Pended for approval/denial.

## 2020-01-29 ENCOUNTER — OFFICE VISIT (OUTPATIENT)
Dept: FAMILY MEDICINE CLINIC | Facility: CLINIC | Age: 40
End: 2020-01-29
Payer: COMMERCIAL

## 2020-01-29 VITALS
SYSTOLIC BLOOD PRESSURE: 118 MMHG | HEIGHT: 69 IN | DIASTOLIC BLOOD PRESSURE: 64 MMHG | BODY MASS INDEX: 28.14 KG/M2 | TEMPERATURE: 98 F | HEART RATE: 98 BPM | WEIGHT: 190 LBS | OXYGEN SATURATION: 99 %

## 2020-01-29 DIAGNOSIS — R05.9 COUGH: Primary | ICD-10-CM

## 2020-01-29 DIAGNOSIS — J20.8 ACUTE VIRAL BRONCHITIS: ICD-10-CM

## 2020-01-29 DIAGNOSIS — J06.9 VIRAL URI WITH COUGH: ICD-10-CM

## 2020-01-29 DIAGNOSIS — J38.3 VOCAL CORD DYSFUNCTION: ICD-10-CM

## 2020-01-29 DIAGNOSIS — J11.1 INFLUENZA-LIKE ILLNESS: ICD-10-CM

## 2020-01-29 PROCEDURE — 1111F DSCHRG MED/CURRENT MED MERGE: CPT | Performed by: FAMILY MEDICINE

## 2020-01-29 PROCEDURE — 99495 TRANSJ CARE MGMT MOD F2F 14D: CPT | Performed by: FAMILY MEDICINE

## 2020-01-29 RX ORDER — ACETAMINOPHEN AND CODEINE PHOSPHATE 300; 30 MG/1; MG/1
1 TABLET ORAL 2 TIMES DAILY PRN
Qty: 30 TABLET | Refills: 0 | Status: SHIPPED | OUTPATIENT
Start: 2020-01-29 | End: 2020-02-10 | Stop reason: ALTCHOICE

## 2020-01-30 NOTE — PATIENT INSTRUCTIONS
Continue all meds as prescribed    Let us know if worsening    Otherwise, followup with PCP as planned

## 2020-01-30 NOTE — PROGRESS NOTES
HPI:    Schuyler Guevara is a 44year old female here today for hospital follow up.    She was discharged from Inpatient hospital, [facility] to Home   Admission Date: 1/24/20   Discharge Date: 1/25/20  Hospital Discharge Diagnoses (since 12/30/2019) (VITAMIN D) 50 MCG (2000 UT) Oral Cap, Take 4,000 Units by mouth daily.   PROAIR  (90 Base) MCG/ACT Inhalation Aero Soln, INHALE 2 PUFFS INTO THE LUNGS EVERY 4 HOURS AS NEEDED FOR WHEEZING OR SHORTNESS OF BREATH  UNITHROID 175 MCG Oral Tab, Take 1 ta by mouth daily. aspirin 81 MG Oral Tab, Take 81 mg by mouth daily. Zolpidem Tartrate 5 MG Oral Tab, Take 0.5-1 tablets (2.5-5 mg total) by mouth nightly as needed. No current facility-administered medications on file prior to visit.          HISTORY: grandmother; Other in her mother. She  reports that she has never smoked. She has never used smokeless tobacco. She reports previous alcohol use. She reports that she does not use drugs.      ROS:   GENERAL: weight stable, energy stable, no sweating  SKIN visit:    Cough  Influenza-like illness  Viral URI with cough  Acute viral bronchitis  Other orders  -improving slowly  -refilled     Acetaminophen-Codeine #3 300-30 MG Oral Tab;  Take 1 tablet by mouth 2 (two) times daily as needed (Cough)   -    which is

## 2020-02-03 ENCOUNTER — OFFICE VISIT (OUTPATIENT)
Dept: FAMILY MEDICINE CLINIC | Facility: CLINIC | Age: 40
End: 2020-02-03
Payer: COMMERCIAL

## 2020-02-03 VITALS
RESPIRATION RATE: 16 BRPM | DIASTOLIC BLOOD PRESSURE: 60 MMHG | WEIGHT: 182 LBS | HEART RATE: 92 BPM | HEIGHT: 69 IN | TEMPERATURE: 98 F | OXYGEN SATURATION: 98 % | BODY MASS INDEX: 26.96 KG/M2 | SYSTOLIC BLOOD PRESSURE: 92 MMHG

## 2020-02-03 DIAGNOSIS — B00.1 COLD SORE: ICD-10-CM

## 2020-02-03 DIAGNOSIS — F51.01 PRIMARY INSOMNIA: ICD-10-CM

## 2020-02-03 DIAGNOSIS — G89.29 CHRONIC ABDOMINAL PAIN: ICD-10-CM

## 2020-02-03 DIAGNOSIS — R10.9 CHRONIC ABDOMINAL PAIN: ICD-10-CM

## 2020-02-03 DIAGNOSIS — F41.1 GAD (GENERALIZED ANXIETY DISORDER): ICD-10-CM

## 2020-02-03 DIAGNOSIS — Z20.828 EXPOSURE TO INFLUENZA: Primary | ICD-10-CM

## 2020-02-03 DIAGNOSIS — R50.9 FEVER AND CHILLS: ICD-10-CM

## 2020-02-03 LAB
FLUAV + FLUBV RNA SPEC NAA+PROBE: NEGATIVE
FLUAV + FLUBV RNA SPEC NAA+PROBE: NEGATIVE
FLUAV + FLUBV RNA SPEC NAA+PROBE: POSITIVE

## 2020-02-03 PROCEDURE — 87798 DETECT AGENT NOS DNA AMP: CPT | Performed by: FAMILY MEDICINE

## 2020-02-03 PROCEDURE — 87502 INFLUENZA DNA AMP PROBE: CPT | Performed by: FAMILY MEDICINE

## 2020-02-03 PROCEDURE — 99214 OFFICE O/P EST MOD 30 MIN: CPT | Performed by: FAMILY MEDICINE

## 2020-02-03 RX ORDER — VALACYCLOVIR HYDROCHLORIDE 1 G/1
2 TABLET, FILM COATED ORAL EVERY 12 HOURS SCHEDULED
Qty: 30 TABLET | Refills: 0 | Status: SHIPPED | OUTPATIENT
Start: 2020-02-03 | End: 2020-12-18

## 2020-02-03 RX ORDER — ZOLPIDEM TARTRATE 5 MG/1
TABLET ORAL
Qty: 30 TABLET | Refills: 0 | Status: SHIPPED | OUTPATIENT
Start: 2020-02-03 | End: 2020-03-02

## 2020-02-03 RX ORDER — ALPRAZOLAM 0.25 MG/1
TABLET ORAL
Qty: 60 TABLET | Refills: 0 | Status: SHIPPED | OUTPATIENT
Start: 2020-02-03 | End: 2020-03-02

## 2020-02-03 NOTE — TELEPHONE ENCOUNTER
Pt requesting refill of ZOLPIDEM TARTRATE 5 MG Oral Tab and ALPRAZOLAM 0.25 MG Oral Tab    Last Time Medication was Filled:  11/12/19    Last Office Visit with PCP: 1/29/20  No future appointments.

## 2020-02-04 NOTE — PROGRESS NOTES
HPI:   Shante Dyson is a 44year old female who presents for upper respiratory symptoms for  2  weeks. Patient reports congestion, fever with Tmax to 101.5, dry cough, cough is keeping pt up at night, chest pain from coughing, denies sinus pain.   Gela Herndon Pseudoeph-Doxylamine-DM-APAP (NYQUIL OR) Take 2 capsules by mouth nightly. • guaiFENesin (MUCINEX OR) Take 1 tablet by mouth Q12H.      • [START ON 2/4/2020] Lisdexamfetamine Dimesylate (VYVANSE) 50 MG Oral Cap Take 1 capsule (50 mg total) by mouth stevie of large intestine    • Endometriosis    • Esophageal reflux    • Extrinsic asthma, unspecified     2014 was in ER and was hospitalized over night   • Hx of gastric bypass 12/18/2017   • Hypothyroid    • Hypothyroidism    • Infertility, female    • Organic CHOLECYSTECTOMY N/A 11/23/2016    Performed by Natalie Ma MD at [facility] MAIN OR   • [address]     • LAPAROSCOPY-GYNE N/A 2/18/2015    Performed by Karyle Stanley, MD at [facility] MAIN OR   • OTHER  NECK SCAR REVISION   • OTHER      gastric byp (36.8 °C) (Oral)   Resp 16   Ht 69\"   Wt 182 lb (82.6 kg)   SpO2 98%   BMI 26.88 kg/m²   GENERAL: NAD, pleasant, not ill appearing, not lethargic  SKIN: no visible rashes  EYES: PERRLA, EOMI, conjunctiva are non-injected  Mouth there is a cold sore on the Instructions on file for this visit.

## 2020-02-04 NOTE — PROGRESS NOTES
Positive influenza A if afebrile no antiviral needed get update.   Order future tests/medications sent to my chart

## 2020-02-08 ENCOUNTER — LAB ENCOUNTER (OUTPATIENT)
Dept: LAB | Age: 40
End: 2020-02-08
Attending: OTOLARYNGOLOGY
Payer: COMMERCIAL

## 2020-02-08 DIAGNOSIS — E07.9 THYROID DYSFUNCTION: ICD-10-CM

## 2020-02-08 DIAGNOSIS — J45.40 MODERATE PERSISTENT ASTHMA WITHOUT COMPLICATION: ICD-10-CM

## 2020-02-08 LAB
BASOPHILS # BLD AUTO: 0.03 X10(3) UL (ref 0–0.2)
BASOPHILS NFR BLD AUTO: 0.6 %
DEPRECATED RDW RBC AUTO: 42.2 FL (ref 35.1–46.3)
EOSINOPHIL # BLD AUTO: 0.12 X10(3) UL (ref 0–0.7)
EOSINOPHIL NFR BLD AUTO: 2.2 %
ERYTHROCYTE [DISTWIDTH] IN BLOOD BY AUTOMATED COUNT: 12 % (ref 11–15)
HCT VFR BLD AUTO: 40.5 % (ref 35–48)
HGB BLD-MCNC: 13.2 G/DL (ref 12–16)
IMM GRANULOCYTES # BLD AUTO: 0.01 X10(3) UL (ref 0–1)
IMM GRANULOCYTES NFR BLD: 0.2 %
IMMUNOGLOBULIN E: 10.6 IU/ML (ref 3.6–114)
LYMPHOCYTES # BLD AUTO: 1.66 X10(3) UL (ref 1–4)
LYMPHOCYTES NFR BLD AUTO: 30.7 %
MCH RBC QN AUTO: 31.3 PG (ref 26–34)
MCHC RBC AUTO-ENTMCNC: 32.6 G/DL (ref 31–37)
MCV RBC AUTO: 96 FL (ref 80–100)
MONOCYTES # BLD AUTO: 0.39 X10(3) UL (ref 0.1–1)
MONOCYTES NFR BLD AUTO: 7.2 %
NEUTROPHILS # BLD AUTO: 3.19 X10 (3) UL (ref 1.5–7.7)
NEUTROPHILS # BLD AUTO: 3.19 X10(3) UL (ref 1.5–7.7)
NEUTROPHILS NFR BLD AUTO: 59.1 %
PLATELET # BLD AUTO: 283 10(3)UL (ref 150–450)
RBC # BLD AUTO: 4.22 X10(6)UL (ref 3.8–5.3)
T4 FREE SERPL-MCNC: 1.1 NG/DL (ref 0.8–1.7)
TSI SER-ACNC: 5.62 MIU/ML (ref 0.36–3.74)
WBC # BLD AUTO: 5.4 X10(3) UL (ref 4–11)

## 2020-02-08 PROCEDURE — 84439 ASSAY OF FREE THYROXINE: CPT

## 2020-02-08 PROCEDURE — 84443 ASSAY THYROID STIM HORMONE: CPT

## 2020-02-08 PROCEDURE — 85025 COMPLETE CBC W/AUTO DIFF WBC: CPT

## 2020-02-08 PROCEDURE — 36415 COLL VENOUS BLD VENIPUNCTURE: CPT

## 2020-02-08 PROCEDURE — 82785 ASSAY OF IGE: CPT

## 2020-02-10 ENCOUNTER — OFFICE VISIT (OUTPATIENT)
Dept: FAMILY MEDICINE CLINIC | Facility: CLINIC | Age: 40
End: 2020-02-10
Payer: COMMERCIAL

## 2020-02-10 VITALS
HEIGHT: 68.9 IN | HEART RATE: 104 BPM | OXYGEN SATURATION: 99 % | WEIGHT: 188 LBS | SYSTOLIC BLOOD PRESSURE: 115 MMHG | TEMPERATURE: 98 F | BODY MASS INDEX: 27.85 KG/M2 | DIASTOLIC BLOOD PRESSURE: 60 MMHG

## 2020-02-10 DIAGNOSIS — J11.1 INFLUENZA: Primary | ICD-10-CM

## 2020-02-10 DIAGNOSIS — R05.9 COUGH: ICD-10-CM

## 2020-02-10 PROCEDURE — 99214 OFFICE O/P EST MOD 30 MIN: CPT | Performed by: FAMILY MEDICINE

## 2020-02-10 RX ORDER — PREDNISONE 20 MG/1
TABLET ORAL
Qty: 21 TABLET | Refills: 0 | Status: SHIPPED | OUTPATIENT
Start: 2020-02-10 | End: 2020-03-05

## 2020-02-10 RX ORDER — AZELASTINE 1 MG/ML
2 SPRAY, METERED NASAL 2 TIMES DAILY
Qty: 30 ML | Refills: 1 | Status: SHIPPED | OUTPATIENT
Start: 2020-02-10 | End: 2020-04-30

## 2020-02-10 NOTE — PATIENT INSTRUCTIONS
-start prednisone taper  -continue inhalers  -change flonase to once daily in am   -start azelastine nasal spray daily in PM    Followup with Carlos Holbrook in 1 wk

## 2020-02-10 NOTE — PROGRESS NOTES
Kiara Carolina is a 44year old female here for Patient presents with: Follow - Up: Patient was here a week ago for the flu. Tested positive for flu A. Patient is coughing a lot and her chest hurts. HPI:       1. Influenza  2.  Cough  -here to fo Krystle Lazo MD at [address] [place] [address] ENDOSCOPY   • ESOPHAGOGASTRODUODENOSCOPY (EGD) N/A 1/29/2018    Performed by Candida Goodson MD at [address] [place] [address] ENDOSCOPY   • ESOPHAGOGASTRODUODENOSCOPY, POSSIBLE BIOPSY, POSSIBLE POLYPECTOMY 19657 N/A 12/19/2015    Performed b Grandmother 76   • Cancer Neg    • Stroke Neg       Social History: Social History    Tobacco Use      Smoking status: Never Smoker      Smokeless tobacco: Never Used    Alcohol use: Not Currently      Alcohol/week: 0.0 standard drinks      Frequency: Yenifer Diaz Amitriptyline HCl 25 MG Oral Tab Take 1 tablet (25 mg total) by mouth nightly. SCHEDULE AN APPOINTMENT FOR FURTHER REFILLS 30 tablet 5   • Calcium Citrate 250 MG Oral Tab Take 1 tablet by mouth 2 (two) times daily.      • topiramate 25 MG Oral Tab Increase Denies  --SKIN: Denies  All other systems reviewed are negative    PHYSICAL EXAM:   /60 (BP Location: Left arm, Patient Position: Sitting, Cuff Size: adult)   Pulse 104   Temp 97.6 °F (36.4 °C) (Oral)   Ht 68.9\"   Wt 188 lb (85.3 kg)   SpO2 99%   BM

## 2020-02-10 NOTE — PROGRESS NOTES
The biologic markers for asthma (Ige, eosinophil count) were in the normal range. Continue current inhalers.   Message sent to patient via [address]

## 2020-02-11 NOTE — PROGRESS NOTES
Please call and inform thyroid levels are improving, if still on unithroid 175 increase to 188mcg and repeat tsh ft4 in 6 weeks

## 2020-02-12 ENCOUNTER — OFFICE VISIT (OUTPATIENT)
Dept: FAMILY MEDICINE CLINIC | Facility: CLINIC | Age: 40
End: 2020-02-12
Payer: COMMERCIAL

## 2020-02-12 ENCOUNTER — TELEPHONE (OUTPATIENT)
Dept: PHYSICAL THERAPY | Facility: HOSPITAL | Age: 40
End: 2020-02-12

## 2020-02-12 ENCOUNTER — TELEPHONE (OUTPATIENT)
Dept: SPEECH THERAPY | Facility: HOSPITAL | Age: 40
End: 2020-02-12

## 2020-02-12 VITALS
TEMPERATURE: 98 F | SYSTOLIC BLOOD PRESSURE: 118 MMHG | HEIGHT: 67.72 IN | DIASTOLIC BLOOD PRESSURE: 66 MMHG | OXYGEN SATURATION: 98 % | WEIGHT: 189 LBS | BODY MASS INDEX: 28.98 KG/M2 | HEART RATE: 98 BPM

## 2020-02-12 DIAGNOSIS — J11.1 INFLUENZA: ICD-10-CM

## 2020-02-12 DIAGNOSIS — R05.9 COUGH: Primary | ICD-10-CM

## 2020-02-12 PROCEDURE — 99214 OFFICE O/P EST MOD 30 MIN: CPT | Performed by: FAMILY MEDICINE

## 2020-02-12 RX ORDER — ACETAMINOPHEN AND CODEINE PHOSPHATE 300; 30 MG/1; MG/1
1 TABLET ORAL 2 TIMES DAILY PRN
Qty: 30 TABLET | Refills: 0 | Status: SHIPPED | OUTPATIENT
Start: 2020-02-12 | End: 2020-03-05

## 2020-02-12 NOTE — PROGRESS NOTES
CC:  Bryson Walters is a 44year old female here for Patient presents with: Follow - Up: Cough with phlegms, no fever, bodyache follow up. Not feeling better. HPI:     1. Cough  2.  Influenza  -started prednisone 2 days ago  -feels like cough wors TAKE 1 TABLET(40 MG) BY MOUTH TWICE DAILY BEFORE MEALS 60 tablet 2   • Nystatin 807386 UNIT/GM External Powder Apply 1 Application topically 4 (four) times daily.  Apply to affected areas 30 g 1   • Pseudoeph-Doxylamine-DM-APAP (NYQUIL OR) Take 2 capsules b Comment:Hives and fever  Mold                        Comment:Verified by skin testing  Nsaids                  OTHER (SEE COMMENTS)    Comment:Upset stomach             Hx of gastric bypass  Reglan [Metoclopram*        Comment:Reglan with isaelr D & C      x4   • DILATION/CURETTAGE,DIAGNOSTIC     • ESOPHAGOGASTRODUODENOSCOPY (EGD) N/A 10/4/2018    Performed by Tara Manrique MD at [facility] ENDOSCOPY   • ESOPHAGOGASTRODUODENOSCOPY (EGD) N/A 1/29/2018    Performed by Tara Manrique, Alcohol/week: 0.0 standard drinks      Frequency: Never      Binge frequency: Never    Drug use: No          EXAM:   /66 (BP Location: Left arm, Patient Position: Sitting, Cuff Size: adult)   Pulse 98   Temp 98.3 °F (36.8 °C) (Oral)   Ht 67.72\"   Wt

## 2020-02-12 NOTE — PROGRESS NOTES
Triage informed pt of results and recommendations per KO. Pt requested MADISYN's recommendation. Per MADISYN: agree to increase to 188 mcg and repeat labs in 6 weeks. Informed pt, verb understanding. Rx sent and future labs ordered.

## 2020-02-13 ENCOUNTER — APPOINTMENT (OUTPATIENT)
Dept: SPEECH THERAPY | Facility: HOSPITAL | Age: 40
End: 2020-02-13
Attending: FAMILY MEDICINE
Payer: COMMERCIAL

## 2020-02-17 ENCOUNTER — TELEPHONE (OUTPATIENT)
Dept: FAMILY MEDICINE CLINIC | Facility: CLINIC | Age: 40
End: 2020-02-17

## 2020-02-17 ENCOUNTER — PATIENT MESSAGE (OUTPATIENT)
Dept: FAMILY MEDICINE CLINIC | Facility: CLINIC | Age: 40
End: 2020-02-17

## 2020-02-17 ENCOUNTER — OFFICE VISIT (OUTPATIENT)
Dept: FAMILY MEDICINE CLINIC | Facility: CLINIC | Age: 40
End: 2020-02-17
Payer: COMMERCIAL

## 2020-02-17 VITALS
WEIGHT: 191 LBS | SYSTOLIC BLOOD PRESSURE: 104 MMHG | HEIGHT: 67.72 IN | BODY MASS INDEX: 29.29 KG/M2 | HEART RATE: 108 BPM | DIASTOLIC BLOOD PRESSURE: 70 MMHG

## 2020-02-17 DIAGNOSIS — K29.00 ACUTE SUPERFICIAL GASTRITIS WITHOUT HEMORRHAGE: Primary | ICD-10-CM

## 2020-02-17 PROBLEM — I87.1 MAY-THURNER SYNDROME: Status: ACTIVE | Noted: 2020-02-17

## 2020-02-17 PROCEDURE — 99214 OFFICE O/P EST MOD 30 MIN: CPT | Performed by: FAMILY MEDICINE

## 2020-02-17 RX ORDER — ASCORBIC ACID 500 MG
500 TABLET ORAL DAILY
COMMUNITY
End: 2020-04-30

## 2020-02-17 RX ORDER — SUCRALFATE ORAL 1 G/10ML
1 SUSPENSION ORAL
Qty: 420 ML | Refills: 1 | Status: SHIPPED | OUTPATIENT
Start: 2020-02-17 | End: 2020-04-30 | Stop reason: ALTCHOICE

## 2020-02-17 NOTE — PATIENT INSTRUCTIONS
Heartburn   What is heartburn? Heartburn refers to the symptoms you feel when acids in your stomach flow backward into the esophagus. The esophagus is the tube that carries food from your throat to your stomach. Heartburn is a common problem.  Despite i the antacid wears off. Heartburn wakes you up at night. What are the symptoms? The main symptom of heartburn is a burning pain in the lower chest, usually close to the bottom of the breastbone.  Other symptoms you may have are:   acid or sour taste in the symptoms, your healthcare provider may prescribe medicine. The prescription medicines help reduce stomach acid. They also help stomach emptying. A very few people who are not helped with medicines may need surgery.    Get emergency care if the following What are the symptoms? You may have:   A pain in the upper abdomen that gnaws or burns. A pain that gets better when you eat or take antacids.    A pain that may be worse just before meals or that may get worse a couple of hours after meals   A pain t cigarettes. You may also need to stop taking some medicines, such as aspirin, ibuprofen, and naproxen. Take your medicine as your provider directs. If you have any problems, tell your provider right away. How long will it take to get well?    Stomach

## 2020-02-17 NOTE — TELEPHONE ENCOUNTER
PA initiated for Sucralfate 1GM/10ML suspension    Key: [id number]    Your information has been submitted to [employer]. [employer] is reviewing the PA request and you will receive an electronic response.  You may check for the updated outcome later by reo

## 2020-02-17 NOTE — PROGRESS NOTES
Nelly Espinal is a 44year old female. HPI:   Patient is in for follow-up on cough that was persistent secondary to type a influenza. Cough is better is on the first day of the 20 mg prednisone taper.   Was started at 60 mg for 3 days then 40 mg for Azelastine HCl 0.1 % Nasal Solution 2 sprays by Nasal route 2 (two) times daily.  30 mL 1   • ZOLPIDEM TARTRATE 5 MG Oral Tab TAKE 1/2 TO 1 TABLET(2.5 TO 5 MG) BY MOUTH NIGHTLY AS NEEDED 30 tablet 0   • ALPRAZOLAM 0.25 MG Oral Tab TAKE 1 TABLET BY MOUTH TWI Solution Take 3 mL by nebulization every 6 (six) hours as needed.  (Patient taking differently: Take 3 mL by nebulization every 4 (four) hours as needed.  ) 1 Container 0   • CONTOUR NEXT TEST In Vitro Strip USE TWICE DAILY AS DIRECTED 200 strip 3   • NEYDA adult)   Pulse 108   Ht 67.72\"   Wt 191 lb (86.6 kg)   LMP 05/01/2013   BMI 29.28 kg/m²   GENERAL: well developed, well nourished,in no apparent distress  SKIN: no rashes,no suspicious lesions  HEENT: TM clear bilaterally, nose no congestion, throat clear spent on counseling regarding medical conditions and treatment. The patient indicates understanding of these issues and agrees to the plan. The patient is asked to return in 1 week if symptoms worse otherwise 1 month for follow-up.

## 2020-02-18 NOTE — TELEPHONE ENCOUNTER
She had bariatric surgery and does not do well with tablets secondary to the absorption issues. .   Can place diagnosis of peptic ulcer due to oral steroids

## 2020-02-18 NOTE — TELEPHONE ENCOUNTER
PA has been DENIED for Sucralfate. Case# [id number]  ID# [id number]    Per plan, patient must have a condition that is approved by the FDA for the requested drug.    Also, provider must give information showing that using the tablet/capsule form is not cli

## 2020-02-20 ENCOUNTER — PATIENT MESSAGE (OUTPATIENT)
Dept: FAMILY MEDICINE CLINIC | Facility: CLINIC | Age: 40
End: 2020-02-20

## 2020-02-20 NOTE — TELEPHONE ENCOUNTER
From: Cara Filler  To: Shadi Person PA-C  Sent: 2/20/2020 8:54 AM CST  Subject: Non-Urgent Medical Question    Umang Suarez,   Sorry I have not gotten back about the carafate. I actually found another bottle in my closet so I am good with that.

## 2020-02-27 ENCOUNTER — OFFICE VISIT (OUTPATIENT)
Dept: SPEECH THERAPY | Facility: HOSPITAL | Age: 40
End: 2020-02-27
Attending: FAMILY MEDICINE
Payer: COMMERCIAL

## 2020-02-27 PROCEDURE — 92524 BEHAVRAL QUALIT ANALYS VOICE: CPT

## 2020-02-28 NOTE — PROGRESS NOTES
VOCAL CORD DYSFUNCTION EVALUATION:   Referring Physician: Anaid AMEZQUITA  Diagnosis:  Spasmodic cough (R05); Vocal cord dysfunction (J38.3)   Date of Service: 2/27/2020   VCD evaluation completed per PA order.   Patient arrived to session on time and • Esophageal reflux    • Extrinsic asthma, unspecified     2014 was in ER and was hospitalized over night   • Hx of gastric bypass 12/18/2017   • Hypothyroid    • Hypothyroidism    • Infertility, female    • Organic hypersomnia, unspecified DMG DX 11-2-1 daily as needed (headache). , Disp: 20 tablet, Rfl: 0  LYRICA 75 MG Oral Cap, , Disp: , Rfl:   Cholecalciferol (VITAMIN D) 50 MCG (2000 UT) Oral Cap, Take 4,000 Units by mouth daily. , Disp: , Rfl:   PROAIR  (90 Base) MCG/ACT Inhalation Aero Soln, INH Strip, USE TWICE DAILY AS DIRECTED, Disp: 200 strip, Rfl: 3  NEYDA MICROLET LANCETS Does not apply Misc, Use as directed to check blood sugars as needed, Disp: 1 Box, Rfl: 0  Magnesium 100 MG Oral Tab, Take 1 tablet by mouth daily.   , Disp: , Rfl:   NON FO treatment plan update will be submitted and further recommendations will be made, as appropriate.   2. Therapy will target easy breathing strategies and methods for preventing/remediating a VCD/chronic cough episode. A home exercise program will also be pro care.    X___________________________________________________ Date____________________    Certification From: 5/77/0192  To:5/27/2020

## 2020-03-02 DIAGNOSIS — G89.29 CHRONIC ABDOMINAL PAIN: ICD-10-CM

## 2020-03-02 DIAGNOSIS — R10.9 CHRONIC ABDOMINAL PAIN: ICD-10-CM

## 2020-03-02 DIAGNOSIS — F51.01 PRIMARY INSOMNIA: ICD-10-CM

## 2020-03-02 DIAGNOSIS — F41.1 GAD (GENERALIZED ANXIETY DISORDER): ICD-10-CM

## 2020-03-02 RX ORDER — ALPRAZOLAM 0.25 MG/1
TABLET ORAL
Qty: 60 TABLET | Refills: 0 | Status: SHIPPED | OUTPATIENT
Start: 2020-03-02 | End: 2020-03-30

## 2020-03-02 RX ORDER — TOPIRAMATE 25 MG/1
TABLET ORAL
Qty: 30 TABLET | Refills: 2 | OUTPATIENT
Start: 2020-03-02

## 2020-03-02 RX ORDER — TOPIRAMATE 25 MG/1
25 TABLET ORAL NIGHTLY
Qty: 90 TABLET | Refills: 3 | Status: SHIPPED | OUTPATIENT
Start: 2020-03-02 | End: 2020-09-16

## 2020-03-02 RX ORDER — ZOLPIDEM TARTRATE 5 MG/1
TABLET ORAL
Qty: 30 TABLET | Refills: 0 | Status: SHIPPED | OUTPATIENT
Start: 2020-03-02 | End: 2020-03-30

## 2020-03-02 NOTE — TELEPHONE ENCOUNTER
Pt requesting refill of Alprazolam and Zolpidem     Last Time Medication was Filled:  2/3/20    Last Office Visit with Provider: 2/17/20    Appt scheduled on 3/5/20

## 2020-03-03 DIAGNOSIS — G44.221 CHRONIC TENSION-TYPE HEADACHE, INTRACTABLE: ICD-10-CM

## 2020-03-03 RX ORDER — BUTALBITAL/ACETAMINOPHEN 50MG-325MG
1 TABLET ORAL 2 TIMES DAILY PRN
Qty: 20 TABLET | Refills: 0 | Status: SHIPPED | OUTPATIENT
Start: 2020-03-03 | End: 2020-04-02

## 2020-03-03 NOTE — PROGRESS NOTES
Multiple attempts to reach the pt and messages left with no returned phone call. Pt went to [facility] with PCP on 1/29/20.

## 2020-03-05 ENCOUNTER — APPOINTMENT (OUTPATIENT)
Dept: SPEECH THERAPY | Facility: HOSPITAL | Age: 40
End: 2020-03-05
Payer: COMMERCIAL

## 2020-03-05 PROBLEM — E87.3 RESPIRATORY ALKALOSIS: Status: RESOLVED | Noted: 2020-01-24 | Resolved: 2020-03-05

## 2020-03-05 PROBLEM — E87.3 METABOLIC ALKALOSIS: Status: RESOLVED | Noted: 2020-01-24 | Resolved: 2020-03-05

## 2020-03-05 PROBLEM — R06.00 DYSPNEA, UNSPECIFIED TYPE: Status: RESOLVED | Noted: 2020-01-24 | Resolved: 2020-03-05

## 2020-03-05 PROBLEM — J06.9 VIRAL URI WITH COUGH: Status: RESOLVED | Noted: 2020-01-24 | Resolved: 2020-03-05

## 2020-03-05 NOTE — PATIENT INSTRUCTIONS
Dose down on Mirtazapine do 1/2 dose  for 1-2 weeks  Then every other night for 1-2 weeks. Then see if Vyvanse is even needed.

## 2020-03-05 NOTE — PROGRESS NOTES
Shante Dyson is a 44year old female. HPI:   Patient is in for follow-up on multiple issues    Insomnia  On Ambien tries to take 2.5 mg unless she is on steroids increases it.   Mirtazapine was initially prescribed secondary to nausea, decreased appe willing to taper off in the summer when she is exercising more.       Hypothyroidism history of thyroid cancer  Patient states her more recent thyroid test started off and she needs referral to endocrinologist was seeing Dr. Red Santos tablet by mouth Q12H. • Lisdexamfetamine Dimesylate (VYVANSE) 50 MG Oral Cap Take 1 capsule (50 mg total) by mouth daily.  30 capsule 0   • mirtazapine 7.5 MG Oral Tab TAKE 1 TABLET(7.5 MG) BY MOUTH EVERY NIGHT 30 tablet 5   • Amitriptyline HCl 25 MG Or elbow    • Unspecified disorder of thyroid    • Visual impairment     glasses      Social History:  Social History    Tobacco Use      Smoking status: Never Smoker      Smokeless tobacco: Never Used    Alcohol use: Not Currently      Alcohol/week: 0.0 brian this encounter.       Meds & Refills for this Visit:  Requested Prescriptions      No prescriptions requested or ordered in this encounter       Imaging & Consults:  ENDOCRINOLOGY - INTERNAL  1. OMAR (generalized anxiety disorder)  For now taper down to half

## 2020-03-12 ENCOUNTER — OFFICE VISIT (OUTPATIENT)
Dept: SPEECH THERAPY | Facility: HOSPITAL | Age: 40
End: 2020-03-12
Attending: NURSE PRACTITIONER
Payer: COMMERCIAL

## 2020-03-12 PROCEDURE — 92507 TX SP LANG VOICE COMM INDIV: CPT

## 2020-03-12 NOTE — PROGRESS NOTES
Treatment #2 (PPO; PN due 5/27/20)   Treatment Time: 60 minutes  Precautions: none     Charges: 1 billed (83637)  Pain: 0/10      Diagnosis: Spasmodic cough (R05); Vocal cord dysfunction (J38.3)        Subjective: Patient arrived to session on time.   She a poor response to medication, SOB/stridor upon inhalation, and laryngeal tightness/tension. Plan: Patient reported that she is familiar with strategies as they have been addressed with therapist for the past year.   She stated she would like time to con

## 2020-03-19 ENCOUNTER — PATIENT MESSAGE (OUTPATIENT)
Dept: FAMILY MEDICINE CLINIC | Facility: CLINIC | Age: 40
End: 2020-03-19

## 2020-03-19 ENCOUNTER — APPOINTMENT (OUTPATIENT)
Dept: SPEECH THERAPY | Facility: HOSPITAL | Age: 40
End: 2020-03-19
Payer: COMMERCIAL

## 2020-03-20 NOTE — TELEPHONE ENCOUNTER
From: Jef Betancourt  To: Chloe Murray PA-C  Sent: 3/19/2020 5:35 PM CDT  Subject: Non-Urgent Medical Question    Hello, I got my EOB for speech therapy and they didn't cover my $650 first visit and I don't about the 2nd yet.  I called [employer] and the

## 2020-03-26 ENCOUNTER — APPOINTMENT (OUTPATIENT)
Dept: SPEECH THERAPY | Facility: HOSPITAL | Age: 40
End: 2020-03-26
Payer: COMMERCIAL

## 2020-03-26 ENCOUNTER — PATIENT MESSAGE (OUTPATIENT)
Dept: FAMILY MEDICINE CLINIC | Facility: CLINIC | Age: 40
End: 2020-03-26

## 2020-03-26 DIAGNOSIS — F51.01 PRIMARY INSOMNIA: ICD-10-CM

## 2020-03-26 DIAGNOSIS — F41.1 GAD (GENERALIZED ANXIETY DISORDER): ICD-10-CM

## 2020-03-26 DIAGNOSIS — G89.29 CHRONIC ABDOMINAL PAIN: ICD-10-CM

## 2020-03-26 DIAGNOSIS — R10.9 CHRONIC ABDOMINAL PAIN: ICD-10-CM

## 2020-03-26 RX ORDER — ZOLPIDEM TARTRATE 5 MG/1
TABLET ORAL
Qty: 30 TABLET | Refills: 0 | OUTPATIENT
Start: 2020-03-26

## 2020-03-26 RX ORDER — ALPRAZOLAM 0.25 MG/1
TABLET ORAL
Qty: 60 TABLET | Refills: 0 | OUTPATIENT
Start: 2020-03-26

## 2020-03-26 NOTE — TELEPHONE ENCOUNTER
Refill request for ALPRAZOLAM 0.25 AND ZOLPIDEM 5MG. Last refill was 3/2/20. Request too soon. Denied.

## 2020-03-26 NOTE — TELEPHONE ENCOUNTER
From: Mark Priest  To: Ciera Yin PA-C  Sent: 3/26/2020 10:31 AM CDT  Subject: Prescription Question    Hello,   I put a request through for two of my prescriptions to be refilled, and they came back denied.  They aren’t supposed to be refille

## 2020-03-30 ENCOUNTER — PATIENT MESSAGE (OUTPATIENT)
Dept: FAMILY MEDICINE CLINIC | Facility: CLINIC | Age: 40
End: 2020-03-30

## 2020-03-30 DIAGNOSIS — F41.1 GAD (GENERALIZED ANXIETY DISORDER): ICD-10-CM

## 2020-03-30 DIAGNOSIS — R10.9 CHRONIC ABDOMINAL PAIN: ICD-10-CM

## 2020-03-30 DIAGNOSIS — G89.29 CHRONIC ABDOMINAL PAIN: ICD-10-CM

## 2020-03-30 DIAGNOSIS — F51.01 PRIMARY INSOMNIA: ICD-10-CM

## 2020-03-30 RX ORDER — ZOLPIDEM TARTRATE 5 MG/1
TABLET ORAL NIGHTLY PRN
Qty: 30 TABLET | Refills: 2 | Status: SHIPPED | OUTPATIENT
Start: 2020-03-30 | End: 2020-06-22

## 2020-03-30 RX ORDER — PANTOPRAZOLE SODIUM 40 MG/1
TABLET, DELAYED RELEASE ORAL
Qty: 60 TABLET | Refills: 0 | Status: SHIPPED | OUTPATIENT
Start: 2020-03-30 | End: 2020-04-27

## 2020-03-30 RX ORDER — ALPRAZOLAM 0.25 MG/1
0.25 TABLET ORAL 2 TIMES DAILY PRN
Qty: 60 TABLET | Refills: 2 | Status: SHIPPED | OUTPATIENT
Start: 2020-03-30 | End: 2020-06-22

## 2020-03-30 RX ORDER — ALPRAZOLAM 0.25 MG/1
TABLET ORAL
Qty: 60 TABLET | Refills: 0 | OUTPATIENT
Start: 2020-03-30

## 2020-03-30 RX ORDER — ZOLPIDEM TARTRATE 5 MG/1
TABLET ORAL
Qty: 30 TABLET | Refills: 0 | OUTPATIENT
Start: 2020-03-30

## 2020-03-30 RX ORDER — ALPRAZOLAM 0.25 MG/1
TABLET ORAL
Qty: 120 TABLET | Refills: 0 | OUTPATIENT
Start: 2020-03-30

## 2020-03-30 NOTE — TELEPHONE ENCOUNTER
From: Agusto Jaimes  To: Lluvia Guerrero PA-C  Sent: 3/30/2020 2:28 PM CDT  Subject: Prescription Question    Hello,  Can you tell me why the Ambien and Xanax was denied again?  I know it was denied last week, because it was too soon, but when I call

## 2020-04-01 ENCOUNTER — TELEPHONE (OUTPATIENT)
Dept: FAMILY MEDICINE CLINIC | Facility: CLINIC | Age: 40
End: 2020-04-01

## 2020-04-01 DIAGNOSIS — K91.2 HYPOGLYCEMIA FOLLOWING GASTROINTESTINAL SURGERY: Primary | ICD-10-CM

## 2020-04-01 DIAGNOSIS — F41.1 GAD (GENERALIZED ANXIETY DISORDER): ICD-10-CM

## 2020-04-01 DIAGNOSIS — G44.221 CHRONIC TENSION-TYPE HEADACHE, INTRACTABLE: ICD-10-CM

## 2020-04-01 DIAGNOSIS — Z79.899 MEDICATION MANAGEMENT: ICD-10-CM

## 2020-04-01 NOTE — TELEPHONE ENCOUNTER
Patient states she is watching her blood sugar and meals. States even with eating she can't get it above 90. Patient will continue to monitor and report to ED should symptoms persist or worsen.      Patient has consented to an E-visit if recommended by

## 2020-04-01 NOTE — TELEPHONE ENCOUNTER
Patient called states her sugar level was a 40 yesterday and the highest it was today was a 92. Patient states being light headed. Patient ok with E-Visit if needed.

## 2020-04-02 PROCEDURE — 99213 OFFICE O/P EST LOW 20 MIN: CPT | Performed by: FAMILY MEDICINE

## 2020-04-02 RX ORDER — ESCITALOPRAM OXALATE 10 MG/1
TABLET ORAL
Qty: 90 TABLET | Refills: 0 | Status: SHIPPED | OUTPATIENT
Start: 2020-04-02 | End: 2020-06-30

## 2020-04-02 RX ORDER — BUTALBITAL/ACETAMINOPHEN 50MG-325MG
1 TABLET ORAL 2 TIMES DAILY PRN
Qty: 20 TABLET | Refills: 0 | Status: SHIPPED | OUTPATIENT
Start: 2020-04-02 | End: 2020-04-27

## 2020-04-08 ENCOUNTER — TELEPHONE (OUTPATIENT)
Dept: FAMILY MEDICINE CLINIC | Facility: CLINIC | Age: 40
End: 2020-04-08

## 2020-04-08 DIAGNOSIS — Z87.01 HISTORY OF BACTERIAL PNEUMONIA: ICD-10-CM

## 2020-04-08 DIAGNOSIS — J38.3 VOCAL CORD DYSFUNCTION: ICD-10-CM

## 2020-04-08 DIAGNOSIS — J45.41 MODERATE PERSISTENT ASTHMA WITH ACUTE EXACERBATION: Primary | ICD-10-CM

## 2020-04-08 PROCEDURE — 99213 OFFICE O/P EST LOW 20 MIN: CPT | Performed by: FAMILY MEDICINE

## 2020-04-08 RX ORDER — AZITHROMYCIN 250 MG/1
TABLET, FILM COATED ORAL
Qty: 6 TABLET | Refills: 0 | Status: SHIPPED | OUTPATIENT
Start: 2020-04-08 | End: 2020-04-13

## 2020-04-08 NOTE — TELEPHONE ENCOUNTER
Virtual/Telephone Check-In    COVID SCREENING FOR PATIENTS:  Symptoms:  Do you have a fever above 100. 4? No  If Yes, current temp:   Do you have a cough? Yes  Are you having any shortness of Breath? Yes  Do you have any other signs/symptoms of illness?  No Presently denies any sore throat no change in smell or taste. States overall she is not struggling to breathe heart rate is going up to 104 but is also using albuterol Nebules every 4 hours.   OBJECTIVE  This visit is conducted using tele-medicine with diana (ZITHROMAX Z-RHONDA) 250 MG Oral Tab; Take 2 tablets (500 mg total) by mouth daily for 1 day, THEN 1 tablet (250 mg total) daily for 4 days. Dispense: 6 tablet; Refill: 0    2.  History of bacterial pneumonia  - azithromycin (ZITHROMAX Z-RHONDA) 250 MG Oral Tab;

## 2020-04-08 NOTE — TELEPHONE ENCOUNTER
Pt said her heart rate is up, she is coughing, low grade temp 99.5 and her oxygen levels are inconsistent. She has not been out of the house either.     No travel  [place] 7 on chart  Ins verified  Pt aware of unavailable call

## 2020-04-09 ENCOUNTER — TELEPHONE (OUTPATIENT)
Dept: FAMILY MEDICINE CLINIC | Facility: CLINIC | Age: 40
End: 2020-04-09

## 2020-04-09 NOTE — TELEPHONE ENCOUNTER
Message to Prescriber     Drug: AZITHROMYCIN 250 MG TABLETS 6-RHONDA     Sig: TAKE 2 TABLETS(500 MG) BY MOUTH DAILY FOR 1 DAY THEN TAKE 1 TABLET(250 MG) BY MOUTH FOR 4 DAYS. Prescribed Qty: 6     Message:    pt has allergy on file for Zpak.   Please offer

## 2020-04-09 NOTE — TELEPHONE ENCOUNTER
Called the pharmacy to discuss. Per Maribel Singh PA-C, the following information was written on the prescription, \"Note to Pharmacy:   Wants to try again only side effect was leg movement with discomfort which she felt was probably the flu. \"  The p

## 2020-04-10 DIAGNOSIS — J38.3 VOCAL CORD DYSFUNCTION: ICD-10-CM

## 2020-04-10 DIAGNOSIS — F51.01 PRIMARY INSOMNIA: ICD-10-CM

## 2020-04-12 DIAGNOSIS — F51.01 PRIMARY INSOMNIA: ICD-10-CM

## 2020-04-12 DIAGNOSIS — F41.1 GAD (GENERALIZED ANXIETY DISORDER): ICD-10-CM

## 2020-04-13 RX ORDER — PREGABALIN 75 MG/1
CAPSULE ORAL
Qty: 60 CAPSULE | Refills: 2 | Status: SHIPPED | OUTPATIENT
Start: 2020-04-13 | End: 2020-06-30

## 2020-04-13 RX ORDER — MIRTAZAPINE 7.5 MG/1
3.75 TABLET, FILM COATED ORAL NIGHTLY
Qty: 15 TABLET | Refills: 2 | Status: SHIPPED | OUTPATIENT
Start: 2020-04-13 | End: 2020-07-07

## 2020-04-13 RX ORDER — AMITRIPTYLINE HYDROCHLORIDE 25 MG/1
TABLET, FILM COATED ORAL
Qty: 30 TABLET | Refills: 5 | Status: SHIPPED | OUTPATIENT
Start: 2020-04-13 | End: 2020-04-20

## 2020-04-13 NOTE — TELEPHONE ENCOUNTER
Virtual visit 4/8/20      Pt requesting refill of AMITRIPTYLINE HCL 25 MG Oral Tab and PREGABALIN 75 MG Oral Cap    Last Office Visit with Provider: 3/5/20  No future appointments.

## 2020-04-13 NOTE — TELEPHONE ENCOUNTER
Virtual visit 4/8/20    Pt requesting refill of Mirtazapine     Last Office Visit with Provider: 3/5/20  No future appointments.

## 2020-04-14 NOTE — TELEPHONE ENCOUNTER
Message to Prescriber       Message:  md wrote 2 sets of directions. Please fax back to clarify [if] md wants 1/2 tablet or 1 tablet. Thanks.

## 2020-04-17 PROBLEM — F43.9 STRESS: Status: ACTIVE | Noted: 2020-04-17

## 2020-04-17 PROBLEM — E66.3 OVERWEIGHT FOR HEIGHT: Status: ACTIVE | Noted: 2018-12-17

## 2020-04-17 PROBLEM — K91.2 HYPOGLYCEMIA FOLLOWING GASTROINTESTINAL SURGERY: Status: ACTIVE | Noted: 2020-04-17

## 2020-04-17 PROBLEM — K91.2: Status: ACTIVE | Noted: 2020-04-17

## 2020-04-17 NOTE — PROGRESS NOTES
Virtual Telephone Check-In    Angela Goodwin verbally consents to a Virtual/Telephone Check-In visit on 04/17/20. Patient understands and accepts financial responsibility for any deductible, co-insurance and/or co-pays associated with this service. daily.          Breann Henry MD

## 2020-04-19 ENCOUNTER — TELEPHONE (OUTPATIENT)
Dept: FAMILY MEDICINE CLINIC | Facility: CLINIC | Age: 40
End: 2020-04-19

## 2020-04-19 NOTE — TELEPHONE ENCOUNTER
Called on call service x 2 on 4/17 and 4/18    Both times complained of tachycardia and chest tightness (different from her asthma)  -her only other symptom has been headache that has been ongoing for past 3-4 days  -hadn't tried inhaler due to fast HR  -d

## 2020-04-20 ENCOUNTER — VIRTUAL PHONE E/M (OUTPATIENT)
Dept: FAMILY MEDICINE CLINIC | Facility: CLINIC | Age: 40
End: 2020-04-20
Payer: COMMERCIAL

## 2020-04-20 DIAGNOSIS — F51.01 PRIMARY INSOMNIA: ICD-10-CM

## 2020-04-20 DIAGNOSIS — J38.3 VOCAL CORD DYSFUNCTION: ICD-10-CM

## 2020-04-20 DIAGNOSIS — G44.221 CHRONIC TENSION-TYPE HEADACHE, INTRACTABLE: Primary | ICD-10-CM

## 2020-04-20 DIAGNOSIS — F41.1 GAD (GENERALIZED ANXIETY DISORDER): ICD-10-CM

## 2020-04-20 DIAGNOSIS — R00.0 TACHYCARDIA: ICD-10-CM

## 2020-04-20 DIAGNOSIS — J45.41 MODERATE PERSISTENT ASTHMA WITH ACUTE EXACERBATION: ICD-10-CM

## 2020-04-20 PROCEDURE — 99214 OFFICE O/P EST MOD 30 MIN: CPT | Performed by: FAMILY MEDICINE

## 2020-04-20 RX ORDER — AMITRIPTYLINE HYDROCHLORIDE 25 MG/1
12.5 TABLET, FILM COATED ORAL NIGHTLY
Qty: 30 TABLET | Refills: 5 | COMMUNITY
Start: 2020-04-20 | End: 2020-09-29

## 2020-04-20 NOTE — PROGRESS NOTES
Virtual/Telephone Check-In    Alis Hernadez is a 44year old female here today for a telemedicine/video visit. Patient understands and accepts financial responsibility for any deductible, co-insurance and/or co-pays associated with this service. labored breathing or appreciable shortness of breath, no coughing during duration of visit  Psych: normal affect      HISTORY:       Past Medical History:   Diagnosis Date   • Anxiety    • Anxiety state, unspecified    • Arthritis    • Asthma    • Calculus • GASTRIC BYPASS,OBESE<100CM MARCIO-EN-Y  12/18/2017    DR. SEGAL   • GASTROC RECESSION FOOT Right 6/6/2017    Performed by Adalberto Ordonez MD at [facility]   • [address] [place] [address] [place] Left 6/19/2015    Performed by Adalberto Ordonez MD Dimesylate (VYVANSE) 50 MG Oral Cap Take 1 capsule (50 mg total) by mouth daily. 30 capsule 0   • [START ON 5/18/2020] Lisdexamfetamine Dimesylate (VYVANSE) 50 MG Oral Cap Take 1 capsule (50 mg total) by mouth daily.  30 capsule 0   • [START ON 6/18/2020] L by mouth 4 (four) times daily before meals and nightly. 420 mL 1   • Azelastine HCl 0.1 % Nasal Solution 2 sprays by Nasal route 2 (two) times daily. 30 mL 1   • Cholecalciferol (VITAMIN D) 50 MCG (2000 UT) Oral Cap Take 4,000 Units by mouth daily.      • P --See HPI for relevant ROS  --GEN: Denies  --HEENT: Denies  --RESP: Denies  --CV: Denies  --GI: Denies  --: Denies  --MSK: Denies  All other systems reviewed are negative      Arlene Feliz MD     Please note that the following visit was completed

## 2020-04-20 NOTE — PATIENT INSTRUCTIONS
Call to schedule holter monitor    Limit caffeine    Increase amitripytline to 1.5 tabs daily  Continue to limit butalbital, but ok for additional dose over the next 1-2 days if needed    Touch base in 1-2 wks if symptoms not improving

## 2020-04-27 DIAGNOSIS — G44.221 CHRONIC TENSION-TYPE HEADACHE, INTRACTABLE: ICD-10-CM

## 2020-04-27 RX ORDER — BUTALBITAL/ACETAMINOPHEN 50MG-325MG
1 TABLET ORAL 2 TIMES DAILY PRN
Qty: 20 TABLET | Refills: 0 | Status: SHIPPED | OUTPATIENT
Start: 2020-04-27 | End: 2020-06-01

## 2020-04-27 RX ORDER — PANTOPRAZOLE SODIUM 40 MG/1
TABLET, DELAYED RELEASE ORAL
Qty: 60 TABLET | Refills: 0 | Status: SHIPPED | OUTPATIENT
Start: 2020-04-27 | End: 2020-05-26

## 2020-04-27 NOTE — TELEPHONE ENCOUNTER
Pt requesting refill of Butalbital-Acetaminophen  MG Oral Tab     Last Time Medication was Filled:  4/2/20    Last Office Visit with Provider: 4/20/20 (virtual appt)  No future appointments.

## 2020-04-28 ENCOUNTER — TELEPHONE (OUTPATIENT)
Dept: FAMILY MEDICINE CLINIC | Facility: CLINIC | Age: 40
End: 2020-04-28

## 2020-04-28 NOTE — TELEPHONE ENCOUNTER
The patient's signed \"Rehabilitation Services Order Form\" was successfully faxed to Ethan Diaz at [phone].

## 2020-04-29 ENCOUNTER — TELEPHONE (OUTPATIENT)
Dept: FAMILY MEDICINE CLINIC | Facility: CLINIC | Age: 40
End: 2020-04-29

## 2020-04-29 NOTE — TELEPHONE ENCOUNTER
Relayed recommendations to patient. She agreed with plan and requested earliest appointment.      Appt made for tomorrow at 8:30am

## 2020-04-29 NOTE — TELEPHONE ENCOUNTER
Place her for a telephone conversation tomorrow Thursday a virtual visit to discuss Inderal or atenolol for symptoms. Tell her they are beta-blockers I just want to review them with her.   Also she was due for thyroid testing (TSH) she should have that don

## 2020-04-29 NOTE — TELEPHONE ENCOUNTER
Pt called and said her resting heart rate has been consistently going up. When she was on the phone with me it was at 114 and within a minute or so it was 120 and she is laying in bed trying to be calm. She can't get her holter monitor until Tuesday.   Sh

## 2020-04-30 ENCOUNTER — LAB ENCOUNTER (OUTPATIENT)
Dept: LAB | Age: 40
End: 2020-04-30
Attending: OTOLARYNGOLOGY
Payer: COMMERCIAL

## 2020-04-30 ENCOUNTER — VIRTUAL PHONE E/M (OUTPATIENT)
Dept: FAMILY MEDICINE CLINIC | Facility: CLINIC | Age: 40
End: 2020-04-30
Payer: COMMERCIAL

## 2020-04-30 DIAGNOSIS — E89.0 POSTOPERATIVE HYPOTHYROIDISM: ICD-10-CM

## 2020-04-30 DIAGNOSIS — K91.2 HYPOGLYCEMIA FOLLOWING GASTROINTESTINAL SURGERY: ICD-10-CM

## 2020-04-30 DIAGNOSIS — E07.9 THYROID DYSFUNCTION: ICD-10-CM

## 2020-04-30 DIAGNOSIS — R00.0 TACHYCARDIA: Primary | ICD-10-CM

## 2020-04-30 DIAGNOSIS — R79.89 LOW VITAMIN D LEVEL: ICD-10-CM

## 2020-04-30 DIAGNOSIS — R00.0 TACHYCARDIA: ICD-10-CM

## 2020-04-30 DIAGNOSIS — F41.1 GAD (GENERALIZED ANXIETY DISORDER): ICD-10-CM

## 2020-04-30 PROCEDURE — 36415 COLL VENOUS BLD VENIPUNCTURE: CPT | Performed by: FAMILY MEDICINE

## 2020-04-30 PROCEDURE — 84681 ASSAY OF C-PEPTIDE: CPT | Performed by: FAMILY MEDICINE

## 2020-04-30 PROCEDURE — 83525 ASSAY OF INSULIN: CPT | Performed by: FAMILY MEDICINE

## 2020-04-30 PROCEDURE — 80048 BASIC METABOLIC PNL TOTAL CA: CPT | Performed by: FAMILY MEDICINE

## 2020-04-30 PROCEDURE — 99214 OFFICE O/P EST MOD 30 MIN: CPT | Performed by: FAMILY MEDICINE

## 2020-04-30 PROCEDURE — 84484 ASSAY OF TROPONIN QUANT: CPT | Performed by: FAMILY MEDICINE

## 2020-04-30 PROCEDURE — 84480 ASSAY TRIIODOTHYRONINE (T3): CPT | Performed by: FAMILY MEDICINE

## 2020-04-30 PROCEDURE — 84206 ASSAY OF PROINSULIN: CPT | Performed by: FAMILY MEDICINE

## 2020-04-30 PROCEDURE — 82533 TOTAL CORTISOL: CPT | Performed by: FAMILY MEDICINE

## 2020-05-01 NOTE — PROGRESS NOTES
T3 total, T4 Free, cortisol and insulin levels are normal.    TSH is low at 0.160 though with history of thyroid cancer there may not be a need for thyroid adjustment.   We could try to alternate  the prior dose (175 mcg)   every other day with the present

## 2020-05-01 NOTE — PROGRESS NOTES
I added a troponin and a basic metabolic panel just to make sure everything was okay troponin is normal meaning no cardiac damage and metabolic panel means kidneys and electrolytes are normal.  Sent to my chart

## 2020-05-01 NOTE — PROGRESS NOTES
Virtual Telephone Check-In    Vimal White verbally consents to a Virtual/Telephone Check-In visit on 04/30/20. Patient understands and accepts financial responsibility for any deductible, co-insurance and/or co-pays associated with this service.  MG Oral Tab Take 1 tablet by mouth 2 (two) times daily as needed (headache). 20 tablet 0   • Amitriptyline HCl 25 MG Oral Tab Take 25 mg by mouth nightly.    30 tablet 5   • PREGABALIN 75 MG Oral Cap TAKE 1 CAPSULE(75 MG) BY MOUTH TWICE DAILY 60 caps TEST In Vitro Strip USE TWICE DAILY AS DIRECTED 200 strip 3   • NEYDA MICROLET LANCETS Does not apply Misc Use as directed to check blood sugars as needed 1 Box 0   • Magnesium 100 MG Oral Tab Take 1 tablet by mouth daily.        • NON FORMULARY Take 1 tabl Alcohol use: Not Currently      Alcohol/week: 0.0 standard drinks      Frequency: Never      Binge frequency: Never    Drug use: No       REVIEW OF SYSTEMS:   GENERAL HEALTH: feels well otherwise  SKIN: denies any unusual skin lesions or rashes  RESPIRATOR telemedicine consult with the patient tomorrow.  - TROPONIN I; Future  - BASIC METABOLIC PANEL (8); Future    2. Postoperative hypothyroidism  TSH was drawn today followed by Dr. Strickland Nurse. TSH is to be suppressed due to history of thyroid cancer.   I want

## 2020-05-02 ENCOUNTER — PATIENT MESSAGE (OUTPATIENT)
Dept: FAMILY MEDICINE CLINIC | Facility: CLINIC | Age: 40
End: 2020-05-02

## 2020-05-02 DIAGNOSIS — E83.51 LOW CALCIUM LEVELS: Primary | ICD-10-CM

## 2020-05-02 NOTE — PROGRESS NOTES
C-peptide is normal hypoglycemia probably secondary to the gastric bypass continue with small amounts of protein regularly.   Sent to my chart

## 2020-05-04 NOTE — TELEPHONE ENCOUNTER
Yes it probably is from the bariatric surgery but we can check a PTH. Order a PTH for her. Also vitamin D was on the low end of normal that sometimes can bring the calcium down so make sure you are taking 2000 international units of vitamin D daily.

## 2020-05-04 NOTE — TELEPHONE ENCOUNTER
From: Frank Torres  To: Hemant Khan PA-C  Sent: 5/2/2020 11:03 AM CDT  Subject: Test Results Question    Che Lopez,  I notice that my calcium is low again.  I know it’s not super low, but it never seems to be either right on the low EGD of low

## 2020-05-05 ENCOUNTER — HOSPITAL ENCOUNTER (OUTPATIENT)
Dept: CV DIAGNOSTICS | Age: 40
Discharge: HOME | End: 2020-05-05
Attending: FAMILY MEDICINE
Payer: COMMERCIAL

## 2020-05-05 ENCOUNTER — APPOINTMENT (OUTPATIENT)
Dept: LAB | Age: 40
End: 2020-05-05
Attending: OTOLARYNGOLOGY
Payer: COMMERCIAL

## 2020-05-05 DIAGNOSIS — R00.0 TACHYCARDIA: ICD-10-CM

## 2020-05-05 PROCEDURE — 93227 XTRNL ECG REC<48 HR R&I: CPT | Performed by: FAMILY MEDICINE

## 2020-05-05 PROCEDURE — 93226 XTRNL ECG REC<48 HR SCAN A/R: CPT | Performed by: FAMILY MEDICINE

## 2020-05-05 PROCEDURE — 93225 XTRNL ECG REC<48 HRS REC: CPT | Performed by: FAMILY MEDICINE

## 2020-05-07 ENCOUNTER — LAB ENCOUNTER (OUTPATIENT)
Dept: LAB | Age: 40
End: 2020-05-07
Attending: FAMILY MEDICINE
Payer: COMMERCIAL

## 2020-05-07 DIAGNOSIS — E83.51 LOW CALCIUM LEVELS: ICD-10-CM

## 2020-05-07 PROCEDURE — 83970 ASSAY OF PARATHORMONE: CPT

## 2020-05-07 PROCEDURE — 84100 ASSAY OF PHOSPHORUS: CPT

## 2020-05-07 PROCEDURE — 82565 ASSAY OF CREATININE: CPT

## 2020-05-07 PROCEDURE — 82310 ASSAY OF CALCIUM: CPT

## 2020-05-07 PROCEDURE — 36415 COLL VENOUS BLD VENIPUNCTURE: CPT

## 2020-05-08 ENCOUNTER — APPOINTMENT (OUTPATIENT)
Dept: GENERAL RADIOLOGY | Age: 40
End: 2020-05-08
Attending: EMERGENCY MEDICINE
Payer: COMMERCIAL

## 2020-05-08 ENCOUNTER — HOSPITAL ENCOUNTER (EMERGENCY)
Age: 40
Discharge: HOME | End: 2020-05-08
Attending: EMERGENCY MEDICINE
Payer: COMMERCIAL

## 2020-05-08 VITALS
HEIGHT: 69 IN | TEMPERATURE: 99 F | BODY MASS INDEX: 29.47 KG/M2 | SYSTOLIC BLOOD PRESSURE: 120 MMHG | WEIGHT: 199 LBS | HEART RATE: 98 BPM | DIASTOLIC BLOOD PRESSURE: 82 MMHG | RESPIRATION RATE: 18 BRPM | OXYGEN SATURATION: 99 %

## 2020-05-08 DIAGNOSIS — R10.33 ABDOMINAL PAIN, PERIUMBILICAL: Primary | ICD-10-CM

## 2020-05-08 PROCEDURE — 99282 EMERGENCY DEPT VISIT SF MDM: CPT

## 2020-05-09 NOTE — ED PROVIDER NOTES
Patient Seen in: THE [facility] In [place]      History   Patient presents with:  Abdomen/Flank Pain    Stated Complaint: abd pain    HPI    40-year-old woman who comes emerge department with mid abdominal pain.   She said she is had nause to exclude bowel obstruction, which is highly unlikely.   She became upset because he was not receiving opiate pain medications and wanted to leave the emergency department      Disposition and Plan     Clinical Impression:  Abdominal pain, periumbilical  (

## 2020-05-11 DIAGNOSIS — E83.51 LOW CALCIUM LEVELS: Primary | ICD-10-CM

## 2020-05-11 NOTE — PROGRESS NOTES
PTH is normal.  Calcium does remain on the low end of normal probably secondary to bariatric surgery. Take a chewable calcium start at 500 mg and repeat calcium level only in 3 months.   Order future tests/medications sent to my chart

## 2020-05-12 ENCOUNTER — TELEPHONE (OUTPATIENT)
Dept: SURGERY | Facility: CLINIC | Age: 40
End: 2020-05-12

## 2020-05-12 ENCOUNTER — TELEPHONE (OUTPATIENT)
Dept: FAMILY MEDICINE CLINIC | Facility: CLINIC | Age: 40
End: 2020-05-12

## 2020-05-12 DIAGNOSIS — Z98.84 HISTORY OF ROUX-EN-Y GASTRIC BYPASS: Primary | ICD-10-CM

## 2020-05-12 NOTE — TELEPHONE ENCOUNTER
Pt called and said she went to [facility] on Friday with abdominal pain and had a cyst that burst.  She had an appt with Stu Bender today did an ultrasound and she is waiting to hear if it is a complex cyst or not.   She does not know if this

## 2020-05-13 NOTE — TELEPHONE ENCOUNTER
Concerned about low Calcium levels    currently taking 1200 calcium citrate    Recommend she takes 600 mg three times a day    Patient wanted recommendation for endocrinologist    Should follow up with PCP as scheduled

## 2020-05-18 PROBLEM — E66.3 OVERWEIGHT FOR HEIGHT: Status: RESOLVED | Noted: 2018-12-17 | Resolved: 2020-05-18

## 2020-05-18 NOTE — PROGRESS NOTES
Virtual Telephone Check-In    Suhas York verbally consents to a Virtual/Telephone Check-In visit on 05/18/20. Patient understands and accepts financial responsibility for any deductible, co-insurance and/or co-pays associated with this service. which may have made a difference. She had been on the amitriptyline with the Lexapro which could have increased the tachycardia. She is now down to a half of the amitriptyline at night. Holter monitor was normal with occasional PAC and PVC.   During her rate variability noted. 3.  No pauses greater than 2 seconds noted. 4.  Supraventricular ectopy consists of two isolated PACs with no paroxysms of atrial fibrillation or flutter noted.    5.  Ventricular ectopy consists of 75 isolated PVCs with no evide escitalopram (LEXAPRO) 10 MG Oral Tab Take 5 mg for 1 week then increase to 10 mg (Patient taking differently: Take 10 mg by mouth nightly.  Take 5 mg for 1 week then increase to 10 mg ) 90 tablet 0   • ALPRAZolam 0.25 MG Oral Tab Take 1 tablet (0.25 mg tot unspecified DMG DX 11-2-12    AHI 2 RDI 2 REM AHI 6 SaO2 curtis 89%   • OTHER DISEASES     rectal bleeding   • Pancreatitis    • Pneumonia august 2014   • Pneumonia, organism unspecified(486)    • PONV (postoperative nausea and vomiting)    • Reflux    • Te Consults:  None  1. OMAR (generalized anxiety disorder)  Continue with counseling with Dr. Elise Rees.   Continue with Lexapro 10 mg, amitriptyline 0.25 mg twice a day and continue to taper off of amitriptyline presently at 12.5 mg used for migraine, inso

## 2020-05-20 ENCOUNTER — MED REC SCAN ONLY (OUTPATIENT)
Dept: FAMILY MEDICINE CLINIC | Facility: CLINIC | Age: 40
End: 2020-05-20

## 2020-05-26 RX ORDER — PANTOPRAZOLE SODIUM 40 MG/1
TABLET, DELAYED RELEASE ORAL
Qty: 60 TABLET | Refills: 1 | Status: SHIPPED | OUTPATIENT
Start: 2020-05-26 | End: 2020-08-03

## 2020-06-01 ENCOUNTER — OFFICE VISIT (OUTPATIENT)
Dept: UROLOGY | Facility: HOSPITAL | Age: 40
End: 2020-06-01
Attending: OBSTETRICS & GYNECOLOGY
Payer: COMMERCIAL

## 2020-06-01 VITALS
BODY MASS INDEX: 28.14 KG/M2 | HEIGHT: 69 IN | DIASTOLIC BLOOD PRESSURE: 74 MMHG | WEIGHT: 190 LBS | SYSTOLIC BLOOD PRESSURE: 124 MMHG

## 2020-06-01 DIAGNOSIS — M79.18 MYALGIA OF PELVIC FLOOR: Primary | ICD-10-CM

## 2020-06-01 DIAGNOSIS — G44.221 CHRONIC TENSION-TYPE HEADACHE, INTRACTABLE: ICD-10-CM

## 2020-06-01 DIAGNOSIS — R35.0 URINARY FREQUENCY: ICD-10-CM

## 2020-06-01 PROCEDURE — 99212 OFFICE O/P EST SF 10 MIN: CPT

## 2020-06-01 RX ORDER — BUTALBITAL/ACETAMINOPHEN 50MG-325MG
1 TABLET ORAL 2 TIMES DAILY PRN
Qty: 20 TABLET | Refills: 0 | Status: SHIPPED | OUTPATIENT
Start: 2020-06-01 | End: 2020-07-05

## 2020-06-01 NOTE — TELEPHONE ENCOUNTER
Pt requesting refill of  Butalbital-Acetaminophen  MG Oral Tab   Last Time Medication was Filled:  4/27/20    Last Office Visit with Provider: 5/18/2020 Virtual Phone     Appt scheduled on 6/2/20

## 2020-06-02 ENCOUNTER — OFFICE VISIT (OUTPATIENT)
Dept: FAMILY MEDICINE CLINIC | Facility: CLINIC | Age: 40
End: 2020-06-02
Payer: COMMERCIAL

## 2020-06-02 VITALS
TEMPERATURE: 99 F | WEIGHT: 190 LBS | DIASTOLIC BLOOD PRESSURE: 68 MMHG | HEART RATE: 76 BPM | SYSTOLIC BLOOD PRESSURE: 92 MMHG | BODY MASS INDEX: 29.13 KG/M2 | HEIGHT: 67.72 IN

## 2020-06-02 DIAGNOSIS — G89.29 CHRONIC SI JOINT PAIN: ICD-10-CM

## 2020-06-02 DIAGNOSIS — R20.2 TINGLING OF SKIN: ICD-10-CM

## 2020-06-02 DIAGNOSIS — L81.9 CHANGE IN PIGMENTED LESION OF FACE: Primary | ICD-10-CM

## 2020-06-02 DIAGNOSIS — M53.3 CHRONIC SI JOINT PAIN: ICD-10-CM

## 2020-06-02 PROBLEM — F43.9 STRESS: Status: RESOLVED | Noted: 2020-04-17 | Resolved: 2020-06-02

## 2020-06-02 PROCEDURE — 99213 OFFICE O/P EST LOW 20 MIN: CPT | Performed by: FAMILY MEDICINE

## 2020-06-02 NOTE — PROGRESS NOTES
Deadra Barthel is a 44year old female. 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 Lower back skin tingling started 1 week ago tingling. Lower back and thoracic area.  2 ibuprofen differently: Take 10 mg by mouth nightly. Take 5 mg for 1 week then increase to 10 mg ) 90 tablet 0   • ALPRAZolam 0.25 MG Oral Tab Take 1 tablet (0.25 mg total) by mouth 2 (two) times daily as needed.  60 tablet 2   • zolpidem 5 MG Oral Tab Take 0.5-1 tabl thyroid cancer    • Hypothyroid    • Hypothyroidism    • Infertility, female    • Organic hypersomnia, unspecified DMG DX 11-2-12    AHI 2 RDI 2 REM AHI 6 SaO2 curtis 89%   • OTHER DISEASES     rectal bleeding   • Pancreatitis    • Pneumonia august 2014   • spine.    Musculoskeletal: No gross deficit. Range of motion is normal normal flexion, extension and rotation. SI joint pain bilaterally.   Neurological: nerves II through XII grossly intact no sensorimotor deficit  Psychological: Mood and affect are norm

## 2020-06-03 ENCOUNTER — OFFICE VISIT (OUTPATIENT)
Dept: UROLOGY | Facility: HOSPITAL | Age: 40
End: 2020-06-03
Attending: OBSTETRICS & GYNECOLOGY
Payer: COMMERCIAL

## 2020-06-03 VITALS
SYSTOLIC BLOOD PRESSURE: 102 MMHG | BODY MASS INDEX: 29.13 KG/M2 | HEIGHT: 67.72 IN | WEIGHT: 190 LBS | DIASTOLIC BLOOD PRESSURE: 60 MMHG

## 2020-06-03 DIAGNOSIS — M79.18 MYALGIA OF PELVIC FLOOR: ICD-10-CM

## 2020-06-03 DIAGNOSIS — R35.0 URINARY FREQUENCY: Primary | ICD-10-CM

## 2020-06-03 PROCEDURE — 51741 ELECTRO-UROFLOWMETRY FIRST: CPT

## 2020-06-03 PROCEDURE — 51729 CYSTOMETROGRAM W/VP&UP: CPT

## 2020-06-03 PROCEDURE — 51797 INTRAABDOMINAL PRESSURE TEST: CPT

## 2020-06-03 PROCEDURE — 51784 ANAL/URINARY MUSCLE STUDY: CPT

## 2020-06-03 NOTE — PATIENT INSTRUCTIONS
[address]  [place] [address]  [facility]: [phone]  FAX: [phone]       Urodynamic Testing Discharge Instructions: There are NO dietary or activity restrictions.   You may resume

## 2020-06-03 NOTE — PROCEDURES
Patient here for urodynamic testing. Procedure explained and confirmed by patient. See evaluation form for results. Both verbal and written discharge instructions were given.   Patient tolerated procedure well and will follow up with Dr. Rosalinda Araujo on Rate 50 mL/min  Temp:  Room  Position:  [x]  Sit  []  Stand  []  Supine  First sensation:   27 mL  First desire to void:   204 mL  Strong desire to void:  273 mL  Maximum cystometric capacity:   500 mL  Detrusor Activity:  []  Unstable   [x]  Stable  Urge

## 2020-06-05 ENCOUNTER — OFFICE VISIT (OUTPATIENT)
Dept: UROLOGY | Facility: HOSPITAL | Age: 40
End: 2020-06-05
Attending: OBSTETRICS & GYNECOLOGY
Payer: COMMERCIAL

## 2020-06-05 VITALS — HEIGHT: 67.72 IN | RESPIRATION RATE: 20 BRPM | WEIGHT: 190 LBS | BODY MASS INDEX: 29.13 KG/M2

## 2020-06-05 DIAGNOSIS — R35.0 URINARY FREQUENCY: Primary | ICD-10-CM

## 2020-06-05 DIAGNOSIS — R35.1 NOCTURIA: ICD-10-CM

## 2020-06-05 PROCEDURE — 99211 OFF/OP EST MAY X REQ PHY/QHP: CPT

## 2020-06-22 DIAGNOSIS — F51.01 PRIMARY INSOMNIA: ICD-10-CM

## 2020-06-22 DIAGNOSIS — R10.9 CHRONIC ABDOMINAL PAIN: ICD-10-CM

## 2020-06-22 DIAGNOSIS — G89.29 CHRONIC ABDOMINAL PAIN: ICD-10-CM

## 2020-06-22 DIAGNOSIS — F41.1 GAD (GENERALIZED ANXIETY DISORDER): ICD-10-CM

## 2020-06-23 ENCOUNTER — APPOINTMENT (OUTPATIENT)
Dept: PHYSICAL THERAPY | Age: 40
End: 2020-06-23
Attending: OBSTETRICS & GYNECOLOGY
Payer: COMMERCIAL

## 2020-06-23 ENCOUNTER — TELEPHONE (OUTPATIENT)
Dept: PHYSICAL THERAPY | Age: 40
End: 2020-06-23

## 2020-06-23 RX ORDER — ALPRAZOLAM 0.25 MG/1
0.25 TABLET ORAL 2 TIMES DAILY PRN
Qty: 60 TABLET | Refills: 2 | Status: SHIPPED | OUTPATIENT
Start: 2020-06-23 | End: 2020-09-14

## 2020-06-23 RX ORDER — ZOLPIDEM TARTRATE 5 MG/1
TABLET ORAL NIGHTLY PRN
Qty: 30 TABLET | Refills: 2 | Status: SHIPPED | OUTPATIENT
Start: 2020-06-23 | End: 2020-09-14

## 2020-06-23 RX ORDER — ALPRAZOLAM 0.25 MG/1
TABLET ORAL
Qty: 60 TABLET | Refills: 0 | OUTPATIENT
Start: 2020-06-23

## 2020-06-23 RX ORDER — ZOLPIDEM TARTRATE 5 MG/1
TABLET ORAL
Qty: 30 TABLET | Refills: 0 | OUTPATIENT
Start: 2020-06-23

## 2020-06-23 NOTE — TELEPHONE ENCOUNTER
Pt requesting refill of  ALPRAZolam 0.25 MG Oral Tab and  zolpidem 5 MG Oral Tab     Last Time Medication was Filled:  5/26/20    Last Office Visit with Provider: 6/2/20    No future appointments.

## 2020-06-25 ENCOUNTER — OFFICE VISIT (OUTPATIENT)
Dept: PHYSICAL THERAPY | Age: 40
End: 2020-06-25
Attending: OBSTETRICS & GYNECOLOGY
Payer: COMMERCIAL

## 2020-06-25 DIAGNOSIS — M79.18 MYALGIA OF PELVIC FLOOR: ICD-10-CM

## 2020-06-25 DIAGNOSIS — R35.0 URINARY FREQUENCY: ICD-10-CM

## 2020-06-25 PROCEDURE — 97110 THERAPEUTIC EXERCISES: CPT

## 2020-06-25 PROCEDURE — 97162 PT EVAL MOD COMPLEX 30 MIN: CPT

## 2020-06-25 NOTE — PROGRESS NOTES
MUSCULOSKELETAL AND PELVIC FLOOR EVALUATION:   Referring Physician: Dr. Nel Mayfield  Diagnosis: Urinary frequency (R35.0)  Myalgia of pelvic floor (M79.18)   Date of Service: 6/25/2020     PATIENT Lena Diaz is a 44year old female is a retur bladder diary for next visit  Leaking occurs: none  Do you ever leak urine without knowing it? no    BOWEL HABITS    Frequency of bowel movements: q 1-2 days  Stool consistency: Cutler Stool Scale: 2 -3  Do you strain with defecation: Yes   Laxative use: WNL  Posterior Wall: WNL  Apical: WNL    Internal Palpation: WNL except superficial transverse perineal bilaterally, 10:00- 7:00 and 2:00-5:00    Today's Treatment and Response:   Patient education was provided on objective findings of external and interna these services furnished under this plan of treatment and while under my care.     X___________________________________________________ Date____________________    Certification From: 1/98/0751  To:9/23/2020

## 2020-06-30 ENCOUNTER — OFFICE VISIT (OUTPATIENT)
Dept: PHYSICAL THERAPY | Age: 40
End: 2020-06-30
Attending: OBSTETRICS & GYNECOLOGY
Payer: COMMERCIAL

## 2020-06-30 DIAGNOSIS — F41.1 GAD (GENERALIZED ANXIETY DISORDER): ICD-10-CM

## 2020-06-30 DIAGNOSIS — M79.18 MYALGIA OF PELVIC FLOOR: ICD-10-CM

## 2020-06-30 DIAGNOSIS — R35.0 URINARY FREQUENCY: ICD-10-CM

## 2020-06-30 PROCEDURE — 97110 THERAPEUTIC EXERCISES: CPT

## 2020-06-30 RX ORDER — PREGABALIN 75 MG/1
75 CAPSULE ORAL 2 TIMES DAILY
Qty: 60 CAPSULE | Refills: 2 | Status: SHIPPED | OUTPATIENT
Start: 2020-06-30 | End: 2020-09-20

## 2020-06-30 RX ORDER — ESCITALOPRAM OXALATE 10 MG/1
10 TABLET ORAL DAILY
Qty: 90 TABLET | Refills: 0 | Status: SHIPPED | OUTPATIENT
Start: 2020-06-30 | End: 2020-09-29

## 2020-06-30 NOTE — TELEPHONE ENCOUNTER
Pt requesting refill of ESCITALOPRAM 10 MG Oral Tab, refill approved and sent     Last Time Medication was Filled:  4/2/20    Last Office Visit with Provider: 6/2/20    Appt scheduled on 7/7/20

## 2020-06-30 NOTE — TELEPHONE ENCOUNTER
Pt requesting refill of PREGABALIN 75 MG Oral Cap      Last Time Medication was Filled:  6/6/20    Last Office Visit with Provider: 6/2/20    Appt scheduled on 7/7/20

## 2020-06-30 NOTE — PROGRESS NOTES
Dx: Urinary frequency (R35.0)  Myalgia of pelvic floor (M79.18)         Insurance (Authorized # of Visits):  8           Authorizing Physician: Dr. Ekta Dimas  Next MD visit: none scheduled  Fall Risk: standard         Precautions: tape, drug allergy, asthma, f PRESEPTAL CELLULITIS: PATIENT EDUCATION RX DOXY 50 MG PO BID. and supine hip ER/PF with green 2 x 20    Charges: Ex3       Total Timed Treatment: 45 min  Total Treatment Time: 45 min       MUSCULOSKELETAL AND PELVIC FLOOR EVALUATION:   Referring Physician: Dr. Tara Serrano  Diagnosis: Urinary frequency (R35.0)  Myalgia of pe patient. 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  Completing bladder diary for next visit  Leaking occurs: none  Do you ever leak urine without knowing it? no    BOWEL HABITS    Linn Nicely Power/Endurance/Reps/Fast) MMT: 3+/5/5/not tested  External Anal Sphincter: not tested  Accessory Muscle Use: none    Tissue Laxity Test:  Anterior Wall: WNL  Posterior Wall: WNL  Apical: WNL    Internal Palpation: WNL except superficial transverse perinea Dept: [phone]    Sincerely,  Electronically signed by therapist: Jay Amezcua, PT  [de-identified] certification required: Yes  I certify the need for these services furnished under this plan of treatment and while under my care.     X_____________________

## 2020-07-02 ENCOUNTER — OFFICE VISIT (OUTPATIENT)
Dept: PHYSICAL THERAPY | Age: 40
End: 2020-07-02
Attending: OBSTETRICS & GYNECOLOGY
Payer: COMMERCIAL

## 2020-07-02 DIAGNOSIS — M79.18 MYALGIA OF PELVIC FLOOR: ICD-10-CM

## 2020-07-02 DIAGNOSIS — R35.0 URINARY FREQUENCY: ICD-10-CM

## 2020-07-02 PROCEDURE — 97110 THERAPEUTIC EXERCISES: CPT

## 2020-07-02 NOTE — PROGRESS NOTES
Dx: Urinary frequency (R35.0)  Myalgia of pelvic floor (M79.18)         Insurance (Authorized # of Visits):  8           Authorizing Physician: Dr. Lashell Crocker MD visit: none scheduled  Fall Risk: standard         Precautions: tape, drug allergy, asthma, f 5:00                    HEP: Cross legs tretch 30 sec x 5 B and supine hip ER/PF with green 2 x 20    Charges: Ex3       Total Timed Treatment: 45 min  Total Treatment Time: 45 min       MUSCULOSKELETAL AND PELVIC FLOOR EVALUATION:   Referring Physician: MARIO empty sometimes. MD report no grade 1 cystocele to patient. 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  Completing bladder diary for next visit  Leaking occurs: none  Do you ever leak uri Floor Muscle strength: (PERF= Power/Endurance/Reps/Fast) MMT: 3+/5/5/not tested  External Anal Sphincter: not tested  Accessory Muscle Use: none    Tissue Laxity Test:  Anterior Wall: WNL  Posterior Wall: WNL  Apical: WNL    Internal Palpation: WNL except questions, please contact me at Dept: [phone]    Sincerely,  Electronically signed by therapist: Abril Mascorro, PT  [de-identified] certification required: Yes  I certify the need for these services furnished under this plan of treatment and while under my

## 2020-07-05 DIAGNOSIS — G44.221 CHRONIC TENSION-TYPE HEADACHE, INTRACTABLE: ICD-10-CM

## 2020-07-06 ENCOUNTER — LAB SERVICES (OUTPATIENT)
Dept: LAB | Age: 40
End: 2020-07-06

## 2020-07-06 DIAGNOSIS — Z01.812 PRE-PROCEDURAL LABORATORY EXAMINATION: Primary | ICD-10-CM

## 2020-07-06 PROBLEM — D25.9 FIBROID, UTERINE: Status: ACTIVE | Noted: 2020-07-06

## 2020-07-06 PROBLEM — F45.0 ANXIETY WITH SOMATIZATION: Status: ACTIVE | Noted: 2020-07-06

## 2020-07-06 PROBLEM — E66.01 MORBID OBESITY WITH BODY MASS INDEX OF 40.0-44.9 IN ADULT (CMD): Status: ACTIVE | Noted: 2017-07-24

## 2020-07-06 PROBLEM — F41.9 ANXIETY WITH SOMATIZATION: Status: ACTIVE | Noted: 2020-07-06

## 2020-07-06 PROBLEM — I87.1 MAY-THURNER SYNDROME: Status: ACTIVE | Noted: 2020-02-17

## 2020-07-06 PROBLEM — G44.221 CHRONIC TENSION-TYPE HEADACHE, INTRACTABLE: Status: ACTIVE | Noted: 2017-11-08

## 2020-07-06 PROBLEM — F41.1 GENERALIZED ANXIETY DISORDER: Status: ACTIVE | Noted: 2020-07-06

## 2020-07-06 PROBLEM — Z90.721 H/O UNILATERAL OOPHORECTOMY: Status: ACTIVE | Noted: 2017-06-12

## 2020-07-06 PROBLEM — E89.0 H/O THYROIDECTOMY: Status: ACTIVE | Noted: 2018-02-21

## 2020-07-06 PROBLEM — Z98.84 HISTORY OF ROUX-EN-Y GASTRIC BYPASS: Status: ACTIVE | Noted: 2017-12-27

## 2020-07-06 PROBLEM — Z98.84 HISTORY OF GASTRIC BYPASS: Status: ACTIVE | Noted: 2020-07-06

## 2020-07-06 PROBLEM — M26.609 TMJ (TEMPOROMANDIBULAR JOINT DISORDER): Status: ACTIVE | Noted: 2020-07-06

## 2020-07-06 PROBLEM — N80.9 ENDOMETRIOSIS: Status: ACTIVE | Noted: 2020-07-06

## 2020-07-06 PROBLEM — K21.9 GERD (GASTROESOPHAGEAL REFLUX DISEASE): Status: ACTIVE | Noted: 2020-07-06

## 2020-07-06 PROBLEM — Z85.850 HISTORY OF THYROID CANCER: Status: ACTIVE | Noted: 2018-02-21

## 2020-07-06 PROCEDURE — 87635 SARS-COV-2 COVID-19 AMP PRB: CPT | Performed by: OBSTETRICS & GYNECOLOGY

## 2020-07-06 RX ORDER — BUTALBITAL/ACETAMINOPHEN 50MG-325MG
1 TABLET ORAL 2 TIMES DAILY PRN
Qty: 20 TABLET | Refills: 0 | Status: SHIPPED | OUTPATIENT
Start: 2020-07-06 | End: 2020-07-29

## 2020-07-06 NOTE — TELEPHONE ENCOUNTER
Pt requesting refill of    Butalbital-Acetaminophen  MG Oral Tab     Last Time Medication was Filled:  6/1/20    Last Office Visit with Provider: 6/2/20     Appt scheduled on 7/7/20

## 2020-07-07 ENCOUNTER — OFFICE VISIT (OUTPATIENT)
Dept: PHYSICAL THERAPY | Age: 40
End: 2020-07-07
Attending: OBSTETRICS & GYNECOLOGY
Payer: COMMERCIAL

## 2020-07-07 ENCOUNTER — OFFICE VISIT (OUTPATIENT)
Dept: FAMILY MEDICINE CLINIC | Facility: CLINIC | Age: 40
End: 2020-07-07
Payer: COMMERCIAL

## 2020-07-07 VITALS
HEART RATE: 96 BPM | SYSTOLIC BLOOD PRESSURE: 90 MMHG | TEMPERATURE: 98 F | DIASTOLIC BLOOD PRESSURE: 62 MMHG | HEIGHT: 67.72 IN | BODY MASS INDEX: 29.75 KG/M2 | WEIGHT: 194 LBS

## 2020-07-07 DIAGNOSIS — F41.1 GAD (GENERALIZED ANXIETY DISORDER): ICD-10-CM

## 2020-07-07 DIAGNOSIS — R52 PAIN MANAGEMENT: ICD-10-CM

## 2020-07-07 DIAGNOSIS — M79.18 MYALGIA OF PELVIC FLOOR: ICD-10-CM

## 2020-07-07 DIAGNOSIS — N83.201 CYST OF RIGHT OVARY: Primary | ICD-10-CM

## 2020-07-07 DIAGNOSIS — R35.0 URINARY FREQUENCY: ICD-10-CM

## 2020-07-07 DIAGNOSIS — F51.01 PRIMARY INSOMNIA: ICD-10-CM

## 2020-07-07 LAB
SARS-COV-2 RNA RESP QL NAA+PROBE: NOT DETECTED
SERVICE CMNT-IMP: NORMAL
SPECIMEN SOURCE: NORMAL

## 2020-07-07 PROCEDURE — 97110 THERAPEUTIC EXERCISES: CPT

## 2020-07-07 PROCEDURE — 99213 OFFICE O/P EST LOW 20 MIN: CPT | Performed by: FAMILY MEDICINE

## 2020-07-07 RX ORDER — PREGABALIN 75 MG/1
75 CAPSULE ORAL 2 TIMES DAILY
COMMUNITY

## 2020-07-07 RX ORDER — GLUCOSAMINE/D3/BOSWELLIA SERRA 1500MG-400
10000 TABLET ORAL
COMMUNITY

## 2020-07-07 RX ORDER — ESCITALOPRAM OXALATE 10 MG/1
10 TABLET ORAL NIGHTLY
COMMUNITY

## 2020-07-07 RX ORDER — HYDROCODONE BITARTRATE AND ACETAMINOPHEN 5; 325 MG/1; MG/1
1-2 TABLET ORAL
Qty: 30 TABLET | Refills: 0 | Status: SHIPPED | OUTPATIENT
Start: 2020-07-07 | End: 2020-07-11

## 2020-07-07 RX ORDER — MIRTAZAPINE 7.5 MG/1
3.75 TABLET, FILM COATED ORAL NIGHTLY
Qty: 15 TABLET | Refills: 5 | Status: SHIPPED | OUTPATIENT
Start: 2020-07-07 | End: 2020-12-31

## 2020-07-07 RX ORDER — BUDESONIDE AND FORMOTEROL FUMARATE DIHYDRATE 160; 4.5 UG/1; UG/1
2 AEROSOL RESPIRATORY (INHALATION) 2 TIMES DAILY
COMMUNITY

## 2020-07-07 RX ORDER — BUTALBITAL/ACETAMINOPHEN 50MG-325MG
TABLET ORAL PRN
COMMUNITY

## 2020-07-07 RX ORDER — LISDEXAMFETAMINE DIMESYLATE CAPSULES 50 MG/1
50 CAPSULE ORAL EVERY MORNING
COMMUNITY

## 2020-07-07 RX ORDER — MULTIVIT-MIN/IRON/FOLIC ACID/K 18-600-40
50 CAPSULE ORAL 2 TIMES DAILY
COMMUNITY

## 2020-07-07 RX ORDER — ALPRAZOLAM 0.25 MG/1
0.25 TABLET ORAL 2 TIMES DAILY
COMMUNITY

## 2020-07-07 RX ORDER — ASPIRIN 81 MG/1
81 TABLET, CHEWABLE ORAL DAILY
COMMUNITY

## 2020-07-07 RX ORDER — ZOLPIDEM TARTRATE 5 MG/1
5 TABLET ORAL NIGHTLY PRN
COMMUNITY

## 2020-07-07 RX ORDER — PREGABALIN 150 MG/1
CAPSULE ORAL
COMMUNITY
Start: 2020-07-08 | End: 2020-07-08

## 2020-07-07 RX ORDER — CALCIUM CARBONATE 260MG(650)
100 TABLET,CHEWABLE ORAL
COMMUNITY

## 2020-07-07 RX ORDER — TOPIRAMATE 25 MG/1
25 TABLET ORAL NIGHTLY
COMMUNITY

## 2020-07-07 RX ORDER — ALBUTEROL SULFATE 90 UG/1
2 AEROSOL, METERED RESPIRATORY (INHALATION) PRN
COMMUNITY

## 2020-07-07 ASSESSMENT — ACTIVITIES OF DAILY LIVING (ADL)
ADL_BEFORE_ADMISSION: INDEPENDENT
ADL_SCORE: 12

## 2020-07-07 NOTE — PROGRESS NOTES
Agusto Jaimes is a 44year old female.   HPI:   Patient presents with:  Pain: Pain management related to the following procedure, which is schedule tomorrow, 07/08/2020, with Dr. Richie Barnett: LAPAROSCOPY, RIGHT OVARIAN CYSTECTOMY, POSSIBLE EXCISION OF ENDOM 0.25 MG Oral Tab Take 1 tablet (0.25 mg total) by mouth 2 (two) times daily as needed. 60 tablet 2   • zolpidem 5 MG Oral Tab Take 0.5-1 tablets (2.5-5 mg total) by mouth nightly as needed for Sleep.  30 tablet 2   • UNITHROID 88 MCG Oral Tab TAKE 1 TABLET Tab Take 1 tablet by mouth daily. • NON FORMULARY Take 1 tablet by mouth daily. • aspirin 81 MG Oral Tab Take 81 mg by mouth daily.         Past Medical History:   Diagnosis Date   • Anxiety    • Anxiety state, unspecified    • Arthritis    • As normal to palpation  LUNGS: clear to auscultation no rales, rhonchi or wheezes  CARDIO: RRR without murmur  GI: Normal bowel sounds ×4 quadrant, no hepatosplenomegaly or masses, and no tenderness   EXTREMITIES: no cyanosis, clubbing or edema  Musculoskelet

## 2020-07-07 NOTE — PROGRESS NOTES
Dx: Urinary frequency (R35.0)  Myalgia of pelvic floor (M79.18)         Insurance (Authorized # of Visits):  8           Authorizing Physician: Dr. Gerardo Gaston  Next MD visit: none scheduled  Fall Risk: standard         Precautions: tape, drug allergy, asthma, f ani    C/R with hip ER with stretching  Breath exercises with stretching  NMRED: hip ER? IR 2 x 20 green  Belly hard and breathing exercises instruction  C/R with hip ER and pelvic floor instruction  Cross leg stretch 30 sec x 5  Internal PF stretching  9:0 dyspareunia. Past medical history was reviewed with Yehuda Soulier.  Significant findings include complex cyst, pancreatitis, hypothyroidism, thyroid CA, endometriosis, diverticulitisSurgeries: Gastric bypass, hysterectomy, gall bladder surgery    Precautions:  Ronni Hip and spine to be screened     Informed consent for internal pelvic evaluation given: Yes    External Observation:   Voluntary contraction: present   Voluntary relaxation: present  Involuntary contraction: present  Involuntary relaxation: present Exercise Program instruction     Education or treatment limitation: None  Rehab Potential:good      Patient was advised of these findings, precautions, and treatment options and has agreed to actively participate in planning and for this course of care.

## 2020-07-07 NOTE — TELEPHONE ENCOUNTER
Pt requesting refill of MIRTAZAPINE 7.5 MG Oral Tab    Last Time Medication was Filled:  6/7/20    Last Office Visit with Provider: 6/2/20    Appt scheduled on today (7/720)

## 2020-07-08 ENCOUNTER — ANESTHESIA EVENT (OUTPATIENT)
Dept: SURGERY | Age: 40
End: 2020-07-08

## 2020-07-08 ENCOUNTER — ANESTHESIA (OUTPATIENT)
Dept: SURGERY | Age: 40
End: 2020-07-08

## 2020-07-08 ENCOUNTER — HOSPITAL ENCOUNTER (OUTPATIENT)
Age: 40
Discharge: HOME | End: 2020-07-08
Attending: OBSTETRICS & GYNECOLOGY | Admitting: OBSTETRICS & GYNECOLOGY

## 2020-07-08 PROBLEM — L30.4 INTERTRIGO: Status: RESOLVED | Noted: 2018-12-17 | Resolved: 2020-07-08

## 2020-07-08 PROBLEM — E87.6 HYPOKALEMIA: Status: RESOLVED | Noted: 2020-01-24 | Resolved: 2020-07-08

## 2020-07-08 PROCEDURE — 13000002 HB ANESTHESIA  GENERAL  S/U + 1ST 15 MIN: Performed by: OBSTETRICS & GYNECOLOGY

## 2020-07-08 PROCEDURE — 10002803 HB RX 637: Performed by: OBSTETRICS & GYNECOLOGY

## 2020-07-08 PROCEDURE — 10002807 HB RX 258: Performed by: NURSE ANESTHETIST, CERTIFIED REGISTERED

## 2020-07-08 PROCEDURE — 88112 CYTOPATH CELL ENHANCE TECH: CPT

## 2020-07-08 PROCEDURE — 13000001 HB PHASE II RECOVERY EA 30 MINUTES: Performed by: OBSTETRICS & GYNECOLOGY

## 2020-07-08 PROCEDURE — 10004451 HB PACU RECOVERY 1ST 30 MINUTES: Performed by: OBSTETRICS & GYNECOLOGY

## 2020-07-08 PROCEDURE — 10002801 HB RX 250 W/O HCPCS: Performed by: NURSE ANESTHETIST, CERTIFIED REGISTERED

## 2020-07-08 PROCEDURE — 10002803 HB RX 637: Performed by: NURSE ANESTHETIST, CERTIFIED REGISTERED

## 2020-07-08 PROCEDURE — 88305 TISSUE EXAM BY PATHOLOGIST: CPT

## 2020-07-08 PROCEDURE — 13000003 HB ANESTHESIA  GENERAL EA ADD MINUTE: Performed by: OBSTETRICS & GYNECOLOGY

## 2020-07-08 PROCEDURE — 10006023 HB SUPPLY 272: Performed by: OBSTETRICS & GYNECOLOGY

## 2020-07-08 PROCEDURE — 13000037 HB COMPLEX CASE EACH ADD MINUTE: Performed by: OBSTETRICS & GYNECOLOGY

## 2020-07-08 PROCEDURE — 13000036 HB COMPLEX  CASE S/U + 1ST 15 MIN: Performed by: OBSTETRICS & GYNECOLOGY

## 2020-07-08 PROCEDURE — 10002800 HB RX 250 W HCPCS: Performed by: NURSE ANESTHETIST, CERTIFIED REGISTERED

## 2020-07-08 PROCEDURE — 10004452 HB PACU ADDL 30 MINUTES: Performed by: OBSTETRICS & GYNECOLOGY

## 2020-07-08 PROCEDURE — 10002803 HB RX 637: Performed by: ANESTHESIOLOGY

## 2020-07-08 PROCEDURE — 10002801 HB RX 250 W/O HCPCS: Performed by: OBSTETRICS & GYNECOLOGY

## 2020-07-08 RX ORDER — PHENAZOPYRIDINE HYDROCHLORIDE 200 MG/1
200 TABLET, FILM COATED ORAL ONCE
Status: COMPLETED | OUTPATIENT
Start: 2020-07-08 | End: 2020-07-08

## 2020-07-08 RX ORDER — DEXAMETHASONE SODIUM PHOSPHATE 4 MG/ML
INJECTION, SOLUTION INTRA-ARTICULAR; INTRALESIONAL; INTRAMUSCULAR; INTRAVENOUS; SOFT TISSUE PRN
Status: DISCONTINUED | OUTPATIENT
Start: 2020-07-08 | End: 2020-07-08

## 2020-07-08 RX ORDER — LIDOCAINE HYDROCHLORIDE 10 MG/ML
5-10 INJECTION, SOLUTION INFILTRATION; PERINEURAL PRN
Status: DISCONTINUED | OUTPATIENT
Start: 2020-07-08 | End: 2020-07-08 | Stop reason: HOSPADM

## 2020-07-08 RX ORDER — ROCURONIUM BROMIDE 10 MG/ML
INJECTION, SOLUTION INTRAVENOUS PRN
Status: DISCONTINUED | OUTPATIENT
Start: 2020-07-08 | End: 2020-07-08

## 2020-07-08 RX ORDER — SODIUM CHLORIDE, SODIUM LACTATE, POTASSIUM CHLORIDE, CALCIUM CHLORIDE 600; 310; 30; 20 MG/100ML; MG/100ML; MG/100ML; MG/100ML
INJECTION, SOLUTION INTRAVENOUS CONTINUOUS PRN
Status: DISCONTINUED | OUTPATIENT
Start: 2020-07-08 | End: 2020-07-08

## 2020-07-08 RX ORDER — HYDROCODONE BITARTRATE AND ACETAMINOPHEN 5; 325 MG/1; MG/1
1 TABLET ORAL EVERY 4 HOURS PRN
Status: DISCONTINUED | OUTPATIENT
Start: 2020-07-08 | End: 2020-07-08 | Stop reason: HOSPADM

## 2020-07-08 RX ORDER — CHLORHEXIDINE GLUCONATE ORAL RINSE 1.2 MG/ML
15 SOLUTION DENTAL ONCE
Status: COMPLETED | OUTPATIENT
Start: 2020-07-08 | End: 2020-07-08

## 2020-07-08 RX ORDER — MIDAZOLAM HYDROCHLORIDE 1 MG/ML
2 INJECTION, SOLUTION INTRAMUSCULAR; INTRAVENOUS
Status: COMPLETED | OUTPATIENT
Start: 2020-07-08 | End: 2020-07-08

## 2020-07-08 RX ORDER — PROPOFOL 10 MG/ML
INJECTION, EMULSION INTRAVENOUS PRN
Status: DISCONTINUED | OUTPATIENT
Start: 2020-07-08 | End: 2020-07-08

## 2020-07-08 RX ORDER — CLINDAMYCIN PHOSPHATE 900 MG/50ML
INJECTION INTRAVENOUS PRN
Status: DISCONTINUED | OUTPATIENT
Start: 2020-07-08 | End: 2020-07-08

## 2020-07-08 RX ORDER — ACETAMINOPHEN 650 MG/1
650 SUPPOSITORY RECTAL EVERY 4 HOURS PRN
Status: DISCONTINUED | OUTPATIENT
Start: 2020-07-08 | End: 2020-07-08 | Stop reason: HOSPADM

## 2020-07-08 RX ORDER — HYDROCODONE BITARTRATE AND ACETAMINOPHEN 5; 325 MG/1; MG/1
1 TABLET ORAL ONCE
Status: COMPLETED | OUTPATIENT
Start: 2020-07-08 | End: 2020-07-08

## 2020-07-08 RX ORDER — CLINDAMYCIN PHOSPHATE 900 MG/50ML
900 INJECTION INTRAVENOUS ONCE
Status: DISCONTINUED | OUTPATIENT
Start: 2020-07-08 | End: 2020-07-08 | Stop reason: HOSPADM

## 2020-07-08 RX ORDER — SODIUM CHLORIDE, SODIUM LACTATE, POTASSIUM CHLORIDE, CALCIUM CHLORIDE 600; 310; 30; 20 MG/100ML; MG/100ML; MG/100ML; MG/100ML
INJECTION, SOLUTION INTRAVENOUS CONTINUOUS
Status: DISCONTINUED | OUTPATIENT
Start: 2020-07-08 | End: 2020-07-08 | Stop reason: HOSPADM

## 2020-07-08 RX ORDER — LIDOCAINE HYDROCHLORIDE 10 MG/ML
INJECTION, SOLUTION INFILTRATION; PERINEURAL PRN
Status: DISCONTINUED | OUTPATIENT
Start: 2020-07-08 | End: 2020-07-08

## 2020-07-08 RX ORDER — BUPIVACAINE HYDROCHLORIDE 5 MG/ML
INJECTION, SOLUTION EPIDURAL; INTRACAUDAL PRN
Status: DISCONTINUED | OUTPATIENT
Start: 2020-07-08 | End: 2020-07-08 | Stop reason: HOSPADM

## 2020-07-08 RX ORDER — ONDANSETRON 2 MG/ML
4 INJECTION INTRAMUSCULAR; INTRAVENOUS EVERY 12 HOURS PRN
Status: DISCONTINUED | OUTPATIENT
Start: 2020-07-08 | End: 2020-07-08 | Stop reason: HOSPADM

## 2020-07-08 RX ORDER — SCOLOPAMINE TRANSDERMAL SYSTEM 1 MG/1
1 PATCH, EXTENDED RELEASE TRANSDERMAL
Status: DISCONTINUED | OUTPATIENT
Start: 2020-07-08 | End: 2020-07-08 | Stop reason: HOSPADM

## 2020-07-08 RX ORDER — ONDANSETRON 4 MG/1
4 TABLET, ORALLY DISINTEGRATING ORAL EVERY 12 HOURS PRN
Status: DISCONTINUED | OUTPATIENT
Start: 2020-07-08 | End: 2020-07-08 | Stop reason: HOSPADM

## 2020-07-08 RX ORDER — ONDANSETRON 2 MG/ML
4 INJECTION INTRAMUSCULAR; INTRAVENOUS
Status: DISCONTINUED | OUTPATIENT
Start: 2020-07-08 | End: 2020-07-08 | Stop reason: HOSPADM

## 2020-07-08 RX ORDER — ACETAMINOPHEN 325 MG/1
650 TABLET ORAL EVERY 4 HOURS PRN
Status: DISCONTINUED | OUTPATIENT
Start: 2020-07-08 | End: 2020-07-08 | Stop reason: HOSPADM

## 2020-07-08 RX ADMIN — PROPOFOL 200 MG: 10 INJECTION, EMULSION INTRAVENOUS at 13:32

## 2020-07-08 RX ADMIN — FENTANYL CITRATE 25 MCG: 50 INJECTION INTRAMUSCULAR; INTRAVENOUS at 15:30

## 2020-07-08 RX ADMIN — CLINDAMYCIN IN 5 PERCENT DEXTROSE 900 MG: 18 INJECTION, SOLUTION INTRAVENOUS at 13:52

## 2020-07-08 RX ADMIN — ROCURONIUM BROMIDE 50 MG: 50 INJECTION, SOLUTION INTRAVENOUS at 13:33

## 2020-07-08 RX ADMIN — FENTANYL CITRATE 25 MCG: 50 INJECTION INTRAMUSCULAR; INTRAVENOUS at 15:25

## 2020-07-08 RX ADMIN — DEXAMETHASONE SODIUM PHOSPHATE 8 MG: 4 INJECTION, SOLUTION INTRAMUSCULAR; INTRAVENOUS at 14:15

## 2020-07-08 RX ADMIN — HYDROCODONE BITARTRATE AND ACETAMINOPHEN 1 TABLET: 5; 325 TABLET ORAL at 15:37

## 2020-07-08 RX ADMIN — MIDAZOLAM HYDROCHLORIDE 2 MG: 1 INJECTION, SOLUTION INTRAMUSCULAR; INTRAVENOUS at 13:22

## 2020-07-08 RX ADMIN — HYDROCODONE BITARTRATE AND ACETAMINOPHEN 1 TABLET: 5; 325 TABLET ORAL at 18:25

## 2020-07-08 RX ADMIN — HYDROCODONE BITARTRATE AND ACETAMINOPHEN 1 TABLET: 5; 325 TABLET ORAL at 15:53

## 2020-07-08 RX ADMIN — SODIUM CHLORIDE, POTASSIUM CHLORIDE, SODIUM LACTATE AND CALCIUM CHLORIDE: 600; 310; 30; 20 INJECTION, SOLUTION INTRAVENOUS at 13:25

## 2020-07-08 RX ADMIN — FENTANYL CITRATE 25 MCG: 50 INJECTION INTRAMUSCULAR; INTRAVENOUS at 16:11

## 2020-07-08 RX ADMIN — LIDOCAINE HYDROCHLORIDE 50 MG: 10 INJECTION, SOLUTION INFILTRATION; PERINEURAL at 13:32

## 2020-07-08 RX ADMIN — FENTANYL CITRATE 50 MCG: 50 INJECTION INTRAMUSCULAR; INTRAVENOUS at 13:30

## 2020-07-08 RX ADMIN — FENTANYL CITRATE 25 MCG: 50 INJECTION INTRAMUSCULAR; INTRAVENOUS at 15:20

## 2020-07-08 RX ADMIN — FENTANYL CITRATE 50 MCG: 50 INJECTION INTRAMUSCULAR; INTRAVENOUS at 13:58

## 2020-07-08 RX ADMIN — PHENAZOPYRIDINE HYDROCHLORIDE 200 MG: 200 TABLET ORAL at 11:19

## 2020-07-08 RX ADMIN — HYDROCODONE BITARTRATE AND ACETAMINOPHEN 1 TABLET: 5; 325 TABLET ORAL at 15:52

## 2020-07-08 RX ADMIN — FENTANYL CITRATE 25 MCG: 50 INJECTION INTRAMUSCULAR; INTRAVENOUS at 15:14

## 2020-07-08 RX ADMIN — CHLORHEXIDINE GLUCONATE 15 ML: 1.2 RINSE ORAL at 12:14

## 2020-07-08 RX ADMIN — SCOPALAMINE 1 PATCH: 1 PATCH, EXTENDED RELEASE TRANSDERMAL at 12:14

## 2020-07-08 ASSESSMENT — PAIN SCALES - WONG BAKER: WONGBAKER_NUMERICALRESPONSE: 0

## 2020-07-08 ASSESSMENT — ENCOUNTER SYMPTOMS
HEADACHES: 1
EXERCISE TOLERANCE: GOOD (>4 METS)

## 2020-07-08 ASSESSMENT — PAIN SCALES - GENERAL
PAINLEVEL_OUTOF10: 7
PAINLEVEL_OUTOF10: 3
PAINLEVEL_OUTOF10: 2

## 2020-07-09 ENCOUNTER — APPOINTMENT (OUTPATIENT)
Dept: PHYSICAL THERAPY | Age: 40
End: 2020-07-09
Attending: OBSTETRICS & GYNECOLOGY
Payer: COMMERCIAL

## 2020-07-10 ENCOUNTER — PATIENT MESSAGE (OUTPATIENT)
Dept: FAMILY MEDICINE CLINIC | Facility: CLINIC | Age: 40
End: 2020-07-10

## 2020-07-10 DIAGNOSIS — N83.201 CYST OF RIGHT OVARY: ICD-10-CM

## 2020-07-10 NOTE — TELEPHONE ENCOUNTER
From: Raheem Esteves  To: Anaid Villarreal PA-C  Sent: 7/10/2020 6:40 AM CDT  Subject: Non-Urgent Medical Question    Anu Watkins,   As of 6:30 a.m. I have 15 Norco left. My next dose can be taken at 7:30.  Which if I take 2 would leave me at 13 pills lef

## 2020-07-11 RX ORDER — HYDROCODONE BITARTRATE AND ACETAMINOPHEN 5; 325 MG/1; MG/1
1 TABLET ORAL
Qty: 10 TABLET | Refills: 0 | Status: SHIPPED | OUTPATIENT
Start: 2020-07-11 | End: 2020-07-13

## 2020-07-13 ENCOUNTER — OFFICE VISIT (OUTPATIENT)
Dept: FAMILY MEDICINE CLINIC | Facility: CLINIC | Age: 40
End: 2020-07-13
Payer: COMMERCIAL

## 2020-07-13 VITALS
WEIGHT: 202 LBS | TEMPERATURE: 98 F | SYSTOLIC BLOOD PRESSURE: 92 MMHG | HEART RATE: 96 BPM | DIASTOLIC BLOOD PRESSURE: 60 MMHG | BODY MASS INDEX: 30.97 KG/M2 | HEIGHT: 67.72 IN

## 2020-07-13 DIAGNOSIS — N83.201 CYST OF RIGHT OVARY: Primary | ICD-10-CM

## 2020-07-13 DIAGNOSIS — R52 PAIN MANAGEMENT: ICD-10-CM

## 2020-07-13 DIAGNOSIS — G89.18 POST-OPERATIVE PAIN: ICD-10-CM

## 2020-07-13 LAB
PATH REPORT, CYTOLOGY: NORMAL
PATHOLOGIST NAME: NORMAL

## 2020-07-13 PROCEDURE — 99213 OFFICE O/P EST LOW 20 MIN: CPT | Performed by: FAMILY MEDICINE

## 2020-07-13 RX ORDER — HYDROCODONE BITARTRATE AND ACETAMINOPHEN 5; 325 MG/1; MG/1
1-2 TABLET ORAL
Qty: 30 TABLET | Refills: 0 | OUTPATIENT
Start: 2020-07-13

## 2020-07-13 RX ORDER — HYDROCODONE BITARTRATE AND ACETAMINOPHEN 5; 325 MG/1; MG/1
1 TABLET ORAL EVERY 8 HOURS PRN
Qty: 32 TABLET | Refills: 0 | Status: SHIPPED | OUTPATIENT
Start: 2020-07-13 | End: 2020-07-29 | Stop reason: ALTCHOICE

## 2020-07-13 NOTE — TELEPHONE ENCOUNTER
From: Marcello Alvarenga  To: Karen Hahn PA-C  Sent: 7/10/2020 6:25 PM CDT  Subject: Prescription Question    Umang Suarez,  Please also see my message from 6:00 a.m. today. I actually see you on Monday at 3:30, not Tuesday.  Right now I have 9 pills

## 2020-07-13 NOTE — PROGRESS NOTES
Bryson Walters is a 44year old female. HPI:   Patient's improved postoperative pain control. Patient had surgery on 7/8/2028 with Dr. Stone John for ovarian cyst removal on the right side.   Patient has low pain tolerance and has been taking 2 of These include, but are not limited to hemorrhage, bleeding, post procedural pain, perforation, phlebitis, aspiration, hypotension, hypoxia, cardiovascular events such as arryhthmia, and possibly death. Additionally, the possibility of missed colonic polyps and interval colon cancer was discussed in the consent. Description of Procedure: The patient was then taken to the endoscopy suite, placed in the left lateral decubitus position and the above IV sedation was administrered. The Olympus video endoscope was placed through the patient's oropharynx without difficulty to the extent of the 2nd portion of the duodenum. Both forward and retroflexed views of the stomach were obtained. Findings:    1) LA class B erosive esophagitis noted. No evidence of Sethi's esophagus noted. 2) There was a mild esophageal stricture noted at the gastroesophageal junction. 3)  There was a 4 cm sliding hiatal hernia noted. The gastroesophageal junction was at 36cm from the incisors. 4) There were several small 4-5 mm polyps in the fundus of the stomach. These were biopsied. The remainder of the stomach was normal.  5) There was a normal appearing duodenal bulb and second portion of the duodenum. 6) The ampulla was normal appearing. The scope was then withdrawn back into the stomach, it was decompressed, and the scope was completely withdrawn. A digital rectal examination was performed and revealed negative without mass, lesions or tenderness, internal hemorrhoids noted. The Olympus video colonoscope was placed in the patient's rectum under digital direction and advanced to the cecum. The cecum was identified by characteristic anatomy and ballottment. The prep was good. The ileocecal valve was identified. The terminal ileum was normal.    The scope was then withdrawn back through the cecum, ascending, transverse, descending, sigmoid colon, and rectum.   Careful circumferential examination of the tablet 0   • escitalopram 10 MG Oral Tab Take 1 tablet (10 mg total) by mouth daily. 90 tablet 0   • pregabalin 75 MG Oral Cap Take 1 capsule (75 mg total) by mouth 2 (two) times daily.  60 capsule 2   • ALPRAZolam 0.25 MG Oral Tab Take 1 tablet (0.25 mg to directed to check blood sugars as needed 1 Box 0   • Magnesium 100 MG Oral Tab Take 1 tablet by mouth daily. • NON FORMULARY Take 1 tablet by mouth daily. • aspirin 81 MG Oral Tab Take 81 mg by mouth daily.         Past Medical History:   Darrion Jones congestion, throat clear no erythema without mass.    EYES: PERRLA, EOM intact, sclera clear without injection  NECK: supple,no adenopathy, thyroid normal to palpation  LUNGS: clear to auscultation no rales, rhonchi or wheezes  CARDIO: RRR without murmur  G

## 2020-07-13 NOTE — PATIENT INSTRUCTIONS
NORCO taper  Today 2 at night  Tuesday and Wednesday 5 pills/day.   Thursday and Friday 4 pills/day  Saturday and Sunday 3 pills/day  Monday and Tuesday 2 pills/day  Wednesday and Thursday one half twice a day taper off

## 2020-07-14 ENCOUNTER — APPOINTMENT (OUTPATIENT)
Dept: PHYSICAL THERAPY | Age: 40
End: 2020-07-14
Attending: OBSTETRICS & GYNECOLOGY
Payer: COMMERCIAL

## 2020-07-16 ENCOUNTER — APPOINTMENT (OUTPATIENT)
Dept: PHYSICAL THERAPY | Age: 40
End: 2020-07-16
Attending: OBSTETRICS & GYNECOLOGY
Payer: COMMERCIAL

## 2020-07-19 ENCOUNTER — PATIENT MESSAGE (OUTPATIENT)
Dept: FAMILY MEDICINE CLINIC | Facility: CLINIC | Age: 40
End: 2020-07-19

## 2020-07-19 DIAGNOSIS — Z01.84 IMMUNITY STATUS TESTING: Primary | ICD-10-CM

## 2020-07-20 NOTE — TELEPHONE ENCOUNTER
Okay for the COVID 19 antibody test use diagnosis immunity status testing. As far as the [facility] she would need to decide that if they are social distancing and using masks I would think this would be appropriate.   She should bring some hand  with

## 2020-07-20 NOTE — TELEPHONE ENCOUNTER
From: Jef Betancourt  To:  Chloe Murray PA-C  Sent: 7/19/2020 11:59 AM CDT  Subject: Non-Urgent Medical Question    Hi Margaret Lehman,   Two questions: I believe Donovan Niece covers the blood test for the covid antibody test. Since Dunia Pizarro met my deductible, If my

## 2020-07-28 ENCOUNTER — LAB ENCOUNTER (OUTPATIENT)
Dept: LAB | Age: 40
End: 2020-07-28
Attending: OTOLARYNGOLOGY
Payer: COMMERCIAL

## 2020-07-28 DIAGNOSIS — E07.9 THYROID DYSFUNCTION: ICD-10-CM

## 2020-07-28 DIAGNOSIS — C73 THYROID CANCER (HCC): ICD-10-CM

## 2020-07-28 DIAGNOSIS — E83.51 LOW CALCIUM LEVELS: ICD-10-CM

## 2020-07-28 DIAGNOSIS — Z01.84 IMMUNITY STATUS TESTING: ICD-10-CM

## 2020-07-28 LAB
CALCIUM BLD-MCNC: 8.7 MG/DL (ref 8.5–10.1)
SARS-COV-2 IGG SERPLBLD QL IA.RAPID: NEGATIVE
VIT D+METAB SERPL-MCNC: 31.4 NG/ML (ref 30–100)

## 2020-07-28 PROCEDURE — 82306 VITAMIN D 25 HYDROXY: CPT | Performed by: FAMILY MEDICINE

## 2020-07-28 PROCEDURE — 82310 ASSAY OF CALCIUM: CPT | Performed by: FAMILY MEDICINE

## 2020-07-28 PROCEDURE — 86769 SARS-COV-2 COVID-19 ANTIBODY: CPT | Performed by: FAMILY MEDICINE

## 2020-07-28 PROCEDURE — 36415 COLL VENOUS BLD VENIPUNCTURE: CPT | Performed by: FAMILY MEDICINE

## 2020-07-29 ENCOUNTER — OFFICE VISIT (OUTPATIENT)
Dept: FAMILY MEDICINE CLINIC | Facility: CLINIC | Age: 40
End: 2020-07-29
Payer: COMMERCIAL

## 2020-07-29 VITALS
HEIGHT: 67.72 IN | TEMPERATURE: 98 F | SYSTOLIC BLOOD PRESSURE: 92 MMHG | WEIGHT: 196 LBS | BODY MASS INDEX: 30.05 KG/M2 | HEART RATE: 96 BPM | DIASTOLIC BLOOD PRESSURE: 68 MMHG

## 2020-07-29 DIAGNOSIS — J45.40 ALLERGIC ASTHMA, MODERATE PERSISTENT, UNCOMPLICATED: ICD-10-CM

## 2020-07-29 DIAGNOSIS — J30.2 SEASONAL ALLERGIES: ICD-10-CM

## 2020-07-29 DIAGNOSIS — G44.221 CHRONIC TENSION-TYPE HEADACHE, INTRACTABLE: Primary | ICD-10-CM

## 2020-07-29 DIAGNOSIS — F41.1 GAD (GENERALIZED ANXIETY DISORDER): ICD-10-CM

## 2020-07-29 DIAGNOSIS — R19.8 UMBILICAL DISCHARGE: ICD-10-CM

## 2020-07-29 DIAGNOSIS — L29.9 PRURITUS: ICD-10-CM

## 2020-07-29 PROCEDURE — 3078F DIAST BP <80 MM HG: CPT | Performed by: FAMILY MEDICINE

## 2020-07-29 PROCEDURE — 87205 SMEAR GRAM STAIN: CPT | Performed by: FAMILY MEDICINE

## 2020-07-29 PROCEDURE — 87070 CULTURE OTHR SPECIMN AEROBIC: CPT | Performed by: FAMILY MEDICINE

## 2020-07-29 PROCEDURE — 3074F SYST BP LT 130 MM HG: CPT | Performed by: FAMILY MEDICINE

## 2020-07-29 PROCEDURE — 99214 OFFICE O/P EST MOD 30 MIN: CPT | Performed by: FAMILY MEDICINE

## 2020-07-29 PROCEDURE — 87077 CULTURE AEROBIC IDENTIFY: CPT | Performed by: FAMILY MEDICINE

## 2020-07-29 PROCEDURE — 3008F BODY MASS INDEX DOCD: CPT | Performed by: FAMILY MEDICINE

## 2020-07-29 RX ORDER — BUTALBITAL/ACETAMINOPHEN 50MG-325MG
1 TABLET ORAL 2 TIMES DAILY PRN
Qty: 30 TABLET | Refills: 0 | Status: SHIPPED | OUTPATIENT
Start: 2020-07-29 | End: 2020-09-02

## 2020-07-29 RX ORDER — HYDROXYZINE HYDROCHLORIDE 25 MG/1
TABLET, FILM COATED ORAL 2 TIMES DAILY PRN
Qty: 60 TABLET | Refills: 0 | Status: SHIPPED | OUTPATIENT
Start: 2020-07-29 | End: 2020-08-25

## 2020-07-29 NOTE — PROGRESS NOTES
Dotty Castillo is a 44year old female. HPI:   Chronic tension-type headache, intractable  Patient is having more tension headaches secondary to COVID-19 pandemic and school starting along with her father-in-law this weekend trying to kill himself.   Saqib Bearden Refill   • hydrOXYzine HCl 25 MG Oral Tab Take 0.5-1 tablets (12.5-25 mg total) by mouth 2 (two) times daily as needed for Anxiety (allergies).  60 tablet 0   • Butalbital-Acetaminophen  MG Oral Tab Take 1 tablet by mouth 2 (two) times daily as needed areas 30 g 1   • Ondansetron HCl (ZOFRAN) 8 MG tablet Take 8 mg by mouth every 8 (eight) hours as needed for Nausea.     1   • CONTOUR NEXT TEST In Vitro Strip USE TWICE DAILY AS DIRECTED 200 strip 3   • Neonode MICROLET LANCETS Does not apply Misc Use as dir Position: Sitting, Cuff Size: adult)   Pulse 96   Temp 97.5 °F (36.4 °C) (Skin)   Ht 67.72\"   Wt 196 lb (88.9 kg)   BMI 30.05 kg/m²   GENERAL: well developed, well nourished,in no apparent distress  SKIN: no rashes,no suspicious lesions  HEENT: TM clear b to limit prescription to no more than 5/week. Try to reduce the dosing as the stress improves. - Butalbital-Acetaminophen  MG Oral Tab; Take 1 tablet by mouth 2 (two) times daily as needed (headache). Dispense: 30 tablet; Refill: 0    2.  Umbilical

## 2020-07-29 NOTE — PROGRESS NOTES
Take over-the-counter vitamin D 1000 to 2000 international units daily. COVID 19 antibody negative. Calcium level is normal.  Vitamin D level is normal on the low end so over-the-counter vitamin D is important.   Sent to my chart

## 2020-07-30 PROBLEM — J30.2 SEASONAL ALLERGIES: Status: ACTIVE | Noted: 2020-07-30

## 2020-07-30 PROBLEM — J45.40: Status: ACTIVE | Noted: 2020-07-30

## 2020-07-30 PROBLEM — J45.40 ALLERGIC ASTHMA, MODERATE PERSISTENT, UNCOMPLICATED: Status: ACTIVE | Noted: 2020-07-30

## 2020-07-30 LAB
THYROGLOBULIN AB: <0.9 IU/ML
THYROGLOBULIN, SERUM OR PLASMA: 0.1 NG/ML

## 2020-08-01 NOTE — PROGRESS NOTES
Mixture of mixed bacteria suggestive of normal skin simeon. Send update on symptoms  any redness, swelling or pain along the umbilical area? Wu Cheeks to my chart

## 2020-08-03 RX ORDER — PANTOPRAZOLE SODIUM 40 MG/1
TABLET, DELAYED RELEASE ORAL
Qty: 60 TABLET | Refills: 1 | Status: SHIPPED | OUTPATIENT
Start: 2020-08-03 | End: 2020-10-12

## 2020-08-04 NOTE — PROGRESS NOTES
Carol Reddy your tumor marker came back negative. We will repeat the level in 1 year, an order has been placed.

## 2020-08-07 ENCOUNTER — OFFICE VISIT (OUTPATIENT)
Dept: UROLOGY | Facility: HOSPITAL | Age: 40
End: 2020-08-07
Attending: OBSTETRICS & GYNECOLOGY
Payer: COMMERCIAL

## 2020-08-07 VITALS — BODY MASS INDEX: 29.7 KG/M2 | RESPIRATION RATE: 16 BRPM | HEIGHT: 68 IN | WEIGHT: 196 LBS

## 2020-08-07 DIAGNOSIS — R35.1 NOCTURIA: ICD-10-CM

## 2020-08-07 DIAGNOSIS — M79.18 MYALGIA OF PELVIC FLOOR: ICD-10-CM

## 2020-08-07 DIAGNOSIS — R35.0 URINARY FREQUENCY: Primary | ICD-10-CM

## 2020-08-07 PROCEDURE — 99212 OFFICE O/P EST SF 10 MIN: CPT

## 2020-08-25 DIAGNOSIS — F41.1 GAD (GENERALIZED ANXIETY DISORDER): ICD-10-CM

## 2020-08-25 DIAGNOSIS — L29.9 PRURITUS: ICD-10-CM

## 2020-08-25 DIAGNOSIS — J30.2 SEASONAL ALLERGIES: ICD-10-CM

## 2020-08-25 RX ORDER — HYDROXYZINE HYDROCHLORIDE 25 MG/1
TABLET, FILM COATED ORAL
Qty: 60 TABLET | Refills: 0 | Status: SHIPPED | OUTPATIENT
Start: 2020-08-25 | End: 2020-10-22

## 2020-08-25 NOTE — TELEPHONE ENCOUNTER
Requested Prescriptions     Pending Prescriptions Disp Refills   • HYDROXYZINE HCL 25 MG Oral Tab [Pharmacy Med Name: HYDROXYZINE HCL 25MG TABS (WHITE)] 60 tablet 0     Sig: TAKE ONE-HALF TO ONE TABLET BY MOUTH TWICE DAILY AS NEEDED FOR ANXIETY(ALLERGIES)

## 2020-08-29 DIAGNOSIS — G44.221 CHRONIC TENSION-TYPE HEADACHE, INTRACTABLE: ICD-10-CM

## 2020-08-31 NOTE — TELEPHONE ENCOUNTER
Pt requesting refill of BUTALBITAL-ACETAMINOPHEN  MG Oral Tab    Last Time Medication was Filled:  7/29/20    Last Office Visit with Provider: 7/29/20. The patient is asked to return in 1-2 months. No future appointments.

## 2020-08-31 NOTE — TELEPHONE ENCOUNTER
Have her follow-up so I can see how much she is taking it seems like she is taking 1/day. I thought her goal was no more than 4-5/week.

## 2020-09-01 RX ORDER — BUTALBITAL/ACETAMINOPHEN 50MG-325MG
TABLET ORAL
Qty: 30 TABLET | Refills: 0 | OUTPATIENT
Start: 2020-09-01

## 2020-09-02 ENCOUNTER — OFFICE VISIT (OUTPATIENT)
Dept: FAMILY MEDICINE CLINIC | Facility: CLINIC | Age: 40
End: 2020-09-02
Payer: COMMERCIAL

## 2020-09-02 VITALS
WEIGHT: 197 LBS | BODY MASS INDEX: 30.21 KG/M2 | SYSTOLIC BLOOD PRESSURE: 90 MMHG | DIASTOLIC BLOOD PRESSURE: 62 MMHG | HEIGHT: 67.72 IN | TEMPERATURE: 99 F | HEART RATE: 108 BPM

## 2020-09-02 DIAGNOSIS — G44.221 CHRONIC TENSION-TYPE HEADACHE, INTRACTABLE: Primary | ICD-10-CM

## 2020-09-02 DIAGNOSIS — F41.1 GAD (GENERALIZED ANXIETY DISORDER): ICD-10-CM

## 2020-09-02 DIAGNOSIS — Z79.899 MEDICATION MANAGEMENT: ICD-10-CM

## 2020-09-02 DIAGNOSIS — F45.0 ANXIETY WITH SOMATIZATION: ICD-10-CM

## 2020-09-02 DIAGNOSIS — F41.9 ANXIETY WITH SOMATIZATION: ICD-10-CM

## 2020-09-02 DIAGNOSIS — F51.01 PRIMARY INSOMNIA: ICD-10-CM

## 2020-09-02 PROCEDURE — 99214 OFFICE O/P EST MOD 30 MIN: CPT | Performed by: FAMILY MEDICINE

## 2020-09-02 PROCEDURE — 3008F BODY MASS INDEX DOCD: CPT | Performed by: FAMILY MEDICINE

## 2020-09-02 PROCEDURE — 3074F SYST BP LT 130 MM HG: CPT | Performed by: FAMILY MEDICINE

## 2020-09-02 PROCEDURE — 3078F DIAST BP <80 MM HG: CPT | Performed by: FAMILY MEDICINE

## 2020-09-02 RX ORDER — BUTALBITAL/ACETAMINOPHEN 50MG-325MG
1 TABLET ORAL 2 TIMES DAILY PRN
Qty: 30 TABLET | Refills: 1 | Status: SHIPPED | OUTPATIENT
Start: 2020-09-02 | End: 2020-11-15

## 2020-09-02 NOTE — PROGRESS NOTES
Nilson Guerrero is a 44year old female.   HPI:   Patient is in for follow-up on tension headaches has started school this week and is a  states that it has been extra run off his 27 kids in her class and is doing everything through E mouth 2 (two) times daily as needed (headache).  30 tablet 1   • HYDROXYZINE HCL 25 MG Oral Tab TAKE ONE-HALF TO ONE TABLET BY MOUTH TWICE DAILY AS NEEDED FOR ANXIETY(ALLERGIES) 60 tablet 0   • PANTOPRAZOLE SODIUM 40 MG Oral Tab EC TAKE 1 TABLET(40 MG) BY M as needed for Nausea.     1   • CONTOUR NEXT TEST In Vitro Strip USE TWICE DAILY AS DIRECTED 200 strip 3   • NEYDA MICROLET LANCETS Does not apply Misc Use as directed to check blood sugars as needed 1 Box 0   • Magnesium 100 MG Oral Tab Take 1 tablet by mo lb (89.4 kg)   LMP 05/12/2013 (Within Days)   BMI 30.20 kg/m²   GENERAL: well developed, well nourished,in no apparent distress  SKIN: no rashes,no suspicious lesions  EYES:  sclera clear without injection  NECK: supple,no adenopathy, thyroid normal to pal to taper off of Ambien. Continue with counseling twice a week. The patient indicates understanding of these issues and agrees to the plan. The patient is asked to return in 2 to 3 months. Or as needed. Marlo Ortiz

## 2020-09-11 DIAGNOSIS — F41.1 GAD (GENERALIZED ANXIETY DISORDER): ICD-10-CM

## 2020-09-11 DIAGNOSIS — G89.29 CHRONIC ABDOMINAL PAIN: ICD-10-CM

## 2020-09-11 DIAGNOSIS — R10.9 CHRONIC ABDOMINAL PAIN: ICD-10-CM

## 2020-09-11 DIAGNOSIS — F51.01 PRIMARY INSOMNIA: ICD-10-CM

## 2020-09-14 RX ORDER — ZOLPIDEM TARTRATE 5 MG/1
TABLET ORAL
Qty: 30 TABLET | Refills: 0 | Status: SHIPPED | OUTPATIENT
Start: 2020-09-14 | End: 2020-10-12

## 2020-09-14 RX ORDER — ALPRAZOLAM 0.25 MG/1
TABLET ORAL
Qty: 60 TABLET | Refills: 0 | Status: SHIPPED | OUTPATIENT
Start: 2020-09-14 | End: 2020-10-12

## 2020-09-14 NOTE — TELEPHONE ENCOUNTER
Pt requesting refill of ZOLPIDEM 5 MG Oral Tab and ALPRAZOLAM 0.25 MG Oral Tab    Last Time Medication was Filled:  8/17/20    Last Office Visit with Provider: 9/2/20    No future appointments.

## 2020-09-16 ENCOUNTER — OFFICE VISIT (OUTPATIENT)
Dept: SURGERY | Facility: CLINIC | Age: 40
End: 2020-09-16
Payer: COMMERCIAL

## 2020-09-16 VITALS
BODY MASS INDEX: 29.4 KG/M2 | OXYGEN SATURATION: 99 % | HEART RATE: 99 BPM | WEIGHT: 194 LBS | SYSTOLIC BLOOD PRESSURE: 119 MMHG | DIASTOLIC BLOOD PRESSURE: 78 MMHG | HEIGHT: 68 IN

## 2020-09-16 DIAGNOSIS — E78.2 MIXED HYPERLIPIDEMIA: Primary | ICD-10-CM

## 2020-09-16 DIAGNOSIS — E66.3 OVERWEIGHT WITH BODY MASS INDEX (BMI) 25.0-29.9: ICD-10-CM

## 2020-09-16 DIAGNOSIS — Z98.84 HISTORY OF ROUX-EN-Y GASTRIC BYPASS: ICD-10-CM

## 2020-09-16 PROCEDURE — 3078F DIAST BP <80 MM HG: CPT | Performed by: INTERNAL MEDICINE

## 2020-09-16 PROCEDURE — 3008F BODY MASS INDEX DOCD: CPT | Performed by: INTERNAL MEDICINE

## 2020-09-16 PROCEDURE — 3074F SYST BP LT 130 MM HG: CPT | Performed by: INTERNAL MEDICINE

## 2020-09-16 PROCEDURE — 99214 OFFICE O/P EST MOD 30 MIN: CPT | Performed by: INTERNAL MEDICINE

## 2020-09-16 RX ORDER — CYCLOBENZAPRINE HCL 10 MG
TABLET ORAL
COMMUNITY
Start: 2020-09-15 | End: 2020-10-29

## 2020-09-16 RX ORDER — TOPIRAMATE 25 MG/1
25 TABLET ORAL NIGHTLY
Qty: 90 TABLET | Refills: 3 | Status: SHIPPED | OUTPATIENT
Start: 2020-09-16 | End: 2021-03-15

## 2020-09-16 NOTE — PROGRESS NOTES
[address]  Dept: [phone]        Patient:  Nia Yuan  :      1980  MRN:      [id number]    Referring Provider: Al Lee EC, TAKE 1 TABLET(40 MG) BY MOUTH TWICE DAILY BEFORE MEALS, Disp: 60 tablet, Rfl: 1  •  mirtazapine 7.5 MG Oral Tab, Take 0.5 tablets (3.75 mg total) by mouth nightly., Disp: 15 tablet, Rfl: 5  •  escitalopram 10 MG Oral Tab, Take 1 tablet (10 mg total) by by mouth daily. , Disp: , Rfl:   •  aspirin 81 MG Oral Tab, Take 81 mg by mouth daily. , Disp: , Rfl:     Allergies:  Adhesive Tape; Cephalosporins; Doxycycline; Azithromycin; Metoclopramide; Cheratussin Ac [Guaifenesin-Codeine];  Chocolate; Mold; Nsaids; T Nahun Alamo MD at [facility] MAIN OR   • [address] [facility] ER     • LAPAROSCOPY-GYNE N/A 2/18/2015    Performed by Farnaz Flores MD at [facility] MAIN OR   • OOPHORECTOMY Left    • OTHER  NECK SCAR REVISION   • OTHER      gastric bypass   • OTHER SURGICAL H noted under skin folds     ROS:    Constitutional: negative  Respiratory: negative  Cardiovascular: negative  Gastrointestinal: positive for abdominal pain  Musculoskeletal:negative  Neurological: negative  Behavioral/Psych: positive for anxiety  All other

## 2020-09-21 NOTE — TELEPHONE ENCOUNTER
Pt requesting refill of pregabalin 75 MG Oral Cap     Last Time Medication was Filled:  8/28/20    Last Office Visit with Provider: 9/2/20. The patient is asked to return in 2 to 3 months. Or as needed  No future appointments.

## 2020-09-22 RX ORDER — PREGABALIN 75 MG/1
75 CAPSULE ORAL 2 TIMES DAILY
Qty: 60 CAPSULE | Refills: 2 | Status: SHIPPED | OUTPATIENT
Start: 2020-09-22 | End: 2020-12-15

## 2020-09-29 DIAGNOSIS — F51.01 PRIMARY INSOMNIA: ICD-10-CM

## 2020-09-29 DIAGNOSIS — J38.3 VOCAL CORD DYSFUNCTION: ICD-10-CM

## 2020-09-29 DIAGNOSIS — F41.1 GAD (GENERALIZED ANXIETY DISORDER): ICD-10-CM

## 2020-09-29 RX ORDER — ESCITALOPRAM OXALATE 10 MG/1
TABLET ORAL
Qty: 90 TABLET | Refills: 0 | Status: SHIPPED | OUTPATIENT
Start: 2020-09-29 | End: 2021-01-05

## 2020-09-29 RX ORDER — AMITRIPTYLINE HYDROCHLORIDE 25 MG/1
TABLET, FILM COATED ORAL
Qty: 30 TABLET | Refills: 2 | Status: SHIPPED | OUTPATIENT
Start: 2020-09-29 | End: 2020-12-31

## 2020-09-29 NOTE — TELEPHONE ENCOUNTER
Requested Prescriptions     Pending Prescriptions Disp Refills   • AMITRIPTYLINE HCL 25 MG Oral Tab [Pharmacy Med Name: AMITRIPTYLINE 25MG TABLETS] 30 tablet 5     Sig: TAKE 1 TABLET BY MOUTH EVERY NIGHT   • ESCITALOPRAM 10 MG Oral Tab [Pharmacy Med Name:

## 2020-10-02 NOTE — TELEPHONE ENCOUNTER
Include Z78.9 (Other Specified Conditions Influencing Health Status) As An Associated Diagnosis?: No rx phoned in to pharmacy qty 30 NR  Message left on pharmacy VM Treatment Number (Optional): 3 Administered By (Optional): Davion Gary PA-C Detail Level: Detailed Kenalog Preparation: Kenalog X Size Of Lesion In Cm (Optional): 0 Total Volume Injected (Ccs- Only Use Numbers And Decimals): 0.3 Medical Necessity Clause: This procedure was medically necessary because the lesions that were treated were: Consent: The risks of atrophy were reviewed with the patient. Concentration Of Solution Injected (Mg/Ml): 5.0

## 2020-10-11 DIAGNOSIS — G89.29 CHRONIC ABDOMINAL PAIN: ICD-10-CM

## 2020-10-11 DIAGNOSIS — R10.9 CHRONIC ABDOMINAL PAIN: ICD-10-CM

## 2020-10-11 DIAGNOSIS — F41.1 GAD (GENERALIZED ANXIETY DISORDER): ICD-10-CM

## 2020-10-11 DIAGNOSIS — F51.01 PRIMARY INSOMNIA: ICD-10-CM

## 2020-10-12 RX ORDER — PANTOPRAZOLE SODIUM 40 MG/1
TABLET, DELAYED RELEASE ORAL
Qty: 60 TABLET | Refills: 1 | Status: SHIPPED | OUTPATIENT
Start: 2020-10-12 | End: 2020-12-07

## 2020-10-12 RX ORDER — ALPRAZOLAM 0.25 MG/1
TABLET ORAL
Qty: 60 TABLET | Refills: 0 | Status: SHIPPED | OUTPATIENT
Start: 2020-10-12 | End: 2020-11-09

## 2020-10-12 RX ORDER — ZOLPIDEM TARTRATE 5 MG/1
TABLET ORAL
Qty: 30 TABLET | Refills: 0 | Status: SHIPPED | OUTPATIENT
Start: 2020-10-12 | End: 2020-11-09

## 2020-10-12 NOTE — TELEPHONE ENCOUNTER
Pt requesting refill of   Requested Prescriptions     Pending Prescriptions Disp Refills   • ZOLPIDEM 5 MG Oral Tab [Pharmacy Med Name: ZOLPIDEM 5MG TABLETS] 30 tablet 0     Sig: TAKE 1/2 TO 1 TABLET(2.5 TO 5 MG) BY MOUTH EVERY NIGHT AS NEEDED FOR SLEEP

## 2020-10-19 ENCOUNTER — PATIENT MESSAGE (OUTPATIENT)
Dept: FAMILY MEDICINE CLINIC | Facility: CLINIC | Age: 40
End: 2020-10-19

## 2020-10-19 NOTE — TELEPHONE ENCOUNTER
From: Branden Larios  To: Wilfrid Duran PA-C  Sent: 10/19/2020 12:41 PM CDT  Subject: Non-Urgent Medical Question    Saji Layne,   Well it’s that time a year. I’m having difficulty breathing which is accompanied now by coughing and a raspy voice.

## 2020-10-21 ENCOUNTER — PATIENT MESSAGE (OUTPATIENT)
Dept: FAMILY MEDICINE CLINIC | Facility: CLINIC | Age: 40
End: 2020-10-21

## 2020-10-22 DIAGNOSIS — L29.9 PRURITUS: ICD-10-CM

## 2020-10-22 DIAGNOSIS — F41.1 GAD (GENERALIZED ANXIETY DISORDER): ICD-10-CM

## 2020-10-22 DIAGNOSIS — J30.2 SEASONAL ALLERGIES: ICD-10-CM

## 2020-10-22 RX ORDER — HYDROXYZINE HYDROCHLORIDE 25 MG/1
TABLET, FILM COATED ORAL
Qty: 60 TABLET | Refills: 0 | Status: SHIPPED | OUTPATIENT
Start: 2020-10-22 | End: 2020-12-04

## 2020-10-22 NOTE — TELEPHONE ENCOUNTER
From: Dotty Castillo  To: Benton Stevens PA-C  Sent: 10/21/2020 5:54 PM CDT  Subject: Non-Urgent Medical Question    Hello,  Had my 2nd appt. with the allergist you referred. He is submitting to Jacinto Martínez for me to go on My [employer] S Responsa. twice a month.  Kemi Scales

## 2020-10-22 NOTE — TELEPHONE ENCOUNTER
Requested Prescriptions     Pending Prescriptions Disp Refills   • HYDROXYZINE HCL 25 MG Oral Tab [Pharmacy Med Name: HYDROXYZINE HCL 25MG TABS (WHITE)] 60 tablet 0     Sig: TAKE 1/2 TO 1 TABLET BY MOUTH TWICE DAILY AS NEEDED FOR ANXIETY OR ALLERGIES     L

## 2020-10-24 ENCOUNTER — APPOINTMENT (OUTPATIENT)
Dept: GENERAL RADIOLOGY | Age: 40
End: 2020-10-24
Attending: EMERGENCY MEDICINE
Payer: COMMERCIAL

## 2020-10-24 ENCOUNTER — HOSPITAL ENCOUNTER (OUTPATIENT)
Facility: HOSPITAL | Age: 40
Setting detail: OBSERVATION
Discharge: HOME | End: 2020-10-27
Attending: EMERGENCY MEDICINE | Admitting: HOSPITALIST
Payer: COMMERCIAL

## 2020-10-24 DIAGNOSIS — J45.41 MODERATE PERSISTENT ASTHMA WITH EXACERBATION: Primary | ICD-10-CM

## 2020-10-24 PROCEDURE — 71045 X-RAY EXAM CHEST 1 VIEW: CPT | Performed by: EMERGENCY MEDICINE

## 2020-10-24 RX ORDER — METHYLPREDNISOLONE SODIUM SUCCINATE 125 MG/2ML
125 INJECTION, POWDER, LYOPHILIZED, FOR SOLUTION INTRAMUSCULAR; INTRAVENOUS ONCE
Status: COMPLETED | OUTPATIENT
Start: 2020-10-24 | End: 2020-10-24

## 2020-10-24 RX ORDER — PREDNISONE 10 MG/1
10 TABLET ORAL DAILY
Status: ON HOLD | COMMUNITY
End: 2020-11-01

## 2020-10-24 RX ORDER — DEXTROSE AND SODIUM CHLORIDE 5; .45 G/100ML; G/100ML
INJECTION, SOLUTION INTRAVENOUS CONTINUOUS
Status: CANCELLED | OUTPATIENT
Start: 2020-10-24 | End: 2020-10-25

## 2020-10-25 PROCEDURE — 99220 INITIAL OBSERVATION CARE,LEVL III: CPT | Performed by: HOSPITALIST

## 2020-10-25 RX ORDER — ALPRAZOLAM 0.25 MG/1
0.25 TABLET ORAL 2 TIMES DAILY PRN
Status: DISCONTINUED | OUTPATIENT
Start: 2020-10-25 | End: 2020-10-27

## 2020-10-25 RX ORDER — ASPIRIN 81 MG/1
81 TABLET, CHEWABLE ORAL DAILY
Status: DISCONTINUED | OUTPATIENT
Start: 2020-10-25 | End: 2020-10-27

## 2020-10-25 RX ORDER — HYDROXYZINE HYDROCHLORIDE 25 MG/1
TABLET, FILM COATED ORAL 2 TIMES DAILY PRN
Status: DISCONTINUED | OUTPATIENT
Start: 2020-10-25 | End: 2020-10-27

## 2020-10-25 RX ORDER — PANTOPRAZOLE SODIUM 40 MG/1
40 TABLET, DELAYED RELEASE ORAL
Status: DISCONTINUED | OUTPATIENT
Start: 2020-10-25 | End: 2020-10-27

## 2020-10-25 RX ORDER — BISACODYL 10 MG
10 SUPPOSITORY, RECTAL RECTAL
Status: DISCONTINUED | OUTPATIENT
Start: 2020-10-25 | End: 2020-10-27

## 2020-10-25 RX ORDER — KETOROLAC TROMETHAMINE 15 MG/ML
15 INJECTION, SOLUTION INTRAMUSCULAR; INTRAVENOUS EVERY 6 HOURS PRN
Status: DISCONTINUED | OUTPATIENT
Start: 2020-10-25 | End: 2020-10-25

## 2020-10-25 RX ORDER — IPRATROPIUM BROMIDE AND ALBUTEROL SULFATE 2.5; .5 MG/3ML; MG/3ML
3 SOLUTION RESPIRATORY (INHALATION)
Status: DISCONTINUED | OUTPATIENT
Start: 2020-10-25 | End: 2020-10-26

## 2020-10-25 RX ORDER — AMITRIPTYLINE HYDROCHLORIDE 25 MG/1
12.5 TABLET, FILM COATED ORAL NIGHTLY
Status: DISCONTINUED | OUTPATIENT
Start: 2020-10-25 | End: 2020-10-27

## 2020-10-25 RX ORDER — IPRATROPIUM BROMIDE AND ALBUTEROL SULFATE 2.5; .5 MG/3ML; MG/3ML
3 SOLUTION RESPIRATORY (INHALATION)
Status: DISCONTINUED | OUTPATIENT
Start: 2020-10-25 | End: 2020-10-25

## 2020-10-25 RX ORDER — METHYLPREDNISOLONE SODIUM SUCCINATE 125 MG/2ML
60 INJECTION, POWDER, LYOPHILIZED, FOR SOLUTION INTRAMUSCULAR; INTRAVENOUS EVERY 8 HOURS
Status: DISCONTINUED | OUTPATIENT
Start: 2020-10-25 | End: 2020-10-26

## 2020-10-25 RX ORDER — IPRATROPIUM BROMIDE AND ALBUTEROL SULFATE 2.5; .5 MG/3ML; MG/3ML
SOLUTION RESPIRATORY (INHALATION)
Status: COMPLETED
Start: 2020-10-25 | End: 2020-10-25

## 2020-10-25 RX ORDER — LEVOTHYROXINE SODIUM 0.1 MG/1
100 TABLET ORAL
Status: DISCONTINUED | OUTPATIENT
Start: 2020-10-25 | End: 2020-10-27

## 2020-10-25 RX ORDER — LEVOTHYROXINE SODIUM 88 UG/1
88 TABLET ORAL
Status: DISCONTINUED | OUTPATIENT
Start: 2020-10-25 | End: 2020-10-27

## 2020-10-25 RX ORDER — POTASSIUM CHLORIDE 20 MEQ/1
40 TABLET, EXTENDED RELEASE ORAL EVERY 4 HOURS
Status: COMPLETED | OUTPATIENT
Start: 2020-10-25 | End: 2020-10-25

## 2020-10-25 RX ORDER — DEXTROAMPHETAMINE SACCHARATE, AMPHETAMINE ASPARTATE, DEXTROAMPHETAMINE SULFATE AND AMPHETAMINE SULFATE 2.5; 2.5; 2.5; 2.5 MG/1; MG/1; MG/1; MG/1
10 TABLET ORAL
Refills: 0 | Status: DISCONTINUED | OUTPATIENT
Start: 2020-10-25 | End: 2020-10-27

## 2020-10-25 RX ORDER — POLYETHYLENE GLYCOL 3350 17 G/17G
17 POWDER, FOR SOLUTION ORAL DAILY PRN
Status: DISCONTINUED | OUTPATIENT
Start: 2020-10-25 | End: 2020-10-27

## 2020-10-25 RX ORDER — DOCUSATE SODIUM 100 MG/1
100 CAPSULE, LIQUID FILLED ORAL 2 TIMES DAILY
Status: DISCONTINUED | OUTPATIENT
Start: 2020-10-25 | End: 2020-10-27

## 2020-10-25 RX ORDER — SODIUM PHOSPHATE, DIBASIC AND SODIUM PHOSPHATE, MONOBASIC 7; 19 G/133ML; G/133ML
1 ENEMA RECTAL ONCE AS NEEDED
Status: DISCONTINUED | OUTPATIENT
Start: 2020-10-25 | End: 2020-10-27

## 2020-10-25 RX ORDER — ENOXAPARIN SODIUM 100 MG/ML
40 INJECTION SUBCUTANEOUS DAILY
Status: DISCONTINUED | OUTPATIENT
Start: 2020-10-25 | End: 2020-10-27

## 2020-10-25 RX ORDER — ZOLPIDEM TARTRATE 5 MG/1
TABLET ORAL NIGHTLY PRN
Status: DISCONTINUED | OUTPATIENT
Start: 2020-10-25 | End: 2020-10-27

## 2020-10-25 RX ORDER — PREGABALIN 25 MG/1
75 CAPSULE ORAL 2 TIMES DAILY
Status: DISCONTINUED | OUTPATIENT
Start: 2020-10-25 | End: 2020-10-27

## 2020-10-25 RX ORDER — BUTALBITAL, ACETAMINOPHEN AND CAFFEINE 50; 325; 40 MG/1; MG/1; MG/1
1 TABLET ORAL 2 TIMES DAILY PRN
Status: DISCONTINUED | OUTPATIENT
Start: 2020-10-25 | End: 2020-10-27

## 2020-10-25 RX ORDER — MIRTAZAPINE 7.5 MG/1
3.75 TABLET, FILM COATED ORAL NIGHTLY
Status: DISCONTINUED | OUTPATIENT
Start: 2020-10-25 | End: 2020-10-27

## 2020-10-25 RX ORDER — TOPIRAMATE 25 MG/1
25 TABLET ORAL NIGHTLY
Status: DISCONTINUED | OUTPATIENT
Start: 2020-10-25 | End: 2020-10-27

## 2020-10-25 RX ORDER — MORPHINE SULFATE 2 MG/ML
2 INJECTION, SOLUTION INTRAMUSCULAR; INTRAVENOUS EVERY 4 HOURS PRN
Status: DISCONTINUED | OUTPATIENT
Start: 2020-10-25 | End: 2020-10-26

## 2020-10-25 RX ORDER — ONDANSETRON 2 MG/ML
4 INJECTION INTRAMUSCULAR; INTRAVENOUS EVERY 6 HOURS PRN
Status: DISCONTINUED | OUTPATIENT
Start: 2020-10-25 | End: 2020-10-27

## 2020-10-25 RX ORDER — ESCITALOPRAM OXALATE 10 MG/1
10 TABLET ORAL NIGHTLY
Status: DISCONTINUED | OUTPATIENT
Start: 2020-10-25 | End: 2020-10-27

## 2020-10-25 RX ORDER — ACETAMINOPHEN 325 MG/1
650 TABLET ORAL EVERY 6 HOURS PRN
Status: DISCONTINUED | OUTPATIENT
Start: 2020-10-25 | End: 2020-10-26

## 2020-10-25 NOTE — ED NOTES
i-STAT Testing  Site line  Time   22:05  Danielito's test result  NA  Clinical data   VBG   7.41/41/40/26.0/1/76%

## 2020-10-25 NOTE — PROGRESS NOTES
NURSING ADMISSION NOTE      Patient  Received from [facility]  Via ambulance and admitted to room 332 with DX of persistent Asthma with exacerbation. She is A & O x4, Denies SOB/chest pain. Diminished lung sounds. No cough.   Oxygen saturati

## 2020-10-25 NOTE — PROGRESS NOTES
Pt seen and examined this am. Overall doing well. No wheezing on exam. Some pain under her rib cage. Plan for lidocaine patch and morphine IV if pain not controlled (muktiple drug allergy). If COVID PCR neg may dc isolation.      Toño Esparza MD

## 2020-10-25 NOTE — CONSULTS
[facility] PULMONARY/CRITICAL CARE CONSULTATION       HPI: Laura Garcia is a 36year old female with a history of asthma, vocal cord dysfunction, anxiety, obesity s/p bariatric surgery, who presented to the ER with worsening dyspnea.  She states that she's h Performed by Orest Habermann, MD at [address] [place] [address] ENDOSCOPY   • ESOPHAGOGASTRODUODENOSCOPY (EGD) N/A 1/29/2018    Performed by Orest Habermann, MD at [address] [place] [address] ENDOSCOPY   • ESOPHAGOGASTRODUODENOSCOPY, POSSIBLE BIOPSY, POSSIBLE POLYPECTOMY [address]9380 N/A 12/1 Reported? Taking?    ALPRAZOLAM 0.25 MG Oral Tab 10/24/2020 at 0830  No Yes   Sig: TAKE 1 TABLET(0.25 MG) BY MOUTH TWICE DAILY AS NEEDED   AMITRIPTYLINE HCL 25 MG Oral Tab 10/23/2020 at 2100  No No   Sig: TAKE 1 TABLET BY MOUTH EVERY NIGHT   Patient taking month at Unknown time Self Yes No   Sig: Take 8 mg by mouth every 8 (eight) hours as needed for Nausea.      PANTOPRAZOLE SODIUM 40 MG Oral Tab EC 10/24/2020 at 0830  No Yes   Sig: TAKE 1 TABLET(40 MG) BY MOUTH TWICE DAILY BEFORE MEALS   PROAIR  (90 Daily PRN    •  bisacodyl (DULCOLAX) rectal suppository 10 mg, 10 mg, Rectal, Daily PRN    •  Fleet Enema (FLEET) 7-19 GM/118ML enema 133 mL, 1 enema, Rectal, Once PRN    •  ondansetron HCl (ZOFRAN) injection 4 mg, 4 mg, Intravenous, Q6H PRN    •  methylPR HIVES    Comment:Patient got welts when she use the adhesive tape             for 48-hour Holter monitor.   Cephalosporins          HIVES  Doxycycline             HIVES    Comment:Hives and fever             Hives and fever  Azithromycin            RESTLESS Gets together: Not on file        Attends [employer] service: Not on file        Active member of club or organization: Not on file        Attends meetings of clubs or organizations: Not on file        Relationship status: Not on file      Intimate partner lymphadenopathy. Lungs - equal breath sounds bilaterally. No wheezing, crackles, rhonchi. Poor air movement but this seemed effort-dependent. CV - regular rate & rhythm. Normal S1, S2. No murmurs, rubs, or gallops appreciated.   Abdomen - soft, nontender years  -consider methacholine challenge test if diagnosis of asthma remains in question, especially since vocal cord dysfunction could be a more pressing issue  -f/u with allergy who is considering initiation of dupixent  Abnormal CXR - lower lobe opacitie

## 2020-10-25 NOTE — H&P
[facility] HOSPITAL[facility]  History and Physical     Marcello Alvarenga Patient Status:  Observation    1980 MRN [id number]   [facility] 3NW-A Attending Benjamin Chopra [address] Day # 0 PCP Karen Hahn PA-C     Chief Complaint: Past Surgical History:   Past Surgical History:   Procedure Laterality Date   • ARTHROSCOPY ANKLE WITH DEBRIDEMENT Left 6/19/2015    Performed by Bhaskar Anderson MD at [address] [facility]  N-Y N/A 12/18/2017 ROBOT-ASSISTED LAPAROSCOPIC HYSTERECTOMY N/A 6/26/2013    Performed by Martha Fregoso MD at [address] [facility] [address]   • SURGICAL STENT Bilateral March 2015.     Bilateral iliac stents   • SURGICAL STENT      stent to iliac crest bone   • THYROIDECTOMY  4/2011   • bypass  Tramadol                RASH    Medications:  No current facility-administered medications on file prior to encounter. •  Tiotropium Bromide Monohydrate 18 MCG Inhalation Cap, Inhale into the lungs daily. , Disp: , Rfl:     •  predniSONE 10 MG O daily. Apply to affected areas, Disp: 30 g, Rfl: 1    •  Magnesium 100 MG Oral Tab, Take 1 tablet by mouth daily. , Disp: , Rfl:     •  NON FORMULARY, Take 1 tablet by mouth daily. , Disp: , Rfl:     •  aspirin 81 MG Oral Tab, Take 81 mg by mouth daily. , carotid bruits. Respiratory: Clear to auscultation bilaterally. No wheezes. No rhonchi. Cardiovascular: S1, S2. Regular rate and rhythm. No murmurs, rubs or gallops. Equal pulses. Chest and Back: No tenderness or deformity.   Abdomen: Soft, nontender, n

## 2020-10-25 NOTE — ED PROVIDER NOTES
Patient Seen in: THE [facility] Emergency Department In [place]      History   Patient presents with:  Difficulty Breathing    Stated Complaint: wheezing for 2 wks    HPI  Patient has asthma and has been followed by Dr. Wali Jeter. 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        ED Course     Labs Reviewed   COMP METABOLIC PANEL (14) - Abnormal; Notable for the following components:       Result Value    Potassium 3.2 (*)     Calcium, Total 7.9 (*)     AST 11 (*)     Total Protein 6.2 (*)     Albumin 3.2 Obadiah Current worse.    FINDINGS:  LUNGS/PLEURA:  There is mild amount of mixed interstitial alveolar infiltrate within the right lower lobe medially and to a lesser extent left retrocardiac region. Findings are suspicious for COVID-19 pneumonia. No effusion.  CARDIAC: pm    Follow-up:  No follow-up provider specified.         Medications Prescribed:  Current Discharge Medication List                       Present on Admission  Date Reviewed: 9/16/2020          ICD-10-CM Noted POA    Moderate persistent asthma with exacer

## 2020-10-25 NOTE — PLAN OF CARE
Assumed patient care at 0730. Vital signs stable. Patient alert and oriented x 4. See EMAR and flowsheets for pain documentation. Patient complains of pain in lower rib cage area. Patient denies shortness of breath and has no cough.   Awaiting covid PC ABGs  - Provide Smoking Cessation handout, if applicable  - Encourage broncho-pulmonary hygiene including cough, deep breathe, Incentive Spirometry  - Assess the need for suctioning and perform as needed  - Assess and instruct to report SOB or any respirat

## 2020-10-26 PROCEDURE — 99225 SUBSEQUENT OBSERVATION CARE: CPT | Performed by: INTERNAL MEDICINE

## 2020-10-26 RX ORDER — METHYLPREDNISOLONE SODIUM SUCCINATE 40 MG/ML
40 INJECTION, POWDER, LYOPHILIZED, FOR SOLUTION INTRAMUSCULAR; INTRAVENOUS EVERY 12 HOURS
Status: DISCONTINUED | OUTPATIENT
Start: 2020-10-26 | End: 2020-10-27

## 2020-10-26 RX ORDER — ALBUTEROL SULFATE 90 UG/1
2 AEROSOL, METERED RESPIRATORY (INHALATION) 4 TIMES DAILY
Status: DISCONTINUED | OUTPATIENT
Start: 2020-10-26 | End: 2020-10-27

## 2020-10-26 RX ORDER — MELATONIN
325 2 TIMES DAILY WITH MEALS
Status: DISCONTINUED | OUTPATIENT
Start: 2020-10-26 | End: 2020-10-27

## 2020-10-26 RX ORDER — ACETAMINOPHEN 500 MG
1000 TABLET ORAL EVERY 6 HOURS PRN
Status: DISCONTINUED | OUTPATIENT
Start: 2020-10-26 | End: 2020-10-27

## 2020-10-26 NOTE — DISCHARGE SUMMARY
[facility]  DISCHARGE SUMMARY     Dotty Castillo Patient Status:  Observation    1980 MRN [id number]   [facility] 3NW-A Attending No att. providers found   Hosp Day # 0 PCP Benton Stevens PA-C     Date of Admission: 10/2 mg total) by mouth 2 (two) times daily with meals.    Stop taking on: November 26, 2020  Quantity: 60 tablet  Refills: 2        CHANGE how you take these medications      Instructions Prescription details   Amitriptyline HCl 25 MG Tabs  Commonly known as: E Quantity: 30 tablet  Refills: 1     CALCIUM CITRATE OR      Take 600 mg by mouth 3 (three) times daily.    Refills: 0     Contour Next Test Strp  Generic drug: Glucose Blood      USE TWICE DAILY AS DIRECTED   Quantity: 200 strip  Refills: 3     cyclobenzapr mg total) by mouth nightly.    Quantity: 90 tablet  Refills: 3     Unithroid 88 MCG Tabs  Generic drug: Levothyroxine Sodium      TAKE 1 TABLET BY MOUTH EVERY MORNING BEFORE BREAKFAST WITH 100MCG TABLET FOR A TOTAL OF 188MCG   Quantity: 30 tablet  Refills: appointment. Please also bring your Insurance card, Photo ID, and your medication bottles or a list of your current medication. If you no longer require this appointment, please contact your physician office to cancel.                       Vital signs:

## 2020-10-27 VITALS
DIASTOLIC BLOOD PRESSURE: 61 MMHG | HEIGHT: 68 IN | HEART RATE: 97 BPM | OXYGEN SATURATION: 100 % | WEIGHT: 196.38 LBS | BODY MASS INDEX: 29.76 KG/M2 | TEMPERATURE: 98 F | RESPIRATION RATE: 16 BRPM | SYSTOLIC BLOOD PRESSURE: 111 MMHG

## 2020-10-27 PROCEDURE — 99217 OBSERVATION CARE DISCHARGE: CPT | Performed by: INTERNAL MEDICINE

## 2020-10-27 RX ORDER — MELATONIN
325 2 TIMES DAILY WITH MEALS
Qty: 60 TABLET | Refills: 2 | Status: SHIPPED | OUTPATIENT
Start: 2020-10-27 | End: 2020-11-26

## 2020-10-27 RX ORDER — PREDNISONE 20 MG/1
40 TABLET ORAL DAILY
Qty: 10 TABLET | Refills: 0 | Status: ON HOLD | OUTPATIENT
Start: 2020-10-27 | End: 2020-11-01

## 2020-10-27 RX ORDER — IPRATROPIUM BROMIDE AND ALBUTEROL SULFATE 2.5; .5 MG/3ML; MG/3ML
3 SOLUTION RESPIRATORY (INHALATION)
Status: DISCONTINUED | OUTPATIENT
Start: 2020-10-27 | End: 2020-10-27

## 2020-10-27 NOTE — PROGRESS NOTES
STILL WITH C/O PAIN UNDER HER RIB CAGE - MANAGED WITH MORPHINE BUT REPORTS \"RELIEF ONLY LASTS FOR ABOUT FOUR HOURS\". SHE ALSO COMPLAINS OF HEADACHE - MANAGED WITH FIORICET AND ICE PACK. SHE REMAINS ON ROOM AIR. NO COUGH, NO FEVER.   HER LUNGS ARE CLEAR

## 2020-10-27 NOTE — PLAN OF CARE
Problem: Patient/Family Goals  Goal: Patient/Family Long Term Goal  Description: Patient's Long Term Goal:  10/25/2020 - TO BE ABLE TO BREATH BETTER SO I CAN GO HOME.     Interventions:  - RT AND PULM CONSULTS  - CARRY OUT PLAN OF CARE  - See additional C Problem: RESPIRATORY - ADULT  Goal: Achieves optimal ventilation and oxygenation  Description: INTERVENTIONS:  - Assess for changes in respiratory status  - Assess for changes in mentation and behavior  - Position to facilitate oxygenation and minimize r

## 2020-10-27 NOTE — PROGRESS NOTES
NURSING DISCHARGE NOTE    Discharged Home via Wheelchair. Accompanied by Support staff  Belongings Taken by patient/family. Patient discharged home.  All discharge instructions, medications and follow up care discussed with patient, understanding ve

## 2020-10-27 NOTE — PROGRESS NOTES
SAR[facility] HOSPITAL[facility]  Progress Note     Musa Melony Patient Status:  Observation    1980 MRN [id number]   [facility] 3NW-A Attending Erwin Nazario MD   Hosp Day # 0 PCP Anisha Prieto PA-C     Chief Complaint: mild cough    S: 10/24/20  2243   TROP <0.045   CK 68            Imaging: Imaging data reviewed in Epic.     Medications:   • Ipratropium Bromide HFA  2 puff Inhalation QID   • Albuterol Sulfate HFA  2 puff Inhalation QID   • iron sucrose  200 mg Intravenous Daily   • clint

## 2020-10-27 NOTE — PLAN OF CARE
Problem: Patient/Family Goals  Goal: Patient/Family Long Term Goal  Description: Patient's Long Term Goal:  10/25/2020 - TO BE ABLE TO BREATH BETTER SO I CAN GO HOME.     Interventions:  - RT AND PULM CONSULTS  - CARRY OUT PLAN OF CARE  - See additional C perform as needed  - Assess and instruct to report SOB or any respiratory difficulty  - Respiratory Therapy support as indicated  - Manage/alleviate anxiety  - Monitor for signs/symptoms of CO2 retention  Outcome: Progressing     Problem: METABOLIC/FLUID A

## 2020-10-27 NOTE — PLAN OF CARE
Problem: Patient/Family Goals  Goal: Patient/Family Long Term Goal  Description: Patient's Long Term Goal:  10/25/2020 - TO BE ABLE TO BREATH BETTER SO I CAN GO HOME.     Interventions:  - RT AND PULM CONSULTS  - CARRY OUT PLAN OF CARE  - See additional C behavior  - Position to facilitate oxygenation and minimize respiratory effort  - Oxygen supplementation based on oxygen saturation or ABGs  - Provide Smoking Cessation handout, if applicable  - Encourage broncho-pulmonary hygiene including cough, deep surendra

## 2020-10-27 NOTE — PLAN OF CARE
Assumed patient care at 0730. Vital signs stable. Patient alert and oriented x 4. Patient complaining of upper abdominal pain, requesting morphine and muscle relaxers. Patient also complaining of headache which she states is common for her.   Patient th any respiratory difficulty  - Respiratory Therapy support as indicated  - Manage/alleviate anxiety  - Monitor for signs/symptoms of CO2 retention  Outcome: Progressing     Problem: METABOLIC/FLUID AND ELECTROLYTES - ADULT  Goal: Electrolytes maintained wit

## 2020-10-27 NOTE — PROGRESS NOTES
STILL WITH C/O PAIN UNDER HER RIB CAGE - MANAGED WITH MORPHINE BUT REPORTS \"RELIEF ONLY LASTS FOR ABOUT FOUR HOURS\". SHE ALSO COMPLAINS OF HEADACHE - MANAGED WITH FIORICET AND ICE PACK. SHE REMAINS ON ROOM AIR. NO COUGH, NO FEVER.   HER LUNGS ARE MARSHA

## 2020-10-27 NOTE — PROGRESS NOTES
DENIED NAUSEA, NO VOMITING ALL SHIFT. SHE ASKED FOR TURKEY SANDWICH AND SUN CHIPS FROM THE VENDO MACHINE \"BECAUSE I THREW UP EVERYTHING THAT I ATE ALL DAY'. CONTINUES TO REPORT PAIN UNDER HER RIB CAGE AND GOING TO HER BACK - TYLENOL WAS GIVEN.

## 2020-10-27 NOTE — PROGRESS NOTES
Pulmonary Progress Note     Assessment / Plan:  1. Dyspnea - possible asthma exacerbation vs VCD. She has no wheezing on exam. Her rapid COVID test neg,  test is pending. PCT neg, doubt bacterial infection.   -currently on room air  -COVID pcr negative Extremities: no edema or cyanosis  Psych: interactive, answering questions appropriately, appropriate affect    Medications:  Reviewed in EMR    Lab Data:  Reviewed in EMR    Imaging:  I independently visualized all relevant chest imaging in PACS and agr

## 2020-10-28 ENCOUNTER — PATIENT OUTREACH (OUTPATIENT)
Dept: CASE MANAGEMENT | Age: 40
End: 2020-10-28

## 2020-10-28 ENCOUNTER — TELEPHONE (OUTPATIENT)
Dept: FAMILY MEDICINE CLINIC | Facility: CLINIC | Age: 40
End: 2020-10-28

## 2020-10-28 ENCOUNTER — PATIENT MESSAGE (OUTPATIENT)
Dept: FAMILY MEDICINE CLINIC | Facility: CLINIC | Age: 40
End: 2020-10-28

## 2020-10-28 DIAGNOSIS — Z02.9 ENCOUNTERS FOR UNSPECIFIED ADMINISTRATIVE PURPOSE: ICD-10-CM

## 2020-10-28 DIAGNOSIS — Z98.84 HISTORY OF ROUX-EN-Y GASTRIC BYPASS: ICD-10-CM

## 2020-10-28 DIAGNOSIS — J45.40 ALLERGIC ASTHMA, MODERATE PERSISTENT, UNCOMPLICATED: ICD-10-CM

## 2020-10-28 DIAGNOSIS — R06.00 DYSPNEA, UNSPECIFIED TYPE: ICD-10-CM

## 2020-10-28 PROCEDURE — 1111F DSCHRG MED/CURRENT MED MERGE: CPT

## 2020-10-28 RX ORDER — ACETAMINOPHEN AND CODEINE PHOSPHATE 300; 30 MG/1; MG/1
TABLET ORAL 2 TIMES DAILY PRN
Qty: 10 TABLET | Refills: 0 | Status: SHIPPED | OUTPATIENT
Start: 2020-10-28 | End: 2020-11-03

## 2020-10-28 NOTE — TELEPHONE ENCOUNTER
1. Patient was discharged home from [facility] on 10/27/2020 and is at moderate risk for readmission and recommended to have a TCM HFU by 11/10/2020 or sooner. Patient confirms she has an appointment on 11/9/2020 with Felice Rodrigues PA-C    2.  Angi

## 2020-10-28 NOTE — TELEPHONE ENCOUNTER
Vanessa Lassiter PA-C 34 minutes ago (2:44 PM)        This message also goes with the previous one.  I am already taking the home remedies of musinex , tea, treatments, but it feels like everything has gotten loosened up with the eileen

## 2020-10-28 NOTE — TELEPHONE ENCOUNTER
From: Crow Lopez  To: Maribel Singh PA-C  Sent: 10/28/2020 2:36 PM CDT  Subject: Non-Urgent Medical Question    Hi Daniela,  I was released yesterday with 2 negative covid tests. Breathing is improving, but, I’m coughing a lot .  I took today o

## 2020-10-28 NOTE — PROGRESS NOTES
Initial Post Discharge Follow Up   Discharge Date: 10/27/20  Contact Date: 10/28/2020    Consent Verification:  Assessment Completed With: Patient  HIPAA Verified? Yes    Discharge Dx:     1. Mild asthma exacerbation   2. Dyspnea   1.  Likely due to #1 (dressing, bathing, ambulating to the bathroom, etc)? yes  • (NCM) Was patient given a different diet per AVS? no, patient reports eating a regular diet.     Medications:   Current Outpatient Medications   Medication Sig Dispense Refill   • predniSONE 20 MG MG Oral Tab Take 0.5 tablets (3.75 mg total) by mouth nightly.  15 tablet 5   • UNITHROID 88 MCG Oral Tab TAKE 1 TABLET BY MOUTH EVERY MORNING BEFORE BREAKFAST WITH 100MCG TABLET FOR A TOTAL OF 188MCG 30 tablet 3   • CALCIUM CITRATE OR Take 600 mg by mouth No    Referrals/orders at D/C:  Home Health/Services ordered at D/C? No     DME ordered at D/C? No    DX: specifics:  Asthma: We reviewed your medications/inhalers, how often are you using your inhaler?  Patient reports she is using her maintenance medicat appointment with pt:  Yes, Patient confirms she has an appointment on 11/9/2020 with Anthony Giang PA-C, OSWALDO will send message to office requesting that they change the visit type to a TCM HFU. Have you made all of your follow up appointments?  ye

## 2020-10-28 NOTE — TELEPHONE ENCOUNTER
From: Celeste Catherine  To: Sarah Leonardo PA-C  Sent: 10/28/2020 2:44 PM CDT  Subject: Non-Urgent Medical Question    This message also goes with the previous one.  I am already taking the home remedies of musinex , tea, treatments, but it feels like

## 2020-10-29 ENCOUNTER — HOSPITAL ENCOUNTER (OUTPATIENT)
Facility: HOSPITAL | Age: 40
Setting detail: OBSERVATION
Discharge: HOME | End: 2020-11-01
Attending: EMERGENCY MEDICINE | Admitting: HOSPITALIST
Payer: COMMERCIAL

## 2020-10-29 ENCOUNTER — APPOINTMENT (OUTPATIENT)
Dept: GENERAL RADIOLOGY | Age: 40
End: 2020-10-29
Attending: EMERGENCY MEDICINE
Payer: COMMERCIAL

## 2020-10-29 DIAGNOSIS — R06.00 DYSPNEA, UNSPECIFIED TYPE: Primary | ICD-10-CM

## 2020-10-29 PROBLEM — R05.9 COUGH: Status: ACTIVE | Noted: 2019-03-06

## 2020-10-29 PROCEDURE — 99220 INITIAL OBSERVATION CARE,LEVL III: CPT | Performed by: HOSPITALIST

## 2020-10-29 PROCEDURE — 71045 X-RAY EXAM CHEST 1 VIEW: CPT | Performed by: EMERGENCY MEDICINE

## 2020-10-29 RX ORDER — ONDANSETRON 2 MG/ML
4 INJECTION INTRAMUSCULAR; INTRAVENOUS EVERY 4 HOURS PRN
Status: CANCELLED | OUTPATIENT
Start: 2020-10-29

## 2020-10-29 RX ORDER — ZOLPIDEM TARTRATE 5 MG/1
5 TABLET ORAL NIGHTLY PRN
Status: DISCONTINUED | OUTPATIENT
Start: 2020-10-29 | End: 2020-11-01

## 2020-10-29 RX ORDER — TOPIRAMATE 25 MG/1
25 TABLET ORAL NIGHTLY
Status: DISCONTINUED | OUTPATIENT
Start: 2020-10-29 | End: 2020-11-01

## 2020-10-29 RX ORDER — LEVOFLOXACIN 5 MG/ML
750 INJECTION, SOLUTION INTRAVENOUS EVERY 24 HOURS
Status: DISCONTINUED | OUTPATIENT
Start: 2020-10-29 | End: 2020-11-01

## 2020-10-29 RX ORDER — ALBUTEROL SULFATE 90 UG/1
2 AEROSOL, METERED RESPIRATORY (INHALATION) EVERY 4 HOURS PRN
Status: DISCONTINUED | OUTPATIENT
Start: 2020-10-29 | End: 2020-10-29

## 2020-10-29 RX ORDER — LEVOTHYROXINE SODIUM 0.1 MG/1
100 TABLET ORAL
Status: DISCONTINUED | OUTPATIENT
Start: 2020-10-29 | End: 2020-10-29 | Stop reason: SDUPTHER

## 2020-10-29 RX ORDER — ALPRAZOLAM 0.25 MG/1
TABLET ORAL EVERY 4 HOURS PRN
Status: DISCONTINUED | OUTPATIENT
Start: 2020-10-29 | End: 2020-11-01

## 2020-10-29 RX ORDER — MIRTAZAPINE 7.5 MG/1
3.75 TABLET, FILM COATED ORAL NIGHTLY
Status: DISCONTINUED | OUTPATIENT
Start: 2020-10-29 | End: 2020-11-01

## 2020-10-29 RX ORDER — POLYETHYLENE GLYCOL 3350 17 G/17G
17 POWDER, FOR SOLUTION ORAL DAILY PRN
Status: DISCONTINUED | OUTPATIENT
Start: 2020-10-29 | End: 2020-11-01

## 2020-10-29 RX ORDER — ACETAMINOPHEN 325 MG/1
650 TABLET ORAL EVERY 6 HOURS PRN
Status: DISCONTINUED | OUTPATIENT
Start: 2020-10-29 | End: 2020-11-01

## 2020-10-29 RX ORDER — ALBUTEROL SULFATE 90 UG/1
2 AEROSOL, METERED RESPIRATORY (INHALATION) EVERY 4 HOURS PRN
Status: DISCONTINUED | OUTPATIENT
Start: 2020-10-29 | End: 2020-11-01

## 2020-10-29 RX ORDER — PANTOPRAZOLE SODIUM 40 MG/1
40 TABLET, DELAYED RELEASE ORAL
Status: DISCONTINUED | OUTPATIENT
Start: 2020-10-30 | End: 2020-11-01

## 2020-10-29 RX ORDER — SIMETHICONE 80 MG
80 TABLET,CHEWABLE ORAL 4 TIMES DAILY PRN
Status: DISCONTINUED | OUTPATIENT
Start: 2020-10-29 | End: 2020-11-01

## 2020-10-29 RX ORDER — BISACODYL 10 MG
10 SUPPOSITORY, RECTAL RECTAL
Status: DISCONTINUED | OUTPATIENT
Start: 2020-10-29 | End: 2020-10-29

## 2020-10-29 RX ORDER — BISACODYL 10 MG
10 SUPPOSITORY, RECTAL RECTAL
Status: DISCONTINUED | OUTPATIENT
Start: 2020-10-29 | End: 2020-11-01

## 2020-10-29 RX ORDER — POLYETHYLENE GLYCOL 3350 17 G/17G
17 POWDER, FOR SOLUTION ORAL DAILY PRN
Status: DISCONTINUED | OUTPATIENT
Start: 2020-10-29 | End: 2020-10-29

## 2020-10-29 RX ORDER — AMITRIPTYLINE HYDROCHLORIDE 25 MG/1
12.5 TABLET, FILM COATED ORAL NIGHTLY
Status: DISCONTINUED | OUTPATIENT
Start: 2020-10-29 | End: 2020-11-01

## 2020-10-29 RX ORDER — METHYLPREDNISOLONE SODIUM SUCCINATE 125 MG/2ML
60 INJECTION, POWDER, LYOPHILIZED, FOR SOLUTION INTRAMUSCULAR; INTRAVENOUS EVERY 12 HOURS
Status: DISCONTINUED | OUTPATIENT
Start: 2020-10-30 | End: 2020-11-01

## 2020-10-29 RX ORDER — PREGABALIN 75 MG/1
75 CAPSULE ORAL 2 TIMES DAILY
Status: DISCONTINUED | OUTPATIENT
Start: 2020-10-29 | End: 2020-11-01

## 2020-10-29 RX ORDER — LEVOTHYROXINE SODIUM 88 UG/1
88 TABLET ORAL
Status: DISCONTINUED | OUTPATIENT
Start: 2020-10-29 | End: 2020-10-29

## 2020-10-29 RX ORDER — BENZONATATE 100 MG/1
100 CAPSULE ORAL 3 TIMES DAILY PRN
Status: DISCONTINUED | OUTPATIENT
Start: 2020-10-29 | End: 2020-11-01

## 2020-10-29 RX ORDER — ACETAMINOPHEN AND CODEINE PHOSPHATE 300; 30 MG/1; MG/1
1 TABLET ORAL EVERY 4 HOURS PRN
Status: DISCONTINUED | OUTPATIENT
Start: 2020-10-29 | End: 2020-11-01

## 2020-10-29 RX ORDER — ONDANSETRON 2 MG/ML
4 INJECTION INTRAMUSCULAR; INTRAVENOUS EVERY 6 HOURS PRN
Status: DISCONTINUED | OUTPATIENT
Start: 2020-10-29 | End: 2020-11-01

## 2020-10-29 RX ORDER — CALCIUM CARBONATE 200(500)MG
500 TABLET,CHEWABLE ORAL
Status: DISCONTINUED | OUTPATIENT
Start: 2020-10-29 | End: 2020-11-01

## 2020-10-29 RX ORDER — MAGNESIUM HYDROXIDE/ALUMINUM HYDROXICE/SIMETHICONE 120; 1200; 1200 MG/30ML; MG/30ML; MG/30ML
30 SUSPENSION ORAL DAILY PRN
Status: DISCONTINUED | OUTPATIENT
Start: 2020-10-29 | End: 2020-11-01

## 2020-10-29 RX ORDER — ALBUTEROL SULFATE 90 UG/1
8 AEROSOL, METERED RESPIRATORY (INHALATION) ONCE
Status: COMPLETED | OUTPATIENT
Start: 2020-10-29 | End: 2020-10-29

## 2020-10-29 RX ORDER — ESCITALOPRAM OXALATE 10 MG/1
10 TABLET ORAL EVERY EVENING
Status: DISCONTINUED | OUTPATIENT
Start: 2020-10-29 | End: 2020-11-01

## 2020-10-29 NOTE — ED PROVIDER NOTES
Patient Seen in: [facility] [place]      History   Patient presents with:  Difficulty Breathing    Stated Complaint: dc tuesday for asthma, now feeling worse, cough -fevers    HPI    80-year-old with past medical history of asthma, [facility]      x4   • DILATION/CURETTAGE,DIAGNOSTIC     • ESOPHAGOGASTRODUODENOSCOPY (EGD) N/A 10/4/2018    Performed by Orest Habermann, MD at [address] [place] [address] ENDOSCOPY   • ESOPHAGOGASTRODUODENOSCOPY (EGD) N/A 1/29/2018    Performed by EDUARDO [facility]PP[facility] GI ENDOSCOPY,EXAM                      Social History    Tobacco Use      Smoking status: Never Smoker      Smokeless tobacco: Never Used    Alcohol use: Not Currently      Alcohol/week: 0.0 standard drinks      Frequency: Never      Binge frequency: Labs Reviewed   COMP METABOLIC PANEL (14) - Abnormal; Notable for the following components:       Result Value    Calcium, Total 8.0 (*)     Total Protein 6.0 (*)     Albumin 3.0 (*)     All other components within normal limits   CBC W/ DIFFERENTIAL - effusions. CONCLUSION:   Improved patchy airspace disease may represent improved pneumonia or atelectasis.     Dictated by (CST): Brook Ram MD on 10/29/2020 at 5:04 PM     Finalized by (CST): Brook Ram MD on 10/29/2020 at 5:07 PM       Xr rule out pulmonary embolism. Plan for basic labs. Will do chest x-ray to assess for consolidation, edema, or effusion. Will observe and reassess. 6:15 pm  Patient's work-up is overall reassuring.   On reassessment, she is maintaining her sats on room

## 2020-10-29 NOTE — ED INITIAL ASSESSMENT (HPI)
Benedict Coronado [address] FOR DIFF BREATHING SAT- TUES--  NEG FOR COVID ON Monday-- SOB RETURNED LAST NIGHT- NO IMPROVEMENT WITH HOME NEB

## 2020-10-30 PROCEDURE — 99225 SUBSEQUENT OBSERVATION CARE: CPT | Performed by: HOSPITALIST

## 2020-10-30 RX ORDER — ENOXAPARIN SODIUM 100 MG/ML
40 INJECTION SUBCUTANEOUS DAILY
Status: DISCONTINUED | OUTPATIENT
Start: 2020-10-30 | End: 2020-11-01

## 2020-10-30 RX ORDER — ONDANSETRON 4 MG/1
8 TABLET, FILM COATED ORAL EVERY 8 HOURS PRN
Status: DISCONTINUED | OUTPATIENT
Start: 2020-10-30 | End: 2020-11-01

## 2020-10-30 RX ORDER — DEXTROAMPHETAMINE SACCHARATE, AMPHETAMINE ASPARTATE, DEXTROAMPHETAMINE SULFATE AND AMPHETAMINE SULFATE 2.5; 2.5; 2.5; 2.5 MG/1; MG/1; MG/1; MG/1
10 TABLET ORAL
Refills: 0 | Status: DISCONTINUED | OUTPATIENT
Start: 2020-10-30 | End: 2020-10-31

## 2020-10-30 NOTE — PROGRESS NOTES
[facility] H[facility]PITAL[facility]  Progress Note     Nasrinjorgevicki Dorene Patient Status:  Observation    1980 MRN [id number]   [facility] 4NW-A Attending Carrie Galeana MD   Hosp Day # 0 PCP Sarah Leonardo PA-C     Chief Complaint: dyspnea/cough Pro-Calcitonin  Recent Labs   Lab 10/24/20  2243   PCT <0.05       Cardiac  Recent Labs   Lab 10/24/20  2243 10/29/20  1652   TROP <0.045 <0.045   PBNP 78  --        Creatinine Kinase  Recent Labs   Lab 10/24/20  2243   CK 68       Inflammatory Jillian

## 2020-10-30 NOTE — PROGRESS NOTES
A&Ox4. VSS. Patient reported pain 6/10, administered tylenol #3. Tolerating diet. Lung sounds diminished. Dyspnea at rest and exertion. Strong and congested cough. Labored breathing after coughing. IV saline locked. Up ad eladio. Encouraged IS use.  Reviewed p

## 2020-10-30 NOTE — H&P
SAR[facility] HOSPITALIST  History and Physical     [facility] Patient Status:  Observation    1980 MRN [id number]   [facility] 4NW-A Attending Edis Martinez MD   Hosp Day # 0 PCP Peter Sexton PA-C     Chief Complaint: cough ESOPHAGOGASTRODUODENOSCOPY (EGD) N/A 10/4/2018    Performed by Jacques Dunlap MD at [facility] ENDOSCOPY   • ESOPHAGOGASTRODUODENOSCOPY (EGD) N/A 1/29/2018    Performed by Jacques Dunlap MD at [facility] ENDOSCOPY   • ESOPHAGOGASTRODUODENOSCOPY, PO smoked. She has never used smokeless tobacco. She reports previous alcohol use. She reports that she does not use drugs.     Family History:   Family History   Problem Relation Age of Onset   • Other (Other) Mother         ALLIE   • Heart Disorder Father    • Tiotropium Bromide Monohydrate 18 MCG Inhalation Cap, Inhale into the lungs daily. , Disp: , Rfl:     •  predniSONE 10 MG Oral Tab, Take 10 mg by mouth daily. , Disp: , Rfl:     •  HYDROXYZINE HCL 25 MG Oral Tab, TAKE 1/2 TO 1 TABLET BY MOUTH TWICE DAILY AS (three) times daily. , Disp: , Rfl:     •  BIOTIN OR, Take 10,000 mcg by mouth nightly., Disp: , Rfl:     •  Budesonide-Formoterol Fumarate 160-4.5 MCG/ACT Inhalation Aerosol, Inhale 2 puffs into the lungs 2 (two) times daily. , Disp: 3 Inhaler, Rfl: 3    • organomegaly. Neurologic: No focal neurological deficits. CNII-XII grossly intact. Musculoskeletal: Moves all extremities. Extremities: No edema or cyanosis. Integument: No rashes or lesions. Psychiatric: Appropriate mood and affect.       Diagnostic

## 2020-10-30 NOTE — PLAN OF CARE
Problem: Patient/Family Goals  Goal: Patient/Family Long Term Goal  Description: Patient's Long Term Goal: Discharge  Interventions:  - iv abx  - pulm consult  - breathing exercises  - See additional Care Plan goals for specific interventions  Outcome: P activity and pre-medicate as appropriate  Outcome: Progressing     Problem: DISCHARGE PLANNING  Goal: Discharge to home or other facility with appropriate resources  Description: INTERVENTIONS:  - Identify barriers to discharge w/pt and caregiver  - Includ

## 2020-10-30 NOTE — PROGRESS NOTES
[facility] Dosing Service  Antibiotic Dosing    Sky Caldwell is a 36year old for whom pharmacy is dosing Levaquin for treatment of  pneumonia.      Allergies: is allergic to adhesive tape; cephalosporins; doxycycline; azithromycin; metoclopramide;

## 2020-10-30 NOTE — CONSULTS
[facility]    Suhas York Patient Status:  Observation    1980 MRN [id number]   [facility] 4NW-A Attending John Beard MD   [facility] Day # 0 PCP Bahman De PA-C     Date of Admission: 10/29/2020  3:57 PM  Admission D POSSIBLE BIOPSY, POSSIBLE POLYPECTOMY 51364 N/A 10/22/2013    Performed by Olga Hooks MD at [address] [place] [address] Way      x4   • DILATION/CURETTAGE,DIAGNOSTIC     • ESOPHAGOGASTRODUODENOSCOPY (EGD) N/A 10/4/2018    Performed by Bhavana Simental BARRETTS  12/15    normal   • [facility] ENDOSCOPY PERFORMED  01/25/2017    Madie Armando M.D.   • [facility] ENDOSCOPY,EXAM          Adhesive Tape           HIVES    Comment:Patient got welts when she use the adhesive tape             for 48-hour Holter tablet (325 mg total) by mouth 2 (two) times daily with meals. , Disp: 60 tablet, Rfl: 2    •  Tiotropium Bromide Monohydrate 18 MCG Inhalation Cap, Inhale into the lungs daily. , Disp: , Rfl:     •  predniSONE 10 MG Oral Tab, Take 10 mg by mouth daily. , Dis TOTAL OF 188MCG, Disp: 30 tablet, Rfl: 3    •  CALCIUM CITRATE OR, Take 600 mg by mouth 3 (three) times daily. , Disp: , Rfl:     •  BIOTIN OR, Take 10,000 mcg by mouth nightly., Disp: , Rfl:     •  Budesonide-Formoterol Fumarate 160-4.5 MCG/ACT Inhalation HCl (ZOFRAN) injection 4 mg, 4 mg, Intravenous, Q6H PRN  •  Alum & Mag Hydroxide-Simeth (MAALOX) oral suspension 30 mL, 30 mL, Oral, Daily PRN  •  benzocaine-menthol (CEPACOL (SUGAR-FREE)) 1 lozenge, 1 lozenge, Oral, PRN  •  Calcium Carbonate Antacid (TUMS vertigo  Psychiatric: denies insomnia, depression, anxiety, or drug abuse  Endocrine: denies polydypsia, polyuria, cold/heat intolerance  Hematologic/lymphatic: denies easy bruising, new blood clots, or lymphedema  Allergic/immunologic: denies hay fever, h Lab Results   Component Value Date     10/30/2020    K 3.9 10/30/2020     10/30/2020    CO2 36.0 10/30/2020    BUN 12 10/30/2020    CREATSERUM 0.71 10/30/2020    GLU 79 10/30/2020    CA 7.7 10/30/2020    ALKPHO 58 10/30/2020    ALT 37 10/30

## 2020-10-31 ENCOUNTER — APPOINTMENT (OUTPATIENT)
Dept: CT IMAGING | Facility: HOSPITAL | Age: 40
End: 2020-10-31
Attending: INTERNAL MEDICINE
Payer: COMMERCIAL

## 2020-10-31 PROCEDURE — 99225 SUBSEQUENT OBSERVATION CARE: CPT | Performed by: HOSPITALIST

## 2020-10-31 PROCEDURE — 71250 CT THORAX DX C-: CPT | Performed by: INTERNAL MEDICINE

## 2020-10-31 RX ORDER — BUDESONIDE AND FORMOTEROL FUMARATE DIHYDRATE 160; 4.5 UG/1; UG/1
2 AEROSOL RESPIRATORY (INHALATION) 2 TIMES DAILY
Status: DISCONTINUED | OUTPATIENT
Start: 2020-10-31 | End: 2020-11-01

## 2020-10-31 NOTE — PROGRESS NOTES
Pulmonary Progress Note        NAME: Galilea Vazquez - ROOM: [facility]/662-R - MRN: [id number] - Age: 36year old - : 1980    Past Medical History:   Diagnosis Date   • Anxiety    • Arthritis    • Asthma    • Depression    • Diverticulosis    • Endomet Extremities:  no cyanosis or edema.    Neurologic: Oriented  Times 3       Recent Labs   Lab 10/24/20  2152 10/25/20  0552 10/29/20  1652 10/30/20  0648   RBC 3.85 3.96 3.90 3.74*   HGB 11.5* 11.9* 11.7* 11.5*   HCT 34.7* 35.5 36.2 34.6*   MCV 90.1 89.6 9

## 2020-10-31 NOTE — PLAN OF CARE
Pt alert and oriented on assessment. Reporting mild shortness of breath on rest and exertion. O2 sat at 99% on room air. Pt also endorses dry cough which she states prn tylenol #3 helps with. Lungs diminished bilaterally. Incentive spirometer encouraged.  D Implement non-pharmacological measures as appropriate and evaluate response  - Consider cultural and social influences on pain and pain management  - Manage/alleviate anxiety  - Utilize distraction and/or relaxation techniques  - Monitor for opioid side ef

## 2020-11-01 VITALS
RESPIRATION RATE: 15 BRPM | TEMPERATURE: 98 F | WEIGHT: 204 LBS | OXYGEN SATURATION: 97 % | HEIGHT: 69 IN | BODY MASS INDEX: 30.21 KG/M2 | HEART RATE: 83 BPM | SYSTOLIC BLOOD PRESSURE: 108 MMHG | DIASTOLIC BLOOD PRESSURE: 70 MMHG

## 2020-11-01 PROCEDURE — 99217 OBSERVATION CARE DISCHARGE: CPT | Performed by: HOSPITALIST

## 2020-11-01 RX ORDER — PREDNISONE 10 MG/1
TABLET ORAL
Qty: 20 TABLET | Refills: 0 | Status: SHIPPED
Start: 2020-11-01 | End: 2020-12-09 | Stop reason: ALTCHOICE

## 2020-11-01 NOTE — PROGRESS NOTES
Pt c/o pleuretic pain with productive cough overnight, tylenol#3 helps suppress cough. Slept intermittently. Xanax given x2.  No other issues reported

## 2020-11-01 NOTE — PROGRESS NOTES
Pulmonary Progress Note        NAME: Angela Goodwin - ROOM: 741/092-E - MRN: [id number] - Age: 36year old - : 1980    Past Medical History:   Diagnosis Date   • Anxiety    • Arthritis    • Asthma    • Depression    • Diverticulosis    • Endomet Heart:   S1, S2 normal,    Abdomen:   Soft, non-tender, non distended   Extremities:  no cyanosis or edema.    Neurologic: Oriented  Times 3       Recent Labs   Lab 10/29/20  1652 10/30/20  0648   RBC 3.90 3.74*   HGB 11.7* 11.5*   HCT 36.2 34.6*   MCV 92

## 2020-11-01 NOTE — PROGRESS NOTES
SAR[facility] HOSPITAL[facility]  Progress Note     Dotty Castillo Patient Status:  Observation    1980 MRN [id number]   [facility] 4NW-A Attending Radha Harper MD   Hosp Day # 0 PCP Benton Stevens PA-C     Chief Complaint: dyspnea/cough data reviewed in Epic.     Medications:   • Budesonide-Formoterol Fumarate  2 puff Inhalation BID   • Tiotropium Bromide Monohydrate  1 puff Inhalation Daily   • enoxaparin  40 mg Subcutaneous Daily   • MethylPREDNISolone Sodium Succ  60 mg Intravenous Q12H

## 2020-11-01 NOTE — PLAN OF CARE
Problem: RESPIRATORY - ADULT  Goal: Achieves optimal ventilation and oxygenation  Description: INTERVENTIONS:  - Assess for changes in respiratory status  - Assess for changes in mentation and behavior  - Position to facilitate oxygenation and minimize r Consider post-discharge preferences of patient/family/discharge partner  - Complete POLST form as appropriate  - Assess patient's ability to be responsible for managing their own health  - Refer to Case Management Department for coordinating discharge plan

## 2020-11-03 ENCOUNTER — PATIENT OUTREACH (OUTPATIENT)
Dept: CASE MANAGEMENT | Age: 40
End: 2020-11-03

## 2020-11-03 ENCOUNTER — OFFICE VISIT (OUTPATIENT)
Dept: FAMILY MEDICINE CLINIC | Facility: CLINIC | Age: 40
End: 2020-11-03
Payer: COMMERCIAL

## 2020-11-03 VITALS
OXYGEN SATURATION: 100 % | WEIGHT: 209 LBS | TEMPERATURE: 98 F | HEART RATE: 86 BPM | BODY MASS INDEX: 31.67 KG/M2 | SYSTOLIC BLOOD PRESSURE: 104 MMHG | DIASTOLIC BLOOD PRESSURE: 60 MMHG | HEIGHT: 68 IN

## 2020-11-03 DIAGNOSIS — J30.2 SEASONAL ALLERGIES: ICD-10-CM

## 2020-11-03 DIAGNOSIS — J45.41 MODERATE PERSISTENT ASTHMA WITH EXACERBATION: ICD-10-CM

## 2020-11-03 DIAGNOSIS — R06.00 DYSPNEA, UNSPECIFIED TYPE: ICD-10-CM

## 2020-11-03 DIAGNOSIS — R05.9 COUGH: Primary | ICD-10-CM

## 2020-11-03 DIAGNOSIS — R05.9 COUGH: ICD-10-CM

## 2020-11-03 DIAGNOSIS — Z02.9 ENCOUNTERS FOR UNSPECIFIED ADMINISTRATIVE PURPOSE: ICD-10-CM

## 2020-11-03 DIAGNOSIS — R06.09 OTHER FORM OF DYSPNEA: ICD-10-CM

## 2020-11-03 DIAGNOSIS — J45.40 ALLERGIC ASTHMA, MODERATE PERSISTENT, UNCOMPLICATED: ICD-10-CM

## 2020-11-03 PROCEDURE — 1111F DSCHRG MED/CURRENT MED MERGE: CPT

## 2020-11-03 PROCEDURE — 3074F SYST BP LT 130 MM HG: CPT | Performed by: FAMILY MEDICINE

## 2020-11-03 PROCEDURE — 3078F DIAST BP <80 MM HG: CPT | Performed by: FAMILY MEDICINE

## 2020-11-03 PROCEDURE — 3008F BODY MASS INDEX DOCD: CPT | Performed by: FAMILY MEDICINE

## 2020-11-03 PROCEDURE — 99496 TRANSJ CARE MGMT HIGH F2F 7D: CPT | Performed by: FAMILY MEDICINE

## 2020-11-03 RX ORDER — ACETAMINOPHEN AND CODEINE PHOSPHATE 300; 30 MG/1; MG/1
TABLET ORAL 2 TIMES DAILY PRN
Qty: 10 TABLET | Refills: 0 | Status: SHIPPED | OUTPATIENT
Start: 2020-11-03 | End: 2020-11-08

## 2020-11-03 NOTE — PATIENT INSTRUCTIONS
Start prednisone 60mg x 3 days, 40mg x 3 days, 20mg x 3 days, 10mg x 3 days, then stop    Tylenol #3 as needed - limit use    Heating pad/icy hot as needed for muscle aches    Followup in 1 wk with Dr. Ric Bourgeois in 2 wks

## 2020-11-03 NOTE — PROGRESS NOTES
HPI:    Schuyler Guevara is a 36year old female here today for hospital follow up.    She was discharged from Inpatient hospital, [facility] to Home   Admission Date: 10/29/20   Discharge Date: 11/1/20  Hospital Discharge Diagnoses (since 10/4/2020) mold; nsaids; and tramadol.     Current Meds:    •  predniSONE 10 MG Oral Tab, Take 4 tablets for 2 days, then 3 tablets for 2 days, then 2 tablets for 2 days then 1 tablets for 2 days    •  ferrous sulfate 325 (65 FE) MG Oral Tab EC, Take 1 tablet (325 mg mouth 2 (two) times a day.       •  PROAIR  (90 Base) MCG/ACT Inhalation Aero Soln, INHALE 2 PUFFS INTO THE LUNGS EVERY 4 HOURS AS NEEDED FOR WHEEZING OR SHORTNESS OF BREATH    •  Nystatin 418813 UNIT/GM External Powder, Apply 1 Application topically hysteroscopy (2011);  Laparoscopic cholecystectomy (N/A, 11/23/2016); removal gallbladder; upper gi endoscopy performed (01/25/2017); cholecystectomy; gastric bypass,obese<100cm magy-en-y (12/18/2017); dilation/curettage,diagnostic; laparoscopy,diagnostic; °C) (Oral)   Ht 68\"   Wt 209 lb (94.8 kg)   SpO2 100%   BMI 31.78 kg/m²    GENERAL: well developed, well nourished, in no apparent distress  SKIN: no rashes, no suspicious lesions  HEENT: atraumatic, normocephalic, ears and throat are clear  EYES: PERRLA, Management Options: severe  · Amount and/or Complexity of Data to Be Reviewed: severe  · Risk of Significant Complications, Morbidity, and/or Mortality: severe    Overall Risk:   severe    Patient seen within 7 days from date of discharge.      HENRY JOSÉ [address]

## 2020-11-03 NOTE — PROGRESS NOTES
Initial Post Discharge Follow Up   Discharge Date: 11/1/20  Contact Date: 11/3/2020    Consent Verification:  Assessment Completed With: Patient  HIPAA Verified?   Yes    Discharge Dx:    Dyspnea on exertion, Cough, Moderate Asthma with acute exacerbatio of living as you have prior to your hospital stay (dressing, bathing, ambulating to the bathroom, etc)?  yes  • (NCM) Was patient given a different diet per AVS? no      Medications:   Current Outpatient Medications   Medication Sig Dispense Refill   • pred mg total) by mouth nightly. 15 tablet 5   • UNITHROID 88 MCG Oral Tab TAKE 1 TABLET BY MOUTH EVERY MORNING BEFORE BREAKFAST WITH 100MCG TABLET FOR A TOTAL OF 188MCG 30 tablet 3   • CALCIUM CITRATE OR Take 600 mg by mouth 3 (three) times daily.      • Alexi ordered at D/C? No    Needs post D/C:   Now that you are home, are there any needs or concerns you need addressed before your next visit with your PCP?  (DME, meds, disease concerns, Etc): No     Follow up appointments:      Your appointments     Date & Mallorie Cancel Yes, patient confirms that she has a HFU on 11/17/2020 with Dr. Monta Paget, pulmonologist.    Interventions by NCM: NCM reviewed medications, discharge instructions, S&S of infection/blood clots, patient instructed to report any new or worsening symptoms, wh

## 2020-11-09 ENCOUNTER — PATIENT MESSAGE (OUTPATIENT)
Dept: FAMILY MEDICINE CLINIC | Facility: CLINIC | Age: 40
End: 2020-11-09

## 2020-11-09 ENCOUNTER — OFFICE VISIT (OUTPATIENT)
Dept: FAMILY MEDICINE CLINIC | Facility: CLINIC | Age: 40
End: 2020-11-09
Payer: COMMERCIAL

## 2020-11-09 VITALS
TEMPERATURE: 97 F | HEART RATE: 103 BPM | SYSTOLIC BLOOD PRESSURE: 110 MMHG | OXYGEN SATURATION: 96 % | RESPIRATION RATE: 16 BRPM | BODY MASS INDEX: 31.07 KG/M2 | WEIGHT: 205 LBS | DIASTOLIC BLOOD PRESSURE: 66 MMHG | HEIGHT: 68 IN

## 2020-11-09 DIAGNOSIS — B37.0 THRUSH: ICD-10-CM

## 2020-11-09 DIAGNOSIS — Z80.3 FAMILY HISTORY OF MALIGNANT NEOPLASM OF BREAST: ICD-10-CM

## 2020-11-09 DIAGNOSIS — Z00.00 WELL ADULT EXAM: Primary | ICD-10-CM

## 2020-11-09 DIAGNOSIS — J45.41 MODERATE PERSISTENT ASTHMA WITH EXACERBATION: ICD-10-CM

## 2020-11-09 DIAGNOSIS — R05.9 COUGH: ICD-10-CM

## 2020-11-09 DIAGNOSIS — F51.01 PRIMARY INSOMNIA: ICD-10-CM

## 2020-11-09 DIAGNOSIS — F41.1 GAD (GENERALIZED ANXIETY DISORDER): ICD-10-CM

## 2020-11-09 PROCEDURE — 3074F SYST BP LT 130 MM HG: CPT | Performed by: FAMILY MEDICINE

## 2020-11-09 PROCEDURE — 99214 OFFICE O/P EST MOD 30 MIN: CPT | Performed by: FAMILY MEDICINE

## 2020-11-09 PROCEDURE — 1111F DSCHRG MED/CURRENT MED MERGE: CPT | Performed by: FAMILY MEDICINE

## 2020-11-09 PROCEDURE — 99072 ADDL SUPL MATRL&STAF TM PHE: CPT | Performed by: FAMILY MEDICINE

## 2020-11-09 PROCEDURE — 3008F BODY MASS INDEX DOCD: CPT | Performed by: FAMILY MEDICINE

## 2020-11-09 PROCEDURE — 99396 PREV VISIT EST AGE 40-64: CPT | Performed by: FAMILY MEDICINE

## 2020-11-09 PROCEDURE — 3078F DIAST BP <80 MM HG: CPT | Performed by: FAMILY MEDICINE

## 2020-11-09 RX ORDER — ZOLPIDEM TARTRATE 5 MG/1
TABLET ORAL NIGHTLY PRN
Qty: 60 TABLET | Refills: 0 | Status: SHIPPED | OUTPATIENT
Start: 2020-11-09 | End: 2020-11-09

## 2020-11-09 RX ORDER — ZOLPIDEM TARTRATE 10 MG/1
TABLET ORAL
Qty: 30 TABLET | Refills: 0 | Status: SHIPPED | OUTPATIENT
Start: 2020-11-09 | End: 2020-12-09

## 2020-11-09 RX ORDER — ZOLPIDEM TARTRATE 5 MG/1
TABLET ORAL NIGHTLY PRN
Qty: 30 TABLET | Refills: 0 | Status: CANCELLED | OUTPATIENT
Start: 2020-11-09

## 2020-11-09 RX ORDER — ALPRAZOLAM 0.25 MG/1
0.25 TABLET ORAL 2 TIMES DAILY PRN
Qty: 60 TABLET | Refills: 2 | Status: SHIPPED | OUTPATIENT
Start: 2020-11-09 | End: 2021-02-02

## 2020-11-09 NOTE — TELEPHONE ENCOUNTER
From: Mark Priest  To: Ciera Yin PA-C  Sent: 11/9/2020 4:42 PM CST  Subject: Visit Follow-up Question    Umang Santiago just let me know my insurance won’t cover me taking 2 pills.  Instead the prescription would have to be put in as

## 2020-11-09 NOTE — PROGRESS NOTES
HPI:   Xuan Pastor is a 36year old female who presents for a complete physical exam follow-up on anxiety and insomnia. Weight gain with prednisone frustrated has to be on the prednisone secondary to another severe asthma exacerbation.   Patient has aspect of the conjunctiva is inflamed but no discharge or eye pain.     Mammogram to be done with gyne  10/23/20     Wt Readings from Last 6 Encounters:  11/09/20 : 205 lb (93 kg)  11/03/20 : 209 lb (94.8 kg)  11/01/20 : 204 lb (92.5 kg)  10/25/20 : 196 lb - 2.0 mg/dL    Total Protein 5.0 (L) 6.4 - 8.2 g/dL    Albumin 2.6 (L) 3.4 - 5.0 g/dL    Globulin  2.4 (L) 2.8 - 4.4 g/dL    A/G Ratio 1.1 1.0 - 2.0   MAGNESIUM   Result Value Ref Range    Magnesium 2.2 1.6 - 2.6 mg/dL   CBC, PLATELET; NO DIFFERENTIAL   Re uL    RBC 3.90 3.80 - 5.30 x10(6)uL    HGB 11.7 (L) 12.0 - 16.0 g/dL    HCT 36.2 35.0 - 48.0 %    .0 150.0 - 450.0 10(3)uL    MCV 92.8 80.0 - 100.0 fL    MCH 30.0 26.0 - 34.0 pg    MCHC 32.3 31.0 - 37.0 g/dL    RDW 13.0 11.0 - 15.0 %    RDW-SD 44.0 40 MG Oral Tab EC TAKE 1 TABLET(40 MG) BY MOUTH TWICE DAILY BEFORE MEALS 60 tablet 1   • AMITRIPTYLINE HCL 25 MG Oral Tab TAKE 1 TABLET BY MOUTH EVERY NIGHT (Patient taking differently: 12.5 mg.  ) 30 tablet 2   • ESCITALOPRAM 10 MG Oral Tab TAKE 1 TABLET( • NON FORMULARY Take 1 tablet by mouth daily. • aspirin 81 MG Oral Tab Take 81 mg by mouth daily. • Zolpidem Tartrate (AMBIEN) 10 MG Oral Tab Take one half (5mg) to one tablet nightly as needed for sleep.  While on prednisone 30 tablet 0      Pa 6/6/2017    Performed by Tristen Morrow MD at [facility]   • [address] [facility] [place] Left 6/19/2015    Performed by Tristen Morrow MD at [place] [address]   • HYSTERECTOMY  2013   • HYSTEROSCOPY  2011   • LAPAROSCOPIC CHOLECYSTECTOMY N/A sugar closely     REVIEW OF SYSTEMS:   GENERAL: denies fevers, weakness, trouble sleeping or weight changes  SKIN: denies any unusual skin lesions or rashes  EYES:denies vision changes  HEENT:  upper respiratory symptoms tongue white plaques see above  SUSIE normal bowel sounds,soft, non tender, no masses, HSM or tenderness   deferred  MUSCULOSKELETAL: gait ritesh,l no gross M/S defect.   EXTREMITIES: no clubbing, cyanosis, or edema  NEURO: oriented times three, cranial nerves are grossly intact, no gross mot alprazolam discussed. Including not to drive, operate machinery or drink alcohol when taking this medication.  - ALPRAZolam 0.25 MG Oral Tab; Take 1 tablet (0.25 mg total) by mouth 2 (two) times daily as needed. Dispense: 60 tablet;  Refill: 2  Continue wi

## 2020-11-10 ENCOUNTER — PATIENT MESSAGE (OUTPATIENT)
Dept: FAMILY MEDICINE CLINIC | Facility: CLINIC | Age: 40
End: 2020-11-10

## 2020-11-10 PROBLEM — R06.00 DYSPNEA, UNSPECIFIED TYPE: Status: RESOLVED | Noted: 2020-10-29 | Resolved: 2020-11-10

## 2020-11-10 NOTE — TELEPHONE ENCOUNTER
From: Jef Betancourt  To: Chloe Murray PA-C  Sent: 11/10/2020 1:59 PM CST  Subject: Visit Follow-up Question    Margaret Lehman,   My body is having a difficult time getting over the hump of exhaustion.  I went to work yesterday and today, but came home r

## 2020-11-10 NOTE — TELEPHONE ENCOUNTER
OK for letter stating that she be given permission to work from home intermittently secondary to health condition.   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

## 2020-11-10 NOTE — TELEPHONE ENCOUNTER
From: Galilea Vazquez  To:  Anthony Giang PA-C  Sent: 11/10/2020 7:43 AM CST  Subject: Visit Follow-up Question    Hello,   I just wanted to check and see if the ambien/zolpidem 10 mg can be sent to [facility] today 11/10 as I will need it for tonight

## 2020-11-11 ENCOUNTER — TELEPHONE (OUTPATIENT)
Dept: FAMILY MEDICINE CLINIC | Facility: CLINIC | Age: 40
End: 2020-11-11

## 2020-11-11 NOTE — TELEPHONE ENCOUNTER
Pt was in on Monday and Vince Mccoy told her she was sending meds in for Thrush to [facility]. Per Mona Mcleod said there is no quantity listed, Nimo Goncalves would like the office to call [facility] and give them the qty so she can get her meds.       [facility] on

## 2020-11-11 NOTE — DISCHARGE SUMMARY
[facility] HOSPITALIST  DISCHARGE SUMMARY     [facility] Patient Status:  Observation    1980 MRN [id number]   [facility] 4NW-A Attending No att. providers found   Hosp Day # 0 PCP Ivy Napoles PA-C     Date of Admission: 10/2 these medications      Instructions Prescription details   Amitriptyline HCl 25 MG Tabs  Commonly known as: ELAVIL  What changed:   · how much to take  · how to take this  · when to take this      TAKE 1 TABLET BY MOUTH EVERY NIGHT   Quantity: 30 tablet  R TAKE 1/2 TO 1 TABLET BY MOUTH TWICE DAILY AS NEEDED FOR ANXIETY OR ALLERGIES   Quantity: 60 tablet  Refills: 0     Lisdexamfetamine Dimesylate 50 MG Caps  Commonly known as: Vyvanse      Take 1 capsule (50 mg total) by mouth every morning.    Quantity: 30 c BARON. (TAKE WITH UNITHROID 88 MCG FOR A TOTAL DOSE  MCG)   Quantity: 30 tablet  Refills: 3     Vitamin D 50 MCG (2000 UT) Caps      Take 4,000 Units by mouth 2 (two) times a day.    Refills: 0        STOP taking these medications    BIOTIN OR acute distress. Respiratory: Clear to auscultation bilaterally. No wheezes. No rhonchi. Cardiovascular: S1, S2. No rubs, no JVD  Abdomen: Soft, nontender, nondistended. Positive bowel sounds.     ---------------------------------------------------------

## 2020-11-11 NOTE — TELEPHONE ENCOUNTER
Called the pharmacy and they advised issue has been taken care of and they have called the patient to .

## 2020-11-15 DIAGNOSIS — G44.221 CHRONIC TENSION-TYPE HEADACHE, INTRACTABLE: ICD-10-CM

## 2020-11-16 RX ORDER — BUTALBITAL/ACETAMINOPHEN 50MG-325MG
1 TABLET ORAL 2 TIMES DAILY PRN
Qty: 30 TABLET | Refills: 1 | Status: SHIPPED | OUTPATIENT
Start: 2020-11-16 | End: 2021-01-25

## 2020-11-16 NOTE — TELEPHONE ENCOUNTER
BUTALBITAL-ACETAMINOPHEN 10/15/2020 10/15/2020  15 tablet  3 MOIRA BETH   BUTALBITAL-ACETAMINOPHEN 09/16/2020 09/02/2020  30 tablet  15 LAURA BAILEY 11/9/20 For wellness

## 2020-11-19 ENCOUNTER — PATIENT MESSAGE (OUTPATIENT)
Dept: FAMILY MEDICINE CLINIC | Facility: CLINIC | Age: 40
End: 2020-11-19

## 2020-11-19 DIAGNOSIS — Z80.3 FAMILY HISTORY OF MALIGNANT NEOPLASM OF BREAST: Primary | ICD-10-CM

## 2020-11-20 NOTE — TELEPHONE ENCOUNTER
From: Galilea Vazquez  To:  Anthony Giang PA-C  Sent: 11/19/2020 11:01 PM CST  Subject: Non-Urgent Medical Question    Harriet Camarena  Can you put the order in for the mamorgram? We had some devastating news that happened with my nephew so, I just do not k

## 2020-11-23 ENCOUNTER — TELEMEDICINE (OUTPATIENT)
Dept: FAMILY MEDICINE CLINIC | Facility: CLINIC | Age: 40
End: 2020-11-23

## 2020-11-23 DIAGNOSIS — F41.1 GAD (GENERALIZED ANXIETY DISORDER): ICD-10-CM

## 2020-11-23 DIAGNOSIS — J45.41 MODERATE PERSISTENT ASTHMA WITH EXACERBATION: ICD-10-CM

## 2020-11-23 DIAGNOSIS — R05.8 RECURRENT NONPRODUCTIVE COUGH: Primary | ICD-10-CM

## 2020-11-23 DIAGNOSIS — M94.0 ACUTE COSTOCHONDRITIS: ICD-10-CM

## 2020-11-23 PROCEDURE — 99214 OFFICE O/P EST MOD 30 MIN: CPT | Performed by: FAMILY MEDICINE

## 2020-11-23 PROCEDURE — 1111F DSCHRG MED/CURRENT MED MERGE: CPT | Performed by: FAMILY MEDICINE

## 2020-11-23 RX ORDER — TIZANIDINE 2 MG/1
TABLET ORAL EVERY 6 HOURS PRN
Qty: 20 TABLET | Refills: 0 | Status: SHIPPED | OUTPATIENT
Start: 2020-11-23 | End: 2020-12-09

## 2020-11-23 RX ORDER — ACETAMINOPHEN AND CODEINE PHOSPHATE 300; 30 MG/1; MG/1
TABLET ORAL 3 TIMES DAILY PRN
Qty: 15 TABLET | Refills: 0 | Status: SHIPPED | OUTPATIENT
Start: 2020-11-23 | End: 2020-12-09

## 2020-11-24 PROBLEM — R05.9 COUGH: Status: RESOLVED | Noted: 2019-03-06 | Resolved: 2020-11-24

## 2020-11-24 PROBLEM — R06.00 DYSPNEA: Status: RESOLVED | Noted: 2020-10-29 | Resolved: 2020-11-24

## 2020-11-24 NOTE — PROGRESS NOTES
Marcello Lyle is a 36year old female. HPI:   Please note that the following visit was completed using two-way, real-time interactive audio and video communication.   This has been done in good radha to provide continuity of care in the best interest twice within a month for the acute asthma exacerbations does not feel that nebulizer helps at all. Has been compliant with Spiriva and Symbicort.   She states that her cough is deep, feels more in the lower lungs with no nasal  congestion or postnasal drai needed for sleep. While on prednisone 30 tablet 0   • Tiotropium Bromide Monohydrate 18 MCG Inhalation Cap Inhale into the lungs daily. • UNITHROID 100 MCG Oral Tab TAKE 1 TABLET BY MOUTH EVERY MORNING WITH BEREAKFAST. (TAKE WITH UNITHROID 88 MCG FOR A (four) hours as needed for Wheezing or Shortness of Breath. Dx: J45.40 360 mL 5   • Lisdexamfetamine Dimesylate (VYVANSE) 50 MG Oral Cap Take 1 capsule (50 mg total) by mouth every morning.  (Patient not taking: Reported on 11/23/2020 ) 30 capsule 0   • pre rashes  RESPIRATORY: denies shortness of breath with exertion  CARDIOVASCULAR: denies chest pain on exertion  GI: denies abdominal pain and denies heartburn  NEURO: denies headaches  Musculoskeletal: No motor deficits  Respiratory symptoms and anxiety see Continue with counseling. Discussed importance of boundaries. 4. Acute costochondritis  - tiZANidine HCl 2 MG Oral Tab; Take 1-2 tablets (2-4 mg total) by mouth every 6 (six) hours as needed. Dispense: 20 tablet;  Refill: 0  - Acetaminophen-Codeine 3

## 2020-12-03 ENCOUNTER — LAB ENCOUNTER (OUTPATIENT)
Dept: LAB | Age: 40
End: 2020-12-03
Attending: OTOLARYNGOLOGY
Payer: COMMERCIAL

## 2020-12-03 DIAGNOSIS — J30.2 SEASONAL ALLERGIES: ICD-10-CM

## 2020-12-03 DIAGNOSIS — L29.9 PRURITUS: ICD-10-CM

## 2020-12-03 DIAGNOSIS — E07.9 THYROID DYSFUNCTION: ICD-10-CM

## 2020-12-03 DIAGNOSIS — F41.1 GAD (GENERALIZED ANXIETY DISORDER): ICD-10-CM

## 2020-12-03 PROCEDURE — 84443 ASSAY THYROID STIM HORMONE: CPT

## 2020-12-03 PROCEDURE — 84439 ASSAY OF FREE THYROXINE: CPT

## 2020-12-03 PROCEDURE — 84432 ASSAY OF THYROGLOBULIN: CPT

## 2020-12-03 PROCEDURE — 36415 COLL VENOUS BLD VENIPUNCTURE: CPT

## 2020-12-03 PROCEDURE — 86800 THYROGLOBULIN ANTIBODY: CPT

## 2020-12-04 RX ORDER — HYDROXYZINE HYDROCHLORIDE 25 MG/1
TABLET, FILM COATED ORAL
Qty: 60 TABLET | Refills: 0 | Status: SHIPPED | OUTPATIENT
Start: 2020-12-04 | End: 2021-02-03

## 2020-12-04 NOTE — PROGRESS NOTES
Please call and inform thyroid levels are stable she is overdue for follow up with DR Roxane Jeffers

## 2020-12-07 DIAGNOSIS — F51.01 PRIMARY INSOMNIA: ICD-10-CM

## 2020-12-07 RX ORDER — ZOLPIDEM TARTRATE 5 MG/1
TABLET ORAL
Qty: 30 TABLET | Refills: 0 | OUTPATIENT
Start: 2020-12-07

## 2020-12-07 RX ORDER — PANTOPRAZOLE SODIUM 40 MG/1
TABLET, DELAYED RELEASE ORAL
Qty: 60 TABLET | Refills: 1 | Status: SHIPPED | OUTPATIENT
Start: 2020-12-07 | End: 2021-02-01

## 2020-12-07 NOTE — TELEPHONE ENCOUNTER
ZOLPIDEM 5 MG Oral Tab     The original prescription was discontinued on 11/9/2020 by Jackelyn Mcgowan PA-C. Renewing this prescription may not be appropriate.      Patient on 10mg

## 2020-12-09 ENCOUNTER — OFFICE VISIT (OUTPATIENT)
Dept: FAMILY MEDICINE CLINIC | Facility: CLINIC | Age: 40
End: 2020-12-09
Payer: COMMERCIAL

## 2020-12-09 VITALS
BODY MASS INDEX: 30 KG/M2 | DIASTOLIC BLOOD PRESSURE: 68 MMHG | SYSTOLIC BLOOD PRESSURE: 110 MMHG | WEIGHT: 201 LBS | HEART RATE: 104 BPM | TEMPERATURE: 98 F

## 2020-12-09 DIAGNOSIS — F41.9 ANXIETY WITH SOMATIZATION: ICD-10-CM

## 2020-12-09 DIAGNOSIS — J45.41 MODERATE PERSISTENT ASTHMA WITH ACUTE EXACERBATION: Primary | ICD-10-CM

## 2020-12-09 DIAGNOSIS — L30.9 HAND ECZEMA: ICD-10-CM

## 2020-12-09 DIAGNOSIS — F51.01 PRIMARY INSOMNIA: ICD-10-CM

## 2020-12-09 DIAGNOSIS — F45.0 ANXIETY WITH SOMATIZATION: ICD-10-CM

## 2020-12-09 DIAGNOSIS — G44.229 CHRONIC TENSION-TYPE HEADACHE, NOT INTRACTABLE: ICD-10-CM

## 2020-12-09 PROCEDURE — 99214 OFFICE O/P EST MOD 30 MIN: CPT | Performed by: FAMILY MEDICINE

## 2020-12-09 PROCEDURE — 3074F SYST BP LT 130 MM HG: CPT | Performed by: FAMILY MEDICINE

## 2020-12-09 PROCEDURE — 3078F DIAST BP <80 MM HG: CPT | Performed by: FAMILY MEDICINE

## 2020-12-09 RX ORDER — DUPILUMAB 300 MG/2ML
INJECTION, SOLUTION SUBCUTANEOUS
COMMUNITY
Start: 2020-11-18 | End: 2021-02-22

## 2020-12-09 RX ORDER — ZOLPIDEM TARTRATE 5 MG/1
TABLET ORAL NIGHTLY PRN
Qty: 30 TABLET | Refills: 2 | Status: SHIPPED | OUTPATIENT
Start: 2020-12-09 | End: 2021-01-04

## 2020-12-09 RX ORDER — BETAMETHASONE DIPROPIONATE 0.5 MG/G
OINTMENT TOPICAL
Qty: 45 G | Refills: 0 | Status: SHIPPED | OUTPATIENT
Start: 2020-12-09 | End: 2021-03-03

## 2020-12-09 RX ORDER — TIZANIDINE HYDROCHLORIDE 2 MG/1
CAPSULE, GELATIN COATED ORAL NIGHTLY PRN
Qty: 30 CAPSULE | Refills: 1 | Status: SHIPPED | OUTPATIENT
Start: 2020-12-09 | End: 2021-02-03

## 2020-12-09 NOTE — PROGRESS NOTES
Xuan Pastor is a 36year old female. HPI:   Patient is in for follow-up on insomnia and asthma. Patient has been following allergist and pulmonologist is on her third injection of Dupixent. Denies any fevers, chills or body aches.   No nausea, vom nightly as needed for Muscle spasms. 30 capsule 1   • zolpidem (AMBIEN) 5 MG Oral Tab Take 0.5-1 tablets (2.5-5 mg total) by mouth nightly as needed for Sleep.  30 tablet 2   • UNITHROID 88 MCG Oral Tab TAKE 1 TABLET BY MOUTH EVERY MORNING BEFORE BREAKFAST daily.     • Budesonide-Formoterol Fumarate 160-4.5 MCG/ACT Inhalation Aerosol Inhale 2 puffs into the lungs 2 (two) times daily. 3 Inhaler 3   • Cholecalciferol (VITAMIN D) 50 MCG (2000 UT) Oral Cap Take 2,000 Units by mouth 2 (two) times a day.        • P tightness pulse ox 96 to 97%   CARDIOVASCULAR: denies chest pain on exertion  GI: denies abdominal pain and denies heartburn  NEURO: denies headaches  Musculoskeletal: Muscle tension in her neck gets tension headaches.   Psych see above issues with insomnia Moderate persistent asthma with acute exacerbation  Patient will continue with albuterol inhaler follow-ups with allergist and pulmonologist.  Only start the prednisone she has at home if the tightness persists despite inhaler.  - DUPIXENT 300 MG/2ML Subcu

## 2020-12-10 ENCOUNTER — PATIENT MESSAGE (OUTPATIENT)
Dept: FAMILY MEDICINE CLINIC | Facility: CLINIC | Age: 40
End: 2020-12-10

## 2020-12-10 DIAGNOSIS — M94.0 ACUTE COSTOCHONDRITIS: Primary | ICD-10-CM

## 2020-12-10 DIAGNOSIS — J20.8 ACUTE VIRAL BRONCHITIS: ICD-10-CM

## 2020-12-10 RX ORDER — ALBUTEROL SULFATE 90 UG/1
AEROSOL, METERED RESPIRATORY (INHALATION)
Qty: 8.5 G | Refills: 1 | Status: SHIPPED | OUTPATIENT
Start: 2020-12-10 | End: 2021-01-08

## 2020-12-10 RX ORDER — TIZANIDINE 2 MG/1
TABLET ORAL NIGHTLY
Qty: 30 TABLET | Refills: 1 | Status: SHIPPED | OUTPATIENT
Start: 2020-12-10 | End: 2021-01-05

## 2020-12-10 NOTE — TELEPHONE ENCOUNTER
From: Raheem Esteves  To:  Anaid Villarreal PA-C  Sent: 12/10/2020 11:35 AM CST  Subject: Visit Follow-up Question    Umang Suarez,   I picked up the muscle relaxer yesterday and realized it was a capsule instead of a tablet (they were tablets last time

## 2020-12-10 NOTE — TELEPHONE ENCOUNTER
Requested Prescriptions     Pending Prescriptions Disp Refills   • ALBUTEROL SULFATE  (90 Base) MCG/ACT Inhalation Aero Soln [Pharmacy Med Name: ALBUTEROL HFA INH (200 PUFFS)8.5GM] 8.5 g 1     Sig: INHALE 2 PUFFS INTO THE LUNGS EVERY 4 HOURS AS NEED

## 2020-12-15 RX ORDER — PREGABALIN 75 MG/1
CAPSULE ORAL
Qty: 60 CAPSULE | Refills: 0 | Status: SHIPPED | OUTPATIENT
Start: 2020-12-15 | End: 2021-01-11

## 2020-12-18 ENCOUNTER — PATIENT MESSAGE (OUTPATIENT)
Dept: FAMILY MEDICINE CLINIC | Facility: CLINIC | Age: 40
End: 2020-12-18

## 2020-12-18 RX ORDER — VALACYCLOVIR HYDROCHLORIDE 1 G/1
2 TABLET, FILM COATED ORAL EVERY 12 HOURS SCHEDULED
Qty: 30 TABLET | Refills: 0 | Status: SHIPPED | OUTPATIENT
Start: 2020-12-18 | End: 2021-07-24

## 2020-12-18 NOTE — TELEPHONE ENCOUNTER
From: Jef Betancourt  To: Chloe Murray PA-C  Sent: 12/18/2020 6:29 AM CST  Subject: Prescription Question    Umang Lehman,   I was wondering if you could send in more Valtrax for me. I am getting a cold sore that is the only thing that works.  I cur

## 2020-12-21 ENCOUNTER — HOSPITAL ENCOUNTER (OUTPATIENT)
Dept: ULTRASOUND IMAGING | Age: 40
Discharge: HOME | End: 2020-12-21
Attending: FAMILY MEDICINE
Payer: COMMERCIAL

## 2020-12-21 ENCOUNTER — HOSPITAL ENCOUNTER (OUTPATIENT)
Dept: MAMMOGRAPHY | Age: 40
Discharge: HOME | End: 2020-12-21
Attending: FAMILY MEDICINE
Payer: COMMERCIAL

## 2020-12-21 ENCOUNTER — HOSPITAL ENCOUNTER (OUTPATIENT)
Dept: ULTRASOUND IMAGING | Age: 40
Discharge: HOME | End: 2020-12-21
Attending: OTOLARYNGOLOGY
Payer: COMMERCIAL

## 2020-12-21 DIAGNOSIS — Z80.3 FAMILY HISTORY OF MALIGNANT NEOPLASM OF BREAST: ICD-10-CM

## 2020-12-21 DIAGNOSIS — E07.9 THYROID DYSFUNCTION: ICD-10-CM

## 2020-12-21 PROCEDURE — 77066 DX MAMMO INCL CAD BI: CPT | Performed by: FAMILY MEDICINE

## 2020-12-21 PROCEDURE — 77062 BREAST TOMOSYNTHESIS BI: CPT | Performed by: FAMILY MEDICINE

## 2020-12-21 PROCEDURE — 76536 US EXAM OF HEAD AND NECK: CPT | Performed by: OTOLARYNGOLOGY

## 2020-12-21 PROCEDURE — 76642 ULTRASOUND BREAST LIMITED: CPT | Performed by: FAMILY MEDICINE

## 2020-12-28 ENCOUNTER — HOSPITAL ENCOUNTER (EMERGENCY)
Age: 40
Discharge: HOME | End: 2020-12-28
Attending: EMERGENCY MEDICINE
Payer: COMMERCIAL

## 2020-12-28 VITALS
OXYGEN SATURATION: 99 % | BODY MASS INDEX: 28.14 KG/M2 | TEMPERATURE: 98 F | DIASTOLIC BLOOD PRESSURE: 83 MMHG | WEIGHT: 190 LBS | HEART RATE: 81 BPM | SYSTOLIC BLOOD PRESSURE: 123 MMHG | HEIGHT: 69 IN | RESPIRATION RATE: 18 BRPM

## 2020-12-28 DIAGNOSIS — R10.9 ABDOMINAL PAIN, LEFT LATERAL: Primary | ICD-10-CM

## 2020-12-28 PROCEDURE — 99282 EMERGENCY DEPT VISIT SF MDM: CPT

## 2020-12-28 NOTE — PROGRESS NOTES
Keyshawn Baugh, your thyroid ultrasound is showing stable findings are present, we will plan to repeat this ultrasound in 1 year. Please call the office to schedule your office visit.

## 2020-12-29 ENCOUNTER — OFFICE VISIT (OUTPATIENT)
Dept: SURGERY | Facility: CLINIC | Age: 40
End: 2020-12-29
Payer: COMMERCIAL

## 2020-12-29 VITALS
HEIGHT: 69 IN | HEART RATE: 85 BPM | DIASTOLIC BLOOD PRESSURE: 80 MMHG | SYSTOLIC BLOOD PRESSURE: 125 MMHG | BODY MASS INDEX: 28.14 KG/M2 | WEIGHT: 190 LBS | TEMPERATURE: 98 F

## 2020-12-29 DIAGNOSIS — R10.9 ABDOMINAL PAIN, UNSPECIFIED ABDOMINAL LOCATION: Primary | ICD-10-CM

## 2020-12-29 DIAGNOSIS — R10.12 LEFT UPPER QUADRANT ABDOMINAL PAIN: ICD-10-CM

## 2020-12-29 DIAGNOSIS — K46.9 ABDOMINAL HERNIA WITHOUT OBSTRUCTION AND WITHOUT GANGRENE, RECURRENCE NOT SPECIFIED, UNSPECIFIED HERNIA TYPE: ICD-10-CM

## 2020-12-29 PROCEDURE — 99215 OFFICE O/P EST HI 40 MIN: CPT | Performed by: SURGERY

## 2020-12-29 PROCEDURE — 3074F SYST BP LT 130 MM HG: CPT | Performed by: SURGERY

## 2020-12-29 PROCEDURE — 3079F DIAST BP 80-89 MM HG: CPT | Performed by: SURGERY

## 2020-12-29 PROCEDURE — 3008F BODY MASS INDEX DOCD: CPT | Performed by: SURGERY

## 2020-12-29 NOTE — ED PROVIDER NOTES
Patient Seen in: [facility] Emergency Department In [place]      History   Patient presents with:  Abdomen/Flank Pain    Stated Complaint: abdominal pain     HPI    Patient is a 41-year-old female with surgical history including gastric bypass, cholecyste BIOPSY, POSSIBLE POLYPECTOMY [address]12 N/A 12/19/2015    Performed by David Rizzo MD at [address] [facility]   • FACET INJECTION UNDER FLUOROSCOPY Bilateral 12/14/2015    Performed by Zhane Marcus DO at [address] abdominal pain   Other systems are as noted in HPI. Constitutional and vital signs reviewed. All other systems reviewed and negative except as noted above.     Physical Exam     ED Triage Vitals [12/28/20 2217]   /83   Pulse 81   Resp 18   Temp clinically she is not obstructed and I think the yield on that is fairly low. Patient really does not want to do a CT because she is \"had so many\".   She is asking about ultrasound but at do not think an ultrasound is good to be very helpful for identify

## 2020-12-29 NOTE — ED INITIAL ASSESSMENT (HPI)
Pt c/o left upper abdominal pain that started 1 month ago, pt states pain ha gotten progressively worse and she states she feels a \"lump\" in her LUQ. Pt denies constipation or vomiting. Pt states she has had diarrhea for the last 4 days.  Pt has hx of gas

## 2020-12-29 NOTE — H&P
New Patient Visit Note       Active Problems      1. Abdominal pain, unspecified abdominal location    2. Abdominal hernia without obstruction and without gangrene, recurrence not specified, unspecified hernia type    3.  Left upper quadrant abdominal pain ESOPHAGOGASTRODUODENOSCOPY (EGD) N/A 10/4/2018    Performed by Amalia Do MD at [address] [place] [address] ENDOSCOPY   • ESOPHAGOGASTRODUODENOSCOPY (EGD) N/A 1/29/2018    Performed by Amalia Do MD at [address] [place] [address] ENDOSCOPY   • ESOPHAGOGASTRODUODENOSCOPY, PO reviewed by me today.     Social History    Tobacco Use      Smoking status: Never Smoker      Smokeless tobacco: Never Used    Alcohol use: Not Currently      Alcohol/week: 0.0 standard drinks      Frequency: Never      Binge frequency: Never       Current 5  •  Butalbital-Acetaminophen  MG Oral Tab, Take 1 tablet by mouth 2 (two) times daily as needed (headache). , Disp: 30 tablet, Rfl: 1  •  Lisdexamfetamine Dimesylate (VYVANSE) 50 MG Oral Cap, Take 1 capsule (50 mg total) by mouth every morning., Dis mirtazapine 7.5 MG Oral Tab, Take 0.5 tablets (3.75 mg total) by mouth nightly., Disp: 15 tablet, Rfl: 5      Review of Systems  The Review of Systems has been reviewed by me during today.   Review of Systems   Constitutional: Negative for diaphoresis, fev 44-year-old female who presents to the clinic today with concerns of a possible hernia. The patient states she developed pain on the left side of her abdomen in beginning of last week. She states the pain felt as if she had a muscle strain.   She stat procedure were explained to the patient in detail. Expected postoperative pain, recuperation and postoperative course was also reviewed. All questions from the patient were answered in detail.   The patient did verbalize understanding and does not have a

## 2020-12-30 ENCOUNTER — HOSPITAL ENCOUNTER (OUTPATIENT)
Dept: ULTRASOUND IMAGING | Facility: HOSPITAL | Age: 40
Discharge: HOME | End: 2020-12-30
Attending: SURGERY
Payer: COMMERCIAL

## 2020-12-30 DIAGNOSIS — K46.9 ABDOMINAL HERNIA WITHOUT OBSTRUCTION AND WITHOUT GANGRENE, RECURRENCE NOT SPECIFIED, UNSPECIFIED HERNIA TYPE: ICD-10-CM

## 2020-12-30 DIAGNOSIS — R10.32 LEFT LOWER QUADRANT ABDOMINAL PAIN: Primary | ICD-10-CM

## 2020-12-30 DIAGNOSIS — R10.32 ABDOMINAL PAIN, LEFT LOWER QUADRANT: Primary | ICD-10-CM

## 2020-12-30 DIAGNOSIS — R10.32 ABDOMINAL PAIN, LEFT LOWER QUADRANT: ICD-10-CM

## 2020-12-30 PROCEDURE — 76705 ECHO EXAM OF ABDOMEN: CPT | Performed by: SURGERY

## 2020-12-31 DIAGNOSIS — F51.01 PRIMARY INSOMNIA: ICD-10-CM

## 2020-12-31 DIAGNOSIS — J38.3 VOCAL CORD DYSFUNCTION: ICD-10-CM

## 2020-12-31 DIAGNOSIS — F41.1 GAD (GENERALIZED ANXIETY DISORDER): ICD-10-CM

## 2020-12-31 RX ORDER — MIRTAZAPINE 7.5 MG/1
3.75 TABLET, FILM COATED ORAL NIGHTLY
Qty: 15 TABLET | Refills: 5 | Status: SHIPPED | OUTPATIENT
Start: 2020-12-31 | End: 2021-01-05

## 2020-12-31 RX ORDER — AMITRIPTYLINE HYDROCHLORIDE 25 MG/1
TABLET, FILM COATED ORAL NIGHTLY
Qty: 30 TABLET | Refills: 2 | Status: SHIPPED | OUTPATIENT
Start: 2020-12-31 | End: 2021-01-05

## 2020-12-31 NOTE — TELEPHONE ENCOUNTER
Requested Prescriptions     Pending Prescriptions Disp Refills   • Amitriptyline HCl 25 MG Oral Tab 30 tablet 2     Sig: Take 1 tablet (25 mg total) by mouth nightly.    • mirtazapine 7.5 MG Oral Tab 15 tablet 5     Sig: Take 0.5 tablets (3.75 mg total) by

## 2021-01-04 ENCOUNTER — TELEMEDICINE (OUTPATIENT)
Dept: FAMILY MEDICINE CLINIC | Facility: CLINIC | Age: 41
End: 2021-01-04
Payer: COMMERCIAL

## 2021-01-04 VITALS — HEART RATE: 94 BPM | OXYGEN SATURATION: 97 %

## 2021-01-04 DIAGNOSIS — F51.01 PRIMARY INSOMNIA: ICD-10-CM

## 2021-01-04 DIAGNOSIS — K43.2 INCISIONAL HERNIA, WITHOUT OBSTRUCTION OR GANGRENE: ICD-10-CM

## 2021-01-04 DIAGNOSIS — F45.0 ANXIETY WITH SOMATIZATION: ICD-10-CM

## 2021-01-04 DIAGNOSIS — R19.7 OVERFLOW DIARRHEA: Primary | ICD-10-CM

## 2021-01-04 DIAGNOSIS — F41.1 GAD (GENERALIZED ANXIETY DISORDER): ICD-10-CM

## 2021-01-04 DIAGNOSIS — J38.3 VOCAL CORD DYSFUNCTION: ICD-10-CM

## 2021-01-04 DIAGNOSIS — F41.9 ANXIETY WITH SOMATIZATION: ICD-10-CM

## 2021-01-04 PROCEDURE — 99215 OFFICE O/P EST HI 40 MIN: CPT | Performed by: FAMILY MEDICINE

## 2021-01-04 RX ORDER — ZOLPIDEM TARTRATE 10 MG/1
10 TABLET ORAL NIGHTLY PRN
Qty: 30 TABLET | Refills: 0 | Status: SHIPPED | OUTPATIENT
Start: 2021-01-04 | End: 2021-02-01

## 2021-01-04 RX ORDER — TIOTROPIUM BROMIDE INHALATION SPRAY 3.12 UG/1
SPRAY, METERED RESPIRATORY (INHALATION)
COMMUNITY
Start: 2020-12-09 | End: 2021-06-04

## 2021-01-05 DIAGNOSIS — F41.1 GAD (GENERALIZED ANXIETY DISORDER): ICD-10-CM

## 2021-01-05 DIAGNOSIS — M94.0 ACUTE COSTOCHONDRITIS: ICD-10-CM

## 2021-01-05 PROBLEM — J45.41 MODERATE PERSISTENT ASTHMA WITH EXACERBATION: Status: RESOLVED | Noted: 2020-10-24 | Resolved: 2021-01-05

## 2021-01-05 PROBLEM — J45.41 MODERATE PERSISTENT ASTHMA WITH EXACERBATION (HCC): Status: RESOLVED | Noted: 2020-10-24 | Resolved: 2021-01-05

## 2021-01-05 RX ORDER — ESCITALOPRAM OXALATE 10 MG/1
10 TABLET ORAL DAILY
Qty: 90 TABLET | Refills: 0 | Status: SHIPPED | OUTPATIENT
Start: 2021-01-05 | End: 2021-04-03

## 2021-01-05 NOTE — PROGRESS NOTES
Xuan Pastor is a 36year old female. HPI:   Please note that the following visit was completed using two-way, real-time interactive audio and video communication.   This has been done in good radha to provide continuity of care in the best interest her life   Consultation with Dr. Thong Carney she ordered a abdominal ultrasound which was normal.  Patient states that she will get a little bulge that comes and goes pain is described as mild and intermittent turning to one side will get the sensation and then mouth nightly as needed for Sleep. 30 tablet 0   • Amitriptyline HCl 25 MG Oral Tab Take 0.5-1 tablets (12.5-25 mg total) by mouth nightly. 30 tablet 2   • mirtazapine 7.5 MG Oral Tab Take 0.5 tablets (3.75 mg total) by mouth nightly.  15 tablet 5   • Lisde (two) times daily as needed. 60 tablet 2   • UNITHROID 100 MCG Oral Tab TAKE 1 TABLET BY MOUTH EVERY MORNING WITH BEREAKFAST. (TAKE WITH UNITHROID 88 MCG FOR A TOTAL DOSE  MCG) 30 tablet 3   • ESCITALOPRAM 10 MG Oral Tab TAKE 1 TABLET(10 MG) BY MOUTH Drug use: No       REVIEW OF SYSTEMS:   GENERAL HEALTH: feels well otherwise  SKIN: denies any unusual skin lesions or rashes  RESPIRATORY: denies shortness of breath with exertion, vocal cord dysfunction has hoarseness to her voice today  CARDIOVASCULAR: possibly constipation she is comfortable today and in no acute distress. If constipation present on imaging patient will start MiraLAX and Colace  - XR ABDOMEN, OBSTRUCTIVE SERIES 3 VIEWS(CPT=74021); Future    2.  Primary insomnia  OMAR (generalized anxiety

## 2021-01-06 RX ORDER — TIZANIDINE 2 MG/1
TABLET ORAL NIGHTLY
Qty: 30 TABLET | Refills: 1 | Status: SHIPPED | OUTPATIENT
Start: 2021-01-06 | End: 2021-02-02

## 2021-01-08 ENCOUNTER — HOSPITAL ENCOUNTER (OUTPATIENT)
Dept: GENERAL RADIOLOGY | Age: 41
Discharge: HOME | End: 2021-01-08
Attending: FAMILY MEDICINE
Payer: COMMERCIAL

## 2021-01-08 DIAGNOSIS — J20.8 ACUTE VIRAL BRONCHITIS: ICD-10-CM

## 2021-01-08 DIAGNOSIS — R19.7 OVERFLOW DIARRHEA: ICD-10-CM

## 2021-01-08 PROCEDURE — 74019 RADEX ABDOMEN 2 VIEWS: CPT | Performed by: FAMILY MEDICINE

## 2021-01-08 RX ORDER — ALBUTEROL SULFATE 90 UG/1
2 AEROSOL, METERED RESPIRATORY (INHALATION) EVERY 4 HOURS PRN
Qty: 8.5 G | Refills: 1 | Status: SHIPPED | OUTPATIENT
Start: 2021-01-08 | End: 2021-02-08

## 2021-01-08 NOTE — TELEPHONE ENCOUNTER
Requested Prescriptions     Pending Prescriptions Disp Refills   • Albuterol Sulfate  (90 Base) MCG/ACT Inhalation Aero Soln 8.5 g 1     Sig: Inhale 2 puffs into the lungs every 4 (four) hours as needed for Wheezing or Shortness of Breath.      Last
no

## 2021-01-09 NOTE — PROGRESS NOTES
Normal obstructive series x-ray. Start taking Benefiber and eating banana daily. If you are still having watery diarrhea I would like to check stool culture, C. difficile and ova parasite.   Let me know how stools are after starting Benefiber also take a

## 2021-01-11 RX ORDER — PREGABALIN 75 MG/1
75 CAPSULE ORAL 2 TIMES DAILY
Qty: 60 CAPSULE | Refills: 0 | Status: SHIPPED | OUTPATIENT
Start: 2021-01-11 | End: 2021-02-05

## 2021-01-11 NOTE — TELEPHONE ENCOUNTER
PREGABALIN 75 MG Oral Cap 60 capsule 0 12/15/2020     12/9/2020 Office Visit  Has future appointment 2/3/21

## 2021-01-14 ENCOUNTER — LAB ENCOUNTER (OUTPATIENT)
Dept: LAB | Age: 41
End: 2021-01-14
Attending: FAMILY MEDICINE
Payer: COMMERCIAL

## 2021-01-14 DIAGNOSIS — R19.7 OVERFLOW DIARRHEA: ICD-10-CM

## 2021-01-14 DIAGNOSIS — R11.0 NAUSEA: ICD-10-CM

## 2021-01-14 LAB
ALBUMIN SERPL-MCNC: 3.5 G/DL (ref 3.4–5)
ALBUMIN/GLOB SERPL: 1.1 {RATIO} (ref 1–2)
ALP LIVER SERPL-CCNC: 78 U/L
ALT SERPL-CCNC: 17 U/L
ANION GAP SERPL CALC-SCNC: 4 MMOL/L (ref 0–18)
AST SERPL-CCNC: 14 U/L (ref 15–37)
BASOPHILS # BLD AUTO: 0.06 X10(3) UL (ref 0–0.2)
BASOPHILS NFR BLD AUTO: 0.9 %
BILIRUB SERPL-MCNC: 0.4 MG/DL (ref 0.1–2)
BUN BLD-MCNC: 11 MG/DL (ref 7–18)
BUN/CREAT SERPL: 14.5 (ref 10–20)
CALCIUM BLD-MCNC: 8.7 MG/DL (ref 8.5–10.1)
CHLORIDE SERPL-SCNC: 107 MMOL/L (ref 98–112)
CO2 SERPL-SCNC: 27 MMOL/L (ref 21–32)
CREAT BLD-MCNC: 0.76 MG/DL
DEPRECATED RDW RBC AUTO: 44.1 FL (ref 35.1–46.3)
EOSINOPHIL # BLD AUTO: 0.06 X10(3) UL (ref 0–0.7)
EOSINOPHIL NFR BLD AUTO: 0.9 %
ERYTHROCYTE [DISTWIDTH] IN BLOOD BY AUTOMATED COUNT: 12.8 % (ref 11–15)
GLOBULIN PLAS-MCNC: 3.3 G/DL (ref 2.8–4.4)
GLUCOSE BLD-MCNC: 98 MG/DL (ref 70–99)
HCT VFR BLD AUTO: 42.2 %
HGB BLD-MCNC: 13.7 G/DL
IMM GRANULOCYTES # BLD AUTO: 0.05 X10(3) UL (ref 0–1)
IMM GRANULOCYTES NFR BLD: 0.7 %
LIPASE SERPL-CCNC: 161 U/L (ref 73–393)
LYMPHOCYTES # BLD AUTO: 2.33 X10(3) UL (ref 1–4)
LYMPHOCYTES NFR BLD AUTO: 33.1 %
M PROTEIN MFR SERPL ELPH: 6.8 G/DL (ref 6.4–8.2)
MCH RBC QN AUTO: 30.5 PG (ref 26–34)
MCHC RBC AUTO-ENTMCNC: 32.5 G/DL (ref 31–37)
MCV RBC AUTO: 94 FL
MONOCYTES # BLD AUTO: 0.56 X10(3) UL (ref 0.1–1)
MONOCYTES NFR BLD AUTO: 8 %
NEUTROPHILS # BLD AUTO: 3.98 X10 (3) UL (ref 1.5–7.7)
NEUTROPHILS # BLD AUTO: 3.98 X10(3) UL (ref 1.5–7.7)
NEUTROPHILS NFR BLD AUTO: 56.4 %
OSMOLALITY SERPL CALC.SUM OF ELEC: 285 MOSM/KG (ref 275–295)
PATIENT FASTING Y/N/NP: NO
PLATELET # BLD AUTO: 238 10(3)UL (ref 150–450)
POTASSIUM SERPL-SCNC: 3.3 MMOL/L (ref 3.5–5.1)
RBC # BLD AUTO: 4.49 X10(6)UL
SODIUM SERPL-SCNC: 138 MMOL/L (ref 136–145)
WBC # BLD AUTO: 7 X10(3) UL (ref 4–11)

## 2021-01-14 PROCEDURE — 83690 ASSAY OF LIPASE: CPT | Performed by: FAMILY MEDICINE

## 2021-01-14 PROCEDURE — 36415 COLL VENOUS BLD VENIPUNCTURE: CPT | Performed by: FAMILY MEDICINE

## 2021-01-14 PROCEDURE — 80053 COMPREHEN METABOLIC PANEL: CPT | Performed by: FAMILY MEDICINE

## 2021-01-14 PROCEDURE — 85025 COMPLETE CBC W/AUTO DIFF WBC: CPT | Performed by: FAMILY MEDICINE

## 2021-01-15 ENCOUNTER — TELEMEDICINE (OUTPATIENT)
Dept: FAMILY MEDICINE CLINIC | Facility: CLINIC | Age: 41
End: 2021-01-15

## 2021-01-15 ENCOUNTER — PATIENT MESSAGE (OUTPATIENT)
Dept: FAMILY MEDICINE CLINIC | Facility: CLINIC | Age: 41
End: 2021-01-15

## 2021-01-15 VITALS — TEMPERATURE: 98 F | OXYGEN SATURATION: 98 % | HEART RATE: 99 BPM

## 2021-01-15 DIAGNOSIS — E87.6 HYPOKALEMIA: ICD-10-CM

## 2021-01-15 DIAGNOSIS — R05.8 SPASMODIC COUGH: ICD-10-CM

## 2021-01-15 DIAGNOSIS — K59.09 CHRONIC CONSTIPATION: ICD-10-CM

## 2021-01-15 DIAGNOSIS — J38.3 VOCAL CORD DYSFUNCTION: Primary | ICD-10-CM

## 2021-01-15 DIAGNOSIS — F41.1 GAD (GENERALIZED ANXIETY DISORDER): ICD-10-CM

## 2021-01-15 PROCEDURE — 99214 OFFICE O/P EST MOD 30 MIN: CPT | Performed by: FAMILY MEDICINE

## 2021-01-15 RX ORDER — ACETAMINOPHEN AND CODEINE PHOSPHATE 300; 30 MG/1; MG/1
TABLET ORAL 3 TIMES DAILY PRN
Qty: 15 TABLET | Refills: 0 | Status: SHIPPED | OUTPATIENT
Start: 2021-01-15 | End: 2021-02-03

## 2021-01-16 NOTE — PROGRESS NOTES
Complete blood count and metabolic panel with lipase are normal except for low potassium will be ordering 10 mEq of potassium to be done daily.   Sent to my chart

## 2021-01-17 RX ORDER — POTASSIUM CHLORIDE 750 MG/1
10 TABLET, FILM COATED, EXTENDED RELEASE ORAL DAILY
Qty: 90 TABLET | Refills: 0 | Status: SHIPPED | OUTPATIENT
Start: 2021-01-17 | End: 2021-04-30

## 2021-01-17 NOTE — PROGRESS NOTES
Angela Goodwin is a 36year old female. HPI:   Please note that the following visit was completed using two-way, real-time interactive audio and video communication.   This has been done in good radha to provide continuity of care in the best interest Ambien. 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   She is practicing better boundaries and still counseling with her psychologist.    Abdominal discomfort is getting days.     • Betamethasone Dipropionate Aug 0.05 % External Ointment Thin film at night as needed to hands 45 g 0   • tiZANidine HCl 2 MG Oral Cap Take 1-2 capsules (2-4 mg total) by mouth nightly as needed for Muscle spasms.  30 capsule 1   • UNITHROID 80 M • NON FORMULARY Take 1 tablet by mouth daily. • aspirin 81 MG Oral Tab Take 81 mg by mouth daily.         Past Medical History:   Diagnosis Date   • Anxiety    • Arthritis    • Asthma    • Depression    • Diverticulosis    • Endometriosis    • Eso disorder)  Hypokalemia    No orders of the defined types were placed in this encounter.       Meds & Refills for this Visit:  Requested Prescriptions     Signed Prescriptions Disp Refills   • Acetaminophen-Codeine 300-30 MG Oral Tab 15 tablet 0     Sig: Amie Thomas

## 2021-01-18 NOTE — TELEPHONE ENCOUNTER
From: Galilea Vazquez  To: Anthony Giang PA-C  Sent: 1/15/2021 8:38 PM CST  Subject: Visit Follow-up Question    Evelyne Acevedo,   I know we talked about me taking Potassium, but a prescription was not sent in for it.  Is this something I am supposed to

## 2021-01-25 DIAGNOSIS — G44.221 CHRONIC TENSION-TYPE HEADACHE, INTRACTABLE: ICD-10-CM

## 2021-01-25 NOTE — TELEPHONE ENCOUNTER
BUTALB/ACETAMINOPHEN 325MG TABS 12/26/2020 12/22/2020  20 each  3 LEDA KITCHEN     Last three refills givven by different provider     LOV 1/15/21  Has future appointment 2/3/21

## 2021-01-26 RX ORDER — BUTALBITAL/ACETAMINOPHEN 50MG-325MG
TABLET ORAL 2 TIMES DAILY PRN
Qty: 15 TABLET | Refills: 0 | Status: SHIPPED | OUTPATIENT
Start: 2021-01-26 | End: 2021-02-22

## 2021-01-26 NOTE — TELEPHONE ENCOUNTER
Please inquire with patient who may be other provider is since she is not supposed to be getting any controlled substances from any other provider.

## 2021-01-26 NOTE — TELEPHONE ENCOUNTER
[facility] Clinical Staff   Phone Number: [phone]             Hello,   It was filled on December 22nd and then there was 1 refill for it that was filled on December 25th.  It was from Dr. Lillian Narayanan who is my dentist. I had my teeth cleane

## 2021-01-29 ENCOUNTER — PATIENT MESSAGE (OUTPATIENT)
Dept: FAMILY MEDICINE CLINIC | Facility: CLINIC | Age: 41
End: 2021-01-29

## 2021-02-01 RX ORDER — PANTOPRAZOLE SODIUM 40 MG/1
TABLET, DELAYED RELEASE ORAL
Qty: 60 TABLET | Refills: 1 | Status: SHIPPED | OUTPATIENT
Start: 2021-02-01 | End: 2021-03-30

## 2021-02-01 RX ORDER — ZOLPIDEM TARTRATE 6.25 MG/1
TABLET, FILM COATED, EXTENDED RELEASE ORAL NIGHTLY PRN
Qty: 10 TABLET | Refills: 0 | Status: SHIPPED | OUTPATIENT
Start: 2021-02-01 | End: 2021-02-22

## 2021-02-01 NOTE — TELEPHONE ENCOUNTER
From: Rosanna Soto  To: Alea De Dios PA-C  Sent: 1/29/2021 3:56 PM CST  Subject: Non-Urgent Medical Question    Nino Loulous  I can fall alseep with the med changes, but I don't stay asleep.  I get 4-5 hours of sleep because I am waking up anywhere b

## 2021-02-02 DIAGNOSIS — M94.0 ACUTE COSTOCHONDRITIS: ICD-10-CM

## 2021-02-02 DIAGNOSIS — F41.1 GAD (GENERALIZED ANXIETY DISORDER): ICD-10-CM

## 2021-02-02 RX ORDER — ALPRAZOLAM 0.25 MG/1
0.25 TABLET ORAL 2 TIMES DAILY PRN
Qty: 60 TABLET | Refills: 2 | Status: SHIPPED | OUTPATIENT
Start: 2021-02-02 | End: 2021-04-26

## 2021-02-02 RX ORDER — TIZANIDINE 2 MG/1
TABLET ORAL NIGHTLY
Qty: 30 TABLET | Refills: 1 | Status: SHIPPED | OUTPATIENT
Start: 2021-02-02 | End: 2021-03-03

## 2021-02-02 NOTE — TELEPHONE ENCOUNTER
Controlled Substance Prescription Refill Request     Drug: ALPRAZOLAM 0.25MG TABLETS     Sig: TAKE 1 TABLET (0.25MG) BY MOUTH TWICE DAILY AS NEEDED    Prescribed Qty: 60    Last Refill: 01/05/2021    Prescription Refill Request     Drug: TIZANIDINE 2MG TAB

## 2021-02-03 ENCOUNTER — OFFICE VISIT (OUTPATIENT)
Dept: FAMILY MEDICINE CLINIC | Facility: CLINIC | Age: 41
End: 2021-02-03
Payer: COMMERCIAL

## 2021-02-03 VITALS
HEIGHT: 68.54 IN | BODY MASS INDEX: 28.62 KG/M2 | SYSTOLIC BLOOD PRESSURE: 102 MMHG | TEMPERATURE: 97 F | DIASTOLIC BLOOD PRESSURE: 62 MMHG | HEART RATE: 100 BPM | WEIGHT: 191 LBS

## 2021-02-03 DIAGNOSIS — R63.0 DECREASED APPETITE: ICD-10-CM

## 2021-02-03 DIAGNOSIS — F51.01 PRIMARY INSOMNIA: ICD-10-CM

## 2021-02-03 DIAGNOSIS — E87.6 HYPOKALEMIA: ICD-10-CM

## 2021-02-03 DIAGNOSIS — R19.5 LOOSE STOOLS: ICD-10-CM

## 2021-02-03 DIAGNOSIS — R10.12 LEFT UPPER QUADRANT ABDOMINAL PAIN: Primary | ICD-10-CM

## 2021-02-03 PROCEDURE — 3074F SYST BP LT 130 MM HG: CPT | Performed by: FAMILY MEDICINE

## 2021-02-03 PROCEDURE — 3008F BODY MASS INDEX DOCD: CPT | Performed by: FAMILY MEDICINE

## 2021-02-03 PROCEDURE — 99214 OFFICE O/P EST MOD 30 MIN: CPT | Performed by: FAMILY MEDICINE

## 2021-02-03 PROCEDURE — 3078F DIAST BP <80 MM HG: CPT | Performed by: FAMILY MEDICINE

## 2021-02-03 RX ORDER — ZALEPLON 5 MG/1
CAPSULE ORAL NIGHTLY
Qty: 6 CAPSULE | Refills: 0 | Status: SHIPPED | OUTPATIENT
Start: 2021-02-03 | End: 2021-02-04

## 2021-02-03 NOTE — PROGRESS NOTES
Nilson Guerrero is a 36year old female. HPI:   Patient is in with continued insomnia, presently is having decreased appetite only eating 1 meal a day approximately 1000 marquis and left abdominal pain for the past month.   Patient also states that her inso antibiotics in November. X-ray for obstruction did not show any retained stool.   Has taken her 6 dose of Dupixent and does not feel it is making a difference is going to talk to allergist and pulmonologist about stopping Dupixent       FINDINGS:   Lakeisha Danielle Absolute 0.06 0.00 - 0.70 x10(3) uL    Basophil Absolute 0.06 0.00 - 0.20 x10(3) uL    Immature Granulocyte Absolute 0.05 0.00 - 1.00 x10(3) uL    Neutrophil % 56.4 %    Lymphocyte % 33.1 %    Monocyte % 8.0 %    Eosinophil % 0.9 %    Basophil % 0.9 %    I 300 MG/2ML Subcutaneous Solution Prefilled Syringe Inject into the muscle every 14 (fourteen) days.      • Betamethasone Dipropionate Aug 0.05 % External Ointment Thin film at night as needed to hands 45 g 0   • UNITHROID 88 MCG Oral Tab TAKE 1 TABLET BY MO Hypothyroidism    • Infertility, female    • Nephrolithiasis    • Organic hypersomnia, unspecified DMG DX 11-2-12    AHI 2 RDI 2 REM AHI 6 SaO2 curtis 89%   • Pancreatitis    • Pneumonia due to organism    • [facility]    • Visual impairment     g deficit  Psychological: Mood anxious due to insomnia affect is normal.  Good communication skills.   ASSESSMENT AND PLAN:   Left upper quadrant abdominal pain  (primary encounter diagnosis)  Primary insomnia  Decreased appetite  Loose stools  Hypokalemia issues and agrees to the plan. The patient is asked to return in 1 month notify office if symptoms worsen. Quin Soliz

## 2021-02-04 ENCOUNTER — PATIENT MESSAGE (OUTPATIENT)
Dept: FAMILY MEDICINE CLINIC | Facility: CLINIC | Age: 41
End: 2021-02-04

## 2021-02-04 ENCOUNTER — TELEPHONE (OUTPATIENT)
Dept: FAMILY MEDICINE CLINIC | Facility: CLINIC | Age: 41
End: 2021-02-04

## 2021-02-04 PROBLEM — R19.5 LOOSE STOOLS: Status: ACTIVE | Noted: 2021-02-04

## 2021-02-04 PROBLEM — R63.0 DECREASED APPETITE: Status: ACTIVE | Noted: 2021-02-04

## 2021-02-04 RX ORDER — ZOLPIDEM TARTRATE 10 MG/1
10 TABLET ORAL NIGHTLY
Qty: 30 TABLET | Refills: 0 | Status: SHIPPED | OUTPATIENT
Start: 2021-02-04 | End: 2021-02-15

## 2021-02-04 NOTE — TELEPHONE ENCOUNTER
From: Xuan Pastor  To:  Shelby Gallego PA-C  Sent: 2/4/2021 11:56 AM CST  Subject: Visit Follow-up Question    Hi Elva Good,   I tried the Zaleplon last night I started with 5 mg then took another one and another rough night. :( I think right now t

## 2021-02-04 NOTE — TELEPHONE ENCOUNTER
Zolpidem Tartrate 10 MG Oral Tab 30 tablet 0 2/4/2021 1/30/2022   Sig:   Take 1 tablet (10 mg total) by mouth nightly.      Route: Timmothy Limb       Script was already sent today

## 2021-02-05 ENCOUNTER — PATIENT MESSAGE (OUTPATIENT)
Dept: FAMILY MEDICINE CLINIC | Facility: CLINIC | Age: 41
End: 2021-02-05

## 2021-02-05 RX ORDER — PREGABALIN 75 MG/1
75 CAPSULE ORAL 2 TIMES DAILY
Qty: 60 CAPSULE | Refills: 1 | Status: SHIPPED | OUTPATIENT
Start: 2021-02-05 | End: 2021-04-05

## 2021-02-05 NOTE — TELEPHONE ENCOUNTER
Requested Prescriptions     Pending Prescriptions Disp Refills   • pregabalin 75 MG Oral Cap 60 capsule 0     Sig: Take 1 capsule (75 mg total) by mouth 2 (two) times daily.      Last fill was 1/11/21 60 caps  Last OV 2/3/21  No future OV

## 2021-02-05 NOTE — TELEPHONE ENCOUNTER
From: Galilea Vazquez  To: Anthony Giang PA-C  Sent: 2/4/2021 4:52 PM CST  Subject: Prescription Question    Harriet Camarena,   I just wanted to give you a heads up.  I picked up the Ambien 10 mg today, but insurance would only allow for 14 because of the a

## 2021-02-08 ENCOUNTER — PATIENT MESSAGE (OUTPATIENT)
Dept: FAMILY MEDICINE CLINIC | Facility: CLINIC | Age: 41
End: 2021-02-08

## 2021-02-08 DIAGNOSIS — J20.8 ACUTE VIRAL BRONCHITIS: ICD-10-CM

## 2021-02-08 RX ORDER — ALBUTEROL SULFATE 90 UG/1
2 AEROSOL, METERED RESPIRATORY (INHALATION) EVERY 4 HOURS PRN
Qty: 8.5 G | Refills: 1 | Status: CANCELLED | OUTPATIENT
Start: 2021-02-08

## 2021-02-08 RX ORDER — ALBUTEROL SULFATE 90 UG/1
2 AEROSOL, METERED RESPIRATORY (INHALATION) EVERY 4 HOURS PRN
Qty: 8.5 G | Refills: 1 | Status: SHIPPED | OUTPATIENT
Start: 2021-02-08

## 2021-02-08 NOTE — TELEPHONE ENCOUNTER
From: Schuyler Guevara  To: Annelise Dimas PA-C  Sent: 2/8/2021 3:46 PM CST  Subject: Prescription Question    Hello,  I received a message through [employer] that my albuterol inhaler was denied. I just wanted to see why.    Thank you,  Hyun Cruz

## 2021-02-08 NOTE — TELEPHONE ENCOUNTER
From: Deadra Barthel  To: Nichole Moya PA-C  Sent: 2/5/2021 3:49 PM CST  Subject: Other    Hi Dixon Cassette,   Can you guys update my medical chart as I received dose 1 of the covid 19 vaccine today?    Thank you,   Kimberli Jo

## 2021-02-15 ENCOUNTER — PATIENT MESSAGE (OUTPATIENT)
Dept: FAMILY MEDICINE CLINIC | Facility: CLINIC | Age: 41
End: 2021-02-15

## 2021-02-15 RX ORDER — ZOLPIDEM TARTRATE 10 MG/1
10 TABLET ORAL NIGHTLY
Qty: 30 TABLET | Refills: 0 | Status: SHIPPED | OUTPATIENT
Start: 2021-02-15 | End: 2021-03-26

## 2021-02-15 NOTE — TELEPHONE ENCOUNTER
ZOLPIDEM 10MG TABLETS 02/04/2021 02/04/2021  14 each  14 LAURA BAILEY     ZOLPIDEM TARTRATE 02/01/2021 02/01/2021 6.25 10 tablet  10         LOV 2/3/21  No future OV         February 4, 2021  Nikita Owens  to [place], [place]

## 2021-02-16 NOTE — TELEPHONE ENCOUNTER
[id number] [address] AT [place] [address] [address]  & , [phone], [phone]      Disp Refills Start End    Zolpidem Tartrate 10 MG Oral Tab 30 tablet 0 2/15/2021 2/10/2022    Sig - Route:  Take 1 tablet (10 mg total

## 2021-02-17 ENCOUNTER — LAB ENCOUNTER (OUTPATIENT)
Dept: LAB | Age: 41
End: 2021-02-17
Attending: FAMILY MEDICINE
Payer: COMMERCIAL

## 2021-02-17 DIAGNOSIS — E87.6 HYPOKALEMIA: ICD-10-CM

## 2021-02-17 DIAGNOSIS — R19.5 LOOSE STOOLS: ICD-10-CM

## 2021-02-17 DIAGNOSIS — I43 DILATED CARDIOMYOPATHY SECONDARY TO INFECTION (HCC): Primary | ICD-10-CM

## 2021-02-17 DIAGNOSIS — B99.9 DILATED CARDIOMYOPATHY SECONDARY TO INFECTION (HCC): Primary | ICD-10-CM

## 2021-02-17 DIAGNOSIS — F51.01 PRIMARY INSOMNIA: ICD-10-CM

## 2021-02-17 DIAGNOSIS — R63.0 DECREASED APPETITE: ICD-10-CM

## 2021-02-17 DIAGNOSIS — C73 THYROID CANCER (HCC): ICD-10-CM

## 2021-02-17 DIAGNOSIS — R10.12 LEFT UPPER QUADRANT ABDOMINAL PAIN: ICD-10-CM

## 2021-02-17 LAB
ALBUMIN SERPL-MCNC: 3 G/DL (ref 3.4–5)
ALBUMIN/GLOB SERPL: 0.9 {RATIO} (ref 1–2)
ALP LIVER SERPL-CCNC: 69 U/L
ALT SERPL-CCNC: 13 U/L
AMYLASE SERPL-CCNC: 48 U/L (ref 25–115)
ANION GAP SERPL CALC-SCNC: 5 MMOL/L (ref 0–18)
AST SERPL-CCNC: 8 U/L (ref 15–37)
BASOPHILS # BLD AUTO: 0.04 X10(3) UL (ref 0–0.2)
BASOPHILS NFR BLD AUTO: 0.9 %
BILIRUB SERPL-MCNC: 0.3 MG/DL (ref 0.1–2)
BUN BLD-MCNC: 6 MG/DL (ref 7–18)
BUN/CREAT SERPL: 7.2 (ref 10–20)
CALCIUM BLD-MCNC: 8.5 MG/DL (ref 8.5–10.1)
CHLORIDE SERPL-SCNC: 109 MMOL/L (ref 98–112)
CO2 SERPL-SCNC: 28 MMOL/L (ref 21–32)
CORTIS SERPL-MCNC: 7 UG/DL
CREAT BLD-MCNC: 0.83 MG/DL
DEPRECATED RDW RBC AUTO: 43.9 FL (ref 35.1–46.3)
EOSINOPHIL # BLD AUTO: 0.18 X10(3) UL (ref 0–0.7)
EOSINOPHIL NFR BLD AUTO: 4 %
ERYTHROCYTE [DISTWIDTH] IN BLOOD BY AUTOMATED COUNT: 12.8 % (ref 11–15)
GLOBULIN PLAS-MCNC: 3.4 G/DL (ref 2.8–4.4)
GLUCOSE BLD-MCNC: 85 MG/DL (ref 70–99)
HCT VFR BLD AUTO: 41.8 %
HGB BLD-MCNC: 13.6 G/DL
IGA SERPL-MCNC: 278 MG/DL (ref 70–312)
IGM SERPL-MCNC: 49.3 MG/DL (ref 43–279)
IMM GRANULOCYTES # BLD AUTO: 0.01 X10(3) UL (ref 0–1)
IMM GRANULOCYTES NFR BLD: 0.2 %
IMMUNOGLOBULIN PNL SER-MCNC: 623 MG/DL (ref 791–1643)
LIPASE SERPL-CCNC: 262 U/L (ref 73–393)
LYMPHOCYTES # BLD AUTO: 1.22 X10(3) UL (ref 1–4)
LYMPHOCYTES NFR BLD AUTO: 26.8 %
M PROTEIN MFR SERPL ELPH: 6.4 G/DL (ref 6.4–8.2)
MCH RBC QN AUTO: 30.9 PG (ref 26–34)
MCHC RBC AUTO-ENTMCNC: 32.5 G/DL (ref 31–37)
MCV RBC AUTO: 95 FL
MONOCYTES # BLD AUTO: 0.29 X10(3) UL (ref 0.1–1)
MONOCYTES NFR BLD AUTO: 6.4 %
NEUTROPHILS # BLD AUTO: 2.81 X10 (3) UL (ref 1.5–7.7)
NEUTROPHILS # BLD AUTO: 2.81 X10(3) UL (ref 1.5–7.7)
NEUTROPHILS NFR BLD AUTO: 61.7 %
OSMOLALITY SERPL CALC.SUM OF ELEC: 291 MOSM/KG (ref 275–295)
PATIENT FASTING Y/N/NP: YES
PLATELET # BLD AUTO: 247 10(3)UL (ref 150–450)
POTASSIUM SERPL-SCNC: 3.7 MMOL/L (ref 3.5–5.1)
RBC # BLD AUTO: 4.4 X10(6)UL
SODIUM SERPL-SCNC: 142 MMOL/L (ref 136–145)
WBC # BLD AUTO: 4.6 X10(3) UL (ref 4–11)

## 2021-02-17 PROCEDURE — 86800 THYROGLOBULIN ANTIBODY: CPT

## 2021-02-17 PROCEDURE — 86774 TETANUS ANTIBODY: CPT

## 2021-02-17 PROCEDURE — 84432 ASSAY OF THYROGLOBULIN: CPT

## 2021-02-17 PROCEDURE — 86648 DIPHTHERIA ANTIBODY: CPT

## 2021-02-17 PROCEDURE — 86317 IMMUNOASSAY INFECTIOUS AGENT: CPT

## 2021-02-17 PROCEDURE — 82533 TOTAL CORTISOL: CPT

## 2021-02-17 PROCEDURE — 83690 ASSAY OF LIPASE: CPT

## 2021-02-17 PROCEDURE — 85025 COMPLETE CBC W/AUTO DIFF WBC: CPT

## 2021-02-17 PROCEDURE — 36415 COLL VENOUS BLD VENIPUNCTURE: CPT

## 2021-02-17 PROCEDURE — 80053 COMPREHEN METABOLIC PANEL: CPT

## 2021-02-17 PROCEDURE — 84244 ASSAY OF RENIN: CPT

## 2021-02-17 PROCEDURE — 82150 ASSAY OF AMYLASE: CPT

## 2021-02-17 PROCEDURE — 82088 ASSAY OF ALDOSTERONE: CPT

## 2021-02-17 PROCEDURE — 87389 HIV-1 AG W/HIV-1&-2 AB AG IA: CPT

## 2021-02-17 PROCEDURE — 82784 ASSAY IGA/IGD/IGG/IGM EACH: CPT

## 2021-02-17 PROCEDURE — 82024 ASSAY OF ACTH: CPT

## 2021-02-18 NOTE — PROGRESS NOTES
Normal complete blood count, amylase, lipase and cortisol levels. Metabolic panel low albumin make sure you are getting enough calories a day at least at 1,000 marquis/day. Also make sure you are getting enough protein.

## 2021-02-19 LAB
ADRENOCORTICOTROPIC HORMONE: 18.2 PG/ML
ALDOSTERONE: 10.1 NG/DL
DIPHTHERIA ANTIBODY, IGG: 0.5 IU/ML
TETANUS ANTIBODY, IGG: 3.6 IU/ML
THYROGLOBULIN AB: <0.9 IU/ML
THYROGLOBULIN, SERUM OR PLASMA: <0.1 NG/ML

## 2021-02-20 LAB — RENIN ACTIVITY: <0.1 NG/ML/HR

## 2021-02-22 ENCOUNTER — OFFICE VISIT (OUTPATIENT)
Dept: FAMILY MEDICINE CLINIC | Facility: CLINIC | Age: 41
End: 2021-02-22
Payer: COMMERCIAL

## 2021-02-22 VITALS
HEART RATE: 100 BPM | SYSTOLIC BLOOD PRESSURE: 96 MMHG | BODY MASS INDEX: 28 KG/M2 | WEIGHT: 184 LBS | TEMPERATURE: 98 F | DIASTOLIC BLOOD PRESSURE: 68 MMHG

## 2021-02-22 DIAGNOSIS — Z98.84 HISTORY OF ROUX-EN-Y GASTRIC BYPASS: ICD-10-CM

## 2021-02-22 DIAGNOSIS — F41.1 GAD (GENERALIZED ANXIETY DISORDER): ICD-10-CM

## 2021-02-22 DIAGNOSIS — R11.2 NAUSEA VOMITING AND DIARRHEA: Primary | ICD-10-CM

## 2021-02-22 DIAGNOSIS — F51.01 PRIMARY INSOMNIA: ICD-10-CM

## 2021-02-22 DIAGNOSIS — R19.7 NAUSEA VOMITING AND DIARRHEA: Primary | ICD-10-CM

## 2021-02-22 DIAGNOSIS — Z90.49 STATUS POST LAPAROSCOPIC CHOLECYSTECTOMY: ICD-10-CM

## 2021-02-22 DIAGNOSIS — E44.1 MILD PROTEIN-CALORIE MALNUTRITION (HCC): ICD-10-CM

## 2021-02-22 PROCEDURE — 3074F SYST BP LT 130 MM HG: CPT | Performed by: FAMILY MEDICINE

## 2021-02-22 PROCEDURE — 99214 OFFICE O/P EST MOD 30 MIN: CPT | Performed by: FAMILY MEDICINE

## 2021-02-22 PROCEDURE — 3078F DIAST BP <80 MM HG: CPT | Performed by: FAMILY MEDICINE

## 2021-02-22 RX ORDER — ONDANSETRON HYDROCHLORIDE 8 MG/1
8 TABLET, FILM COATED ORAL EVERY 12 HOURS PRN
Qty: 30 TABLET | Refills: 0 | Status: SHIPPED | OUTPATIENT
Start: 2021-02-22 | End: 2021-09-20

## 2021-02-23 PROBLEM — R19.7 NAUSEA VOMITING AND DIARRHEA: Status: ACTIVE | Noted: 2018-01-19

## 2021-02-23 PROBLEM — R11.2 NAUSEA VOMITING AND DIARRHEA: Status: ACTIVE | Noted: 2018-01-19

## 2021-02-23 NOTE — PROGRESS NOTES
Deadra Barthel is a 36year old female. HPI:   Patient did see gastroenterologist Dr. Sheela Deluna to schedule for endoscopy and colonoscopy has continued nausea, abdominal pain and diarrhea. Concerns about malnourishment and malabsorption.   Orders were pl Has been hospitalized multiple times for the breathing issues. Anxiety and stress wise has not been doing well secondary to the GI symptoms and the reoccurring insomnia. Is still seeing her counselor.       Current Outpatient Medications   Medication Si TABLET FOR A TOTAL OF 188MCG 30 tablet 3   • albuterol sulfate (2.5 MG/3ML) 0.083% Inhalation Nebu Soln Take 3 mL (2.5 mg total) by nebulization every 4 (four) hours as needed for Wheezing or Shortness of Breath.  Dx: J45.40 360 mL 5   • Lisdexamfetamine Giselle Franc any unusual skin lesions or rashes  RESPIRATORY: denies shortness of breath with exertion  CARDIOVASCULAR: denies chest pain on exertion  GI: See above, nausea, vomiting and diarrhea has endoscopy and colonoscopy planned  NEURO: Tension headaches  Musculos Nausea. Imaging & Consults:  None  1. Nausea vomiting and diarrhea  Needs to increase caloric intake will use Zofran twice a day also adding mirtazapine at night to hopefully help with anxiety and nausea. - Ondansetron HCl (ZOFRAN) 8 MG tablet;  Take

## 2021-03-02 ENCOUNTER — LAB ENCOUNTER (OUTPATIENT)
Dept: LAB | Age: 41
End: 2021-03-02
Attending: PHYSICIAN ASSISTANT
Payer: COMMERCIAL

## 2021-03-02 DIAGNOSIS — B99.9 DILATED CARDIOMYOPATHY SECONDARY TO INFECTION (HCC): ICD-10-CM

## 2021-03-02 DIAGNOSIS — M94.0 ACUTE COSTOCHONDRITIS: ICD-10-CM

## 2021-03-02 DIAGNOSIS — I43 DILATED CARDIOMYOPATHY SECONDARY TO INFECTION (HCC): ICD-10-CM

## 2021-03-03 ENCOUNTER — LAB ENCOUNTER (OUTPATIENT)
Dept: LAB | Age: 41
End: 2021-03-03
Attending: INTERNAL MEDICINE
Payer: COMMERCIAL

## 2021-03-03 DIAGNOSIS — K90.9 DIARRHEA DUE TO MALABSORPTION: ICD-10-CM

## 2021-03-03 DIAGNOSIS — R19.7 DIARRHEA DUE TO MALABSORPTION: ICD-10-CM

## 2021-03-03 PROCEDURE — 82705 FATS/LIPIDS FECES QUAL: CPT

## 2021-03-03 PROCEDURE — 82656 EL-1 FECAL QUAL/SEMIQ: CPT

## 2021-03-03 RX ORDER — TIZANIDINE 2 MG/1
TABLET ORAL NIGHTLY
Qty: 30 TABLET | Refills: 0 | Status: SHIPPED | OUTPATIENT
Start: 2021-03-03 | End: 2021-03-16

## 2021-03-04 PROBLEM — R19.7 NAUSEA VOMITING AND DIARRHEA: Status: RESOLVED | Noted: 2018-01-19 | Resolved: 2021-03-04

## 2021-03-04 PROBLEM — K58.2 IRRITABLE BOWEL SYNDROME WITH BOTH CONSTIPATION AND DIARRHEA: Status: ACTIVE | Noted: 2021-03-04

## 2021-03-04 PROBLEM — R19.5 LOOSE STOOLS: Status: RESOLVED | Noted: 2021-02-04 | Resolved: 2021-03-04

## 2021-03-04 PROBLEM — R11.2 NAUSEA VOMITING AND DIARRHEA: Status: RESOLVED | Noted: 2018-01-19 | Resolved: 2021-03-04

## 2021-03-04 NOTE — PROGRESS NOTES
Cori Awad is a 36year old female. HPI:   Patient is in for follow-up on chronic issues does state that she was exposed to some fumes in the last couple days and developed heavy congestion from it. Denies any fevers, chills or body aches.   No Co Medications   Medication Sig Dispense Refill   • tiZANidine HCl 2 MG Oral Tab Take 1-2 tablets (2-4 mg total) by mouth nightly. 30 tablet 0   • mirtazapine 7.5 MG Oral Tab Take 1 tablet (7.5 mg total) by mouth nightly.  30 tablet 3   • Butalbital-Acetaminop mg total) by nebulization every 4 (four) hours as needed for Wheezing or Shortness of Breath. Dx: J45.40 360 mL 5   • Lisdexamfetamine Dimesylate (VYVANSE) 50 MG Oral Cap Take 1 capsule (50 mg total) by mouth every morning.  30 capsule 0   • topiramate 25 M exertion  GI: Abdominal pain with nausea intermittent and IBS constipation/diarrhea  NEURO: denies headaches  Musculoskeletal: No motor deficits  Psych see above including anxiety and insomnia  EXAM:   BP 92/64 (BP Location: Right arm, Patient Position: Si mirtazapine to 7.5 mg at night seems to have helped with sleep and insomnia. - mirtazapine 7.5 MG Oral Tab; Take 1 tablet (7.5 mg total) by mouth nightly. Dispense: 30 tablet;  Refill: 3  Decrease Xanax to one half of the 0.25 mg twice a day  Continue Peter

## 2021-03-05 LAB
NEUTRAL FAT, FECAL: NORMAL
PANCREATIC ELASTASE , FECAL: >800 UG/G
SPLIT FAT, FECAL: NORMAL

## 2021-03-08 ENCOUNTER — TELEPHONE (OUTPATIENT)
Dept: FAMILY MEDICINE CLINIC | Facility: CLINIC | Age: 41
End: 2021-03-08

## 2021-03-08 NOTE — TELEPHONE ENCOUNTER
Pt called states she got the second dose of COVID vaccine and she started with a fever Sat even and has been in and out of fevers since, but pt also stated she was light headed and dizzy and was not sure that's normal and wants to know if she needs to do a

## 2021-03-08 NOTE — TELEPHONE ENCOUNTER
Phoned patient and advised these are s/e of the COVID vaccine. Advised rest and fluids and these should pass. Advised to call if persists.

## 2021-03-09 ENCOUNTER — PATIENT MESSAGE (OUTPATIENT)
Dept: FAMILY MEDICINE CLINIC | Facility: CLINIC | Age: 41
End: 2021-03-09

## 2021-03-09 NOTE — TELEPHONE ENCOUNTER
From: Cara Travis  To: Shadi Person PA-C  Sent: 3/9/2021 2:05 PM CST  Subject: Non-Urgent Medical Question    Taina Mccollum,   I got a message from the allergist/immunologist and she would like me to get the Pneumovax Vaccine again.  Her name is D

## 2021-03-09 NOTE — TELEPHONE ENCOUNTER
From: Cara Travis  To: Shadi Person PA-C  Sent: 3/9/2021 10:47 AM CST  Subject: Other    Hi Rosemary Diss,  I wanted to let you know that I got the 2nd Moderna Covid Vaccine on Saturday, March 6th.   Thanks,   [place]

## 2021-03-10 NOTE — TELEPHONE ENCOUNTER
I will place order for Pneumovax but I would probably have her take it at least 2-4 weeks from the COVID-19 vaccine .   So that the immune system can be working on the COVID-19 vaccine

## 2021-03-15 ENCOUNTER — TELEMEDICINE (OUTPATIENT)
Dept: FAMILY MEDICINE CLINIC | Facility: CLINIC | Age: 41
End: 2021-03-15

## 2021-03-15 VITALS — OXYGEN SATURATION: 97 % | TEMPERATURE: 97 F | HEART RATE: 85 BPM

## 2021-03-15 DIAGNOSIS — R07.89 CHEST WALL PAIN: ICD-10-CM

## 2021-03-15 DIAGNOSIS — J45.41 MODERATE PERSISTENT ASTHMA WITH EXACERBATION: Primary | ICD-10-CM

## 2021-03-15 DIAGNOSIS — J38.3 VOCAL CORD DYSFUNCTION: ICD-10-CM

## 2021-03-15 PROCEDURE — 99214 OFFICE O/P EST MOD 30 MIN: CPT | Performed by: FAMILY MEDICINE

## 2021-03-15 RX ORDER — ACETAMINOPHEN AND CODEINE PHOSPHATE 300; 30 MG/1; MG/1
TABLET ORAL
Qty: 12 TABLET | Refills: 0 | Status: SHIPPED | OUTPATIENT
Start: 2021-03-15 | End: 2021-03-26 | Stop reason: ALTCHOICE

## 2021-03-16 DIAGNOSIS — M94.0 ACUTE COSTOCHONDRITIS: ICD-10-CM

## 2021-03-16 PROBLEM — J45.40: Status: RESOLVED | Noted: 2020-07-30 | Resolved: 2021-03-16

## 2021-03-16 PROBLEM — Z98.84 HISTORY OF GASTRIC BYPASS: Status: ACTIVE | Noted: 2020-07-06

## 2021-03-16 PROBLEM — J45.40 ALLERGIC ASTHMA, MODERATE PERSISTENT, UNCOMPLICATED: Status: RESOLVED | Noted: 2020-07-30 | Resolved: 2021-03-16

## 2021-03-16 RX ORDER — TIZANIDINE 2 MG/1
TABLET ORAL
Qty: 30 TABLET | Refills: 0 | Status: SHIPPED | OUTPATIENT
Start: 2021-03-16 | End: 2021-03-26

## 2021-03-16 NOTE — PROGRESS NOTES
Cori Awad is a 36year old female. HPI:   Please note that the following visit was completed using two-way, real-time interactive audio and video communication.   This has been done in good radha to provide continuity of care in the best interest consistent with using the Symbicort and Spiriva.   Has pulmonologist and allergist has specialist for reoccurring asthma attacks has been going to the [facility] [place] Fina Duong M.D.  did a virtual visit with her 2/15/2021, referr daily. 60 capsule 1   • ALPRAZolam 0.25 MG Oral Tab Take 1 tablet (0.25 mg total) by mouth 2 (two) times daily as needed.  60 tablet 2   • PANTOPRAZOLE SODIUM 40 MG Oral Tab EC TAKE 1 TABLET(40 MG) BY MOUTH TWICE DAILY BEFORE MEALS 60 tablet 1   • Potassium • Arthritis    • Asthma    • Depression    • Diverticulosis    • Endometriosis    • Esophageal reflux    • Gastric ulcer    • Hx of gastric bypass 12/18/2017   • Hypothyroidism    • Infertility, female    • Nephrolithiasis    • Organic hypersomnia, unspe tablet 0     Sig: Take 1/2 tablet twice a day as needed for cough and one at night for cough       Imaging & Consults:  None  1. Moderate persistent asthma with exacerbation  Contact pulmonologist if symptoms persist or go to the emergency room.   Continue

## 2021-03-18 ENCOUNTER — PATIENT MESSAGE (OUTPATIENT)
Dept: FAMILY MEDICINE CLINIC | Facility: CLINIC | Age: 41
End: 2021-03-18

## 2021-03-19 NOTE — TELEPHONE ENCOUNTER
From: Celeste Catherine  To: Sarah Leonardo PA-C  Sent: 3/18/2021 11:20 PM CDT  Subject: Non-Urgent Medical Question    Xi Hernandez,  I apologize if you got another message from me as well. I accidentally pressed send.  Tuesday night, I whacked my foreh

## 2021-03-26 ENCOUNTER — TELEMEDICINE (OUTPATIENT)
Dept: FAMILY MEDICINE CLINIC | Facility: CLINIC | Age: 41
End: 2021-03-26

## 2021-03-26 VITALS — HEART RATE: 105 BPM | OXYGEN SATURATION: 99 %

## 2021-03-26 DIAGNOSIS — F51.01 PRIMARY INSOMNIA: ICD-10-CM

## 2021-03-26 DIAGNOSIS — J45.41 MODERATE PERSISTENT ASTHMA WITH ACUTE EXACERBATION: ICD-10-CM

## 2021-03-26 DIAGNOSIS — F41.1 GAD (GENERALIZED ANXIETY DISORDER): ICD-10-CM

## 2021-03-26 DIAGNOSIS — G44.221 CHRONIC TENSION-TYPE HEADACHE, INTRACTABLE: Primary | ICD-10-CM

## 2021-03-26 DIAGNOSIS — J38.3 VOCAL CORD DYSFUNCTION: ICD-10-CM

## 2021-03-26 PROCEDURE — 99214 OFFICE O/P EST MOD 30 MIN: CPT | Performed by: FAMILY MEDICINE

## 2021-03-26 RX ORDER — BUTALBITAL/ACETAMINOPHEN 50MG-325MG
0.5 TABLET ORAL 2 TIMES DAILY PRN
Qty: 8 TABLET | Refills: 0 | Status: SHIPPED | OUTPATIENT
Start: 2021-03-26 | End: 2021-04-21

## 2021-03-26 RX ORDER — TIZANIDINE 2 MG/1
TABLET ORAL NIGHTLY
Qty: 30 TABLET | Refills: 0 | Status: SHIPPED | OUTPATIENT
Start: 2021-03-26 | End: 2021-04-06

## 2021-03-26 RX ORDER — ZOLPIDEM TARTRATE 5 MG/1
1 TABLET ORAL NIGHTLY
COMMUNITY
Start: 2021-03-16 | End: 2021-04-05

## 2021-03-28 ENCOUNTER — LAB ENCOUNTER (OUTPATIENT)
Dept: LAB | Facility: HOSPITAL | Age: 41
End: 2021-03-28
Attending: INTERNAL MEDICINE
Payer: COMMERCIAL

## 2021-03-28 ENCOUNTER — PATIENT MESSAGE (OUTPATIENT)
Dept: FAMILY MEDICINE CLINIC | Facility: CLINIC | Age: 41
End: 2021-03-28

## 2021-03-28 DIAGNOSIS — Z01.818 PRE-OP TESTING: ICD-10-CM

## 2021-03-28 DIAGNOSIS — M94.0 ACUTE COSTOCHONDRITIS: ICD-10-CM

## 2021-03-28 NOTE — PROGRESS NOTES
Crow Lopez is a 36year old female. HPI:   Please note that the following visit was completed using two-way, real-time interactive audio and video communication.   This has been done in good radha to provide continuity of care in the best interest lying down does help and she does rest.  Will be on spring break this next week which will help with her voice rest.  Has both asthma reactive airway and vocal cord dysfunction. Is using 1200 mg of Mucinex every 12 hours.   Tries to avoid steam since it ma PANTOPRAZOLE SODIUM 40 MG Oral Tab EC TAKE 1 TABLET(40 MG) BY MOUTH TWICE DAILY BEFORE MEALS 60 tablet 1   • Potassium Chloride ER 10 MEQ Oral Tab CR Take 1 tablet (10 mEq total) by mouth daily.  90 tablet 0   • SYMBICORT 160-4.5 MCG/ACT Inhalation Aerosol Endometriosis    • Esophageal reflux    • Gastric ulcer    • Hx of gastric bypass 12/18/2017   • Hypothyroidism    • Infertility, female    • Nephrolithiasis    • Organic hypersomnia, unspecified DMG DX 11-2-12    AHI 2 RDI 2 REM AHI 6 SaO2 curtis 89%   • P Refills for this Visit:  Requested Prescriptions     Signed Prescriptions Disp Refills   • tiZANidine HCl 2 MG Oral Tab 30 tablet 0     Sig: Take 1-2 tablets (2-4 mg total) by mouth nightly.    • Butalbital-Acetaminophen  MG Oral Tab 8 tablet 0     Si

## 2021-03-29 RX ORDER — TIZANIDINE 2 MG/1
TABLET ORAL
Qty: 30 TABLET | Refills: 0 | OUTPATIENT
Start: 2021-03-29

## 2021-03-29 NOTE — TELEPHONE ENCOUNTER
Requested Prescriptions     Refused Prescriptions Disp Refills   • TIZANIDINE HCL 2 MG Oral Tab [Pharmacy Med Name: TIZANIDINE 2MG TABLETS] 30 tablet 0     Sig: TAKE 1 TO 2 TABLETS(2 TO 4 MG) BY MOUTH EVERY NIGHT     Refused By: Torey RAMIREZ     Reason

## 2021-03-29 NOTE — TELEPHONE ENCOUNTER
From: Joya Burton  To:  Ginger Khan PA-C  Sent: 3/28/2021 7:33 PM CDT  Subject: Prescription Question    Sajan Diaz,   I wanted to let you know that I have the tizanidine on auto refill at [facility] and I just got an alert that it needed presc

## 2021-03-30 RX ORDER — PANTOPRAZOLE SODIUM 40 MG/1
TABLET, DELAYED RELEASE ORAL
Qty: 60 TABLET | Refills: 1 | Status: SHIPPED | OUTPATIENT
Start: 2021-03-30 | End: 2021-05-25

## 2021-03-31 PROBLEM — R11.0 NAUSEA: Status: ACTIVE | Noted: 2021-03-31

## 2021-03-31 PROBLEM — R10.84 ABDOMINAL PAIN, GENERALIZED: Status: ACTIVE | Noted: 2021-03-31

## 2021-04-02 DIAGNOSIS — F41.1 GAD (GENERALIZED ANXIETY DISORDER): ICD-10-CM

## 2021-04-02 NOTE — TELEPHONE ENCOUNTER
Requested Prescriptions     Pending Prescriptions Disp Refills   • ESCITALOPRAM 10 MG Oral Tab [Pharmacy Med Name: ESCITALOPRAM 10MG TABLETS] 90 tablet 0     Sig: TAKE 1 TABLET(10 MG) BY MOUTH DAILY     Last fill was 1/5/21 90 days  Last oV 3/26/21  Future

## 2021-04-03 RX ORDER — ESCITALOPRAM OXALATE 10 MG/1
TABLET ORAL
Qty: 90 TABLET | Refills: 0 | Status: SHIPPED | OUTPATIENT
Start: 2021-04-03 | End: 2021-07-02

## 2021-04-05 RX ORDER — PREGABALIN 75 MG/1
CAPSULE ORAL
Qty: 60 CAPSULE | Refills: 0 | OUTPATIENT
Start: 2021-04-05

## 2021-04-06 PROBLEM — E16.1 HYPOGLYCEMIC REACTION: Status: RESOLVED | Noted: 2018-12-08 | Resolved: 2021-04-06

## 2021-04-06 PROBLEM — E44.1 MILD PROTEIN-CALORIE MALNUTRITION (HCC): Status: RESOLVED | Noted: 2018-01-19 | Resolved: 2021-04-06

## 2021-04-06 PROBLEM — R11.0 NAUSEA: Status: RESOLVED | Noted: 2021-03-31 | Resolved: 2021-04-06

## 2021-04-06 PROBLEM — J45.50 SEVERE PERSISTENT ASTHMA WITHOUT COMPLICATION: Status: ACTIVE | Noted: 2019-02-22

## 2021-04-06 PROBLEM — K91.2: Status: RESOLVED | Noted: 2020-04-17 | Resolved: 2021-04-06

## 2021-04-06 PROBLEM — R63.0 DECREASED APPETITE: Status: RESOLVED | Noted: 2021-02-04 | Resolved: 2021-04-06

## 2021-04-06 PROBLEM — J45.50 SEVERE PERSISTENT ASTHMA WITHOUT COMPLICATION (HCC): Status: ACTIVE | Noted: 2019-02-22

## 2021-04-06 PROBLEM — R10.84 ABDOMINAL PAIN, GENERALIZED: Status: RESOLVED | Noted: 2021-03-31 | Resolved: 2021-04-06

## 2021-04-06 PROBLEM — K91.2 HYPOGLYCEMIA FOLLOWING GASTROINTESTINAL SURGERY: Status: RESOLVED | Noted: 2020-04-17 | Resolved: 2021-04-06

## 2021-04-06 RX ORDER — TIZANIDINE 2 MG/1
TABLET ORAL
Qty: 30 TABLET | Refills: 0 | Status: SHIPPED | OUTPATIENT
Start: 2021-04-06 | End: 2021-04-16

## 2021-04-06 NOTE — PROGRESS NOTES
Rosanna Soto is a 36year old female. HPI:   Patient is in for follow-up on anxiety, asthma and abdominal pain. ACT is at a 10 secondary to recurrent asthma exacerbations.   Presently following pulmonologist at [facility] prednisone 30 tablet 0   • hydrOXYzine HCl 25 MG Oral Tab Take 1-2 tablets (25-50 mg total) by mouth 2 (two) times daily as needed for Anxiety (allergies). 60 tablet 1   • pregabalin 75 MG Oral Cap Take 1 capsule (75 mg total) by mouth 2 (two) times daily. J45.40 360 mL 5   • Lisdexamfetamine Dimesylate (VYVANSE) 50 MG Oral Cap Take 1 capsule (50 mg total) by mouth every morning.  (Patient taking differently: Take 50 mg by mouth daily as needed.  ) 30 capsule 0   • CALCIUM CITRATE OR Take 600 mg by mouth 3 (t fertilizer wound  CARDIOVASCULAR: denies chest pain on exertion  GI: Much improved abdominal pain with mirtazapine and intermittent heartburn uses Protonix twice a day recent endoscopy normal  NEURO: Tension headaches almost daily   musculoskeletal: Histor daily as needed for Anxiety (allergies). • pregabalin 75 MG Oral Cap 180 capsule 1     Sig: Take 1 capsule (75 mg total) by mouth 2 (two) times daily. Imaging & Consults:  None  1.  OMAR (generalized anxiety disorder)  Continue to try to taper down X Make sure taking it on empty stomach especially when prednisone is started the prednisone is to be taking on the fourth meal of the day ideally in the morning to avoid the insomnia side effects of prednisone to the evening    6.  Medication management  As a

## 2021-04-06 NOTE — TELEPHONE ENCOUNTER
tiZANidine HCl 2 MG Oral Tab 30 tablet 0 3/26/2021    Sig:   Take 1-2 tablets (2-4 mg total) by mouth nightly       LOV 4/5/21    No future OV

## 2021-04-09 ENCOUNTER — PATIENT MESSAGE (OUTPATIENT)
Dept: FAMILY MEDICINE CLINIC | Facility: CLINIC | Age: 41
End: 2021-04-09

## 2021-04-09 NOTE — TELEPHONE ENCOUNTER
From: Sky Caldwell  To:  Ronald Oliver PA-C  Sent: 4/9/2021 4:46 AM CDT  Subject: Visit Follow-up Question    Hi BODØ,     I was able to talk to Carlos Baum who is the procedural nurse for the doctor at the [facility]

## 2021-04-12 RX ORDER — ACETAMINOPHEN AND CODEINE PHOSPHATE 300; 30 MG/1; MG/1
1 TABLET ORAL EVERY 8 HOURS PRN
Qty: 20 TABLET | Refills: 1 | Status: SHIPPED | OUTPATIENT
Start: 2021-04-12 | End: 2021-04-30 | Stop reason: ALTCHOICE

## 2021-04-12 RX ORDER — ACETAMINOPHEN AND CODEINE PHOSPHATE 300; 30 MG/1; MG/1
1 TABLET ORAL EVERY 8 HOURS PRN
Qty: 30 TABLET | Refills: 0 | Status: CANCELLED | OUTPATIENT
Start: 2021-04-12

## 2021-04-16 ENCOUNTER — PATIENT MESSAGE (OUTPATIENT)
Dept: FAMILY MEDICINE CLINIC | Facility: CLINIC | Age: 41
End: 2021-04-16

## 2021-04-16 RX ORDER — TIZANIDINE 2 MG/1
TABLET ORAL NIGHTLY
Qty: 30 TABLET | Refills: 0 | Status: SHIPPED | OUTPATIENT
Start: 2021-04-16 | End: 2021-04-30

## 2021-04-16 RX ORDER — TIZANIDINE 2 MG/1
TABLET ORAL
Qty: 30 TABLET | Refills: 0 | OUTPATIENT
Start: 2021-04-16

## 2021-04-16 NOTE — TELEPHONE ENCOUNTER
From: Nilson Guerrero  To: Margarette Odell PA-C  Sent: 4/16/2021 10:55 AM CDT  Subject: Prescription Question    Can you tell me why the tizanidine was not refilled?  It's not due till early, next week, but with the weekend, I'm  Always told to request

## 2021-04-16 NOTE — TELEPHONE ENCOUNTER
Requested Prescriptions     Refused Prescriptions Disp Refills   • TIZANIDINE HCL 2 MG Oral Tab [Pharmacy Med Name: TIZANIDINE 2MG TABLETS] 30 tablet 0     Sig: TAKE 1 TO 2 TABLETS(2 TO 4 MG) BY MOUTH EVERY NIGHT     Refused By: Caryle Petties     Reason fo

## 2021-04-20 DIAGNOSIS — G44.221 CHRONIC TENSION-TYPE HEADACHE, INTRACTABLE: ICD-10-CM

## 2021-04-21 RX ORDER — BUTALBITAL/ACETAMINOPHEN 50MG-325MG
TABLET ORAL
Qty: 8 TABLET | Refills: 0 | Status: ON HOLD | OUTPATIENT
Start: 2021-04-21 | End: 2021-05-18

## 2021-04-21 NOTE — TELEPHONE ENCOUNTER
Requested Prescriptions     Pending Prescriptions Disp Refills   • BUTALBITAL-ACETAMINOPHEN  MG Oral Tab [Pharmacy Med Name: BUTALB/ACETAMINOPHEN 325MG TABS] 8 tablet 0     Sig: TAKE 1/2 TABLET BY MOUTH TWICE DAILY AS NEEDED FOR HEADACHE     Last fransisco

## 2021-04-26 DIAGNOSIS — F41.1 GAD (GENERALIZED ANXIETY DISORDER): ICD-10-CM

## 2021-04-26 RX ORDER — ALPRAZOLAM 0.25 MG/1
TABLET ORAL
Qty: 60 TABLET | Refills: 0 | Status: SHIPPED | OUTPATIENT
Start: 2021-04-26 | End: 2021-05-24

## 2021-05-02 PROBLEM — Z91.89 AT RISK FOR POLYPHARMACY: Status: ACTIVE | Noted: 2021-05-02

## 2021-05-02 PROBLEM — G44.229 CHRONIC TENSION-TYPE HEADACHE, NOT INTRACTABLE: Status: ACTIVE | Noted: 2017-11-08

## 2021-05-02 NOTE — PROGRESS NOTES
Angela Goodwin is a 36year old female. HPI:   Please note that the following visit was completed using two-way, real-time interactive audio and video communication.   This has been done in good radha to provide continuity of care in the best interest residual tension headache from wearing the mask at work. She will need to do 2 more rounds of the thermoplasty. Did tolerate the prednisone with her stomach issues and was able to come off of it with no problem.   Sleep was manageable with the Ambien, tiz mg total) by mouth 2 (two) times daily.  180 capsule 1   • ESCITALOPRAM 10 MG Oral Tab TAKE 1 TABLET(10 MG) BY MOUTH DAILY 90 tablet 0   • PANTOPRAZOLE SODIUM 40 MG Oral Tab EC TAKE 1 TABLET(40 MG) BY MOUTH TWICE DAILY BEFORE MEALS 60 tablet 1   • mirtazapi 12/18/2017   • Hypothyroidism    • Infertility, female    • Nephrolithiasis    • Organic hypersomnia, unspecified DMG DX 11-2-12    AHI 2 RDI 2 REM AHI 6 SaO2 curtis 89%   • Pancreatitis    • Pneumonia due to organism    • Thyroid cancer ([facility] [address]    • Visual i Imaging & Consults:  None  1. OMAR (generalized anxiety disorder)  For now continue with Lexapro 10 mg. Continue to cut the 0.25 mg Xanax into half and try to take a half twice a day.   Patient states that typically she ends up taking 0.25 mg twice a

## 2021-05-06 ENCOUNTER — TELEPHONE (OUTPATIENT)
Dept: FAMILY MEDICINE CLINIC | Facility: CLINIC | Age: 41
End: 2021-05-06

## 2021-05-06 NOTE — TELEPHONE ENCOUNTER
ACT noted patient is undergoing treatment with bronchial thermoplasty @ [facility] and asthma will not be under control until completed therapy in the next 2 months.

## 2021-05-06 NOTE — TELEPHONE ENCOUNTER
The patient was contacted in order to complete a follow-up ACT. The patient's ACT score is 14, which is improved from her previous score of 10.

## 2021-05-07 ENCOUNTER — PATIENT MESSAGE (OUTPATIENT)
Dept: FAMILY MEDICINE CLINIC | Facility: CLINIC | Age: 41
End: 2021-05-07

## 2021-05-07 RX ORDER — ACETAMINOPHEN AND CODEINE PHOSPHATE 300; 30 MG/1; MG/1
1 TABLET ORAL EVERY 6 HOURS PRN
Qty: 20 TABLET | Refills: 1 | Status: SHIPPED | OUTPATIENT
Start: 2021-05-07 | End: 2021-05-19

## 2021-05-07 NOTE — TELEPHONE ENCOUNTER
From: Schuyler Guevara  To: Annelise Dimas PA-C  Sent: 5/7/2021 1:23 PM CDT  Subject: Prescription Question    Anshu Morales,   At my last appointment, you asked me to send you a message the Friday before my procedure to remind you about the Tylenol #3.

## 2021-05-10 ENCOUNTER — LAB ENCOUNTER (OUTPATIENT)
Dept: LAB | Age: 41
End: 2021-05-10
Attending: INTERNAL MEDICINE
Payer: COMMERCIAL

## 2021-05-10 DIAGNOSIS — Z20.822 ENCOUNTER FOR PREOPERATIVE SCREENING LABORATORY TESTING FOR COVID-19 VIRUS: ICD-10-CM

## 2021-05-10 DIAGNOSIS — Z01.812 ENCOUNTER FOR PREOPERATIVE SCREENING LABORATORY TESTING FOR COVID-19 VIRUS: ICD-10-CM

## 2021-05-14 ENCOUNTER — PATIENT MESSAGE (OUTPATIENT)
Dept: FAMILY MEDICINE CLINIC | Facility: CLINIC | Age: 41
End: 2021-05-14

## 2021-05-14 NOTE — TELEPHONE ENCOUNTER
From: Shante Dyson  To: Rene Jay PA-C  Sent: 5/14/2021 3:14 AM CDT  Subject: Other    Attached is my covid 19 vaccine card. I wanted to get it into my medical record. Especially with all of the upcoming changes ahead.     Thanks,   Reyes Loss

## 2021-05-16 DIAGNOSIS — G44.221 CHRONIC TENSION-TYPE HEADACHE, INTRACTABLE: ICD-10-CM

## 2021-05-17 ENCOUNTER — APPOINTMENT (OUTPATIENT)
Dept: GENERAL RADIOLOGY | Age: 41
End: 2021-05-17
Attending: EMERGENCY MEDICINE
Payer: COMMERCIAL

## 2021-05-17 ENCOUNTER — HOSPITAL ENCOUNTER (OUTPATIENT)
Facility: HOSPITAL | Age: 41
Setting detail: OBSERVATION
Discharge: HOME | End: 2021-05-19
Attending: EMERGENCY MEDICINE
Payer: COMMERCIAL

## 2021-05-17 ENCOUNTER — ORDER TRANSCRIPTION (OUTPATIENT)
Dept: ADMINISTRATIVE | Facility: HOSPITAL | Age: 41
End: 2021-05-17

## 2021-05-17 DIAGNOSIS — R06.03 ACUTE RESPIRATORY DISTRESS: ICD-10-CM

## 2021-05-17 DIAGNOSIS — Z01.818 PREOP EXAMINATION: Primary | ICD-10-CM

## 2021-05-17 DIAGNOSIS — Z98.890 POST-OPERATIVE STATE: ICD-10-CM

## 2021-05-17 DIAGNOSIS — J45.51 SEVERE PERSISTENT ASTHMA WITH EXACERBATION: Primary | ICD-10-CM

## 2021-05-17 DIAGNOSIS — Z11.59 ENCOUNTER FOR SCREENING FOR OTHER VIRAL DISEASES: ICD-10-CM

## 2021-05-17 PROCEDURE — 71045 X-RAY EXAM CHEST 1 VIEW: CPT | Performed by: EMERGENCY MEDICINE

## 2021-05-17 RX ORDER — ONDANSETRON 2 MG/ML
4 INJECTION INTRAMUSCULAR; INTRAVENOUS ONCE
Status: COMPLETED | OUTPATIENT
Start: 2021-05-17 | End: 2021-05-17

## 2021-05-17 RX ORDER — MORPHINE SULFATE 4 MG/ML
4 INJECTION, SOLUTION INTRAMUSCULAR; INTRAVENOUS ONCE
Status: COMPLETED | OUTPATIENT
Start: 2021-05-17 | End: 2021-05-17

## 2021-05-17 RX ORDER — MAGNESIUM SULFATE 1 G/100ML
1 INJECTION INTRAVENOUS ONCE
Status: COMPLETED | OUTPATIENT
Start: 2021-05-17 | End: 2021-05-17

## 2021-05-17 RX ORDER — ACETAMINOPHEN AND CODEINE PHOSPHATE 120; 12 MG/5ML; MG/5ML
10 SOLUTION ORAL ONCE
Status: COMPLETED | OUTPATIENT
Start: 2021-05-17 | End: 2021-05-17

## 2021-05-17 RX ORDER — METHYLPREDNISOLONE SODIUM SUCCINATE 125 MG/2ML
125 INJECTION, POWDER, LYOPHILIZED, FOR SOLUTION INTRAMUSCULAR; INTRAVENOUS ONCE
Status: COMPLETED | OUTPATIENT
Start: 2021-05-17 | End: 2021-05-17

## 2021-05-17 NOTE — ED PROVIDER NOTES
Patient Seen in: THE [facility] Emergency Department In [place]      History   Patient presents with:  Difficulty Breathing    Stated Complaint: difficulty breathing, asthma    HPI/Subjective:   HPI    Difficulty breathing after significant lung procedure at Nephrolithiasis    • Organic hypersomnia, unspecified DMG DX 11-2-12    AHI 2 RDI 2 REM AHI 6 SaO2 curtis 89%   • Pancreatitis    • Pneumonia due to organism    • Thyroid cancer [facility])    • Visual impairment     glasses   • Vocal cord dysfunction iliac stents   • SURGICAL STENT      stent to iliac crest bone   • THYROIDECTOMY  4/2011   • TOTAL ABDOM HYSTERECTOMY     • UPPER GI ENDO NO BARRETTS  12/15    normal   • UPPER GI ENDOSCOPY PERFORMED  01/25/2017    Deshawn Vieyra M.D.   • UPPER GI END Cardiovascular:      Rate and Rhythm: Normal rate and regular rhythm. Heart sounds: Normal heart sounds. No murmur heard. No friction rub. No gallop. Pulmonary:      Effort: Pulmonary effort is normal. Tachypnea present.  No respiratory distress Result Value    Glucose 205 (*)     Calcium, Total 8.4 (*)     All other components within normal limits   POCT ISTAT G3 CARTRIDGE - Abnormal; Notable for the following components:    ISTAT Blood Gas HCO3 28.5 (*)     All other components within normal holder FINDINGS:      LUNGS/PLEURA:  No focal consolidation. No mass. No pneumothorax. CARDIAC:  Normal size cardiac silhouette. Normal vascularity    MEDIASTINUM/XOCHITL:  Normal.    BONES:  Normal for age. OTHER:                    None. Patient was stable for transfer from [facility] I do not think at this time that she will require an ICU admission  Admission disposition: 5/17/2021  8:40 PM                                  Disposition and Plan     Clinical Impres

## 2021-05-17 NOTE — ED INITIAL ASSESSMENT (HPI)
C/o difficulty breathing h/o asthma. Had a pulmonary bronchial thermoplasty on Thursday. Coughing. No chest pain. No fever.

## 2021-05-18 ENCOUNTER — PATIENT MESSAGE (OUTPATIENT)
Dept: FAMILY MEDICINE CLINIC | Facility: CLINIC | Age: 41
End: 2021-05-18

## 2021-05-18 PROCEDURE — 99220 INITIAL OBSERVATION CARE,LEVL III: CPT | Performed by: INTERNAL MEDICINE

## 2021-05-18 RX ORDER — BUTALBITAL/ACETAMINOPHEN 50MG-325MG
0.5 TABLET ORAL 2 TIMES DAILY PRN
Status: DISCONTINUED | OUTPATIENT
Start: 2021-05-18 | End: 2021-05-18 | Stop reason: RX

## 2021-05-18 RX ORDER — ONDANSETRON 2 MG/ML
4 INJECTION INTRAMUSCULAR; INTRAVENOUS EVERY 6 HOURS PRN
Status: DISCONTINUED | OUTPATIENT
Start: 2021-05-18 | End: 2021-05-19

## 2021-05-18 RX ORDER — HYDROXYZINE HYDROCHLORIDE 25 MG/1
TABLET, FILM COATED ORAL 2 TIMES DAILY PRN
Status: DISCONTINUED | OUTPATIENT
Start: 2021-05-18 | End: 2021-05-18 | Stop reason: CLARIF

## 2021-05-18 RX ORDER — BUTALBITAL/ACETAMINOPHEN 50MG-325MG
TABLET ORAL
Qty: 8 TABLET | Refills: 0 | Status: SHIPPED | OUTPATIENT
Start: 2021-05-18 | End: 2021-06-10

## 2021-05-18 RX ORDER — HYDROCODONE POLISTIREX AND CHLORPHENIRAMINE POLISTIREX 10; 8 MG/5ML; MG/5ML
5 SUSPENSION, EXTENDED RELEASE ORAL 2 TIMES DAILY PRN
Status: DISCONTINUED | OUTPATIENT
Start: 2021-05-18 | End: 2021-05-19

## 2021-05-18 RX ORDER — PREGABALIN 75 MG/1
75 CAPSULE ORAL 2 TIMES DAILY
Status: DISCONTINUED | OUTPATIENT
Start: 2021-05-18 | End: 2021-05-19

## 2021-05-18 RX ORDER — HYDROXYZINE HYDROCHLORIDE 25 MG/1
25 TABLET, FILM COATED ORAL 2 TIMES DAILY PRN
Status: DISCONTINUED | OUTPATIENT
Start: 2021-05-18 | End: 2021-05-19

## 2021-05-18 RX ORDER — HYDROCODONE BITARTRATE AND HOMATROPINE METHYLBROMIDE ORAL SOLUTION 5; 1.5 MG/5ML; MG/5ML
5 LIQUID ORAL EVERY 4 HOURS PRN
Status: DISCONTINUED | OUTPATIENT
Start: 2021-05-18 | End: 2021-05-18

## 2021-05-18 RX ORDER — ACETAMINOPHEN 325 MG/1
650 TABLET ORAL EVERY 6 HOURS PRN
Status: DISCONTINUED | OUTPATIENT
Start: 2021-05-18 | End: 2021-05-18

## 2021-05-18 RX ORDER — ESCITALOPRAM OXALATE 10 MG/1
10 TABLET ORAL NIGHTLY
Status: DISCONTINUED | OUTPATIENT
Start: 2021-05-18 | End: 2021-05-19

## 2021-05-18 RX ORDER — LEVOTHYROXINE SODIUM 0.15 MG/1
150 TABLET ORAL
Status: DISCONTINUED | OUTPATIENT
Start: 2021-05-18 | End: 2021-05-19

## 2021-05-18 RX ORDER — ALBUTEROL SULFATE 90 UG/1
2 AEROSOL, METERED RESPIRATORY (INHALATION) EVERY 4 HOURS PRN
Status: DISCONTINUED | OUTPATIENT
Start: 2021-05-18 | End: 2021-05-19

## 2021-05-18 RX ORDER — ZOLPIDEM TARTRATE 5 MG/1
TABLET ORAL NIGHTLY
Status: DISCONTINUED | OUTPATIENT
Start: 2021-05-18 | End: 2021-05-19

## 2021-05-18 RX ORDER — ALPRAZOLAM 0.25 MG/1
0.25 TABLET ORAL 2 TIMES DAILY PRN
Status: DISCONTINUED | OUTPATIENT
Start: 2021-05-18 | End: 2021-05-19

## 2021-05-18 RX ORDER — ALBUTEROL SULFATE 2.5 MG/3ML
2.5 SOLUTION RESPIRATORY (INHALATION)
Status: DISCONTINUED | OUTPATIENT
Start: 2021-05-18 | End: 2021-05-19

## 2021-05-18 RX ORDER — PANTOPRAZOLE SODIUM 40 MG/1
40 TABLET, DELAYED RELEASE ORAL
Status: DISCONTINUED | OUTPATIENT
Start: 2021-05-18 | End: 2021-05-19

## 2021-05-18 RX ORDER — BUTALBITAL, ACETAMINOPHEN AND CAFFEINE 50; 325; 40 MG/1; MG/1; MG/1
0.5 TABLET ORAL 2 TIMES DAILY PRN
Status: DISCONTINUED | OUTPATIENT
Start: 2021-05-18 | End: 2021-05-19

## 2021-05-18 RX ORDER — METHYLPREDNISOLONE SODIUM SUCCINATE 40 MG/ML
40 INJECTION, POWDER, LYOPHILIZED, FOR SOLUTION INTRAMUSCULAR; INTRAVENOUS EVERY 12 HOURS
Status: DISCONTINUED | OUTPATIENT
Start: 2021-05-18 | End: 2021-05-19

## 2021-05-18 RX ORDER — HYDROXYZINE 50 MG/1
50 TABLET, FILM COATED ORAL 2 TIMES DAILY PRN
Status: DISCONTINUED | OUTPATIENT
Start: 2021-05-18 | End: 2021-05-19

## 2021-05-18 RX ORDER — BENZONATATE 100 MG/1
100 CAPSULE ORAL 3 TIMES DAILY PRN
Status: DISCONTINUED | OUTPATIENT
Start: 2021-05-18 | End: 2021-05-19

## 2021-05-18 RX ORDER — ESCITALOPRAM OXALATE 10 MG/1
10 TABLET ORAL DAILY
Status: DISCONTINUED | OUTPATIENT
Start: 2021-05-18 | End: 2021-05-18

## 2021-05-18 RX ORDER — MELATONIN
3 NIGHTLY PRN
Status: DISCONTINUED | OUTPATIENT
Start: 2021-05-18 | End: 2021-05-19

## 2021-05-18 RX ORDER — BUDESONIDE AND FORMOTEROL FUMARATE DIHYDRATE 160; 4.5 UG/1; UG/1
2 AEROSOL RESPIRATORY (INHALATION)
Status: DISCONTINUED | OUTPATIENT
Start: 2021-05-18 | End: 2021-05-19

## 2021-05-18 RX ORDER — BISACODYL 10 MG
10 SUPPOSITORY, RECTAL RECTAL
Status: DISCONTINUED | OUTPATIENT
Start: 2021-05-18 | End: 2021-05-19

## 2021-05-18 RX ORDER — ONDANSETRON 2 MG/ML
4 INJECTION INTRAMUSCULAR; INTRAVENOUS EVERY 4 HOURS PRN
Status: DISCONTINUED | OUTPATIENT
Start: 2021-05-18 | End: 2021-05-18

## 2021-05-18 RX ORDER — MIRTAZAPINE 7.5 MG/1
7.5 TABLET, FILM COATED ORAL NIGHTLY
Status: DISCONTINUED | OUTPATIENT
Start: 2021-05-18 | End: 2021-05-19

## 2021-05-18 RX ORDER — METHYLPREDNISOLONE SODIUM SUCCINATE 125 MG/2ML
60 INJECTION, POWDER, LYOPHILIZED, FOR SOLUTION INTRAMUSCULAR; INTRAVENOUS EVERY 8 HOURS
Status: DISCONTINUED | OUTPATIENT
Start: 2021-05-18 | End: 2021-05-18

## 2021-05-18 RX ORDER — PANTOPRAZOLE SODIUM 40 MG/1
40 TABLET, DELAYED RELEASE ORAL
Status: DISCONTINUED | OUTPATIENT
Start: 2021-05-18 | End: 2021-05-18

## 2021-05-18 RX ORDER — POLYETHYLENE GLYCOL 3350 17 G/17G
17 POWDER, FOR SOLUTION ORAL DAILY PRN
Status: DISCONTINUED | OUTPATIENT
Start: 2021-05-18 | End: 2021-05-19

## 2021-05-18 RX ORDER — ASPIRIN 81 MG/1
81 TABLET, CHEWABLE ORAL DAILY
Status: DISCONTINUED | OUTPATIENT
Start: 2021-05-18 | End: 2021-05-19

## 2021-05-18 RX ORDER — TIZANIDINE 2 MG/1
TABLET ORAL NIGHTLY
Status: DISCONTINUED | OUTPATIENT
Start: 2021-05-18 | End: 2021-05-19

## 2021-05-18 RX ORDER — SODIUM PHOSPHATE, DIBASIC AND SODIUM PHOSPHATE, MONOBASIC 7; 19 G/133ML; G/133ML
1 ENEMA RECTAL ONCE AS NEEDED
Status: DISCONTINUED | OUTPATIENT
Start: 2021-05-18 | End: 2021-05-19

## 2021-05-18 RX ORDER — SODIUM CHLORIDE 9 MG/ML
INJECTION, SOLUTION INTRAVENOUS CONTINUOUS
Status: ACTIVE | OUTPATIENT
Start: 2021-05-18 | End: 2021-05-18

## 2021-05-18 RX ORDER — ALBUTEROL SULFATE 2.5 MG/3ML
2.5 SOLUTION RESPIRATORY (INHALATION) EVERY 4 HOURS PRN
Status: DISCONTINUED | OUTPATIENT
Start: 2021-05-18 | End: 2021-05-19

## 2021-05-18 RX ORDER — ENOXAPARIN SODIUM 100 MG/ML
40 INJECTION SUBCUTANEOUS DAILY
Status: DISCONTINUED | OUTPATIENT
Start: 2021-05-18 | End: 2021-05-19

## 2021-05-18 RX ORDER — ACETAMINOPHEN AND CODEINE PHOSPHATE 300; 30 MG/1; MG/1
1 TABLET ORAL EVERY 6 HOURS PRN
Status: DISCONTINUED | OUTPATIENT
Start: 2021-05-18 | End: 2021-05-19

## 2021-05-18 RX ORDER — ALBUTEROL SULFATE 2.5 MG/3ML
2.5 SOLUTION RESPIRATORY (INHALATION)
Status: DISCONTINUED | OUTPATIENT
Start: 2021-05-18 | End: 2021-05-18

## 2021-05-18 NOTE — RESPIRATORY THERAPY NOTE
i-STAT Testing  Site right radial  Time 2202  Danielito's test result positive  Clinical data pH 7.454  PCO2: 40.6  PO2:100  HCO3: 28.5  SO2: 98%  BE: 5

## 2021-05-18 NOTE — TELEPHONE ENCOUNTER
Pt called to check status on this message, states she would like an answer prior to discharge. Pt aware Finn Socks not in the office.

## 2021-05-18 NOTE — PROGRESS NOTES
Pt examined - 99% on 2L  GA NAD  CV RRR  Pulm crackles at left lung base    Plan of care  - Weaned steroids to 40 Q 12  - Continue anti tussive, nebs  - CXR reviewed  - Pulm eval  - Monitor O2 saturation while on RA   ?  DC today     Discussed with pt, RN

## 2021-05-18 NOTE — PROGRESS NOTES
05/18/21 0015 05/18/21 0017 05/18/21 0019   Vital Signs   /68 131/73 120/80   MAP (mmHg) 82 87 90   BP Location Right arm Right arm Right arm   BP Method Automatic Automatic Automatic   Patient Position Lying Sitting Standing

## 2021-05-18 NOTE — PROGRESS NOTES
Problem: Patient/Family Goals  Goal: Patient/Family Long Term Goal  Description: Patient's Long Term Goal:     Interventions:  - Inhalers as order.  -Deep breathing exercises  -Medication as order  - See additional Care Plan goals for specific intervention

## 2021-05-18 NOTE — CONSULTS
Pulmonary H&P/Consult       NAME: Frank Torres - ROOM: [facility]Y - MRN: [id number] - Age: 36year old - :  1980    Date of Admission: 2021  4:57 PM  Admission Diagnosis: Acute respiratory distress [R06.03]  Severe persistent asthma with POLYPECTOMY 70040;  Surgeon: Tracee Moreland MD;  Location: [address] [place] [address]   • D & C      x4   • DILATION/CURETTAGE,DIAGNOSTIC     • EGD  03/31/2021   • GASTRIC BYPASS,OBESE<100CM MARCIO-EN-Y  12/18/2017    DR. SEGAL   • GASTRIC BYPASS,OBESITY,SB REC Rfl: 1, 5/18/2021 at Unknown time  tiZANidine HCl 2 MG Oral Tab, Take 1-2 tablets (2-4 mg total) by mouth nightly., Disp: 30 tablet, Rfl: 2, 5/17/2021 at Unknown time  Lisdexamfetamine Dimesylate (VYVANSE) 50 MG Oral Cap, Take 1 capsule (50 mg total) by mo 5/18/2021 at Unknown time  SPIRIVA RESPIMAT 2.5 MCG/ACT Inhalation Aero Soln, INHALE 2 SPRAYS DAILY, Disp: , Rfl: , 5/18/2021 at Unknown time  UNITHROID 88 MCG Oral Tab, TAKE 1 TABLET BY MOUTH EVERY MORNING BEFORE BREAKFAST WITH 100MCG TABLET FOR A TOTAL O (SEE COMMENTS)    Comment:tingling             Restless leg syndrome             Restless leg syndrome  Metoclopramide          ANXIETY    Comment:Reglan with benadryl, feels like she is going to             jump out of her skin  Toradol [Ketorolac *    RA Worried About [facility] in the Last Year:       Ran Out of Food in the Last Year:   Transportation Needs:       Lack of Transportation (Medical):       Lack of Transportation (Non-Medical):   Physical Activity:       Days of Exercise per Week: UNITHROID 88 MCG FOR A TOTAL DOSE  MCG), Disp: 30 tablet, Rfl: 3  Zolpidem Tartrate 10 MG Oral Tab, Take 0.5-1 tablets (5-10 mg total) by mouth nightly.  Increase to 10 mg when on prednisone, Disp: 30 tablet, Rfl: 0  hydrOXYzine HCl 25 MG Oral Tab, Ta Pantoprazole Sodium  40 mg Oral BID AC   • escitalopram  10 mg Oral Nightly   • tiZANidine HCl  2-4 mg Oral Nightly   • Budesonide-Formoterol Fumarate  2 puff Inhalation 2 times daily   • Tiotropium Bromide Monohydrate  1 spray Inhalation QAM   • MethylPRE trachea midline, no adenopathy;        thyroid:  No enlargement/tenderness/nodules; no carotid    bruit or JVD   Back:     Symmetric, no curvature, ROM normal, no CVA tenderness   Lungs:     Extremely diminished on the left, clear but somewhat diminished o

## 2021-05-18 NOTE — H&P
[facility] HOSPITAL[facility]  History and Physical     Evans Filler Patient Status:  Observation    1980 MRN [id number]   [facility] 2NE-A Attending Keiko Nicolas MD;Shad*   Hosp Day # 0 PCP Shadi Person PA-C     Chief Complaint: •      • CHOLECYSTECTOMY     • COLONOSCOPY  2021   • COLONOSCOPY,DIAGNOSTIC  10/22/2013    Procedure: COLONOSCOPY, POSSIBLE BIOPSY, POSSIBLE POLYPECTOMY 35852;  Surgeon: Mendoza Russo MD;  Location: [address] [place] [address] ENDOSCOPY,EXAM         Social History:  reports that she has never smoked. She has never used smokeless tobacco. She reports previous alcohol use. She reports that she does not use drugs.     Family History:   Family History   Problem Relation Age of Onset by mouth daily. , Disp: , Rfl:   tiZANidine HCl 2 MG Oral Tab, Take 1-2 tablets (2-4 mg total) by mouth nightly., Disp: 30 tablet, Rfl: 2  Lisdexamfetamine Dimesylate (VYVANSE) 50 MG Oral Cap, Take 1 capsule (50 mg total) by mouth every morning.  (Patient ta MCG/ACT Inhalation Aero Soln, INHALE 2 SPRAYS DAILY, Disp: , Rfl:   UNITHROID 88 MCG Oral Tab, TAKE 1 TABLET BY MOUTH EVERY MORNING BEFORE BREAKFAST WITH 100MCG TABLET FOR A TOTAL OF 188MCG, Disp: 30 tablet, Rfl: 3  albuterol sulfate (2.5 MG/3ML) 0.083% In neurological deficits. CNII-XII grossly intact. Musculoskeletal: Moves all extremities. Extremities: No edema or cyanosis. Integument: No rashes or lesions. Psychiatric: Appropriate mood and affect.       Diagnostic Data:      Labs:  No results for inp

## 2021-05-18 NOTE — ED QUICK NOTES
Hour of continous neb tx completed, pt resting in bed. Not in resp distress. Still having bronchospasm. Awaiting for disposition.

## 2021-05-18 NOTE — PLAN OF CARE
Problem: Patient/Family Goals  Goal: Patient/Family Long Term Goal  Description: Patient's Long Term Goal:     Interventions:  - Inhalers as order.  -Deep breathing exercises  -Medication as order  - See additional Care Plan goals for specific interventi

## 2021-05-18 NOTE — ED QUICK NOTES
Report given to [facility] medics. Pt to be transferred to [facility] by ambulance at this time.

## 2021-05-18 NOTE — PROGRESS NOTES
Arrived to floor via EMS at midnight. Pt in no apparent distress. Oriented to room. Call light within reach. Pt is A/Ox4. Vital signs stable. On 2L O2, currently 98%. Non-productive cough. Lungs clear/diminishd bilaterally. NSR. Up ad eladio in the room.

## 2021-05-18 NOTE — PROGRESS NOTES
[facility]  Progress Note     Nelly Espinal Patient Status:  Observation    1980 MRN [id number]   [facility] 2NE-A Attending Go Singh MD   Hosp Day # 0 PCP Gabino Guadarrama MD     Chief Complaint: Asthma    S: Patient the last 168 hours. Imaging: Imaging data reviewed in Epic.     Medications:   • enoxaparin  40 mg Subcutaneous Daily   • aspirin  81 mg Oral Daily   • mirtazapine  7.5 mg Oral Nightly   • pregabalin  75 mg Oral BID   • Levothyroxine Sodium  150 mcg Oral

## 2021-05-19 ENCOUNTER — PATIENT MESSAGE (OUTPATIENT)
Dept: FAMILY MEDICINE CLINIC | Facility: CLINIC | Age: 41
End: 2021-05-19

## 2021-05-19 VITALS
HEIGHT: 68 IN | BODY MASS INDEX: 31.07 KG/M2 | DIASTOLIC BLOOD PRESSURE: 69 MMHG | HEART RATE: 92 BPM | OXYGEN SATURATION: 98 % | RESPIRATION RATE: 18 BRPM | TEMPERATURE: 99 F | SYSTOLIC BLOOD PRESSURE: 102 MMHG | WEIGHT: 205 LBS

## 2021-05-19 PROCEDURE — 99217 OBSERVATION CARE DISCHARGE: CPT | Performed by: HOSPITALIST

## 2021-05-19 RX ORDER — HYDROCODONE POLISTIREX AND CHLORPHENIRAMINE POLISTIREX 10; 8 MG/5ML; MG/5ML
5 SUSPENSION, EXTENDED RELEASE ORAL 2 TIMES DAILY PRN
Qty: 50 ML | Refills: 0 | Status: SHIPPED | OUTPATIENT
Start: 2021-05-19 | End: 2021-05-19

## 2021-05-19 RX ORDER — ACETAMINOPHEN AND CODEINE PHOSPHATE 300; 30 MG/1; MG/1
1 TABLET ORAL EVERY 6 HOURS PRN
Qty: 30 TABLET | Refills: 0 | Status: SHIPPED | OUTPATIENT
Start: 2021-05-19 | End: 2021-05-28

## 2021-05-19 RX ORDER — PREDNISONE 10 MG/1
TABLET ORAL
Qty: 18 TABLET | Refills: 0 | Status: SHIPPED | OUTPATIENT
Start: 2021-05-19 | End: 2021-06-16 | Stop reason: ALTCHOICE

## 2021-05-19 NOTE — TELEPHONE ENCOUNTER
Let me know when you are discharged I will send over #30 of codeine No. 3.  Eventually will need to taper you off of this hopefully in the next 2 weeks. Sorry to hear about the asthma was hoping this would not happen again.   Sincerely,   Margarette Odell

## 2021-05-19 NOTE — PLAN OF CARE
Assumed care for pt at 19:30 on 5/18    A&Ox4  Reports chest pain r/t cough treated with Hydromet. Requested all of her medications at the same time- ambien, t#3, tizanidine, lyrica, and remeron along with solumedrol. Tolerated.    VSS on RA, NSR on tele

## 2021-05-19 NOTE — TELEPHONE ENCOUNTER
From: Gabi Haddad  To: Ray Pereyra PA-C  Sent: 5/19/2021 8:31 AM CDT  Subject: Non-Urgent Medical Question    Eunice Carlton,  Dr. Oralia Wesley my pulmonologist is discharging me now. Can you send the medication into [facility]? I will see you 5/28.

## 2021-05-19 NOTE — DISCHARGE SUMMARY
[facility] [place] HOSPITALIST  DISCHARGE SUMMARY     Deadra Barthel Patient Status:  Observation    1980 MRN [id number]   [facility] 2NE-A Attending Rosalinda Banegas MD   Hosp Day # 0 PCP Roger Lujan MD     Date of Admission: 2021  Da Risk of readmission after discharge from the hospital.    Discharge Medication List:     Discharge Medications      START taking these medications      Instructions Prescription details   predniSONE 10 MG Tabs  Commonly known as: DELTASONE      3 tabs stevie MG-UNIT Tabs  Generic drug: Calcium Carb-Cholecalciferol      Take 1 tablet by mouth 3 (three) times daily.    Refills: 0     Contour Next Test Strp  Generic drug: Glucose Blood      USE TWICE DAILY AS DIRECTED   Quantity: 200 strip  Refills: 3     escitalo Tabs  Generic drug: Levothyroxine Sodium      TAKE 1 TABLET BY MOUTH EVERY MORNING BEFORE BREAKFAST WITH 100MCG TABLET FOR A TOTAL OF 188MCG   Quantity: 30 tablet  Refills: 3     Unithroid 100 MCG Tabs  Generic drug: Levothyroxine Sodium      TAKE 1 TABLET 6/7/2021 11:00 AM  [facility] COVID ALINITY [6542] 15 min [address]    Patient Instructions:     Please arrive no earlier than 10 minutes for your scheduled appointment. Location Instructions:      Your appoint current medications. A 24 hour notice is required to cancel any appointment or you may be charged a $40 No Show Fee. Important: 24 hour notice is required to cancel any appointment or you may be charged a $40 No Show Fee.  Please notify your physic

## 2021-05-19 NOTE — PROGRESS NOTES
Pulmonary Progress Note      NAME: Deadra Barthel - ROOM: 8694/3767-J - MRN: [id number] - Age: 36year old - : 1980    Assessment/Plan:  1.  Asthma Exacerbation - better, not completely resolved  -related to bronchial thermoplasty / recent COVID deformity. Heart: Regular rate and rhythm, normal S1S2, no murmur. Abdomen: soft, non-tender, non-distended, positive BS.    Extremity: no edema   Skin: no new rash   Neuro: normal    Recent Labs   Lab 05/18/21  0540   RBC 3.92   HGB 12.1   HCT 36.5   M

## 2021-05-19 NOTE — PLAN OF CARE
Patient alert and oriented x4. Up with standby assist. Sinus Tach on tele. On RA. No complaints of pain, shortness of breath, or chest pain/discomfort. POC IV steroids, discharge planning Call light within reach, will continue to monitor.      Problem: Angi

## 2021-05-19 NOTE — TELEPHONE ENCOUNTER
From: Crow Lopez  To: Maribel Singh PA-C  Sent: 5/19/2021 1:11 PM CDT  Subject: Non-Urgent Medical Question    Umang Shearer Dr. at the hospital was going to send me home with the cough medicine.  I just saw him and told him

## 2021-05-19 NOTE — PROGRESS NOTES
NURSING DISCHARGE NOTE    Discharged Home via Wheelchair. Accompanied by RN  Belongings Taken by patient/family. Patient discharged to home. Discharge instructions given to and understood by patient. Follow up appointments reviewed with patient.  Al

## 2021-05-20 ENCOUNTER — PATIENT OUTREACH (OUTPATIENT)
Dept: CASE MANAGEMENT | Age: 41
End: 2021-05-20

## 2021-05-20 DIAGNOSIS — J45.51 SEVERE PERSISTENT ASTHMA WITH EXACERBATION: ICD-10-CM

## 2021-05-20 DIAGNOSIS — Z02.9 ENCOUNTERS FOR UNSPECIFIED ADMINISTRATIVE PURPOSE: ICD-10-CM

## 2021-05-20 NOTE — PROGRESS NOTES
Initial Post Discharge Follow Up   Discharge Date: 5/19/21  Contact Date: 5/20/2021    Consent Verification:  Assessment Completed With: Patient  HIPAA Verified? Yes    Discharge Dx:  1.  Asthma (severe, persistent), with exacerbation with recent bronch for Pain (cough after lung procedure).  30 tablet 0   • BUTALBITAL-ACETAMINOPHEN  MG Oral Tab TAKE 1/2 TABLET BY MOUTH TWICE DAILY AS NEEDED FOR HEADACHE 8 tablet 0   • Calcium Carb-Cholecalciferol (CALCIUM 600/VITAMIN D3) 600-800 MG-UNIT Oral Tab Janie Horner LUNGS TWICE DAILY 3 Inhaler 3   • SPIRIVA RESPIMAT 2.5 MCG/ACT Inhalation Aero Soln INHALE 2 SPRAYS DAILY     • UNITHROID 88 MCG Oral Tab TAKE 1 TABLET BY MOUTH EVERY MORNING BEFORE BREAKFAST WITH 100MCG TABLET FOR A TOTAL OF 188MCG 30 tablet 3   • albuter Your appointments     Date & Time Appointment Department [facility])    May 26, 2021  3:00 PM CDT Video Visit with Sherrill Husain, [address] ([address])    Please verify your telehealth insurance benefits prior to your appointme of your current medications. A 24 hour notice is required to cancel any appointment or you may be charged a $40 No Show Fee. Important: 24 hour notice is required to cancel any appointment or you may be charged a $40 No Show Fee.  Please notify you [facility]  [address]  [address] Fabiola Bhatt  [address] [place] [address]  [phone]          PCP TCM/HFU appointment: scheduled at D/C within 7-

## 2021-05-21 DIAGNOSIS — F41.1 GAD (GENERALIZED ANXIETY DISORDER): ICD-10-CM

## 2021-05-24 RX ORDER — ALPRAZOLAM 0.25 MG/1
TABLET ORAL
Qty: 60 TABLET | Refills: 0 | Status: SHIPPED | OUTPATIENT
Start: 2021-05-24 | End: 2021-06-21

## 2021-05-25 VITALS
DIASTOLIC BLOOD PRESSURE: 62 MMHG | HEART RATE: 65 BPM | WEIGHT: 190.7 LBS | BODY MASS INDEX: 28.9 KG/M2 | TEMPERATURE: 97.7 F | RESPIRATION RATE: 16 BRPM | SYSTOLIC BLOOD PRESSURE: 110 MMHG | HEIGHT: 68 IN | OXYGEN SATURATION: 100 %

## 2021-05-25 RX ORDER — PANTOPRAZOLE SODIUM 40 MG/1
TABLET, DELAYED RELEASE ORAL
Qty: 60 TABLET | Refills: 1 | Status: SHIPPED | OUTPATIENT
Start: 2021-05-25 | End: 2021-07-20

## 2021-05-28 ENCOUNTER — OFFICE VISIT (OUTPATIENT)
Dept: FAMILY MEDICINE CLINIC | Facility: CLINIC | Age: 41
End: 2021-05-28
Payer: COMMERCIAL

## 2021-05-28 VITALS
OXYGEN SATURATION: 98 % | SYSTOLIC BLOOD PRESSURE: 122 MMHG | DIASTOLIC BLOOD PRESSURE: 72 MMHG | TEMPERATURE: 99 F | BODY MASS INDEX: 31.16 KG/M2 | WEIGHT: 208 LBS | HEART RATE: 100 BPM | HEIGHT: 68.5 IN

## 2021-05-28 DIAGNOSIS — J45.51 SEVERE PERSISTENT ASTHMA WITH ACUTE EXACERBATION: ICD-10-CM

## 2021-05-28 DIAGNOSIS — F51.01 PRIMARY INSOMNIA: ICD-10-CM

## 2021-05-28 DIAGNOSIS — J38.3 VOCAL CORD DYSFUNCTION: Primary | ICD-10-CM

## 2021-05-28 PROCEDURE — 3074F SYST BP LT 130 MM HG: CPT | Performed by: FAMILY MEDICINE

## 2021-05-28 PROCEDURE — 3008F BODY MASS INDEX DOCD: CPT | Performed by: FAMILY MEDICINE

## 2021-05-28 PROCEDURE — 3078F DIAST BP <80 MM HG: CPT | Performed by: FAMILY MEDICINE

## 2021-05-28 PROCEDURE — 99495 TRANSJ CARE MGMT MOD F2F 14D: CPT | Performed by: FAMILY MEDICINE

## 2021-05-28 RX ORDER — ZOLPIDEM TARTRATE 5 MG/1
5 TABLET ORAL NIGHTLY
Qty: 30 TABLET | Refills: 2 | Status: SHIPPED | OUTPATIENT
Start: 2021-05-28 | End: 2021-07-09

## 2021-05-28 RX ORDER — ACETAMINOPHEN AND CODEINE PHOSPHATE 300; 30 MG/1; MG/1
TABLET ORAL
Qty: 30 TABLET | Refills: 0 | Status: SHIPPED | OUTPATIENT
Start: 2021-05-28 | End: 2021-06-16

## 2021-05-28 RX ORDER — ZOLPIDEM TARTRATE 5 MG/1
5 TABLET ORAL NIGHTLY
COMMUNITY
End: 2021-05-28

## 2021-05-29 PROBLEM — R06.03 ACUTE RESPIRATORY DISTRESS: Status: RESOLVED | Noted: 2021-05-17 | Resolved: 2021-05-29

## 2021-05-29 NOTE — PROGRESS NOTES
HPI:    Mark Priest is a 36year old female here today for hospital follow up.    She was discharged from Inpatient hospital, [facility] to Home   Admission Date: 5/17/21   Discharge Date: 5/19/21  Hospital Discharge Diagnoses (since 4/29/2021 hospitalization so patient was discharged with getting follow-up with pulmonology and FELISHA MONSIVAIS. She will be getting a another thermoplasty 6 that is scheduled for 6/10/2021.   We will did another round of codeine for that exacerbation and pres 1 capsule (50 mg total) by mouth every morning. (Patient taking differently: Take 50 mg by mouth daily as needed.  )  UNITHROID 100 MCG Oral Tab, TAKE 1 TABLET BY MOUTH EVERY MORNING WITH BREAKFAST. (TAKE WITH UNITHROID 88 MCG FOR A TOTAL DOSE  MCG) medications on file prior to visit.         HISTORY: reconciled and reviewed with patient  She  has a past medical history of Anxiety, Arthritis, Asthma, Depression, Diverticulosis, Endometriosis, Esophageal reflux, Gastric ulcer, gastric bypass (12/18/2017 tobacco. She reports previous alcohol use. She reports that she does not use drugs.      ROS:   GENERAL: weight stable, energy stable, no sweating  SKIN: denies any unusual skin lesions  EYES: denies blurred vision or double vision  HEENT: denies nasal shelia intact  Psych patient is anxious affect is normal communication skills are normal.  ASSESSMENT/ PLAN:   Diagnoses and all orders for this visit:    Vocal cord dysfunction  Severe persistent asthma with acute exacerbation  -     Acetaminophen-Codeine 300-30 tablet 2     Sig: Take 1 tablet (5 mg total) by mouth nightly.        Imaging & Consults:  None       Transitional Care Management Certification:  I certify that the following are true:  Communication with the patient was made within 2 business days of disc

## 2021-06-07 ENCOUNTER — LAB ENCOUNTER (OUTPATIENT)
Dept: LAB | Age: 41
End: 2021-06-07
Attending: INTERNAL MEDICINE
Payer: COMMERCIAL

## 2021-06-07 DIAGNOSIS — Z01.818 PREOP EXAMINATION: ICD-10-CM

## 2021-06-07 DIAGNOSIS — Z11.59 ENCOUNTER FOR SCREENING FOR OTHER VIRAL DISEASES: ICD-10-CM

## 2021-06-08 ENCOUNTER — PATIENT MESSAGE (OUTPATIENT)
Dept: FAMILY MEDICINE CLINIC | Facility: CLINIC | Age: 41
End: 2021-06-08

## 2021-06-08 DIAGNOSIS — G44.221 CHRONIC TENSION-TYPE HEADACHE, INTRACTABLE: ICD-10-CM

## 2021-06-08 DIAGNOSIS — G44.229 CHRONIC TENSION-TYPE HEADACHE, NOT INTRACTABLE: ICD-10-CM

## 2021-06-08 RX ORDER — ACETAMINOPHEN AND CODEINE PHOSPHATE 300; 30 MG/1; MG/1
1 TABLET ORAL EVERY 6 HOURS PRN
Qty: 30 TABLET | Refills: 0 | Status: SHIPPED | OUTPATIENT
Start: 2021-06-08 | End: 2021-06-16

## 2021-06-08 NOTE — TELEPHONE ENCOUNTER
From: Angela Goodwin  To: Demarcus Fregoso PA-C  Sent: 6/8/2021 7:57 AM CDT  Subject: Other    Hi Blue Del Castillo,   Can you send in the Tylenol #3 with a refill for my procedure that is this Thursday, June 10th? Thank you so much.     Enrique Perea

## 2021-06-08 NOTE — TELEPHONE ENCOUNTER
ACETAMINOPHEN-CODEINE 05/28/2021 05/28/2021 300-30 mg 30 tablet  7 LAURA BAILEY 5/28/21  Has future OV 6/16/21

## 2021-06-09 NOTE — TELEPHONE ENCOUNTER
Requested Prescriptions     Pending Prescriptions Disp Refills   • TIZANIDINE HCL 2 MG Oral Tab [Pharmacy Med Name: TIZANIDINE 2MG TABLETS] 30 tablet 2     Sig: TAKE 1 TO 2 TABLETS(2 TO 4 MG) BY MOUTH EVERY NIGHT   • BUTALBITAL-ACETAMINOPHEN  MG Oral

## 2021-06-10 RX ORDER — BUTALBITAL/ACETAMINOPHEN 50MG-325MG
TABLET ORAL
Qty: 8 TABLET | Refills: 0 | Status: SHIPPED | OUTPATIENT
Start: 2021-06-10 | End: 2021-06-29

## 2021-06-10 RX ORDER — TIZANIDINE 2 MG/1
TABLET ORAL
Qty: 30 TABLET | Refills: 2 | Status: SHIPPED | OUTPATIENT
Start: 2021-06-10 | End: 2021-07-14

## 2021-06-15 DIAGNOSIS — J30.2 SEASONAL ALLERGIES: ICD-10-CM

## 2021-06-15 DIAGNOSIS — F41.1 GAD (GENERALIZED ANXIETY DISORDER): ICD-10-CM

## 2021-06-15 RX ORDER — HYDROXYZINE HYDROCHLORIDE 25 MG/1
TABLET, FILM COATED ORAL
Qty: 60 TABLET | Refills: 1 | Status: SHIPPED | OUTPATIENT
Start: 2021-06-15 | End: 2021-09-07

## 2021-06-15 NOTE — TELEPHONE ENCOUNTER
hydrOXYzine HCl 25 MG Oral Tab 60 tablet 1 4/5/2021     LOV 5/28/21    Has future OV tomorrow 6/16/21

## 2021-06-16 ENCOUNTER — OFFICE VISIT (OUTPATIENT)
Dept: FAMILY MEDICINE CLINIC | Facility: CLINIC | Age: 41
End: 2021-06-16
Payer: COMMERCIAL

## 2021-06-16 VITALS
BODY MASS INDEX: 33.26 KG/M2 | DIASTOLIC BLOOD PRESSURE: 60 MMHG | HEART RATE: 108 BPM | SYSTOLIC BLOOD PRESSURE: 130 MMHG | TEMPERATURE: 97 F | WEIGHT: 222 LBS | HEIGHT: 68.5 IN

## 2021-06-16 DIAGNOSIS — J38.3 VOCAL CORD DYSFUNCTION: ICD-10-CM

## 2021-06-16 DIAGNOSIS — Z98.890 POST-OPERATIVE STATE: ICD-10-CM

## 2021-06-16 DIAGNOSIS — F51.01 PRIMARY INSOMNIA: ICD-10-CM

## 2021-06-16 DIAGNOSIS — J45.51 SEVERE PERSISTENT ASTHMA WITH EXACERBATION: Primary | ICD-10-CM

## 2021-06-16 PROCEDURE — 99214 OFFICE O/P EST MOD 30 MIN: CPT | Performed by: FAMILY MEDICINE

## 2021-06-16 PROCEDURE — 3078F DIAST BP <80 MM HG: CPT | Performed by: FAMILY MEDICINE

## 2021-06-16 PROCEDURE — 3008F BODY MASS INDEX DOCD: CPT | Performed by: FAMILY MEDICINE

## 2021-06-16 PROCEDURE — 3075F SYST BP GE 130 - 139MM HG: CPT | Performed by: FAMILY MEDICINE

## 2021-06-16 RX ORDER — ACETAMINOPHEN AND CODEINE PHOSPHATE 300; 30 MG/1; MG/1
1 TABLET ORAL EVERY 6 HOURS PRN
Qty: 30 TABLET | Refills: 0 | Status: SHIPPED | OUTPATIENT
Start: 2021-06-16 | End: 2021-06-23

## 2021-06-16 NOTE — PROGRESS NOTES
Lorena Catherine is a 36year old female. HPI:       On 6/10/2021 had the final of the 3 bronchoscopies with bronchial thermoplasty. Patient has had an irritant cough with asthma exacerbation since. Has not had to have any prednisone since Friday.   St asking for Matthew I have concerns over [address] [facility] since she had an elevated lipase in the 900 range in 2016 shortly after did have a cholecystectomy secondary to gallbladder issues if she did have pancreatitis it was probably caused by gallstones but would s • ESCITALOPRAM 10 MG Oral Tab TAKE 1 TABLET(10 MG) BY MOUTH DAILY 90 tablet 0   • mirtazapine 7.5 MG Oral Tab Take 1 tablet (7.5 mg total) by mouth nightly.  30 tablet 3   • Ondansetron HCl (ZOFRAN) 8 MG tablet Take 1 tablet (8 mg total) by mouth every 12 • Visual impairment     glasses   • Vocal cord dysfunction       Social History:  Social History    Tobacco Use      Smoking status: Never Smoker      Smokeless tobacco: Never Used    Vaping Use      Vaping Use: Never used    Alcohol use: Not Currently diagnosis)  Vocal cord dysfunction  Post-operative state  Primary insomnia    No orders of the defined types were placed in this encounter.       Meds & Refills for this Visit:  Requested Prescriptions     Signed Prescriptions Disp Refills   • Acetaminophen

## 2021-06-18 NOTE — PATIENT INSTRUCTIONS
SCHEDULING [facility] LAB APPOINTMENTS ONLINE    Lab appointments can now be scheduled online at [email or web address]    · Go to [email or web address]  · In Search type Lab  · Click \"Lab services\"  · Click \"Schedule Your Test Online\"  · Follow the prompts  · If you High Dose Vitamin A Pregnancy And Lactation Text: High dose vitamin A therapy is contraindicated during pregnancy and breast feeding.

## 2021-06-21 DIAGNOSIS — F41.1 GAD (GENERALIZED ANXIETY DISORDER): ICD-10-CM

## 2021-06-21 RX ORDER — ALPRAZOLAM 0.25 MG/1
TABLET ORAL
Qty: 60 TABLET | Refills: 0 | Status: SHIPPED | OUTPATIENT
Start: 2021-06-21 | End: 2021-07-19

## 2021-06-22 ENCOUNTER — HOSPITAL ENCOUNTER (OUTPATIENT)
Dept: GENERAL RADIOLOGY | Age: 41
Discharge: HOME | End: 2021-06-22
Attending: FAMILY MEDICINE
Payer: COMMERCIAL

## 2021-06-22 DIAGNOSIS — R05.9 COUGH: ICD-10-CM

## 2021-06-22 PROCEDURE — 71046 X-RAY EXAM CHEST 2 VIEWS: CPT | Performed by: FAMILY MEDICINE

## 2021-06-23 ENCOUNTER — OFFICE VISIT (OUTPATIENT)
Dept: FAMILY MEDICINE CLINIC | Facility: CLINIC | Age: 41
End: 2021-06-23
Payer: COMMERCIAL

## 2021-06-23 VITALS
OXYGEN SATURATION: 99 % | HEIGHT: 68.5 IN | DIASTOLIC BLOOD PRESSURE: 62 MMHG | BODY MASS INDEX: 32.51 KG/M2 | HEART RATE: 100 BPM | SYSTOLIC BLOOD PRESSURE: 92 MMHG | WEIGHT: 217 LBS | TEMPERATURE: 97 F

## 2021-06-23 DIAGNOSIS — J38.3 VOCAL CORD DYSFUNCTION: ICD-10-CM

## 2021-06-23 DIAGNOSIS — F51.01 PRIMARY INSOMNIA: ICD-10-CM

## 2021-06-23 DIAGNOSIS — F11.21 HISTORY OF NARCOTIC ADDICTION (HCC): ICD-10-CM

## 2021-06-23 DIAGNOSIS — J45.51 SEVERE PERSISTENT ASTHMA WITH EXACERBATION: Primary | ICD-10-CM

## 2021-06-23 DIAGNOSIS — F41.1 GAD (GENERALIZED ANXIETY DISORDER): ICD-10-CM

## 2021-06-23 PROCEDURE — 3078F DIAST BP <80 MM HG: CPT | Performed by: FAMILY MEDICINE

## 2021-06-23 PROCEDURE — 99214 OFFICE O/P EST MOD 30 MIN: CPT | Performed by: FAMILY MEDICINE

## 2021-06-23 PROCEDURE — 3008F BODY MASS INDEX DOCD: CPT | Performed by: FAMILY MEDICINE

## 2021-06-23 PROCEDURE — 3074F SYST BP LT 130 MM HG: CPT | Performed by: FAMILY MEDICINE

## 2021-06-23 RX ORDER — MIRTAZAPINE 7.5 MG/1
TABLET, FILM COATED ORAL
Qty: 30 TABLET | Refills: 3 | Status: SHIPPED | OUTPATIENT
Start: 2021-06-23 | End: 2021-09-10

## 2021-06-23 RX ORDER — ACETAMINOPHEN AND CODEINE PHOSPHATE 300; 30 MG/1; MG/1
TABLET ORAL
Qty: 30 TABLET | Refills: 0 | Status: SHIPPED | OUTPATIENT
Start: 2021-06-23 | End: 2021-07-08

## 2021-06-24 PROBLEM — Z98.890 POST-OPERATIVE STATE: Status: RESOLVED | Noted: 2021-05-17 | Resolved: 2021-06-24

## 2021-06-24 PROBLEM — J45.50 SEVERE PERSISTENT ASTHMA WITHOUT COMPLICATION: Status: RESOLVED | Noted: 2019-02-22 | Resolved: 2021-06-24

## 2021-06-24 PROBLEM — J45.50 SEVERE PERSISTENT ASTHMA WITHOUT COMPLICATION (HCC): Status: RESOLVED | Noted: 2019-02-22 | Resolved: 2021-06-24

## 2021-06-24 PROBLEM — E66.3 OVERWEIGHT WITH BODY MASS INDEX (BMI) 25.0-29.9: Status: RESOLVED | Noted: 2018-12-17 | Resolved: 2021-06-24

## 2021-06-24 NOTE — PROGRESS NOTES
Moises Tong is a 36year old female. HPI:   Follow-up on chronic cough. On 6/10/2021 had the final of the 3 bronchoscopies with bronchial thermoplasty. Patient has had an irritant cough with asthma exacerbation since.   Has not had to have any pre Outpatient Medications   Medication Sig Dispense Refill   • Acetaminophen-Codeine 300-30 MG Oral Tab Take 1 tablet by mouth every 8 (eight) hours as needed (cough after lung procedure) for 5 days, THEN 1 tablet 2 (two) times daily as needed (cough after elvin (twelve) hours as needed for Nausea. 30 tablet 0   • Albuterol Sulfate  (90 Base) MCG/ACT Inhalation Aero Soln Inhale 2 puffs into the lungs every 4 (four) hours as needed for Wheezing or Shortness of Breath.  8.5 g 1   • SYMBICORT 160-4.5 MCG/ACT In well otherwise  SKIN: denies any unusual skin lesions or rashes  RESPIRATORY: shortness of breath with exertion and cough  CARDIOVASCULAR: denies chest pain on exertion  GI: denies abdominal pain and denies heartburn  NEURO: denies headaches  Musculoskelet Consults:  None  1. Severe persistent asthma with exacerbation  Vocal cord dysfunction  History of narcotic addiction ([facility] [address]  Discussed the importance of tapering off of the codeine since she has been on it for a long period of time.   Use, risks, benefits a

## 2021-06-29 DIAGNOSIS — G44.221 CHRONIC TENSION-TYPE HEADACHE, INTRACTABLE: ICD-10-CM

## 2021-06-29 RX ORDER — BUTALBITAL/ACETAMINOPHEN 50MG-325MG
TABLET ORAL
Qty: 8 TABLET | Refills: 0 | Status: SHIPPED | OUTPATIENT
Start: 2021-06-29 | End: 2021-07-19

## 2021-06-30 ENCOUNTER — OFFICE VISIT (OUTPATIENT)
Dept: SURGERY | Facility: CLINIC | Age: 41
End: 2021-06-30
Payer: COMMERCIAL

## 2021-06-30 VITALS
SYSTOLIC BLOOD PRESSURE: 121 MMHG | BODY MASS INDEX: 33.69 KG/M2 | HEIGHT: 68 IN | OXYGEN SATURATION: 100 % | HEART RATE: 107 BPM | WEIGHT: 222.31 LBS | DIASTOLIC BLOOD PRESSURE: 87 MMHG

## 2021-06-30 DIAGNOSIS — E55.9 VITAMIN D DEFICIENCY: ICD-10-CM

## 2021-06-30 DIAGNOSIS — R63.5 WEIGHT GAIN: ICD-10-CM

## 2021-06-30 DIAGNOSIS — E66.9 OBESITY (BMI 30-39.9): ICD-10-CM

## 2021-06-30 DIAGNOSIS — E44.1 MILD PROTEIN-CALORIE MALNUTRITION (HCC): Primary | ICD-10-CM

## 2021-06-30 DIAGNOSIS — Z98.84 HISTORY OF ROUX-EN-Y GASTRIC BYPASS: ICD-10-CM

## 2021-06-30 DIAGNOSIS — F43.9 STRESS: ICD-10-CM

## 2021-06-30 PROCEDURE — 3079F DIAST BP 80-89 MM HG: CPT | Performed by: INTERNAL MEDICINE

## 2021-06-30 PROCEDURE — 3008F BODY MASS INDEX DOCD: CPT | Performed by: INTERNAL MEDICINE

## 2021-06-30 PROCEDURE — 3074F SYST BP LT 130 MM HG: CPT | Performed by: INTERNAL MEDICINE

## 2021-06-30 PROCEDURE — 99214 OFFICE O/P EST MOD 30 MIN: CPT | Performed by: INTERNAL MEDICINE

## 2021-06-30 RX ORDER — PEN NEEDLE, DIABETIC 30 GX3/16"
1 NEEDLE, DISPOSABLE MISCELLANEOUS DAILY
Qty: 90 EACH | Refills: 0 | Status: SHIPPED | OUTPATIENT
Start: 2021-06-30 | End: 2021-07-28

## 2021-06-30 RX ORDER — LIRAGLUTIDE 6 MG/ML
INJECTION, SOLUTION SUBCUTANEOUS
Qty: 2 EACH | Refills: 2 | Status: SHIPPED | OUTPATIENT
Start: 2021-06-30 | End: 2021-07-28

## 2021-06-30 NOTE — PROGRESS NOTES
[address], [address]  Dept: [phone]        Patient:  Joya Burton  :      1980  MRN:      [id number]    Referring Provider: Carolyn Hood procedure) for 5 days, THEN 0.5 tablets 2 (two) times daily as needed (cough after lung procedure).  Taper off as directed., Disp: 30 tablet, Rfl: 0  •  ALPRAZOLAM 0.25 MG Oral Tab, TAKE 1 TABLET(0.25 MG) BY MOUTH TWICE DAILY AS NEEDED, Disp: 60 tablet, Rfl Wheezing or Shortness of Breath., Disp: 8.5 g, Rfl: 1  •  SYMBICORT 160-4.5 MCG/ACT Inhalation Aerosol, INHALE 2 PUFFS INTO THE LUNGS TWICE DAILY, Disp: 3 Inhaler, Rfl: 3  •  UNITHROID 88 MCG Oral Tab, TAKE 1 TABLET BY MOUTH EVERY MORNING BEFORE BREAKFAST POLYPECTOMY 52023;  Surgeon: Bouchra Ray MD;  Location: [address] [place] [address]   • D & C      x4   • DILATION/CURETTAGE,DIAGNOSTIC     • EGD  03/31/2021   • GASTRIC BYPASS,OBESE<100CM MARCIO-EN-Y  12/18/2017    DR. SEGAL   • GASTRIC BYPASS,OBESITY,SB REC Father    • Diabetes Paternal Grandmother    • Heart Disorder Paternal Grandmother    • Heart Disease Paternal Grandmother    • Breast Cancer Maternal Grandmother 76   • Cancer Neg    • Stroke Neg        Physical Exam:  Vital signs: Blood pressure 121/87, Order Specific Question: Release to patient          Answer: Immediate    Will switch Vyvanse to Saxenda    Will try Phentermine if Saxenda not covered    Headaches with Topiramate, discontinued. Intertrigo:  Has irritation under skin folds.    Wou

## 2021-07-02 DIAGNOSIS — F41.1 GAD (GENERALIZED ANXIETY DISORDER): ICD-10-CM

## 2021-07-02 RX ORDER — ESCITALOPRAM OXALATE 10 MG/1
TABLET ORAL
Qty: 90 TABLET | Refills: 0 | Status: SHIPPED | OUTPATIENT
Start: 2021-07-02 | End: 2021-09-29

## 2021-07-02 NOTE — TELEPHONE ENCOUNTER
Requested Prescriptions     Signed Prescriptions Disp Refills   • ESCITALOPRAM 10 MG Oral Tab 90 tablet 0     Sig: TAKE 1 TABLET(10 MG) BY MOUTH DAILY     Authorizing Provider: Deena Soto     Ordering User: Bonny Peres     Refill approved.   Future

## 2021-07-08 ENCOUNTER — HOSPITAL ENCOUNTER (EMERGENCY)
Age: 41
Discharge: HOME | End: 2021-07-08
Attending: EMERGENCY MEDICINE
Payer: COMMERCIAL

## 2021-07-08 ENCOUNTER — APPOINTMENT (OUTPATIENT)
Dept: GENERAL RADIOLOGY | Age: 41
End: 2021-07-08
Attending: EMERGENCY MEDICINE
Payer: COMMERCIAL

## 2021-07-08 VITALS
DIASTOLIC BLOOD PRESSURE: 72 MMHG | TEMPERATURE: 98 F | HEART RATE: 111 BPM | OXYGEN SATURATION: 96 % | HEIGHT: 68 IN | RESPIRATION RATE: 24 BRPM | WEIGHT: 215 LBS | BODY MASS INDEX: 32.58 KG/M2 | SYSTOLIC BLOOD PRESSURE: 129 MMHG

## 2021-07-08 DIAGNOSIS — J45.21 MILD INTERMITTENT ASTHMA WITH EXACERBATION: Primary | ICD-10-CM

## 2021-07-08 LAB
ALBUMIN SERPL-MCNC: 3.4 G/DL (ref 3.4–5)
ALBUMIN/GLOB SERPL: 0.9 {RATIO} (ref 1–2)
ALP LIVER SERPL-CCNC: 93 U/L
ALT SERPL-CCNC: 25 U/L
ANION GAP SERPL CALC-SCNC: 4 MMOL/L (ref 0–18)
AST SERPL-CCNC: 14 U/L (ref 15–37)
BASOPHILS # BLD AUTO: 0.02 X10(3) UL (ref 0–0.2)
BASOPHILS NFR BLD AUTO: 0.3 %
BILIRUB SERPL-MCNC: 0.2 MG/DL (ref 0.1–2)
BUN BLD-MCNC: 10 MG/DL (ref 7–18)
BUN/CREAT SERPL: 12 (ref 10–20)
CALCIUM BLD-MCNC: 8.3 MG/DL (ref 8.5–10.1)
CHLORIDE SERPL-SCNC: 108 MMOL/L (ref 98–112)
CO2 SERPL-SCNC: 28 MMOL/L (ref 21–32)
CREAT BLD-MCNC: 0.83 MG/DL
D-DIMER: <0.27 UG/ML FEU (ref ?–0.5)
DEPRECATED RDW RBC AUTO: 43.8 FL (ref 35.1–46.3)
EOSINOPHIL # BLD AUTO: 0.12 X10(3) UL (ref 0–0.7)
EOSINOPHIL NFR BLD AUTO: 1.9 %
ERYTHROCYTE [DISTWIDTH] IN BLOOD BY AUTOMATED COUNT: 13 % (ref 11–15)
GLOBULIN PLAS-MCNC: 3.6 G/DL (ref 2.8–4.4)
GLUCOSE BLD-MCNC: 100 MG/DL (ref 70–99)
HCT VFR BLD AUTO: 41.9 %
HGB BLD-MCNC: 13.6 G/DL
IMM GRANULOCYTES # BLD AUTO: 0.02 X10(3) UL (ref 0–1)
IMM GRANULOCYTES NFR BLD: 0.3 %
LIPASE SERPL-CCNC: 194 U/L (ref 73–393)
LYMPHOCYTES # BLD AUTO: 1.74 X10(3) UL (ref 1–4)
LYMPHOCYTES NFR BLD AUTO: 26.9 %
M PROTEIN MFR SERPL ELPH: 7 G/DL (ref 6.4–8.2)
MCH RBC QN AUTO: 30.1 PG (ref 26–34)
MCHC RBC AUTO-ENTMCNC: 32.5 G/DL (ref 31–37)
MCV RBC AUTO: 92.7 FL
MONOCYTES # BLD AUTO: 0.57 X10(3) UL (ref 0.1–1)
MONOCYTES NFR BLD AUTO: 8.8 %
NEUTROPHILS # BLD AUTO: 4 X10 (3) UL (ref 1.5–7.7)
NEUTROPHILS # BLD AUTO: 4 X10(3) UL (ref 1.5–7.7)
NEUTROPHILS NFR BLD AUTO: 61.8 %
OSMOLALITY SERPL CALC.SUM OF ELEC: 289 MOSM/KG (ref 275–295)
PLATELET # BLD AUTO: 250 10(3)UL (ref 150–450)
POTASSIUM SERPL-SCNC: 3.4 MMOL/L (ref 3.5–5.1)
RBC # BLD AUTO: 4.52 X10(6)UL
SODIUM SERPL-SCNC: 140 MMOL/L (ref 136–145)
TROPONIN I SERPL-MCNC: <0.045 NG/ML (ref ?–0.04)
WBC # BLD AUTO: 6.5 X10(3) UL (ref 4–11)

## 2021-07-08 PROCEDURE — 94644 CONT INHLJ TX 1ST HOUR: CPT

## 2021-07-08 PROCEDURE — 99285 EMERGENCY DEPT VISIT HI MDM: CPT

## 2021-07-08 PROCEDURE — 84484 ASSAY OF TROPONIN QUANT: CPT | Performed by: EMERGENCY MEDICINE

## 2021-07-08 PROCEDURE — 85025 COMPLETE CBC W/AUTO DIFF WBC: CPT | Performed by: EMERGENCY MEDICINE

## 2021-07-08 PROCEDURE — 71045 X-RAY EXAM CHEST 1 VIEW: CPT | Performed by: EMERGENCY MEDICINE

## 2021-07-08 PROCEDURE — 85379 FIBRIN DEGRADATION QUANT: CPT | Performed by: EMERGENCY MEDICINE

## 2021-07-08 PROCEDURE — 83690 ASSAY OF LIPASE: CPT | Performed by: EMERGENCY MEDICINE

## 2021-07-08 PROCEDURE — 93010 ELECTROCARDIOGRAM REPORT: CPT

## 2021-07-08 PROCEDURE — 80053 COMPREHEN METABOLIC PANEL: CPT | Performed by: EMERGENCY MEDICINE

## 2021-07-08 PROCEDURE — 93005 ELECTROCARDIOGRAM TRACING: CPT

## 2021-07-08 PROCEDURE — 96374 THER/PROPH/DIAG INJ IV PUSH: CPT

## 2021-07-08 RX ORDER — METHYLPREDNISOLONE SODIUM SUCCINATE 125 MG/2ML
125 INJECTION, POWDER, LYOPHILIZED, FOR SOLUTION INTRAMUSCULAR; INTRAVENOUS ONCE
Status: COMPLETED | OUTPATIENT
Start: 2021-07-08 | End: 2021-07-08

## 2021-07-08 RX ORDER — PREDNISONE 20 MG/1
40 TABLET ORAL DAILY
Qty: 10 TABLET | Refills: 0 | Status: SHIPPED | OUTPATIENT
Start: 2021-07-08 | End: 2021-07-13

## 2021-07-09 ENCOUNTER — OFFICE VISIT (OUTPATIENT)
Dept: FAMILY MEDICINE CLINIC | Facility: CLINIC | Age: 41
End: 2021-07-09
Payer: COMMERCIAL

## 2021-07-09 VITALS
HEIGHT: 68.5 IN | SYSTOLIC BLOOD PRESSURE: 104 MMHG | OXYGEN SATURATION: 98 % | HEART RATE: 90 BPM | WEIGHT: 217 LBS | DIASTOLIC BLOOD PRESSURE: 78 MMHG | TEMPERATURE: 97 F | BODY MASS INDEX: 32.51 KG/M2

## 2021-07-09 DIAGNOSIS — R05.3 CHRONIC COUGH: ICD-10-CM

## 2021-07-09 DIAGNOSIS — J45.51 SEVERE PERSISTENT ASTHMA WITH EXACERBATION: Primary | ICD-10-CM

## 2021-07-09 DIAGNOSIS — F51.01 PRIMARY INSOMNIA: ICD-10-CM

## 2021-07-09 LAB
ATRIAL RATE: 89 BPM
P AXIS: 60 DEGREES
P-R INTERVAL: 136 MS
Q-T INTERVAL: 376 MS
QRS DURATION: 116 MS
QTC CALCULATION (BEZET): 457 MS
R AXIS: -8 DEGREES
T AXIS: 40 DEGREES
VENTRICULAR RATE: 89 BPM

## 2021-07-09 PROCEDURE — 3078F DIAST BP <80 MM HG: CPT | Performed by: FAMILY MEDICINE

## 2021-07-09 PROCEDURE — 99214 OFFICE O/P EST MOD 30 MIN: CPT | Performed by: FAMILY MEDICINE

## 2021-07-09 PROCEDURE — 3074F SYST BP LT 130 MM HG: CPT | Performed by: FAMILY MEDICINE

## 2021-07-09 PROCEDURE — 3008F BODY MASS INDEX DOCD: CPT | Performed by: FAMILY MEDICINE

## 2021-07-09 RX ORDER — ACETAMINOPHEN AND CODEINE PHOSPHATE 300; 30 MG/1; MG/1
1 TABLET ORAL 2 TIMES DAILY PRN
Qty: 30 TABLET | Refills: 0 | Status: SHIPPED | OUTPATIENT
Start: 2021-07-09 | End: 2021-07-28 | Stop reason: ALTCHOICE

## 2021-07-09 RX ORDER — ZOLPIDEM TARTRATE 10 MG/1
10 TABLET ORAL NIGHTLY
Qty: 20 TABLET | Refills: 0 | Status: SHIPPED | OUTPATIENT
Start: 2021-07-09 | End: 2021-07-28 | Stop reason: DRUGHIGH

## 2021-07-09 NOTE — ED PROVIDER NOTES
Patient Seen in: [facility] Emergency Department In [place]      History   Patient presents with:  Difficulty Breathing    Stated Complaint: SOB, asthma     HPI/Subjective:   HPI    26-year-old female with a history of anxiety, asthma, depression, gastric Location: [address] [place] [address]   • D & C      x4   • DILATION/CURETTAGE,DIAGNOSTIC     • EGD  03/31/2021   • GASTRIC BYPASS,OBESE<100CM MARCIO-EN-Y  12/18/2017    DR. SEGAL   • GASTRIC BYPASS,OBESITY,SB RECONSTRUC     • GASTROCNEMIUS RECESSION Left 6/1 Alcohol/week: 0.0 standard drinks    Drug use: No             Review of Systems    Positive for stated complaint: SOB, asthma   Other systems are as noted in HPI. Constitutional and vital signs reviewed.       All other systems reviewed and negative except Abnormality         Status                     ---------                               -----------         ------                     CBC W/ DIFFERENTIAL[[id number]]                              Final result                 Please view results for these emergency basis. Comprehensive verbal and written discharge and follow-up instructions were provided to help prevent relapse or worsening.   Patient was instructed to follow-up with her primary care provider for further evaluation and treatment, but to ret

## 2021-07-09 NOTE — PATIENT INSTRUCTIONS
Take prednisone 40 mg for 4 days; 30 mg for 4 days then 20 mg for 3 days.     Codeine one at night repeat 6 hours later if needed

## 2021-07-09 NOTE — ED INITIAL ASSESSMENT (HPI)
PT to the ED for evaluation of asthma exacerbation since Monday. PT has been using nebs and inhaler at home with little relief. Pt also reports RUQ abdominal pain and nausea for 4 days. States it feels similar to when she had gallstone pancreatitis.

## 2021-07-10 NOTE — PROGRESS NOTES
Stephanie Mckeon is a 36year old female. HPI:   Follow-up on chronic cough and ER visit 7/8/21. On 6/10/2021 had the final of the 3 bronchoscopies with bronchial thermoplasty. Patient has had an irritant cough with asthma exacerbation since.   Has not Range    D-Dimer <0.27 <0.50 ug/mL FEU   Lipase   Result Value Ref Range    Lipase 194 73 - 393 U/L   Troponin I   Result Value Ref Range    Troponin <0.045 <0.045 ng/mL   EKG   Result Value Ref Range    Ventricular rate 89 BPM    Atrial rate 89 BPM    P-R Management (SAXENDA) 18 MG/3ML Subcutaneous Solution Pen-injector Inject 0.6 mg into the skin daily for 7 days, THEN 1.2 mg daily for 7 days, THEN 1.8 mg daily for 7 days, THEN 2.4 mg daily for 7 days, THEN 3 mg daily.  2 each 2   • Insulin Pen Needle (PEN 3   • UNITHROID 88 MCG Oral Tab TAKE 1 TABLET BY MOUTH EVERY MORNING BEFORE BREAKFAST WITH 100MCG TABLET FOR A TOTAL OF 188MCG 30 tablet 3   • Cholecalciferol (VITAMIN D) 50 MCG (2000 UT) Oral Cap Take 2,000 Units by mouth 2 (two) times a day.        • Nyst 8.5\" (1.74 m)   Wt 217 lb (98.4 kg)   LMP 05/01/2013   SpO2 98%   BMI 32.51 kg/m²   GENERAL: well developed, well nourished,in no apparent distress  SKIN: no rashes,no suspicious lesions  HEENT: TM clear bilaterally, nose no congestion, throat clear no er Acetaminophen-Codeine 300-30 MG Oral Tab; Take 1 tablet by mouth 2 (two) times daily as needed (repetitive cough). Dispense: 30 tablet; Refill: 0    3.  Primary insomnia  Temporarily increase Ambien from 5 mg to 10 mg.  Use, risks, benefits and precautions

## 2021-07-13 DIAGNOSIS — G44.229 CHRONIC TENSION-TYPE HEADACHE, NOT INTRACTABLE: ICD-10-CM

## 2021-07-13 LAB
AMB EXT BILIRUBIN URINE: NEGATIVE
AMB EXT BLOOD URINE: NEGATIVE
AMB EXT CMP ALT: 11 U/L
AMB EXT CMP AST: 11 U/L
AMB EXT CREATININE: 0.77 MG/DL
AMB EXT GFR: 97
AMB EXT GLUCOSE URINE: NEGATIVE
AMB EXT GLUCOSE: 96 MG/DL
AMB EXT HEMATOCRIT: 40.1
AMB EXT HEMOGLOBIN: 12.8
AMB EXT KETONES URINE: NEGATIVE
AMB EXT LEUKOCYTE ESTERASE URINE: NEGATIVE
AMB EXT MCV: 93.7
AMB EXT NITRITE URINE: NEGATIVE
AMB EXT PH URINE: 7
AMB EXT PLATELETS: 264
AMB EXT POSTASSIUM: 4.1 MMOL/L
AMB EXT PROTEIN URINE: NEGATIVE
AMB EXT SODIUM: 138 MMOL/L
AMB EXT URINE CLARITY: CLEAR
AMB EXT URINE COLOR: YELLOW
AMB EXT URINE SPECIFIC GRAVITY: 1.01
AMB EXT UROBILINOGEN URINE: 2
AMB EXT WBC: 8.8 X10(3)UL

## 2021-07-14 RX ORDER — TIZANIDINE 2 MG/1
TABLET ORAL
Qty: 30 TABLET | Refills: 2 | Status: SHIPPED | OUTPATIENT
Start: 2021-07-14 | End: 2021-08-19

## 2021-07-14 NOTE — TELEPHONE ENCOUNTER
Last refill #30 x 2 on 6/10/2021  Last office visit pertaining to refill on 7/9/2021  Future Appointments   Date Time Provider Abdirashid Kumari   7/28/2021 10:00 AM Raymond Correa PA-C [facility]   8/5/2021 11:15 AM Gordy Ansari MD [facility]

## 2021-07-17 DIAGNOSIS — G44.221 CHRONIC TENSION-TYPE HEADACHE, INTRACTABLE: ICD-10-CM

## 2021-07-17 DIAGNOSIS — F41.1 GAD (GENERALIZED ANXIETY DISORDER): ICD-10-CM

## 2021-07-19 RX ORDER — BUTALBITAL/ACETAMINOPHEN 50MG-325MG
TABLET ORAL
Qty: 8 TABLET | Refills: 0 | Status: SHIPPED | OUTPATIENT
Start: 2021-07-19 | End: 2021-08-09

## 2021-07-19 RX ORDER — ALPRAZOLAM 0.25 MG/1
TABLET ORAL
Qty: 60 TABLET | Refills: 0 | Status: SHIPPED | OUTPATIENT
Start: 2021-07-19 | End: 2021-08-17

## 2021-07-19 NOTE — TELEPHONE ENCOUNTER
Requested Prescriptions     Pending Prescriptions Disp Refills   • ALPRAZOLAM 0.25 MG Oral Tab [Pharmacy Med Name: ALPRAZOLAM 0.25MG TABLETS] 60 tablet 0     Sig: TAKE 1 TABLET(0.25 MG) BY MOUTH TWICE DAILY AS NEEDED   • BUTALBITAL-ACETAMINOPHEN  MG

## 2021-07-20 RX ORDER — PANTOPRAZOLE SODIUM 40 MG/1
TABLET, DELAYED RELEASE ORAL
Qty: 60 TABLET | Refills: 1 | Status: SHIPPED | OUTPATIENT
Start: 2021-07-20 | End: 2021-10-01

## 2021-07-22 ENCOUNTER — PATIENT MESSAGE (OUTPATIENT)
Dept: FAMILY MEDICINE CLINIC | Facility: CLINIC | Age: 41
End: 2021-07-22

## 2021-07-23 NOTE — TELEPHONE ENCOUNTER
From: Stephanie Mckeon  To: Arminda Spear PA-C  Sent: 7/22/2021 7:08 PM CDT  Subject: Prescription Question    Hi BODØ,   Can you send in a prescription for Valtrex? I have a cold sore that it starting.    Thank you,   Roxanne Muniz

## 2021-07-24 RX ORDER — VALACYCLOVIR HYDROCHLORIDE 1 G/1
2 TABLET, FILM COATED ORAL EVERY 12 HOURS SCHEDULED
Qty: 30 TABLET | Refills: 0 | Status: SHIPPED | OUTPATIENT
Start: 2021-07-24 | End: 2021-07-25

## 2021-07-28 ENCOUNTER — LAB ENCOUNTER (OUTPATIENT)
Dept: LAB | Age: 41
End: 2021-07-28
Attending: NURSE PRACTITIONER
Payer: COMMERCIAL

## 2021-07-28 ENCOUNTER — PATIENT MESSAGE (OUTPATIENT)
Dept: FAMILY MEDICINE CLINIC | Facility: CLINIC | Age: 41
End: 2021-07-28

## 2021-07-28 ENCOUNTER — HOSPITAL ENCOUNTER (OUTPATIENT)
Dept: GENERAL RADIOLOGY | Age: 41
Discharge: HOME | End: 2021-07-28
Attending: FAMILY MEDICINE
Payer: COMMERCIAL

## 2021-07-28 ENCOUNTER — OFFICE VISIT (OUTPATIENT)
Dept: FAMILY MEDICINE CLINIC | Facility: CLINIC | Age: 41
End: 2021-07-28
Payer: COMMERCIAL

## 2021-07-28 ENCOUNTER — LAB ENCOUNTER (OUTPATIENT)
Dept: LAB | Age: 41
End: 2021-07-28
Attending: PHYSICIAN ASSISTANT
Payer: COMMERCIAL

## 2021-07-28 VITALS
SYSTOLIC BLOOD PRESSURE: 90 MMHG | OXYGEN SATURATION: 98 % | WEIGHT: 222 LBS | DIASTOLIC BLOOD PRESSURE: 60 MMHG | HEIGHT: 68.5 IN | BODY MASS INDEX: 33.26 KG/M2 | RESPIRATION RATE: 20 BRPM | HEART RATE: 118 BPM

## 2021-07-28 DIAGNOSIS — E66.9 OBESITY (BMI 30-39.9): ICD-10-CM

## 2021-07-28 DIAGNOSIS — E44.1 MILD PROTEIN-CALORIE MALNUTRITION (HCC): ICD-10-CM

## 2021-07-28 DIAGNOSIS — K59.04 CHRONIC IDIOPATHIC CONSTIPATION: ICD-10-CM

## 2021-07-28 DIAGNOSIS — R10.12 LEFT UPPER QUADRANT ABDOMINAL PAIN: Primary | ICD-10-CM

## 2021-07-28 DIAGNOSIS — E55.9 VITAMIN D DEFICIENCY: ICD-10-CM

## 2021-07-28 DIAGNOSIS — Z98.84 HISTORY OF ROUX-EN-Y GASTRIC BYPASS: ICD-10-CM

## 2021-07-28 DIAGNOSIS — R63.5 WEIGHT GAIN: ICD-10-CM

## 2021-07-28 DIAGNOSIS — E07.9 THYROID DYSFUNCTION: ICD-10-CM

## 2021-07-28 DIAGNOSIS — R19.5 WATERY STOOLS: ICD-10-CM

## 2021-07-28 DIAGNOSIS — F41.1 GAD (GENERALIZED ANXIETY DISORDER): ICD-10-CM

## 2021-07-28 DIAGNOSIS — G62.9 NEUROPATHY: ICD-10-CM

## 2021-07-28 DIAGNOSIS — R10.12 LEFT UPPER QUADRANT ABDOMINAL PAIN: ICD-10-CM

## 2021-07-28 DIAGNOSIS — F43.9 STRESS: ICD-10-CM

## 2021-07-28 LAB
ALBUMIN SERPL-MCNC: 3.2 G/DL (ref 3.4–5)
ALBUMIN/GLOB SERPL: 0.9 {RATIO} (ref 1–2)
ALP LIVER SERPL-CCNC: 99 U/L
ALT SERPL-CCNC: 37 U/L
ANION GAP SERPL CALC-SCNC: 5 MMOL/L (ref 0–18)
AST SERPL-CCNC: 25 U/L (ref 15–37)
BILIRUB SERPL-MCNC: 0.1 MG/DL (ref 0.1–2)
BUN BLD-MCNC: 5 MG/DL (ref 7–18)
BUN/CREAT SERPL: 4.7 (ref 10–20)
CALCIUM BLD-MCNC: 7.8 MG/DL (ref 8.5–10.1)
CHLORIDE SERPL-SCNC: 111 MMOL/L (ref 98–112)
CO2 SERPL-SCNC: 25 MMOL/L (ref 21–32)
CREAT BLD-MCNC: 1.07 MG/DL
GLOBULIN PLAS-MCNC: 3.5 G/DL (ref 2.8–4.4)
GLUCOSE BLD-MCNC: 144 MG/DL (ref 70–99)
LIPASE SERPL-CCNC: 451 U/L (ref 73–393)
M PROTEIN MFR SERPL ELPH: 6.7 G/DL (ref 6.4–8.2)
OSMOLALITY SERPL CALC.SUM OF ELEC: 292 MOSM/KG (ref 275–295)
PATIENT FASTING Y/N/NP: NO
POTASSIUM SERPL-SCNC: 3.6 MMOL/L (ref 3.5–5.1)
SODIUM SERPL-SCNC: 141 MMOL/L (ref 136–145)
T4 FREE SERPL-MCNC: 0.9 NG/DL (ref 0.8–1.7)
THYROGLOB SERPL-MCNC: <15 U/ML (ref ?–60)
THYROPEROXIDASE AB SERPL-ACNC: 34 U/ML (ref ?–60)
TSI SER-ACNC: 1.79 MIU/ML (ref 0.36–3.74)
VIT B12 SERPL-MCNC: 400 PG/ML (ref 193–986)
VIT D+METAB SERPL-MCNC: 37.2 NG/ML (ref 30–100)

## 2021-07-28 PROCEDURE — 84425 ASSAY OF VITAMIN B-1: CPT | Performed by: INTERNAL MEDICINE

## 2021-07-28 PROCEDURE — 74019 RADEX ABDOMEN 2 VIEWS: CPT | Performed by: FAMILY MEDICINE

## 2021-07-28 PROCEDURE — 3008F BODY MASS INDEX DOCD: CPT | Performed by: FAMILY MEDICINE

## 2021-07-28 PROCEDURE — 3078F DIAST BP <80 MM HG: CPT | Performed by: FAMILY MEDICINE

## 2021-07-28 PROCEDURE — 99214 OFFICE O/P EST MOD 30 MIN: CPT | Performed by: FAMILY MEDICINE

## 2021-07-28 PROCEDURE — 82607 VITAMIN B-12: CPT | Performed by: INTERNAL MEDICINE

## 2021-07-28 PROCEDURE — 82306 VITAMIN D 25 HYDROXY: CPT | Performed by: INTERNAL MEDICINE

## 2021-07-28 PROCEDURE — 3074F SYST BP LT 130 MM HG: CPT | Performed by: FAMILY MEDICINE

## 2021-07-28 PROCEDURE — 80053 COMPREHEN METABOLIC PANEL: CPT | Performed by: FAMILY MEDICINE

## 2021-07-28 PROCEDURE — 83690 ASSAY OF LIPASE: CPT | Performed by: FAMILY MEDICINE

## 2021-07-28 RX ORDER — ZOLPIDEM TARTRATE 5 MG/1
1 TABLET ORAL NIGHTLY PRN
COMMUNITY
Start: 2021-07-26 | End: 2021-08-23

## 2021-07-28 RX ORDER — PREGABALIN 75 MG/1
75 CAPSULE ORAL 2 TIMES DAILY
Qty: 60 CAPSULE | Refills: 5 | Status: SHIPPED | OUTPATIENT
Start: 2021-07-28 | End: 2021-12-14 | Stop reason: DRUGHIGH

## 2021-07-28 NOTE — PROGRESS NOTES
Nilson Guerrero is a 36year old female. HPI:   Started with abdominal pain in the past week states she did discontinue the Saxenda prescribed by bariatric center on Thursday because she ran out of it.   Had had a history of elevated pancreatic enzymes Tab EC TAKE 1 TABLET(40 MG) BY MOUTH TWICE DAILY BEFORE MEALS 60 tablet 1   • ALPRAZOLAM 0.25 MG Oral Tab TAKE 1 TABLET(0.25 MG) BY MOUTH TWICE DAILY AS NEEDED 60 tablet 0   • BUTALBITAL-ACETAMINOPHEN  MG Oral Tab TAKE 1/2 TABLET BY MOUTH TWICE DAILY Vitro Strip USE TWICE DAILY AS DIRECTED 200 strip 3   • NEYDA MICROLET LANCETS Does not apply Misc Use as directed to check blood sugars as needed 1 Box 0   • magnesium 250 MG Oral Tab Take 250 mg by mouth daily.        • NON FORMULARY Take 1 tablet by mout quadrant and lower abdomen no guarding, rigidity or rebound.     EXTREMITIES: no cyanosis, clubbing or edema  Musculoskeletal: No gross deficit  Neurological: nerves II through XII grossly intact no sensorimotor deficit  Psychological: Mood and affect are n vomiting. Yaima Manifold

## 2021-07-29 ENCOUNTER — LAB ENCOUNTER (OUTPATIENT)
Dept: LAB | Age: 41
End: 2021-07-29
Attending: FAMILY MEDICINE
Payer: COMMERCIAL

## 2021-07-29 ENCOUNTER — PATIENT MESSAGE (OUTPATIENT)
Dept: FAMILY MEDICINE CLINIC | Facility: CLINIC | Age: 41
End: 2021-07-29

## 2021-07-29 DIAGNOSIS — R74.8 ELEVATED LIPASE: Primary | ICD-10-CM

## 2021-07-29 DIAGNOSIS — R74.8 ELEVATED LIPASE: ICD-10-CM

## 2021-07-29 LAB — LIPASE SERPL-CCNC: 380 U/L (ref 73–393)

## 2021-07-29 PROCEDURE — 83690 ASSAY OF LIPASE: CPT | Performed by: FAMILY MEDICINE

## 2021-07-29 NOTE — PROGRESS NOTES
No signs of obstruction. There is a large amount of stool in the colon consistent with what we were discussing in the office of overflow diarrhea from constipation. Start taking the Colace or Docusate sodium and MiraLAX.   If this is not working we can tr

## 2021-07-29 NOTE — TELEPHONE ENCOUNTER
Molly Reyes PA-C 13 hours ago (6:09 PM)   SG  Hi BODØ,   My bloodwork came through and my lipase level is higher than it should be. Is this a concern?   Louie Crow

## 2021-07-29 NOTE — TELEPHONE ENCOUNTER
From: Moises Close  To: vIy Napoles PA-C  Sent: 7/28/2021 6:09 PM CDT  Subject: Test Results Question    Tamie Tuckeray,   My bloodwork came through and my lipase level is higher than it should be. Is this a concern?   Zuleima Henry

## 2021-07-29 NOTE — PROGRESS NOTES
Lipase is slightly elevated if the pain does get worse go to ER. Just for the next 2 to 3 days do liquids. Repeat lipase in 24 hours if you do not go to the emergency room with increased pain.   Tried to chew on some Tums with calcium secondary to low marquis

## 2021-07-29 NOTE — TELEPHONE ENCOUNTER
From: Kiara Carolina  To: Felice Rodrigues PA-C  Sent: 7/29/2021 5:07 AM CDT  Subject: Test Results Question    Prachi Every,   So far I have not gone to the ER.  I am in quite a bit of pain still as my stomach is still dissented and the upper left quad

## 2021-07-30 RX ORDER — PHENTERMINE HYDROCHLORIDE 30 MG/1
30 CAPSULE ORAL EVERY MORNING
Qty: 30 CAPSULE | Refills: 1 | Status: SHIPPED | OUTPATIENT
Start: 2021-07-30 | End: 2021-08-26

## 2021-07-30 NOTE — TELEPHONE ENCOUNTER
From: Suhas York  To: Bahman De PA-C  Sent: 7/29/2021 4:10 PM CDT  Subject: Test Results Question    Yuan Kang,   So I saw my lipase level today and it's down to 350ish. ..is there a concern that something is going on that it was high yest

## 2021-07-30 NOTE — PROGRESS NOTES
Lipase is now normal.  Continue to just try to rest the stomach by concentrating on fluids and bland foods such as mashed potatoes and chicken. Let me know if you are not having a reasonable bowel movement.

## 2021-07-31 NOTE — PROGRESS NOTES
Please call and inform tsh ft4 normal confirm current dose since her levels have slightly changed, please make sure they ran thyroglobulin tumor marker.  She is due for office visit with dr Abebe Rater

## 2021-08-02 NOTE — PROGRESS NOTES
Called [facility] and was told unable to add tumor marker to specimen as it needs to be collected in different tube and needs larger specimen. Informed pt of results and recommendations per KO, verb understanding. Pt taking Unithroid 188 mcg daily.   Ida Alejandro

## 2021-08-03 LAB — VITAMIN B1 (THIAMINE), WHOLE B: 148 NMOL/L

## 2021-08-08 DIAGNOSIS — G44.221 CHRONIC TENSION-TYPE HEADACHE, INTRACTABLE: ICD-10-CM

## 2021-08-09 ENCOUNTER — LAB ENCOUNTER (OUTPATIENT)
Dept: LAB | Age: 41
End: 2021-08-09
Attending: FAMILY MEDICINE
Payer: COMMERCIAL

## 2021-08-09 DIAGNOSIS — R74.8 ELEVATED LIPASE: ICD-10-CM

## 2021-08-09 DIAGNOSIS — C73 THYROID CANCER (HCC): ICD-10-CM

## 2021-08-09 DIAGNOSIS — R10.12 LEFT UPPER QUADRANT ABDOMINAL PAIN: ICD-10-CM

## 2021-08-09 LAB — LIPASE SERPL-CCNC: 213 U/L (ref 73–393)

## 2021-08-09 PROCEDURE — 83690 ASSAY OF LIPASE: CPT | Performed by: FAMILY MEDICINE

## 2021-08-09 RX ORDER — BUTALBITAL/ACETAMINOPHEN 50MG-325MG
TABLET ORAL
Qty: 8 TABLET | Refills: 0 | Status: SHIPPED | OUTPATIENT
Start: 2021-08-09 | End: 2021-08-30

## 2021-08-09 NOTE — TELEPHONE ENCOUNTER
BUTALBITAL-ACETAMINOPHEN  MG Oral Tab 8 tablet 0 7/19/2021    Sig:   TAKE 1/2 TABLET BY MOUTH TWICE DAILY AS NEEDED FOR HEADACHE       LOV 7/28/21    Has future OV 9/10/21

## 2021-08-11 LAB
THYROGLOBULIN AB: <0.9 IU/ML
THYROGLOBULIN, SERUM OR PLASMA: <0.1 NG/ML

## 2021-08-16 DIAGNOSIS — F41.1 GAD (GENERALIZED ANXIETY DISORDER): ICD-10-CM

## 2021-08-17 RX ORDER — ALPRAZOLAM 0.25 MG/1
TABLET ORAL
Qty: 60 TABLET | Refills: 0 | Status: SHIPPED | OUTPATIENT
Start: 2021-08-17 | End: 2021-09-13

## 2021-08-17 NOTE — TELEPHONE ENCOUNTER
ALPRAZOLAM 0.25 MG Oral Tab 60 tablet 0 7/19/2021     Sig: TAKE 1 TABLET(0.25 MG) BY MOUTH TWICE DAILY AS NEEDED      LOV 7/28/21  Has future OV 9/10/21

## 2021-08-18 DIAGNOSIS — G44.229 CHRONIC TENSION-TYPE HEADACHE, NOT INTRACTABLE: ICD-10-CM

## 2021-08-19 RX ORDER — TIZANIDINE 2 MG/1
TABLET ORAL
Qty: 30 TABLET | Refills: 2 | Status: SHIPPED | OUTPATIENT
Start: 2021-08-19 | End: 2021-08-20 | Stop reason: DRUGHIGH

## 2021-08-19 NOTE — TELEPHONE ENCOUNTER
Per Constantin, this has been dispense:  7/19 # 30  7/31 # 30  8/12 # 30    Called patient again to clarify what she is taking.    Left message to call back

## 2021-08-19 NOTE — TELEPHONE ENCOUNTER
#90 since 7/15/21? She should not need any refills then. Call pharmacy and see if this is accurate. I would discontinue my reorder if she has had 90 in 1 month,  she is definitely overusing if that is the case.

## 2021-08-19 NOTE — TELEPHONE ENCOUNTER
Requested Prescriptions     Pending Prescriptions Disp Refills   • TIZANIDINE 2 MG Oral Tab [Pharmacy Med Name: TIZANIDINE 2MG TABLETS] 30 tablet 2     Sig: TAKE 1 TO 2 TABLETS(2 TO 4 MG) BY MOUTH EVERY NIGHT     Last fill was 7/14/21 30 with 2 refills.  Ac

## 2021-08-20 RX ORDER — TIZANIDINE 4 MG/1
TABLET ORAL
Qty: 30 TABLET | Refills: 0 | Status: SHIPPED | OUTPATIENT
Start: 2021-08-20 | End: 2021-09-13

## 2021-08-23 RX ORDER — ZOLPIDEM TARTRATE 5 MG/1
TABLET ORAL
Qty: 30 TABLET | Refills: 0 | Status: SHIPPED | OUTPATIENT
Start: 2021-08-23 | End: 2021-09-20

## 2021-08-23 NOTE — TELEPHONE ENCOUNTER
ZOLPIDEM TARTRATE 07/28/2021 05/28/2021 5 mg 30 tablet  30 LAURA BAILEY [facility] 7/28/21    FOV 10/18/2021

## 2021-08-26 ENCOUNTER — OFFICE VISIT (OUTPATIENT)
Dept: SURGERY | Facility: CLINIC | Age: 41
End: 2021-08-26
Payer: COMMERCIAL

## 2021-08-26 VITALS
BODY MASS INDEX: 32.51 KG/M2 | WEIGHT: 217 LBS | HEART RATE: 100 BPM | DIASTOLIC BLOOD PRESSURE: 83 MMHG | HEIGHT: 68.5 IN | OXYGEN SATURATION: 100 % | SYSTOLIC BLOOD PRESSURE: 115 MMHG

## 2021-08-26 DIAGNOSIS — E66.9 OBESITY (BMI 30-39.9): ICD-10-CM

## 2021-08-26 DIAGNOSIS — Z98.84 HISTORY OF ROUX-EN-Y GASTRIC BYPASS: ICD-10-CM

## 2021-08-26 DIAGNOSIS — E44.1 MILD PROTEIN-CALORIE MALNUTRITION (HCC): Primary | ICD-10-CM

## 2021-08-26 DIAGNOSIS — F43.9 STRESS: ICD-10-CM

## 2021-08-26 PROCEDURE — 3079F DIAST BP 80-89 MM HG: CPT | Performed by: INTERNAL MEDICINE

## 2021-08-26 PROCEDURE — 3074F SYST BP LT 130 MM HG: CPT | Performed by: INTERNAL MEDICINE

## 2021-08-26 PROCEDURE — 99214 OFFICE O/P EST MOD 30 MIN: CPT | Performed by: INTERNAL MEDICINE

## 2021-08-26 PROCEDURE — 3008F BODY MASS INDEX DOCD: CPT | Performed by: INTERNAL MEDICINE

## 2021-08-26 RX ORDER — PHENTERMINE HYDROCHLORIDE 30 MG/1
30 CAPSULE ORAL EVERY MORNING
Qty: 30 CAPSULE | Refills: 2 | Status: SHIPPED | OUTPATIENT
Start: 2021-08-26 | End: 2021-10-23

## 2021-08-26 NOTE — PROGRESS NOTES
[address], [address]  Dept: [phone]        Patient:  Lorena Catherine  :      1980  MRN:      [id number]    Referring Provider: Rosi Crowell Oral Tab, TAKE 1 TABLET(10 MG) BY MOUTH DAILY, Disp: 90 tablet, Rfl: 0  •  MIRTAZAPINE 7.5 MG Oral Tab, TAKE 1 TABLET(7.5 MG) BY MOUTH EVERY NIGHT, Disp: 30 tablet, Rfl: 3  •  HYDROXYZINE HCL 25 MG Oral Tab, TAKE 1 TO 2 TABLETS(25 TO 50 MG) BY MOUTH TWICE mouth daily. , Disp: , Rfl:   •  NON FORMULARY, Take 1 tablet by mouth daily. , Disp: , Rfl:   •  aspirin 81 MG Oral Tab, Take 81 mg by mouth daily. , Disp: , Rfl:     Allergies:  Adhesive Tape, Cephalosporins, Doxycycline, Azithromycin, Metoclopramide, T 12/14/2015    Procedure: FACET INJECTION UNDER FLUOROSCOPY;  Surgeon: Leighann Cash DO;  Location: [address] [place] [address]   • OOPHORECTOMY Left    • OTHER  NECK SCAR REVISION   • OTHER      gastric bypass   • OTHER SURGICAL HISTORY      partial thyro edema  Pulses: 2+ and symmetric  Skin: irritation noted under skin folds     ROS:    Constitutional: negative  Respiratory: negative  Cardiovascular: negative  Gastrointestinal: positive for abdominal pain  Musculoskeletal:negative  Neurological: negative

## 2021-08-30 DIAGNOSIS — G44.221 CHRONIC TENSION-TYPE HEADACHE, INTRACTABLE: ICD-10-CM

## 2021-08-30 RX ORDER — BUTALBITAL/ACETAMINOPHEN 50MG-325MG
TABLET ORAL
Qty: 8 TABLET | Refills: 0 | Status: SHIPPED | OUTPATIENT
Start: 2021-08-30 | End: 2021-09-03

## 2021-08-30 NOTE — TELEPHONE ENCOUNTER
BUTALBITAL-ACETAMINOPHEN  MG Oral Tab 8 tablet 0 8/9/2021    Sig:   TAKE 1/2 TABLET BY MOUTH TWICE DAILY AS NEEDED FOR HEADACHE       LOV 7/28/21  Has future OV 9/10/21

## 2021-09-02 ENCOUNTER — TELEPHONE (OUTPATIENT)
Dept: FAMILY MEDICINE CLINIC | Facility: CLINIC | Age: 41
End: 2021-09-02

## 2021-09-02 ENCOUNTER — PATIENT MESSAGE (OUTPATIENT)
Dept: FAMILY MEDICINE CLINIC | Facility: CLINIC | Age: 41
End: 2021-09-02

## 2021-09-02 DIAGNOSIS — G44.221 CHRONIC TENSION-TYPE HEADACHE, INTRACTABLE: ICD-10-CM

## 2021-09-02 NOTE — TELEPHONE ENCOUNTER
From: Greg Rosales  To:  Dena Conner PA-C  Sent: 9/2/2021 3:09 PM CDT  Subject: Non-Urgent Medical Question    Leia Duff,   I wanted to check in with you to see if you are ok if I take the medicine for my tension headaches more than two in a w

## 2021-09-03 RX ORDER — BUTALBITAL/ACETAMINOPHEN 50MG-325MG
0.5 TABLET ORAL 2 TIMES DAILY PRN
Qty: 16 TABLET | Refills: 0 | Status: SHIPPED | OUTPATIENT
Start: 2021-09-03 | End: 2021-09-17

## 2021-09-03 NOTE — TELEPHONE ENCOUNTER
OK to increase to 4 per week and tylenol on the other days for 2 weeks ok to increase the RX amount this month to #16 Fioricet.

## 2021-09-05 DIAGNOSIS — F41.1 GAD (GENERALIZED ANXIETY DISORDER): ICD-10-CM

## 2021-09-05 DIAGNOSIS — J30.2 SEASONAL ALLERGIES: ICD-10-CM

## 2021-09-07 RX ORDER — HYDROXYZINE HYDROCHLORIDE 25 MG/1
TABLET, FILM COATED ORAL
Qty: 60 TABLET | Refills: 1 | Status: SHIPPED | OUTPATIENT
Start: 2021-09-07 | End: 2021-11-29

## 2021-09-07 NOTE — TELEPHONE ENCOUNTER
HYDROXYZINE HCL 25 MG Oral Tab 60 tablet 1 6/15/2021    Sig:   TAKE 1 TO 2 TABLETS(25 TO 50 MG) BY MOUTH TWICE DAILY AS NEEDED FOR ANXIETY OR ALLERGIES       LOV 7/28/21    Has FOV 9/10/21

## 2021-09-10 ENCOUNTER — OFFICE VISIT (OUTPATIENT)
Dept: FAMILY MEDICINE CLINIC | Facility: CLINIC | Age: 41
End: 2021-09-10
Payer: COMMERCIAL

## 2021-09-10 VITALS
HEIGHT: 68.5 IN | HEART RATE: 96 BPM | OXYGEN SATURATION: 98 % | SYSTOLIC BLOOD PRESSURE: 92 MMHG | WEIGHT: 219 LBS | TEMPERATURE: 98 F | DIASTOLIC BLOOD PRESSURE: 60 MMHG | BODY MASS INDEX: 32.81 KG/M2

## 2021-09-10 DIAGNOSIS — J38.3 VOCAL CORD DYSFUNCTION: ICD-10-CM

## 2021-09-10 DIAGNOSIS — Z00.00 WELL ADULT EXAM: Primary | ICD-10-CM

## 2021-09-10 DIAGNOSIS — E78.2 MIXED HYPERLIPIDEMIA: ICD-10-CM

## 2021-09-10 DIAGNOSIS — J45.51 SEVERE PERSISTENT ASTHMA WITH EXACERBATION: ICD-10-CM

## 2021-09-10 DIAGNOSIS — Z00.00 LABORATORY EXAMINATION ORDERED AS PART OF A ROUTINE GENERAL MEDICAL EXAMINATION: ICD-10-CM

## 2021-09-10 DIAGNOSIS — Z12.31 ENCOUNTER FOR SCREENING MAMMOGRAM FOR MALIGNANT NEOPLASM OF BREAST: ICD-10-CM

## 2021-09-10 DIAGNOSIS — Z80.3 FAMILY HISTORY OF MALIGNANT NEOPLASM OF BREAST: ICD-10-CM

## 2021-09-10 PROCEDURE — 99212 OFFICE O/P EST SF 10 MIN: CPT | Performed by: FAMILY MEDICINE

## 2021-09-10 PROCEDURE — 3078F DIAST BP <80 MM HG: CPT | Performed by: FAMILY MEDICINE

## 2021-09-10 PROCEDURE — 99396 PREV VISIT EST AGE 40-64: CPT | Performed by: FAMILY MEDICINE

## 2021-09-10 PROCEDURE — 3008F BODY MASS INDEX DOCD: CPT | Performed by: FAMILY MEDICINE

## 2021-09-10 PROCEDURE — 3074F SYST BP LT 130 MM HG: CPT | Performed by: FAMILY MEDICINE

## 2021-09-10 NOTE — PROGRESS NOTES
HPI:   Cara Travis is a 36year old female who presents for a complete physical exam follow-up on anxiety and insomnia.   COVID vaccinIe  UTD  Dental exams yes nerve pain crown came off this past week and she got it back on and painful increased HA u kg)    Body mass index is 32.81 kg/m².        Results for orders placed or performed in visit on 08/09/21   THYROGLOBULIN, SERUM OR PLASMA W/RFLX TO LC-MS/MS OR WATSON   Result Value Ref Range    Thyroglobulin Antibody <0.9 0.0 - 4.0 IU/mL    Thyroglobulin, Se Calcium Carb-Cholecalciferol (CALCIUM 600/VITAMIN D3) 600-800 MG-UNIT Oral Tab Take 1 tablet by mouth 3 (three) times daily. • Biotin 01359 MCG Oral Tab Take 1 tablet by mouth daily.      • Ondansetron HCl (ZOFRAN) 8 MG tablet Take 1 tablet (8 mg total) Procedure: ARTHROSCOPY ANKLE WITH DEBRIDEMENT;  Surgeon: Joyce Hubbard MD;  Location: [facility]   • BRONCH THERMOPLASTY 1 LOBE Right 04/15/2021   • BRONCH THERMOPLASTY 1 LOBE Left 2021   •      • CHOLECYSTECTOMY     • Jair Marcial ENDOSCOPY,BIOPSY N/A 12/19/2015    Procedure: ESOPHAGOGASTRODUODENOSCOPY, POSSIBLE BIOPSY, POSSIBLE POLYPECTOMY 19265;  Surgeon: David Rizzo MD;  Location: [address] [place] [address]   • UPPER GI ENDOSCOPY,EXAM        Family History   Problem Relatio Cuff Size: adult)   Pulse 96   Temp 98.4 °F (36.9 °C) (Temporal)   Ht 5' 8.5\" (1.74 m)   Wt 219 lb (99.3 kg)   LMP 05/12/2013 (Within Days)   SpO2 98%   BMI 32.81 kg/m²   Body mass index is 32.81 kg/m².    GENERAL: well developed, well nourished and in no daily. Lifestyle guidance provided recommended low fat diet and aerobic exercise 30 minutes 3-4 times weekly. 2. Vocal cord dysfunction  Continue with vocal cord exercises.     3. Severe persistent asthma with exacerbation  Use albuterol and nebulizer

## 2021-09-10 NOTE — PATIENT INSTRUCTIONS
Routine Healthcare for Women   Routine checkups can find treatable problems early. For many medical problems, early treatment can help prevent more serious complications. The value of checkups and how often you have them depend mainly on your age.  Your per family history of high cholesterol. • Colorectal cancer test: if you are 48 or older.  Recommended tests include a yearly test for blood in the stool, called the fecal occult blood test (FOBT) or fecal immunochemical test (FIT), and one of the following t personal and family medical history. Many other tests are often done at routine checkups, but there is no current evidence that they are helpful as routine screening tests for healthy women.  Examples of such tests are a CBC (complete blood count), thyroi a younger age if you have a high-risk medical condition, such as diabetes. • Varicella (chickenpox) if you have never had chickenpox. • Zoster (shingles) vaccine: if you are 50 or older. The vaccine can help prevent shingles.  It can also reduce the nella

## 2021-09-12 PROBLEM — Z80.3 FAMILY HISTORY OF MALIGNANT NEOPLASM OF BREAST: Status: ACTIVE | Noted: 2021-09-12

## 2021-09-13 ENCOUNTER — OFFICE VISIT (OUTPATIENT)
Dept: FAMILY MEDICINE CLINIC | Facility: CLINIC | Age: 41
End: 2021-09-13
Payer: COMMERCIAL

## 2021-09-13 VITALS
WEIGHT: 214 LBS | OXYGEN SATURATION: 98 % | HEIGHT: 68.5 IN | TEMPERATURE: 98 F | SYSTOLIC BLOOD PRESSURE: 118 MMHG | BODY MASS INDEX: 32.06 KG/M2 | DIASTOLIC BLOOD PRESSURE: 68 MMHG | HEART RATE: 128 BPM

## 2021-09-13 DIAGNOSIS — Z79.899 MEDICATION MANAGEMENT: ICD-10-CM

## 2021-09-13 DIAGNOSIS — G44.221 CHRONIC TENSION-TYPE HEADACHE, INTRACTABLE: ICD-10-CM

## 2021-09-13 DIAGNOSIS — J38.3 VOCAL CORD DYSFUNCTION: ICD-10-CM

## 2021-09-13 DIAGNOSIS — J45.51 SEVERE PERSISTENT ASTHMA WITH EXACERBATION: Primary | ICD-10-CM

## 2021-09-13 DIAGNOSIS — F41.1 GAD (GENERALIZED ANXIETY DISORDER): ICD-10-CM

## 2021-09-13 PROCEDURE — 3078F DIAST BP <80 MM HG: CPT | Performed by: FAMILY MEDICINE

## 2021-09-13 PROCEDURE — 3074F SYST BP LT 130 MM HG: CPT | Performed by: FAMILY MEDICINE

## 2021-09-13 PROCEDURE — 3008F BODY MASS INDEX DOCD: CPT | Performed by: FAMILY MEDICINE

## 2021-09-13 PROCEDURE — 99214 OFFICE O/P EST MOD 30 MIN: CPT | Performed by: FAMILY MEDICINE

## 2021-09-13 RX ORDER — ACETAMINOPHEN AND CODEINE PHOSPHATE 300; 30 MG/1; MG/1
TABLET ORAL
Qty: 15 TABLET | Refills: 0 | Status: SHIPPED | OUTPATIENT
Start: 2021-09-13 | End: 2021-09-20

## 2021-09-13 RX ORDER — ALPRAZOLAM 0.25 MG/1
0.25 TABLET ORAL 2 TIMES DAILY PRN
Qty: 60 TABLET | Refills: 0 | Status: SHIPPED | OUTPATIENT
Start: 2021-09-13 | End: 2021-10-12

## 2021-09-13 RX ORDER — TIZANIDINE 4 MG/1
TABLET ORAL
Qty: 30 TABLET | Refills: 1 | Status: SHIPPED | OUTPATIENT
Start: 2021-09-13 | End: 2021-10-30

## 2021-09-13 RX ORDER — PREDNISONE 20 MG/1
40 TABLET ORAL DAILY
COMMUNITY
End: 2021-10-15 | Stop reason: ALTCHOICE

## 2021-09-13 NOTE — PROGRESS NOTES
Galilea Vazquez is a 36year old female. HPI:   Patient seen for follow-up on asthma attacks started last week was seen for wellness visit on Friday had vocal cord dysfunction starting and bronchial cough.   Over the weekend symptoms got much worse and pandemic, asthma life stressors have been under good control recently. Current Outpatient Medications   Medication Sig Dispense Refill   • predniSONE 20 MG Oral Tab Take 40 mg by mouth daily.      • Acetaminophen-Codeine 300-30 MG Oral Tab Take 0.5 2 puffs into the lungs every 4 (four) hours as needed for Wheezing or Shortness of Breath.  8.5 g 1   • SYMBICORT 160-4.5 MCG/ACT Inhalation Aerosol INHALE 2 PUFFS INTO THE LUNGS TWICE DAILY 3 Inhaler 3   • UNITHROID 88 MCG Oral Tab TAKE 1 TABLET BY MOUTH E diarrhea. Prednisone has not affected her abdomen yet.   NEURO: denies headaches  Musculoskeletal: No motor deficits  Asthma symptoms as above  Psych see above  Patient denies any swelling, fever chills or body aches  EXAM:   /68 (BP Location: Left a Sig: Take 1/2-1 tab nightly       Imaging & Consults:  None  1. Severe persistent asthma with exacerbation/chest wall pain  Continue with present inhalers Spiriva, albuterol and Symbicort.   Continue with follow-ups with pulmonologist.  - predniSONE 20 MG

## 2021-09-13 NOTE — PATIENT INSTRUCTIONS
Prednisone taper   40 mg for 3-4 days   30 mg for 3 days  20 mg for 3 days  10 mg for 3 days  5 mg for 3 days

## 2021-09-17 DIAGNOSIS — G44.221 CHRONIC TENSION-TYPE HEADACHE, INTRACTABLE: ICD-10-CM

## 2021-09-17 RX ORDER — BUTALBITAL/ACETAMINOPHEN 50MG-325MG
0.5 TABLET ORAL 2 TIMES DAILY PRN
Qty: 16 TABLET | Refills: 0 | Status: SHIPPED | OUTPATIENT
Start: 2021-09-17 | End: 2021-10-12

## 2021-09-17 NOTE — TELEPHONE ENCOUNTER
Requested Prescriptions     Pending Prescriptions Disp Refills   • Butalbital-Acetaminophen  MG Oral Tab 16 tablet 0     Sig: Take 0.5 tablets by mouth 2 (two) times daily as needed.      Last fill was 9/3/21 16 tabs  Last OV 9/13/21  No future OV
<<----- Click to add NO pertinent Family History

## 2021-09-18 ENCOUNTER — PATIENT MESSAGE (OUTPATIENT)
Dept: FAMILY MEDICINE CLINIC | Facility: CLINIC | Age: 41
End: 2021-09-18

## 2021-09-19 DIAGNOSIS — E44.1 MILD PROTEIN-CALORIE MALNUTRITION (HCC): ICD-10-CM

## 2021-09-19 DIAGNOSIS — R19.7 NAUSEA VOMITING AND DIARRHEA: ICD-10-CM

## 2021-09-19 DIAGNOSIS — R11.2 NAUSEA VOMITING AND DIARRHEA: ICD-10-CM

## 2021-09-20 ENCOUNTER — PATIENT MESSAGE (OUTPATIENT)
Dept: FAMILY MEDICINE CLINIC | Facility: CLINIC | Age: 41
End: 2021-09-20

## 2021-09-20 ENCOUNTER — TELEPHONE (OUTPATIENT)
Dept: FAMILY MEDICINE CLINIC | Facility: CLINIC | Age: 41
End: 2021-09-20

## 2021-09-20 DIAGNOSIS — K08.9 EXTRACTION OF TOOTH NEEDED: Primary | ICD-10-CM

## 2021-09-20 RX ORDER — BUTALBITAL/ACETAMINOPHEN 50MG-325MG
TABLET ORAL
Qty: 16 TABLET | Refills: 0 | OUTPATIENT
Start: 2021-09-20

## 2021-09-20 RX ORDER — HYDROCODONE BITARTRATE AND ACETAMINOPHEN 5; 325 MG/1; MG/1
TABLET ORAL EVERY 6 HOURS PRN
Qty: 10 TABLET | Refills: 0 | Status: SHIPPED | OUTPATIENT
Start: 2021-09-20 | End: 2021-09-24

## 2021-09-20 RX ORDER — ONDANSETRON HYDROCHLORIDE 8 MG/1
TABLET, FILM COATED ORAL
Qty: 30 TABLET | Refills: 0 | Status: SHIPPED | OUTPATIENT
Start: 2021-09-20

## 2021-09-20 RX ORDER — ZOLPIDEM TARTRATE 5 MG/1
TABLET ORAL
Qty: 30 TABLET | Refills: 0 | Status: SHIPPED | OUTPATIENT
Start: 2021-09-20 | End: 2021-10-15 | Stop reason: ALTCHOICE

## 2021-09-20 NOTE — TELEPHONE ENCOUNTER
Sent to pharmacy Norco 5 mg take 1/2 to one every 6-8 hours #10 no refill   Can the oral surgeon send consult letter as well.

## 2021-09-20 NOTE — TELEPHONE ENCOUNTER
Angeles Seip is calling because she is having her tooth taken out tomorrow and she needs pain medication from [facility], she is having it done at 8 tomorrow so she needs it called in to her pharmacy, please call Leonce Seip at [phone]

## 2021-09-20 NOTE — TELEPHONE ENCOUNTER
From: Laura Garcia  To: Marilin Rivero PA-C  Sent: 9/20/2021 9:54 AM CDT  Subject: Medication for Tooth Extraction    Hi Carlos Holbrook,   I talked to my dentist this morning and the tooth that has the root canal is cracked.  I must have cracked it when

## 2021-09-20 NOTE — TELEPHONE ENCOUNTER
ZOLPIDEM 5 MG Oral Tab 30 tablet 0 8/23/2021    Sig:   TAKE 1 TABLET BY MOUTH NIGHTLY       LOV 9/13/21    No future OV

## 2021-09-20 NOTE — TELEPHONE ENCOUNTER
Ondansetron HCl (ZOFRAN) 8 MG tablet 30 tablet 0 2/22/2021    Sig:   Take 1 tablet (8 mg total) by mouth every 12 (twelve) hours as needed for Nausea.        LOV 9/13/21    No future OV

## 2021-09-21 ENCOUNTER — PATIENT MESSAGE (OUTPATIENT)
Dept: FAMILY MEDICINE CLINIC | Facility: CLINIC | Age: 41
End: 2021-09-21

## 2021-09-21 NOTE — TELEPHONE ENCOUNTER
From: Mark Priest  To: Ciera Yin PA-C  Sent: 9/21/2021 11:06 AM CDT  Subject: Medication     Hi Adelene Feeling,   They did give me a prescription for pain medication as well.  I am having Harley drop it off in an envelope at the  for you t

## 2021-09-22 ENCOUNTER — PATIENT MESSAGE (OUTPATIENT)
Dept: FAMILY MEDICINE CLINIC | Facility: CLINIC | Age: 41
End: 2021-09-22

## 2021-09-22 NOTE — TELEPHONE ENCOUNTER
From: Alis Hernadez  To: Ephraim Bustillo PA-C  Sent: 9/22/2021 1:02 PM CDT  Subject: Procedure     Hi BODØ,   I had the tooth extraction yesterday.  Andrew Leo dropped off the script yesterday oral surgeon wrote and put it in an envelope marked with y

## 2021-09-23 ENCOUNTER — PATIENT MESSAGE (OUTPATIENT)
Dept: FAMILY MEDICINE CLINIC | Facility: CLINIC | Age: 41
End: 2021-09-23

## 2021-09-23 DIAGNOSIS — J38.3 VOCAL CORD DYSFUNCTION: ICD-10-CM

## 2021-09-23 DIAGNOSIS — J45.51 SEVERE PERSISTENT ASTHMA WITH EXACERBATION: ICD-10-CM

## 2021-09-24 RX ORDER — HYDROCODONE BITARTRATE AND ACETAMINOPHEN 5; 325 MG/1; MG/1
TABLET ORAL EVERY 6 HOURS PRN
Qty: 10 TABLET | Refills: 0 | Status: CANCELLED | OUTPATIENT
Start: 2021-09-24

## 2021-09-24 RX ORDER — ACETAMINOPHEN AND CODEINE PHOSPHATE 300; 30 MG/1; MG/1
TABLET ORAL EVERY 8 HOURS PRN
Qty: 10 TABLET | Refills: 0 | Status: SHIPPED | OUTPATIENT
Start: 2021-09-24 | End: 2021-09-28

## 2021-09-24 NOTE — TELEPHONE ENCOUNTER
From: Crow Lopez  To: Maribel Singh PA-C  Sent: 9/23/2021 5:14 PM CDT  Subject: Medication    Hi Valarie Gardner,   I talked to my dentist after school today as I am still very umcomfortable and the oral surgeon was closed.  My jaw hurts quite a bit an

## 2021-09-27 NOTE — TELEPHONE ENCOUNTER
Acetaminophen-Codeine 300-30 MG Oral Tab 10 tablet 0 9/24/2021 10/4/2021   Sig:   Take 0.5-1 tablets by mouth every 8 (eight) hours as needed for Pain (after tooth extraction).        Refill sent 9/24/21

## 2021-09-28 ENCOUNTER — PATIENT MESSAGE (OUTPATIENT)
Dept: FAMILY MEDICINE CLINIC | Facility: CLINIC | Age: 41
End: 2021-09-28

## 2021-09-28 ENCOUNTER — MOBILE ENCOUNTER (OUTPATIENT)
Dept: FAMILY MEDICINE CLINIC | Facility: CLINIC | Age: 41
End: 2021-09-28

## 2021-09-28 DIAGNOSIS — J38.3 VOCAL CORD DYSFUNCTION: ICD-10-CM

## 2021-09-28 DIAGNOSIS — J45.51 SEVERE PERSISTENT ASTHMA WITH EXACERBATION: Primary | ICD-10-CM

## 2021-09-28 DIAGNOSIS — F41.1 GAD (GENERALIZED ANXIETY DISORDER): ICD-10-CM

## 2021-09-28 RX ORDER — ACETAMINOPHEN AND CODEINE PHOSPHATE 300; 30 MG/1; MG/1
TABLET ORAL EVERY 8 HOURS PRN
Qty: 10 TABLET | Refills: 0 | Status: SHIPPED | OUTPATIENT
Start: 2021-09-28 | End: 2021-10-22

## 2021-09-28 NOTE — TELEPHONE ENCOUNTER
From: Galilea Vazquez  To: Anthony Giang PA-C  Sent: 9/28/2021 12:06 PM CDT  Subject: Tooth Extraction     Hi Harriet Camarena,   I first wanted to check and see if you have received the notes from the oral surgeon?  Also, I talked to the Oral Surgeon's offi

## 2021-09-29 ENCOUNTER — PATIENT MESSAGE (OUTPATIENT)
Dept: FAMILY MEDICINE CLINIC | Facility: CLINIC | Age: 41
End: 2021-09-29

## 2021-09-29 DIAGNOSIS — Z01.84 IMMUNITY STATUS TESTING: Primary | ICD-10-CM

## 2021-09-29 RX ORDER — ESCITALOPRAM OXALATE 10 MG/1
TABLET ORAL
Qty: 90 TABLET | Refills: 0 | Status: SHIPPED | OUTPATIENT
Start: 2021-09-29 | End: 2021-10-15

## 2021-09-29 NOTE — TELEPHONE ENCOUNTER
Requested Prescriptions     Pending Prescriptions Disp Refills   • ESCITALOPRAM 10 MG Oral Tab [Pharmacy Med Name: ESCITALOPRAM 10MG TABLETS] 90 tablet 0     Sig: TAKE 1 TABLET(10 MG) BY MOUTH DAILY     Last fill was 7/2/21 90 tabs 0 refill  Last OV 9/13/2

## 2021-09-29 NOTE — PROGRESS NOTES
[facility]    cc:         Moe Peters D.P.M.    PREOPERATIVE DIAGNOSES:  HAMMER DIGIT SYNDROME, SECOND TOE, LEFT FOOT; CONTRACTED CAPSULE TENDON DORSAL SECOND MPJ, LEFT FOOT; CONTRACTED CAPSULE TENDON PLANTAR SECOND IPJ CAPSULE, LEFT FOOT; HAMMER DIGIT SYNDROME, SECOND DIGIT, RIGHT FOOT; CONTRACTED CAPSULE TENDON DORSAL SECOND MPJ, RIGHT FOOT; CONTRACTED CAPSULE TENDON PLANTAR SECOND IPJ, RIGHT FOOT.    POSTOPERATIVE DIAGNOSES:  Hammer digit syndrome, second toe, left foot; contracted capsule tendon dorsal second MPJ, left foot; contracted capsule tendon plantar second IPJ capsule, left foot; hammer digit syndrome, second digit, right foot; contracted capsule tendon dorsal second MPJ, right foot; contracted capsule tendon plantar second IPJ, right foot.    PROCEDURES:  ARTHROPLASTY DIPJ SECOND DIGIT, RIGHT FOOT; ARTHROPLASTY DIPJ SECOND DIGIT, LEFT FOOT; TENOTOMY CAPSULOTOMY DORSAL SECOND MPJ, RIGHT FOOT; TENOTOMY CAPSULOTOMY DORSAL SECOND MPJ, LEFT FOOT; TENOTOMY CAPSULOTOMY PLANTAR SECOND IPJ, LEFT FOOT; TENOTOMY CAPSULOTOMY PLANTAR SECOND IPJ, RIGHT FOOT.    SURGEON:  MOE PETERS D.P.M.    ANESTHESIA:  LOCAL WITH MONITORED ANESTHESIA CARE.    HEMOSTASIS:  Pneumatic ankle tourniquet, bilateral.    ESTIMATED BLOOD LOSS:  Minimal.    INDICATION FOR PROCEDURE:  Painful hammertoe second toe, bilateral contracted capsule tendon.  Conservative measures have failed to relieve symptoms and surgical intervention is indicated.    PROCEDURE IN DETAIL:  Under mild sedation, the patient was brought into the operating room and placed on the operative table in supine position.  Pneumatic ankle tourniquets were placed about the patient's bilateral ankles.  Following IV sedation, local anesthesia was obtained about the patient's bilateral feet.  Utilizing 5 mL of 1:1 mixture of 2% lidocaine plain and 0.5 Marcaine plain was utilized in each foot.  The feet were then scrubbed, prepped, and draped in   Still with mouth pain s/p tooth extraction she is discussing with the oral surgeon per Ayush message OK for another #10 codeine. usual aseptic manner.  An Esmarch bandage was utilized to exsanguinate the patient's left foot and pneumatic ankle tourniquet was inflated to 250 mmHg.  Attention was directed dorsal aspect of the second digit left foot.  There was noted a transverse plane deformity of the DIPJ.  At this point in time, a 2 cm incision overlying the second digit.  The incision was deepened down to subcutaneous tissue, being careful to identify and retract all  vital neurovascular structures.  All bleeders were ligated and cauterized as necessary.  At this time, dissection was carried down to the periosteum and capsular structures reflected medially and laterally exposing the head of the middle phalanx as the operative site.  There was noted to be angular deviation of the head of the middle phalanx.  At this point in time, using the sagittal bone saw, the head of the middle phalanx resected.  Care was  taken to remove the hypertrophic bone on the medial aspect of the middle phalanx.  The wound was flushed with copious amounts of sterile normal saline.  Periosteum and capsular structures were reapproximated and coapted utilizing 3-0 Vicryl, and the skin was reapproximated and coapted utilizing 4-0 nylon simple interrupted suture technique.  Attention was directed to the dorsal aspect of the second MPJ, where a 1 cm incision overlying the  second MPJ of the left foot.  The incision was deepened down to subcutaneous tissue being careful to identify and retract all vital neurovascular structures.  All bleeders were ligated and cauterized as necessary.  At this time, extensor digitorum longus and brevis tendon were identified, isolated, and transected.  At this point in time, a dorsal medial and lateral capsulotomy was created at the metatarsophalangeal joint on the second left  foot.  The wound was flushed with copious amounts of sterile normal saline.  Skin was reapproximated, coapted utilizing 4-0 nylon.  Attention was directed to the  plantar aspect of the second digit left foot at the level of the IPJ where a 1 cm incision underlying the second digit on the patient's left foot at the level of the IPJ.  The incision was deepened down to subcutaneous tissue, being careful to identify and retract all vital  neurovascular structures.  All bleeders were ligated and cauterized as necessary.  At this time, the flexor tendons was identified nicely and transected.  The toe was noticed in a more rectus position.  A plantar, medial, and lateral IPJ capsulotomy was created.  The wound was flushed with copious amounts of sterile normal saline.  Skin was reapproximated, coapted utilizing 4-0 nylon.  All incisions were dressed with Betadine solution, Adaptic,  and dry sterile compressive dressing consisting of 4 x 4s and Nilda.  The pneumatic ankle tourniquet was deflated on the left and prompt hyperemic response to all digits of the patient's left foot.  Attention was directed to right foot, where an Esmarch bandage was utilized to exsanguinate the patient's right foot.  Then, the pneumatic ankle tourniquet was inflated to 250 mmHg.  Attention was directed to dorsal aspect of second digit right  foot, where a 2 cm incision overlying the second digit on the right foot.  Incision was deepened down through subcutaneous tissues, being careful to identify and retract all vital neurovascular structures.  All bleeders were ligated and cauterized as necessary.  At this time, a capsular incision was made overlying the distal interphalangeal joint, thus exposing the head of the middle phalanx as the operative site.  At this point in time, the  head of the middle phalanx was resected and passed off toto.  Care was taken to make to correct the angular deformity in the transverse plane.  The wound was flushed with copious amounts of sterile normal saline.  Periosteum and capsular structures were reapproximated and coapted utilizing 3-0 Vicryl, and skin was reapproximated  and coapted utilizing 4-0 nylon simple interrupted suture technique.  Attention was directed to the second MPJ of the  right foot, where a 1 cm incision overlying the second MPJ on the right foot.  The incision was deepened down to subcutaneous tissue, being careful to identify and retract all vital neurovascular structures.  All bleeders were ligated and cauterized as necessary.  At the time, the extensor digitorum longus and brevis tendons identified, isolated, and transected.  The toe was noticed in a more plantar flexed position.  At this point in time, a  dorsal medial and lateral capsulotomy was created at the metatarsophalangeal joint allowing the toes to sit in a more plantar flexed position.  The wound was flushed with copious amounts of sterile normal saline.  The skin was reapproximated and coapted utilizing 4-0 nylon.  Attention was directed to plantar aspect of second digit on the right foot, where 1 cm incision underlying the IPJ on the plantar second digit right foot.  The incision was  deepened down to subcutaneous tissue, being careful to identify and retract all vital neurovascular structures.  All bleeders were ligated and cauterized as necessary.  At this time, a flexor tenotomy was created on the plantar aspect of second digit right foot, the brevis and longus tendon.  At this point in time, a plantar IPJ capsulotomy was created.  A plantar lateral and medial capsulotomy was performed.  The wound was flushed with copious  amounts of sterile normal saline.  Skin was reapproximated and coapted utilizing 4-0 nylon.  All incisions were dressed with Betadine solution, Adaptic, and dry sterile compressive dressing consisting of 4 x 4s and Nilda.  The pneumatic ankle tourniquet was deflated and prompt hyperemic response to all digits of the patient's right foot.  Following a period of postop monitoring, the patient will be discharged home with oral and written postop  instructions.      Shay Peters,  SYEDA                   MRN#:  [id number]  [place]wood LLCT#:  [id number]  DATE OF SURGERY:  0  4/19/2019    ROOM:     [facility] [place]  [facility]  DICTATED BY: Shay Peters D.P.M.  DD: 04/19/2019 DT: 04/19/2019 TD: 09:03 TT: 17:16 K#: [id number]    REPORT OF OPERATION   134

## 2021-09-29 NOTE — TELEPHONE ENCOUNTER
From: Cori Awad  To: Janneth Castañeda PA-C  Sent: 9/29/2021 2:38 PM CDT  Subject: Covid Booster    Hi David Spotted,   I am getting close to the time where I would get the booster if I am someone you think should get it.  I was wondering if you could a

## 2021-10-01 RX ORDER — PANTOPRAZOLE SODIUM 40 MG/1
TABLET, DELAYED RELEASE ORAL
Qty: 60 TABLET | Refills: 1 | Status: SHIPPED | OUTPATIENT
Start: 2021-10-01 | End: 2021-12-22

## 2021-10-05 ENCOUNTER — PATIENT MESSAGE (OUTPATIENT)
Dept: FAMILY MEDICINE CLINIC | Facility: CLINIC | Age: 41
End: 2021-10-05

## 2021-10-05 ENCOUNTER — TELEPHONE (OUTPATIENT)
Dept: FAMILY MEDICINE CLINIC | Facility: CLINIC | Age: 41
End: 2021-10-05

## 2021-10-05 DIAGNOSIS — J45.51 SEVERE PERSISTENT ASTHMA WITH EXACERBATION: ICD-10-CM

## 2021-10-05 DIAGNOSIS — J38.3 VOCAL CORD DYSFUNCTION: ICD-10-CM

## 2021-10-05 RX ORDER — ACETAMINOPHEN AND CODEINE PHOSPHATE 300; 30 MG/1; MG/1
TABLET ORAL EVERY 8 HOURS PRN
Qty: 10 TABLET | Refills: 0 | Status: CANCELLED | OUTPATIENT
Start: 2021-10-05 | End: 2021-10-15

## 2021-10-05 NOTE — TELEPHONE ENCOUNTER
Per Margarette Odell PA-C, the patient's was contacted to do a follow-up ACT over-the-phone. The patient scored a 17, which has improved from her previous score of 15.

## 2021-10-08 DIAGNOSIS — E44.1 MILD PROTEIN-CALORIE MALNUTRITION (HCC): ICD-10-CM

## 2021-10-08 DIAGNOSIS — E78.2 MIXED HYPERLIPIDEMIA: ICD-10-CM

## 2021-10-08 DIAGNOSIS — E66.9 OBESITY (BMI 30-39.9): ICD-10-CM

## 2021-10-08 DIAGNOSIS — Z98.84 HISTORY OF ROUX-EN-Y GASTRIC BYPASS: Primary | ICD-10-CM

## 2021-10-10 ENCOUNTER — PATIENT MESSAGE (OUTPATIENT)
Dept: FAMILY MEDICINE CLINIC | Facility: CLINIC | Age: 41
End: 2021-10-10

## 2021-10-11 ENCOUNTER — OFFICE VISIT (OUTPATIENT)
Dept: SURGERY | Facility: CLINIC | Age: 41
End: 2021-10-11
Payer: COMMERCIAL

## 2021-10-11 VITALS — BODY MASS INDEX: 32.01 KG/M2 | WEIGHT: 213.63 LBS | HEIGHT: 68.5 IN

## 2021-10-11 DIAGNOSIS — G44.221 CHRONIC TENSION-TYPE HEADACHE, INTRACTABLE: ICD-10-CM

## 2021-10-11 DIAGNOSIS — E44.1 MILD PROTEIN-CALORIE MALNUTRITION (HCC): ICD-10-CM

## 2021-10-11 DIAGNOSIS — E66.9 OBESITY (BMI 30-39.9): Primary | ICD-10-CM

## 2021-10-11 DIAGNOSIS — Z98.84 HISTORY OF ROUX-EN-Y GASTRIC BYPASS: ICD-10-CM

## 2021-10-11 DIAGNOSIS — F41.1 GAD (GENERALIZED ANXIETY DISORDER): ICD-10-CM

## 2021-10-11 PROCEDURE — 3008F BODY MASS INDEX DOCD: CPT

## 2021-10-11 PROCEDURE — 97803 MED NUTRITION INDIV SUBSEQ: CPT

## 2021-10-11 NOTE — PROGRESS NOTES
[facility]  [address] [place] [id number]  Dept: [phone]  Loc: [phone]    10/11/21      Bariatric Follow-up Nutrition Session    Cori Awad is a 39year old female. CHOLEST 143 04/17/2018    TRIG 72 04/17/2018    HDL 61 04/17/2018    LDL 68 04/17/2018    VLDL 19 09/23/2017    TCHDLRATIO 2.89 09/23/2017    NONHDLC 82 04/17/2018        Vitamins/Minerals:  Lab Results   Component Value Date    B12 400 07/28/2021     L RESPIMAT 2.5 MCG/ACT Inhalation Aero Soln, Inhale 2 puffs into the lungs daily. , Disp: 3 each, Rfl: 0  •  Calcium Carb-Cholecalciferol (CALCIUM 600/VITAMIN D3) 600-800 MG-UNIT Oral Tab, Take 1 tablet by mouth 3 (three) times daily. , Disp: , Rfl:   •  Bioti not typical for pt, pt had tooth pulled recently, molar in back        Breakfast      AM Snack       Lunch     PM Snack     Dinner   Rice pudding prune Skipping, works during lunch.   n/a Salad, bite of soup, bite of rice, bite of steak               Total Lunch might be skipped, leftovers from dinner. Dinner is meat. Snacks on yogurt, occasionally. Pt notes she is picky or selective eater, only likes to do ground beef or chili reheated at school.    Pt estimated to do 1366 or under, but pt notes she is alw Yohana Wise MS, RD, LDN  Bariatric Dietitian     Face-to-face time spent with pt: 90 minutes

## 2021-10-11 NOTE — PATIENT INSTRUCTIONS
Recommendations/goals:    Aim for 60 grams of protein. 1. Try lactaid lactase enzyme, over the counter options available. Take within first few bites of having dairy products. 2. Can try plant protein protein shakes, try orgain.  Try pea protein product

## 2021-10-11 NOTE — TELEPHONE ENCOUNTER
From: Laura Garcia  To: Marilin Rivero PA-C  Sent: 10/10/2021 8:28 PM CDT  Subject: Bowl movement     Umang Holbrook,   I currently have not had a bowl movement since last Monday or very early on Tuesday morning.  I have been taking Lilo Carlton

## 2021-10-12 DIAGNOSIS — G44.221 CHRONIC TENSION-TYPE HEADACHE, INTRACTABLE: ICD-10-CM

## 2021-10-12 DIAGNOSIS — F41.1 GAD (GENERALIZED ANXIETY DISORDER): ICD-10-CM

## 2021-10-12 RX ORDER — ALPRAZOLAM 0.25 MG/1
0.25 TABLET ORAL 2 TIMES DAILY PRN
Qty: 60 TABLET | Refills: 0 | Status: CANCELLED | OUTPATIENT
Start: 2021-10-12

## 2021-10-12 RX ORDER — ALPRAZOLAM 0.25 MG/1
TABLET ORAL
Qty: 60 TABLET | Refills: 0 | Status: SHIPPED | OUTPATIENT
Start: 2021-10-12 | End: 2021-11-09

## 2021-10-12 RX ORDER — BUTALBITAL/ACETAMINOPHEN 50MG-325MG
TABLET ORAL
Qty: 8 TABLET | Refills: 0 | Status: SHIPPED | OUTPATIENT
Start: 2021-10-12 | End: 2021-10-30

## 2021-10-12 RX ORDER — BUTALBITAL/ACETAMINOPHEN 50MG-325MG
0.5 TABLET ORAL 2 TIMES DAILY PRN
Qty: 16 TABLET | Refills: 0 | Status: CANCELLED | OUTPATIENT
Start: 2021-10-12

## 2021-10-12 NOTE — TELEPHONE ENCOUNTER
Butalbital-Acetaminophen  MG Oral Tab 16 tablet 0 9/17/2021    Sig:   Take 0.5 tablets by mouth 2 (two) times daily as needed.      Route:   Oral       ALPRAZolam 0.25 MG Oral Tab 60 tablet 0 9/13/2021    Sig:   Take 1 tablet (0.25 mg total) by mouth

## 2021-10-13 ENCOUNTER — LAB ENCOUNTER (OUTPATIENT)
Dept: LAB | Age: 41
End: 2021-10-13
Attending: FAMILY MEDICINE
Payer: COMMERCIAL

## 2021-10-13 DIAGNOSIS — Z00.00 LABORATORY EXAMINATION ORDERED AS PART OF A ROUTINE GENERAL MEDICAL EXAMINATION: ICD-10-CM

## 2021-10-13 DIAGNOSIS — Z01.84 IMMUNITY STATUS TESTING: ICD-10-CM

## 2021-10-13 PROCEDURE — 86769 SARS-COV-2 COVID-19 ANTIBODY: CPT | Performed by: FAMILY MEDICINE

## 2021-10-13 PROCEDURE — 80053 COMPREHEN METABOLIC PANEL: CPT | Performed by: FAMILY MEDICINE

## 2021-10-14 NOTE — PROGRESS NOTES
Covid 19 total antibody test is reactive i.e. positive for the antibodies. Complete metabolic panel normal kidney and liver function test.  Still slightly low albumin make sure you are getting adequate protein.

## 2021-10-15 ENCOUNTER — OFFICE VISIT (OUTPATIENT)
Dept: FAMILY MEDICINE CLINIC | Facility: CLINIC | Age: 41
End: 2021-10-15
Payer: COMMERCIAL

## 2021-10-15 VITALS
BODY MASS INDEX: 31.76 KG/M2 | WEIGHT: 212 LBS | DIASTOLIC BLOOD PRESSURE: 78 MMHG | TEMPERATURE: 98 F | HEART RATE: 96 BPM | SYSTOLIC BLOOD PRESSURE: 108 MMHG | HEIGHT: 68.5 IN

## 2021-10-15 DIAGNOSIS — G44.229 CHRONIC TENSION-TYPE HEADACHE, NOT INTRACTABLE: ICD-10-CM

## 2021-10-15 DIAGNOSIS — F51.01 PRIMARY INSOMNIA: ICD-10-CM

## 2021-10-15 DIAGNOSIS — J45.50 SEVERE PERSISTENT ASTHMA WITHOUT COMPLICATION: ICD-10-CM

## 2021-10-15 DIAGNOSIS — Z98.84 HISTORY OF ROUX-EN-Y GASTRIC BYPASS: ICD-10-CM

## 2021-10-15 DIAGNOSIS — F32.5 MAJOR DEPRESSION IN REMISSION (HCC): ICD-10-CM

## 2021-10-15 DIAGNOSIS — Z28.21 INFLUENZA VACCINE REFUSED: ICD-10-CM

## 2021-10-15 DIAGNOSIS — K58.2 IRRITABLE BOWEL SYNDROME WITH BOTH CONSTIPATION AND DIARRHEA: ICD-10-CM

## 2021-10-15 DIAGNOSIS — E88.09 HYPOALBUMINEMIA: Primary | ICD-10-CM

## 2021-10-15 DIAGNOSIS — F41.1 GAD (GENERALIZED ANXIETY DISORDER): ICD-10-CM

## 2021-10-15 PROCEDURE — 99215 OFFICE O/P EST HI 40 MIN: CPT | Performed by: FAMILY MEDICINE

## 2021-10-15 PROCEDURE — 3074F SYST BP LT 130 MM HG: CPT | Performed by: FAMILY MEDICINE

## 2021-10-15 PROCEDURE — 3078F DIAST BP <80 MM HG: CPT | Performed by: FAMILY MEDICINE

## 2021-10-15 PROCEDURE — 3008F BODY MASS INDEX DOCD: CPT | Performed by: FAMILY MEDICINE

## 2021-10-15 RX ORDER — ESCITALOPRAM OXALATE 10 MG/1
5 TABLET ORAL DAILY
Refills: 0 | COMMUNITY
Start: 2021-10-15 | End: 2021-11-01 | Stop reason: ALTCHOICE

## 2021-10-15 RX ORDER — TRAZODONE HYDROCHLORIDE 50 MG/1
50 TABLET ORAL NIGHTLY
Qty: 30 TABLET | Refills: 5 | Status: SHIPPED | OUTPATIENT
Start: 2021-10-15 | End: 2021-11-01

## 2021-10-15 NOTE — PROGRESS NOTES
Alis Hernadez is a 39year old female. HPI:   Patient is in for follow-up on generalized anxiety disorder, hypoalbuminemia, tension headaches, asthma and medication adjustments. Has polypharmacology and needs to reduce medications.     Generalized an low albumin levels in the past with occasional normal ones.   Chronic constipation  Was told not to use MiraLAX after having bariatric surgery a increased risk for obstruction use milk of magnesia and discussed with nutritionist from the bariatric office us 10/29/2020   ALKALINE PHOSPHATASE      37 - 98 U/L 69 78 58 65   Total Bilirubin      0.1 - 2.0 mg/dL 0.3 0.4 0.3 0.1   TOTAL PROTEIN      6.4 - 8.2 g/dL 6.4 6.8 5.0 (L) 6.0 (L)   Albumin      3.4 - 5.0 g/dL 3.0 (L) 3.5 2.6 (L) 3.0 (L)     Component      L MOUTH TWICE DAILY AS NEEDED.  60 tablet 0   • PANTOPRAZOLE 40 MG Oral Tab EC TAKE 1 TABLET(40 MG) BY MOUTH TWICE DAILY BEFORE MEALS 60 tablet 1   • ondansetron (ZOFRAN) 8 MG tablet TAKE 1 TABLET(8 MG) BY MOUTH EVERY 12 HOURS AS NEEDED FOR NAUSEA 30 tablet 0 MG Oral Tab Take 250 mg by mouth daily. • NON FORMULARY Take 1 tablet by mouth daily. BARIATRIC CHOICE VITAMIN     • aspirin 81 MG Oral Tab Take 81 mg by mouth daily.         Past Medical History:   Diagnosis Date   • Anxiety    • Arthritis    • Asthm without mass.    EYES: PERRLA, EOM intact, sclera clear without injection  NECK: supple,no adenopathy, thyroid normal to palpation  LUNGS: Mild expiratory wheeze in the upper lobes no accessory muscle use or retractions no rhonchi or crackles  CARDIO: RRR w (14)  - ALPHA-1-ANTITRYPSIN, SERUM  - JERMAINE AND PROTEIN ELECTROPHORESIS, SERUM  - MICROALB/CREAT RATIO, RANDOM URINE; Future  - C-REACTIVE PROTEIN; Future    2. OMAR (generalized anxiety disorder)  Taper down on Lexapro wants to try trazodone.   Discussed the ALPHA-1-ANTITRYPSIN, SERUM    6. Chronic tension-type headache, not intractable  Taper down on the butalbital to only 8/month. Uses tizanidine at night which helps with headaches and sleep    7.  Irritable bowel syndrome with both constipation and diarrhea

## 2021-10-16 ENCOUNTER — PATIENT MESSAGE (OUTPATIENT)
Dept: FAMILY MEDICINE CLINIC | Facility: CLINIC | Age: 41
End: 2021-10-16

## 2021-10-16 PROBLEM — J45.51 SEVERE PERSISTENT ASTHMA WITH EXACERBATION (HCC): Status: RESOLVED | Noted: 2021-05-17 | Resolved: 2021-10-16

## 2021-10-16 PROBLEM — J45.51 SEVERE PERSISTENT ASTHMA WITH EXACERBATION: Status: RESOLVED | Noted: 2021-05-17 | Resolved: 2021-10-16

## 2021-10-16 PROBLEM — F43.9 STRESS: Status: RESOLVED | Noted: 2020-04-17 | Resolved: 2021-10-16

## 2021-10-16 PROBLEM — E87.6 HYPOKALEMIA: Status: RESOLVED | Noted: 2019-02-23 | Resolved: 2021-10-16

## 2021-10-16 PROBLEM — Z98.890 STATUS POST LAPAROSCOPIC PROCEDURE: Status: RESOLVED | Noted: 2018-08-11 | Resolved: 2021-10-16

## 2021-10-16 PROBLEM — R63.5 WEIGHT GAIN: Status: RESOLVED | Noted: 2019-03-11 | Resolved: 2021-10-16

## 2021-10-16 PROBLEM — E88.09 HYPOALBUMINEMIA: Status: ACTIVE | Noted: 2021-10-16

## 2021-10-16 PROBLEM — Z98.84 HISTORY OF GASTRIC BYPASS: Status: RESOLVED | Noted: 2020-07-06 | Resolved: 2021-10-16

## 2021-10-17 ENCOUNTER — PATIENT MESSAGE (OUTPATIENT)
Dept: FAMILY MEDICINE CLINIC | Facility: CLINIC | Age: 41
End: 2021-10-17

## 2021-10-18 ENCOUNTER — PATIENT MESSAGE (OUTPATIENT)
Dept: FAMILY MEDICINE CLINIC | Facility: CLINIC | Age: 41
End: 2021-10-18

## 2021-10-18 NOTE — TELEPHONE ENCOUNTER
From: Galilea Vazquez  To: Anthony Giang PA-C  Sent: 10/17/2021 4:23 PM CDT  Subject: Status    Hi Harriet Camarena,   I made an appointment tomorrow afternoon at the [facility] in [facility]. I’ll go if Dana Neal is the provider tomorrow.  My cough a

## 2021-10-19 ENCOUNTER — PATIENT MESSAGE (OUTPATIENT)
Dept: FAMILY MEDICINE CLINIC | Facility: CLINIC | Age: 41
End: 2021-10-19

## 2021-10-19 NOTE — TELEPHONE ENCOUNTER
From: Lucien James  To: Coleen Cancino PA-C  Sent: 10/18/2021 5:21 PM CDT  Subject: Immediate Care Visit     Hi Senait Mcneal,   I was able to see Leelee Mares at [facility] in [facility].  She listened to my lungs and melvin d that they are

## 2021-10-22 ENCOUNTER — LAB ENCOUNTER (OUTPATIENT)
Dept: LAB | Age: 41
End: 2021-10-22
Attending: FAMILY MEDICINE
Payer: COMMERCIAL

## 2021-10-22 ENCOUNTER — OFFICE VISIT (OUTPATIENT)
Dept: FAMILY MEDICINE CLINIC | Facility: CLINIC | Age: 41
End: 2021-10-22
Payer: COMMERCIAL

## 2021-10-22 VITALS
DIASTOLIC BLOOD PRESSURE: 70 MMHG | SYSTOLIC BLOOD PRESSURE: 118 MMHG | WEIGHT: 212 LBS | BODY MASS INDEX: 31.76 KG/M2 | TEMPERATURE: 99 F | OXYGEN SATURATION: 98 % | HEIGHT: 68.5 IN | HEART RATE: 96 BPM

## 2021-10-22 DIAGNOSIS — J45.51 SEVERE PERSISTENT ASTHMA WITH EXACERBATION: ICD-10-CM

## 2021-10-22 DIAGNOSIS — F41.1 GAD (GENERALIZED ANXIETY DISORDER): ICD-10-CM

## 2021-10-22 DIAGNOSIS — E88.09 HYPOALBUMINEMIA: Primary | ICD-10-CM

## 2021-10-22 DIAGNOSIS — J38.3 VOCAL CORD DYSFUNCTION: ICD-10-CM

## 2021-10-22 PROBLEM — E88.01 LOW PLASMA ALPHA-1 ANTITRYPSIN (HCC): Status: ACTIVE | Noted: 2021-10-22

## 2021-10-22 PROCEDURE — 82570 ASSAY OF URINE CREATININE: CPT | Performed by: FAMILY MEDICINE

## 2021-10-22 PROCEDURE — 99214 OFFICE O/P EST MOD 30 MIN: CPT | Performed by: FAMILY MEDICINE

## 2021-10-22 PROCEDURE — 3078F DIAST BP <80 MM HG: CPT | Performed by: FAMILY MEDICINE

## 2021-10-22 PROCEDURE — 3008F BODY MASS INDEX DOCD: CPT | Performed by: FAMILY MEDICINE

## 2021-10-22 PROCEDURE — 82043 UR ALBUMIN QUANTITATIVE: CPT | Performed by: FAMILY MEDICINE

## 2021-10-22 PROCEDURE — 3074F SYST BP LT 130 MM HG: CPT | Performed by: FAMILY MEDICINE

## 2021-10-22 PROCEDURE — 86140 C-REACTIVE PROTEIN: CPT | Performed by: FAMILY MEDICINE

## 2021-10-22 RX ORDER — ACETAMINOPHEN AND CODEINE PHOSPHATE 300; 30 MG/1; MG/1
TABLET ORAL EVERY 8 HOURS PRN
Qty: 15 TABLET | Refills: 0 | Status: SHIPPED | OUTPATIENT
Start: 2021-10-22 | End: 2021-11-01 | Stop reason: ALTCHOICE

## 2021-10-22 NOTE — PATIENT INSTRUCTIONS
Prednisone 30 mg for 3 days then 20 mg for 3 days then 10 mg for 2 days. Codeine 1/2 every 4-6 hours over weekend.

## 2021-10-22 NOTE — PROGRESS NOTES
Lorena Catherine is a 39year old female. HPI:   Symptoms started getting worse over weekend with the weather change.   Patient is presently on Symbicort, Spiriva and albuterol as needed has nebulizer if necessary presently states her symptoms are progre Take 0.5-1 tablets by mouth every 8 (eight) hours as needed for Pain (for acute asthma cough control). 15 tablet 0   • escitalopram 10 MG Oral Tab Take 0.5 tablets (5 mg total) by mouth daily.   0   • traZODone 50 MG Oral Tab Take 1 tablet (50 mg total) by tablet 3   • Cholecalciferol (VITAMIN D) 50 MCG (2000 UT) Oral Cap Take 2,000 Units by mouth 2 (two) times a day. • Nystatin 902725 UNIT/GM External Powder Apply 1 Application topically 4 (four) times daily.  Apply to affected areas 30 g 1   • CONTOUR Location: Right arm, Patient Position: Sitting, Cuff Size: adult)   Pulse 96   Temp 98.8 °F (37.1 °C) (Temporal)   Ht 5' 8.5\" (1.74 m)   Wt 212 lb (96.2 kg)   LMP 05/12/2013 (Within Days)   SpO2 98%   BMI 31.77 kg/m²   GENERAL: well developed, well nouris acute asthma cough control). Dispense: 15 tablet; Refill: 0  Start decreasing Lexapro may need to use higher dose of trazodone for sleep when she is on prednisone. Presently using 50 mg working well has been able to stop mirtazapine and Ambien.   Does sta

## 2021-10-23 ENCOUNTER — LAB ENCOUNTER (OUTPATIENT)
Dept: LAB | Age: 41
End: 2021-10-23
Attending: FAMILY MEDICINE
Payer: COMMERCIAL

## 2021-10-23 DIAGNOSIS — E88.01 LOW PLASMA ALPHA-1 ANTITRYPSIN (HCC): ICD-10-CM

## 2021-10-23 PROCEDURE — 82104 ALPHA-1-ANTITRYPSIN PHENO: CPT | Performed by: FAMILY MEDICINE

## 2021-10-23 PROCEDURE — 82103 ALPHA-1-ANTITRYPSIN TOTAL: CPT | Performed by: FAMILY MEDICINE

## 2021-10-23 RX ORDER — PREDNISONE 10 MG/1
TABLET ORAL
Qty: 17 TABLET | Refills: 0 | Status: SHIPPED | OUTPATIENT
Start: 2021-10-23 | End: 2021-10-31

## 2021-10-23 NOTE — PROGRESS NOTES
Albumin is normal.  Liver function and kidney function tests are normal.  Alpha 1 antitrypsin serum is low adding alpha 1 antitrypsin phenotype

## 2021-10-25 ENCOUNTER — PATIENT MESSAGE (OUTPATIENT)
Dept: FAMILY MEDICINE CLINIC | Facility: CLINIC | Age: 41
End: 2021-10-25

## 2021-10-26 RX ORDER — ACETAMINOPHEN AND CODEINE PHOSPHATE 300; 30 MG/1; MG/1
TABLET ORAL
Qty: 15 TABLET | Refills: 0 | Status: SHIPPED | OUTPATIENT
Start: 2021-10-26 | End: 2021-10-30

## 2021-10-26 NOTE — TELEPHONE ENCOUNTER
From: Xuan Pastor  To: Shelby Gallego PA-C  Sent: 10/25/2021 2:50 PM CDT  Subject: Current progress    Umang Suarez,   Unfortunately, I am not doing any better than I was doing on Friday.  I do not feel as though I am necessarily getting any worse,

## 2021-10-26 NOTE — TELEPHONE ENCOUNTER
Have her take 2 more days of the 30 mg then go back down to 20 mg for 3 days see if she needs prescription sent over. Give another #15 of Tylenol 3 take 0.5 to 1 tablet every 4-6 hours as needed for cough.

## 2021-10-28 NOTE — PROGRESS NOTES
I copied this from the [employer] website hopefully it helps, still would encourage the pulmonology evaluation. Sounds like the variant you have would not predispose you to COPD unless you were a smoker.     People with the MZ genotype do not have

## 2021-10-29 ENCOUNTER — HOSPITAL ENCOUNTER (EMERGENCY)
Age: 41
Discharge: HOME | End: 2021-10-29
Attending: EMERGENCY MEDICINE
Payer: COMMERCIAL

## 2021-10-29 ENCOUNTER — APPOINTMENT (OUTPATIENT)
Dept: GENERAL RADIOLOGY | Age: 41
End: 2021-10-29
Attending: EMERGENCY MEDICINE
Payer: COMMERCIAL

## 2021-10-29 VITALS
WEIGHT: 210 LBS | DIASTOLIC BLOOD PRESSURE: 64 MMHG | OXYGEN SATURATION: 97 % | TEMPERATURE: 99 F | HEIGHT: 68 IN | HEART RATE: 76 BPM | RESPIRATION RATE: 20 BRPM | BODY MASS INDEX: 31.83 KG/M2 | SYSTOLIC BLOOD PRESSURE: 139 MMHG

## 2021-10-29 DIAGNOSIS — J45.21 MILD INTERMITTENT ASTHMA WITH EXACERBATION: Primary | ICD-10-CM

## 2021-10-29 PROCEDURE — 96374 THER/PROPH/DIAG INJ IV PUSH: CPT

## 2021-10-29 PROCEDURE — 94640 AIRWAY INHALATION TREATMENT: CPT

## 2021-10-29 PROCEDURE — 71045 X-RAY EXAM CHEST 1 VIEW: CPT | Performed by: EMERGENCY MEDICINE

## 2021-10-29 PROCEDURE — 99284 EMERGENCY DEPT VISIT MOD MDM: CPT

## 2021-10-29 RX ORDER — METHYLPREDNISOLONE SODIUM SUCCINATE 125 MG/2ML
125 INJECTION, POWDER, LYOPHILIZED, FOR SOLUTION INTRAMUSCULAR; INTRAVENOUS ONCE
Status: COMPLETED | OUTPATIENT
Start: 2021-10-29 | End: 2021-10-29

## 2021-10-29 RX ORDER — BENZONATATE 100 MG/1
100 CAPSULE ORAL 3 TIMES DAILY PRN
Qty: 30 CAPSULE | Refills: 0 | Status: SHIPPED | OUTPATIENT
Start: 2021-10-29 | End: 2021-11-01

## 2021-10-29 RX ORDER — PREDNISONE 20 MG/1
40 TABLET ORAL DAILY
Qty: 10 TABLET | Refills: 0 | Status: SHIPPED | OUTPATIENT
Start: 2021-10-29 | End: 2021-11-03

## 2021-10-29 RX ORDER — IPRATROPIUM BROMIDE AND ALBUTEROL SULFATE 2.5; .5 MG/3ML; MG/3ML
3 SOLUTION RESPIRATORY (INHALATION) ONCE
Status: COMPLETED | OUTPATIENT
Start: 2021-10-29 | End: 2021-10-29

## 2021-10-29 RX ORDER — IPRATROPIUM BROMIDE AND ALBUTEROL SULFATE 2.5; .5 MG/3ML; MG/3ML
SOLUTION RESPIRATORY (INHALATION)
Status: COMPLETED
Start: 2021-10-29 | End: 2021-10-29

## 2021-10-30 ENCOUNTER — TELEPHONE (OUTPATIENT)
Dept: FAMILY MEDICINE CLINIC | Facility: CLINIC | Age: 41
End: 2021-10-30

## 2021-10-30 DIAGNOSIS — G44.221 CHRONIC TENSION-TYPE HEADACHE, INTRACTABLE: ICD-10-CM

## 2021-10-30 RX ORDER — ACETAMINOPHEN AND CODEINE PHOSPHATE 300; 30 MG/1; MG/1
TABLET ORAL
Qty: 10 TABLET | Refills: 0 | Status: SHIPPED | OUTPATIENT
Start: 2021-10-30 | End: 2021-11-01

## 2021-10-30 NOTE — ED INITIAL ASSESSMENT (HPI)
States 10 days of tight cough, bronchospasm and wheezing.  Patient has continuous cough states nebs are not helping at home

## 2021-10-30 NOTE — ED PROVIDER NOTES
Patient Seen in: THE [facility] In [place]      History   Patient presents with:  Difficulty Breathing    Stated Complaint: cough and emilia for 10 days    Subjective:   HPI    This is a pleasant 80-year-old female with a history of asthma w [facility]   • D & C      x4   • DILATION/CURETTAGE,DIAGNOSTIC     • EGD  03/31/2021   • GASTRIC BYPASS,OBESE<100CM MARCIO-EN-Y  12/18/2017    DR. SEGAL   • GASTRIC BYPASS,OBESITY,SB RECONSTRUC     • GASTROCNEMIUS RECESSION Left 6/19/2015    Procedure: GASTROC RE use: No             Review of Systems    Positive for stated complaint: cough and emilia for 10 days  Other systems are as noted in HPI. Constitutional and vital signs reviewed. All other systems reviewed and negative except as noted above.     Physical Impression:  Mild intermittent asthma with exacerbation  (primary encounter diagnosis)     Disposition:  Discharge  10/29/2021 10:11 pm    Follow-up:  Mad[facility] [address]  [address]  [phone]    In 1 week

## 2021-11-01 ENCOUNTER — OFFICE VISIT (OUTPATIENT)
Dept: FAMILY MEDICINE CLINIC | Facility: CLINIC | Age: 41
End: 2021-11-01
Payer: COMMERCIAL

## 2021-11-01 VITALS
HEIGHT: 68.5 IN | DIASTOLIC BLOOD PRESSURE: 64 MMHG | BODY MASS INDEX: 32.81 KG/M2 | TEMPERATURE: 98 F | WEIGHT: 219 LBS | SYSTOLIC BLOOD PRESSURE: 100 MMHG | OXYGEN SATURATION: 99 % | HEART RATE: 120 BPM

## 2021-11-01 DIAGNOSIS — J45.51 SEVERE PERSISTENT ASTHMA WITH EXACERBATION: Primary | ICD-10-CM

## 2021-11-01 DIAGNOSIS — F51.01 PRIMARY INSOMNIA: ICD-10-CM

## 2021-11-01 PROCEDURE — 3078F DIAST BP <80 MM HG: CPT | Performed by: FAMILY MEDICINE

## 2021-11-01 PROCEDURE — 99214 OFFICE O/P EST MOD 30 MIN: CPT | Performed by: FAMILY MEDICINE

## 2021-11-01 PROCEDURE — 3008F BODY MASS INDEX DOCD: CPT | Performed by: FAMILY MEDICINE

## 2021-11-01 PROCEDURE — 3074F SYST BP LT 130 MM HG: CPT | Performed by: FAMILY MEDICINE

## 2021-11-01 RX ORDER — ACETAMINOPHEN AND CODEINE PHOSPHATE 300; 30 MG/1; MG/1
1 TABLET ORAL
Qty: 20 TABLET | Refills: 0 | Status: SHIPPED | OUTPATIENT
Start: 2021-11-01 | End: 2021-11-24 | Stop reason: ALTCHOICE

## 2021-11-01 RX ORDER — TRAZODONE HYDROCHLORIDE 100 MG/1
100 TABLET ORAL NIGHTLY
Qty: 30 TABLET | Refills: 1 | Status: SHIPPED | OUTPATIENT
Start: 2021-11-01 | End: 2021-11-15

## 2021-11-01 RX ORDER — PREDNISONE 10 MG/1
TABLET ORAL
Qty: 20 TABLET | Refills: 0 | Status: SHIPPED | OUTPATIENT
Start: 2021-11-01 | End: 2021-11-10

## 2021-11-01 RX ORDER — TIZANIDINE 4 MG/1
TABLET ORAL
Qty: 30 TABLET | Refills: 1 | Status: SHIPPED | OUTPATIENT
Start: 2021-11-01 | End: 2021-12-10

## 2021-11-01 RX ORDER — BUTALBITAL/ACETAMINOPHEN 50MG-325MG
0.5 TABLET ORAL 2 TIMES DAILY PRN
Qty: 8 TABLET | Refills: 0 | Status: SHIPPED | OUTPATIENT
Start: 2021-11-01 | End: 2021-11-22

## 2021-11-01 NOTE — TELEPHONE ENCOUNTER
BUTALBITAL-ACETAMINOPHEN  MG Oral Tab 8 tablet 0 10/12/2021    Sig:   TAKE 1/2 TABLET BY MOUTH TWICE DAILY AS NEEDED       tiZANidine 4 MG Oral Tab 30 tablet 1 9/13/2021    Sig:   Take 1/2-1 tab nightly     Route:   (none)     Note to Pharmacy:   Dis

## 2021-11-01 NOTE — PROGRESS NOTES
Cara Travis is a 39year old female. HPI:   Patient is in for follow-up on asthma exacerbation. Went to the ER on Friday given a Solu-Medrol dose through IV and restarted on prednisone 40 mg.   This will be the ninth day of 40 mg prednisone will ha Results for orders placed or performed during the hospital encounter of 10/29/21   Rapid SARS-CoV-2 by PCR    Specimen: Nares;  Other   Result Value Ref Range    Rapid SARS-CoV-2 by PCR Not Detected Not Detected     *Note: Due to a large number of res by mouth 3 (three) times daily. • Biotin 84484 MCG Oral Tab Take 1 tablet by mouth daily.      • Albuterol Sulfate  (90 Base) MCG/ACT Inhalation Aero Soln Inhale 2 puffs into the lungs every 4 (four) hours as needed for Wheezing or Shortness of B • Visual impairment     glasses   • Vocal cord dysfunction    • Weight gain 3/11/2019      Social History:  Social History    Tobacco Use      Smoking status: Never Smoker      Smokeless tobacco: Never Used    Vaping Use      Vaping Use: Never used    Al encounter.       Meds & Refills for this Visit:  Requested Prescriptions     Signed Prescriptions Disp Refills   • predniSONE 10 MG Oral Tab 20 tablet 0     Sig: Take 3 tablets (30 mg total) by mouth daily for 4 days, THEN 2 tablets (20 mg total) daily for work and radiology tests. The patient indicates understanding of these issues and agrees to the plan. The patient is asked to return in 1 week if needed.

## 2021-11-02 ENCOUNTER — HOSPITAL ENCOUNTER (EMERGENCY)
Age: 41
Discharge: HOME | End: 2021-11-02
Attending: EMERGENCY MEDICINE
Payer: COMMERCIAL

## 2021-11-02 VITALS
TEMPERATURE: 98 F | WEIGHT: 215 LBS | HEART RATE: 83 BPM | OXYGEN SATURATION: 96 % | RESPIRATION RATE: 12 BRPM | BODY MASS INDEX: 32 KG/M2 | SYSTOLIC BLOOD PRESSURE: 114 MMHG | DIASTOLIC BLOOD PRESSURE: 71 MMHG

## 2021-11-02 DIAGNOSIS — R06.2 WHEEZING: Primary | ICD-10-CM

## 2021-11-02 DIAGNOSIS — R05.9 COUGH: ICD-10-CM

## 2021-11-02 PROBLEM — F13.20 SEDATIVE, HYPNOTIC OR ANXIOLYTIC DEPENDENCE, UNCOMPLICATED (HCC): Status: ACTIVE | Noted: 2021-11-02

## 2021-11-02 PROCEDURE — 99284 EMERGENCY DEPT VISIT MOD MDM: CPT | Performed by: EMERGENCY MEDICINE

## 2021-11-02 PROCEDURE — 94640 AIRWAY INHALATION TREATMENT: CPT | Performed by: EMERGENCY MEDICINE

## 2021-11-02 PROCEDURE — S0077 INJECTION, CLINDAMYCIN PHOSP: HCPCS | Performed by: EMERGENCY MEDICINE

## 2021-11-02 PROCEDURE — 96375 TX/PRO/DX INJ NEW DRUG ADDON: CPT | Performed by: EMERGENCY MEDICINE

## 2021-11-02 PROCEDURE — 96365 THER/PROPH/DIAG IV INF INIT: CPT | Performed by: EMERGENCY MEDICINE

## 2021-11-02 RX ORDER — CLINDAMYCIN HYDROCHLORIDE 150 MG/1
450 CAPSULE ORAL 3 TIMES DAILY
Qty: 36 CAPSULE | Refills: 0 | Status: SHIPPED | OUTPATIENT
Start: 2021-11-03 | End: 2021-11-07

## 2021-11-02 RX ORDER — ONDANSETRON 2 MG/ML
4 INJECTION INTRAMUSCULAR; INTRAVENOUS ONCE
Status: COMPLETED | OUTPATIENT
Start: 2021-11-02 | End: 2021-11-02

## 2021-11-02 RX ORDER — ALBUTEROL SULFATE 90 UG/1
8 AEROSOL, METERED RESPIRATORY (INHALATION) ONCE
Status: COMPLETED | OUTPATIENT
Start: 2021-11-02 | End: 2021-11-02

## 2021-11-02 RX ORDER — CLINDAMYCIN PHOSPHATE 600 MG/50ML
600 INJECTION INTRAVENOUS ONCE
Status: COMPLETED | OUTPATIENT
Start: 2021-11-02 | End: 2021-11-02

## 2021-11-02 RX ORDER — IPRATROPIUM BROMIDE AND ALBUTEROL SULFATE 2.5; .5 MG/3ML; MG/3ML
3 SOLUTION RESPIRATORY (INHALATION) ONCE
Status: COMPLETED | OUTPATIENT
Start: 2021-11-02 | End: 2021-11-02

## 2021-11-02 RX ORDER — DEXAMETHASONE 4 MG/1
10 TABLET ORAL ONCE
Status: COMPLETED | OUTPATIENT
Start: 2021-11-02 | End: 2021-11-02

## 2021-11-03 ENCOUNTER — PATIENT MESSAGE (OUTPATIENT)
Dept: FAMILY MEDICINE CLINIC | Facility: CLINIC | Age: 41
End: 2021-11-03

## 2021-11-03 NOTE — TELEPHONE ENCOUNTER
From: Laura Garcia  To: Marilin Rivero PA-C  Sent: 11/3/2021 12:37 PM CDT  Subject: Current Progress    Hi Carlso Holbrook,   I was in the ER again last night. They gave me an inhaler, then a duoneb.  The doctor then gave me oral steriods, but gave me Dec
(4) excellent

## 2021-11-03 NOTE — ED PROVIDER NOTES
Patient Seen in: THE [facility] Emergency Department In [place]      History   Patient presents with:  Difficulty Breathing    Stated Complaint: ELISE    Subjective:   57-year-old female, multiple comorbidities including severe asthma, presents with wheezing an BYPASS,OBESITY,SB RECONSTRUC     • GASTROCNEMIUS RECESSION Left 6/19/2015    Procedure: GASTROC RECESSION FOOT;  Surgeon: Adalberto Ordonez MD;  Location: [facility]   • HYSTERECTOMY  2013   • HYSTEROSCOPY  2011   • INJ PARAVERT F JNT L/S 1 LEV Bilate breath and wheezing. Cardiovascular: Negative for chest pain, palpitations and leg swelling. Gastrointestinal: Negative for abdominal pain, nausea and vomiting. Endocrine: Negative. Genitourinary: Negative. Musculoskeletal: Negative.     Skin: Tenderness: There is no abdominal tenderness. Musculoskeletal:      Cervical back: Normal range of motion and neck supple. Skin:     General: Skin is warm and dry. Coloration: Skin is not cyanotic or pale.    Neurological:      General: No focal de clindamycin for the next 4 days as an outpatient. Encourage her to call her pulmonologist tomorrow. Very strict return precaution provided. Shared decision-making utilized. DC home at her request after long discussion at bedside.

## 2021-11-04 RX ORDER — ACETAMINOPHEN AND CODEINE PHOSPHATE 300; 30 MG/1; MG/1
TABLET ORAL
Qty: 15 TABLET | Refills: 0 | Status: SHIPPED | OUTPATIENT
Start: 2021-11-04 | End: 2021-11-12

## 2021-11-07 DIAGNOSIS — F41.1 GAD (GENERALIZED ANXIETY DISORDER): ICD-10-CM

## 2021-11-08 ENCOUNTER — MOBILE ENCOUNTER (OUTPATIENT)
Dept: FAMILY MEDICINE CLINIC | Facility: CLINIC | Age: 41
End: 2021-11-08

## 2021-11-08 ENCOUNTER — HOSPITAL ENCOUNTER (EMERGENCY)
Age: 41
Discharge: HOME | End: 2021-11-08
Attending: EMERGENCY MEDICINE
Payer: COMMERCIAL

## 2021-11-08 VITALS
RESPIRATION RATE: 22 BRPM | OXYGEN SATURATION: 100 % | BODY MASS INDEX: 32 KG/M2 | HEART RATE: 87 BPM | WEIGHT: 212 LBS | DIASTOLIC BLOOD PRESSURE: 88 MMHG | TEMPERATURE: 98 F | SYSTOLIC BLOOD PRESSURE: 117 MMHG

## 2021-11-08 DIAGNOSIS — F41.9 ANXIETY: ICD-10-CM

## 2021-11-08 DIAGNOSIS — J38.3 VOCAL CORD DYSFUNCTION: Primary | ICD-10-CM

## 2021-11-08 PROCEDURE — 99283 EMERGENCY DEPT VISIT LOW MDM: CPT

## 2021-11-09 RX ORDER — ALPRAZOLAM 0.25 MG/1
TABLET ORAL
Qty: 60 TABLET | Refills: 0 | Status: SHIPPED | OUTPATIENT
Start: 2021-11-09 | End: 2021-12-06

## 2021-11-09 NOTE — ED PROVIDER NOTES
Patient Seen in: THE [facility] In [place]      History   Patient presents with:  Difficulty Breathing    Stated Complaint: Difficulty breathing, unable to take a deep breath.  Seen on the 29th of October*    Subjective:   HPI    41-year-o HPI.  Constitutional and vital signs reviewed. All other systems reviewed and negative except as noted above.     Physical Exam     ED Triage Vitals [11/08/21 1909]   /78   Pulse 91   Resp 18   Temp 97.5 °F (36.4 °C)   Temp src Temporal   SpO2 10 her symptoms. She became upset after discussing this as she disagrees. The patient believes that she needs admission and further work-up. She has had an extensive work-up and is closely followed by primary care and pulmonology.     I have discussed wit

## 2021-11-09 NOTE — PROGRESS NOTES
Patient called the on-call service today reporting Ongoing shortness of breath, coughing, difficulty breathing, dyspnea on exertion for the last two weeks approximately. She has had three ER visits most recently today.  She had a negative Covid test. She ha

## 2021-11-09 NOTE — TELEPHONE ENCOUNTER
ALPRAZOLAM 0.25 MG Oral Tab 60 tablet 0 10/12/2021    Sig:   TAKE 1 TABLET BY MOUTH TWICE DAILY AS NEEDED.        LOV 11/1/21  FOV 11/24/21

## 2021-11-10 ENCOUNTER — PATIENT MESSAGE (OUTPATIENT)
Dept: FAMILY MEDICINE CLINIC | Facility: CLINIC | Age: 41
End: 2021-11-10

## 2021-11-10 NOTE — TELEPHONE ENCOUNTER
From: Ben Kendall  To:  Chavo Suarez PA-C  Sent: 11/10/2021 3:36 PM CST  Subject: Released     Hi BODØ,   I was released from the hospital. I’m trying to get into Dr. Baez Rising as soon as I can and also need to contact the bariatric surgeon to

## 2021-11-11 ENCOUNTER — TELEPHONE (OUTPATIENT)
Dept: FAMILY MEDICINE CLINIC | Facility: CLINIC | Age: 41
End: 2021-11-11

## 2021-11-11 ENCOUNTER — PATIENT MESSAGE (OUTPATIENT)
Dept: FAMILY MEDICINE CLINIC | Facility: CLINIC | Age: 41
End: 2021-11-11

## 2021-11-11 DIAGNOSIS — E83.51 LOW CALCIUM LEVELS: Primary | ICD-10-CM

## 2021-11-11 NOTE — TELEPHONE ENCOUNTER
----- Message from Lluvia Guerrero PA-C sent at 11/11/2021  5:50 AM CST -----  Order vitamin D level secondary to low calcium level  Order future tests/medications sent to my chart

## 2021-11-11 NOTE — PROGRESS NOTES
Order vitamin D level secondary to low calcium level  Order future tests/medications sent to my chart

## 2021-11-11 NOTE — TELEPHONE ENCOUNTER
From: Troy Cortez  To: Cara Travis  Sent: 11/11/2021 8:59 AM CST  Subject: lab    Hi Evert Murphy has placed an order for a vit D level. You can complete at an [facility] at your next convenience. Thank you. Fasting is not necessary.

## 2021-11-15 ENCOUNTER — PATIENT MESSAGE (OUTPATIENT)
Dept: FAMILY MEDICINE CLINIC | Facility: CLINIC | Age: 41
End: 2021-11-15

## 2021-11-15 RX ORDER — TRAZODONE HYDROCHLORIDE 150 MG/1
150 TABLET ORAL NIGHTLY
Qty: 90 TABLET | Refills: 0 | Status: SHIPPED | OUTPATIENT
Start: 2021-11-15 | End: 2022-01-17 | Stop reason: DRUGHIGH

## 2021-11-15 NOTE — TELEPHONE ENCOUNTER
From: Dotty Castillo  To:  Benton Stevens PA-C  Sent: 11/15/2021 12:22 PM CST  Subject: Trazadone     [facility]  I have been doing 150 mg of the trazodone however I only have about four nights left Because I had the hundred milligram pills and I wa

## 2021-11-17 ENCOUNTER — TELEPHONE (OUTPATIENT)
Dept: HEMATOLOGY/ONCOLOGY | Facility: HOSPITAL | Age: 41
End: 2021-11-17

## 2021-11-17 PROBLEM — E88.01 ALPHA-1-ANTITRYPSIN DEFICIENCY (HCC): Status: ACTIVE | Noted: 2021-11-17

## 2021-11-17 RX ORDER — METHYLPREDNISOLONE SODIUM SUCCINATE 125 MG/2ML
125 INJECTION, POWDER, LYOPHILIZED, FOR SOLUTION INTRAMUSCULAR; INTRAVENOUS ONCE
Status: CANCELLED | OUTPATIENT
Start: 2021-11-17

## 2021-11-17 RX ORDER — MEPERIDINE HYDROCHLORIDE 25 MG/ML
25 INJECTION INTRAMUSCULAR; INTRAVENOUS; SUBCUTANEOUS ONCE
Status: CANCELLED | OUTPATIENT
Start: 2021-11-17

## 2021-11-17 RX ORDER — METHYLPREDNISOLONE SODIUM SUCCINATE 40 MG/ML
40 INJECTION, POWDER, LYOPHILIZED, FOR SOLUTION INTRAMUSCULAR; INTRAVENOUS ONCE
Status: CANCELLED | OUTPATIENT
Start: 2021-11-17

## 2021-11-17 RX ORDER — DIPHENHYDRAMINE HYDROCHLORIDE 50 MG/ML
25 INJECTION INTRAMUSCULAR; INTRAVENOUS ONCE
Status: CANCELLED | OUTPATIENT
Start: 2021-11-17

## 2021-11-17 RX ORDER — FAMOTIDINE 10 MG/ML
20 INJECTION, SOLUTION INTRAVENOUS ONCE
Status: CANCELLED | OUTPATIENT
Start: 2021-11-17

## 2021-11-17 NOTE — TELEPHONE ENCOUNTER
Returning call to [place] at [facility]. Prolastin orders and generated referral entered. Will enter therapy plan in epic to send to billing que. Office aware they need to call for auth and provide documentation prior to [facility] contacting patient to schedule.  They

## 2021-11-19 DIAGNOSIS — R63.2 BINGE EATING: ICD-10-CM

## 2021-11-19 DIAGNOSIS — E66.9 OBESITY (BMI 30-39.9): Primary | ICD-10-CM

## 2021-11-21 DIAGNOSIS — G44.221 CHRONIC TENSION-TYPE HEADACHE, INTRACTABLE: ICD-10-CM

## 2021-11-22 RX ORDER — BUTALBITAL/ACETAMINOPHEN 50MG-325MG
TABLET ORAL
Qty: 8 TABLET | Refills: 0 | Status: SHIPPED | OUTPATIENT
Start: 2021-11-22 | End: 2021-12-13

## 2021-11-22 NOTE — TELEPHONE ENCOUNTER
Butalbital-Acetaminophen  MG Oral Tab 8 tablet 0 11/1/2021    Sig:   Take 0.5 tablets by mouth 2 (two) times daily as needed.        LOV 11/1/21  FOV 11/24/21

## 2021-11-23 ENCOUNTER — LAB ENCOUNTER (OUTPATIENT)
Dept: LAB | Age: 41
End: 2021-11-23
Attending: FAMILY MEDICINE
Payer: COMMERCIAL

## 2021-11-23 DIAGNOSIS — E83.51 LOW CALCIUM LEVELS: ICD-10-CM

## 2021-11-23 PROCEDURE — 82306 VITAMIN D 25 HYDROXY: CPT | Performed by: FAMILY MEDICINE

## 2021-11-24 ENCOUNTER — OFFICE VISIT (OUTPATIENT)
Dept: FAMILY MEDICINE CLINIC | Facility: CLINIC | Age: 41
End: 2021-11-24
Payer: COMMERCIAL

## 2021-11-24 VITALS
SYSTOLIC BLOOD PRESSURE: 102 MMHG | HEART RATE: 84 BPM | TEMPERATURE: 98 F | WEIGHT: 213 LBS | HEIGHT: 68.5 IN | BODY MASS INDEX: 31.91 KG/M2 | OXYGEN SATURATION: 98 % | DIASTOLIC BLOOD PRESSURE: 60 MMHG

## 2021-11-24 DIAGNOSIS — G44.229 CHRONIC TENSION-TYPE HEADACHE, NOT INTRACTABLE: Primary | ICD-10-CM

## 2021-11-24 DIAGNOSIS — T39.95XA ANALGESIC REBOUND HEADACHE: ICD-10-CM

## 2021-11-24 DIAGNOSIS — G44.40 ANALGESIC REBOUND HEADACHE: ICD-10-CM

## 2021-11-24 DIAGNOSIS — J45.51 SEVERE PERSISTENT ASTHMA WITH ACUTE EXACERBATION: ICD-10-CM

## 2021-11-24 DIAGNOSIS — K21.9 GASTROESOPHAGEAL REFLUX DISEASE WITHOUT ESOPHAGITIS: ICD-10-CM

## 2021-11-24 PROCEDURE — 99214 OFFICE O/P EST MOD 30 MIN: CPT | Performed by: FAMILY MEDICINE

## 2021-11-24 PROCEDURE — 3074F SYST BP LT 130 MM HG: CPT | Performed by: FAMILY MEDICINE

## 2021-11-24 PROCEDURE — 3078F DIAST BP <80 MM HG: CPT | Performed by: FAMILY MEDICINE

## 2021-11-24 PROCEDURE — 3008F BODY MASS INDEX DOCD: CPT | Performed by: FAMILY MEDICINE

## 2021-11-24 NOTE — PATIENT INSTRUCTIONS
Rebound Headache     Overuse of pain medications can lead to rebound headaches. You use pain medicines called analgesics to treat your headaches. You are now having more frequent or intense headaches (rebound headaches).  This is your body’s response to will continue. · Caffeine can make rebound headaches worse. If you have caffeinated drinks every day, slowly cut your intake. · Keep a written log of your headaches. This can help you and your healthcare provider track your progress. · Be patient.  It ca

## 2021-11-25 PROBLEM — G44.40 ANALGESIC REBOUND HEADACHE: Status: ACTIVE | Noted: 2021-11-25

## 2021-11-25 PROBLEM — T39.95XA ANALGESIC REBOUND HEADACHE: Status: ACTIVE | Noted: 2021-11-25

## 2021-11-25 NOTE — PROGRESS NOTES
Alis Hernadez is a 39year old female. HPI:   Patient is in for follow-up on asthma as well as anxiety and headaches. Patient was admitted into [facility] for asthma had IV steroids and breathing treatments.   Has an incentive spirometer th to 6 hours per night is using tramadol. States that she feels that when she lies down she starts coughing more could be from the reflux.     Anxiety presently controlled with Xanax and hydroxyzine  Is off of Lexapro since it was not helping takes trazodone Carb-Cholecalciferol (CALCIUM 600/VITAMIN D3) 600-800 MG-UNIT Oral Tab Take 1 tablet by mouth 3 (three) times daily. • Biotin 04089 MCG Oral Tab Take 1 tablet by mouth daily.      • Albuterol Sulfate  (90 Base) MCG/ACT Inhalation Aero Soln Inhale Smokeless tobacco: Never Used    Vaping Use      Vaping Use: Never used    Alcohol use: Not Currently      Alcohol/week: 0.0 standard drinks    Drug use: No       REVIEW OF SYSTEMS:   GENERAL HEALTH: feels well otherwise  SKIN: denies any unusual skin jean paul headache  Gastroesophageal reflux disease without esophagitis  Severe persistent asthma with acute exacerbation    No orders of the defined types were placed in this encounter.       Meds & Refills for this Visit:  Requested Prescriptions      No prescripti tests.     The patient indicates understanding of these issues and agrees to the plan. The patient is asked to return in 1 to 2 months for follow-up on anxiety.

## 2021-11-29 DIAGNOSIS — F41.1 GAD (GENERALIZED ANXIETY DISORDER): ICD-10-CM

## 2021-11-29 DIAGNOSIS — J30.2 SEASONAL ALLERGIES: ICD-10-CM

## 2021-11-29 RX ORDER — HYDROXYZINE HYDROCHLORIDE 25 MG/1
TABLET, FILM COATED ORAL 2 TIMES DAILY PRN
Qty: 60 TABLET | Refills: 1 | Status: SHIPPED | OUTPATIENT
Start: 2021-11-29

## 2021-11-29 NOTE — TELEPHONE ENCOUNTER
HYDROXYZINE 25 MG Oral Tab 60 tablet 1 9/7/2021    Sig:   TAKE 1 TO 2 TABLETS(25 TO 50 MG) BY MOUTH TWICE DAILY AS NEEDED FOR ANXIETY OR ALLERGIES       LOV 11/24/21  FOV 12/1/21

## 2021-12-01 ENCOUNTER — TELEPHONE (OUTPATIENT)
Dept: FAMILY MEDICINE CLINIC | Facility: CLINIC | Age: 41
End: 2021-12-01

## 2021-12-01 ENCOUNTER — OFFICE VISIT (OUTPATIENT)
Dept: FAMILY MEDICINE CLINIC | Facility: CLINIC | Age: 41
End: 2021-12-01
Payer: COMMERCIAL

## 2021-12-01 VITALS
HEART RATE: 88 BPM | HEIGHT: 68.5 IN | TEMPERATURE: 98 F | BODY MASS INDEX: 31.01 KG/M2 | WEIGHT: 207 LBS | OXYGEN SATURATION: 98 % | DIASTOLIC BLOOD PRESSURE: 60 MMHG | SYSTOLIC BLOOD PRESSURE: 100 MMHG

## 2021-12-01 DIAGNOSIS — E66.9 OBESITY (BMI 30-39.9): ICD-10-CM

## 2021-12-01 DIAGNOSIS — B37.0 ORAL THRUSH: ICD-10-CM

## 2021-12-01 DIAGNOSIS — R06.02 SHORTNESS OF BREATH: ICD-10-CM

## 2021-12-01 DIAGNOSIS — R10.13 EPIGASTRIC ABDOMINAL PAIN: ICD-10-CM

## 2021-12-01 DIAGNOSIS — J45.51 SEVERE PERSISTENT ASTHMA WITH ACUTE EXACERBATION: Primary | ICD-10-CM

## 2021-12-01 DIAGNOSIS — J38.3 VOCAL CORD DYSFUNCTION: ICD-10-CM

## 2021-12-01 PROCEDURE — 3078F DIAST BP <80 MM HG: CPT | Performed by: FAMILY MEDICINE

## 2021-12-01 PROCEDURE — 3008F BODY MASS INDEX DOCD: CPT | Performed by: FAMILY MEDICINE

## 2021-12-01 PROCEDURE — 99214 OFFICE O/P EST MOD 30 MIN: CPT | Performed by: FAMILY MEDICINE

## 2021-12-01 PROCEDURE — 3074F SYST BP LT 130 MM HG: CPT | Performed by: FAMILY MEDICINE

## 2021-12-01 RX ORDER — ACETAMINOPHEN AND CODEINE PHOSPHATE 300; 30 MG/1; MG/1
1 TABLET ORAL
Qty: 20 TABLET | Refills: 0 | Status: SHIPPED | OUTPATIENT
Start: 2021-12-01 | End: 2021-12-06

## 2021-12-01 RX ORDER — FAMOTIDINE 40 MG/1
40 TABLET, FILM COATED ORAL 2 TIMES DAILY
Qty: 60 TABLET | Refills: 0 | Status: SHIPPED | OUTPATIENT
Start: 2021-12-01

## 2021-12-01 NOTE — TELEPHONE ENCOUNTER
famotidine (PEPCID) 40 MG Oral Tab 60 tablet 0 12/1/2021    Sig:   Take 1 tablet (40 mg total) by mouth 2 (two) times daily. Per Constantin, max dose for famotidine is 40mg once daily. Script was sent for BID. They would like dose confirmed.

## 2021-12-01 NOTE — TELEPHONE ENCOUNTER
Yes this is temporary dose since the Protonix is not helping she is seeing GI next week she can take twice a day if needed

## 2021-12-02 PROBLEM — R10.13 EPIGASTRIC ABDOMINAL PAIN: Status: ACTIVE | Noted: 2021-12-02

## 2021-12-02 PROBLEM — B37.0 ORAL THRUSH: Status: ACTIVE | Noted: 2021-12-02

## 2021-12-02 PROBLEM — R06.02 SHORTNESS OF BREATH: Status: ACTIVE | Noted: 2021-12-02

## 2021-12-02 NOTE — PROGRESS NOTES
Branden Larios is a 39year old female. HPI:   Patient is in today to follow-up on the chronic cough she has been experiencing [facility]   She states that over the weekend she got sick again with chills UNIT/ML Mouth/Throat Suspension Take 5 mL (500,000 Units total) by mouth 4 (four) times daily for 10 days. 200 mL 0   • Acetaminophen-Codeine 300-30 MG Oral Tab Take 1 tablet by mouth every 4 to 6 hours as needed for Pain.  20 tablet 0   • hydrOXYzine 25 MG Oral Tab Take 1 tablet by mouth daily. • Albuterol Sulfate  (90 Base) MCG/ACT Inhalation Aero Soln Inhale 2 puffs into the lungs every 4 (four) hours as needed for Wheezing or Shortness of Breath.  8.5 g 1   • SYMBICORT 160-4.5 MCG/ACT Inhalation drinks    Drug use: No       REVIEW OF SYSTEMS:   GENERAL HEALTH: feels well otherwise  SKIN: denies any unusual skin lesions or rashes  HEENT see above clear congestion  RESPIRATORY: Cough and shortness of breath chronic no   CARDIOVASCULAR: denies chest Signed Prescriptions Disp Refills   • famotidine (PEPCID) 40 MG Oral Tab 60 tablet 0     Sig: Take 1 tablet (40 mg total) by mouth 2 (two) times daily.    • nystatin 381337 UNIT/ML Mouth/Throat Suspension 200 mL 0     Sig: Take 5 mL (500,000 Units total 0      The patient indicates understanding of these issues and agrees to the plan. The patient is asked to return in 1 month.

## 2021-12-05 ENCOUNTER — PATIENT MESSAGE (OUTPATIENT)
Dept: FAMILY MEDICINE CLINIC | Facility: CLINIC | Age: 41
End: 2021-12-05

## 2021-12-05 DIAGNOSIS — R10.13 EPIGASTRIC ABDOMINAL PAIN: ICD-10-CM

## 2021-12-05 DIAGNOSIS — J45.51 SEVERE PERSISTENT ASTHMA WITH ACUTE EXACERBATION: ICD-10-CM

## 2021-12-06 DIAGNOSIS — F41.1 GAD (GENERALIZED ANXIETY DISORDER): ICD-10-CM

## 2021-12-06 RX ORDER — ACETAMINOPHEN AND CODEINE PHOSPHATE 300; 30 MG/1; MG/1
1 TABLET ORAL
Qty: 20 TABLET | Refills: 0 | Status: SHIPPED | OUTPATIENT
Start: 2021-12-06 | End: 2021-12-22 | Stop reason: ALTCHOICE

## 2021-12-06 RX ORDER — ALPRAZOLAM 0.25 MG/1
TABLET ORAL
Qty: 60 TABLET | Refills: 0 | Status: SHIPPED | OUTPATIENT
Start: 2021-12-06 | End: 2022-01-03

## 2021-12-06 NOTE — TELEPHONE ENCOUNTER
From: Alis Hernadez  To: Ephraim Bustillo PA-C  Sent: 12/5/2021 2:31 PM CST  Subject: Refill Tylenol #3    Umang Suarez,  I wanted to see if you could refill the Tylenol #3 till I see the GI doctor which is this Friday.  It has been helping with the co

## 2021-12-06 NOTE — TELEPHONE ENCOUNTER
ALPRAZOLAM 0.25 MG Oral Tab 60 tablet 0 11/9/2021    Sig:   TAKE 1 TABLET BY MOUTH TWICE DAILY AS NEEDED       LOV 12/1/21  FOV 12/22/21

## 2021-12-07 ENCOUNTER — MOBILE ENCOUNTER (OUTPATIENT)
Dept: FAMILY MEDICINE CLINIC | Facility: CLINIC | Age: 41
End: 2021-12-07

## 2021-12-07 ENCOUNTER — APPOINTMENT (OUTPATIENT)
Dept: GENERAL RADIOLOGY | Age: 41
End: 2021-12-07
Attending: EMERGENCY MEDICINE
Payer: COMMERCIAL

## 2021-12-07 ENCOUNTER — HOSPITAL ENCOUNTER (EMERGENCY)
Age: 41
Discharge: HOME | End: 2021-12-07
Attending: EMERGENCY MEDICINE
Payer: COMMERCIAL

## 2021-12-07 VITALS
WEIGHT: 209 LBS | RESPIRATION RATE: 16 BRPM | TEMPERATURE: 98 F | BODY MASS INDEX: 30.96 KG/M2 | OXYGEN SATURATION: 98 % | SYSTOLIC BLOOD PRESSURE: 119 MMHG | HEIGHT: 69 IN | DIASTOLIC BLOOD PRESSURE: 79 MMHG | HEART RATE: 81 BPM

## 2021-12-07 DIAGNOSIS — M54.40 BACK PAIN OF LUMBAR REGION WITH SCIATICA: Primary | ICD-10-CM

## 2021-12-07 PROCEDURE — 81003 URINALYSIS AUTO W/O SCOPE: CPT | Performed by: EMERGENCY MEDICINE

## 2021-12-07 PROCEDURE — 74018 RADEX ABDOMEN 1 VIEW: CPT | Performed by: EMERGENCY MEDICINE

## 2021-12-07 PROCEDURE — 99283 EMERGENCY DEPT VISIT LOW MDM: CPT

## 2021-12-08 NOTE — ED PROVIDER NOTES
Patient Seen in: THE [facility] In [place]      History   Patient presents with:  Abdomen/Flank Pain  Urinary Symptoms    Stated Complaint: flank pain, dysuria     Subjective:   JANIE Cardenaskendy Russ is a pleasant 14-year-old female presenting wit DILATION/CURETTAGE,DIAGNOSTIC     • EGD  03/31/2021   • GASTRIC BYPASS,OBESE<100CM MARCIO-EN-Y  12/18/2017    DR. SEGAL   • GASTRIC BYPASS,OBESITY,SB RECONSTRUC     • GASTROCNEMIUS RECESSION Left 6/19/2015    Procedure: GASTROC RECESSION FOOT;  Surgeon:  Beka Mena of Systems    Positive for stated complaint: flank pain, dysuria   Other systems are as noted in HPI. Constitutional and vital signs reviewed. All other systems reviewed and negative except as noted above.     Physical Exam     ED Triage Vitals [12/07

## 2021-12-08 NOTE — PROGRESS NOTES
Patient is calling for low back pain since four days. She also has dysuria. She notes that her urine may be cloudy. Denies history of pyelonephritis or recurrent UTIs. Denies history of kidney stones. Denies any hematuria. Denies any fevers or chills.  Sravanthi Stuart

## 2021-12-10 ENCOUNTER — TELEMEDICINE (OUTPATIENT)
Dept: FAMILY MEDICINE CLINIC | Facility: CLINIC | Age: 41
End: 2021-12-10

## 2021-12-10 VITALS — HEART RATE: 82 BPM

## 2021-12-10 DIAGNOSIS — M54.32 SCIATICA OF LEFT SIDE: ICD-10-CM

## 2021-12-10 DIAGNOSIS — S76.312A STRAIN OF LEFT PIRIFORMIS MUSCLE, INITIAL ENCOUNTER: Primary | ICD-10-CM

## 2021-12-10 PROCEDURE — 99213 OFFICE O/P EST LOW 20 MIN: CPT | Performed by: FAMILY MEDICINE

## 2021-12-10 RX ORDER — TIZANIDINE 4 MG/1
TABLET ORAL 2 TIMES DAILY PRN
Qty: 60 TABLET | Refills: 0 | Status: SHIPPED | OUTPATIENT
Start: 2021-12-10 | End: 2021-12-29

## 2021-12-10 NOTE — PATIENT INSTRUCTIONS
Hip Rotation (Flexibility)    These instructions are for the right hip. Switch sides for your left hip. 1. Lie on your back on the floor, with your knees bent and feet flat on the floor. Don’t press your neck or lower back to the floor.   2. Rest your ri because the nerves and disks in your back can’t be seen on an X-ray. If the provider sees signs of a compressed nerve, you will need to schedule an MRI scan.  Nerve conductions studies and electromyography are nerve tests that can also help find the cause o need physical therapy or more tests. If X-rays were taken, a radiologist will look at them. You will be told of any new findings that may affect your care.   When to seek medical advice  Call your healthcare provider right away if any of these occur:  · Pa Fluconazole Pregnancy And Lactation Text: This medication is Pregnancy Category C and it isn't know if it is safe during pregnancy. It is also excreted in breast milk.

## 2021-12-11 ENCOUNTER — LAB ENCOUNTER (OUTPATIENT)
Dept: LAB | Age: 41
End: 2021-12-11
Attending: INTERNAL MEDICINE
Payer: COMMERCIAL

## 2021-12-11 DIAGNOSIS — Z01.818 PRE-OP TESTING: ICD-10-CM

## 2021-12-12 PROBLEM — S76.312A STRAIN OF LEFT PIRIFORMIS MUSCLE: Status: ACTIVE | Noted: 2021-12-12

## 2021-12-12 NOTE — PROGRESS NOTES
Marcello Lyle is a 39year old female. HPI:   Please note that the following visit was completed using two-way, real-time interactive audio and video communication.   This has been done in good radha to provide continuity of care in the best interest Dispense Refill   • tiZANidine 4 MG Oral Tab Take 0.5-1.5 tablets (2-6 mg total) by mouth 2 (two) times daily as needed.  Take 1/2-1 tab nightly 60 tablet 0   • ALPRAZOLAM 0.25 MG Oral Tab TAKE 1 TABLET BY MOUTH TWICE DAILY AS NEEDED 60 tablet 0   • Acetami MCG) 30 tablet 3   • SPIRIVA RESPIMAT 2.5 MCG/ACT Inhalation Aero Soln Inhale 2 puffs into the lungs daily. 3 each 0   • Calcium Carb-Cholecalciferol (CALCIUM 600/VITAMIN D3) 600-800 MG-UNIT Oral Tab Take 1 tablet by mouth 3 (three) times daily.      • Biot Hx of gastric bypass 12/18/2017   • Hypokalemia 2/23/2019   • Hypothyroidism    • Infertility, female    • Loss of appetite 12/2020   • Nausea 12/2020   • Nephrolithiasis    • Organic hypersomnia, unspecified DMG DX 11-2-12    AHI 2 RDI 2 REM AHI 6 SaO2 na of left piriformis muscle, initial encounter  (primary encounter diagnosis)  Sciatica of left side    No orders of the defined types were placed in this encounter.       Meds & Refills for this Visit:  Requested Prescriptions     Signed Prescriptions Disp R

## 2021-12-13 ENCOUNTER — PATIENT MESSAGE (OUTPATIENT)
Dept: FAMILY MEDICINE CLINIC | Facility: CLINIC | Age: 41
End: 2021-12-13

## 2021-12-13 DIAGNOSIS — G44.221 CHRONIC TENSION-TYPE HEADACHE, INTRACTABLE: ICD-10-CM

## 2021-12-13 RX ORDER — BUTALBITAL/ACETAMINOPHEN 50MG-325MG
0.5 TABLET ORAL 2 TIMES DAILY PRN
Qty: 8 TABLET | Refills: 0 | Status: SHIPPED | OUTPATIENT
Start: 2021-12-13 | End: 2022-01-01

## 2021-12-13 NOTE — TELEPHONE ENCOUNTER
From: Nunu Starkey  To: Deepkia Iyer PA-C  Sent: 12/13/2021 2:08 PM CST  Subject: Prescription     Hi Yvrose Bumpers,  I just put a request through [facility] for a refill on the headaches meds. I apologize if it came through twice.  [facility]

## 2021-12-13 NOTE — TELEPHONE ENCOUNTER
BUTALBITAL-ACETAMINOPHEN  MG Oral Tab 8 tablet 0 11/22/2021    Sig:   TAKE 1/2 TABLET BY MOUTH TWICE DAILY AS NEEDED       LOV 12/10/21 Telemed   FOV 12/22/21

## 2021-12-14 PROBLEM — R10.13 ABDOMINAL PAIN, EPIGASTRIC: Status: ACTIVE | Noted: 2021-12-02

## 2021-12-14 PROBLEM — R93.3 ABNORMAL CT SCAN, ESOPHAGUS: Status: ACTIVE | Noted: 2021-12-14

## 2021-12-15 ENCOUNTER — PATIENT MESSAGE (OUTPATIENT)
Dept: FAMILY MEDICINE CLINIC | Facility: CLINIC | Age: 41
End: 2021-12-15

## 2021-12-15 DIAGNOSIS — J45.51 SEVERE PERSISTENT ASTHMA WITH ACUTE EXACERBATION: ICD-10-CM

## 2021-12-15 DIAGNOSIS — R10.13 EPIGASTRIC ABDOMINAL PAIN: ICD-10-CM

## 2021-12-15 RX ORDER — ACETAMINOPHEN AND CODEINE PHOSPHATE 300; 30 MG/1; MG/1
TABLET ORAL 2 TIMES DAILY PRN
Qty: 20 TABLET | Refills: 0 | Status: SHIPPED
Start: 2021-12-15 | End: 2021-12-25

## 2021-12-15 NOTE — TELEPHONE ENCOUNTER
From: Cori Awad  To: Janneth Castañeda PA-C  Sent: 12/15/2021 5:33 AM CST  Subject: Medicine     Hi David Spotted,   Can we do the Tylenol #3 1/2 -1 for another 7-10 days?  Then, it’ll hopefully get me to when the higher dose of the lyrica will be helpi

## 2021-12-15 NOTE — TELEPHONE ENCOUNTER
Acetaminophen-Codeine 300-30 MG Oral Tab (Discontinued) 20 tablet 0 12/1/2021 12/6/2021   Sig:   Take 1 tablet by mouth every 4 to 6 hours as needed for Pain.

## 2021-12-16 ENCOUNTER — PATIENT MESSAGE (OUTPATIENT)
Dept: FAMILY MEDICINE CLINIC | Facility: CLINIC | Age: 41
End: 2021-12-16

## 2021-12-16 NOTE — TELEPHONE ENCOUNTER
Per Wilfrid Duran PA-C, the patient's signed prescription for Acetaminophen-Codeine 300-30 MG Oral Tab was successfully faxed to [facility] in [place].

## 2021-12-20 ENCOUNTER — OFFICE VISIT (OUTPATIENT)
Dept: SURGERY | Facility: CLINIC | Age: 41
End: 2021-12-20
Payer: COMMERCIAL

## 2021-12-20 VITALS
DIASTOLIC BLOOD PRESSURE: 71 MMHG | OXYGEN SATURATION: 99 % | SYSTOLIC BLOOD PRESSURE: 113 MMHG | HEART RATE: 83 BPM | HEIGHT: 69 IN | BODY MASS INDEX: 30.36 KG/M2 | WEIGHT: 205 LBS

## 2021-12-20 DIAGNOSIS — R10.13 EPIGASTRIC PAIN: ICD-10-CM

## 2021-12-20 DIAGNOSIS — E66.9 OBESITY (BMI 30-39.9): ICD-10-CM

## 2021-12-20 DIAGNOSIS — E44.1 MILD PROTEIN-CALORIE MALNUTRITION (HCC): Primary | ICD-10-CM

## 2021-12-20 DIAGNOSIS — Z98.84 HISTORY OF ROUX-EN-Y GASTRIC BYPASS: ICD-10-CM

## 2021-12-20 PROCEDURE — 3078F DIAST BP <80 MM HG: CPT | Performed by: INTERNAL MEDICINE

## 2021-12-20 PROCEDURE — 3074F SYST BP LT 130 MM HG: CPT | Performed by: INTERNAL MEDICINE

## 2021-12-20 PROCEDURE — 3008F BODY MASS INDEX DOCD: CPT | Performed by: INTERNAL MEDICINE

## 2021-12-20 PROCEDURE — 99214 OFFICE O/P EST MOD 30 MIN: CPT | Performed by: INTERNAL MEDICINE

## 2021-12-20 NOTE — PROGRESS NOTES
[address]  Dept: [phone]        Patient:  Shante Dyson  :      1980  MRN:      [id number]    Referring Provider: Darya Mock 300-30 MG Oral Tab, Take 1 tablet by mouth every 4 to 6 hours as needed for Pain., Disp: 20 tablet, Rfl: 0  •  famotidine (PEPCID) 40 MG Oral Tab, Take 1 tablet (40 mg total) by mouth 2 (two) times daily.  (Patient taking differently: Take 40 mg by mouth ni DAILY, Disp: 3 Inhaler, Rfl: 3  •  Cholecalciferol (VITAMIN D) 50 MCG (2000 UT) Oral Cap, Take 2,000 Units by mouth 2 (two) times a day.  , Disp: , Rfl:   •  Nystatin 433281 UNIT/GM External Powder, Apply 1 Application topically 4 (four) times daily.  Apply DR. Wise [facility]   • GASTRIC BYPASS,OBESITY,SB RECONSTRUC     • GASTROCNEMIUS RECESSION Left 6/19/2015    Procedure: GASTROC RECESSION FOOT;  Surgeon: Ryan Brock MD;  Location: [facility]   • HYSTERECTOMY  2013   • HYSTEROSCOPY  2011   • INJ PARA • Heart Attack Paternal Grandmother    • Breast Cancer Maternal Grandmother 76   • Cancer Neg    • Stroke Neg        Physical Exam:  Vital signs: Blood pressure 113/71, pulse 83, height 5' 9\" (1.753 m), weight 205 lb (93 kg), last menstrual period 05/12 does not eat. ?pancreatitis again  Will have her talk to GI and surgical team    From bariatric medicine standpoint, she is doing well  Bariatric labs normal  Weight loss stable. Needs to increase physical activity.      No orders of the defined types w

## 2021-12-21 ENCOUNTER — LAB ENCOUNTER (OUTPATIENT)
Dept: LAB | Age: 41
End: 2021-12-21
Attending: NURSE PRACTITIONER
Payer: COMMERCIAL

## 2021-12-21 ENCOUNTER — OFFICE VISIT (OUTPATIENT)
Dept: SURGERY | Facility: CLINIC | Age: 41
End: 2021-12-21
Payer: COMMERCIAL

## 2021-12-21 VITALS
HEIGHT: 68 IN | BODY MASS INDEX: 30.96 KG/M2 | SYSTOLIC BLOOD PRESSURE: 116 MMHG | HEART RATE: 105 BPM | WEIGHT: 204.31 LBS | OXYGEN SATURATION: 98 % | DIASTOLIC BLOOD PRESSURE: 70 MMHG

## 2021-12-21 DIAGNOSIS — R10.13 EPIGASTRIC ABDOMINAL PAIN: ICD-10-CM

## 2021-12-21 PROCEDURE — 83690 ASSAY OF LIPASE: CPT

## 2021-12-21 PROCEDURE — 36415 COLL VENOUS BLD VENIPUNCTURE: CPT

## 2021-12-21 PROCEDURE — 80053 COMPREHEN METABOLIC PANEL: CPT

## 2021-12-21 PROCEDURE — 85025 COMPLETE CBC W/AUTO DIFF WBC: CPT

## 2021-12-21 PROCEDURE — 82150 ASSAY OF AMYLASE: CPT

## 2021-12-21 NOTE — PROGRESS NOTES
[address]  Dept: [phone]    12/21/2021    BARIATRIC EXISTING PATIENT/FOLLOW UP    HPI:  Nia Lissy is a 39year old-year old female but not too bad. She has very minimal tenderness in the epigastrium otherwise her abdomen is soft flat and there are no hernias present    ASSESSMENT:  Cryptic abdominal pain.   She has had a fairly extensive work-up in the past.  I suppose she could have

## 2021-12-22 ENCOUNTER — OFFICE VISIT (OUTPATIENT)
Dept: FAMILY MEDICINE CLINIC | Facility: CLINIC | Age: 41
End: 2021-12-22
Payer: COMMERCIAL

## 2021-12-22 ENCOUNTER — HOSPITAL ENCOUNTER (OUTPATIENT)
Dept: ULTRASOUND IMAGING | Age: 41
Discharge: HOME | End: 2021-12-22
Attending: OTOLARYNGOLOGY
Payer: COMMERCIAL

## 2021-12-22 ENCOUNTER — OFFICE VISIT (OUTPATIENT)
Dept: HEMATOLOGY/ONCOLOGY | Age: 41
End: 2021-12-22
Attending: INTERNAL MEDICINE
Payer: COMMERCIAL

## 2021-12-22 VITALS
TEMPERATURE: 98 F | HEART RATE: 88 BPM | WEIGHT: 203 LBS | DIASTOLIC BLOOD PRESSURE: 60 MMHG | HEIGHT: 68.5 IN | SYSTOLIC BLOOD PRESSURE: 90 MMHG | BODY MASS INDEX: 30.41 KG/M2

## 2021-12-22 DIAGNOSIS — E88.09 HYPOALBUMINEMIA: ICD-10-CM

## 2021-12-22 DIAGNOSIS — S76.312A STRAIN OF LEFT PIRIFORMIS MUSCLE, INITIAL ENCOUNTER: Primary | ICD-10-CM

## 2021-12-22 DIAGNOSIS — E88.01 ALPHA-1-ANTITRYPSIN DEFICIENCY (HCC): ICD-10-CM

## 2021-12-22 DIAGNOSIS — E88.01 ALPHA-1-ANTITRYPSIN DEFICIENCY (HCC): Primary | ICD-10-CM

## 2021-12-22 DIAGNOSIS — R10.13 EPIGASTRIC PAIN: ICD-10-CM

## 2021-12-22 DIAGNOSIS — E07.9 THYROID DYSFUNCTION: ICD-10-CM

## 2021-12-22 DIAGNOSIS — C73 THYROID CANCER (HCC): ICD-10-CM

## 2021-12-22 DIAGNOSIS — M53.3 SACROILIAC JOINT DYSFUNCTION OF LEFT SIDE: ICD-10-CM

## 2021-12-22 PROCEDURE — 99214 OFFICE O/P EST MOD 30 MIN: CPT | Performed by: FAMILY MEDICINE

## 2021-12-22 PROCEDURE — 96365 THER/PROPH/DIAG IV INF INIT: CPT

## 2021-12-22 PROCEDURE — 76536 US EXAM OF HEAD AND NECK: CPT | Performed by: OTOLARYNGOLOGY

## 2021-12-22 PROCEDURE — 3008F BODY MASS INDEX DOCD: CPT | Performed by: FAMILY MEDICINE

## 2021-12-22 PROCEDURE — 3074F SYST BP LT 130 MM HG: CPT | Performed by: FAMILY MEDICINE

## 2021-12-22 PROCEDURE — 3078F DIAST BP <80 MM HG: CPT | Performed by: FAMILY MEDICINE

## 2021-12-22 RX ORDER — METHYLPREDNISOLONE SODIUM SUCCINATE 40 MG/ML
40 INJECTION, POWDER, LYOPHILIZED, FOR SOLUTION INTRAMUSCULAR; INTRAVENOUS ONCE
Status: CANCELLED | OUTPATIENT
Start: 2021-12-29

## 2021-12-22 RX ORDER — PREGABALIN 75 MG/1
75 CAPSULE ORAL 3 TIMES DAILY
COMMUNITY
End: 2022-01-17

## 2021-12-22 RX ORDER — METHYLPREDNISOLONE SODIUM SUCCINATE 125 MG/2ML
125 INJECTION, POWDER, LYOPHILIZED, FOR SOLUTION INTRAMUSCULAR; INTRAVENOUS ONCE
Status: CANCELLED | OUTPATIENT
Start: 2021-12-29

## 2021-12-22 RX ORDER — MEPERIDINE HYDROCHLORIDE 25 MG/ML
25 INJECTION INTRAMUSCULAR; INTRAVENOUS; SUBCUTANEOUS ONCE
Status: CANCELLED | OUTPATIENT
Start: 2021-12-29

## 2021-12-22 RX ORDER — DIPHENHYDRAMINE HYDROCHLORIDE 50 MG/ML
25 INJECTION INTRAMUSCULAR; INTRAVENOUS ONCE
Status: CANCELLED | OUTPATIENT
Start: 2021-12-29

## 2021-12-22 RX ORDER — FAMOTIDINE 10 MG/ML
20 INJECTION, SOLUTION INTRAVENOUS ONCE
Status: CANCELLED | OUTPATIENT
Start: 2021-12-29

## 2021-12-22 NOTE — PROGRESS NOTES
Prolastin infused per MD order w/out incident. Pt dc home in stable condition, no new complaints. AVS provided.    Education Record    Learner:  Patient    Disease / Diagnosis:    Barriers / Limitations:  None   Comments:    Method:  Brief focused and Print

## 2021-12-22 NOTE — PROGRESS NOTES
Jef Betancourt is a 39year old female. HPI:     Strain of left piriformis muscle, initial encounter  Sacroiliac joint dysfunction of left side  Follow-up on SI and left piriformis pain.   Increase intake increase tizanidine  took 2 and 1 during the [employer] 77 37 - 98 U/L    Bilirubin, Total 0.4 0.1 - 2.0 mg/dL    Total Protein 6.0 (L) 6.4 - 8.2 g/dL    Albumin 3.5 3.4 - 5.0 g/dL    Globulin  2.5 (L) 2.8 - 4.4 g/dL    A/G Ratio 1.4 1.0 - 2.0    Patient Fasting for CMP?  Yes    AMYLASE   Result Value Ref Range mouth 2 (two) times daily as needed. 8 tablet 0   • tiZANidine 4 MG Oral Tab Take 0.5-1.5 tablets (2-6 mg total) by mouth 2 (two) times daily as needed.  Take 1/2-1 tab nightly 60 tablet 0   • ALPRAZOLAM 0.25 MG Oral Tab TAKE 1 TABLET BY MOUTH TWICE DAILY A Apply to affected areas 30 g 1   • CONTOUR NEXT TEST In Vitro Strip USE TWICE DAILY AS DIRECTED 200 strip 3   • NEYDA MICROLET LANCETS Does not apply Misc Use as directed to check blood sugars as needed 1 Box 0   • magnesium 250 MG Oral Tab Take 250 mg by Used    Vaping Use      Vaping Use: Never used    Alcohol use: Not Currently      Alcohol/week: 0.0 standard drinks    Drug use: No       REVIEW OF SYSTEMS:   GENERAL HEALTH: feels well otherwise  SKIN: denies any unusual skin lesions or rashes  RESPIRATOR every 4 (four) hours as needed for Cramping (use before meal). Imaging & Consults:  None  1. Strain of left piriformis muscle, initial encounter  Sacroiliac joint dysfunction of left side  Tizanidine 6 mg at night. 2 to 4 mg in the morning.   Exercis

## 2021-12-23 PROBLEM — M53.3 SACROILIAC JOINT DYSFUNCTION OF LEFT SIDE: Status: ACTIVE | Noted: 2021-12-23

## 2021-12-27 NOTE — PROGRESS NOTES
Amie Bellamy, your thyroid ultrasound is stable, please keep your scheduled follow up with Dr Roxane Jeffers.

## 2021-12-29 ENCOUNTER — OFFICE VISIT (OUTPATIENT)
Dept: HEMATOLOGY/ONCOLOGY | Age: 41
End: 2021-12-29
Attending: INTERNAL MEDICINE
Payer: COMMERCIAL

## 2021-12-29 ENCOUNTER — PATIENT MESSAGE (OUTPATIENT)
Dept: FAMILY MEDICINE CLINIC | Facility: CLINIC | Age: 41
End: 2021-12-29

## 2021-12-29 VITALS
RESPIRATION RATE: 16 BRPM | SYSTOLIC BLOOD PRESSURE: 97 MMHG | DIASTOLIC BLOOD PRESSURE: 67 MMHG | OXYGEN SATURATION: 98 % | TEMPERATURE: 96 F | HEART RATE: 81 BPM

## 2021-12-29 DIAGNOSIS — E88.01 ALPHA-1-ANTITRYPSIN DEFICIENCY (HCC): Primary | ICD-10-CM

## 2021-12-29 PROCEDURE — 96365 THER/PROPH/DIAG IV INF INIT: CPT

## 2021-12-29 RX ORDER — FAMOTIDINE 10 MG/ML
20 INJECTION, SOLUTION INTRAVENOUS ONCE
Status: CANCELLED | OUTPATIENT
Start: 2022-01-05

## 2021-12-29 RX ORDER — METHYLPREDNISOLONE SODIUM SUCCINATE 125 MG/2ML
125 INJECTION, POWDER, LYOPHILIZED, FOR SOLUTION INTRAMUSCULAR; INTRAVENOUS ONCE
Status: CANCELLED | OUTPATIENT
Start: 2022-01-05

## 2021-12-29 RX ORDER — METHYLPREDNISOLONE SODIUM SUCCINATE 40 MG/ML
40 INJECTION, POWDER, LYOPHILIZED, FOR SOLUTION INTRAMUSCULAR; INTRAVENOUS ONCE
Status: CANCELLED | OUTPATIENT
Start: 2022-01-05

## 2021-12-29 RX ORDER — MEPERIDINE HYDROCHLORIDE 25 MG/ML
25 INJECTION INTRAMUSCULAR; INTRAVENOUS; SUBCUTANEOUS ONCE
Status: CANCELLED | OUTPATIENT
Start: 2022-01-05

## 2021-12-29 RX ORDER — DIPHENHYDRAMINE HYDROCHLORIDE 50 MG/ML
25 INJECTION INTRAMUSCULAR; INTRAVENOUS ONCE
Status: CANCELLED | OUTPATIENT
Start: 2022-01-05

## 2021-12-29 RX ORDER — TIZANIDINE 4 MG/1
TABLET ORAL 2 TIMES DAILY PRN
Qty: 60 TABLET | Refills: 0 | Status: SHIPPED | OUTPATIENT
Start: 2021-12-29 | End: 2022-01-17

## 2021-12-29 NOTE — TELEPHONE ENCOUNTER
From: Shante Dyson  To: Rene Jay PA-C  Sent: 12/29/2021 7:29 AM CST  Subject: Medications     Hi Andrez Almaraz,   I had a few questions about medication:   1.) Dr. Jeremy Fragoso wanted to up my Lyrica so I am currently taking 75mg 3 x a day.  He did send

## 2021-12-30 ENCOUNTER — HOSPITAL ENCOUNTER (OUTPATIENT)
Dept: CV DIAGNOSTICS | Age: 41
Discharge: HOME | End: 2021-12-30
Attending: FAMILY MEDICINE
Payer: COMMERCIAL

## 2021-12-30 DIAGNOSIS — J45.51 SEVERE PERSISTENT ASTHMA WITH ACUTE EXACERBATION: ICD-10-CM

## 2021-12-30 DIAGNOSIS — R06.02 SHORTNESS OF BREATH: ICD-10-CM

## 2021-12-30 PROCEDURE — 93306 TTE W/DOPPLER COMPLETE: CPT | Performed by: FAMILY MEDICINE

## 2021-12-31 DIAGNOSIS — F41.1 GAD (GENERALIZED ANXIETY DISORDER): ICD-10-CM

## 2022-01-01 ENCOUNTER — PATIENT MESSAGE (OUTPATIENT)
Dept: FAMILY MEDICINE CLINIC | Facility: CLINIC | Age: 42
End: 2022-01-01

## 2022-01-01 DIAGNOSIS — G44.221 CHRONIC TENSION-TYPE HEADACHE, INTRACTABLE: ICD-10-CM

## 2022-01-02 RX ORDER — TIZANIDINE 4 MG/1
TABLET ORAL
Qty: 60 TABLET | Refills: 0 | OUTPATIENT
Start: 2022-01-02

## 2022-01-03 RX ORDER — BUTALBITAL/ACETAMINOPHEN 50MG-325MG
0.5 TABLET ORAL 2 TIMES DAILY PRN
Qty: 8 TABLET | Refills: 0 | Status: SHIPPED | OUTPATIENT
Start: 2022-01-03 | End: 2022-01-17

## 2022-01-03 RX ORDER — ALPRAZOLAM 0.25 MG/1
TABLET ORAL
Qty: 60 TABLET | Refills: 0 | Status: SHIPPED | OUTPATIENT
Start: 2022-01-03 | End: 2022-01-18

## 2022-01-03 NOTE — TELEPHONE ENCOUNTER
Butalbital-Acetaminophen  MG Oral Tab 8 tablet 0 12/13/2021    Sig:   Take 0.5 tablets by mouth 2 (two) times daily as needed.        LOV 12/22/21  FOV 1/17/22

## 2022-01-03 NOTE — TELEPHONE ENCOUNTER
ALPRAZOLAM 0.25 MG Oral Tab 60 tablet 0 12/6/2021    Sig:   TAKE 1 TABLET BY MOUTH TWICE DAILY AS NEEDED       LOV 12/22/21  FOV 1/17/22

## 2022-01-03 NOTE — TELEPHONE ENCOUNTER
From: Alma Martinez  To: Asher Quintero PA-C  Sent: 1/1/2022 7:04 PM CST  Subject: Updated vaccine card for chart     Attached is my updated Covid 19 Vaccine Card with my booster date.    Evan Mendez

## 2022-01-05 ENCOUNTER — OFFICE VISIT (OUTPATIENT)
Dept: HEMATOLOGY/ONCOLOGY | Age: 42
End: 2022-01-05
Attending: INTERNAL MEDICINE
Payer: COMMERCIAL

## 2022-01-05 VITALS
HEART RATE: 96 BPM | OXYGEN SATURATION: 99 % | DIASTOLIC BLOOD PRESSURE: 88 MMHG | TEMPERATURE: 97 F | RESPIRATION RATE: 18 BRPM | SYSTOLIC BLOOD PRESSURE: 121 MMHG

## 2022-01-05 DIAGNOSIS — E88.01 ALPHA-1-ANTITRYPSIN DEFICIENCY (HCC): Primary | ICD-10-CM

## 2022-01-05 PROCEDURE — 96365 THER/PROPH/DIAG IV INF INIT: CPT

## 2022-01-05 RX ORDER — FAMOTIDINE 10 MG/ML
20 INJECTION, SOLUTION INTRAVENOUS ONCE
Status: CANCELLED | OUTPATIENT
Start: 2022-01-12

## 2022-01-05 RX ORDER — METHYLPREDNISOLONE SODIUM SUCCINATE 40 MG/ML
40 INJECTION, POWDER, LYOPHILIZED, FOR SOLUTION INTRAMUSCULAR; INTRAVENOUS ONCE
Status: CANCELLED | OUTPATIENT
Start: 2022-01-12

## 2022-01-05 RX ORDER — DIPHENHYDRAMINE HYDROCHLORIDE 50 MG/ML
25 INJECTION INTRAMUSCULAR; INTRAVENOUS ONCE
Status: CANCELLED | OUTPATIENT
Start: 2022-01-12

## 2022-01-05 RX ORDER — METHYLPREDNISOLONE SODIUM SUCCINATE 125 MG/2ML
125 INJECTION, POWDER, LYOPHILIZED, FOR SOLUTION INTRAMUSCULAR; INTRAVENOUS ONCE
Status: CANCELLED | OUTPATIENT
Start: 2022-01-12

## 2022-01-05 RX ORDER — MEPERIDINE HYDROCHLORIDE 25 MG/ML
25 INJECTION INTRAMUSCULAR; INTRAVENOUS; SUBCUTANEOUS ONCE
Status: CANCELLED | OUTPATIENT
Start: 2022-01-12

## 2022-01-05 NOTE — PROGRESS NOTES
Prolastin infused per MD orders w/out incident. Pt dc home in stable condition, no new complaints. Has f/u appt.

## 2022-01-05 NOTE — PROGRESS NOTES
Education Record    Learner:  Patient    Disease / Diagnosis: here for Prolastin    Barriers / Limitations:  None    Method:  Brief focused, printed material and  reinforcement    General Topics:  Plan of care reviewed    Outcome: Patient ambulatory with n

## 2022-01-12 ENCOUNTER — OFFICE VISIT (OUTPATIENT)
Dept: HEMATOLOGY/ONCOLOGY | Age: 42
End: 2022-01-12
Attending: INTERNAL MEDICINE
Payer: COMMERCIAL

## 2022-01-12 VITALS
RESPIRATION RATE: 18 BRPM | OXYGEN SATURATION: 98 % | HEART RATE: 90 BPM | TEMPERATURE: 98 F | SYSTOLIC BLOOD PRESSURE: 134 MMHG | DIASTOLIC BLOOD PRESSURE: 84 MMHG

## 2022-01-12 DIAGNOSIS — E88.01 ALPHA-1-ANTITRYPSIN DEFICIENCY (HCC): Primary | ICD-10-CM

## 2022-01-12 PROCEDURE — 96365 THER/PROPH/DIAG IV INF INIT: CPT

## 2022-01-12 RX ORDER — FAMOTIDINE 10 MG/ML
20 INJECTION, SOLUTION INTRAVENOUS ONCE
Status: CANCELLED | OUTPATIENT
Start: 2022-01-19

## 2022-01-12 RX ORDER — METHYLPREDNISOLONE SODIUM SUCCINATE 40 MG/ML
40 INJECTION, POWDER, LYOPHILIZED, FOR SOLUTION INTRAMUSCULAR; INTRAVENOUS ONCE
Status: CANCELLED | OUTPATIENT
Start: 2022-01-19

## 2022-01-12 RX ORDER — METHYLPREDNISOLONE SODIUM SUCCINATE 125 MG/2ML
125 INJECTION, POWDER, LYOPHILIZED, FOR SOLUTION INTRAMUSCULAR; INTRAVENOUS ONCE
Status: CANCELLED | OUTPATIENT
Start: 2022-01-19

## 2022-01-12 RX ORDER — MEPERIDINE HYDROCHLORIDE 25 MG/ML
25 INJECTION INTRAMUSCULAR; INTRAVENOUS; SUBCUTANEOUS ONCE
Status: CANCELLED | OUTPATIENT
Start: 2022-01-19

## 2022-01-12 RX ORDER — DIPHENHYDRAMINE HYDROCHLORIDE 50 MG/ML
25 INJECTION INTRAMUSCULAR; INTRAVENOUS ONCE
Status: CANCELLED | OUTPATIENT
Start: 2022-01-19

## 2022-01-12 NOTE — PROGRESS NOTES
Education Record    Learner:  Patient    Disease / Diagnosis: here for Prolastin    Barriers / Limitations:  None    Method:  Brief focused, printed material and  reinforcement    General Topics:  Plan of care reviewed    Outcome: patient ambulatory with n
Clear bilaterally, pupils equal, round and reactive to light.

## 2022-01-14 ENCOUNTER — PATIENT MESSAGE (OUTPATIENT)
Dept: FAMILY MEDICINE CLINIC | Facility: CLINIC | Age: 42
End: 2022-01-14

## 2022-01-14 NOTE — TELEPHONE ENCOUNTER
From: Gaurav Marinelli  To: Thais Lowery PA-C  Sent: 1/14/2022 10:23 AM CST  Subject: Question    Hello,   I have a nickel size external hemorrhoid as well as another smaller external one. It is quite painful as I am sure you know.  I looked on-line

## 2022-01-17 NOTE — PROGRESS NOTES
Chance Renae is a 39year old female. HPI:   States is in for follow-up on several issues. Headaches, insomnia, OMAR, and abdominal pain   The OMAR is made worse by physical symptoms of pain.   Presently on Lyrica 75 mg 3 times a day was increased sec was successful. Is taking ibuprofen for the headaches which she has been told not to do since having bariatric surgery and having abdominal pain she wants to avoid is wondering increasing  the butalbital to a few times a week versus just 2 times a week. MG Oral Tab Take 1 tablet (40 mg total) by mouth 2 (two) times daily. (Patient taking differently: Take 40 mg by mouth at bedtime.) 60 tablet 0   • hydrOXYzine 25 MG Oral Tab Take 1-2 tablets (25-50 mg total) by mouth 2 (two) times daily as needed.  60 tabl Alpha-1-antitrypsin deficiency ([facility] 11/17/2021   • Anxiety    • Arthritis    • Asthma    • Back pain 2013    I have Spinal Stenosis that has gotten worse over the years.    • Bloating 2/21   • Calculus of kidney 2003   • Cancer [facility])     Thyroid   • Change 94/74 (BP Location: Left arm, Patient Position: Sitting, Cuff Size: adult)   Pulse 84   Temp 98.1 °F (36.7 °C) (Temporal)   Ht 5' 8.7\" (1.745 m)   Wt 200 lb (90.7 kg)   LMP 05/12/2013 (Within Days)   SpO2 99%   BMI 29.79 kg/m²   GENERAL: well developed, w disorder)  Continue with the Lyrica, reduce Xanax to one half of 0.25 mg twice a day. Continue with as needed hydroxyzine during the day    2. Primary insomnia  Reviewed medication benefits and side effects.    Discussed if any problems call office immedia pain with normal endoscopy  Stop nonsteroidals  6. Sedative, hypnotic or anxiolytic dependence, uncomplicated (HCC)  Reduce Xanax to one half 0.25 mg twice a day.   Continue with counseling with psychologist  Time spent was 40 minutes more than 50% was spen

## 2022-01-19 ENCOUNTER — APPOINTMENT (OUTPATIENT)
Dept: HEMATOLOGY/ONCOLOGY | Age: 42
End: 2022-01-19
Attending: INTERNAL MEDICINE
Payer: COMMERCIAL

## 2022-01-22 ENCOUNTER — PATIENT MESSAGE (OUTPATIENT)
Dept: FAMILY MEDICINE CLINIC | Facility: CLINIC | Age: 42
End: 2022-01-22

## 2022-01-24 RX ORDER — BENZONATATE 100 MG/1
100 CAPSULE ORAL 3 TIMES DAILY PRN
Qty: 30 CAPSULE | Refills: 0 | Status: SHIPPED | OUTPATIENT
Start: 2022-01-24 | End: 2022-02-23

## 2022-01-24 NOTE — TELEPHONE ENCOUNTER
From: Rhonda Diamond  To: Josee Rodriguez PA-C  Sent: 1/22/2022 12:52 PM CST  Subject: Oh no! Dmitry Johnston may have jinxed me on Monday. Starting Tuesday I started feeling worn down.  Then it just kept getting worse with body aches, runny no

## 2022-01-25 ENCOUNTER — PATIENT MESSAGE (OUTPATIENT)
Dept: FAMILY MEDICINE CLINIC | Facility: CLINIC | Age: 42
End: 2022-01-25

## 2022-01-25 ENCOUNTER — HOSPITAL ENCOUNTER (OUTPATIENT)
Dept: GENERAL RADIOLOGY | Age: 42
Discharge: HOME | End: 2022-01-25
Attending: FAMILY MEDICINE
Payer: COMMERCIAL

## 2022-01-25 DIAGNOSIS — R05.8 COUGH PRODUCTIVE OF YELLOW SPUTUM: ICD-10-CM

## 2022-01-25 DIAGNOSIS — J06.9 UPPER RESPIRATORY TRACT INFECTION, UNSPECIFIED TYPE: ICD-10-CM

## 2022-01-25 DIAGNOSIS — R05.8 COUGH PRODUCTIVE OF YELLOW SPUTUM: Primary | ICD-10-CM

## 2022-01-25 PROCEDURE — 71046 X-RAY EXAM CHEST 2 VIEWS: CPT | Performed by: FAMILY MEDICINE

## 2022-01-26 ENCOUNTER — OFFICE VISIT (OUTPATIENT)
Dept: HEMATOLOGY/ONCOLOGY | Age: 42
End: 2022-01-26
Attending: INTERNAL MEDICINE
Payer: COMMERCIAL

## 2022-01-26 VITALS
SYSTOLIC BLOOD PRESSURE: 128 MMHG | HEART RATE: 84 BPM | DIASTOLIC BLOOD PRESSURE: 80 MMHG | RESPIRATION RATE: 18 BRPM | TEMPERATURE: 98 F | OXYGEN SATURATION: 100 %

## 2022-01-26 DIAGNOSIS — E88.01 ALPHA-1-ANTITRYPSIN DEFICIENCY (HCC): Primary | ICD-10-CM

## 2022-01-26 PROCEDURE — 96365 THER/PROPH/DIAG IV INF INIT: CPT

## 2022-01-26 RX ORDER — DIPHENHYDRAMINE HYDROCHLORIDE 50 MG/ML
25 INJECTION INTRAMUSCULAR; INTRAVENOUS ONCE
OUTPATIENT
Start: 2022-02-02

## 2022-01-26 RX ORDER — METHYLPREDNISOLONE SODIUM SUCCINATE 40 MG/ML
40 INJECTION, POWDER, LYOPHILIZED, FOR SOLUTION INTRAMUSCULAR; INTRAVENOUS ONCE
OUTPATIENT
Start: 2022-02-02

## 2022-01-26 RX ORDER — MEPERIDINE HYDROCHLORIDE 25 MG/ML
25 INJECTION INTRAMUSCULAR; INTRAVENOUS; SUBCUTANEOUS ONCE
OUTPATIENT
Start: 2022-02-02

## 2022-01-26 RX ORDER — FAMOTIDINE 10 MG/ML
20 INJECTION, SOLUTION INTRAVENOUS ONCE
OUTPATIENT
Start: 2022-02-02

## 2022-01-26 RX ORDER — METHYLPREDNISOLONE SODIUM SUCCINATE 125 MG/2ML
125 INJECTION, POWDER, LYOPHILIZED, FOR SOLUTION INTRAMUSCULAR; INTRAVENOUS ONCE
OUTPATIENT
Start: 2022-02-02

## 2022-01-26 NOTE — TELEPHONE ENCOUNTER
From: Javier Espinoza  To: Chris Gorman PA-C  Sent: 1/25/2022 6:22 PM CST  Subject: Progress     Hi Baldomero Jones did have the [employer] as well, so I took those yesterday and today and I will  the other ones tomorrow.  I also went

## 2022-01-26 NOTE — PROGRESS NOTES
Education Record    Learner:  Patient    Here for prolastin infusion    Barriers / Limitations:  None   Comments:    Method:  Discussion   Comments:    General Topics:  Plan of care reviewed   Comments:    Outcome:  Shows understanding   Comments:    Ela

## 2022-02-02 ENCOUNTER — OFFICE VISIT (OUTPATIENT)
Dept: HEMATOLOGY/ONCOLOGY | Age: 42
End: 2022-02-02
Attending: INTERNAL MEDICINE
Payer: COMMERCIAL

## 2022-02-02 VITALS
HEART RATE: 84 BPM | OXYGEN SATURATION: 100 % | TEMPERATURE: 98 F | DIASTOLIC BLOOD PRESSURE: 82 MMHG | SYSTOLIC BLOOD PRESSURE: 134 MMHG | RESPIRATION RATE: 20 BRPM

## 2022-02-02 DIAGNOSIS — E88.01 ALPHA-1-ANTITRYPSIN DEFICIENCY (HCC): Primary | ICD-10-CM

## 2022-02-02 PROCEDURE — 96365 THER/PROPH/DIAG IV INF INIT: CPT

## 2022-02-02 RX ORDER — MEPERIDINE HYDROCHLORIDE 25 MG/ML
25 INJECTION INTRAMUSCULAR; INTRAVENOUS; SUBCUTANEOUS ONCE
Status: CANCELLED | OUTPATIENT
Start: 2022-02-09

## 2022-02-02 RX ORDER — METHYLPREDNISOLONE SODIUM SUCCINATE 40 MG/ML
40 INJECTION, POWDER, LYOPHILIZED, FOR SOLUTION INTRAMUSCULAR; INTRAVENOUS ONCE
Status: CANCELLED | OUTPATIENT
Start: 2022-02-09

## 2022-02-02 RX ORDER — METHYLPREDNISOLONE SODIUM SUCCINATE 125 MG/2ML
125 INJECTION, POWDER, LYOPHILIZED, FOR SOLUTION INTRAMUSCULAR; INTRAVENOUS ONCE
Status: CANCELLED | OUTPATIENT
Start: 2022-02-09

## 2022-02-02 RX ORDER — FAMOTIDINE 10 MG/ML
20 INJECTION, SOLUTION INTRAVENOUS ONCE
Status: CANCELLED | OUTPATIENT
Start: 2022-02-09

## 2022-02-02 RX ORDER — DIPHENHYDRAMINE HYDROCHLORIDE 50 MG/ML
25 INJECTION INTRAMUSCULAR; INTRAVENOUS ONCE
Status: CANCELLED | OUTPATIENT
Start: 2022-02-09

## 2022-02-02 NOTE — PROGRESS NOTES
Patient here for prolastin infusion. Patient tolerated infusion, no complications. Discharged home in stable condition.      Education Record    Learner:  Patient    Disease / Diagnosis: alpha 1 antitrypsin deficiency     Barriers / Limitations:  None    Method:  Brief focused, printed material and  reinforcement    General Topics:  Plan of care reviewed    Outcome:  Shows understanding

## 2022-02-09 ENCOUNTER — OFFICE VISIT (OUTPATIENT)
Dept: HEMATOLOGY/ONCOLOGY | Age: 42
End: 2022-02-09
Attending: INTERNAL MEDICINE
Payer: COMMERCIAL

## 2022-02-09 VITALS
SYSTOLIC BLOOD PRESSURE: 120 MMHG | TEMPERATURE: 97 F | DIASTOLIC BLOOD PRESSURE: 73 MMHG | HEART RATE: 90 BPM | RESPIRATION RATE: 18 BRPM | OXYGEN SATURATION: 98 %

## 2022-02-09 DIAGNOSIS — E88.01 ALPHA-1-ANTITRYPSIN DEFICIENCY (HCC): Primary | ICD-10-CM

## 2022-02-09 PROCEDURE — 96365 THER/PROPH/DIAG IV INF INIT: CPT

## 2022-02-09 RX ORDER — METHYLPREDNISOLONE SODIUM SUCCINATE 40 MG/ML
40 INJECTION, POWDER, LYOPHILIZED, FOR SOLUTION INTRAMUSCULAR; INTRAVENOUS ONCE
Status: CANCELLED | OUTPATIENT
Start: 2022-02-16

## 2022-02-09 RX ORDER — METHYLPREDNISOLONE SODIUM SUCCINATE 125 MG/2ML
125 INJECTION, POWDER, LYOPHILIZED, FOR SOLUTION INTRAMUSCULAR; INTRAVENOUS ONCE
Status: CANCELLED | OUTPATIENT
Start: 2022-02-16

## 2022-02-09 RX ORDER — DIPHENHYDRAMINE HYDROCHLORIDE 50 MG/ML
25 INJECTION INTRAMUSCULAR; INTRAVENOUS ONCE
Status: CANCELLED | OUTPATIENT
Start: 2022-02-16

## 2022-02-09 RX ORDER — FAMOTIDINE 10 MG/ML
20 INJECTION, SOLUTION INTRAVENOUS ONCE
Status: CANCELLED | OUTPATIENT
Start: 2022-02-16

## 2022-02-09 RX ORDER — MEPERIDINE HYDROCHLORIDE 25 MG/ML
25 INJECTION INTRAMUSCULAR; INTRAVENOUS; SUBCUTANEOUS ONCE
Status: CANCELLED | OUTPATIENT
Start: 2022-02-16

## 2022-02-09 NOTE — PROGRESS NOTES
Education Record    Learner:  Patient    Disease / Diagnosis: Here for prolastin infusion    Barriers / Limitations:  None    Method:  Brief focused, printed material and  reinforcement    General Topics:  Plan of care reviewed    Outcome:  Patient ambulatory with no complaints. Tolerated infusion.  Shows understanding

## 2022-02-10 DIAGNOSIS — G44.221 CHRONIC TENSION-TYPE HEADACHE, INTRACTABLE: ICD-10-CM

## 2022-02-10 RX ORDER — BUTALBITAL/ACETAMINOPHEN 50MG-325MG
0.5 TABLET ORAL 2 TIMES DAILY PRN
Qty: 20 TABLET | Refills: 0 | Status: CANCELLED | OUTPATIENT
Start: 2022-02-10

## 2022-02-10 NOTE — TELEPHONE ENCOUNTER
Pt requesting refill of Butalbital-Acetaminophen  MG Oral Tab    Last Time Medication was Prescribed :  01/17/2022 qty # 20 with 0 refills    Last Office Visit with Provider: 01/17/2022    Recommended to return by Provider: 1 Month    Appt scheduled on 02/14/2022

## 2022-02-11 ENCOUNTER — PATIENT MESSAGE (OUTPATIENT)
Dept: FAMILY MEDICINE CLINIC | Facility: CLINIC | Age: 42
End: 2022-02-11

## 2022-02-11 RX ORDER — TRAZODONE HYDROCHLORIDE 150 MG/1
TABLET ORAL
Qty: 90 TABLET | Refills: 0 | OUTPATIENT
Start: 2022-02-11

## 2022-02-11 NOTE — TELEPHONE ENCOUNTER
From: Ruth Back  To: Valdemar Cogan, PA-C  Sent: 2/11/2022 10:37 AM CST  Subject: Trazadone    Viry Nails, I did not realize that I did not take the Trazadone 150mg off auto refill with the pharamacy. I just noticed it when the denial came through.     Mark Vogel

## 2022-02-12 NOTE — TELEPHONE ENCOUNTER
Can this prescription be lowered again to the #8. It was temporarily increased because of headaches from the infusions she was getting.

## 2022-02-14 ENCOUNTER — TELEPHONE (OUTPATIENT)
Dept: FAMILY MEDICINE CLINIC | Facility: CLINIC | Age: 42
End: 2022-02-14

## 2022-02-14 ENCOUNTER — PATIENT MESSAGE (OUTPATIENT)
Dept: FAMILY MEDICINE CLINIC | Facility: CLINIC | Age: 42
End: 2022-02-14

## 2022-02-14 NOTE — PROGRESS NOTES
Moderate bilirubin and protein with ketones. Get labs and abdominal ultrasound ASAP. We can order the ultrasound STAT if you are not able to get it done in the next 1-2 days.

## 2022-02-15 ENCOUNTER — LAB ENCOUNTER (OUTPATIENT)
Dept: LAB | Age: 42
End: 2022-02-15
Attending: FAMILY MEDICINE
Payer: COMMERCIAL

## 2022-02-15 ENCOUNTER — HOSPITAL ENCOUNTER (OUTPATIENT)
Dept: ULTRASOUND IMAGING | Age: 42
Discharge: HOME | End: 2022-02-15
Attending: FAMILY MEDICINE
Payer: COMMERCIAL

## 2022-02-15 DIAGNOSIS — R82.2 BILIRUBIN IN URINE: ICD-10-CM

## 2022-02-15 DIAGNOSIS — R10.13 EPIGASTRIC PAIN: ICD-10-CM

## 2022-02-15 DIAGNOSIS — R11.0 NAUSEA: ICD-10-CM

## 2022-02-15 DIAGNOSIS — R10.10 CHRONIC UPPER ABDOMINAL PAIN: ICD-10-CM

## 2022-02-15 DIAGNOSIS — G89.29 CHRONIC UPPER ABDOMINAL PAIN: ICD-10-CM

## 2022-02-15 LAB
ALBUMIN SERPL-MCNC: 3.6 G/DL (ref 3.4–5)
ALBUMIN/GLOB SERPL: 1.2 {RATIO} (ref 1–2)
ALP LIVER SERPL-CCNC: 72 U/L
ALT SERPL-CCNC: 16 U/L
AMYLASE SERPL-CCNC: 40 U/L (ref 25–115)
ANION GAP SERPL CALC-SCNC: 7 MMOL/L (ref 0–18)
BILIRUB SERPL-MCNC: 0.4 MG/DL (ref 0.1–2)
BUN BLD-MCNC: 10 MG/DL (ref 7–18)
CALCIUM BLD-MCNC: 8.9 MG/DL (ref 8.5–10.1)
CHLORIDE SERPL-SCNC: 104 MMOL/L (ref 98–112)
CO2 SERPL-SCNC: 29 MMOL/L (ref 21–32)
CREAT BLD-MCNC: 0.85 MG/DL
FASTING STATUS PATIENT QL REPORTED: YES
GLOBULIN PLAS-MCNC: 2.9 G/DL (ref 2.8–4.4)
GLUCOSE BLD-MCNC: 92 MG/DL (ref 70–99)
LIPASE SERPL-CCNC: 147 U/L (ref 73–393)
OSMOLALITY SERPL CALC.SUM OF ELEC: 289 MOSM/KG (ref 275–295)
POTASSIUM SERPL-SCNC: 3.8 MMOL/L (ref 3.5–5.1)
PROT SERPL-MCNC: 6.5 G/DL (ref 6.4–8.2)
SODIUM SERPL-SCNC: 140 MMOL/L (ref 136–145)

## 2022-02-15 PROCEDURE — 83690 ASSAY OF LIPASE: CPT

## 2022-02-15 PROCEDURE — 80053 COMPREHEN METABOLIC PANEL: CPT

## 2022-02-15 PROCEDURE — 76700 US EXAM ABDOM COMPLETE: CPT | Performed by: FAMILY MEDICINE

## 2022-02-15 PROCEDURE — 36415 COLL VENOUS BLD VENIPUNCTURE: CPT

## 2022-02-15 PROCEDURE — 82150 ASSAY OF AMYLASE: CPT

## 2022-02-15 NOTE — TELEPHONE ENCOUNTER
From: Joy Galeano  To: Terrance Tate PA-C  Sent: 2/14/2022 5:34 PM CST  Subject: Ultrasound scheduling     Hi Eva Naylor,  Can you the ultrasound to stat so I can get it done tomorrow? Currently March 2nd is the soonest they can get me in.    Thank you,  Zeeshan Dsouza

## 2022-02-15 NOTE — PROGRESS NOTES
Call GI and see if they can get you in sooner. The abdominal ultrasound was normal no bile duct dilation and the visualized areas of the pancreas were normal.  Urine concentrated from lack of water and food awaiting lab results. Esdras Wilkerson

## 2022-02-16 ENCOUNTER — OFFICE VISIT (OUTPATIENT)
Dept: HEMATOLOGY/ONCOLOGY | Age: 42
End: 2022-02-16
Attending: INTERNAL MEDICINE
Payer: COMMERCIAL

## 2022-02-16 ENCOUNTER — PATIENT MESSAGE (OUTPATIENT)
Dept: FAMILY MEDICINE CLINIC | Facility: CLINIC | Age: 42
End: 2022-02-16

## 2022-02-16 VITALS
RESPIRATION RATE: 16 BRPM | HEART RATE: 83 BPM | SYSTOLIC BLOOD PRESSURE: 109 MMHG | DIASTOLIC BLOOD PRESSURE: 73 MMHG | OXYGEN SATURATION: 97 % | TEMPERATURE: 99 F

## 2022-02-16 DIAGNOSIS — E88.01 ALPHA-1-ANTITRYPSIN DEFICIENCY (HCC): Primary | ICD-10-CM

## 2022-02-16 PROCEDURE — 96365 THER/PROPH/DIAG IV INF INIT: CPT

## 2022-02-16 RX ORDER — DIPHENHYDRAMINE HYDROCHLORIDE 50 MG/ML
25 INJECTION INTRAMUSCULAR; INTRAVENOUS ONCE
Status: CANCELLED | OUTPATIENT
Start: 2022-02-23

## 2022-02-16 RX ORDER — METHYLPREDNISOLONE SODIUM SUCCINATE 125 MG/2ML
125 INJECTION, POWDER, LYOPHILIZED, FOR SOLUTION INTRAMUSCULAR; INTRAVENOUS ONCE
Status: CANCELLED | OUTPATIENT
Start: 2022-02-23

## 2022-02-16 RX ORDER — MEPERIDINE HYDROCHLORIDE 25 MG/ML
25 INJECTION INTRAMUSCULAR; INTRAVENOUS; SUBCUTANEOUS ONCE
Status: CANCELLED | OUTPATIENT
Start: 2022-02-23

## 2022-02-16 RX ORDER — FAMOTIDINE 10 MG/ML
20 INJECTION, SOLUTION INTRAVENOUS ONCE
Status: CANCELLED | OUTPATIENT
Start: 2022-02-23

## 2022-02-16 RX ORDER — METHYLPREDNISOLONE SODIUM SUCCINATE 40 MG/ML
40 INJECTION, POWDER, LYOPHILIZED, FOR SOLUTION INTRAMUSCULAR; INTRAVENOUS ONCE
Status: CANCELLED | OUTPATIENT
Start: 2022-02-23

## 2022-02-16 NOTE — PROGRESS NOTES
Education Record    Learner:  Patient    Disease / Diagnosis:pt here for proteinase inhib    Barriers / Limitations:  None    Method:  Brief focused, printed material and  reinforcement    General Topics:  Plan of care reviewed    Outcome:  Shows understanding

## 2022-02-16 NOTE — PROGRESS NOTES
Amylase and lipase are normal.  Liver function tests are all normal which is good since the urine did show the bilirubin. Again try to make sure you stay hydrated and eat some protein. The total protein was actually normal which is also good.   Let me know when you get in with GI.

## 2022-02-18 ENCOUNTER — PATIENT MESSAGE (OUTPATIENT)
Dept: FAMILY MEDICINE CLINIC | Facility: CLINIC | Age: 42
End: 2022-02-18

## 2022-02-18 RX ORDER — FAMOTIDINE 40 MG/1
TABLET, FILM COATED ORAL
Qty: 60 TABLET | Refills: 0 | OUTPATIENT
Start: 2022-02-18

## 2022-02-21 ENCOUNTER — TELEPHONE (OUTPATIENT)
Dept: FAMILY MEDICINE CLINIC | Facility: CLINIC | Age: 42
End: 2022-02-21

## 2022-02-21 NOTE — TELEPHONE ENCOUNTER
Per Thais Lowery PA-C, the patient was contacted to complete a repeat Asthma Control Test (ACT) over-the-phone. The patient scored a 19, which is improved from 18.

## 2022-02-23 ENCOUNTER — OFFICE VISIT (OUTPATIENT)
Dept: HEMATOLOGY/ONCOLOGY | Age: 42
End: 2022-02-23
Attending: INTERNAL MEDICINE
Payer: COMMERCIAL

## 2022-02-23 VITALS
DIASTOLIC BLOOD PRESSURE: 78 MMHG | RESPIRATION RATE: 16 BRPM | TEMPERATURE: 99 F | SYSTOLIC BLOOD PRESSURE: 119 MMHG | HEART RATE: 96 BPM

## 2022-02-23 DIAGNOSIS — E88.01 ALPHA-1-ANTITRYPSIN DEFICIENCY (HCC): Primary | ICD-10-CM

## 2022-02-23 PROCEDURE — 96365 THER/PROPH/DIAG IV INF INIT: CPT

## 2022-02-23 RX ORDER — DIPHENHYDRAMINE HYDROCHLORIDE 50 MG/ML
25 INJECTION INTRAMUSCULAR; INTRAVENOUS ONCE
Status: CANCELLED | OUTPATIENT
Start: 2022-03-02

## 2022-02-23 RX ORDER — MEPERIDINE HYDROCHLORIDE 25 MG/ML
25 INJECTION INTRAMUSCULAR; INTRAVENOUS; SUBCUTANEOUS ONCE
Status: CANCELLED | OUTPATIENT
Start: 2022-03-02

## 2022-02-23 RX ORDER — FAMOTIDINE 10 MG/ML
20 INJECTION, SOLUTION INTRAVENOUS ONCE
Status: CANCELLED | OUTPATIENT
Start: 2022-03-02

## 2022-02-23 RX ORDER — METHYLPREDNISOLONE SODIUM SUCCINATE 40 MG/ML
40 INJECTION, POWDER, LYOPHILIZED, FOR SOLUTION INTRAMUSCULAR; INTRAVENOUS ONCE
Status: CANCELLED | OUTPATIENT
Start: 2022-03-02

## 2022-02-23 RX ORDER — METHYLPREDNISOLONE SODIUM SUCCINATE 125 MG/2ML
125 INJECTION, POWDER, LYOPHILIZED, FOR SOLUTION INTRAMUSCULAR; INTRAVENOUS ONCE
Status: CANCELLED | OUTPATIENT
Start: 2022-03-02

## 2022-02-23 NOTE — PROGRESS NOTES
Education Record    Learner:  Patient    Disease / Diagnosis: Pt here for Prolastin infusion. Barriers / Limitations:  None    Method:  Brief focused, printed material and  reinforcement    General Topics:  Plan of care reviewed    Outcome:  Shows understanding    Pt tolerated infusion. Pt has no complaints.

## 2022-02-24 RX ORDER — FAMOTIDINE 40 MG/1
40 TABLET, FILM COATED ORAL NIGHTLY
Qty: 90 TABLET | Refills: 0 | Status: SHIPPED | OUTPATIENT
Start: 2022-02-24

## 2022-03-02 ENCOUNTER — OFFICE VISIT (OUTPATIENT)
Dept: HEMATOLOGY/ONCOLOGY | Age: 42
End: 2022-03-02
Attending: INTERNAL MEDICINE
Payer: COMMERCIAL

## 2022-03-02 VITALS
SYSTOLIC BLOOD PRESSURE: 142 MMHG | RESPIRATION RATE: 16 BRPM | OXYGEN SATURATION: 99 % | HEART RATE: 123 BPM | TEMPERATURE: 98 F | DIASTOLIC BLOOD PRESSURE: 84 MMHG

## 2022-03-02 DIAGNOSIS — E88.01 ALPHA-1-ANTITRYPSIN DEFICIENCY (HCC): Primary | ICD-10-CM

## 2022-03-02 PROCEDURE — 96365 THER/PROPH/DIAG IV INF INIT: CPT

## 2022-03-02 RX ORDER — METHYLPREDNISOLONE SODIUM SUCCINATE 125 MG/2ML
125 INJECTION, POWDER, LYOPHILIZED, FOR SOLUTION INTRAMUSCULAR; INTRAVENOUS ONCE
Status: CANCELLED | OUTPATIENT
Start: 2022-03-09

## 2022-03-02 RX ORDER — DIPHENHYDRAMINE HYDROCHLORIDE 50 MG/ML
25 INJECTION INTRAMUSCULAR; INTRAVENOUS ONCE
Status: CANCELLED | OUTPATIENT
Start: 2022-03-09

## 2022-03-02 RX ORDER — METHYLPREDNISOLONE SODIUM SUCCINATE 40 MG/ML
40 INJECTION, POWDER, LYOPHILIZED, FOR SOLUTION INTRAMUSCULAR; INTRAVENOUS ONCE
Status: CANCELLED | OUTPATIENT
Start: 2022-03-09

## 2022-03-02 RX ORDER — FAMOTIDINE 10 MG/ML
20 INJECTION, SOLUTION INTRAVENOUS ONCE
Status: CANCELLED | OUTPATIENT
Start: 2022-03-09

## 2022-03-02 RX ORDER — MEPERIDINE HYDROCHLORIDE 25 MG/ML
25 INJECTION INTRAMUSCULAR; INTRAVENOUS; SUBCUTANEOUS ONCE
Status: CANCELLED | OUTPATIENT
Start: 2022-03-09

## 2022-03-03 ENCOUNTER — PATIENT MESSAGE (OUTPATIENT)
Dept: FAMILY MEDICINE CLINIC | Facility: CLINIC | Age: 42
End: 2022-03-03

## 2022-03-03 PROBLEM — R63.4 WEIGHT LOSS: Status: ACTIVE | Noted: 2022-03-03

## 2022-03-03 PROBLEM — R10.12 LEFT UPPER QUADRANT ABDOMINAL PAIN: Status: ACTIVE | Noted: 2022-03-03

## 2022-03-03 PROBLEM — R10.816 EPIGASTRIC ABDOMINAL TENDERNESS WITHOUT REBOUND TENDERNESS: Status: ACTIVE | Noted: 2022-03-03

## 2022-03-04 NOTE — TELEPHONE ENCOUNTER
From: Samuel Rao  To: Yandel Cardona PA-C  Sent: 3/3/2022 6:36 PM CST  Subject: GI Visit     [facility],   I saw the GI yesterday and unfortunately, he is not sure what is going on. He said that he can no perform the scope on me as he said he would not be able to see what he needs to see as my anatomy is different since I had the bypass. Instead he has recommended a CT Scan. So, now I have that scheduled for next Wednesday. He orderd it with IV and oral contrast. He said he is going to see what that says. If ther is not an answer there, he is going to move onto an MRI. He also said he does not think is is Oddie Dysfunction as there are many symptoms that I do not have. So again, it is a waiting game. I am loosing steam with it though. I am in constant pain the goes back and forth between bearable and unbearable. I have tried everything to make it better, but nothing is helping. I am miserable.    Yesika Gomez

## 2022-03-08 ENCOUNTER — HOSPITAL ENCOUNTER (EMERGENCY)
Age: 42
Discharge: HOME | End: 2022-03-08
Attending: EMERGENCY MEDICINE
Payer: COMMERCIAL

## 2022-03-08 ENCOUNTER — APPOINTMENT (OUTPATIENT)
Dept: CT IMAGING | Age: 42
End: 2022-03-08
Attending: EMERGENCY MEDICINE
Payer: COMMERCIAL

## 2022-03-08 VITALS
TEMPERATURE: 98 F | HEIGHT: 68 IN | OXYGEN SATURATION: 98 % | WEIGHT: 187 LBS | DIASTOLIC BLOOD PRESSURE: 56 MMHG | BODY MASS INDEX: 28.34 KG/M2 | SYSTOLIC BLOOD PRESSURE: 101 MMHG | HEART RATE: 75 BPM | RESPIRATION RATE: 18 BRPM

## 2022-03-08 DIAGNOSIS — K56.41 FECAL IMPACTION (HCC): Primary | ICD-10-CM

## 2022-03-08 DIAGNOSIS — K59.00 CONSTIPATION, UNSPECIFIED CONSTIPATION TYPE: ICD-10-CM

## 2022-03-08 LAB
ALBUMIN SERPL-MCNC: 3.1 G/DL (ref 3.4–5)
ALBUMIN/GLOB SERPL: 0.9 {RATIO} (ref 1–2)
ALP LIVER SERPL-CCNC: 76 U/L
ALT SERPL-CCNC: 17 U/L
ANION GAP SERPL CALC-SCNC: 8 MMOL/L (ref 0–18)
AST SERPL-CCNC: 9 U/L (ref 15–37)
BASOPHILS # BLD AUTO: 0.02 X10(3) UL (ref 0–0.2)
BASOPHILS NFR BLD AUTO: 0.3 %
BILIRUB SERPL-MCNC: 0.7 MG/DL (ref 0.1–2)
BILIRUB UR QL CFM: NEGATIVE
BUN BLD-MCNC: 12 MG/DL (ref 7–18)
CALCIUM BLD-MCNC: 8.9 MG/DL (ref 8.5–10.1)
CHLORIDE SERPL-SCNC: 105 MMOL/L (ref 98–112)
CO2 SERPL-SCNC: 25 MMOL/L (ref 21–32)
COLOR UR AUTO: YELLOW
CREAT BLD-MCNC: 0.71 MG/DL
EOSINOPHIL # BLD AUTO: 0.14 X10(3) UL (ref 0–0.7)
EOSINOPHIL NFR BLD AUTO: 1.9 %
ERYTHROCYTE [DISTWIDTH] IN BLOOD BY AUTOMATED COUNT: 12.8 %
GLOBULIN PLAS-MCNC: 3.3 G/DL (ref 2.8–4.4)
GLUCOSE BLD-MCNC: 106 MG/DL (ref 70–99)
GLUCOSE UR STRIP.AUTO-MCNC: NEGATIVE MG/DL
HCT VFR BLD AUTO: 40.8 %
HGB BLD-MCNC: 13.9 G/DL
IMM GRANULOCYTES # BLD AUTO: 0.01 X10(3) UL (ref 0–1)
IMM GRANULOCYTES NFR BLD: 0.1 %
KETONES UR STRIP.AUTO-MCNC: >=160 MG/DL
LEUKOCYTE ESTERASE UR QL STRIP.AUTO: NEGATIVE
LIPASE SERPL-CCNC: 74 U/L (ref 73–393)
LYMPHOCYTES # BLD AUTO: 1.16 X10(3) UL (ref 1–4)
LYMPHOCYTES NFR BLD AUTO: 16.2 %
MCHC RBC AUTO-ENTMCNC: 34.1 G/DL (ref 31–37)
MCV RBC AUTO: 92.3 FL
MONOCYTES # BLD AUTO: 0.49 X10(3) UL (ref 0.1–1)
MONOCYTES NFR BLD AUTO: 6.8 %
NEUTROPHILS # BLD AUTO: 5.36 X10 (3) UL (ref 1.5–7.7)
NEUTROPHILS # BLD AUTO: 5.36 X10(3) UL (ref 1.5–7.7)
NEUTROPHILS NFR BLD AUTO: 74.7 %
NITRITE UR QL STRIP.AUTO: NEGATIVE
OSMOLALITY SERPL CALC.SUM OF ELEC: 286 MOSM/KG (ref 275–295)
PH UR STRIP.AUTO: 6 [PH] (ref 5–8)
PLATELET # BLD AUTO: 224 10(3)UL (ref 150–450)
POTASSIUM SERPL-SCNC: 3.6 MMOL/L (ref 3.5–5.1)
PROT SERPL-MCNC: 6.4 G/DL (ref 6.4–8.2)
PROT UR STRIP.AUTO-MCNC: NEGATIVE MG/DL
RBC # BLD AUTO: 4.42 X10(6)UL
RBC UR QL AUTO: NEGATIVE
SODIUM SERPL-SCNC: 138 MMOL/L (ref 136–145)
SP GR UR STRIP.AUTO: 1.01 (ref 1–1.03)
UROBILINOGEN UR STRIP.AUTO-MCNC: 1 MG/DL
WBC # BLD AUTO: 7.2 X10(3) UL (ref 4–11)

## 2022-03-08 PROCEDURE — 83690 ASSAY OF LIPASE: CPT | Performed by: EMERGENCY MEDICINE

## 2022-03-08 PROCEDURE — 80053 COMPREHEN METABOLIC PANEL: CPT | Performed by: EMERGENCY MEDICINE

## 2022-03-08 PROCEDURE — 81003 URINALYSIS AUTO W/O SCOPE: CPT | Performed by: EMERGENCY MEDICINE

## 2022-03-08 PROCEDURE — 99284 EMERGENCY DEPT VISIT MOD MDM: CPT

## 2022-03-08 PROCEDURE — 74177 CT ABD & PELVIS W/CONTRAST: CPT | Performed by: EMERGENCY MEDICINE

## 2022-03-08 PROCEDURE — 96376 TX/PRO/DX INJ SAME DRUG ADON: CPT

## 2022-03-08 PROCEDURE — 85025 COMPLETE CBC W/AUTO DIFF WBC: CPT | Performed by: EMERGENCY MEDICINE

## 2022-03-08 PROCEDURE — 96374 THER/PROPH/DIAG INJ IV PUSH: CPT

## 2022-03-08 PROCEDURE — 96361 HYDRATE IV INFUSION ADD-ON: CPT

## 2022-03-08 RX ORDER — SODIUM CHLORIDE 9 MG/ML
INJECTION, SOLUTION INTRAVENOUS ONCE
Status: COMPLETED | OUTPATIENT
Start: 2022-03-08 | End: 2022-03-08

## 2022-03-08 RX ORDER — ONDANSETRON 2 MG/ML
4 INJECTION INTRAMUSCULAR; INTRAVENOUS ONCE
Status: COMPLETED | OUTPATIENT
Start: 2022-03-08 | End: 2022-03-08

## 2022-03-08 RX ORDER — IOHEXOL 350 MG/ML
100 INJECTION, SOLUTION INTRAVENOUS
Status: COMPLETED | OUTPATIENT
Start: 2022-03-08 | End: 2022-03-08

## 2022-03-08 NOTE — ED INITIAL ASSESSMENT (HPI)
C/o constipation/hemorrhoids, nausea/vomiting since yesterday. Was seen by Dr. Elena Luna 5 days ago due to losing weights, had an order of CT abd tomorrow.

## 2022-03-09 ENCOUNTER — OFFICE VISIT (OUTPATIENT)
Dept: HEMATOLOGY/ONCOLOGY | Age: 42
End: 2022-03-09
Attending: INTERNAL MEDICINE
Payer: COMMERCIAL

## 2022-03-10 ENCOUNTER — PATIENT MESSAGE (OUTPATIENT)
Dept: FAMILY MEDICINE CLINIC | Facility: CLINIC | Age: 42
End: 2022-03-10

## 2022-03-11 RX ORDER — BUTALBITAL/ACETAMINOPHEN 50MG-325MG
0.5 TABLET ORAL 2 TIMES DAILY PRN
Qty: 10 TABLET | Refills: 0 | Status: SHIPPED | OUTPATIENT
Start: 2022-03-11

## 2022-03-11 RX ORDER — ONDANSETRON HYDROCHLORIDE 8 MG/1
8 TABLET, FILM COATED ORAL EVERY 12 HOURS PRN
Qty: 30 TABLET | Refills: 0 | Status: SHIPPED | OUTPATIENT
Start: 2022-03-11

## 2022-03-11 NOTE — TELEPHONE ENCOUNTER
From: Jose Pride  To: Fox Guerra PA-C  Sent: 3/10/2022 6:19 PM CST  Subject: ER visit     Umang Espinoza,   I was in the ER Tuesday night. Unfortunately, I had an impaction. I tried to go on Monday morning and everything got stuck. I went to work and started throwing up. I just keep getting worse each day. The ER doc tried to get it out himself. Most painful thing I ever have had done. The they gave me an enima and some IV Zofran. The enima worked! The dr did say if I waited longer I would have kept getting sicker. I did request a zofran since I used a lot of it this week.   Thank you,  Neelima Jara

## 2022-03-16 ENCOUNTER — OFFICE VISIT (OUTPATIENT)
Dept: HEMATOLOGY/ONCOLOGY | Age: 42
End: 2022-03-16
Attending: INTERNAL MEDICINE
Payer: COMMERCIAL

## 2022-03-16 VITALS
TEMPERATURE: 98 F | SYSTOLIC BLOOD PRESSURE: 117 MMHG | DIASTOLIC BLOOD PRESSURE: 81 MMHG | RESPIRATION RATE: 16 BRPM | OXYGEN SATURATION: 98 % | HEART RATE: 111 BPM

## 2022-03-16 DIAGNOSIS — E88.01 ALPHA-1-ANTITRYPSIN DEFICIENCY (HCC): Primary | ICD-10-CM

## 2022-03-16 PROCEDURE — 96365 THER/PROPH/DIAG IV INF INIT: CPT

## 2022-03-16 RX ORDER — METHYLPREDNISOLONE SODIUM SUCCINATE 40 MG/ML
40 INJECTION, POWDER, LYOPHILIZED, FOR SOLUTION INTRAMUSCULAR; INTRAVENOUS ONCE
Status: CANCELLED | OUTPATIENT
Start: 2022-03-23

## 2022-03-16 RX ORDER — FAMOTIDINE 10 MG/ML
20 INJECTION, SOLUTION INTRAVENOUS ONCE
Status: CANCELLED | OUTPATIENT
Start: 2022-03-23

## 2022-03-16 RX ORDER — MEPERIDINE HYDROCHLORIDE 25 MG/ML
25 INJECTION INTRAMUSCULAR; INTRAVENOUS; SUBCUTANEOUS ONCE
Status: CANCELLED | OUTPATIENT
Start: 2022-03-23

## 2022-03-16 RX ORDER — METHYLPREDNISOLONE SODIUM SUCCINATE 125 MG/2ML
125 INJECTION, POWDER, LYOPHILIZED, FOR SOLUTION INTRAMUSCULAR; INTRAVENOUS ONCE
Status: CANCELLED | OUTPATIENT
Start: 2022-03-23

## 2022-03-16 RX ORDER — DIPHENHYDRAMINE HYDROCHLORIDE 50 MG/ML
25 INJECTION INTRAMUSCULAR; INTRAVENOUS ONCE
Status: CANCELLED | OUTPATIENT
Start: 2022-03-23

## 2022-03-16 NOTE — PROGRESS NOTES
Education Record    Learner:  Patient    Disease / Diagnosis:pt here for prolastin    Barriers / Limitations:  None    Method:  Brief focused, printed material and  reinforcement    General Topics:  Plan of care reviewed    Outcome: no c/o Shows understanding

## 2022-03-22 RX ORDER — ALPRAZOLAM 0.25 MG/1
TABLET ORAL
Qty: 60 TABLET | Refills: 0 | Status: SHIPPED | OUTPATIENT
Start: 2022-03-22

## 2022-03-23 ENCOUNTER — OFFICE VISIT (OUTPATIENT)
Dept: FAMILY MEDICINE CLINIC | Facility: CLINIC | Age: 42
End: 2022-03-23
Payer: COMMERCIAL

## 2022-03-23 ENCOUNTER — OFFICE VISIT (OUTPATIENT)
Dept: HEMATOLOGY/ONCOLOGY | Age: 42
End: 2022-03-23
Attending: INTERNAL MEDICINE
Payer: COMMERCIAL

## 2022-03-23 VITALS
DIASTOLIC BLOOD PRESSURE: 78 MMHG | RESPIRATION RATE: 18 BRPM | WEIGHT: 190.06 LBS | TEMPERATURE: 99 F | OXYGEN SATURATION: 96 % | BODY MASS INDEX: 29 KG/M2 | SYSTOLIC BLOOD PRESSURE: 112 MMHG | HEART RATE: 107 BPM

## 2022-03-23 VITALS
SYSTOLIC BLOOD PRESSURE: 100 MMHG | DIASTOLIC BLOOD PRESSURE: 64 MMHG | HEIGHT: 68 IN | BODY MASS INDEX: 28.49 KG/M2 | WEIGHT: 188 LBS | HEART RATE: 104 BPM

## 2022-03-23 DIAGNOSIS — F41.1 GAD (GENERALIZED ANXIETY DISORDER): ICD-10-CM

## 2022-03-23 DIAGNOSIS — E44.1 MILD PROTEIN-CALORIE MALNUTRITION (HCC): ICD-10-CM

## 2022-03-23 DIAGNOSIS — R19.00 MASS OF SOFT TISSUE OF ABDOMEN: ICD-10-CM

## 2022-03-23 DIAGNOSIS — Z98.84 HISTORY OF ROUX-EN-Y GASTRIC BYPASS: ICD-10-CM

## 2022-03-23 DIAGNOSIS — E88.01 ALPHA-1-ANTITRYPSIN DEFICIENCY (HCC): Primary | ICD-10-CM

## 2022-03-23 DIAGNOSIS — R10.12 LEFT UPPER QUADRANT ABDOMINAL PAIN: Primary | ICD-10-CM

## 2022-03-23 PROCEDURE — 3078F DIAST BP <80 MM HG: CPT | Performed by: FAMILY MEDICINE

## 2022-03-23 PROCEDURE — 3008F BODY MASS INDEX DOCD: CPT | Performed by: FAMILY MEDICINE

## 2022-03-23 PROCEDURE — 96365 THER/PROPH/DIAG IV INF INIT: CPT

## 2022-03-23 PROCEDURE — 99214 OFFICE O/P EST MOD 30 MIN: CPT | Performed by: FAMILY MEDICINE

## 2022-03-23 PROCEDURE — 3074F SYST BP LT 130 MM HG: CPT | Performed by: FAMILY MEDICINE

## 2022-03-23 RX ORDER — DIPHENHYDRAMINE HYDROCHLORIDE 50 MG/ML
25 INJECTION INTRAMUSCULAR; INTRAVENOUS ONCE
OUTPATIENT
Start: 2022-03-30

## 2022-03-23 RX ORDER — MEPERIDINE HYDROCHLORIDE 25 MG/ML
25 INJECTION INTRAMUSCULAR; INTRAVENOUS; SUBCUTANEOUS ONCE
OUTPATIENT
Start: 2022-03-30

## 2022-03-23 RX ORDER — FAMOTIDINE 10 MG/ML
20 INJECTION, SOLUTION INTRAVENOUS ONCE
OUTPATIENT
Start: 2022-03-30

## 2022-03-23 RX ORDER — METHYLPREDNISOLONE SODIUM SUCCINATE 40 MG/ML
40 INJECTION, POWDER, LYOPHILIZED, FOR SOLUTION INTRAMUSCULAR; INTRAVENOUS ONCE
OUTPATIENT
Start: 2022-03-30

## 2022-03-23 RX ORDER — METHYLPREDNISOLONE SODIUM SUCCINATE 125 MG/2ML
125 INJECTION, POWDER, LYOPHILIZED, FOR SOLUTION INTRAMUSCULAR; INTRAVENOUS ONCE
OUTPATIENT
Start: 2022-03-30

## 2022-03-23 NOTE — PROGRESS NOTES
Education Record    Learner:  Patient    Disease / Diagnosis: here for prolastin infusion    Barriers / Limitations:  None    Method:  Brief focused, printed material and  reinforcement    General Topics:  Plan of care reviewed    Outcome:  Patient ambulatory with no complaints. Tolerated infusion. Discharged home in stable condition.  Shows understanding

## 2022-03-24 PROBLEM — R06.02 SHORTNESS OF BREATH: Status: RESOLVED | Noted: 2021-12-02 | Resolved: 2022-03-24

## 2022-03-24 PROBLEM — B37.0 ORAL THRUSH: Status: RESOLVED | Noted: 2021-12-02 | Resolved: 2022-03-24

## 2022-03-24 PROBLEM — S76.312A STRAIN OF LEFT PIRIFORMIS MUSCLE: Status: RESOLVED | Noted: 2021-12-12 | Resolved: 2022-03-24

## 2022-03-24 PROBLEM — R10.816 EPIGASTRIC ABDOMINAL TENDERNESS WITHOUT REBOUND TENDERNESS: Status: RESOLVED | Noted: 2022-03-03 | Resolved: 2022-03-24

## 2022-03-24 PROBLEM — R19.00 MASS OF SOFT TISSUE OF ABDOMEN: Status: ACTIVE | Noted: 2022-03-24

## 2022-03-29 ENCOUNTER — PATIENT MESSAGE (OUTPATIENT)
Dept: FAMILY MEDICINE CLINIC | Facility: CLINIC | Age: 42
End: 2022-03-29

## 2022-03-29 ENCOUNTER — OFFICE VISIT (OUTPATIENT)
Dept: HEMATOLOGY/ONCOLOGY | Age: 42
End: 2022-03-29
Attending: INTERNAL MEDICINE
Payer: COMMERCIAL

## 2022-03-29 ENCOUNTER — OFFICE VISIT (OUTPATIENT)
Dept: SURGERY | Facility: CLINIC | Age: 42
End: 2022-03-29
Payer: COMMERCIAL

## 2022-03-29 VITALS
SYSTOLIC BLOOD PRESSURE: 107 MMHG | HEART RATE: 87 BPM | DIASTOLIC BLOOD PRESSURE: 69 MMHG | RESPIRATION RATE: 16 BRPM | TEMPERATURE: 96 F | OXYGEN SATURATION: 100 %

## 2022-03-29 VITALS — DIASTOLIC BLOOD PRESSURE: 79 MMHG | TEMPERATURE: 98 F | HEART RATE: 101 BPM | SYSTOLIC BLOOD PRESSURE: 113 MMHG

## 2022-03-29 DIAGNOSIS — R10.9 ABDOMINAL PAIN, UNSPECIFIED ABDOMINAL LOCATION: Primary | ICD-10-CM

## 2022-03-29 DIAGNOSIS — E88.01 ALPHA-1-ANTITRYPSIN DEFICIENCY (HCC): Primary | ICD-10-CM

## 2022-03-29 DIAGNOSIS — R11.0 NAUSEA: ICD-10-CM

## 2022-03-29 PROCEDURE — 3074F SYST BP LT 130 MM HG: CPT | Performed by: SURGERY

## 2022-03-29 PROCEDURE — 96365 THER/PROPH/DIAG IV INF INIT: CPT

## 2022-03-29 PROCEDURE — 99244 OFF/OP CNSLTJ NEW/EST MOD 40: CPT | Performed by: SURGERY

## 2022-03-29 PROCEDURE — 3078F DIAST BP <80 MM HG: CPT | Performed by: SURGERY

## 2022-03-29 RX ORDER — MEPERIDINE HYDROCHLORIDE 25 MG/ML
25 INJECTION INTRAMUSCULAR; INTRAVENOUS; SUBCUTANEOUS ONCE
OUTPATIENT
Start: 2022-03-30

## 2022-03-29 RX ORDER — METHYLPREDNISOLONE SODIUM SUCCINATE 125 MG/2ML
125 INJECTION, POWDER, LYOPHILIZED, FOR SOLUTION INTRAMUSCULAR; INTRAVENOUS ONCE
OUTPATIENT
Start: 2022-03-30

## 2022-03-29 RX ORDER — DIPHENHYDRAMINE HYDROCHLORIDE 50 MG/ML
25 INJECTION INTRAMUSCULAR; INTRAVENOUS ONCE
OUTPATIENT
Start: 2022-03-30

## 2022-03-29 RX ORDER — FAMOTIDINE 10 MG/ML
20 INJECTION, SOLUTION INTRAVENOUS ONCE
OUTPATIENT
Start: 2022-03-30

## 2022-03-29 RX ORDER — METHYLPREDNISOLONE SODIUM SUCCINATE 40 MG/ML
40 INJECTION, POWDER, LYOPHILIZED, FOR SOLUTION INTRAMUSCULAR; INTRAVENOUS ONCE
OUTPATIENT
Start: 2022-03-30

## 2022-03-29 NOTE — PROGRESS NOTES
Education Record    Learner:  Patient    Disease / Diagnosis: Here for prolastin infusion    Barriers / Limitations:  None    Method:  Brief focused, printed material and  reinforcement    General Topics:  Plan of care reviewed    Outcome: Patient ambulatory with no complaints. Tolerated infusion today. states she will get her next appts off of mychart.  Shows understanding

## 2022-03-30 ENCOUNTER — APPOINTMENT (OUTPATIENT)
Dept: HEMATOLOGY/ONCOLOGY | Age: 42
End: 2022-03-30
Attending: INTERNAL MEDICINE
Payer: COMMERCIAL

## 2022-04-05 RX ORDER — BUTALBITAL/ACETAMINOPHEN 50MG-325MG
0.5 TABLET ORAL 2 TIMES DAILY PRN
Qty: 10 TABLET | Refills: 0 | Status: SHIPPED | OUTPATIENT
Start: 2022-04-05

## 2022-04-06 ENCOUNTER — PATIENT MESSAGE (OUTPATIENT)
Dept: FAMILY MEDICINE CLINIC | Facility: CLINIC | Age: 42
End: 2022-04-06

## 2022-04-06 ENCOUNTER — OFFICE VISIT (OUTPATIENT)
Dept: HEMATOLOGY/ONCOLOGY | Age: 42
End: 2022-04-06
Attending: INTERNAL MEDICINE
Payer: COMMERCIAL

## 2022-04-06 VITALS
OXYGEN SATURATION: 98 % | DIASTOLIC BLOOD PRESSURE: 80 MMHG | RESPIRATION RATE: 18 BRPM | SYSTOLIC BLOOD PRESSURE: 125 MMHG | TEMPERATURE: 99 F | HEART RATE: 96 BPM

## 2022-04-06 DIAGNOSIS — E88.01 ALPHA-1-ANTITRYPSIN DEFICIENCY (HCC): Primary | ICD-10-CM

## 2022-04-06 PROCEDURE — 96365 THER/PROPH/DIAG IV INF INIT: CPT

## 2022-04-06 RX ORDER — DIPHENHYDRAMINE HYDROCHLORIDE 50 MG/ML
25 INJECTION INTRAMUSCULAR; INTRAVENOUS ONCE
OUTPATIENT
Start: 2022-04-13

## 2022-04-06 RX ORDER — FAMOTIDINE 10 MG/ML
20 INJECTION, SOLUTION INTRAVENOUS ONCE
OUTPATIENT
Start: 2022-04-13

## 2022-04-06 RX ORDER — METHYLPREDNISOLONE SODIUM SUCCINATE 40 MG/ML
40 INJECTION, POWDER, LYOPHILIZED, FOR SOLUTION INTRAMUSCULAR; INTRAVENOUS ONCE
OUTPATIENT
Start: 2022-04-13

## 2022-04-06 RX ORDER — MEPERIDINE HYDROCHLORIDE 25 MG/ML
25 INJECTION INTRAMUSCULAR; INTRAVENOUS; SUBCUTANEOUS ONCE
OUTPATIENT
Start: 2022-04-13

## 2022-04-06 RX ORDER — METHYLPREDNISOLONE SODIUM SUCCINATE 125 MG/2ML
125 INJECTION, POWDER, LYOPHILIZED, FOR SOLUTION INTRAMUSCULAR; INTRAVENOUS ONCE
OUTPATIENT
Start: 2022-04-13

## 2022-04-06 NOTE — PROGRESS NOTES
Education Record    Learner:  Patient    Disease / Diagnosis: pt here for prolastin infusion    Barriers / Limitations:  None    Method:  Brief focused, printed material and  reinforcement    General Topics:  Plan of care reviewed    Outcome:  Shows understanding      No complaints

## 2022-04-12 ENCOUNTER — LAB ENCOUNTER (OUTPATIENT)
Dept: LAB | Age: 42
End: 2022-04-12
Attending: SURGERY
Payer: COMMERCIAL

## 2022-04-12 DIAGNOSIS — E66.01 CLASS 2 SEVERE OBESITY DUE TO EXCESS CALORIES WITH SERIOUS COMORBIDITY AND BODY MASS INDEX (BMI) OF 35.0 TO 35.9 IN ADULT (HCC): ICD-10-CM

## 2022-04-12 LAB — SARS-COV-2 RNA RESP QL NAA+PROBE: NOT DETECTED

## 2022-04-13 ENCOUNTER — OFFICE VISIT (OUTPATIENT)
Dept: HEMATOLOGY/ONCOLOGY | Age: 42
End: 2022-04-13
Attending: INTERNAL MEDICINE
Payer: COMMERCIAL

## 2022-04-13 ENCOUNTER — PATIENT MESSAGE (OUTPATIENT)
Dept: FAMILY MEDICINE CLINIC | Facility: CLINIC | Age: 42
End: 2022-04-13

## 2022-04-13 VITALS
DIASTOLIC BLOOD PRESSURE: 79 MMHG | SYSTOLIC BLOOD PRESSURE: 116 MMHG | RESPIRATION RATE: 18 BRPM | BODY MASS INDEX: 28 KG/M2 | HEART RATE: 79 BPM | OXYGEN SATURATION: 100 % | TEMPERATURE: 98 F | WEIGHT: 181 LBS

## 2022-04-13 DIAGNOSIS — E88.01 ALPHA-1-ANTITRYPSIN DEFICIENCY (HCC): Primary | ICD-10-CM

## 2022-04-13 PROCEDURE — 96365 THER/PROPH/DIAG IV INF INIT: CPT

## 2022-04-13 RX ORDER — PREGABALIN 75 MG/1
75 CAPSULE ORAL 3 TIMES DAILY
Qty: 90 CAPSULE | Refills: 3 | Status: SHIPPED | OUTPATIENT
Start: 2022-04-13

## 2022-04-13 RX ORDER — FAMOTIDINE 10 MG/ML
20 INJECTION, SOLUTION INTRAVENOUS ONCE
OUTPATIENT
Start: 2022-04-20

## 2022-04-13 RX ORDER — METHYLPREDNISOLONE SODIUM SUCCINATE 40 MG/ML
40 INJECTION, POWDER, LYOPHILIZED, FOR SOLUTION INTRAMUSCULAR; INTRAVENOUS ONCE
OUTPATIENT
Start: 2022-04-20

## 2022-04-13 RX ORDER — MEPERIDINE HYDROCHLORIDE 25 MG/ML
25 INJECTION INTRAMUSCULAR; INTRAVENOUS; SUBCUTANEOUS ONCE
OUTPATIENT
Start: 2022-04-20

## 2022-04-13 RX ORDER — METHYLPREDNISOLONE SODIUM SUCCINATE 125 MG/2ML
125 INJECTION, POWDER, LYOPHILIZED, FOR SOLUTION INTRAMUSCULAR; INTRAVENOUS ONCE
OUTPATIENT
Start: 2022-04-20

## 2022-04-13 RX ORDER — DIPHENHYDRAMINE HYDROCHLORIDE 50 MG/ML
25 INJECTION INTRAMUSCULAR; INTRAVENOUS ONCE
OUTPATIENT
Start: 2022-04-20

## 2022-04-13 NOTE — TELEPHONE ENCOUNTER
From: Samuel Rao  To: Yandel Cardona PA-C  Sent: 4/13/2022 1:26 PM CDT  Subject: Pregabalin/Lyrica 75mg    Hi Darcie Silva,   As we talked about at my last visit, can you send in a prescrption for my Pregabalin/Lyrica 75 mg 3 x a day? You asked me to send a message when I needed it sent in. It was being filled by Dr. Melissa Cedeno since the increase was done, but I am no longer following up with him.     Thank you,   Yesika Gomez

## 2022-04-13 NOTE — PROGRESS NOTES
Education Record    Learner:  Patient    Disease / Diagnosis: here fir prolastin-c infusion    Barriers / Limitations:  None   Comments:    Method:  Brief focused   Comments:    General Topics:  Plan of care reviewed   Comments:    Outcome:  Shows understanding   Comments: tolerated treatment. No complaints.

## 2022-04-15 ENCOUNTER — HOSPITAL ENCOUNTER (OUTPATIENT)
Dept: GENERAL RADIOLOGY | Age: 42
Discharge: HOME | End: 2022-04-15
Attending: SURGERY
Payer: COMMERCIAL

## 2022-04-15 DIAGNOSIS — E66.01 CLASS 2 SEVERE OBESITY DUE TO EXCESS CALORIES WITH SERIOUS COMORBIDITY AND BODY MASS INDEX (BMI) OF 35.0 TO 35.9 IN ADULT (HCC): ICD-10-CM

## 2022-04-15 PROCEDURE — 74240 X-RAY XM UPR GI TRC 1CNTRST: CPT | Performed by: SURGERY

## 2022-04-19 ENCOUNTER — TELEPHONE (OUTPATIENT)
Dept: FAMILY MEDICINE CLINIC | Facility: CLINIC | Age: 42
End: 2022-04-19

## 2022-04-19 PROBLEM — R19.00 MASS OF SOFT TISSUE OF ABDOMEN: Status: RESOLVED | Noted: 2022-03-24 | Resolved: 2022-04-19

## 2022-04-19 NOTE — TELEPHONE ENCOUNTER
Paola Velasquez (Key: [id number])    This request has received a Favorable outcome.     Please note any additional information provided by [employer] at the bottom of this request.

## 2022-04-19 NOTE — TELEPHONE ENCOUNTER
Received a fax requesting PA for Tyl #3. Sent to ly Bernal (Doshi: [id number])    Your information has been submitted to [employer]. [employer] is reviewing the PA request and you will receive an electronic response. You may check for the updated outcome later by reopening this request. The standard fax determination will also be sent to you directly. If you have any questions about your PA submission, contact [employer] at [phone].

## 2022-04-20 ENCOUNTER — OFFICE VISIT (OUTPATIENT)
Dept: HEMATOLOGY/ONCOLOGY | Age: 42
End: 2022-04-20
Attending: INTERNAL MEDICINE
Payer: COMMERCIAL

## 2022-04-20 ENCOUNTER — APPOINTMENT (OUTPATIENT)
Dept: HEMATOLOGY/ONCOLOGY | Age: 42
End: 2022-04-20
Attending: INTERNAL MEDICINE
Payer: COMMERCIAL

## 2022-04-20 VITALS
OXYGEN SATURATION: 99 % | RESPIRATION RATE: 18 BRPM | DIASTOLIC BLOOD PRESSURE: 76 MMHG | HEART RATE: 76 BPM | TEMPERATURE: 98 F | SYSTOLIC BLOOD PRESSURE: 107 MMHG

## 2022-04-20 DIAGNOSIS — E88.01 ALPHA-1-ANTITRYPSIN DEFICIENCY (HCC): Primary | ICD-10-CM

## 2022-04-20 PROCEDURE — 96365 THER/PROPH/DIAG IV INF INIT: CPT

## 2022-04-20 RX ORDER — METHYLPREDNISOLONE SODIUM SUCCINATE 125 MG/2ML
125 INJECTION, POWDER, LYOPHILIZED, FOR SOLUTION INTRAMUSCULAR; INTRAVENOUS ONCE
OUTPATIENT
Start: 2022-04-27

## 2022-04-20 RX ORDER — DIPHENHYDRAMINE HYDROCHLORIDE 50 MG/ML
25 INJECTION INTRAMUSCULAR; INTRAVENOUS ONCE
OUTPATIENT
Start: 2022-04-27

## 2022-04-20 RX ORDER — MEPERIDINE HYDROCHLORIDE 25 MG/ML
25 INJECTION INTRAMUSCULAR; INTRAVENOUS; SUBCUTANEOUS ONCE
OUTPATIENT
Start: 2022-04-27

## 2022-04-20 RX ORDER — METHYLPREDNISOLONE SODIUM SUCCINATE 40 MG/ML
40 INJECTION, POWDER, LYOPHILIZED, FOR SOLUTION INTRAMUSCULAR; INTRAVENOUS ONCE
OUTPATIENT
Start: 2022-04-27

## 2022-04-20 RX ORDER — FAMOTIDINE 10 MG/ML
20 INJECTION, SOLUTION INTRAVENOUS ONCE
OUTPATIENT
Start: 2022-04-27

## 2022-04-20 NOTE — PROGRESS NOTES
Education Record    Learner:  Patient    Disease / Diagnosis: patient here for prolastin infusion    Barriers / Limitations:  None    Method:  Brief focused, printed material and  reinforcement    General Topics:  Plan of care reviewed    Outcome:  Shows understanding  Patient tolerated infusion. No complications.

## 2022-04-25 ENCOUNTER — PATIENT MESSAGE (OUTPATIENT)
Dept: FAMILY MEDICINE CLINIC | Facility: CLINIC | Age: 42
End: 2022-04-25

## 2022-04-26 ENCOUNTER — OFFICE VISIT (OUTPATIENT)
Dept: SURGERY | Facility: CLINIC | Age: 42
End: 2022-04-26
Payer: COMMERCIAL

## 2022-04-26 ENCOUNTER — PATIENT MESSAGE (OUTPATIENT)
Dept: FAMILY MEDICINE CLINIC | Facility: CLINIC | Age: 42
End: 2022-04-26

## 2022-04-26 VITALS
WEIGHT: 180 LBS | HEART RATE: 85 BPM | OXYGEN SATURATION: 97 % | DIASTOLIC BLOOD PRESSURE: 72 MMHG | BODY MASS INDEX: 27.28 KG/M2 | HEIGHT: 68 IN | SYSTOLIC BLOOD PRESSURE: 110 MMHG

## 2022-04-26 RX ORDER — BUSPIRONE HYDROCHLORIDE 5 MG/1
TABLET ORAL 3 TIMES DAILY PRN
Qty: 90 TABLET | Refills: 2 | Status: SHIPPED | OUTPATIENT
Start: 2022-04-26

## 2022-04-27 ENCOUNTER — OFFICE VISIT (OUTPATIENT)
Dept: HEMATOLOGY/ONCOLOGY | Age: 42
End: 2022-04-27
Attending: INTERNAL MEDICINE
Payer: COMMERCIAL

## 2022-04-27 ENCOUNTER — APPOINTMENT (OUTPATIENT)
Dept: HEMATOLOGY/ONCOLOGY | Age: 42
End: 2022-04-27
Attending: INTERNAL MEDICINE
Payer: COMMERCIAL

## 2022-04-27 VITALS
DIASTOLIC BLOOD PRESSURE: 72 MMHG | RESPIRATION RATE: 16 BRPM | SYSTOLIC BLOOD PRESSURE: 109 MMHG | OXYGEN SATURATION: 97 % | TEMPERATURE: 98 F | HEART RATE: 100 BPM

## 2022-04-27 DIAGNOSIS — E88.01 ALPHA-1-ANTITRYPSIN DEFICIENCY (HCC): Primary | ICD-10-CM

## 2022-04-27 PROCEDURE — 96365 THER/PROPH/DIAG IV INF INIT: CPT

## 2022-04-27 RX ORDER — MEPERIDINE HYDROCHLORIDE 25 MG/ML
25 INJECTION INTRAMUSCULAR; INTRAVENOUS; SUBCUTANEOUS ONCE
OUTPATIENT
Start: 2022-05-04

## 2022-04-27 RX ORDER — METHYLPREDNISOLONE SODIUM SUCCINATE 125 MG/2ML
125 INJECTION, POWDER, LYOPHILIZED, FOR SOLUTION INTRAMUSCULAR; INTRAVENOUS ONCE
OUTPATIENT
Start: 2022-05-04

## 2022-04-27 RX ORDER — METHYLPREDNISOLONE SODIUM SUCCINATE 40 MG/ML
40 INJECTION, POWDER, LYOPHILIZED, FOR SOLUTION INTRAMUSCULAR; INTRAVENOUS ONCE
OUTPATIENT
Start: 2022-05-04

## 2022-04-27 RX ORDER — FAMOTIDINE 10 MG/ML
20 INJECTION, SOLUTION INTRAVENOUS ONCE
OUTPATIENT
Start: 2022-05-04

## 2022-04-27 RX ORDER — DIPHENHYDRAMINE HYDROCHLORIDE 50 MG/ML
25 INJECTION INTRAMUSCULAR; INTRAVENOUS ONCE
OUTPATIENT
Start: 2022-05-04

## 2022-04-27 NOTE — PROGRESS NOTES
Education Record    Learner:  Patient    Disease / Diagnosis:pt here for prolastin    Barriers / Limitations:  None    Method:  Brief focused, printed material and  reinforcement    General Topics:  Plan of care reviewed    Outcome:  Shows understanding

## 2022-04-28 ENCOUNTER — OFFICE VISIT (OUTPATIENT)
Dept: SURGERY | Facility: CLINIC | Age: 42
End: 2022-04-28
Payer: COMMERCIAL

## 2022-04-28 VITALS
DIASTOLIC BLOOD PRESSURE: 79 MMHG | OXYGEN SATURATION: 100 % | WEIGHT: 184 LBS | HEIGHT: 68 IN | SYSTOLIC BLOOD PRESSURE: 122 MMHG | BODY MASS INDEX: 27.89 KG/M2 | RESPIRATION RATE: 16 BRPM | HEART RATE: 97 BPM

## 2022-04-28 DIAGNOSIS — E66.3 OVERWEIGHT WITH BODY MASS INDEX (BMI) 25.0-29.9: ICD-10-CM

## 2022-04-28 DIAGNOSIS — E44.1 MILD PROTEIN-CALORIE MALNUTRITION (HCC): Primary | ICD-10-CM

## 2022-04-28 DIAGNOSIS — Z98.84 HISTORY OF ROUX-EN-Y GASTRIC BYPASS: ICD-10-CM

## 2022-04-28 DIAGNOSIS — F43.9 STRESS: ICD-10-CM

## 2022-04-28 PROCEDURE — 99214 OFFICE O/P EST MOD 30 MIN: CPT | Performed by: INTERNAL MEDICINE

## 2022-04-28 PROCEDURE — 3078F DIAST BP <80 MM HG: CPT | Performed by: INTERNAL MEDICINE

## 2022-04-28 PROCEDURE — 3074F SYST BP LT 130 MM HG: CPT | Performed by: INTERNAL MEDICINE

## 2022-04-28 PROCEDURE — 3008F BODY MASS INDEX DOCD: CPT | Performed by: INTERNAL MEDICINE

## 2022-04-28 RX ORDER — TRAZODONE HYDROCHLORIDE 50 MG/1
TABLET ORAL
Qty: 120 TABLET | Refills: 3 | Status: SHIPPED | OUTPATIENT
Start: 2022-04-28

## 2022-04-28 RX ORDER — ACETAMINOPHEN AND CODEINE PHOSPHATE 300; 30 MG/1; MG/1
TABLET ORAL NIGHTLY PRN
Qty: 30 TABLET | Refills: 0 | Status: SHIPPED | OUTPATIENT
Start: 2022-05-02

## 2022-04-28 RX ORDER — BUTALBITAL/ACETAMINOPHEN 50MG-325MG
TABLET ORAL
Qty: 10 TABLET | Refills: 0 | Status: SHIPPED | OUTPATIENT
Start: 2022-04-28

## 2022-04-28 NOTE — TELEPHONE ENCOUNTER
From: Crystal Ding  To: Kymberly Vargas PA-C  Sent: 4/26/2022 8:28 PM CDT  Subject: Surgeon    Jessica Anderson,   I met with the Bariatric Surgeon today and he is going to do an Laparoscopy on me. The only problem is that he can not get me in until June 13th. He also referred me to a Gastro at the [address] [place] [address] that I am going to call tomorrow to make an appointment with them. He said that they will be able to do a procedure where they are able to get a scope all the way down and then all the way up. It is some sort of balloon procedure. Knowing that I am not getting in for 6 weeks, what do you want to do about the Tylenol #3? I am mostly taking a full pill as the half pill really was not even taking the edge of. The entire pill does not take all of the pain away, but at least it takes the edge off and allows me to sleep at least for a little bit. I am also planning on seeing if I can move Dever to July. After talking with the Surgeon who said that if there are a lot of adhesions, it could become a bigger surgery and I would need to cancel [place]. I am also worried about   traveling 15 hours less than 2 days after surgery.    Thank you for all of your help, and let me know your thoughts,   Severa Schmidt

## 2022-05-04 ENCOUNTER — OFFICE VISIT (OUTPATIENT)
Dept: HEMATOLOGY/ONCOLOGY | Age: 42
End: 2022-05-04
Attending: INTERNAL MEDICINE
Payer: COMMERCIAL

## 2022-05-04 VITALS
RESPIRATION RATE: 18 BRPM | TEMPERATURE: 98 F | OXYGEN SATURATION: 98 % | HEART RATE: 108 BPM | DIASTOLIC BLOOD PRESSURE: 79 MMHG | SYSTOLIC BLOOD PRESSURE: 118 MMHG

## 2022-05-04 DIAGNOSIS — E88.01 ALPHA-1-ANTITRYPSIN DEFICIENCY (HCC): Primary | ICD-10-CM

## 2022-05-04 PROCEDURE — 96365 THER/PROPH/DIAG IV INF INIT: CPT

## 2022-05-04 RX ORDER — METHYLPREDNISOLONE SODIUM SUCCINATE 125 MG/2ML
125 INJECTION, POWDER, LYOPHILIZED, FOR SOLUTION INTRAMUSCULAR; INTRAVENOUS ONCE
OUTPATIENT
Start: 2022-05-11

## 2022-05-04 RX ORDER — DIPHENHYDRAMINE HYDROCHLORIDE 50 MG/ML
25 INJECTION INTRAMUSCULAR; INTRAVENOUS ONCE
OUTPATIENT
Start: 2022-05-11

## 2022-05-04 RX ORDER — MEPERIDINE HYDROCHLORIDE 25 MG/ML
25 INJECTION INTRAMUSCULAR; INTRAVENOUS; SUBCUTANEOUS ONCE
OUTPATIENT
Start: 2022-05-11

## 2022-05-04 RX ORDER — METHYLPREDNISOLONE SODIUM SUCCINATE 40 MG/ML
40 INJECTION, POWDER, LYOPHILIZED, FOR SOLUTION INTRAMUSCULAR; INTRAVENOUS ONCE
OUTPATIENT
Start: 2022-05-11

## 2022-05-04 RX ORDER — FAMOTIDINE 10 MG/ML
20 INJECTION, SOLUTION INTRAVENOUS ONCE
OUTPATIENT
Start: 2022-05-11

## 2022-05-04 NOTE — PROGRESS NOTES
Education Record    Learner:  Patient    Disease / Diagnosis: here for prolastin infusion    Barriers / Limitations:  None    Method:  Brief focused, printed material and  reinforcement    General Topics:  Plan of care reviewed    Outcome: patient ambulatory with no complaints. Tolerated infusion. Shows understanding. Discharged home in stable condition.

## 2022-05-09 ENCOUNTER — OFFICE VISIT (OUTPATIENT)
Dept: FAMILY MEDICINE CLINIC | Facility: CLINIC | Age: 42
End: 2022-05-09
Payer: COMMERCIAL

## 2022-05-09 ENCOUNTER — PATIENT MESSAGE (OUTPATIENT)
Dept: FAMILY MEDICINE CLINIC | Facility: CLINIC | Age: 42
End: 2022-05-09

## 2022-05-09 VITALS
TEMPERATURE: 98 F | DIASTOLIC BLOOD PRESSURE: 60 MMHG | HEART RATE: 88 BPM | SYSTOLIC BLOOD PRESSURE: 98 MMHG | HEIGHT: 68 IN | BODY MASS INDEX: 26.37 KG/M2 | WEIGHT: 174 LBS

## 2022-05-09 DIAGNOSIS — E88.09 HYPOALBUMINEMIA: ICD-10-CM

## 2022-05-09 DIAGNOSIS — F41.1 GAD (GENERALIZED ANXIETY DISORDER): ICD-10-CM

## 2022-05-09 DIAGNOSIS — R73.9 HYPERGLYCEMIA: ICD-10-CM

## 2022-05-09 DIAGNOSIS — R63.4 RECENT UNINTENTIONAL WEIGHT LOSS OVER SEVERAL MONTHS: ICD-10-CM

## 2022-05-09 DIAGNOSIS — R10.12 LEFT UPPER QUADRANT ABDOMINAL PAIN: Primary | ICD-10-CM

## 2022-05-09 DIAGNOSIS — E44.1 MILD PROTEIN-CALORIE MALNUTRITION (HCC): ICD-10-CM

## 2022-05-09 DIAGNOSIS — Z98.84 HISTORY OF ROUX-EN-Y GASTRIC BYPASS: ICD-10-CM

## 2022-05-09 LAB
ALBUMIN SERPL-MCNC: 3.3 G/DL (ref 3.4–5)
ALBUMIN/GLOB SERPL: 1.2 {RATIO} (ref 1–2)
ALP LIVER SERPL-CCNC: 75 U/L
ALT SERPL-CCNC: 15 U/L
ANION GAP SERPL CALC-SCNC: 5 MMOL/L (ref 0–18)
AST SERPL-CCNC: 8 U/L (ref 15–37)
BASOPHILS # BLD AUTO: 0.03 X10(3) UL (ref 0–0.2)
BASOPHILS NFR BLD AUTO: 0.6 %
BILIRUB SERPL-MCNC: 0.4 MG/DL (ref 0.1–2)
BUN BLD-MCNC: 10 MG/DL (ref 7–18)
CALCIUM BLD-MCNC: 8.8 MG/DL (ref 8.5–10.1)
CHLORIDE SERPL-SCNC: 107 MMOL/L (ref 98–112)
CO2 SERPL-SCNC: 28 MMOL/L (ref 21–32)
CREAT BLD-MCNC: 0.69 MG/DL
DEPRECATED HBV CORE AB SER IA-ACNC: 24.2 NG/ML
EOSINOPHIL # BLD AUTO: 0.3 X10(3) UL (ref 0–0.7)
EOSINOPHIL NFR BLD AUTO: 5.9 %
ERYTHROCYTE [DISTWIDTH] IN BLOOD BY AUTOMATED COUNT: 12.2 %
FASTING STATUS PATIENT QL REPORTED: YES
GLOBULIN PLAS-MCNC: 2.8 G/DL (ref 2.8–4.4)
GLUCOSE BLD-MCNC: 75 MG/DL (ref 70–99)
HCT VFR BLD AUTO: 40 %
HGB BLD-MCNC: 13.5 G/DL
IMM GRANULOCYTES # BLD AUTO: 0.02 X10(3) UL (ref 0–1)
IMM GRANULOCYTES NFR BLD: 0.4 %
LYMPHOCYTES # BLD AUTO: 1.1 X10(3) UL (ref 1–4)
LYMPHOCYTES NFR BLD AUTO: 21.7 %
MCH RBC QN AUTO: 31.4 PG (ref 26–34)
MCHC RBC AUTO-ENTMCNC: 33.8 G/DL (ref 31–37)
MCV RBC AUTO: 93 FL
MONOCYTES # BLD AUTO: 0.38 X10(3) UL (ref 0.1–1)
MONOCYTES NFR BLD AUTO: 7.5 %
NEUTROPHILS # BLD AUTO: 3.23 X10 (3) UL (ref 1.5–7.7)
NEUTROPHILS # BLD AUTO: 3.23 X10(3) UL (ref 1.5–7.7)
NEUTROPHILS NFR BLD AUTO: 63.9 %
OSMOLALITY SERPL CALC.SUM OF ELEC: 288 MOSM/KG (ref 275–295)
PLATELET # BLD AUTO: 257 10(3)UL (ref 150–450)
POTASSIUM SERPL-SCNC: 4.5 MMOL/L (ref 3.5–5.1)
PROT SERPL-MCNC: 6.1 G/DL (ref 6.4–8.2)
RBC # BLD AUTO: 4.3 X10(6)UL
SODIUM SERPL-SCNC: 140 MMOL/L (ref 136–145)
WBC # BLD AUTO: 5.1 X10(3) UL (ref 4–11)

## 2022-05-09 PROCEDURE — 82728 ASSAY OF FERRITIN: CPT | Performed by: FAMILY MEDICINE

## 2022-05-09 PROCEDURE — 83036 HEMOGLOBIN GLYCOSYLATED A1C: CPT | Performed by: FAMILY MEDICINE

## 2022-05-09 PROCEDURE — 80053 COMPREHEN METABOLIC PANEL: CPT | Performed by: FAMILY MEDICINE

## 2022-05-09 PROCEDURE — 3074F SYST BP LT 130 MM HG: CPT | Performed by: FAMILY MEDICINE

## 2022-05-09 PROCEDURE — 85025 COMPLETE CBC W/AUTO DIFF WBC: CPT | Performed by: FAMILY MEDICINE

## 2022-05-09 PROCEDURE — 3078F DIAST BP <80 MM HG: CPT | Performed by: FAMILY MEDICINE

## 2022-05-09 PROCEDURE — 36415 COLL VENOUS BLD VENIPUNCTURE: CPT | Performed by: FAMILY MEDICINE

## 2022-05-09 PROCEDURE — 99214 OFFICE O/P EST MOD 30 MIN: CPT | Performed by: FAMILY MEDICINE

## 2022-05-09 PROCEDURE — 3008F BODY MASS INDEX DOCD: CPT | Performed by: FAMILY MEDICINE

## 2022-05-09 RX ORDER — TIZANIDINE 4 MG/1
TABLET ORAL
Qty: 30 TABLET | Refills: 3 | Status: SHIPPED | OUTPATIENT
Start: 2022-05-09

## 2022-05-09 NOTE — PROGRESS NOTES
Patient came in for draw of ordered fasting labs. Patient drawn out of R AC, x 1 attempt and tolerated well.  1 Gold & 1 Lav tube drawn.

## 2022-05-10 LAB
EST. AVERAGE GLUCOSE BLD GHB EST-MCNC: 111 MG/DL (ref 68–126)
HBA1C MFR BLD: 5.5 % (ref ?–5.7)

## 2022-05-10 NOTE — TELEPHONE ENCOUNTER
From: Ruth Back  To: Valdemar Cogan, PA-C  Sent: 5/9/2022 2:34 PM CDT  Subject: Blood Sugar Level    Hi Daniela,   I took my blood sugar because I was a little light headed and it was 221. It seems pretty high as I am not sure it has ever been that high. Is this something to worry about?      Thanks,   Mark Vogel

## 2022-05-10 NOTE — PROGRESS NOTES
As expected total protein and albumin are low. Your blood sugar was normal on the fasting blood sugar and kidney function is also normal.  The complete blood count shows no signs of anemia. And the iron storage level is normal.  Your albumin is slightly better though your total protein is decreased. Continue to try to get 60 g of protein in daily. Will forward to bariatric surgeon as well.   Sent to [employer]  Please forward to bariatric surgeon

## 2022-05-11 ENCOUNTER — OFFICE VISIT (OUTPATIENT)
Dept: HEMATOLOGY/ONCOLOGY | Age: 42
End: 2022-05-11
Attending: INTERNAL MEDICINE
Payer: COMMERCIAL

## 2022-05-11 VITALS
SYSTOLIC BLOOD PRESSURE: 116 MMHG | TEMPERATURE: 98 F | RESPIRATION RATE: 20 BRPM | DIASTOLIC BLOOD PRESSURE: 73 MMHG | HEART RATE: 90 BPM | OXYGEN SATURATION: 98 %

## 2022-05-11 DIAGNOSIS — E88.01 ALPHA-1-ANTITRYPSIN DEFICIENCY (HCC): Primary | ICD-10-CM

## 2022-05-11 PROCEDURE — 96365 THER/PROPH/DIAG IV INF INIT: CPT

## 2022-05-11 RX ORDER — FAMOTIDINE 10 MG/ML
20 INJECTION, SOLUTION INTRAVENOUS ONCE
OUTPATIENT
Start: 2022-05-18

## 2022-05-11 RX ORDER — DIPHENHYDRAMINE HYDROCHLORIDE 50 MG/ML
25 INJECTION INTRAMUSCULAR; INTRAVENOUS ONCE
OUTPATIENT
Start: 2022-05-18

## 2022-05-11 RX ORDER — MEPERIDINE HYDROCHLORIDE 25 MG/ML
25 INJECTION INTRAMUSCULAR; INTRAVENOUS; SUBCUTANEOUS ONCE
OUTPATIENT
Start: 2022-05-18

## 2022-05-11 RX ORDER — METHYLPREDNISOLONE SODIUM SUCCINATE 40 MG/ML
40 INJECTION, POWDER, LYOPHILIZED, FOR SOLUTION INTRAMUSCULAR; INTRAVENOUS ONCE
OUTPATIENT
Start: 2022-05-18

## 2022-05-11 RX ORDER — METHYLPREDNISOLONE SODIUM SUCCINATE 125 MG/2ML
125 INJECTION, POWDER, LYOPHILIZED, FOR SOLUTION INTRAMUSCULAR; INTRAVENOUS ONCE
OUTPATIENT
Start: 2022-05-18

## 2022-05-11 NOTE — PROGRESS NOTES
Education Record    Learner:  Patient    Disease / Diagnosis: here for prolastin    Barriers / Limitations:  None    Method:  Brief focused, printed material and  reinforcement    General Topics:  Plan of care reviewed    Outcome:  Patient ambulatory with no complaints. Tolerated infusion. Shows understanding. Discharged in stable condition.

## 2022-05-16 ENCOUNTER — PATIENT MESSAGE (OUTPATIENT)
Dept: FAMILY MEDICINE CLINIC | Facility: CLINIC | Age: 42
End: 2022-05-16

## 2022-05-16 RX ORDER — BUTALBITAL/ACETAMINOPHEN 50MG-325MG
1 TABLET ORAL 2 TIMES DAILY PRN
Qty: 30 TABLET | Refills: 0 | Status: SHIPPED | OUTPATIENT
Start: 2022-05-16

## 2022-05-17 ENCOUNTER — OFFICE VISIT (OUTPATIENT)
Dept: HEMATOLOGY/ONCOLOGY | Age: 42
End: 2022-05-17
Attending: INTERNAL MEDICINE
Payer: COMMERCIAL

## 2022-05-17 VITALS
OXYGEN SATURATION: 100 % | SYSTOLIC BLOOD PRESSURE: 143 MMHG | RESPIRATION RATE: 20 BRPM | HEART RATE: 102 BPM | TEMPERATURE: 99 F | DIASTOLIC BLOOD PRESSURE: 92 MMHG

## 2022-05-17 DIAGNOSIS — E88.01 ALPHA-1-ANTITRYPSIN DEFICIENCY (HCC): Primary | ICD-10-CM

## 2022-05-17 PROCEDURE — 96365 THER/PROPH/DIAG IV INF INIT: CPT

## 2022-05-17 RX ORDER — DIPHENHYDRAMINE HYDROCHLORIDE 50 MG/ML
25 INJECTION INTRAMUSCULAR; INTRAVENOUS ONCE
OUTPATIENT
Start: 2022-05-18

## 2022-05-17 RX ORDER — METHYLPREDNISOLONE SODIUM SUCCINATE 40 MG/ML
40 INJECTION, POWDER, LYOPHILIZED, FOR SOLUTION INTRAMUSCULAR; INTRAVENOUS ONCE
OUTPATIENT
Start: 2022-05-18

## 2022-05-17 RX ORDER — METHYLPREDNISOLONE SODIUM SUCCINATE 125 MG/2ML
125 INJECTION, POWDER, LYOPHILIZED, FOR SOLUTION INTRAMUSCULAR; INTRAVENOUS ONCE
OUTPATIENT
Start: 2022-05-18

## 2022-05-17 RX ORDER — FAMOTIDINE 10 MG/ML
20 INJECTION, SOLUTION INTRAVENOUS ONCE
OUTPATIENT
Start: 2022-05-18

## 2022-05-17 RX ORDER — MEPERIDINE HYDROCHLORIDE 25 MG/ML
25 INJECTION INTRAMUSCULAR; INTRAVENOUS; SUBCUTANEOUS ONCE
OUTPATIENT
Start: 2022-05-18

## 2022-05-17 NOTE — PROGRESS NOTES
Education Record    Learner:  Patient    Here for Prolastin-C Liquid    Barriers / Limitations:  None   Comments:    Method:  Discussion   Comments:    General Topics:  Medication and Plan of care reviewed   Comments:    Outcome:  Shows understanding   Comments:    Patient tolerated infusion. Patient discharged in stable condition.

## 2022-05-18 ENCOUNTER — APPOINTMENT (OUTPATIENT)
Dept: HEMATOLOGY/ONCOLOGY | Age: 42
End: 2022-05-18
Attending: INTERNAL MEDICINE
Payer: COMMERCIAL

## 2022-05-21 DIAGNOSIS — R10.13 EPIGASTRIC ABDOMINAL PAIN: ICD-10-CM

## 2022-05-22 RX ORDER — FAMOTIDINE 40 MG/1
TABLET, FILM COATED ORAL
Qty: 90 TABLET | Refills: 2 | Status: SHIPPED | OUTPATIENT
Start: 2022-05-22

## 2022-05-22 NOTE — TELEPHONE ENCOUNTER
Refill Passed Protocol:     Pt requesting refill of famotadine    Refill was approved and sent to pharmacy:   Last Office Visit with Provider: 5/9/22    Appt. scheduled on 6/1/22

## 2022-05-25 ENCOUNTER — OFFICE VISIT (OUTPATIENT)
Dept: HEMATOLOGY/ONCOLOGY | Age: 42
End: 2022-05-25
Attending: INTERNAL MEDICINE
Payer: COMMERCIAL

## 2022-05-25 VITALS
HEART RATE: 108 BPM | RESPIRATION RATE: 16 BRPM | TEMPERATURE: 100 F | SYSTOLIC BLOOD PRESSURE: 132 MMHG | DIASTOLIC BLOOD PRESSURE: 82 MMHG

## 2022-05-25 DIAGNOSIS — E88.01 ALPHA-1-ANTITRYPSIN DEFICIENCY (HCC): Primary | ICD-10-CM

## 2022-05-25 PROCEDURE — 96365 THER/PROPH/DIAG IV INF INIT: CPT

## 2022-05-25 RX ORDER — METHYLPREDNISOLONE SODIUM SUCCINATE 125 MG/2ML
125 INJECTION, POWDER, LYOPHILIZED, FOR SOLUTION INTRAMUSCULAR; INTRAVENOUS ONCE
OUTPATIENT
Start: 2022-06-01

## 2022-05-25 RX ORDER — METHYLPREDNISOLONE SODIUM SUCCINATE 40 MG/ML
40 INJECTION, POWDER, LYOPHILIZED, FOR SOLUTION INTRAMUSCULAR; INTRAVENOUS ONCE
OUTPATIENT
Start: 2022-06-01

## 2022-05-25 RX ORDER — MEPERIDINE HYDROCHLORIDE 25 MG/ML
25 INJECTION INTRAMUSCULAR; INTRAVENOUS; SUBCUTANEOUS ONCE
OUTPATIENT
Start: 2022-06-01

## 2022-05-25 RX ORDER — DIPHENHYDRAMINE HYDROCHLORIDE 50 MG/ML
25 INJECTION INTRAMUSCULAR; INTRAVENOUS ONCE
OUTPATIENT
Start: 2022-06-01

## 2022-05-25 RX ORDER — FAMOTIDINE 10 MG/ML
20 INJECTION, SOLUTION INTRAVENOUS ONCE
OUTPATIENT
Start: 2022-06-01

## 2022-05-25 NOTE — PROGRESS NOTES
Education Record    Learner:  Patient    Disease / Diagnosis:pt here for prolastin    Barriers / Limitations:  None    Method:  Brief focused, printed material and  reinforcement    General Topics:  Plan of care reviewed    Outcome: no c/o.   Shows understanding

## 2022-06-01 ENCOUNTER — PATIENT MESSAGE (OUTPATIENT)
Dept: FAMILY MEDICINE CLINIC | Facility: CLINIC | Age: 42
End: 2022-06-01

## 2022-06-01 ENCOUNTER — OFFICE VISIT (OUTPATIENT)
Dept: FAMILY MEDICINE CLINIC | Facility: CLINIC | Age: 42
End: 2022-06-01
Payer: COMMERCIAL

## 2022-06-01 VITALS
TEMPERATURE: 99 F | BODY MASS INDEX: 25.76 KG/M2 | WEIGHT: 170 LBS | DIASTOLIC BLOOD PRESSURE: 60 MMHG | HEART RATE: 96 BPM | HEIGHT: 68 IN | SYSTOLIC BLOOD PRESSURE: 90 MMHG

## 2022-06-01 DIAGNOSIS — R63.4 WEIGHT LOSS: ICD-10-CM

## 2022-06-01 DIAGNOSIS — E44.1 MILD PROTEIN-CALORIE MALNUTRITION (HCC): ICD-10-CM

## 2022-06-01 DIAGNOSIS — J45.998 ASTHMA, PERSISTENT CONTROLLED: ICD-10-CM

## 2022-06-01 DIAGNOSIS — Z71.89 SURGICAL COUNSELING VISIT: ICD-10-CM

## 2022-06-01 DIAGNOSIS — G44.221 CHRONIC TENSION-TYPE HEADACHE, INTRACTABLE: Primary | ICD-10-CM

## 2022-06-01 DIAGNOSIS — E88.01 ALPHA-1-ANTITRYPSIN DEFICIENCY (HCC): ICD-10-CM

## 2022-06-01 DIAGNOSIS — R11.0 NAUSEA: ICD-10-CM

## 2022-06-01 DIAGNOSIS — R10.12 LEFT UPPER QUADRANT ABDOMINAL PAIN: ICD-10-CM

## 2022-06-01 PROCEDURE — 3008F BODY MASS INDEX DOCD: CPT | Performed by: FAMILY MEDICINE

## 2022-06-01 PROCEDURE — 3078F DIAST BP <80 MM HG: CPT | Performed by: FAMILY MEDICINE

## 2022-06-01 PROCEDURE — 3074F SYST BP LT 130 MM HG: CPT | Performed by: FAMILY MEDICINE

## 2022-06-01 PROCEDURE — 99215 OFFICE O/P EST HI 40 MIN: CPT | Performed by: FAMILY MEDICINE

## 2022-06-01 RX ORDER — ACETAMINOPHEN AND CODEINE PHOSPHATE 300; 30 MG/1; MG/1
1 TABLET ORAL NIGHTLY
Qty: 13 TABLET | Refills: 0 | Status: SHIPPED | OUTPATIENT
Start: 2022-06-01

## 2022-06-01 RX ORDER — PREDNISONE 10 MG/1
TABLET ORAL
Qty: 30 TABLET | Refills: 0 | Status: SHIPPED | OUTPATIENT
Start: 2022-06-01 | End: 2022-06-13

## 2022-06-01 RX ORDER — TIZANIDINE 4 MG/1
4 TABLET ORAL EVERY 8 HOURS PRN
Qty: 30 TABLET | Refills: 0 | Status: SHIPPED | OUTPATIENT
Start: 2022-06-01

## 2022-06-01 RX ORDER — HYDROCODONE BITARTRATE AND ACETAMINOPHEN 5; 325 MG/1; MG/1
TABLET ORAL
Qty: 20 TABLET | Refills: 0 | Status: SHIPPED | OUTPATIENT
Start: 2022-06-12

## 2022-06-01 RX ORDER — ONDANSETRON HYDROCHLORIDE 8 MG/1
8 TABLET, FILM COATED ORAL EVERY 12 HOURS PRN
Qty: 30 TABLET | Refills: 1 | Status: SHIPPED | OUTPATIENT
Start: 2022-06-01

## 2022-06-01 RX ORDER — BUTALBITAL/ACETAMINOPHEN 50MG-325MG
1 TABLET ORAL 2 TIMES DAILY PRN
Qty: 10 TABLET | Refills: 0 | Status: SHIPPED | OUTPATIENT
Start: 2022-06-01

## 2022-06-02 ENCOUNTER — APPOINTMENT (OUTPATIENT)
Dept: HEMATOLOGY/ONCOLOGY | Age: 42
End: 2022-06-02
Attending: INTERNAL MEDICINE
Payer: COMMERCIAL

## 2022-06-02 PROBLEM — J45.998 ASTHMA, PERSISTENT CONTROLLED: Status: ACTIVE | Noted: 2022-06-02

## 2022-06-02 PROBLEM — J45.998 ASTHMA, PERSISTENT CONTROLLED (HCC): Status: ACTIVE | Noted: 2022-06-02

## 2022-06-02 PROBLEM — F43.9 STRESS: Status: RESOLVED | Noted: 2020-04-17 | Resolved: 2022-06-02

## 2022-06-02 RX ORDER — ALBUTEROL SULFATE 2.5 MG/3ML
2.5 SOLUTION RESPIRATORY (INHALATION) EVERY 4 HOURS PRN
Qty: 360 ML | Refills: 1 | Status: SHIPPED | OUTPATIENT
Start: 2022-06-02 | End: 2022-12-29

## 2022-06-02 RX ORDER — IPRATROPIUM BROMIDE AND ALBUTEROL SULFATE 2.5; .5 MG/3ML; MG/3ML
3 SOLUTION RESPIRATORY (INHALATION) EVERY 4 HOURS PRN
Qty: 25 EACH | Refills: 0 | Status: SHIPPED | OUTPATIENT
Start: 2022-06-02

## 2022-06-03 ENCOUNTER — OFFICE VISIT (OUTPATIENT)
Dept: HEMATOLOGY/ONCOLOGY | Age: 42
End: 2022-06-03
Attending: INTERNAL MEDICINE
Payer: COMMERCIAL

## 2022-06-03 VITALS
SYSTOLIC BLOOD PRESSURE: 111 MMHG | RESPIRATION RATE: 18 BRPM | DIASTOLIC BLOOD PRESSURE: 69 MMHG | HEART RATE: 85 BPM | TEMPERATURE: 98 F | OXYGEN SATURATION: 98 %

## 2022-06-03 DIAGNOSIS — E88.01 ALPHA-1-ANTITRYPSIN DEFICIENCY (HCC): Primary | ICD-10-CM

## 2022-06-03 PROCEDURE — 96365 THER/PROPH/DIAG IV INF INIT: CPT

## 2022-06-03 RX ORDER — METHYLPREDNISOLONE SODIUM SUCCINATE 125 MG/2ML
125 INJECTION, POWDER, LYOPHILIZED, FOR SOLUTION INTRAMUSCULAR; INTRAVENOUS ONCE
OUTPATIENT
Start: 2022-06-08

## 2022-06-03 RX ORDER — FAMOTIDINE 10 MG/ML
20 INJECTION, SOLUTION INTRAVENOUS ONCE
OUTPATIENT
Start: 2022-06-08

## 2022-06-03 RX ORDER — METHYLPREDNISOLONE SODIUM SUCCINATE 40 MG/ML
40 INJECTION, POWDER, LYOPHILIZED, FOR SOLUTION INTRAMUSCULAR; INTRAVENOUS ONCE
OUTPATIENT
Start: 2022-06-08

## 2022-06-03 RX ORDER — PROMETHAZINE HYDROCHLORIDE 25 MG/1
3 TABLET ORAL AS NEEDED
Qty: 50 EACH | Refills: 0 | Status: SHIPPED | OUTPATIENT
Start: 2022-06-03

## 2022-06-03 RX ORDER — MEPERIDINE HYDROCHLORIDE 25 MG/ML
25 INJECTION INTRAMUSCULAR; INTRAVENOUS; SUBCUTANEOUS ONCE
OUTPATIENT
Start: 2022-06-08

## 2022-06-03 RX ORDER — DIPHENHYDRAMINE HYDROCHLORIDE 50 MG/ML
25 INJECTION INTRAMUSCULAR; INTRAVENOUS ONCE
OUTPATIENT
Start: 2022-06-08

## 2022-06-03 NOTE — PROGRESS NOTES
Education Record    Learner:  Patient    Disease / Diagnosis: here for prolastin     Barriers / Limitations:  None    Method:  Brief focused, printed material and  reinforcement    General Topics:  Plan of care reviewed    Outcome:  Patient ambulatory with no complaints. Tolerated infusion. States she scheduled her next appts around vacations. Discharged in stable condition.  Shows understanding

## 2022-06-12 ENCOUNTER — PATIENT MESSAGE (OUTPATIENT)
Dept: FAMILY MEDICINE CLINIC | Facility: CLINIC | Age: 42
End: 2022-06-12

## 2022-06-12 ENCOUNTER — LAB ENCOUNTER (OUTPATIENT)
Dept: LAB | Facility: HOSPITAL | Age: 42
End: 2022-06-12
Attending: SURGERY
Payer: COMMERCIAL

## 2022-06-12 DIAGNOSIS — R10.12 LEFT UPPER QUADRANT ABDOMINAL PAIN: ICD-10-CM

## 2022-06-12 DIAGNOSIS — Z01.818 PREOP TESTING: ICD-10-CM

## 2022-06-12 LAB — SARS-COV-2 RNA RESP QL NAA+PROBE: DETECTED

## 2022-06-13 RX ORDER — ACETAMINOPHEN AND CODEINE PHOSPHATE 300; 30 MG/1; MG/1
1 TABLET ORAL NIGHTLY
Qty: 13 TABLET | Refills: 0 | Status: SHIPPED | OUTPATIENT
Start: 2022-06-13

## 2022-06-13 NOTE — PROGRESS NOTES
Positive COVID-19 please notify bariatric surgeon tomorrow morning. Consider going to immediate care center or ER for COVID-19 antibodies if symptomatic with past medical history of severe asthma. If you need further guidance call office in the morning. Follow the CDC guidelines for isolation. People with COVID-19 should isolate for 5 days and additional days until symptoms are resolving including being without fever for 24 hours, followed that by 5 days of wearing a mask when walking around to minimize the risk of infecting people the encounter. Guaifenesin generic (Mucinex) for expectorant   Vitamin C, vitamin D and Zinc    Warm steams increase humidity. Review COVID-19 breathing exercises on YouTube. Also proning positions for sleep. Review CDC. gov COVID-19 website for suggestions on how to maintain isolation. If symptoms are not better or worsening after 24 to 48 hours call the office back. Practice home hygiene and isolation to prevent spread of infection. Call office if any shortness of breath, chest pain or any difficulty breathing. Drink adequate amounts of water and Tylenol (acetaminophen) as needed for fever. Call office for a virtual appointment if you need guidance. Call Ric Ravi in the morning to see how she is doing and see if she needs monoclonal antibodies.

## 2022-06-14 ENCOUNTER — APPOINTMENT (OUTPATIENT)
Dept: HEMATOLOGY/ONCOLOGY | Age: 42
End: 2022-06-14
Attending: INTERNAL MEDICINE
Payer: COMMERCIAL

## 2022-06-17 ENCOUNTER — PATIENT MESSAGE (OUTPATIENT)
Dept: FAMILY MEDICINE CLINIC | Facility: CLINIC | Age: 42
End: 2022-06-17

## 2022-06-17 DIAGNOSIS — G44.221 CHRONIC TENSION-TYPE HEADACHE, INTRACTABLE: ICD-10-CM

## 2022-06-17 RX ORDER — BUTALBITAL/ACETAMINOPHEN 50MG-325MG
1 TABLET ORAL 2 TIMES DAILY PRN
Qty: 10 TABLET | Refills: 0 | Status: SHIPPED | OUTPATIENT
Start: 2022-06-17 | End: 2022-06-20

## 2022-06-17 NOTE — TELEPHONE ENCOUNTER
Please see [facility] message  Last refill #10 on 6/1/2022  Last office visit 6/1/2022  Future Appointments   Date Time Provider Abdirashid Kumari   6/27/2022  9:00 AM Kym Sherman PA-C [facility] [place]   6/29/2022 11:30 AM PF [place] RN1 PF CHEMO [facility] [place]   7/20/2022 10:00 AM Kym Sherman PA-C [facility] [place]

## 2022-06-20 ENCOUNTER — PATIENT MESSAGE (OUTPATIENT)
Dept: FAMILY MEDICINE CLINIC | Facility: CLINIC | Age: 42
End: 2022-06-20

## 2022-06-20 ENCOUNTER — OFFICE VISIT (OUTPATIENT)
Dept: FAMILY MEDICINE CLINIC | Facility: CLINIC | Age: 42
End: 2022-06-20
Payer: COMMERCIAL

## 2022-06-20 VITALS
HEART RATE: 88 BPM | OXYGEN SATURATION: 98 % | DIASTOLIC BLOOD PRESSURE: 68 MMHG | TEMPERATURE: 98 F | HEIGHT: 68 IN | BODY MASS INDEX: 26.83 KG/M2 | SYSTOLIC BLOOD PRESSURE: 100 MMHG | WEIGHT: 177 LBS

## 2022-06-20 DIAGNOSIS — R05.3 POST-COVID CHRONIC COUGH: ICD-10-CM

## 2022-06-20 DIAGNOSIS — J45.51 SEVERE PERSISTENT ASTHMA WITH ACUTE EXACERBATION: ICD-10-CM

## 2022-06-20 DIAGNOSIS — R05.3 POST-COVID-19 SYNDROME MANIFESTING AS CHRONIC COUGH: ICD-10-CM

## 2022-06-20 DIAGNOSIS — G44.221 CHRONIC TENSION-TYPE HEADACHE, INTRACTABLE: Primary | ICD-10-CM

## 2022-06-20 DIAGNOSIS — F41.1 GAD (GENERALIZED ANXIETY DISORDER): ICD-10-CM

## 2022-06-20 DIAGNOSIS — U09.9 POST-COVID-19 SYNDROME MANIFESTING AS CHRONIC COUGH: ICD-10-CM

## 2022-06-20 DIAGNOSIS — U09.9 POST-COVID CHRONIC COUGH: ICD-10-CM

## 2022-06-20 DIAGNOSIS — R05.9 COUGH: Primary | ICD-10-CM

## 2022-06-20 PROCEDURE — 3008F BODY MASS INDEX DOCD: CPT | Performed by: FAMILY MEDICINE

## 2022-06-20 PROCEDURE — 3078F DIAST BP <80 MM HG: CPT | Performed by: FAMILY MEDICINE

## 2022-06-20 PROCEDURE — 3074F SYST BP LT 130 MM HG: CPT | Performed by: FAMILY MEDICINE

## 2022-06-20 PROCEDURE — 99214 OFFICE O/P EST MOD 30 MIN: CPT | Performed by: FAMILY MEDICINE

## 2022-06-20 RX ORDER — ALPRAZOLAM 0.25 MG/1
0.25 TABLET ORAL 2 TIMES DAILY PRN
Qty: 60 TABLET | Refills: 1 | Status: SHIPPED | OUTPATIENT
Start: 2022-06-20

## 2022-06-20 RX ORDER — BUTALBITAL/ACETAMINOPHEN 50MG-325MG
1 TABLET ORAL 3 TIMES DAILY PRN
Qty: 30 TABLET | Refills: 0 | Status: SHIPPED | OUTPATIENT
Start: 2022-06-20

## 2022-06-21 PROBLEM — J45.51 SEVERE PERSISTENT ASTHMA WITH ACUTE EXACERBATION: Status: ACTIVE | Noted: 2021-05-17

## 2022-06-21 PROBLEM — J45.51 SEVERE PERSISTENT ASTHMA WITH ACUTE EXACERBATION (HCC): Status: ACTIVE | Noted: 2021-05-17

## 2022-06-23 ENCOUNTER — PATIENT MESSAGE (OUTPATIENT)
Dept: FAMILY MEDICINE CLINIC | Facility: CLINIC | Age: 42
End: 2022-06-23

## 2022-06-23 ENCOUNTER — HOSPITAL ENCOUNTER (OUTPATIENT)
Dept: GENERAL RADIOLOGY | Age: 42
Discharge: HOME | End: 2022-06-23
Attending: FAMILY MEDICINE
Payer: COMMERCIAL

## 2022-06-23 DIAGNOSIS — R05.3 POST-COVID-19 SYNDROME MANIFESTING AS CHRONIC COUGH: ICD-10-CM

## 2022-06-23 DIAGNOSIS — G44.221 CHRONIC TENSION-TYPE HEADACHE, INTRACTABLE: ICD-10-CM

## 2022-06-23 DIAGNOSIS — R05.9 COUGH: ICD-10-CM

## 2022-06-23 DIAGNOSIS — U09.9 POST-COVID-19 SYNDROME MANIFESTING AS CHRONIC COUGH: ICD-10-CM

## 2022-06-23 PROCEDURE — 71046 X-RAY EXAM CHEST 2 VIEWS: CPT | Performed by: FAMILY MEDICINE

## 2022-06-23 RX ORDER — TIZANIDINE 4 MG/1
4 TABLET ORAL EVERY 8 HOURS PRN
Qty: 30 TABLET | Refills: 0 | Status: SHIPPED | OUTPATIENT
Start: 2022-06-23

## 2022-06-23 RX ORDER — AMOXICILLIN AND CLAVULANATE POTASSIUM 875; 125 MG/1; MG/1
1 TABLET, FILM COATED ORAL 2 TIMES DAILY
Qty: 20 TABLET | Refills: 0 | Status: SHIPPED | OUTPATIENT
Start: 2022-06-23 | End: 2022-07-03

## 2022-06-23 NOTE — PROGRESS NOTES
Chest x-ray is normal no COVID-pneumonia we will start her on Augmentin secondary to the change in mucus and persistent symptoms.

## 2022-06-24 ENCOUNTER — OFFICE VISIT (OUTPATIENT)
Dept: HEMATOLOGY/ONCOLOGY | Age: 42
End: 2022-06-24
Attending: INTERNAL MEDICINE
Payer: COMMERCIAL

## 2022-06-24 ENCOUNTER — PATIENT MESSAGE (OUTPATIENT)
Dept: FAMILY MEDICINE CLINIC | Facility: CLINIC | Age: 42
End: 2022-06-24

## 2022-06-24 VITALS
DIASTOLIC BLOOD PRESSURE: 78 MMHG | SYSTOLIC BLOOD PRESSURE: 109 MMHG | RESPIRATION RATE: 16 BRPM | HEART RATE: 82 BPM | TEMPERATURE: 97 F | OXYGEN SATURATION: 100 %

## 2022-06-24 DIAGNOSIS — E88.01 ALPHA-1-ANTITRYPSIN DEFICIENCY (HCC): Primary | ICD-10-CM

## 2022-06-24 DIAGNOSIS — R10.12 LEFT UPPER QUADRANT ABDOMINAL PAIN: ICD-10-CM

## 2022-06-24 PROCEDURE — 96365 THER/PROPH/DIAG IV INF INIT: CPT

## 2022-06-24 RX ORDER — METHYLPREDNISOLONE SODIUM SUCCINATE 40 MG/ML
40 INJECTION, POWDER, LYOPHILIZED, FOR SOLUTION INTRAMUSCULAR; INTRAVENOUS ONCE
OUTPATIENT
Start: 2022-07-01

## 2022-06-24 RX ORDER — MEPERIDINE HYDROCHLORIDE 25 MG/ML
25 INJECTION INTRAMUSCULAR; INTRAVENOUS; SUBCUTANEOUS ONCE
OUTPATIENT
Start: 2022-07-01

## 2022-06-24 RX ORDER — ACETAMINOPHEN AND CODEINE PHOSPHATE 300; 30 MG/1; MG/1
1 TABLET ORAL NIGHTLY
Qty: 30 TABLET | Refills: 0 | Status: SHIPPED | OUTPATIENT
Start: 2022-06-24 | End: 2022-06-27

## 2022-06-24 RX ORDER — FAMOTIDINE 10 MG/ML
20 INJECTION, SOLUTION INTRAVENOUS ONCE
OUTPATIENT
Start: 2022-07-01

## 2022-06-24 RX ORDER — DIPHENHYDRAMINE HYDROCHLORIDE 50 MG/ML
25 INJECTION INTRAMUSCULAR; INTRAVENOUS ONCE
OUTPATIENT
Start: 2022-07-01

## 2022-06-24 RX ORDER — METHYLPREDNISOLONE SODIUM SUCCINATE 125 MG/2ML
125 INJECTION, POWDER, LYOPHILIZED, FOR SOLUTION INTRAMUSCULAR; INTRAVENOUS ONCE
OUTPATIENT
Start: 2022-07-01

## 2022-06-24 NOTE — TELEPHONE ENCOUNTER
From: Anabela Howard  To: Cuate Multani PA-C  Sent: 6/24/2022 6:21 AM CDT  Subject: Tylenol #3    Umang Suarez,  Monday, you told me to send you a message today asking to send in more of the Tylenol #3. Can you send it into the pharmacy today?   Thank you,   Tomasz Khan

## 2022-06-24 NOTE — PROGRESS NOTES
Zemaira infused per MD orders w/out incident. Pt dc home ambulatory in stable condition, no new complaints. Has fu appts.    Education Record    Learner:  Patient    Disease / Fred Vilchis infusion    Barriers / Limitations:  None   Comments:    Method:  Brief focused and Printed material   Comments:    General Topics:  Medication, Side effects and symptom management and Plan of care reviewed   Comments:    Outcome:  Shows understanding   Comments:

## 2022-06-25 ENCOUNTER — TELEPHONE (OUTPATIENT)
Dept: FAMILY MEDICINE CLINIC | Facility: CLINIC | Age: 42
End: 2022-06-25

## 2022-06-25 NOTE — TELEPHONE ENCOUNTER
Patient Chris Vaughn ONEAL on-call 6/25/2022 at 10:30 AM patient calling about cough that is producing green sputum. Positive COVID-19 sick/12/22 negative chest x-ray 6/23/2022 started on Augmentin has been on it for 3 days. History of alpha 1 antitrypsin deficiency just had her infusion this week. States her breathing is not bad her pulse ox is normal and is just that the cough will not stop. This is typical for her having cough that does not remit related to her asthma and alpha-1 antitrypsin deficiency. Coughing is all the time yesterday was worse,   Augmentin bid since Thursday. Tessalon Perles  Benadryl   Dayquil   Has vaporizer and Flonase. Infusion was yesterday for alpha-1 antitrypsin deficiency  Nebulizer was wheezing this AM used the Albuterol mild improvement less cough with the inhaler. On Spiriva, albuterol and Symbicort. Does not feel she needs prednisone right now does have codeine that she just picked up that she takes at night which helps her sleep secondary to the cough and abdominal pain is wondering if she can take 3 times a day for the next week until the cough improves. Patient can proceed down with codeine 3 times a day for the next several days until the cough improves.   Continue with deep breathing exercises and adequate hydration and above cough medication including

## 2022-06-27 ENCOUNTER — OFFICE VISIT (OUTPATIENT)
Dept: FAMILY MEDICINE CLINIC | Facility: CLINIC | Age: 42
End: 2022-06-27
Payer: COMMERCIAL

## 2022-06-27 VITALS
DIASTOLIC BLOOD PRESSURE: 58 MMHG | HEIGHT: 68 IN | SYSTOLIC BLOOD PRESSURE: 88 MMHG | BODY MASS INDEX: 26.37 KG/M2 | OXYGEN SATURATION: 97 % | HEART RATE: 72 BPM | TEMPERATURE: 98 F | WEIGHT: 174 LBS

## 2022-06-27 DIAGNOSIS — U09.9 POST-COVID CHRONIC COUGH: Primary | ICD-10-CM

## 2022-06-27 DIAGNOSIS — U09.9 POST-COVID CHRONIC HEADACHE: ICD-10-CM

## 2022-06-27 DIAGNOSIS — G89.29 POST-COVID CHRONIC HEADACHE: ICD-10-CM

## 2022-06-27 DIAGNOSIS — R10.12 LEFT UPPER QUADRANT ABDOMINAL PAIN: ICD-10-CM

## 2022-06-27 DIAGNOSIS — R51.9 POST-COVID CHRONIC HEADACHE: ICD-10-CM

## 2022-06-27 DIAGNOSIS — R05.3 POST-COVID CHRONIC COUGH: Primary | ICD-10-CM

## 2022-06-27 PROCEDURE — 3078F DIAST BP <80 MM HG: CPT | Performed by: FAMILY MEDICINE

## 2022-06-27 PROCEDURE — 99214 OFFICE O/P EST MOD 30 MIN: CPT | Performed by: FAMILY MEDICINE

## 2022-06-27 PROCEDURE — 3008F BODY MASS INDEX DOCD: CPT | Performed by: FAMILY MEDICINE

## 2022-06-27 PROCEDURE — 3074F SYST BP LT 130 MM HG: CPT | Performed by: FAMILY MEDICINE

## 2022-06-27 RX ORDER — ACETAMINOPHEN AND CODEINE PHOSPHATE 300; 30 MG/1; MG/1
1 TABLET ORAL 3 TIMES DAILY PRN
COMMUNITY
Start: 2022-06-27 | End: 2022-07-01

## 2022-06-28 PROBLEM — R05.3 POST-COVID CHRONIC COUGH: Status: ACTIVE | Noted: 2022-06-28

## 2022-06-28 PROBLEM — U09.9 POST-COVID CHRONIC HEADACHE: Status: ACTIVE | Noted: 2022-06-28

## 2022-06-28 PROBLEM — G89.29 POST-COVID CHRONIC HEADACHE: Status: ACTIVE | Noted: 2022-06-28

## 2022-06-28 PROBLEM — U09.9 POST-COVID CHRONIC COUGH: Status: ACTIVE | Noted: 2022-06-28

## 2022-06-28 PROBLEM — R51.9 POST-COVID CHRONIC HEADACHE: Status: ACTIVE | Noted: 2022-06-28

## 2022-06-29 ENCOUNTER — OFFICE VISIT (OUTPATIENT)
Dept: HEMATOLOGY/ONCOLOGY | Age: 42
End: 2022-06-29
Attending: INTERNAL MEDICINE
Payer: COMMERCIAL

## 2022-06-29 ENCOUNTER — APPOINTMENT (OUTPATIENT)
Dept: HEMATOLOGY/ONCOLOGY | Age: 42
End: 2022-06-29
Attending: INTERNAL MEDICINE
Payer: COMMERCIAL

## 2022-06-29 VITALS
TEMPERATURE: 98 F | SYSTOLIC BLOOD PRESSURE: 114 MMHG | DIASTOLIC BLOOD PRESSURE: 76 MMHG | HEART RATE: 74 BPM | RESPIRATION RATE: 16 BRPM | OXYGEN SATURATION: 100 %

## 2022-06-29 DIAGNOSIS — E88.01 ALPHA-1-ANTITRYPSIN DEFICIENCY (HCC): Primary | ICD-10-CM

## 2022-06-29 PROCEDURE — 96365 THER/PROPH/DIAG IV INF INIT: CPT

## 2022-06-29 RX ORDER — MEPERIDINE HYDROCHLORIDE 25 MG/ML
25 INJECTION INTRAMUSCULAR; INTRAVENOUS; SUBCUTANEOUS ONCE
OUTPATIENT
Start: 2022-07-01

## 2022-06-29 RX ORDER — METHYLPREDNISOLONE SODIUM SUCCINATE 125 MG/2ML
125 INJECTION, POWDER, LYOPHILIZED, FOR SOLUTION INTRAMUSCULAR; INTRAVENOUS ONCE
OUTPATIENT
Start: 2022-07-01

## 2022-06-29 RX ORDER — FAMOTIDINE 10 MG/ML
20 INJECTION, SOLUTION INTRAVENOUS ONCE
OUTPATIENT
Start: 2022-07-01

## 2022-06-29 RX ORDER — DIPHENHYDRAMINE HYDROCHLORIDE 50 MG/ML
25 INJECTION INTRAMUSCULAR; INTRAVENOUS ONCE
OUTPATIENT
Start: 2022-07-01

## 2022-06-29 RX ORDER — METHYLPREDNISOLONE SODIUM SUCCINATE 40 MG/ML
40 INJECTION, POWDER, LYOPHILIZED, FOR SOLUTION INTRAMUSCULAR; INTRAVENOUS ONCE
OUTPATIENT
Start: 2022-07-01

## 2022-06-29 NOTE — PROGRESS NOTES
Education Record    Learner:  Patient    Disease / Diagnosis:pt here for prolastin    Barriers / Limitations:  None    Method:  Brief focused, printed material and  reinforcement    General Topics:  Plan of care reviewed    Outcome: no c/o.  Tolerated inf with no c/o.,  Shows understanding

## 2022-07-01 ENCOUNTER — PATIENT MESSAGE (OUTPATIENT)
Dept: FAMILY MEDICINE CLINIC | Facility: CLINIC | Age: 42
End: 2022-07-01

## 2022-07-01 DIAGNOSIS — R10.12 LEFT UPPER QUADRANT ABDOMINAL PAIN: ICD-10-CM

## 2022-07-01 DIAGNOSIS — G44.221 CHRONIC TENSION-TYPE HEADACHE, INTRACTABLE: ICD-10-CM

## 2022-07-01 RX ORDER — BUTALBITAL/ACETAMINOPHEN 50MG-325MG
1 TABLET ORAL 3 TIMES DAILY PRN
Qty: 30 TABLET | Refills: 0 | Status: SHIPPED | OUTPATIENT
Start: 2022-07-01

## 2022-07-01 RX ORDER — ACETAMINOPHEN AND CODEINE PHOSPHATE 300; 30 MG/1; MG/1
1 TABLET ORAL NIGHTLY PRN
Qty: 30 TABLET | Refills: 0 | Status: SHIPPED | OUTPATIENT
Start: 2022-07-05

## 2022-07-05 NOTE — TELEPHONE ENCOUNTER
From: Abdiel Nguyen  To: Eliazar Lion PA-C  Sent: 7/1/2022 6:08 PM CDT  Subject: Medicine note     Patti Laws,  The note under the Tylenol #3 says that I started the 3x a day on June 27th. However, I started the 3X a day Saturday, June 25th when you were on-call. I saw you in the office on Monday, June 27th.   Alfonso Maria

## 2022-07-06 ENCOUNTER — OFFICE VISIT (OUTPATIENT)
Dept: HEMATOLOGY/ONCOLOGY | Age: 42
End: 2022-07-06
Attending: INTERNAL MEDICINE
Payer: COMMERCIAL

## 2022-07-06 VITALS
TEMPERATURE: 97 F | WEIGHT: 172 LBS | RESPIRATION RATE: 20 BRPM | BODY MASS INDEX: 26 KG/M2 | SYSTOLIC BLOOD PRESSURE: 103 MMHG | DIASTOLIC BLOOD PRESSURE: 68 MMHG | OXYGEN SATURATION: 99 % | HEART RATE: 79 BPM

## 2022-07-06 DIAGNOSIS — E88.01 ALPHA-1-ANTITRYPSIN DEFICIENCY (HCC): Primary | ICD-10-CM

## 2022-07-06 PROCEDURE — 96365 THER/PROPH/DIAG IV INF INIT: CPT

## 2022-07-06 RX ORDER — METHYLPREDNISOLONE SODIUM SUCCINATE 40 MG/ML
40 INJECTION, POWDER, LYOPHILIZED, FOR SOLUTION INTRAMUSCULAR; INTRAVENOUS ONCE
OUTPATIENT
Start: 2022-07-13

## 2022-07-06 RX ORDER — MEPERIDINE HYDROCHLORIDE 25 MG/ML
25 INJECTION INTRAMUSCULAR; INTRAVENOUS; SUBCUTANEOUS ONCE
OUTPATIENT
Start: 2022-07-13

## 2022-07-06 RX ORDER — METHYLPREDNISOLONE SODIUM SUCCINATE 125 MG/2ML
125 INJECTION, POWDER, LYOPHILIZED, FOR SOLUTION INTRAMUSCULAR; INTRAVENOUS ONCE
OUTPATIENT
Start: 2022-07-13

## 2022-07-06 RX ORDER — FAMOTIDINE 10 MG/ML
20 INJECTION, SOLUTION INTRAVENOUS ONCE
OUTPATIENT
Start: 2022-07-13

## 2022-07-06 RX ORDER — DIPHENHYDRAMINE HYDROCHLORIDE 50 MG/ML
25 INJECTION INTRAMUSCULAR; INTRAVENOUS ONCE
OUTPATIENT
Start: 2022-07-13

## 2022-07-06 NOTE — PROGRESS NOTES
Education Record    Learner:  Patient    Disease / Diagnosis:    Barriers / Limitations:  None   Comments:    Method:  Discussion and Printed material   Comments:    General Topics:  Medication, Procedure and Plan of care reviewed   Comments:    Outcome:  Shows understanding   Comments:    Patient tolerated Prolastin infusion without incidence. Patient discharged from [facility] in stable condition.

## 2022-07-08 NOTE — TELEPHONE ENCOUNTER
Patient called to followup on asthma exacerbation  Seen in ER yesterday  Given prednisone and tessalon  Was also given tylenol#3 by PCP last week to help her cough  Dry cough is slightly worse - but O2 remains good 95-97%  Able to speak comfortably on the Admission Reconciliation is Completed  Discharge Reconciliation is Not Complete Admission Reconciliation is Completed  Discharge Reconciliation is Completed

## 2022-07-12 ENCOUNTER — PATIENT MESSAGE (OUTPATIENT)
Dept: FAMILY MEDICINE CLINIC | Facility: CLINIC | Age: 42
End: 2022-07-12

## 2022-07-12 DIAGNOSIS — F41.1 GAD (GENERALIZED ANXIETY DISORDER): ICD-10-CM

## 2022-07-12 DIAGNOSIS — G44.221 CHRONIC TENSION-TYPE HEADACHE, INTRACTABLE: ICD-10-CM

## 2022-07-13 ENCOUNTER — OFFICE VISIT (OUTPATIENT)
Dept: HEMATOLOGY/ONCOLOGY | Age: 42
End: 2022-07-13
Attending: INTERNAL MEDICINE
Payer: COMMERCIAL

## 2022-07-13 VITALS
TEMPERATURE: 98 F | RESPIRATION RATE: 18 BRPM | HEART RATE: 86 BPM | OXYGEN SATURATION: 100 % | DIASTOLIC BLOOD PRESSURE: 76 MMHG | SYSTOLIC BLOOD PRESSURE: 122 MMHG

## 2022-07-13 DIAGNOSIS — E88.01 ALPHA-1-ANTITRYPSIN DEFICIENCY (HCC): Primary | ICD-10-CM

## 2022-07-13 PROCEDURE — 96365 THER/PROPH/DIAG IV INF INIT: CPT

## 2022-07-13 RX ORDER — DIPHENHYDRAMINE HYDROCHLORIDE 50 MG/ML
25 INJECTION INTRAMUSCULAR; INTRAVENOUS ONCE
OUTPATIENT
Start: 2022-07-20

## 2022-07-13 RX ORDER — FAMOTIDINE 10 MG/ML
20 INJECTION, SOLUTION INTRAVENOUS ONCE
OUTPATIENT
Start: 2022-07-20

## 2022-07-13 RX ORDER — BUSPIRONE HYDROCHLORIDE 5 MG/1
TABLET ORAL
Qty: 90 TABLET | Refills: 2 | Status: SHIPPED | OUTPATIENT
Start: 2022-07-13

## 2022-07-13 RX ORDER — METHYLPREDNISOLONE SODIUM SUCCINATE 125 MG/2ML
125 INJECTION, POWDER, LYOPHILIZED, FOR SOLUTION INTRAMUSCULAR; INTRAVENOUS ONCE
OUTPATIENT
Start: 2022-07-20

## 2022-07-13 RX ORDER — BUTALBITAL/ACETAMINOPHEN 50MG-325MG
1 TABLET ORAL 3 TIMES DAILY PRN
Qty: 20 TABLET | Refills: 0 | Status: SHIPPED | OUTPATIENT
Start: 2022-07-13

## 2022-07-13 RX ORDER — METHYLPREDNISOLONE SODIUM SUCCINATE 40 MG/ML
40 INJECTION, POWDER, LYOPHILIZED, FOR SOLUTION INTRAMUSCULAR; INTRAVENOUS ONCE
OUTPATIENT
Start: 2022-07-20

## 2022-07-13 RX ORDER — MEPERIDINE HYDROCHLORIDE 25 MG/ML
25 INJECTION INTRAMUSCULAR; INTRAVENOUS; SUBCUTANEOUS ONCE
OUTPATIENT
Start: 2022-07-20

## 2022-07-19 ENCOUNTER — OFFICE VISIT (OUTPATIENT)
Dept: HEMATOLOGY/ONCOLOGY | Age: 42
End: 2022-07-19
Attending: INTERNAL MEDICINE
Payer: COMMERCIAL

## 2022-07-19 VITALS
TEMPERATURE: 99 F | RESPIRATION RATE: 16 BRPM | DIASTOLIC BLOOD PRESSURE: 81 MMHG | OXYGEN SATURATION: 100 % | SYSTOLIC BLOOD PRESSURE: 128 MMHG | HEART RATE: 100 BPM

## 2022-07-19 DIAGNOSIS — E88.01 ALPHA-1-ANTITRYPSIN DEFICIENCY (HCC): Primary | ICD-10-CM

## 2022-07-19 PROCEDURE — 96365 THER/PROPH/DIAG IV INF INIT: CPT

## 2022-07-19 RX ORDER — DIPHENHYDRAMINE HYDROCHLORIDE 50 MG/ML
25 INJECTION INTRAMUSCULAR; INTRAVENOUS ONCE
OUTPATIENT
Start: 2022-07-20

## 2022-07-19 RX ORDER — METHYLPREDNISOLONE SODIUM SUCCINATE 40 MG/ML
40 INJECTION, POWDER, LYOPHILIZED, FOR SOLUTION INTRAMUSCULAR; INTRAVENOUS ONCE
OUTPATIENT
Start: 2022-07-20

## 2022-07-19 RX ORDER — FAMOTIDINE 10 MG/ML
20 INJECTION, SOLUTION INTRAVENOUS ONCE
OUTPATIENT
Start: 2022-07-20

## 2022-07-19 RX ORDER — MEPERIDINE HYDROCHLORIDE 25 MG/ML
25 INJECTION INTRAMUSCULAR; INTRAVENOUS; SUBCUTANEOUS ONCE
OUTPATIENT
Start: 2022-07-20

## 2022-07-19 RX ORDER — METHYLPREDNISOLONE SODIUM SUCCINATE 125 MG/2ML
125 INJECTION, POWDER, LYOPHILIZED, FOR SOLUTION INTRAMUSCULAR; INTRAVENOUS ONCE
OUTPATIENT
Start: 2022-07-20

## 2022-07-19 NOTE — PROGRESS NOTES
Education Record    Learner:  Patient    Disease / Diagnosis:pt here for proteinase    Barriers / Limitations:  None    Method:  Brief focused, printed material and  reinforcement    General Topics:  Plan of care reviewed    Outcome:no c/o  Shows understanding

## 2022-07-20 ENCOUNTER — OFFICE VISIT (OUTPATIENT)
Dept: FAMILY MEDICINE CLINIC | Facility: CLINIC | Age: 42
End: 2022-07-20
Payer: COMMERCIAL

## 2022-07-20 VITALS
HEIGHT: 68.7 IN | SYSTOLIC BLOOD PRESSURE: 88 MMHG | DIASTOLIC BLOOD PRESSURE: 60 MMHG | BODY MASS INDEX: 26.22 KG/M2 | TEMPERATURE: 98 F | HEART RATE: 88 BPM | WEIGHT: 175 LBS

## 2022-07-20 DIAGNOSIS — U09.9 POST COVID-19 CONDITION, UNSPECIFIED: ICD-10-CM

## 2022-07-20 DIAGNOSIS — R51.9 POST-COVID-19 SYNDROME MANIFESTING AS CHRONIC HEADACHE: ICD-10-CM

## 2022-07-20 DIAGNOSIS — G44.221 CHRONIC TENSION-TYPE HEADACHE, INTRACTABLE: ICD-10-CM

## 2022-07-20 DIAGNOSIS — G44.52 NEW PERSISTENT DAILY HEADACHE: Primary | ICD-10-CM

## 2022-07-20 DIAGNOSIS — U09.9 POST-COVID-19 SYNDROME MANIFESTING AS CHRONIC HEADACHE: ICD-10-CM

## 2022-07-20 DIAGNOSIS — G89.29 POST-COVID-19 SYNDROME MANIFESTING AS CHRONIC HEADACHE: ICD-10-CM

## 2022-07-20 PROCEDURE — 3078F DIAST BP <80 MM HG: CPT | Performed by: FAMILY MEDICINE

## 2022-07-20 PROCEDURE — 99214 OFFICE O/P EST MOD 30 MIN: CPT | Performed by: FAMILY MEDICINE

## 2022-07-20 PROCEDURE — 3074F SYST BP LT 130 MM HG: CPT | Performed by: FAMILY MEDICINE

## 2022-07-20 PROCEDURE — 3008F BODY MASS INDEX DOCD: CPT | Performed by: FAMILY MEDICINE

## 2022-07-20 RX ORDER — BUTALBITAL/ACETAMINOPHEN 50MG-325MG
1 TABLET ORAL 3 TIMES DAILY PRN
Qty: 30 TABLET | Refills: 0 | Status: SHIPPED | OUTPATIENT
Start: 2022-07-20

## 2022-07-21 PROBLEM — R51.9 POST-COVID-19 SYNDROME MANIFESTING AS CHRONIC HEADACHE: Status: ACTIVE | Noted: 2022-06-28

## 2022-07-21 PROBLEM — U09.9 POST COVID-19 CONDITION, UNSPECIFIED: Status: ACTIVE | Noted: 2022-06-28

## 2022-07-21 PROBLEM — G89.29 POST-COVID-19 SYNDROME MANIFESTING AS CHRONIC HEADACHE: Status: ACTIVE | Noted: 2022-06-28

## 2022-07-21 PROBLEM — U09.9 POST-COVID-19 SYNDROME MANIFESTING AS CHRONIC HEADACHE: Status: ACTIVE | Noted: 2022-06-28

## 2022-07-21 PROBLEM — G44.52 NEW PERSISTENT DAILY HEADACHE: Status: ACTIVE | Noted: 2022-07-21

## 2022-07-25 ENCOUNTER — HOSPITAL ENCOUNTER (OUTPATIENT)
Facility: HOSPITAL | Age: 42
Setting detail: HOSPITAL OUTPATIENT SURGERY
Discharge: HOME | End: 2022-07-25
Attending: SURGERY | Admitting: SURGERY
Payer: COMMERCIAL

## 2022-07-25 ENCOUNTER — ANESTHESIA EVENT (OUTPATIENT)
Dept: SURGERY | Facility: HOSPITAL | Age: 42
End: 2022-07-25
Payer: COMMERCIAL

## 2022-07-25 ENCOUNTER — ANESTHESIA (OUTPATIENT)
Dept: SURGERY | Facility: HOSPITAL | Age: 42
End: 2022-07-25
Payer: COMMERCIAL

## 2022-07-25 VITALS
BODY MASS INDEX: 25.62 KG/M2 | HEART RATE: 72 BPM | HEIGHT: 68.5 IN | SYSTOLIC BLOOD PRESSURE: 107 MMHG | WEIGHT: 171 LBS | OXYGEN SATURATION: 94 % | RESPIRATION RATE: 18 BRPM | DIASTOLIC BLOOD PRESSURE: 62 MMHG | TEMPERATURE: 98 F

## 2022-07-25 PROCEDURE — 0WJG4ZZ INSPECTION OF PERITONEAL CAVITY, PERCUTANEOUS ENDOSCOPIC APPROACH: ICD-10-PCS | Performed by: SURGERY

## 2022-07-25 PROCEDURE — S0077 INJECTION, CLINDAMYCIN PHOSP: HCPCS | Performed by: NURSE ANESTHETIST, CERTIFIED REGISTERED

## 2022-07-25 PROCEDURE — 0DNW4ZZ RELEASE PERITONEUM, PERCUTANEOUS ENDOSCOPIC APPROACH: ICD-10-PCS | Performed by: SURGERY

## 2022-07-25 RX ORDER — MORPHINE SULFATE 4 MG/ML
4 INJECTION, SOLUTION INTRAMUSCULAR; INTRAVENOUS EVERY 10 MIN PRN
Status: DISCONTINUED | OUTPATIENT
Start: 2022-07-25 | End: 2022-07-25

## 2022-07-25 RX ORDER — ROCURONIUM BROMIDE 10 MG/ML
INJECTION, SOLUTION INTRAVENOUS AS NEEDED
Status: DISCONTINUED | OUTPATIENT
Start: 2022-07-25 | End: 2022-07-25 | Stop reason: SURG

## 2022-07-25 RX ORDER — BUPIVACAINE HYDROCHLORIDE AND EPINEPHRINE 2.5; 5 MG/ML; UG/ML
INJECTION, SOLUTION INFILTRATION; PERINEURAL AS NEEDED
Status: DISCONTINUED | OUTPATIENT
Start: 2022-07-25 | End: 2022-07-25 | Stop reason: HOSPADM

## 2022-07-25 RX ORDER — ACETAMINOPHEN 500 MG
1000 TABLET ORAL ONCE
Status: COMPLETED | OUTPATIENT
Start: 2022-07-25 | End: 2022-07-25

## 2022-07-25 RX ORDER — NALOXONE HYDROCHLORIDE 0.4 MG/ML
80 INJECTION, SOLUTION INTRAMUSCULAR; INTRAVENOUS; SUBCUTANEOUS AS NEEDED
Status: DISCONTINUED | OUTPATIENT
Start: 2022-07-25 | End: 2022-07-25

## 2022-07-25 RX ORDER — HYDROCODONE BITARTRATE AND ACETAMINOPHEN 5; 325 MG/1; MG/1
1 TABLET ORAL ONCE
Status: COMPLETED | OUTPATIENT
Start: 2022-07-25 | End: 2022-07-25

## 2022-07-25 RX ORDER — CLINDAMYCIN PHOSPHATE 900 MG/50ML
900 INJECTION INTRAVENOUS ONCE
Status: DISCONTINUED | OUTPATIENT
Start: 2022-07-25 | End: 2022-07-25 | Stop reason: HOSPADM

## 2022-07-25 RX ORDER — MIDAZOLAM HYDROCHLORIDE 1 MG/ML
INJECTION INTRAMUSCULAR; INTRAVENOUS AS NEEDED
Status: DISCONTINUED | OUTPATIENT
Start: 2022-07-25 | End: 2022-07-25 | Stop reason: SURG

## 2022-07-25 RX ORDER — LIDOCAINE HYDROCHLORIDE 10 MG/ML
INJECTION, SOLUTION EPIDURAL; INFILTRATION; INTRACAUDAL; PERINEURAL AS NEEDED
Status: DISCONTINUED | OUTPATIENT
Start: 2022-07-25 | End: 2022-07-25 | Stop reason: SURG

## 2022-07-25 RX ORDER — ONDANSETRON 2 MG/ML
INJECTION INTRAMUSCULAR; INTRAVENOUS AS NEEDED
Status: DISCONTINUED | OUTPATIENT
Start: 2022-07-25 | End: 2022-07-25 | Stop reason: SURG

## 2022-07-25 RX ORDER — CLINDAMYCIN PHOSPHATE 150 MG/ML
INJECTION, SOLUTION INTRAVENOUS AS NEEDED
Status: DISCONTINUED | OUTPATIENT
Start: 2022-07-25 | End: 2022-07-25 | Stop reason: SURG

## 2022-07-25 RX ORDER — ACETAMINOPHEN 500 MG
1000 TABLET ORAL ONCE
Status: DISCONTINUED | OUTPATIENT
Start: 2022-07-25 | End: 2022-07-25 | Stop reason: HOSPADM

## 2022-07-25 RX ORDER — HYDROMORPHONE HYDROCHLORIDE 1 MG/ML
0.2 INJECTION, SOLUTION INTRAMUSCULAR; INTRAVENOUS; SUBCUTANEOUS EVERY 5 MIN PRN
Status: DISCONTINUED | OUTPATIENT
Start: 2022-07-25 | End: 2022-07-25

## 2022-07-25 RX ORDER — HYDROMORPHONE HYDROCHLORIDE 1 MG/ML
0.6 INJECTION, SOLUTION INTRAMUSCULAR; INTRAVENOUS; SUBCUTANEOUS EVERY 5 MIN PRN
Status: DISCONTINUED | OUTPATIENT
Start: 2022-07-25 | End: 2022-07-25

## 2022-07-25 RX ORDER — MORPHINE SULFATE 4 MG/ML
2 INJECTION, SOLUTION INTRAMUSCULAR; INTRAVENOUS EVERY 10 MIN PRN
Status: DISCONTINUED | OUTPATIENT
Start: 2022-07-25 | End: 2022-07-25

## 2022-07-25 RX ORDER — HYDROMORPHONE HYDROCHLORIDE 1 MG/ML
0.4 INJECTION, SOLUTION INTRAMUSCULAR; INTRAVENOUS; SUBCUTANEOUS EVERY 5 MIN PRN
Status: DISCONTINUED | OUTPATIENT
Start: 2022-07-25 | End: 2022-07-25

## 2022-07-25 RX ORDER — DEXAMETHASONE SODIUM PHOSPHATE 4 MG/ML
VIAL (ML) INJECTION AS NEEDED
Status: DISCONTINUED | OUTPATIENT
Start: 2022-07-25 | End: 2022-07-25 | Stop reason: SURG

## 2022-07-25 RX ORDER — GLYCOPYRROLATE 0.2 MG/ML
INJECTION, SOLUTION INTRAMUSCULAR; INTRAVENOUS AS NEEDED
Status: DISCONTINUED | OUTPATIENT
Start: 2022-07-25 | End: 2022-07-25 | Stop reason: SURG

## 2022-07-25 RX ORDER — SODIUM CHLORIDE, SODIUM LACTATE, POTASSIUM CHLORIDE, CALCIUM CHLORIDE 600; 310; 30; 20 MG/100ML; MG/100ML; MG/100ML; MG/100ML
INJECTION, SOLUTION INTRAVENOUS CONTINUOUS
Status: DISCONTINUED | OUTPATIENT
Start: 2022-07-25 | End: 2022-07-25

## 2022-07-25 RX ORDER — GABAPENTIN 300 MG/1
300 CAPSULE ORAL ONCE
Status: DISCONTINUED | OUTPATIENT
Start: 2022-07-25 | End: 2022-07-25 | Stop reason: HOSPADM

## 2022-07-25 RX ORDER — MORPHINE SULFATE 10 MG/ML
6 INJECTION, SOLUTION INTRAMUSCULAR; INTRAVENOUS EVERY 10 MIN PRN
Status: DISCONTINUED | OUTPATIENT
Start: 2022-07-25 | End: 2022-07-25

## 2022-07-25 RX ORDER — HEPARIN SODIUM 5000 [USP'U]/ML
5000 INJECTION, SOLUTION INTRAVENOUS; SUBCUTANEOUS ONCE
Status: COMPLETED | OUTPATIENT
Start: 2022-07-25 | End: 2022-07-25

## 2022-07-25 RX ORDER — ALBUTEROL SULFATE 90 UG/1
AEROSOL, METERED RESPIRATORY (INHALATION) AS NEEDED
Status: DISCONTINUED | OUTPATIENT
Start: 2022-07-25 | End: 2022-07-25 | Stop reason: SURG

## 2022-07-25 RX ADMIN — ROCURONIUM BROMIDE 5 MG: 10 INJECTION, SOLUTION INTRAVENOUS at 08:19:00

## 2022-07-25 RX ADMIN — ROCURONIUM BROMIDE 35 MG: 10 INJECTION, SOLUTION INTRAVENOUS at 07:44:00

## 2022-07-25 RX ADMIN — LIDOCAINE HYDROCHLORIDE 50 MG: 10 INJECTION, SOLUTION EPIDURAL; INFILTRATION; INTRACAUDAL; PERINEURAL at 07:43:00

## 2022-07-25 RX ADMIN — SODIUM CHLORIDE, SODIUM LACTATE, POTASSIUM CHLORIDE, CALCIUM CHLORIDE: 600; 310; 30; 20 INJECTION, SOLUTION INTRAVENOUS at 08:39:00

## 2022-07-25 RX ADMIN — MIDAZOLAM HYDROCHLORIDE 2 MG: 1 INJECTION INTRAMUSCULAR; INTRAVENOUS at 07:34:00

## 2022-07-25 RX ADMIN — ROCURONIUM BROMIDE 5 MG: 10 INJECTION, SOLUTION INTRAVENOUS at 07:59:00

## 2022-07-25 RX ADMIN — SODIUM CHLORIDE, SODIUM LACTATE, POTASSIUM CHLORIDE, CALCIUM CHLORIDE: 600; 310; 30; 20 INJECTION, SOLUTION INTRAVENOUS at 07:34:00

## 2022-07-25 RX ADMIN — ALBUTEROL SULFATE 2 PUFF: 90 AEROSOL, METERED RESPIRATORY (INHALATION) at 07:32:00

## 2022-07-25 RX ADMIN — GLYCOPYRROLATE 0.1 MG: 0.2 INJECTION, SOLUTION INTRAMUSCULAR; INTRAVENOUS at 08:16:00

## 2022-07-25 RX ADMIN — GLYCOPYRROLATE 0.1 MG: 0.2 INJECTION, SOLUTION INTRAMUSCULAR; INTRAVENOUS at 07:34:00

## 2022-07-25 RX ADMIN — CLINDAMYCIN PHOSPHATE 900 MG: 150 INJECTION, SOLUTION INTRAVENOUS at 07:36:00

## 2022-07-25 RX ADMIN — DEXAMETHASONE SODIUM PHOSPHATE 8 MG: 4 MG/ML VIAL (ML) INJECTION at 07:50:00

## 2022-07-25 RX ADMIN — ONDANSETRON 4 MG: 2 INJECTION INTRAMUSCULAR; INTRAVENOUS at 08:37:00

## 2022-07-25 RX ADMIN — ROCURONIUM BROMIDE 10 MG: 10 INJECTION, SOLUTION INTRAVENOUS at 07:50:00

## 2022-07-25 NOTE — ANESTHESIA PROCEDURE NOTES
Airway  Date/Time: 7/25/2022 7:45 AM  Urgency: Elective      General Information and Staff    Patient location during procedure: OR  Anesthesiologist: Judy Allison MD  Resident/CRNA: Hao Topete CRNA  Performed: CRNA     Indications and Patient Condition  Indications for airway management: anesthesia  Sedation level: deep  Preoxygenated: yes  Patient position: sniffing  Mask difficulty assessment: 1 - vent by mask    Final Airway Details  Final airway type: endotracheal airway      Successful airway: ETT  Cuffed: yes   Successful intubation technique: direct laryngoscopy  Endotracheal tube insertion site: oral  Blade: Meg  Blade size: #3  ETT size (mm): 7.5    Cormack-Lehane Classification: grade I - full view of glottis  Placement verified by: chest auscultation and capnometry   Measured from: lips  ETT to lips (cm): 21  Number of attempts at approach: 1

## 2022-07-25 NOTE — OPERATIVE REPORT
Quyen Treviño / Marzetta Buster Drs. Tedi Brunner / Gio Cam / Dionisio Mcgowan / Kristan Lowekarin / Claudia Brothers - [phone]  Answering Service [phone]        Preop Diagnosis: Abdominal pain after gastric bypass  Postop Diagnosis: Same  Procedure: Diagnostic laparoscopy with lysis of adhesions  Surgeon: Gio Cam  Co-surgeon: kings  Anesthesia: GETA  EBL: 5ml  Complications: None  Date of operation: 07/25/22    Indications: Patient is a 39year old female here for elective diagnostic laparoscopy for chronic left upper abdominal pain. She had a successful gastric bypass approximately 4 years ago and intermittently has had left upper abdominal pain. She has had a significant outpatient work-up and at this point his pending double-balloon enteroscopy at the [facility] and decided on diagnostic laparoscopy for the possibility of adhesive disease. Operative Summary: Patient was brought to the operating room and placed under general anesthesia. Perioperative antibiotics and heparin were given and was secured to the operating room table. Prepped and draped in a sterile fashion. An Optiview technique was used to insert an 11 mm left paramedian trocar and the abdomen was insufflated and the patient tolerated the insufflation well throughout the entire case. Cursory examination of the abdomen was unremarkable. The patient was then placed in steep reverse Trendelenburg position. Laparoscopic TAP block was performed using . 25% marcaine bilaterally. two 5 mm ports were placed in the right abdomen  We initially began by identifying the Mingo limb. We ran the Mingo limb back to the jejunojejunostomy and started identifying her limbs. Common channel was identified and ran all the way back to the ileocecal valve. Patient does have mildly enlarged lymph nodes in the terminal small bowel mesentery.   Otherwise the bowel itself was normal in appearance and in an appropriate location with no twisting of the mesentery. Appendix was normal in appearance. Right ovary did have some cysts in the ovary itself. Common channel was then run all the way back to the jejunojejunostomy from the ileocecal valve and again was inspected and normal.  We now identified the pancreaticobiliary limb around this all the way back and this was normal.  We ran then from the ligament of Treitz to the jejunojejunostomy and inspected the mesenteric defect this was fully closed. Jejunojejunostomy was fully expected 360 degrees and was normal.  We now ran the Mingo limb 1 more time up to the gastrojejunostomy closely inspected the gastrojejunostomy and this was normal.  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 Finally, we focused in the left upper abdomen and saw that the spleen was normal did see that there was omental adhesions to the left abdominal wall where the patient was having her pain. This was tethered to the jejunojejunostomy as well as the colon so it is possible that these adhesions were pulling on the peritoneum causing pain. These were fully taken down. Liver was inspected there was adhesions between the transverse colon to the undersurface of the liver consistent with her history of a laparoscopic cholecystectomy. Trocar sites were closed with 4-0 Vicryl subcuticular sutures. Sterile dressing was applied with Dermabond. Patient tolerated the procedure well and was brought to the postanesthesia recovery unit in stable condition.

## 2022-07-27 ENCOUNTER — OFFICE VISIT (OUTPATIENT)
Dept: HEMATOLOGY/ONCOLOGY | Age: 42
End: 2022-07-27
Attending: INTERNAL MEDICINE
Payer: COMMERCIAL

## 2022-07-27 VITALS
WEIGHT: 172 LBS | DIASTOLIC BLOOD PRESSURE: 69 MMHG | RESPIRATION RATE: 18 BRPM | SYSTOLIC BLOOD PRESSURE: 109 MMHG | OXYGEN SATURATION: 99 % | BODY MASS INDEX: 26 KG/M2 | HEART RATE: 95 BPM | TEMPERATURE: 97 F

## 2022-07-27 DIAGNOSIS — E88.01 ALPHA-1-ANTITRYPSIN DEFICIENCY (HCC): Primary | ICD-10-CM

## 2022-07-27 PROCEDURE — 96365 THER/PROPH/DIAG IV INF INIT: CPT

## 2022-07-27 RX ORDER — METHYLPREDNISOLONE SODIUM SUCCINATE 125 MG/2ML
125 INJECTION, POWDER, LYOPHILIZED, FOR SOLUTION INTRAMUSCULAR; INTRAVENOUS ONCE
OUTPATIENT
Start: 2022-08-03

## 2022-07-27 RX ORDER — MEPERIDINE HYDROCHLORIDE 25 MG/ML
25 INJECTION INTRAMUSCULAR; INTRAVENOUS; SUBCUTANEOUS ONCE
OUTPATIENT
Start: 2022-08-03

## 2022-07-27 RX ORDER — FAMOTIDINE 10 MG/ML
20 INJECTION, SOLUTION INTRAVENOUS ONCE
OUTPATIENT
Start: 2022-08-03

## 2022-07-27 RX ORDER — METHYLPREDNISOLONE SODIUM SUCCINATE 40 MG/ML
40 INJECTION, POWDER, LYOPHILIZED, FOR SOLUTION INTRAMUSCULAR; INTRAVENOUS ONCE
OUTPATIENT
Start: 2022-08-03

## 2022-07-27 RX ORDER — DIPHENHYDRAMINE HYDROCHLORIDE 50 MG/ML
25 INJECTION INTRAMUSCULAR; INTRAVENOUS ONCE
OUTPATIENT
Start: 2022-08-03

## 2022-07-29 ENCOUNTER — PATIENT MESSAGE (OUTPATIENT)
Dept: FAMILY MEDICINE CLINIC | Facility: CLINIC | Age: 42
End: 2022-07-29

## 2022-07-29 ENCOUNTER — OFFICE VISIT (OUTPATIENT)
Dept: FAMILY MEDICINE CLINIC | Facility: CLINIC | Age: 42
End: 2022-07-29
Payer: COMMERCIAL

## 2022-07-29 VITALS
TEMPERATURE: 98 F | DIASTOLIC BLOOD PRESSURE: 60 MMHG | SYSTOLIC BLOOD PRESSURE: 90 MMHG | HEART RATE: 104 BPM | WEIGHT: 169 LBS | HEIGHT: 68.7 IN | BODY MASS INDEX: 25.32 KG/M2 | OXYGEN SATURATION: 98 %

## 2022-07-29 DIAGNOSIS — G44.221 CHRONIC TENSION-TYPE HEADACHE, INTRACTABLE: ICD-10-CM

## 2022-07-29 DIAGNOSIS — Z09 FOLLOW-UP EXAMINATION AFTER ABDOMINAL SURGERY: ICD-10-CM

## 2022-07-29 DIAGNOSIS — R10.12 LEFT UPPER QUADRANT ABDOMINAL PAIN: Primary | ICD-10-CM

## 2022-07-29 DIAGNOSIS — F41.1 GAD (GENERALIZED ANXIETY DISORDER): ICD-10-CM

## 2022-07-29 PROCEDURE — 3074F SYST BP LT 130 MM HG: CPT | Performed by: FAMILY MEDICINE

## 2022-07-29 PROCEDURE — 99214 OFFICE O/P EST MOD 30 MIN: CPT | Performed by: FAMILY MEDICINE

## 2022-07-29 PROCEDURE — 3078F DIAST BP <80 MM HG: CPT | Performed by: FAMILY MEDICINE

## 2022-07-29 PROCEDURE — 3008F BODY MASS INDEX DOCD: CPT | Performed by: FAMILY MEDICINE

## 2022-07-29 RX ORDER — HYDROCODONE BITARTRATE AND ACETAMINOPHEN 5; 325 MG/1; MG/1
TABLET ORAL
COMMUNITY
Start: 2022-07-24

## 2022-07-29 RX ORDER — TIZANIDINE 4 MG/1
4 TABLET ORAL EVERY 8 HOURS PRN
Qty: 30 TABLET | Refills: 0 | Status: SHIPPED | OUTPATIENT
Start: 2022-07-29

## 2022-07-29 RX ORDER — HYDROCODONE BITARTRATE AND ACETAMINOPHEN 5; 325 MG/1; MG/1
TABLET ORAL
Qty: 34 TABLET | Refills: 0 | Status: SHIPPED | OUTPATIENT
Start: 2022-07-29 | End: 2022-08-08

## 2022-08-01 LAB
AMB EXT ANION GAP: 6
AMB EXT BILIRUBIN URINE: NEGATIVE
AMB EXT BILIRUBIN, TOTAL: 0.3 MG/DL
AMB EXT BLOOD URINE: NEGATIVE
AMB EXT BUN: 11 MG/DL
AMB EXT CALCIUM: 9
AMB EXT CARBON DIOXIDE: 28
AMB EXT CHLORIDE: 102
AMB EXT CHOLESTEROL, TOTAL: 185 MG/DL
AMB EXT CMP ALT: 13 U/L
AMB EXT CMP AST: 13 U/L
AMB EXT CREATININE: 0.7 MG/DL
AMB EXT GLUCOSE URINE: NEGATIVE
AMB EXT GLUCOSE: 63 MG/DL
AMB EXT HDL CHOLESTEROL: 83 MG/DL
AMB EXT HEMATOCRIT: 40.7
AMB EXT HEMOGLOBIN: 13.4
AMB EXT KETONES URINE: NEGATIVE
AMB EXT LDL CHOLESTEROL, DIRECT: 80 MG/DL
AMB EXT LEUKOCYTE ESTERASE URINE: NEGATIVE
AMB EXT MCV: 95
AMB EXT NITRITE URINE: NEGATIVE
AMB EXT PH URINE: 6
AMB EXT PLATELETS: 253
AMB EXT POSTASSIUM: 3.6 MMOL/L
AMB EXT PROTEIN URINE: NEGATIVE
AMB EXT SODIUM: 136 MMOL/L
AMB EXT TOTAL PROTEIN: 6.4
AMB EXT TRIGLYCERIDES: 109 MG/DL
AMB EXT TSH: 0.02 MIU/ML
AMB EXT URINE CLARITY: CLEAR
AMB EXT URINE COLOR: YELLOW
AMB EXT URINE SPECIFIC GRAVITY: 1.02
AMB EXT UROBILINOGEN URINE: 0.2
AMB EXT WBC: 5.4 X10(3)UL
HEPATITIS B SURFACE AB: REACTIVE

## 2022-08-03 ENCOUNTER — APPOINTMENT (OUTPATIENT)
Dept: HEMATOLOGY/ONCOLOGY | Age: 42
End: 2022-08-03
Attending: INTERNAL MEDICINE
Payer: COMMERCIAL

## 2022-08-04 ENCOUNTER — PATIENT MESSAGE (OUTPATIENT)
Dept: FAMILY MEDICINE CLINIC | Facility: CLINIC | Age: 42
End: 2022-08-04

## 2022-08-04 DIAGNOSIS — F51.01 PRIMARY INSOMNIA: ICD-10-CM

## 2022-08-05 RX ORDER — HYDROCODONE BITARTRATE AND ACETAMINOPHEN 5; 325 MG/1; MG/1
TABLET ORAL
Qty: 11 TABLET | Refills: 0 | Status: SHIPPED | OUTPATIENT
Start: 2022-08-07 | End: 2022-08-16

## 2022-08-06 DIAGNOSIS — F51.01 PRIMARY INSOMNIA: ICD-10-CM

## 2022-08-08 RX ORDER — PREGABALIN 75 MG/1
CAPSULE ORAL
Qty: 90 CAPSULE | Refills: 0 | Status: SHIPPED | OUTPATIENT
Start: 2022-08-08

## 2022-08-08 RX ORDER — TRAZODONE HYDROCHLORIDE 50 MG/1
TABLET ORAL
Qty: 120 TABLET | Refills: 1 | Status: SHIPPED | OUTPATIENT
Start: 2022-08-08

## 2022-08-09 ENCOUNTER — OFFICE VISIT (OUTPATIENT)
Dept: SURGERY | Facility: CLINIC | Age: 42
End: 2022-08-09
Payer: COMMERCIAL

## 2022-08-09 VITALS
DIASTOLIC BLOOD PRESSURE: 80 MMHG | OXYGEN SATURATION: 98 % | HEART RATE: 88 BPM | HEIGHT: 68 IN | SYSTOLIC BLOOD PRESSURE: 124 MMHG | BODY MASS INDEX: 26.09 KG/M2 | WEIGHT: 172.13 LBS

## 2022-08-09 RX ORDER — TRAZODONE HYDROCHLORIDE 50 MG/1
TABLET ORAL
Qty: 120 TABLET | Refills: 3 | OUTPATIENT
Start: 2022-08-09

## 2022-08-10 ENCOUNTER — OFFICE VISIT (OUTPATIENT)
Dept: HEMATOLOGY/ONCOLOGY | Age: 42
End: 2022-08-10
Attending: INTERNAL MEDICINE
Payer: COMMERCIAL

## 2022-08-10 ENCOUNTER — PATIENT MESSAGE (OUTPATIENT)
Dept: FAMILY MEDICINE CLINIC | Facility: CLINIC | Age: 42
End: 2022-08-10

## 2022-08-10 ENCOUNTER — OFFICE VISIT (OUTPATIENT)
Dept: FAMILY MEDICINE CLINIC | Facility: CLINIC | Age: 42
End: 2022-08-10
Payer: COMMERCIAL

## 2022-08-10 VITALS
DIASTOLIC BLOOD PRESSURE: 58 MMHG | TEMPERATURE: 98 F | BODY MASS INDEX: 25.47 KG/M2 | HEART RATE: 108 BPM | WEIGHT: 170 LBS | OXYGEN SATURATION: 98 % | SYSTOLIC BLOOD PRESSURE: 88 MMHG | HEIGHT: 68.7 IN

## 2022-08-10 VITALS
DIASTOLIC BLOOD PRESSURE: 67 MMHG | SYSTOLIC BLOOD PRESSURE: 102 MMHG | TEMPERATURE: 99 F | HEART RATE: 86 BPM | RESPIRATION RATE: 16 BRPM | OXYGEN SATURATION: 99 %

## 2022-08-10 DIAGNOSIS — J21.9 BRONCHIOLITIS: Primary | ICD-10-CM

## 2022-08-10 DIAGNOSIS — E87.6 LOW BLOOD POTASSIUM: ICD-10-CM

## 2022-08-10 DIAGNOSIS — M35.1 MIXED CONNECTIVE TISSUE DISEASE (HCC): ICD-10-CM

## 2022-08-10 DIAGNOSIS — G44.221 CHRONIC TENSION-TYPE HEADACHE, INTRACTABLE: ICD-10-CM

## 2022-08-10 DIAGNOSIS — R10.12 LEFT UPPER QUADRANT ABDOMINAL PAIN: ICD-10-CM

## 2022-08-10 DIAGNOSIS — F41.1 GAD (GENERALIZED ANXIETY DISORDER): ICD-10-CM

## 2022-08-10 DIAGNOSIS — E88.01 ALPHA-1-ANTITRYPSIN DEFICIENCY (HCC): Primary | ICD-10-CM

## 2022-08-10 DIAGNOSIS — Z79.899 MEDICATION MANAGEMENT: ICD-10-CM

## 2022-08-10 PROCEDURE — 90677 PCV20 VACCINE IM: CPT | Performed by: FAMILY MEDICINE

## 2022-08-10 PROCEDURE — 3008F BODY MASS INDEX DOCD: CPT | Performed by: FAMILY MEDICINE

## 2022-08-10 PROCEDURE — 3074F SYST BP LT 130 MM HG: CPT | Performed by: FAMILY MEDICINE

## 2022-08-10 PROCEDURE — 3078F DIAST BP <80 MM HG: CPT | Performed by: FAMILY MEDICINE

## 2022-08-10 PROCEDURE — 99215 OFFICE O/P EST HI 40 MIN: CPT | Performed by: FAMILY MEDICINE

## 2022-08-10 PROCEDURE — 90471 IMMUNIZATION ADMIN: CPT | Performed by: FAMILY MEDICINE

## 2022-08-10 PROCEDURE — 96365 THER/PROPH/DIAG IV INF INIT: CPT

## 2022-08-10 RX ORDER — BUTALBITAL/ACETAMINOPHEN 50MG-325MG
1 TABLET ORAL 2 TIMES DAILY PRN
Qty: 12 TABLET | Refills: 0 | Status: SHIPPED | OUTPATIENT
Start: 2022-08-10

## 2022-08-10 RX ORDER — METHYLPREDNISOLONE SODIUM SUCCINATE 40 MG/ML
40 INJECTION, POWDER, LYOPHILIZED, FOR SOLUTION INTRAMUSCULAR; INTRAVENOUS ONCE
OUTPATIENT
Start: 2022-08-17

## 2022-08-10 RX ORDER — ALPRAZOLAM 0.25 MG/1
0.25 TABLET ORAL 2 TIMES DAILY PRN
Qty: 60 TABLET | Refills: 0 | Status: SHIPPED | OUTPATIENT
Start: 2022-08-10

## 2022-08-10 RX ORDER — METHYLPREDNISOLONE SODIUM SUCCINATE 125 MG/2ML
125 INJECTION, POWDER, LYOPHILIZED, FOR SOLUTION INTRAMUSCULAR; INTRAVENOUS ONCE
OUTPATIENT
Start: 2022-08-17

## 2022-08-10 RX ORDER — DIPHENHYDRAMINE HYDROCHLORIDE 50 MG/ML
25 INJECTION INTRAMUSCULAR; INTRAVENOUS ONCE
OUTPATIENT
Start: 2022-08-17

## 2022-08-10 RX ORDER — FAMOTIDINE 10 MG/ML
20 INJECTION, SOLUTION INTRAVENOUS ONCE
OUTPATIENT
Start: 2022-08-17

## 2022-08-10 RX ORDER — MEPERIDINE HYDROCHLORIDE 25 MG/ML
25 INJECTION INTRAMUSCULAR; INTRAVENOUS; SUBCUTANEOUS ONCE
OUTPATIENT
Start: 2022-08-17

## 2022-08-10 NOTE — PROGRESS NOTES
Education Record    Learner:  Patient    Disease / Diagnosis: Pt here for prolastin    Barriers / Limitations:  None    Method:  Brief focused, printed material and  reinforcement    General Topics:  Plan of care reviewed    Outcome:  Shows understanding.  Pt tolerated tx without incident

## 2022-08-11 PROBLEM — J21.9 BRONCHIOLITIS: Status: ACTIVE | Noted: 2022-08-11

## 2022-08-11 PROBLEM — M35.1 MIXED CONNECTIVE TISSUE DISEASE (HCC): Status: ACTIVE | Noted: 2022-08-11

## 2022-08-17 ENCOUNTER — OFFICE VISIT (OUTPATIENT)
Dept: HEMATOLOGY/ONCOLOGY | Age: 42
End: 2022-08-17
Attending: INTERNAL MEDICINE
Payer: COMMERCIAL

## 2022-08-17 ENCOUNTER — OFFICE VISIT (OUTPATIENT)
Dept: FAMILY MEDICINE CLINIC | Facility: CLINIC | Age: 42
End: 2022-08-17
Payer: COMMERCIAL

## 2022-08-17 VITALS
HEART RATE: 99 BPM | SYSTOLIC BLOOD PRESSURE: 100 MMHG | TEMPERATURE: 98 F | DIASTOLIC BLOOD PRESSURE: 65 MMHG | RESPIRATION RATE: 18 BRPM | OXYGEN SATURATION: 99 %

## 2022-08-17 VITALS
BODY MASS INDEX: 26.52 KG/M2 | OXYGEN SATURATION: 98 % | WEIGHT: 177 LBS | HEIGHT: 68.7 IN | DIASTOLIC BLOOD PRESSURE: 60 MMHG | HEART RATE: 104 BPM | TEMPERATURE: 99 F | SYSTOLIC BLOOD PRESSURE: 92 MMHG

## 2022-08-17 DIAGNOSIS — R05.8 SPASMODIC COUGH: Primary | ICD-10-CM

## 2022-08-17 DIAGNOSIS — E88.01 ALPHA-1-ANTITRYPSIN DEFICIENCY (HCC): Primary | ICD-10-CM

## 2022-08-17 PROCEDURE — 3074F SYST BP LT 130 MM HG: CPT | Performed by: FAMILY MEDICINE

## 2022-08-17 PROCEDURE — 99213 OFFICE O/P EST LOW 20 MIN: CPT | Performed by: FAMILY MEDICINE

## 2022-08-17 PROCEDURE — 96365 THER/PROPH/DIAG IV INF INIT: CPT

## 2022-08-17 PROCEDURE — 3008F BODY MASS INDEX DOCD: CPT | Performed by: FAMILY MEDICINE

## 2022-08-17 PROCEDURE — 3078F DIAST BP <80 MM HG: CPT | Performed by: FAMILY MEDICINE

## 2022-08-17 RX ORDER — FAMOTIDINE 10 MG/ML
20 INJECTION, SOLUTION INTRAVENOUS ONCE
OUTPATIENT
Start: 2022-08-24

## 2022-08-17 RX ORDER — METHYLPREDNISOLONE SODIUM SUCCINATE 125 MG/2ML
125 INJECTION, POWDER, LYOPHILIZED, FOR SOLUTION INTRAMUSCULAR; INTRAVENOUS ONCE
OUTPATIENT
Start: 2022-08-24

## 2022-08-17 RX ORDER — DIPHENHYDRAMINE HYDROCHLORIDE 50 MG/ML
25 INJECTION INTRAMUSCULAR; INTRAVENOUS ONCE
OUTPATIENT
Start: 2022-08-24

## 2022-08-17 RX ORDER — MEPERIDINE HYDROCHLORIDE 25 MG/ML
25 INJECTION INTRAMUSCULAR; INTRAVENOUS; SUBCUTANEOUS ONCE
OUTPATIENT
Start: 2022-08-24

## 2022-08-17 RX ORDER — ACETAMINOPHEN AND CODEINE PHOSPHATE 300; 30 MG/1; MG/1
TABLET ORAL
Qty: 21 TABLET | Refills: 0 | Status: SHIPPED | OUTPATIENT
Start: 2022-08-17 | End: 2022-08-31

## 2022-08-17 RX ORDER — METHYLPREDNISOLONE SODIUM SUCCINATE 40 MG/ML
40 INJECTION, POWDER, LYOPHILIZED, FOR SOLUTION INTRAMUSCULAR; INTRAVENOUS ONCE
OUTPATIENT
Start: 2022-08-24

## 2022-08-19 NOTE — TELEPHONE ENCOUNTER
From: Cara Travis  To: Shadi Person PA-C  Sent: 11/10/2021 5:53 AM CST  Subject: Update     Umang Suarez,   They are ran a bunch of tests on me yesterday. Bloodwork came back ok, everything on the respiratory panel came back negative.  They also none

## 2022-08-22 ENCOUNTER — PATIENT MESSAGE (OUTPATIENT)
Dept: FAMILY MEDICINE CLINIC | Facility: CLINIC | Age: 42
End: 2022-08-22

## 2022-08-23 NOTE — TELEPHONE ENCOUNTER
From: Sammi Shah  To: Petty Lao PA-C  Sent: 8/22/2022 7:28 PM CDT  Subject: Update     Hi Kat Mayo,   Unfortunately, things are getting much better. I actually think I may be a little worse. I am pretty sure I have the same thing that my little carlton has. His doctor said it could last a few more weeeks :( My cough is a bit more junky than it was. 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 It last through the morning, but the afternoon at work is rough with coughing. I then take it before I go to bed and I am waking up multiple times thorughout the night.      Ariel Files

## 2022-08-24 ENCOUNTER — OFFICE VISIT (OUTPATIENT)
Dept: HEMATOLOGY/ONCOLOGY | Age: 42
End: 2022-08-24
Attending: INTERNAL MEDICINE
Payer: COMMERCIAL

## 2022-08-24 VITALS
SYSTOLIC BLOOD PRESSURE: 112 MMHG | TEMPERATURE: 97 F | BODY MASS INDEX: 26 KG/M2 | DIASTOLIC BLOOD PRESSURE: 72 MMHG | OXYGEN SATURATION: 100 % | WEIGHT: 172 LBS | HEART RATE: 77 BPM

## 2022-08-24 DIAGNOSIS — E88.01 ALPHA-1-ANTITRYPSIN DEFICIENCY (HCC): Primary | ICD-10-CM

## 2022-08-24 PROCEDURE — 96365 THER/PROPH/DIAG IV INF INIT: CPT

## 2022-08-24 RX ORDER — DIPHENHYDRAMINE HYDROCHLORIDE 50 MG/ML
25 INJECTION INTRAMUSCULAR; INTRAVENOUS ONCE
OUTPATIENT
Start: 2022-08-31

## 2022-08-24 RX ORDER — FAMOTIDINE 10 MG/ML
20 INJECTION, SOLUTION INTRAVENOUS ONCE
OUTPATIENT
Start: 2022-08-31

## 2022-08-24 RX ORDER — METHYLPREDNISOLONE SODIUM SUCCINATE 40 MG/ML
40 INJECTION, POWDER, LYOPHILIZED, FOR SOLUTION INTRAMUSCULAR; INTRAVENOUS ONCE
OUTPATIENT
Start: 2022-08-31

## 2022-08-24 RX ORDER — MEPERIDINE HYDROCHLORIDE 25 MG/ML
25 INJECTION INTRAMUSCULAR; INTRAVENOUS; SUBCUTANEOUS ONCE
OUTPATIENT
Start: 2022-08-31

## 2022-08-24 RX ORDER — METHYLPREDNISOLONE SODIUM SUCCINATE 125 MG/2ML
125 INJECTION, POWDER, LYOPHILIZED, FOR SOLUTION INTRAMUSCULAR; INTRAVENOUS ONCE
OUTPATIENT
Start: 2022-08-31

## 2022-08-24 NOTE — PROGRESS NOTES
Education Record    Learner:  Patient    Disease / Diagnosis: alpha one deficiency, here for prolastin    Barriers / Limitations:  None   Comments:    Method:  Discussion   Comments:    General Topics:  Plan of care reviewed   Comments:    Outcome:  Shows understanding   Comments:    Prolastin infused per MD order without incident. Tolerated well. Pt requesting to change next week appointment from Wednesday 8/31 to Thursday 9/1 at same time. Appointment change requested. Discharged home in stable condition, no new complaints.

## 2022-08-29 ENCOUNTER — OFFICE VISIT (OUTPATIENT)
Dept: FAMILY MEDICINE CLINIC | Facility: CLINIC | Age: 42
End: 2022-08-29
Payer: COMMERCIAL

## 2022-08-29 VITALS
TEMPERATURE: 98 F | WEIGHT: 172 LBS | DIASTOLIC BLOOD PRESSURE: 60 MMHG | HEIGHT: 68.7 IN | SYSTOLIC BLOOD PRESSURE: 100 MMHG | BODY MASS INDEX: 25.77 KG/M2 | OXYGEN SATURATION: 98 % | HEART RATE: 100 BPM

## 2022-08-29 DIAGNOSIS — J45.998 ASTHMA, PERSISTENT CONTROLLED: ICD-10-CM

## 2022-08-29 DIAGNOSIS — J01.20 ACUTE NON-RECURRENT ETHMOIDAL SINUSITIS: Primary | ICD-10-CM

## 2022-08-29 DIAGNOSIS — R09.82 POST-NASAL DRAINAGE: ICD-10-CM

## 2022-08-29 DIAGNOSIS — R05.8 SPASMODIC COUGH: ICD-10-CM

## 2022-08-29 PROCEDURE — 3008F BODY MASS INDEX DOCD: CPT | Performed by: FAMILY MEDICINE

## 2022-08-29 PROCEDURE — 3074F SYST BP LT 130 MM HG: CPT | Performed by: FAMILY MEDICINE

## 2022-08-29 PROCEDURE — 99214 OFFICE O/P EST MOD 30 MIN: CPT | Performed by: FAMILY MEDICINE

## 2022-08-29 PROCEDURE — 3078F DIAST BP <80 MM HG: CPT | Performed by: FAMILY MEDICINE

## 2022-08-29 RX ORDER — AMOXICILLIN AND CLAVULANATE POTASSIUM 875; 125 MG/1; MG/1
1 TABLET, FILM COATED ORAL 2 TIMES DAILY
Qty: 20 TABLET | Refills: 0 | Status: SHIPPED | OUTPATIENT
Start: 2022-08-29 | End: 2022-09-08

## 2022-08-29 RX ORDER — ACETAMINOPHEN AND CODEINE PHOSPHATE 300; 30 MG/1; MG/1
0.5 TABLET ORAL 2 TIMES DAILY
Qty: 10 TABLET | Refills: 0 | Status: SHIPPED | OUTPATIENT
Start: 2022-08-29 | End: 2022-09-08

## 2022-08-31 ENCOUNTER — APPOINTMENT (OUTPATIENT)
Dept: HEMATOLOGY/ONCOLOGY | Age: 42
End: 2022-08-31
Attending: INTERNAL MEDICINE
Payer: COMMERCIAL

## 2022-08-31 DIAGNOSIS — G44.221 CHRONIC TENSION-TYPE HEADACHE, INTRACTABLE: ICD-10-CM

## 2022-09-01 ENCOUNTER — OFFICE VISIT (OUTPATIENT)
Dept: HEMATOLOGY/ONCOLOGY | Age: 42
End: 2022-09-01
Attending: INTERNAL MEDICINE
Payer: COMMERCIAL

## 2022-09-01 VITALS
DIASTOLIC BLOOD PRESSURE: 74 MMHG | SYSTOLIC BLOOD PRESSURE: 110 MMHG | OXYGEN SATURATION: 100 % | TEMPERATURE: 99 F | HEART RATE: 82 BPM | RESPIRATION RATE: 18 BRPM

## 2022-09-01 DIAGNOSIS — E88.01 ALPHA-1-ANTITRYPSIN DEFICIENCY (HCC): Primary | ICD-10-CM

## 2022-09-01 PROCEDURE — 96365 THER/PROPH/DIAG IV INF INIT: CPT

## 2022-09-01 RX ORDER — PREGABALIN 75 MG/1
CAPSULE ORAL
Qty: 90 CAPSULE | Refills: 0 | Status: SHIPPED | OUTPATIENT
Start: 2022-09-01

## 2022-09-01 RX ORDER — METHYLPREDNISOLONE SODIUM SUCCINATE 40 MG/ML
40 INJECTION, POWDER, LYOPHILIZED, FOR SOLUTION INTRAMUSCULAR; INTRAVENOUS ONCE
OUTPATIENT
Start: 2022-09-07

## 2022-09-01 RX ORDER — FAMOTIDINE 10 MG/ML
20 INJECTION, SOLUTION INTRAVENOUS ONCE
OUTPATIENT
Start: 2022-09-07

## 2022-09-01 RX ORDER — DIPHENHYDRAMINE HYDROCHLORIDE 50 MG/ML
25 INJECTION INTRAMUSCULAR; INTRAVENOUS ONCE
OUTPATIENT
Start: 2022-09-07

## 2022-09-01 RX ORDER — MEPERIDINE HYDROCHLORIDE 25 MG/ML
25 INJECTION INTRAMUSCULAR; INTRAVENOUS; SUBCUTANEOUS ONCE
OUTPATIENT
Start: 2022-09-07

## 2022-09-01 RX ORDER — METHYLPREDNISOLONE SODIUM SUCCINATE 125 MG/2ML
125 INJECTION, POWDER, LYOPHILIZED, FOR SOLUTION INTRAMUSCULAR; INTRAVENOUS ONCE
OUTPATIENT
Start: 2022-09-07

## 2022-09-01 RX ORDER — TIZANIDINE 4 MG/1
TABLET ORAL
Qty: 30 TABLET | Refills: 0 | Status: SHIPPED | OUTPATIENT
Start: 2022-09-01

## 2022-09-01 NOTE — PROGRESS NOTES
Education Record    Learner:  Patient    Disease / Diagnosis: patient here for prolastin infusion     Barriers / Limitations:  None    Method:  Brief focused, printed material and  reinforcement    General Topics:  Plan of care reviewed    Outcome:  Shows understanding  Patient tolerated infusion, no complications.

## 2022-09-02 ENCOUNTER — PATIENT MESSAGE (OUTPATIENT)
Dept: FAMILY MEDICINE CLINIC | Facility: CLINIC | Age: 42
End: 2022-09-02

## 2022-09-02 DIAGNOSIS — G44.221 CHRONIC TENSION-TYPE HEADACHE, INTRACTABLE: ICD-10-CM

## 2022-09-02 RX ORDER — BUTALBITAL/ACETAMINOPHEN 50MG-325MG
TABLET ORAL
Qty: 12 TABLET | Refills: 0 | Status: SHIPPED | OUTPATIENT
Start: 2022-09-02

## 2022-09-02 NOTE — TELEPHONE ENCOUNTER
From: Alfonso Franz  To: Alejandra Burns PA-C  Sent: 9/2/2022 9:19 AM CDT  Subject: Results     Umang Torres I faxed over all of the results I have from [facility]. Let me know if you did not receive them and I will fax them again.    Tremaine Landry

## 2022-09-07 ENCOUNTER — OFFICE VISIT (OUTPATIENT)
Dept: HEMATOLOGY/ONCOLOGY | Age: 42
End: 2022-09-07
Attending: INTERNAL MEDICINE
Payer: COMMERCIAL

## 2022-09-07 ENCOUNTER — LAB ENCOUNTER (OUTPATIENT)
Dept: LAB | Age: 42
End: 2022-09-07
Attending: FAMILY MEDICINE
Payer: COMMERCIAL

## 2022-09-07 ENCOUNTER — LAB ENCOUNTER (OUTPATIENT)
Dept: LAB | Age: 42
End: 2022-09-07
Attending: OTOLARYNGOLOGY
Payer: COMMERCIAL

## 2022-09-07 VITALS
DIASTOLIC BLOOD PRESSURE: 70 MMHG | HEART RATE: 86 BPM | OXYGEN SATURATION: 98 % | SYSTOLIC BLOOD PRESSURE: 105 MMHG | TEMPERATURE: 98 F | RESPIRATION RATE: 16 BRPM

## 2022-09-07 DIAGNOSIS — E88.01 ALPHA-1-ANTITRYPSIN DEFICIENCY (HCC): Primary | ICD-10-CM

## 2022-09-07 DIAGNOSIS — E07.9 THYROID DYSFUNCTION: ICD-10-CM

## 2022-09-07 DIAGNOSIS — E87.6 LOW BLOOD POTASSIUM: ICD-10-CM

## 2022-09-07 DIAGNOSIS — C73 THYROID CANCER (HCC): ICD-10-CM

## 2022-09-07 LAB
ANION GAP SERPL CALC-SCNC: 5 MMOL/L (ref 0–18)
BUN BLD-MCNC: 8 MG/DL (ref 7–18)
CALCIUM BLD-MCNC: 8.7 MG/DL (ref 8.5–10.1)
CHLORIDE SERPL-SCNC: 110 MMOL/L (ref 98–112)
CO2 SERPL-SCNC: 28 MMOL/L (ref 21–32)
CREAT BLD-MCNC: 0.76 MG/DL
FASTING STATUS PATIENT QL REPORTED: NO
GFR SERPLBLD BASED ON 1.73 SQ M-ARVRAT: 101 ML/MIN/1.73M2 (ref 60–?)
GLUCOSE BLD-MCNC: 102 MG/DL (ref 70–99)
OSMOLALITY SERPL CALC.SUM OF ELEC: 295 MOSM/KG (ref 275–295)
POTASSIUM SERPL-SCNC: 3.5 MMOL/L (ref 3.5–5.1)
SODIUM SERPL-SCNC: 143 MMOL/L (ref 136–145)
T4 FREE SERPL-MCNC: 1.2 NG/DL (ref 0.8–1.7)
TSI SER-ACNC: 0.25 MIU/ML (ref 0.36–3.74)

## 2022-09-07 PROCEDURE — 80048 BASIC METABOLIC PNL TOTAL CA: CPT | Performed by: FAMILY MEDICINE

## 2022-09-07 PROCEDURE — 96365 THER/PROPH/DIAG IV INF INIT: CPT

## 2022-09-07 RX ORDER — METHYLPREDNISOLONE SODIUM SUCCINATE 125 MG/2ML
125 INJECTION, POWDER, LYOPHILIZED, FOR SOLUTION INTRAMUSCULAR; INTRAVENOUS ONCE
OUTPATIENT
Start: 2022-09-14

## 2022-09-07 RX ORDER — DIPHENHYDRAMINE HYDROCHLORIDE 50 MG/ML
25 INJECTION INTRAMUSCULAR; INTRAVENOUS ONCE
OUTPATIENT
Start: 2022-09-14

## 2022-09-07 RX ORDER — MEPERIDINE HYDROCHLORIDE 25 MG/ML
25 INJECTION INTRAMUSCULAR; INTRAVENOUS; SUBCUTANEOUS ONCE
OUTPATIENT
Start: 2022-09-14

## 2022-09-07 RX ORDER — FAMOTIDINE 10 MG/ML
20 INJECTION, SOLUTION INTRAVENOUS ONCE
OUTPATIENT
Start: 2022-09-14

## 2022-09-07 RX ORDER — METHYLPREDNISOLONE SODIUM SUCCINATE 40 MG/ML
40 INJECTION, POWDER, LYOPHILIZED, FOR SOLUTION INTRAMUSCULAR; INTRAVENOUS ONCE
OUTPATIENT
Start: 2022-09-14

## 2022-09-07 NOTE — PROGRESS NOTES
Education Record    Learner:  Patient    Disease / Diagnosis: here for prolastin    Barriers / Limitations:  None    Method:  Brief focused, printed material and  reinforcement    General Topics:  Plan of care reviewed    Outcome: patient ambulatory. No complaints. Tolerated infusion. Discharged in stable condition.  Shows understanding

## 2022-09-08 NOTE — PROGRESS NOTES
TSH is still slightly low see if Dr. Emily Jasso wants to adjust your medication.   Electrolytes and kidney function are normal.

## 2022-09-09 ENCOUNTER — OFFICE VISIT (OUTPATIENT)
Dept: UROLOGY | Facility: CLINIC | Age: 42
End: 2022-09-09
Attending: OBSTETRICS & GYNECOLOGY
Payer: COMMERCIAL

## 2022-09-09 VITALS — TEMPERATURE: 98 F | BODY MASS INDEX: 25.77 KG/M2 | HEIGHT: 68.7 IN | WEIGHT: 172 LBS

## 2022-09-09 DIAGNOSIS — N95.2 VAGINAL ATROPHY: Primary | ICD-10-CM

## 2022-09-09 DIAGNOSIS — M62.89 PELVIC FLOOR TENSION: ICD-10-CM

## 2022-09-09 LAB
THYROGLOBULIN AB: <0.9 IU/ML
THYROGLOBULIN, SERUM OR PLASMA: 0.1 NG/ML

## 2022-09-09 PROCEDURE — 99212 OFFICE O/P EST SF 10 MIN: CPT

## 2022-09-09 RX ORDER — ESTRADIOL 0.1 MG/G
CREAM VAGINAL
Qty: 42.5 G | Refills: 0 | Status: SHIPPED | OUTPATIENT
Start: 2022-09-09

## 2022-09-09 NOTE — PROGRESS NOTES
Assisted MD with pelvic exam  Educated pt on bowel regimen and it's relationship to urinary function

## 2022-09-12 ENCOUNTER — OFFICE VISIT (OUTPATIENT)
Dept: FAMILY MEDICINE CLINIC | Facility: CLINIC | Age: 42
End: 2022-09-12
Payer: COMMERCIAL

## 2022-09-12 VITALS
OXYGEN SATURATION: 96 % | WEIGHT: 181 LBS | BODY MASS INDEX: 27 KG/M2 | DIASTOLIC BLOOD PRESSURE: 64 MMHG | TEMPERATURE: 99 F | HEART RATE: 92 BPM | SYSTOLIC BLOOD PRESSURE: 102 MMHG

## 2022-09-12 DIAGNOSIS — E88.01 ALPHA-1-ANTITRYPSIN DEFICIENCY (HCC): ICD-10-CM

## 2022-09-12 DIAGNOSIS — G89.29 ABDOMINAL PAIN, CHRONIC, LEFT UPPER QUADRANT: ICD-10-CM

## 2022-09-12 DIAGNOSIS — Z00.00 WELL FEMALE EXAM WITHOUT GYNECOLOGICAL EXAM: Primary | ICD-10-CM

## 2022-09-12 DIAGNOSIS — G44.221 CHRONIC TENSION-TYPE HEADACHE, INTRACTABLE: ICD-10-CM

## 2022-09-12 DIAGNOSIS — Z79.899 MEDICATION MANAGEMENT: ICD-10-CM

## 2022-09-12 DIAGNOSIS — K08.89 PAIN IN TOOTH: ICD-10-CM

## 2022-09-12 DIAGNOSIS — J45.50 SEVERE PERSISTENT ASTHMA WITHOUT COMPLICATION: ICD-10-CM

## 2022-09-12 DIAGNOSIS — R10.12 ABDOMINAL PAIN, CHRONIC, LEFT UPPER QUADRANT: ICD-10-CM

## 2022-09-12 DIAGNOSIS — N80.9 ENDOMETRIOSIS: ICD-10-CM

## 2022-09-12 DIAGNOSIS — Z23 NEED FOR VACCINATION: ICD-10-CM

## 2022-09-12 DIAGNOSIS — F41.1 GAD (GENERALIZED ANXIETY DISORDER): ICD-10-CM

## 2022-09-12 DIAGNOSIS — Z12.31 VISIT FOR SCREENING MAMMOGRAM: ICD-10-CM

## 2022-09-12 PROCEDURE — 99214 OFFICE O/P EST MOD 30 MIN: CPT | Performed by: FAMILY MEDICINE

## 2022-09-12 PROCEDURE — 3074F SYST BP LT 130 MM HG: CPT | Performed by: FAMILY MEDICINE

## 2022-09-12 PROCEDURE — 3078F DIAST BP <80 MM HG: CPT | Performed by: FAMILY MEDICINE

## 2022-09-12 PROCEDURE — 99396 PREV VISIT EST AGE 40-64: CPT | Performed by: FAMILY MEDICINE

## 2022-09-12 RX ORDER — BUTALBITAL/ACETAMINOPHEN 50MG-325MG
1 TABLET ORAL 2 TIMES DAILY PRN
Qty: 30 TABLET | Refills: 1 | Status: SHIPPED | OUTPATIENT
Start: 2022-09-12

## 2022-09-12 RX ORDER — DULOXETIN HYDROCHLORIDE 20 MG/1
20 CAPSULE, DELAYED RELEASE ORAL DAILY
Qty: 30 CAPSULE | Refills: 1 | Status: SHIPPED | OUTPATIENT
Start: 2022-09-12

## 2022-09-12 RX ORDER — ALPRAZOLAM 0.25 MG/1
0.25 TABLET ORAL 2 TIMES DAILY PRN
Qty: 60 TABLET | Refills: 0 | Status: SHIPPED | OUTPATIENT
Start: 2022-09-12

## 2022-09-13 ENCOUNTER — PATIENT MESSAGE (OUTPATIENT)
Dept: FAMILY MEDICINE CLINIC | Facility: CLINIC | Age: 42
End: 2022-09-13

## 2022-09-13 PROBLEM — U09.9 POST COVID-19 CONDITION, UNSPECIFIED: Status: RESOLVED | Noted: 2022-06-28 | Resolved: 2022-09-13

## 2022-09-13 PROBLEM — J45.51 SEVERE PERSISTENT ASTHMA WITH ACUTE EXACERBATION (HCC): Status: RESOLVED | Noted: 2021-05-17 | Resolved: 2022-09-13

## 2022-09-13 PROBLEM — R51.9 POST-COVID-19 SYNDROME MANIFESTING AS CHRONIC HEADACHE: Status: RESOLVED | Noted: 2022-06-28 | Resolved: 2022-09-13

## 2022-09-13 PROBLEM — R10.12 ABDOMINAL PAIN, CHRONIC, LEFT UPPER QUADRANT: Status: ACTIVE | Noted: 2022-03-03

## 2022-09-13 PROBLEM — J45.51 SEVERE PERSISTENT ASTHMA WITH ACUTE EXACERBATION: Status: RESOLVED | Noted: 2021-05-17 | Resolved: 2022-09-13

## 2022-09-13 PROBLEM — G89.29 POST-COVID-19 SYNDROME MANIFESTING AS CHRONIC HEADACHE: Status: RESOLVED | Noted: 2022-06-28 | Resolved: 2022-09-13

## 2022-09-13 PROBLEM — E88.01 LOW PLASMA ALPHA-1 ANTITRYPSIN (HCC): Status: RESOLVED | Noted: 2021-10-22 | Resolved: 2022-09-13

## 2022-09-13 PROBLEM — G89.29 ABDOMINAL PAIN, CHRONIC, LEFT UPPER QUADRANT: Status: ACTIVE | Noted: 2022-03-03

## 2022-09-13 PROBLEM — U09.9 POST-COVID-19 SYNDROME MANIFESTING AS CHRONIC HEADACHE: Status: RESOLVED | Noted: 2022-06-28 | Resolved: 2022-09-13

## 2022-09-13 PROBLEM — N80.9 ENDOMETRIOSIS: Status: ACTIVE | Noted: 2022-09-13

## 2022-09-13 PROBLEM — R10.13 EPIGASTRIC PAIN: Status: RESOLVED | Noted: 2021-12-02 | Resolved: 2022-09-13

## 2022-09-14 RX ORDER — VALACYCLOVIR HYDROCHLORIDE 1 G/1
2000 TABLET, FILM COATED ORAL EVERY 12 HOURS SCHEDULED
Qty: 30 TABLET | Refills: 0 | Status: SHIPPED | OUTPATIENT
Start: 2022-09-14 | End: 2022-09-15

## 2022-09-15 ENCOUNTER — OFFICE VISIT (OUTPATIENT)
Dept: HEMATOLOGY/ONCOLOGY | Age: 42
End: 2022-09-15
Attending: INTERNAL MEDICINE
Payer: COMMERCIAL

## 2022-09-15 VITALS
SYSTOLIC BLOOD PRESSURE: 128 MMHG | OXYGEN SATURATION: 98 % | TEMPERATURE: 97 F | DIASTOLIC BLOOD PRESSURE: 76 MMHG | RESPIRATION RATE: 20 BRPM | HEART RATE: 78 BPM

## 2022-09-15 DIAGNOSIS — E88.01 ALPHA-1-ANTITRYPSIN DEFICIENCY (HCC): Primary | ICD-10-CM

## 2022-09-15 PROCEDURE — 96365 THER/PROPH/DIAG IV INF INIT: CPT

## 2022-09-15 RX ORDER — MEPERIDINE HYDROCHLORIDE 25 MG/ML
25 INJECTION INTRAMUSCULAR; INTRAVENOUS; SUBCUTANEOUS ONCE
OUTPATIENT
Start: 2022-09-21

## 2022-09-15 RX ORDER — FAMOTIDINE 10 MG/ML
20 INJECTION, SOLUTION INTRAVENOUS ONCE
OUTPATIENT
Start: 2022-09-21

## 2022-09-15 RX ORDER — METHYLPREDNISOLONE SODIUM SUCCINATE 40 MG/ML
40 INJECTION, POWDER, LYOPHILIZED, FOR SOLUTION INTRAMUSCULAR; INTRAVENOUS ONCE
OUTPATIENT
Start: 2022-09-21

## 2022-09-15 RX ORDER — METHYLPREDNISOLONE SODIUM SUCCINATE 125 MG/2ML
125 INJECTION, POWDER, LYOPHILIZED, FOR SOLUTION INTRAMUSCULAR; INTRAVENOUS ONCE
OUTPATIENT
Start: 2022-09-21

## 2022-09-15 RX ORDER — DIPHENHYDRAMINE HYDROCHLORIDE 50 MG/ML
25 INJECTION INTRAMUSCULAR; INTRAVENOUS ONCE
OUTPATIENT
Start: 2022-09-21

## 2022-09-15 NOTE — PROGRESS NOTES
Education Record    Learner:  Patient    Disease / Diagnosis: here for prolastin infusion    Barriers / Limitations:  None    Method:  Brief focused, printed material and  reinforcement    General Topics:  Plan of care reviewed    Outcome: Patient ambulatory. No complaints. Tolerated infusion. Shows understanding.  Discharged in stable condition

## 2022-09-21 ENCOUNTER — OFFICE VISIT (OUTPATIENT)
Dept: HEMATOLOGY/ONCOLOGY | Age: 42
End: 2022-09-21
Attending: INTERNAL MEDICINE

## 2022-09-21 VITALS
BODY MASS INDEX: 26.89 KG/M2 | HEIGHT: 68.7 IN | TEMPERATURE: 98 F | HEART RATE: 72 BPM | SYSTOLIC BLOOD PRESSURE: 116 MMHG | WEIGHT: 179.5 LBS | OXYGEN SATURATION: 100 % | DIASTOLIC BLOOD PRESSURE: 74 MMHG

## 2022-09-21 DIAGNOSIS — E88.01 ALPHA-1-ANTITRYPSIN DEFICIENCY (HCC): Primary | ICD-10-CM

## 2022-09-21 PROCEDURE — 96365 THER/PROPH/DIAG IV INF INIT: CPT

## 2022-09-21 RX ORDER — METHYLPREDNISOLONE SODIUM SUCCINATE 125 MG/2ML
125 INJECTION, POWDER, LYOPHILIZED, FOR SOLUTION INTRAMUSCULAR; INTRAVENOUS ONCE
OUTPATIENT
Start: 2022-09-28

## 2022-09-21 RX ORDER — DIPHENHYDRAMINE HYDROCHLORIDE 50 MG/ML
25 INJECTION INTRAMUSCULAR; INTRAVENOUS ONCE
OUTPATIENT
Start: 2022-09-28

## 2022-09-21 RX ORDER — METHYLPREDNISOLONE SODIUM SUCCINATE 40 MG/ML
40 INJECTION, POWDER, LYOPHILIZED, FOR SOLUTION INTRAMUSCULAR; INTRAVENOUS ONCE
OUTPATIENT
Start: 2022-09-28

## 2022-09-21 RX ORDER — MEPERIDINE HYDROCHLORIDE 25 MG/ML
25 INJECTION INTRAMUSCULAR; INTRAVENOUS; SUBCUTANEOUS ONCE
OUTPATIENT
Start: 2022-09-28

## 2022-09-21 RX ORDER — FAMOTIDINE 10 MG/ML
20 INJECTION, SOLUTION INTRAVENOUS ONCE
OUTPATIENT
Start: 2022-09-28

## 2022-09-26 DIAGNOSIS — G44.221 CHRONIC TENSION-TYPE HEADACHE, INTRACTABLE: ICD-10-CM

## 2022-09-28 ENCOUNTER — PATIENT MESSAGE (OUTPATIENT)
Dept: FAMILY MEDICINE CLINIC | Facility: CLINIC | Age: 42
End: 2022-09-28

## 2022-09-28 ENCOUNTER — OFFICE VISIT (OUTPATIENT)
Dept: HEMATOLOGY/ONCOLOGY | Age: 42
End: 2022-09-28
Attending: INTERNAL MEDICINE

## 2022-09-28 ENCOUNTER — OFFICE VISIT (OUTPATIENT)
Dept: FAMILY MEDICINE CLINIC | Facility: CLINIC | Age: 42
End: 2022-09-28

## 2022-09-28 VITALS
DIASTOLIC BLOOD PRESSURE: 60 MMHG | TEMPERATURE: 98 F | HEIGHT: 68.78 IN | OXYGEN SATURATION: 99 % | RESPIRATION RATE: 22 BRPM | BODY MASS INDEX: 26.22 KG/M2 | HEART RATE: 80 BPM | SYSTOLIC BLOOD PRESSURE: 100 MMHG | WEIGHT: 177 LBS

## 2022-09-28 VITALS
HEART RATE: 69 BPM | RESPIRATION RATE: 16 BRPM | SYSTOLIC BLOOD PRESSURE: 113 MMHG | TEMPERATURE: 97 F | DIASTOLIC BLOOD PRESSURE: 65 MMHG | OXYGEN SATURATION: 98 %

## 2022-09-28 DIAGNOSIS — Z12.31 ENCOUNTER FOR SCREENING MAMMOGRAM FOR MALIGNANT NEOPLASM OF BREAST: ICD-10-CM

## 2022-09-28 DIAGNOSIS — E88.01 ALPHA-1-ANTITRYPSIN DEFICIENCY (HCC): Primary | ICD-10-CM

## 2022-09-28 DIAGNOSIS — R77.0 LOW SERUM ALBUMIN: ICD-10-CM

## 2022-09-28 DIAGNOSIS — G44.221 CHRONIC TENSION-TYPE HEADACHE, INTRACTABLE: Primary | ICD-10-CM

## 2022-09-28 DIAGNOSIS — J45.51 SEVERE PERSISTENT ASTHMA WITH ACUTE EXACERBATION: ICD-10-CM

## 2022-09-28 DIAGNOSIS — J06.9 URI, ACUTE: ICD-10-CM

## 2022-09-28 DIAGNOSIS — K08.89 TOOTH PAIN WITH CHEWING: ICD-10-CM

## 2022-09-28 PROCEDURE — 3078F DIAST BP <80 MM HG: CPT | Performed by: FAMILY MEDICINE

## 2022-09-28 PROCEDURE — 3008F BODY MASS INDEX DOCD: CPT | Performed by: FAMILY MEDICINE

## 2022-09-28 PROCEDURE — 3074F SYST BP LT 130 MM HG: CPT | Performed by: FAMILY MEDICINE

## 2022-09-28 PROCEDURE — 99215 OFFICE O/P EST HI 40 MIN: CPT | Performed by: FAMILY MEDICINE

## 2022-09-28 PROCEDURE — 96365 THER/PROPH/DIAG IV INF INIT: CPT

## 2022-09-28 RX ORDER — METHYLPREDNISOLONE SODIUM SUCCINATE 40 MG/ML
40 INJECTION, POWDER, LYOPHILIZED, FOR SOLUTION INTRAMUSCULAR; INTRAVENOUS ONCE
OUTPATIENT
Start: 2022-10-05

## 2022-09-28 RX ORDER — MEPERIDINE HYDROCHLORIDE 25 MG/ML
25 INJECTION INTRAMUSCULAR; INTRAVENOUS; SUBCUTANEOUS ONCE
OUTPATIENT
Start: 2022-10-05

## 2022-09-28 RX ORDER — METHYLPREDNISOLONE SODIUM SUCCINATE 125 MG/2ML
125 INJECTION, POWDER, LYOPHILIZED, FOR SOLUTION INTRAMUSCULAR; INTRAVENOUS ONCE
OUTPATIENT
Start: 2022-10-05

## 2022-09-28 RX ORDER — TIZANIDINE 4 MG/1
TABLET ORAL
Qty: 30 TABLET | Refills: 0 | OUTPATIENT
Start: 2022-09-28

## 2022-09-28 RX ORDER — TIZANIDINE 4 MG/1
4 TABLET ORAL NIGHTLY PRN
Qty: 30 TABLET | Refills: 2 | Status: SHIPPED | OUTPATIENT
Start: 2022-09-28

## 2022-09-28 RX ORDER — FAMOTIDINE 10 MG/ML
20 INJECTION, SOLUTION INTRAVENOUS ONCE
OUTPATIENT
Start: 2022-10-05

## 2022-09-28 RX ORDER — DIPHENHYDRAMINE HYDROCHLORIDE 50 MG/ML
25 INJECTION INTRAMUSCULAR; INTRAVENOUS ONCE
OUTPATIENT
Start: 2022-10-05

## 2022-09-28 NOTE — PROGRESS NOTES
Education Record    Learner:  Patient    Disease / Diagnosis: patient here for Prolastin infusion     Barriers / Limitations:  None    Method:  Brief focused, printed material and  reinforcement    General Topics:  Plan of care reviewed    Outcome:  Shows understanding  Patient tolerated infusion, no c/o. Discharged home in stable condition.

## 2022-09-29 NOTE — TELEPHONE ENCOUNTER
From: Veronica Schuler  To: Telma Lomeli PA-C  Sent: 9/28/2022 6:19 PM CDT  Subject: Stool    Hi Render Dears,   I forgot to tell you when I was there today. I have noticed that my stool consistently floats in the toilet. Is this something I should be concerned with?   Thanks,   Hyun Boucher

## 2022-10-03 ENCOUNTER — TELEMEDICINE (OUTPATIENT)
Dept: FAMILY MEDICINE CLINIC | Facility: CLINIC | Age: 42
End: 2022-10-03

## 2022-10-03 DIAGNOSIS — R10.12 ABDOMINAL PAIN, CHRONIC, LEFT UPPER QUADRANT: ICD-10-CM

## 2022-10-03 DIAGNOSIS — G89.29 ABDOMINAL PAIN, CHRONIC, LEFT UPPER QUADRANT: ICD-10-CM

## 2022-10-03 DIAGNOSIS — J45.51 SEVERE PERSISTENT ASTHMA WITH ACUTE EXACERBATION: ICD-10-CM

## 2022-10-03 DIAGNOSIS — R68.89 INFLUENZA-LIKE SYMPTOMS: Primary | ICD-10-CM

## 2022-10-03 DIAGNOSIS — M53.3 SACROILIAC JOINT DYSFUNCTION OF LEFT SIDE: ICD-10-CM

## 2022-10-03 DIAGNOSIS — E88.01 ALPHA-1-ANTITRYPSIN DEFICIENCY (HCC): ICD-10-CM

## 2022-10-03 DIAGNOSIS — R05.3 CHRONIC COUGH: ICD-10-CM

## 2022-10-03 PROCEDURE — 99214 OFFICE O/P EST MOD 30 MIN: CPT | Performed by: FAMILY MEDICINE

## 2022-10-03 RX ORDER — OSELTAMIVIR PHOSPHATE 75 MG/1
75 CAPSULE ORAL 2 TIMES DAILY
Qty: 10 CAPSULE | Refills: 0 | Status: SHIPPED | OUTPATIENT
Start: 2022-10-03 | End: 2022-10-08

## 2022-10-03 RX ORDER — PREGABALIN 75 MG/1
75 CAPSULE ORAL 3 TIMES DAILY
Qty: 90 CAPSULE | Refills: 5 | Status: SHIPPED | OUTPATIENT
Start: 2022-10-03

## 2022-10-03 RX ORDER — ACETAMINOPHEN AND CODEINE PHOSPHATE 300; 30 MG/1; MG/1
0.5 TABLET ORAL 2 TIMES DAILY PRN
Qty: 10 TABLET | Refills: 0 | Status: SHIPPED | OUTPATIENT
Start: 2022-10-03 | End: 2022-10-06

## 2022-10-04 ENCOUNTER — PATIENT MESSAGE (OUTPATIENT)
Dept: FAMILY MEDICINE CLINIC | Facility: CLINIC | Age: 42
End: 2022-10-04

## 2022-10-04 DIAGNOSIS — J45.51 SEVERE PERSISTENT ASTHMA WITH ACUTE EXACERBATION: ICD-10-CM

## 2022-10-04 DIAGNOSIS — R05.3 CHRONIC COUGH: ICD-10-CM

## 2022-10-05 ENCOUNTER — OFFICE VISIT (OUTPATIENT)
Dept: HEMATOLOGY/ONCOLOGY | Age: 42
End: 2022-10-05
Attending: INTERNAL MEDICINE
Payer: COMMERCIAL

## 2022-10-05 VITALS
TEMPERATURE: 99 F | RESPIRATION RATE: 18 BRPM | DIASTOLIC BLOOD PRESSURE: 79 MMHG | SYSTOLIC BLOOD PRESSURE: 127 MMHG | OXYGEN SATURATION: 98 % | HEART RATE: 94 BPM

## 2022-10-05 DIAGNOSIS — E88.01 ALPHA-1-ANTITRYPSIN DEFICIENCY (HCC): Primary | ICD-10-CM

## 2022-10-05 PROCEDURE — 96365 THER/PROPH/DIAG IV INF INIT: CPT

## 2022-10-05 RX ORDER — METHYLPREDNISOLONE SODIUM SUCCINATE 40 MG/ML
40 INJECTION, POWDER, LYOPHILIZED, FOR SOLUTION INTRAMUSCULAR; INTRAVENOUS ONCE
OUTPATIENT
Start: 2022-10-12

## 2022-10-05 RX ORDER — MEPERIDINE HYDROCHLORIDE 25 MG/ML
25 INJECTION INTRAMUSCULAR; INTRAVENOUS; SUBCUTANEOUS ONCE
OUTPATIENT
Start: 2022-10-12

## 2022-10-05 RX ORDER — METHYLPREDNISOLONE SODIUM SUCCINATE 125 MG/2ML
125 INJECTION, POWDER, LYOPHILIZED, FOR SOLUTION INTRAMUSCULAR; INTRAVENOUS ONCE
OUTPATIENT
Start: 2022-10-12

## 2022-10-05 RX ORDER — DIPHENHYDRAMINE HYDROCHLORIDE 50 MG/ML
25 INJECTION INTRAMUSCULAR; INTRAVENOUS ONCE
OUTPATIENT
Start: 2022-10-12

## 2022-10-05 RX ORDER — FAMOTIDINE 10 MG/ML
20 INJECTION, SOLUTION INTRAVENOUS ONCE
OUTPATIENT
Start: 2022-10-12

## 2022-10-05 NOTE — PROGRESS NOTES
Education Record    Learner:  Patient    Disease / Diagnosis: patient here for prolastin infusion. Barriers / Limitations:  None    Method:  Brief focused, printed material and  reinforcement    General Topics:  Plan of care reviewed    Outcome:  Shows understanding  Patient tolerated infusion, no c/o. Discharged home in stable condition.

## 2022-10-05 NOTE — TELEPHONE ENCOUNTER
From: Gaurav Marinelli  To: Thais Lowery PA-C  Sent: 10/4/2022 6:54 PM CDT  Subject: Progress    Umang Salas,   I am currently taking the Tylenol 3 1/2 in the morning and 1/2 at night due to the cough throughout the day as well as at night. However, the cough has gotten worse today to the point that Erlin Espinoza mentioned it to me. The cough is going between being dry and productive. When it is productive, the mucus is yellow. 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 I wanted to see what we can do for the cough as I am extremely worn out from coughing all day and all night.    Thank you,   Filipe Fontaine

## 2022-10-07 RX ORDER — ACETAMINOPHEN AND CODEINE PHOSPHATE 300; 30 MG/1; MG/1
1 TABLET ORAL 2 TIMES DAILY PRN
Qty: 10 TABLET | Refills: 0 | Status: SHIPPED | OUTPATIENT
Start: 2022-10-07

## 2022-10-09 DIAGNOSIS — G44.221 CHRONIC TENSION-TYPE HEADACHE, INTRACTABLE: ICD-10-CM

## 2022-10-09 DIAGNOSIS — F41.1 GAD (GENERALIZED ANXIETY DISORDER): ICD-10-CM

## 2022-10-10 RX ORDER — BUTALBITAL/ACETAMINOPHEN 50MG-325MG
TABLET ORAL
Qty: 30 TABLET | Refills: 0 | Status: SHIPPED | OUTPATIENT
Start: 2022-10-10

## 2022-10-10 RX ORDER — ALPRAZOLAM 0.25 MG/1
TABLET ORAL
Qty: 60 TABLET | Refills: 0 | Status: SHIPPED | OUTPATIENT
Start: 2022-10-10

## 2022-10-11 ENCOUNTER — PATIENT MESSAGE (OUTPATIENT)
Dept: FAMILY MEDICINE CLINIC | Facility: CLINIC | Age: 42
End: 2022-10-11

## 2022-10-12 ENCOUNTER — OFFICE VISIT (OUTPATIENT)
Dept: HEMATOLOGY/ONCOLOGY | Age: 42
End: 2022-10-12
Attending: INTERNAL MEDICINE
Payer: COMMERCIAL

## 2022-10-12 VITALS
DIASTOLIC BLOOD PRESSURE: 75 MMHG | RESPIRATION RATE: 18 BRPM | OXYGEN SATURATION: 98 % | SYSTOLIC BLOOD PRESSURE: 111 MMHG | HEART RATE: 87 BPM | TEMPERATURE: 97 F

## 2022-10-12 DIAGNOSIS — E88.01 ALPHA-1-ANTITRYPSIN DEFICIENCY (HCC): Primary | ICD-10-CM

## 2022-10-12 PROCEDURE — 96365 THER/PROPH/DIAG IV INF INIT: CPT

## 2022-10-12 RX ORDER — METHYLPREDNISOLONE SODIUM SUCCINATE 125 MG/2ML
125 INJECTION, POWDER, LYOPHILIZED, FOR SOLUTION INTRAMUSCULAR; INTRAVENOUS ONCE
OUTPATIENT
Start: 2022-10-19

## 2022-10-12 RX ORDER — FAMOTIDINE 10 MG/ML
20 INJECTION, SOLUTION INTRAVENOUS ONCE
OUTPATIENT
Start: 2022-10-19

## 2022-10-12 RX ORDER — DIPHENHYDRAMINE HYDROCHLORIDE 50 MG/ML
25 INJECTION INTRAMUSCULAR; INTRAVENOUS ONCE
OUTPATIENT
Start: 2022-10-19

## 2022-10-12 RX ORDER — METHYLPREDNISOLONE SODIUM SUCCINATE 40 MG/ML
40 INJECTION, POWDER, LYOPHILIZED, FOR SOLUTION INTRAMUSCULAR; INTRAVENOUS ONCE
OUTPATIENT
Start: 2022-10-19

## 2022-10-12 RX ORDER — MEPERIDINE HYDROCHLORIDE 25 MG/ML
25 INJECTION INTRAMUSCULAR; INTRAVENOUS; SUBCUTANEOUS ONCE
OUTPATIENT
Start: 2022-10-19

## 2022-10-13 ENCOUNTER — PATIENT MESSAGE (OUTPATIENT)
Dept: FAMILY MEDICINE CLINIC | Facility: CLINIC | Age: 42
End: 2022-10-13

## 2022-10-13 DIAGNOSIS — J45.51 SEVERE PERSISTENT ASTHMA WITH ACUTE EXACERBATION: ICD-10-CM

## 2022-10-13 DIAGNOSIS — R05.3 CHRONIC COUGH: ICD-10-CM

## 2022-10-13 RX ORDER — ACETAMINOPHEN AND CODEINE PHOSPHATE 300; 30 MG/1; MG/1
1 TABLET ORAL 2 TIMES DAILY PRN
Qty: 10 TABLET | Refills: 0 | Status: SHIPPED | OUTPATIENT
Start: 2022-10-13

## 2022-10-17 ENCOUNTER — PATIENT MESSAGE (OUTPATIENT)
Dept: FAMILY MEDICINE CLINIC | Facility: CLINIC | Age: 42
End: 2022-10-17

## 2022-10-19 ENCOUNTER — OFFICE VISIT (OUTPATIENT)
Dept: HEMATOLOGY/ONCOLOGY | Age: 42
End: 2022-10-19
Attending: INTERNAL MEDICINE
Payer: COMMERCIAL

## 2022-10-19 VITALS
SYSTOLIC BLOOD PRESSURE: 131 MMHG | HEART RATE: 87 BPM | RESPIRATION RATE: 18 BRPM | OXYGEN SATURATION: 99 % | TEMPERATURE: 98 F | DIASTOLIC BLOOD PRESSURE: 79 MMHG

## 2022-10-19 DIAGNOSIS — E88.01 ALPHA-1-ANTITRYPSIN DEFICIENCY (HCC): Primary | ICD-10-CM

## 2022-10-19 PROCEDURE — 96365 THER/PROPH/DIAG IV INF INIT: CPT

## 2022-10-19 RX ORDER — FAMOTIDINE 10 MG/ML
20 INJECTION, SOLUTION INTRAVENOUS ONCE
OUTPATIENT
Start: 2022-10-26

## 2022-10-19 RX ORDER — MEPERIDINE HYDROCHLORIDE 25 MG/ML
25 INJECTION INTRAMUSCULAR; INTRAVENOUS; SUBCUTANEOUS ONCE
OUTPATIENT
Start: 2022-10-26

## 2022-10-19 RX ORDER — METHYLPREDNISOLONE SODIUM SUCCINATE 40 MG/ML
40 INJECTION, POWDER, LYOPHILIZED, FOR SOLUTION INTRAMUSCULAR; INTRAVENOUS ONCE
OUTPATIENT
Start: 2022-10-26

## 2022-10-19 RX ORDER — METHYLPREDNISOLONE SODIUM SUCCINATE 125 MG/2ML
125 INJECTION, POWDER, LYOPHILIZED, FOR SOLUTION INTRAMUSCULAR; INTRAVENOUS ONCE
OUTPATIENT
Start: 2022-10-26

## 2022-10-19 RX ORDER — DIPHENHYDRAMINE HYDROCHLORIDE 50 MG/ML
25 INJECTION INTRAMUSCULAR; INTRAVENOUS ONCE
OUTPATIENT
Start: 2022-10-26

## 2022-10-19 NOTE — PROGRESS NOTES
Education Record    Learner:  Patient    Disease / Diagnosis: patient here for prolastin infusion     Barriers / Limitations:  None    Method:  Brief focused, printed material and  reinforcement    General Topics:  Plan of care reviewed    Outcome:  Shows understanding  Patient tolerated infusion, discharged home in stable condition.

## 2022-10-24 ENCOUNTER — OFFICE VISIT (OUTPATIENT)
Dept: FAMILY MEDICINE CLINIC | Facility: CLINIC | Age: 42
End: 2022-10-24
Payer: COMMERCIAL

## 2022-10-24 VITALS
BODY MASS INDEX: 25.95 KG/M2 | WEIGHT: 173.19 LBS | RESPIRATION RATE: 18 BRPM | SYSTOLIC BLOOD PRESSURE: 118 MMHG | HEIGHT: 68.5 IN | OXYGEN SATURATION: 99 % | DIASTOLIC BLOOD PRESSURE: 64 MMHG | TEMPERATURE: 98 F | HEART RATE: 112 BPM

## 2022-10-24 DIAGNOSIS — E88.01 ALPHA-1-ANTITRYPSIN DEFICIENCY (HCC): ICD-10-CM

## 2022-10-24 DIAGNOSIS — G44.221 CHRONIC TENSION-TYPE HEADACHE, INTRACTABLE: ICD-10-CM

## 2022-10-24 DIAGNOSIS — J45.51 SEVERE PERSISTENT ASTHMA WITH ACUTE EXACERBATION: ICD-10-CM

## 2022-10-24 DIAGNOSIS — J01.21 RECURRENT ETHMOIDAL SINUSITIS: Primary | ICD-10-CM

## 2022-10-24 DIAGNOSIS — R05.8 RECURRENT COUGH: ICD-10-CM

## 2022-10-24 PROCEDURE — 3074F SYST BP LT 130 MM HG: CPT | Performed by: FAMILY MEDICINE

## 2022-10-24 PROCEDURE — 3008F BODY MASS INDEX DOCD: CPT | Performed by: FAMILY MEDICINE

## 2022-10-24 PROCEDURE — 99214 OFFICE O/P EST MOD 30 MIN: CPT | Performed by: FAMILY MEDICINE

## 2022-10-24 PROCEDURE — 3078F DIAST BP <80 MM HG: CPT | Performed by: FAMILY MEDICINE

## 2022-10-24 RX ORDER — AMOXICILLIN AND CLAVULANATE POTASSIUM 875; 125 MG/1; MG/1
1 TABLET, FILM COATED ORAL 2 TIMES DAILY
Qty: 20 TABLET | Refills: 0 | Status: SHIPPED | OUTPATIENT
Start: 2022-10-24 | End: 2022-11-03

## 2022-10-24 RX ORDER — ACETAMINOPHEN AND CODEINE PHOSPHATE 300; 30 MG/1; MG/1
1 TABLET ORAL 2 TIMES DAILY PRN
Qty: 20 TABLET | Refills: 0 | Status: SHIPPED | OUTPATIENT
Start: 2022-10-24

## 2022-10-24 RX ORDER — BUTALBITAL/ACETAMINOPHEN 50MG-325MG
1 TABLET ORAL 2 TIMES DAILY PRN
Qty: 30 TABLET | Refills: 0 | Status: SHIPPED | OUTPATIENT
Start: 2022-10-24

## 2022-10-25 PROBLEM — R05.8 RECURRENT COUGH: Status: ACTIVE | Noted: 2019-03-06

## 2022-10-26 ENCOUNTER — OFFICE VISIT (OUTPATIENT)
Dept: HEMATOLOGY/ONCOLOGY | Age: 42
End: 2022-10-26
Attending: INTERNAL MEDICINE
Payer: COMMERCIAL

## 2022-10-26 VITALS
DIASTOLIC BLOOD PRESSURE: 71 MMHG | HEART RATE: 67 BPM | SYSTOLIC BLOOD PRESSURE: 108 MMHG | TEMPERATURE: 98 F | OXYGEN SATURATION: 99 % | RESPIRATION RATE: 18 BRPM

## 2022-10-26 DIAGNOSIS — E88.01 ALPHA-1-ANTITRYPSIN DEFICIENCY (HCC): Primary | ICD-10-CM

## 2022-10-26 PROCEDURE — 96365 THER/PROPH/DIAG IV INF INIT: CPT

## 2022-10-26 RX ORDER — DIPHENHYDRAMINE HYDROCHLORIDE 50 MG/ML
25 INJECTION INTRAMUSCULAR; INTRAVENOUS ONCE
OUTPATIENT
Start: 2022-11-02

## 2022-10-26 RX ORDER — METHYLPREDNISOLONE SODIUM SUCCINATE 125 MG/2ML
125 INJECTION, POWDER, LYOPHILIZED, FOR SOLUTION INTRAMUSCULAR; INTRAVENOUS ONCE
OUTPATIENT
Start: 2022-11-02

## 2022-10-26 RX ORDER — FAMOTIDINE 10 MG/ML
20 INJECTION, SOLUTION INTRAVENOUS ONCE
OUTPATIENT
Start: 2022-11-02

## 2022-10-26 RX ORDER — MEPERIDINE HYDROCHLORIDE 25 MG/ML
25 INJECTION INTRAMUSCULAR; INTRAVENOUS; SUBCUTANEOUS ONCE
OUTPATIENT
Start: 2022-11-02

## 2022-10-26 RX ORDER — METHYLPREDNISOLONE SODIUM SUCCINATE 40 MG/ML
40 INJECTION, POWDER, LYOPHILIZED, FOR SOLUTION INTRAMUSCULAR; INTRAVENOUS ONCE
OUTPATIENT
Start: 2022-11-02

## 2022-10-26 NOTE — PROGRESS NOTES
Education Record    Learner:  Patient    Disease / Diagnosis: here for prolastin infusion    Barriers / Limitations:  None    Method:  Brief focused, printed material and  reinforcement    General Topics:  Plan of care reviewed    Outcome: patient ambulatory. No complaints. tolerated infusion. Discharged in stable condition.  Shows understanding

## 2022-10-27 NOTE — TELEPHONE ENCOUNTER
Is she willing to try the tablets? Please inform patient that his test results are normal including his sodium level. No medication adjustments necessary including thyroid.

## 2022-11-02 ENCOUNTER — OFFICE VISIT (OUTPATIENT)
Dept: HEMATOLOGY/ONCOLOGY | Age: 42
End: 2022-11-02
Attending: INTERNAL MEDICINE
Payer: COMMERCIAL

## 2022-11-02 ENCOUNTER — PATIENT MESSAGE (OUTPATIENT)
Dept: FAMILY MEDICINE CLINIC | Facility: CLINIC | Age: 42
End: 2022-11-02

## 2022-11-02 VITALS
DIASTOLIC BLOOD PRESSURE: 70 MMHG | HEART RATE: 88 BPM | OXYGEN SATURATION: 99 % | SYSTOLIC BLOOD PRESSURE: 106 MMHG | RESPIRATION RATE: 16 BRPM | TEMPERATURE: 98 F

## 2022-11-02 DIAGNOSIS — E88.01 ALPHA-1-ANTITRYPSIN DEFICIENCY (HCC): Primary | ICD-10-CM

## 2022-11-02 PROCEDURE — 96365 THER/PROPH/DIAG IV INF INIT: CPT

## 2022-11-02 RX ORDER — MEPERIDINE HYDROCHLORIDE 25 MG/ML
25 INJECTION INTRAMUSCULAR; INTRAVENOUS; SUBCUTANEOUS ONCE
OUTPATIENT
Start: 2022-11-09

## 2022-11-02 RX ORDER — DIPHENHYDRAMINE HYDROCHLORIDE 50 MG/ML
25 INJECTION INTRAMUSCULAR; INTRAVENOUS ONCE
OUTPATIENT
Start: 2022-11-09

## 2022-11-02 RX ORDER — METHYLPREDNISOLONE SODIUM SUCCINATE 125 MG/2ML
125 INJECTION, POWDER, LYOPHILIZED, FOR SOLUTION INTRAMUSCULAR; INTRAVENOUS ONCE
OUTPATIENT
Start: 2022-11-09

## 2022-11-02 RX ORDER — FAMOTIDINE 10 MG/ML
20 INJECTION, SOLUTION INTRAVENOUS ONCE
OUTPATIENT
Start: 2022-11-09

## 2022-11-02 RX ORDER — METHYLPREDNISOLONE SODIUM SUCCINATE 40 MG/ML
40 INJECTION, POWDER, LYOPHILIZED, FOR SOLUTION INTRAMUSCULAR; INTRAVENOUS ONCE
OUTPATIENT
Start: 2022-11-09

## 2022-11-02 NOTE — PROGRESS NOTES
Education Record    Learner:  Patient    Disease / Diagnosis:  Pt here for proteinase     Barriers / Limitations:  None    Method:  Brief focused, printed material and  reinforcement    General Topics:  Plan of care reviewed    Outcome:tolerating treatment with no c/o

## 2022-11-03 NOTE — TELEPHONE ENCOUNTER
From: Franklin Hamman  To: Stephan Sutton PA-C  Sent: 11/2/2022 11:37 AM CDT  Subject: RSV    Hi Janice Singh,   Well, my youngest son was diagnosed with RSV today. I wanted to see if there is anything that I should watch for with me as I have the breathing issues with the small airway disease and alpha one. I am not sure how RSV would present in me as an adult.      Thank you,   Aleksandar Hodges

## 2022-11-04 ENCOUNTER — PATIENT MESSAGE (OUTPATIENT)
Dept: FAMILY MEDICINE CLINIC | Facility: CLINIC | Age: 42
End: 2022-11-04

## 2022-11-05 DIAGNOSIS — F41.1 GAD (GENERALIZED ANXIETY DISORDER): ICD-10-CM

## 2022-11-07 RX ORDER — ALPRAZOLAM 0.25 MG/1
TABLET ORAL
Qty: 60 TABLET | Refills: 0 | Status: SHIPPED | OUTPATIENT
Start: 2022-11-07

## 2022-11-08 DIAGNOSIS — G44.221 CHRONIC TENSION-TYPE HEADACHE, INTRACTABLE: ICD-10-CM

## 2022-11-08 DIAGNOSIS — F41.1 GAD (GENERALIZED ANXIETY DISORDER): ICD-10-CM

## 2022-11-09 ENCOUNTER — APPOINTMENT (OUTPATIENT)
Dept: HEMATOLOGY/ONCOLOGY | Age: 42
End: 2022-11-09
Attending: INTERNAL MEDICINE
Payer: COMMERCIAL

## 2022-11-10 ENCOUNTER — OFFICE VISIT (OUTPATIENT)
Dept: HEMATOLOGY/ONCOLOGY | Age: 42
End: 2022-11-10
Attending: INTERNAL MEDICINE
Payer: COMMERCIAL

## 2022-11-10 VITALS
SYSTOLIC BLOOD PRESSURE: 125 MMHG | HEART RATE: 72 BPM | OXYGEN SATURATION: 100 % | RESPIRATION RATE: 16 BRPM | DIASTOLIC BLOOD PRESSURE: 74 MMHG | TEMPERATURE: 99 F

## 2022-11-10 DIAGNOSIS — E88.01 ALPHA-1-ANTITRYPSIN DEFICIENCY (HCC): Primary | ICD-10-CM

## 2022-11-10 PROCEDURE — 96365 THER/PROPH/DIAG IV INF INIT: CPT

## 2022-11-10 RX ORDER — METHYLPREDNISOLONE SODIUM SUCCINATE 125 MG/2ML
125 INJECTION, POWDER, LYOPHILIZED, FOR SOLUTION INTRAMUSCULAR; INTRAVENOUS ONCE
OUTPATIENT
Start: 2022-11-16

## 2022-11-10 RX ORDER — DULOXETIN HYDROCHLORIDE 20 MG/1
CAPSULE, DELAYED RELEASE ORAL
Qty: 30 CAPSULE | Refills: 1 | Status: SHIPPED | OUTPATIENT
Start: 2022-11-10

## 2022-11-10 RX ORDER — MEPERIDINE HYDROCHLORIDE 25 MG/ML
25 INJECTION INTRAMUSCULAR; INTRAVENOUS; SUBCUTANEOUS ONCE
OUTPATIENT
Start: 2022-11-16

## 2022-11-10 RX ORDER — FAMOTIDINE 10 MG/ML
20 INJECTION, SOLUTION INTRAVENOUS ONCE
OUTPATIENT
Start: 2022-11-16

## 2022-11-10 RX ORDER — METHYLPREDNISOLONE SODIUM SUCCINATE 40 MG/ML
40 INJECTION, POWDER, LYOPHILIZED, FOR SOLUTION INTRAMUSCULAR; INTRAVENOUS ONCE
OUTPATIENT
Start: 2022-11-16

## 2022-11-10 RX ORDER — DIPHENHYDRAMINE HYDROCHLORIDE 50 MG/ML
25 INJECTION INTRAMUSCULAR; INTRAVENOUS ONCE
OUTPATIENT
Start: 2022-11-16

## 2022-11-10 NOTE — PROGRESS NOTES
Education Record    Learner:  Patient    Disease / Diagnosis: Pt presented for infusion. Barriers / Limitations:  None    Method:  Brief focused, printed material and  reinforcement    General Topics:  Plan of care reviewed    Outcome:  Shows understanding. Pt tolerated infusion without complication. Pt discharged home in stable condition.

## 2022-11-14 ENCOUNTER — OFFICE VISIT (OUTPATIENT)
Dept: FAMILY MEDICINE CLINIC | Facility: CLINIC | Age: 42
End: 2022-11-14
Payer: COMMERCIAL

## 2022-11-14 ENCOUNTER — PATIENT MESSAGE (OUTPATIENT)
Dept: FAMILY MEDICINE CLINIC | Facility: CLINIC | Age: 42
End: 2022-11-14

## 2022-11-14 VITALS
HEIGHT: 68 IN | DIASTOLIC BLOOD PRESSURE: 66 MMHG | SYSTOLIC BLOOD PRESSURE: 108 MMHG | HEART RATE: 88 BPM | WEIGHT: 171 LBS | TEMPERATURE: 98 F | RESPIRATION RATE: 18 BRPM | OXYGEN SATURATION: 98 % | BODY MASS INDEX: 25.91 KG/M2

## 2022-11-14 DIAGNOSIS — M54.6 DISCOGENIC THORACIC PAIN: ICD-10-CM

## 2022-11-14 DIAGNOSIS — G89.29 CHRONIC MIDLINE THORACIC BACK PAIN: Primary | ICD-10-CM

## 2022-11-14 DIAGNOSIS — F41.1 GAD (GENERALIZED ANXIETY DISORDER): ICD-10-CM

## 2022-11-14 DIAGNOSIS — R63.4 WEIGHT LOSS: ICD-10-CM

## 2022-11-14 DIAGNOSIS — J32.0 CHRONIC MAXILLARY SINUSITIS: Primary | ICD-10-CM

## 2022-11-14 DIAGNOSIS — M54.6 CHRONIC MIDLINE THORACIC BACK PAIN: Primary | ICD-10-CM

## 2022-11-14 DIAGNOSIS — R10.12 LEFT UPPER QUADRANT PAIN: ICD-10-CM

## 2022-11-14 PROCEDURE — 3008F BODY MASS INDEX DOCD: CPT | Performed by: FAMILY MEDICINE

## 2022-11-14 PROCEDURE — 3078F DIAST BP <80 MM HG: CPT | Performed by: FAMILY MEDICINE

## 2022-11-14 PROCEDURE — 99215 OFFICE O/P EST HI 40 MIN: CPT | Performed by: FAMILY MEDICINE

## 2022-11-14 PROCEDURE — 3074F SYST BP LT 130 MM HG: CPT | Performed by: FAMILY MEDICINE

## 2022-11-15 ENCOUNTER — LAB ENCOUNTER (OUTPATIENT)
Dept: LAB | Age: 42
End: 2022-11-15
Attending: FAMILY MEDICINE
Payer: COMMERCIAL

## 2022-11-15 DIAGNOSIS — R77.0 LOW SERUM ALBUMIN: ICD-10-CM

## 2022-11-15 LAB
ALBUMIN SERPL-MCNC: 3.5 G/DL (ref 3.4–5)
ALBUMIN/GLOB SERPL: 1.4 {RATIO} (ref 1–2)
ALP LIVER SERPL-CCNC: 70 U/L
ALT SERPL-CCNC: 21 U/L
ANION GAP SERPL CALC-SCNC: 2 MMOL/L (ref 0–18)
AST SERPL-CCNC: 10 U/L (ref 15–37)
BILIRUB SERPL-MCNC: 0.4 MG/DL (ref 0.1–2)
BUN BLD-MCNC: 8 MG/DL (ref 7–18)
CALCIUM BLD-MCNC: 8.7 MG/DL (ref 8.5–10.1)
CHLORIDE SERPL-SCNC: 109 MMOL/L (ref 98–112)
CO2 SERPL-SCNC: 29 MMOL/L (ref 21–32)
CREAT BLD-MCNC: 0.73 MG/DL
FASTING STATUS PATIENT QL REPORTED: NO
GFR SERPLBLD BASED ON 1.73 SQ M-ARVRAT: 105 ML/MIN/1.73M2 (ref 60–?)
GLOBULIN PLAS-MCNC: 2.5 G/DL (ref 2.8–4.4)
GLUCOSE BLD-MCNC: 144 MG/DL (ref 70–99)
OSMOLALITY SERPL CALC.SUM OF ELEC: 291 MOSM/KG (ref 275–295)
POTASSIUM SERPL-SCNC: 3.9 MMOL/L (ref 3.5–5.1)
PROT SERPL-MCNC: 6 G/DL (ref 6.4–8.2)
SODIUM SERPL-SCNC: 140 MMOL/L (ref 136–145)

## 2022-11-15 PROCEDURE — 80053 COMPREHEN METABOLIC PANEL: CPT

## 2022-11-15 PROCEDURE — 36415 COLL VENOUS BLD VENIPUNCTURE: CPT

## 2022-11-15 NOTE — TELEPHONE ENCOUNTER
From: Yahaira Tillman  To: Rainer Sweet PA-C  Sent: 11/14/2022 5:58 PM CST  Subject: MRI    Hi Trina Garnica,   I just talked to central scheduling and they no longer have an open MRI machine.      Brooke Carney

## 2022-11-16 ENCOUNTER — OFFICE VISIT (OUTPATIENT)
Dept: HEMATOLOGY/ONCOLOGY | Age: 42
End: 2022-11-16
Attending: INTERNAL MEDICINE
Payer: COMMERCIAL

## 2022-11-16 VITALS
RESPIRATION RATE: 18 BRPM | SYSTOLIC BLOOD PRESSURE: 120 MMHG | OXYGEN SATURATION: 99 % | HEART RATE: 79 BPM | TEMPERATURE: 98 F | DIASTOLIC BLOOD PRESSURE: 75 MMHG

## 2022-11-16 DIAGNOSIS — E88.01 ALPHA-1-ANTITRYPSIN DEFICIENCY (HCC): Primary | ICD-10-CM

## 2022-11-16 PROCEDURE — 96365 THER/PROPH/DIAG IV INF INIT: CPT

## 2022-11-16 RX ORDER — MEPERIDINE HYDROCHLORIDE 25 MG/ML
25 INJECTION INTRAMUSCULAR; INTRAVENOUS; SUBCUTANEOUS ONCE
Status: CANCELLED | OUTPATIENT
Start: 2022-11-23

## 2022-11-16 RX ORDER — METHYLPREDNISOLONE SODIUM SUCCINATE 125 MG/2ML
125 INJECTION, POWDER, LYOPHILIZED, FOR SOLUTION INTRAMUSCULAR; INTRAVENOUS ONCE
Status: CANCELLED | OUTPATIENT
Start: 2022-11-23

## 2022-11-16 RX ORDER — DIPHENHYDRAMINE HYDROCHLORIDE 50 MG/ML
25 INJECTION INTRAMUSCULAR; INTRAVENOUS ONCE
Status: CANCELLED | OUTPATIENT
Start: 2022-11-23

## 2022-11-16 RX ORDER — FAMOTIDINE 10 MG/ML
20 INJECTION, SOLUTION INTRAVENOUS ONCE
Status: CANCELLED | OUTPATIENT
Start: 2022-11-23

## 2022-11-16 RX ORDER — METHYLPREDNISOLONE SODIUM SUCCINATE 40 MG/ML
40 INJECTION, POWDER, LYOPHILIZED, FOR SOLUTION INTRAMUSCULAR; INTRAVENOUS ONCE
Status: CANCELLED | OUTPATIENT
Start: 2022-11-23

## 2022-11-16 NOTE — PROGRESS NOTES
Education Record    Learner:  Patient    Disease / Diagnosis: here for prolastin infusion    Barriers / Limitations:  None    Method:  Brief focused, printed material and  reinforcement    General Topics:  Plan of care reviewed    Outcome:  Patient ambulatory. No complaints. Tolerated infusion. Shows understanding. States she will get her next appt from [place].

## 2022-11-16 NOTE — PROGRESS NOTES
Total protein is down again which explains the fatigue, weakness and mood. You need to take a protein supplement daily try Ensure Plus. Also talk to bariatric surgeon and Madai about managing the pain and nutritional aspect. Will forward labs to both providers. Dietician at bariatric center may have some ideas also to help get protein up.     Forward to GI Madai and Dr Verito Granados bariatric surgeon

## 2022-11-17 ENCOUNTER — TELEPHONE (OUTPATIENT)
Dept: HEMATOLOGY/ONCOLOGY | Facility: HOSPITAL | Age: 42
End: 2022-11-17

## 2022-11-17 NOTE — TELEPHONE ENCOUNTER
Received fax from Dr. Sidney Severance office with referral information. Last Prolastin order was received in November 2021. Requesting order be faxed to RN station today so patient can be scheduled for the remainder of appointments.

## 2022-11-18 ENCOUNTER — PATIENT MESSAGE (OUTPATIENT)
Dept: FAMILY MEDICINE CLINIC | Facility: CLINIC | Age: 42
End: 2022-11-18

## 2022-11-18 ENCOUNTER — HOSPITAL ENCOUNTER (OUTPATIENT)
Dept: GENERAL RADIOLOGY | Age: 42
Discharge: HOME | End: 2022-11-18
Attending: FAMILY MEDICINE
Payer: COMMERCIAL

## 2022-11-18 ENCOUNTER — DOCUMENTATION ONLY (OUTPATIENT)
Dept: HEMATOLOGY/ONCOLOGY | Facility: HOSPITAL | Age: 42
End: 2022-11-18

## 2022-11-18 DIAGNOSIS — G89.29 CHRONIC MIDLINE THORACIC BACK PAIN: ICD-10-CM

## 2022-11-18 DIAGNOSIS — M54.6 CHRONIC MIDLINE THORACIC BACK PAIN: ICD-10-CM

## 2022-11-18 PROCEDURE — 72072 X-RAY EXAM THORAC SPINE 3VWS: CPT | Performed by: FAMILY MEDICINE

## 2022-11-19 NOTE — PROGRESS NOTES
X-ray does show the degenerative changes but no significant disc height narrowing which would indicates unlikely chance that you have a significant disc bulge.

## 2022-11-20 ENCOUNTER — PATIENT MESSAGE (OUTPATIENT)
Dept: FAMILY MEDICINE CLINIC | Facility: CLINIC | Age: 42
End: 2022-11-20

## 2022-11-20 DIAGNOSIS — G44.221 CHRONIC TENSION-TYPE HEADACHE, INTRACTABLE: ICD-10-CM

## 2022-11-22 RX ORDER — BUTALBITAL/ACETAMINOPHEN 50MG-325MG
1 TABLET ORAL 2 TIMES DAILY PRN
Qty: 12 TABLET | Refills: 0 | Status: SHIPPED | OUTPATIENT
Start: 2022-11-22

## 2022-11-23 ENCOUNTER — OFFICE VISIT (OUTPATIENT)
Dept: HEMATOLOGY/ONCOLOGY | Age: 42
End: 2022-11-23
Attending: INTERNAL MEDICINE
Payer: COMMERCIAL

## 2022-11-23 VITALS
DIASTOLIC BLOOD PRESSURE: 67 MMHG | RESPIRATION RATE: 16 BRPM | TEMPERATURE: 98 F | SYSTOLIC BLOOD PRESSURE: 125 MMHG | OXYGEN SATURATION: 98 % | HEART RATE: 92 BPM

## 2022-11-23 DIAGNOSIS — E88.01 ALPHA-1-ANTITRYPSIN DEFICIENCY (HCC): Primary | ICD-10-CM

## 2022-11-23 PROCEDURE — 96365 THER/PROPH/DIAG IV INF INIT: CPT

## 2022-11-23 RX ORDER — METHYLPREDNISOLONE SODIUM SUCCINATE 125 MG/2ML
125 INJECTION, POWDER, LYOPHILIZED, FOR SOLUTION INTRAMUSCULAR; INTRAVENOUS ONCE
OUTPATIENT
Start: 2022-11-30

## 2022-11-23 RX ORDER — FAMOTIDINE 10 MG/ML
20 INJECTION, SOLUTION INTRAVENOUS ONCE
OUTPATIENT
Start: 2022-11-30

## 2022-11-23 RX ORDER — METHYLPREDNISOLONE SODIUM SUCCINATE 40 MG/ML
40 INJECTION, POWDER, LYOPHILIZED, FOR SOLUTION INTRAMUSCULAR; INTRAVENOUS ONCE
OUTPATIENT
Start: 2022-11-30

## 2022-11-23 RX ORDER — DIPHENHYDRAMINE HYDROCHLORIDE 50 MG/ML
25 INJECTION INTRAMUSCULAR; INTRAVENOUS ONCE
OUTPATIENT
Start: 2022-11-30

## 2022-11-23 RX ORDER — MEPERIDINE HYDROCHLORIDE 25 MG/ML
25 INJECTION INTRAMUSCULAR; INTRAVENOUS; SUBCUTANEOUS ONCE
OUTPATIENT
Start: 2022-11-30

## 2022-11-30 ENCOUNTER — APPOINTMENT (OUTPATIENT)
Dept: HEMATOLOGY/ONCOLOGY | Age: 42
End: 2022-11-30
Attending: INTERNAL MEDICINE
Payer: COMMERCIAL

## 2022-12-01 ENCOUNTER — OFFICE VISIT (OUTPATIENT)
Dept: HEMATOLOGY/ONCOLOGY | Age: 42
End: 2022-12-01
Attending: INTERNAL MEDICINE
Payer: COMMERCIAL

## 2022-12-01 VITALS
SYSTOLIC BLOOD PRESSURE: 137 MMHG | TEMPERATURE: 98 F | DIASTOLIC BLOOD PRESSURE: 79 MMHG | HEART RATE: 98 BPM | OXYGEN SATURATION: 100 % | RESPIRATION RATE: 20 BRPM

## 2022-12-01 DIAGNOSIS — E88.01 ALPHA-1-ANTITRYPSIN DEFICIENCY (HCC): Primary | ICD-10-CM

## 2022-12-01 PROCEDURE — 96365 THER/PROPH/DIAG IV INF INIT: CPT

## 2022-12-01 RX ORDER — FAMOTIDINE 10 MG/ML
20 INJECTION, SOLUTION INTRAVENOUS ONCE
OUTPATIENT
Start: 2022-12-07

## 2022-12-01 RX ORDER — METHYLPREDNISOLONE SODIUM SUCCINATE 125 MG/2ML
125 INJECTION, POWDER, LYOPHILIZED, FOR SOLUTION INTRAMUSCULAR; INTRAVENOUS ONCE
OUTPATIENT
Start: 2022-12-07

## 2022-12-01 RX ORDER — METHYLPREDNISOLONE SODIUM SUCCINATE 40 MG/ML
40 INJECTION, POWDER, LYOPHILIZED, FOR SOLUTION INTRAMUSCULAR; INTRAVENOUS ONCE
OUTPATIENT
Start: 2022-12-07

## 2022-12-01 RX ORDER — DIPHENHYDRAMINE HYDROCHLORIDE 50 MG/ML
25 INJECTION INTRAMUSCULAR; INTRAVENOUS ONCE
OUTPATIENT
Start: 2022-12-07

## 2022-12-01 RX ORDER — MEPERIDINE HYDROCHLORIDE 25 MG/ML
25 INJECTION INTRAMUSCULAR; INTRAVENOUS; SUBCUTANEOUS ONCE
OUTPATIENT
Start: 2022-12-07

## 2022-12-01 NOTE — PROGRESS NOTES
Education Record    Learner:  Patient    Disease / Diagnosis: here for prolastin infusion    Barriers / Limitations:  None    Method:  Brief focused, printed material and  reinforcement    General Topics:  Plan of care reviewed    Outcome: patient ambulatory. States she feels short of breath. Started 2 days ago but got worse today. Refused to go to ER. Tolerated infusion. Discharged in stable condition.  Shows understanding

## 2022-12-04 DIAGNOSIS — F41.1 GAD (GENERALIZED ANXIETY DISORDER): ICD-10-CM

## 2022-12-05 ENCOUNTER — PATIENT MESSAGE (OUTPATIENT)
Dept: FAMILY MEDICINE CLINIC | Facility: CLINIC | Age: 42
End: 2022-12-05

## 2022-12-05 DIAGNOSIS — R10.12 ABDOMINAL PAIN, CHRONIC, LEFT UPPER QUADRANT: Primary | ICD-10-CM

## 2022-12-05 DIAGNOSIS — G89.29 ABDOMINAL PAIN, CHRONIC, LEFT UPPER QUADRANT: Primary | ICD-10-CM

## 2022-12-06 RX ORDER — ALPRAZOLAM 0.25 MG/1
TABLET ORAL
Qty: 60 TABLET | Refills: 0 | Status: SHIPPED | OUTPATIENT
Start: 2022-12-06

## 2022-12-07 ENCOUNTER — LAB ENCOUNTER (OUTPATIENT)
Dept: LAB | Age: 42
End: 2022-12-07
Attending: FAMILY MEDICINE
Payer: COMMERCIAL

## 2022-12-07 ENCOUNTER — OFFICE VISIT (OUTPATIENT)
Dept: HEMATOLOGY/ONCOLOGY | Age: 42
End: 2022-12-07
Attending: INTERNAL MEDICINE
Payer: COMMERCIAL

## 2022-12-07 VITALS
OXYGEN SATURATION: 100 % | RESPIRATION RATE: 20 BRPM | DIASTOLIC BLOOD PRESSURE: 82 MMHG | HEART RATE: 80 BPM | SYSTOLIC BLOOD PRESSURE: 114 MMHG | TEMPERATURE: 96 F

## 2022-12-07 DIAGNOSIS — E88.01 ALPHA-1-ANTITRYPSIN DEFICIENCY (HCC): Primary | ICD-10-CM

## 2022-12-07 DIAGNOSIS — R10.12 ABDOMINAL PAIN, CHRONIC, LEFT UPPER QUADRANT: ICD-10-CM

## 2022-12-07 DIAGNOSIS — G44.221 CHRONIC TENSION-TYPE HEADACHE, INTRACTABLE: ICD-10-CM

## 2022-12-07 DIAGNOSIS — G89.29 ABDOMINAL PAIN, CHRONIC, LEFT UPPER QUADRANT: ICD-10-CM

## 2022-12-07 LAB — LIPASE SERPL-CCNC: 197 U/L (ref 73–393)

## 2022-12-07 PROCEDURE — 36415 COLL VENOUS BLD VENIPUNCTURE: CPT

## 2022-12-07 PROCEDURE — 96365 THER/PROPH/DIAG IV INF INIT: CPT

## 2022-12-07 PROCEDURE — 83690 ASSAY OF LIPASE: CPT

## 2022-12-07 RX ORDER — METHYLPREDNISOLONE SODIUM SUCCINATE 125 MG/2ML
125 INJECTION, POWDER, LYOPHILIZED, FOR SOLUTION INTRAMUSCULAR; INTRAVENOUS ONCE
OUTPATIENT
Start: 2022-12-14

## 2022-12-07 RX ORDER — FAMOTIDINE 10 MG/ML
20 INJECTION, SOLUTION INTRAVENOUS ONCE
OUTPATIENT
Start: 2022-12-14

## 2022-12-07 RX ORDER — DIPHENHYDRAMINE HYDROCHLORIDE 50 MG/ML
25 INJECTION INTRAMUSCULAR; INTRAVENOUS ONCE
OUTPATIENT
Start: 2022-12-14

## 2022-12-07 RX ORDER — METHYLPREDNISOLONE SODIUM SUCCINATE 40 MG/ML
40 INJECTION, POWDER, LYOPHILIZED, FOR SOLUTION INTRAMUSCULAR; INTRAVENOUS ONCE
OUTPATIENT
Start: 2022-12-14

## 2022-12-07 RX ORDER — MEPERIDINE HYDROCHLORIDE 25 MG/ML
25 INJECTION INTRAMUSCULAR; INTRAVENOUS; SUBCUTANEOUS ONCE
OUTPATIENT
Start: 2022-12-14

## 2022-12-07 NOTE — PROGRESS NOTES
Education Record    Learner:  Patient    Disease / Diagnosis:    Barriers / Limitations:  None    Method:  Brief focused, printed material and  reinforcement    General Topics:  Plan of care reviewed    Outcome:  Shows understanding. Patient tolerated infusion without incident. In-stable condition for discharge home.

## 2022-12-08 ENCOUNTER — PATIENT MESSAGE (OUTPATIENT)
Dept: FAMILY MEDICINE CLINIC | Facility: CLINIC | Age: 42
End: 2022-12-08

## 2022-12-08 RX ORDER — TIZANIDINE 4 MG/1
TABLET ORAL
Qty: 30 TABLET | Refills: 2 | Status: SHIPPED | OUTPATIENT
Start: 2022-12-08

## 2022-12-08 NOTE — TELEPHONE ENCOUNTER
From: Yahaira Tillman  To: Rainer Sweet PA-C  Sent: 12/8/2022 6:06 AM CST  Subject: Question regarding LIPASE    I was able to make an appointment with the GI doctor at [facility], but not until  December 29th. I also have an appointment with the surgeon again, but not until December 27th. So still quite awhile away.    Brooke Carney

## 2022-12-12 ENCOUNTER — OFFICE VISIT (OUTPATIENT)
Dept: FAMILY MEDICINE CLINIC | Facility: CLINIC | Age: 42
End: 2022-12-12
Payer: COMMERCIAL

## 2022-12-12 VITALS
RESPIRATION RATE: 18 BRPM | DIASTOLIC BLOOD PRESSURE: 64 MMHG | OXYGEN SATURATION: 97 % | SYSTOLIC BLOOD PRESSURE: 122 MMHG | HEIGHT: 68.5 IN | TEMPERATURE: 98 F | HEART RATE: 86 BPM | BODY MASS INDEX: 25.89 KG/M2 | WEIGHT: 172.81 LBS

## 2022-12-12 DIAGNOSIS — R10.12 ABDOMINAL PAIN, CHRONIC, LEFT UPPER QUADRANT: ICD-10-CM

## 2022-12-12 DIAGNOSIS — F41.1 GAD (GENERALIZED ANXIETY DISORDER): ICD-10-CM

## 2022-12-12 DIAGNOSIS — E88.01 ALPHA-1-ANTITRYPSIN DEFICIENCY (HCC): ICD-10-CM

## 2022-12-12 DIAGNOSIS — R05.8 RECURRENT COUGH: ICD-10-CM

## 2022-12-12 DIAGNOSIS — G44.221 CHRONIC TENSION-TYPE HEADACHE, INTRACTABLE: Primary | ICD-10-CM

## 2022-12-12 DIAGNOSIS — E44.1 MILD PROTEIN-CALORIE MALNUTRITION (HCC): ICD-10-CM

## 2022-12-12 DIAGNOSIS — F13.20 SEDATIVE, HYPNOTIC OR ANXIOLYTIC DEPENDENCE, UNCOMPLICATED (HCC): ICD-10-CM

## 2022-12-12 DIAGNOSIS — J45.50 SEVERE PERSISTENT ASTHMA WITHOUT COMPLICATION: ICD-10-CM

## 2022-12-12 DIAGNOSIS — G89.29 ABDOMINAL PAIN, CHRONIC, LEFT UPPER QUADRANT: ICD-10-CM

## 2022-12-12 PROCEDURE — 99215 OFFICE O/P EST HI 40 MIN: CPT | Performed by: FAMILY MEDICINE

## 2022-12-12 PROCEDURE — 3074F SYST BP LT 130 MM HG: CPT | Performed by: FAMILY MEDICINE

## 2022-12-12 PROCEDURE — 3078F DIAST BP <80 MM HG: CPT | Performed by: FAMILY MEDICINE

## 2022-12-12 PROCEDURE — 3008F BODY MASS INDEX DOCD: CPT | Performed by: FAMILY MEDICINE

## 2022-12-12 RX ORDER — ALBUTEROL SULFATE 90 UG/1
2 AEROSOL, METERED RESPIRATORY (INHALATION) EVERY 4 HOURS PRN
Qty: 8.5 G | Refills: 1 | Status: SHIPPED | OUTPATIENT
Start: 2022-12-12

## 2022-12-12 RX ORDER — TRAZODONE HYDROCHLORIDE 50 MG/1
TABLET ORAL
COMMUNITY
Start: 2022-11-21

## 2022-12-12 RX ORDER — DICYCLOMINE HYDROCHLORIDE 10 MG/1
10 CAPSULE ORAL 4 TIMES DAILY
COMMUNITY
Start: 2022-12-12

## 2022-12-12 RX ORDER — BUTALBITAL/ACETAMINOPHEN 50MG-325MG
1 TABLET ORAL 2 TIMES DAILY PRN
Qty: 12 TABLET | Refills: 2 | Status: SHIPPED | OUTPATIENT
Start: 2022-12-12

## 2022-12-12 RX ORDER — BUSPIRONE HYDROCHLORIDE 5 MG/1
TABLET ORAL 3 TIMES DAILY PRN
Qty: 90 TABLET | Refills: 2 | Status: SHIPPED | OUTPATIENT
Start: 2022-12-12

## 2022-12-13 PROBLEM — K08.89 PAIN IN TOOTH: Status: RESOLVED | Noted: 2019-05-31 | Resolved: 2022-12-13

## 2022-12-13 PROBLEM — T39.95XA ANALGESIC REBOUND HEADACHE: Status: RESOLVED | Noted: 2021-11-25 | Resolved: 2022-12-13

## 2022-12-13 PROBLEM — G44.40 ANALGESIC REBOUND HEADACHE: Status: RESOLVED | Noted: 2021-11-25 | Resolved: 2022-12-13

## 2022-12-13 PROBLEM — G44.52 NEW PERSISTENT DAILY HEADACHE: Status: RESOLVED | Noted: 2022-07-21 | Resolved: 2022-12-13

## 2022-12-13 PROBLEM — J21.9 BRONCHIOLITIS: Status: RESOLVED | Noted: 2022-08-11 | Resolved: 2022-12-13

## 2022-12-14 ENCOUNTER — OFFICE VISIT (OUTPATIENT)
Dept: HEMATOLOGY/ONCOLOGY | Age: 42
End: 2022-12-14
Attending: INTERNAL MEDICINE
Payer: COMMERCIAL

## 2022-12-14 ENCOUNTER — APPOINTMENT (OUTPATIENT)
Dept: HEMATOLOGY/ONCOLOGY | Age: 42
End: 2022-12-14
Attending: INTERNAL MEDICINE
Payer: COMMERCIAL

## 2022-12-14 VITALS
OXYGEN SATURATION: 100 % | DIASTOLIC BLOOD PRESSURE: 70 MMHG | HEART RATE: 80 BPM | SYSTOLIC BLOOD PRESSURE: 108 MMHG | TEMPERATURE: 98 F | RESPIRATION RATE: 20 BRPM

## 2022-12-14 DIAGNOSIS — E88.01 ALPHA-1-ANTITRYPSIN DEFICIENCY (HCC): Primary | ICD-10-CM

## 2022-12-14 PROCEDURE — 96365 THER/PROPH/DIAG IV INF INIT: CPT

## 2022-12-14 RX ORDER — METHYLPREDNISOLONE SODIUM SUCCINATE 40 MG/ML
40 INJECTION, POWDER, LYOPHILIZED, FOR SOLUTION INTRAMUSCULAR; INTRAVENOUS ONCE
OUTPATIENT
Start: 2022-12-21

## 2022-12-14 RX ORDER — METHYLPREDNISOLONE SODIUM SUCCINATE 125 MG/2ML
125 INJECTION, POWDER, LYOPHILIZED, FOR SOLUTION INTRAMUSCULAR; INTRAVENOUS ONCE
OUTPATIENT
Start: 2022-12-21

## 2022-12-14 RX ORDER — FAMOTIDINE 10 MG/ML
20 INJECTION, SOLUTION INTRAVENOUS ONCE
OUTPATIENT
Start: 2022-12-21

## 2022-12-14 RX ORDER — DIPHENHYDRAMINE HYDROCHLORIDE 50 MG/ML
25 INJECTION INTRAMUSCULAR; INTRAVENOUS ONCE
OUTPATIENT
Start: 2022-12-21

## 2022-12-14 RX ORDER — MEPERIDINE HYDROCHLORIDE 25 MG/ML
25 INJECTION INTRAMUSCULAR; INTRAVENOUS; SUBCUTANEOUS ONCE
OUTPATIENT
Start: 2022-12-21

## 2022-12-14 NOTE — PROGRESS NOTES
Education Record    Learner:  Patient    Disease / Diagnosis: here for prolastin    Barriers / Limitations:  None    Method:  Brief focused, printed material and  reinforcement    General Topics:  Plan of care reviewed    Outcome: patient ambulatory. No complaints. Tolerated infusion.  Discharged in stable condition Shows understanding

## 2022-12-15 ENCOUNTER — APPOINTMENT (OUTPATIENT)
Dept: HEMATOLOGY/ONCOLOGY | Age: 42
End: 2022-12-15
Attending: INTERNAL MEDICINE
Payer: COMMERCIAL

## 2022-12-15 ENCOUNTER — TELEPHONE (OUTPATIENT)
Dept: FAMILY MEDICINE CLINIC | Facility: CLINIC | Age: 42
End: 2022-12-15

## 2022-12-15 NOTE — TELEPHONE ENCOUNTER
Patient calling has terrible cough her pulmonologist prescribed a steroid  Wants to make sure Aurora Sandra is aware of this before the weekend     Please seen my chart message from 12/14

## 2022-12-16 ENCOUNTER — PATIENT MESSAGE (OUTPATIENT)
Dept: FAMILY MEDICINE CLINIC | Facility: CLINIC | Age: 42
End: 2022-12-16

## 2022-12-16 RX ORDER — ACETAMINOPHEN AND CODEINE PHOSPHATE 300; 30 MG/1; MG/1
1 TABLET ORAL 2 TIMES DAILY PRN
Qty: 12 TABLET | Refills: 0 | Status: SHIPPED | OUTPATIENT
Start: 2022-12-16

## 2022-12-16 NOTE — TELEPHONE ENCOUNTER
From: Stephan Sutton PA-C  To: Franklin Hamman  Sent: 12/16/2022 7:09 AM CST  Subject: ok on steroid    I was unable to see what type of or how much steroid, but if he prescribed then of course proceed on what taking it. Make sure you are taking some food with it even if you are getting pain keep taking antacids and PPI. Feel better.       Sincerely,   Stephan Sutton PA-C

## 2022-12-20 RX ORDER — TRAZODONE HYDROCHLORIDE 50 MG/1
TABLET ORAL
Qty: 120 TABLET | Refills: 0 | Status: SHIPPED | OUTPATIENT
Start: 2022-12-20

## 2022-12-21 ENCOUNTER — OFFICE VISIT (OUTPATIENT)
Dept: HEMATOLOGY/ONCOLOGY | Age: 42
End: 2022-12-21
Attending: INTERNAL MEDICINE
Payer: COMMERCIAL

## 2022-12-21 VITALS
OXYGEN SATURATION: 97 % | HEART RATE: 78 BPM | SYSTOLIC BLOOD PRESSURE: 124 MMHG | RESPIRATION RATE: 18 BRPM | DIASTOLIC BLOOD PRESSURE: 82 MMHG | TEMPERATURE: 99 F

## 2022-12-21 DIAGNOSIS — E88.01 ALPHA-1-ANTITRYPSIN DEFICIENCY (HCC): Primary | ICD-10-CM

## 2022-12-21 PROCEDURE — 96365 THER/PROPH/DIAG IV INF INIT: CPT

## 2022-12-21 RX ORDER — DIPHENHYDRAMINE HYDROCHLORIDE 50 MG/ML
25 INJECTION INTRAMUSCULAR; INTRAVENOUS ONCE
OUTPATIENT
Start: 2022-12-28

## 2022-12-21 RX ORDER — METHYLPREDNISOLONE SODIUM SUCCINATE 125 MG/2ML
125 INJECTION, POWDER, LYOPHILIZED, FOR SOLUTION INTRAMUSCULAR; INTRAVENOUS ONCE
OUTPATIENT
Start: 2022-12-28

## 2022-12-21 RX ORDER — FAMOTIDINE 10 MG/ML
20 INJECTION, SOLUTION INTRAVENOUS ONCE
OUTPATIENT
Start: 2022-12-28

## 2022-12-21 RX ORDER — METHYLPREDNISOLONE SODIUM SUCCINATE 40 MG/ML
40 INJECTION, POWDER, LYOPHILIZED, FOR SOLUTION INTRAMUSCULAR; INTRAVENOUS ONCE
OUTPATIENT
Start: 2022-12-28

## 2022-12-21 RX ORDER — MEPERIDINE HYDROCHLORIDE 25 MG/ML
25 INJECTION INTRAMUSCULAR; INTRAVENOUS; SUBCUTANEOUS ONCE
OUTPATIENT
Start: 2022-12-28

## 2022-12-22 ENCOUNTER — PATIENT MESSAGE (OUTPATIENT)
Dept: FAMILY MEDICINE CLINIC | Facility: CLINIC | Age: 42
End: 2022-12-22

## 2022-12-23 ENCOUNTER — PATIENT MESSAGE (OUTPATIENT)
Dept: FAMILY MEDICINE CLINIC | Facility: CLINIC | Age: 42
End: 2022-12-23

## 2022-12-23 RX ORDER — ACETAMINOPHEN AND CODEINE PHOSPHATE 300; 30 MG/1; MG/1
1 TABLET ORAL 2 TIMES DAILY PRN
Qty: 12 TABLET | Refills: 0 | Status: SHIPPED | OUTPATIENT
Start: 2022-12-23

## 2022-12-27 ENCOUNTER — OFFICE VISIT (OUTPATIENT)
Dept: SURGERY | Facility: CLINIC | Age: 42
End: 2022-12-27
Payer: COMMERCIAL

## 2022-12-27 VITALS
WEIGHT: 177.63 LBS | DIASTOLIC BLOOD PRESSURE: 74 MMHG | HEIGHT: 68 IN | BODY MASS INDEX: 26.92 KG/M2 | OXYGEN SATURATION: 97 % | SYSTOLIC BLOOD PRESSURE: 134 MMHG | HEART RATE: 85 BPM

## 2022-12-28 ENCOUNTER — OFFICE VISIT (OUTPATIENT)
Dept: HEMATOLOGY/ONCOLOGY | Age: 42
End: 2022-12-28
Attending: INTERNAL MEDICINE
Payer: COMMERCIAL

## 2022-12-28 ENCOUNTER — OFFICE VISIT (OUTPATIENT)
Dept: RHEUMATOLOGY | Facility: CLINIC | Age: 42
End: 2022-12-28
Payer: COMMERCIAL

## 2022-12-28 ENCOUNTER — OFFICE VISIT (OUTPATIENT)
Dept: FAMILY MEDICINE CLINIC | Facility: CLINIC | Age: 42
End: 2022-12-28
Payer: COMMERCIAL

## 2022-12-28 VITALS
WEIGHT: 174 LBS | BODY MASS INDEX: 26.37 KG/M2 | OXYGEN SATURATION: 98 % | RESPIRATION RATE: 18 BRPM | HEART RATE: 94 BPM | HEIGHT: 68 IN | TEMPERATURE: 99 F | DIASTOLIC BLOOD PRESSURE: 76 MMHG | SYSTOLIC BLOOD PRESSURE: 124 MMHG

## 2022-12-28 VITALS
BODY MASS INDEX: 26.82 KG/M2 | DIASTOLIC BLOOD PRESSURE: 60 MMHG | RESPIRATION RATE: 16 BRPM | WEIGHT: 179 LBS | OXYGEN SATURATION: 99 % | TEMPERATURE: 98 F | SYSTOLIC BLOOD PRESSURE: 114 MMHG | HEIGHT: 68.5 IN | HEART RATE: 100 BPM

## 2022-12-28 DIAGNOSIS — M25.50 POLYARTHRALGIA: ICD-10-CM

## 2022-12-28 DIAGNOSIS — R76.8 ANA POSITIVE: ICD-10-CM

## 2022-12-28 DIAGNOSIS — J42: ICD-10-CM

## 2022-12-28 DIAGNOSIS — J98.4 LUNG DISEASE: ICD-10-CM

## 2022-12-28 DIAGNOSIS — E88.01 ALPHA-1-ANTITRYPSIN DEFICIENCY (HCC): ICD-10-CM

## 2022-12-28 DIAGNOSIS — E88.01 ALPHA-1-ANTITRYPSIN DEFICIENCY (HCC): Primary | ICD-10-CM

## 2022-12-28 DIAGNOSIS — R53.82 CHRONIC FATIGUE: ICD-10-CM

## 2022-12-28 DIAGNOSIS — R05.3 PERSISTENT COUGH: Primary | ICD-10-CM

## 2022-12-28 PROCEDURE — 3074F SYST BP LT 130 MM HG: CPT | Performed by: INTERNAL MEDICINE

## 2022-12-28 PROCEDURE — 3008F BODY MASS INDEX DOCD: CPT | Performed by: FAMILY MEDICINE

## 2022-12-28 PROCEDURE — 96365 THER/PROPH/DIAG IV INF INIT: CPT

## 2022-12-28 PROCEDURE — 3078F DIAST BP <80 MM HG: CPT | Performed by: INTERNAL MEDICINE

## 2022-12-28 PROCEDURE — 3074F SYST BP LT 130 MM HG: CPT | Performed by: FAMILY MEDICINE

## 2022-12-28 PROCEDURE — 99245 OFF/OP CONSLTJ NEW/EST HI 55: CPT | Performed by: INTERNAL MEDICINE

## 2022-12-28 PROCEDURE — 99214 OFFICE O/P EST MOD 30 MIN: CPT | Performed by: FAMILY MEDICINE

## 2022-12-28 PROCEDURE — 3008F BODY MASS INDEX DOCD: CPT | Performed by: INTERNAL MEDICINE

## 2022-12-28 PROCEDURE — 3078F DIAST BP <80 MM HG: CPT | Performed by: FAMILY MEDICINE

## 2022-12-28 RX ORDER — FAMOTIDINE 10 MG/ML
20 INJECTION, SOLUTION INTRAVENOUS ONCE
OUTPATIENT
Start: 2023-01-04

## 2022-12-28 RX ORDER — MEPERIDINE HYDROCHLORIDE 25 MG/ML
25 INJECTION INTRAMUSCULAR; INTRAVENOUS; SUBCUTANEOUS ONCE
OUTPATIENT
Start: 2023-01-04

## 2022-12-28 RX ORDER — DIPHENHYDRAMINE HYDROCHLORIDE 50 MG/ML
25 INJECTION INTRAMUSCULAR; INTRAVENOUS ONCE
OUTPATIENT
Start: 2023-01-04

## 2022-12-28 RX ORDER — METHYLPREDNISOLONE SODIUM SUCCINATE 125 MG/2ML
125 INJECTION, POWDER, LYOPHILIZED, FOR SOLUTION INTRAMUSCULAR; INTRAVENOUS ONCE
OUTPATIENT
Start: 2023-01-04

## 2022-12-28 RX ORDER — PYRAZINAMIDE 500 MG/1
TABLET ORAL 2 TIMES DAILY PRN
Qty: 10 TABLET | Refills: 0 | Status: SHIPPED | OUTPATIENT
Start: 2022-12-28

## 2022-12-28 RX ORDER — AMOXICILLIN AND CLAVULANATE POTASSIUM 875; 125 MG/1; MG/1
1 TABLET, FILM COATED ORAL 2 TIMES DAILY
Qty: 20 TABLET | Refills: 0 | Status: SHIPPED | OUTPATIENT
Start: 2022-12-28 | End: 2023-01-07

## 2022-12-28 RX ORDER — METHYLPREDNISOLONE SODIUM SUCCINATE 40 MG/ML
40 INJECTION, POWDER, LYOPHILIZED, FOR SOLUTION INTRAMUSCULAR; INTRAVENOUS ONCE
OUTPATIENT
Start: 2023-01-04

## 2022-12-28 NOTE — PROGRESS NOTES
Education Record    Learner:  Patient    Disease / Diagnosis:pt here for proteinase    Barriers / Limitations:  None    Method:  Brief focused, printed material and  reinforcement    General Topics:  Plan of care reviewed    Outcome: pt tolerated infusion with no c/o

## 2022-12-29 ENCOUNTER — TELEPHONE (OUTPATIENT)
Dept: RHEUMATOLOGY | Facility: CLINIC | Age: 42
End: 2022-12-29

## 2022-12-29 NOTE — TELEPHONE ENCOUNTER
Phoned [facility] to see if lab orders could be collected at pt infusion on 1/4 due to pt being a difficult stick. Lab orders faxed to [facility] per [employer].

## 2023-01-03 ENCOUNTER — LAB ENCOUNTER (OUTPATIENT)
Dept: LAB | Age: 43
End: 2023-01-03
Attending: INTERNAL MEDICINE
Payer: COMMERCIAL

## 2023-01-03 DIAGNOSIS — R76.8 ANA POSITIVE: ICD-10-CM

## 2023-01-03 DIAGNOSIS — M25.50 POLYARTHRALGIA: ICD-10-CM

## 2023-01-03 DIAGNOSIS — R19.7 DIARRHEA OF PRESUMED INFECTIOUS ORIGIN: ICD-10-CM

## 2023-01-03 DIAGNOSIS — R63.4 LOSS OF WEIGHT: Primary | ICD-10-CM

## 2023-01-03 DIAGNOSIS — F41.1 GAD (GENERALIZED ANXIETY DISORDER): ICD-10-CM

## 2023-01-03 DIAGNOSIS — R53.82 CHRONIC FATIGUE: ICD-10-CM

## 2023-01-03 DIAGNOSIS — Z98.84 BARIATRIC SURGERY STATUS: ICD-10-CM

## 2023-01-03 DIAGNOSIS — J98.4 LUNG DISEASE: ICD-10-CM

## 2023-01-03 DIAGNOSIS — E77.8: ICD-10-CM

## 2023-01-03 LAB
ALBUMIN SERPL-MCNC: 3.5 G/DL (ref 3.4–5)
ALBUMIN/GLOB SERPL: 1.1 {RATIO} (ref 1–2)
ALP LIVER SERPL-CCNC: 82 U/L
ALT SERPL-CCNC: 25 U/L
ANION GAP SERPL CALC-SCNC: 2 MMOL/L (ref 0–18)
AST SERPL-CCNC: 14 U/L (ref 15–37)
BILIRUB SERPL-MCNC: 0.4 MG/DL (ref 0.1–2)
BUN BLD-MCNC: 9 MG/DL (ref 7–18)
C3 SERPL-MCNC: 110 MG/DL (ref 90–180)
C4 SERPL-MCNC: 26.6 MG/DL (ref 10–40)
CALCIUM BLD-MCNC: 8.8 MG/DL (ref 8.5–10.1)
CHLORIDE SERPL-SCNC: 107 MMOL/L (ref 98–112)
CO2 SERPL-SCNC: 29 MMOL/L (ref 21–32)
CREAT BLD-MCNC: 0.74 MG/DL
CRP SERPL-MCNC: <0.29 MG/DL (ref ?–0.3)
ERYTHROCYTE [SEDIMENTATION RATE] IN BLOOD: 9 MM/HR
FASTING STATUS PATIENT QL REPORTED: NO
FOLATE SERPL-MCNC: 42.3 NG/ML (ref 8.7–?)
GFR SERPLBLD BASED ON 1.73 SQ M-ARVRAT: 104 ML/MIN/1.73M2 (ref 60–?)
GLOBULIN PLAS-MCNC: 3.1 G/DL (ref 2.8–4.4)
GLUCOSE BLD-MCNC: 106 MG/DL (ref 70–99)
IGA SERPL-MCNC: 237 MG/DL (ref 70–312)
IGM SERPL-MCNC: 44.7 MG/DL (ref 43–279)
IMMUNOGLOBULIN PNL SER-MCNC: 675 MG/DL (ref 791–1643)
MAGNESIUM SERPL-MCNC: 1.9 MG/DL (ref 1.6–2.6)
OSMOLALITY SERPL CALC.SUM OF ELEC: 285 MOSM/KG (ref 275–295)
POTASSIUM SERPL-SCNC: 3.6 MMOL/L (ref 3.5–5.1)
PREALB SERPL-MCNC: 20.6 MG/DL (ref 20–40)
PROT SERPL-MCNC: 6.6 G/DL (ref 6.4–8.2)
SODIUM SERPL-SCNC: 138 MMOL/L (ref 136–145)
VIT B12 SERPL-MCNC: 1200 PG/ML (ref 193–986)

## 2023-01-03 PROCEDURE — 82784 ASSAY IGA/IGD/IGG/IGM EACH: CPT | Performed by: INTERNAL MEDICINE

## 2023-01-03 PROCEDURE — 85652 RBC SED RATE AUTOMATED: CPT | Performed by: INTERNAL MEDICINE

## 2023-01-03 PROCEDURE — 83516 IMMUNOASSAY NONANTIBODY: CPT | Performed by: INTERNAL MEDICINE

## 2023-01-03 PROCEDURE — 82787 IGG 1 2 3 OR 4 EACH: CPT | Performed by: INTERNAL MEDICINE

## 2023-01-03 PROCEDURE — 86140 C-REACTIVE PROTEIN: CPT | Performed by: INTERNAL MEDICINE

## 2023-01-03 PROCEDURE — 86160 COMPLEMENT ANTIGEN: CPT | Performed by: INTERNAL MEDICINE

## 2023-01-03 PROCEDURE — 86038 ANTINUCLEAR ANTIBODIES: CPT | Performed by: INTERNAL MEDICINE

## 2023-01-03 PROCEDURE — 86235 NUCLEAR ANTIGEN ANTIBODY: CPT | Performed by: INTERNAL MEDICINE

## 2023-01-04 ENCOUNTER — OFFICE VISIT (OUTPATIENT)
Dept: HEMATOLOGY/ONCOLOGY | Age: 43
End: 2023-01-04
Attending: INTERNAL MEDICINE
Payer: COMMERCIAL

## 2023-01-04 VITALS
SYSTOLIC BLOOD PRESSURE: 96 MMHG | DIASTOLIC BLOOD PRESSURE: 68 MMHG | HEART RATE: 91 BPM | RESPIRATION RATE: 18 BRPM | OXYGEN SATURATION: 100 % | TEMPERATURE: 98 F

## 2023-01-04 DIAGNOSIS — E88.01 ALPHA-1-ANTITRYPSIN DEFICIENCY (HCC): Primary | ICD-10-CM

## 2023-01-04 LAB — VIT D+METAB SERPL-MCNC: 66 NG/ML (ref 30–100)

## 2023-01-04 PROCEDURE — 96365 THER/PROPH/DIAG IV INF INIT: CPT

## 2023-01-04 RX ORDER — METHYLPREDNISOLONE SODIUM SUCCINATE 125 MG/2ML
125 INJECTION, POWDER, LYOPHILIZED, FOR SOLUTION INTRAMUSCULAR; INTRAVENOUS ONCE
OUTPATIENT
Start: 2023-01-11

## 2023-01-04 RX ORDER — DIPHENHYDRAMINE HYDROCHLORIDE 50 MG/ML
25 INJECTION INTRAMUSCULAR; INTRAVENOUS ONCE
OUTPATIENT
Start: 2023-01-11

## 2023-01-04 RX ORDER — METHYLPREDNISOLONE SODIUM SUCCINATE 40 MG/ML
40 INJECTION, POWDER, LYOPHILIZED, FOR SOLUTION INTRAMUSCULAR; INTRAVENOUS ONCE
OUTPATIENT
Start: 2023-01-11

## 2023-01-04 RX ORDER — FAMOTIDINE 10 MG/ML
20 INJECTION, SOLUTION INTRAVENOUS ONCE
OUTPATIENT
Start: 2023-01-11

## 2023-01-04 RX ORDER — MEPERIDINE HYDROCHLORIDE 25 MG/ML
25 INJECTION INTRAMUSCULAR; INTRAVENOUS; SUBCUTANEOUS ONCE
OUTPATIENT
Start: 2023-01-11

## 2023-01-05 LAB — NUCLEAR IGG TITR SER IF: NEGATIVE {TITER}

## 2023-01-05 RX ORDER — ALPRAZOLAM 0.25 MG/1
TABLET ORAL
Qty: 60 TABLET | Refills: 0 | OUTPATIENT
Start: 2023-01-05

## 2023-01-06 LAB
ENA RNP IGG SER IA-ACNC: 0.5 U/ML
IMMUNOGLOBULIN G SUBCLASS 1: 357 MG/DL
IMMUNOGLOBULIN G SUBCLASS 2: 189 MG/DL
IMMUNOGLOBULIN G SUBCLASS 3: 68 MG/DL
IMMUNOGLOBULIN G SUBCLASS 4: 30 MG/DL
SELENIUM, SERUM: 131.8 UG/L
U1 SNRNP IGG SER IA-ACNC: 0.5 U/ML

## 2023-01-07 LAB
ALPHA-TOCOPHEROL (VIT E) -MG/L: 10.4 MG/L
COPPER, RBC: 75.1 MCG/DL
GAMMA-TOCOPHEROL (VIT E) -MG/L: 0.5 MG/L
INTERPRETATION VIT A, SER/PLA: NORMAL
RETINYL PALMITATE: 0.03 MG/L
VITAMIN A (RETINOL): 0.51 MG/L

## 2023-01-11 ENCOUNTER — OFFICE VISIT (OUTPATIENT)
Dept: HEMATOLOGY/ONCOLOGY | Age: 43
End: 2023-01-11
Attending: INTERNAL MEDICINE
Payer: COMMERCIAL

## 2023-01-11 VITALS
TEMPERATURE: 98 F | HEART RATE: 83 BPM | DIASTOLIC BLOOD PRESSURE: 76 MMHG | OXYGEN SATURATION: 100 % | RESPIRATION RATE: 16 BRPM | SYSTOLIC BLOOD PRESSURE: 124 MMHG

## 2023-01-11 DIAGNOSIS — E88.01 ALPHA-1-ANTITRYPSIN DEFICIENCY (HCC): Primary | ICD-10-CM

## 2023-01-11 PROCEDURE — 96365 THER/PROPH/DIAG IV INF INIT: CPT

## 2023-01-11 RX ORDER — FAMOTIDINE 10 MG/ML
20 INJECTION, SOLUTION INTRAVENOUS ONCE
OUTPATIENT
Start: 2023-01-18

## 2023-01-11 RX ORDER — METHYLPREDNISOLONE SODIUM SUCCINATE 125 MG/2ML
125 INJECTION, POWDER, LYOPHILIZED, FOR SOLUTION INTRAMUSCULAR; INTRAVENOUS ONCE
OUTPATIENT
Start: 2023-01-18

## 2023-01-11 RX ORDER — DIPHENHYDRAMINE HYDROCHLORIDE 50 MG/ML
25 INJECTION INTRAMUSCULAR; INTRAVENOUS ONCE
OUTPATIENT
Start: 2023-01-18

## 2023-01-11 RX ORDER — METHYLPREDNISOLONE SODIUM SUCCINATE 40 MG/ML
40 INJECTION, POWDER, LYOPHILIZED, FOR SOLUTION INTRAMUSCULAR; INTRAVENOUS ONCE
OUTPATIENT
Start: 2023-01-18

## 2023-01-11 RX ORDER — MEPERIDINE HYDROCHLORIDE 25 MG/ML
25 INJECTION INTRAMUSCULAR; INTRAVENOUS; SUBCUTANEOUS ONCE
OUTPATIENT
Start: 2023-01-18

## 2023-01-11 NOTE — PROGRESS NOTES
Education Record    Learner:  Patient    Disease / Diagnosis: Pt presented for Prolastin infusion    Barriers / Limitations:  None    Method:  Brief focused, printed material and  reinforcement    General Topics:  Plan of care reviewed    Outcome:  Shows understanding. Pt tolerated infusion without complication. Pt discharged home in stable condition.

## 2023-01-12 LAB
EJ (GLYCYL - TRNA SYNTHETASE) ANTIBODY: NEGATIVE
FIBRILLARIN (U3 RNP) AB, IGG: NEGATIVE
JO-1 (HISTIDY1-TRNA SYNTHETASE) AB, IGG: 1 AU/ML
KU ANTIBODY: NEGATIVE
MDA5 (CADM-140) ANTIBODY: NEGATIVE
MI-2 (NUCLEAR HELICASE PROTEIN) ANTIBODY: NEGATIVE
NXP-2 (NUCLEAR MATRIX PROTEIN-2) AB: NEGATIVE
OJ (ISOLEUCYL-TRNA SYNTHETASE) ANTIBODY: NEGATIVE
P155/140 (TIF-1 GAMMA) ANTIBODY: NEGATIVE
PL-12 (ALANYL-TRNA SYNTHETASE) ANTIBODY: NEGATIVE
PL-7 (THREONYL-TRNA SYNTHETASE) ANTIBODY: NEGATIVE
PM_SCL 100 ANTIBODY, IGG: NEGATIVE
RIBONUCLEIC PROTEIN (U1) (ENA) AB, IGG: 2 UNITS
SAE1 (SUMO ACTIVATING ENZYME) ANTIBODY: NEGATIVE
SRP (SINGLE RECOGNITION PARTICLE) AB: NEGATIVE
SSA 52 (RO) (ENA) ANTIBODY, IGG: 4 AU/ML
SSA 60 (RO) (ENA) ANTIBODY, IGG: 1 AU/ML
TIF1-GAMMA ANTIBODY: NEGATIVE

## 2023-01-16 ENCOUNTER — OFFICE VISIT (OUTPATIENT)
Dept: RHEUMATOLOGY | Facility: CLINIC | Age: 43
End: 2023-01-16
Payer: COMMERCIAL

## 2023-01-16 VITALS
RESPIRATION RATE: 16 BRPM | BODY MASS INDEX: 26.67 KG/M2 | HEART RATE: 90 BPM | HEIGHT: 68.5 IN | TEMPERATURE: 98 F | OXYGEN SATURATION: 100 % | WEIGHT: 178 LBS | SYSTOLIC BLOOD PRESSURE: 106 MMHG | DIASTOLIC BLOOD PRESSURE: 60 MMHG

## 2023-01-16 DIAGNOSIS — M25.50 POLYARTHRALGIA: ICD-10-CM

## 2023-01-16 DIAGNOSIS — R53.82 CHRONIC FATIGUE: ICD-10-CM

## 2023-01-16 DIAGNOSIS — E88.01 ALPHA-1-ANTITRYPSIN DEFICIENCY (HCC): Primary | ICD-10-CM

## 2023-01-16 DIAGNOSIS — J98.4 LUNG DISEASE: ICD-10-CM

## 2023-01-16 DIAGNOSIS — R76.8 ANA POSITIVE: ICD-10-CM

## 2023-01-16 PROCEDURE — 3078F DIAST BP <80 MM HG: CPT | Performed by: INTERNAL MEDICINE

## 2023-01-16 PROCEDURE — 3008F BODY MASS INDEX DOCD: CPT | Performed by: INTERNAL MEDICINE

## 2023-01-16 PROCEDURE — 99214 OFFICE O/P EST MOD 30 MIN: CPT | Performed by: INTERNAL MEDICINE

## 2023-01-16 PROCEDURE — 3074F SYST BP LT 130 MM HG: CPT | Performed by: INTERNAL MEDICINE

## 2023-01-18 ENCOUNTER — OFFICE VISIT (OUTPATIENT)
Dept: HEMATOLOGY/ONCOLOGY | Age: 43
End: 2023-01-18
Attending: INTERNAL MEDICINE
Payer: COMMERCIAL

## 2023-01-18 VITALS
SYSTOLIC BLOOD PRESSURE: 115 MMHG | OXYGEN SATURATION: 99 % | DIASTOLIC BLOOD PRESSURE: 80 MMHG | TEMPERATURE: 99 F | HEART RATE: 96 BPM | RESPIRATION RATE: 16 BRPM

## 2023-01-18 DIAGNOSIS — E88.01 ALPHA-1-ANTITRYPSIN DEFICIENCY (HCC): Primary | ICD-10-CM

## 2023-01-18 PROCEDURE — 96365 THER/PROPH/DIAG IV INF INIT: CPT

## 2023-01-18 RX ORDER — FAMOTIDINE 10 MG/ML
20 INJECTION, SOLUTION INTRAVENOUS ONCE
OUTPATIENT
Start: 2023-01-25

## 2023-01-18 RX ORDER — METHYLPREDNISOLONE SODIUM SUCCINATE 125 MG/2ML
125 INJECTION, POWDER, LYOPHILIZED, FOR SOLUTION INTRAMUSCULAR; INTRAVENOUS ONCE
OUTPATIENT
Start: 2023-01-25

## 2023-01-18 RX ORDER — METHYLPREDNISOLONE SODIUM SUCCINATE 40 MG/ML
40 INJECTION, POWDER, LYOPHILIZED, FOR SOLUTION INTRAMUSCULAR; INTRAVENOUS ONCE
OUTPATIENT
Start: 2023-01-25

## 2023-01-18 RX ORDER — DIPHENHYDRAMINE HYDROCHLORIDE 50 MG/ML
25 INJECTION INTRAMUSCULAR; INTRAVENOUS ONCE
OUTPATIENT
Start: 2023-01-25

## 2023-01-18 RX ORDER — MEPERIDINE HYDROCHLORIDE 25 MG/ML
25 INJECTION INTRAMUSCULAR; INTRAVENOUS; SUBCUTANEOUS ONCE
OUTPATIENT
Start: 2023-01-25

## 2023-01-18 NOTE — PROGRESS NOTES
Education Record    Learner:  Patient    Disease / Diagnosis:  Pt here for proteinase inhib    Barriers / Limitations:  None    Method:  Brief focused, printed material and  reinforcement    General Topics:  Plan of care reviewed    Outcome:tolertating inf with no c/o  Shows understanding

## 2023-01-22 RX ORDER — TRAZODONE HYDROCHLORIDE 50 MG/1
TABLET ORAL
Qty: 120 TABLET | Refills: 0 | Status: CANCELLED | OUTPATIENT
Start: 2023-01-22

## 2023-01-24 RX ORDER — TRAZODONE HYDROCHLORIDE 50 MG/1
TABLET ORAL
Qty: 120 TABLET | Refills: 0 | Status: SHIPPED | OUTPATIENT
Start: 2023-01-24

## 2023-01-25 ENCOUNTER — OFFICE VISIT (OUTPATIENT)
Dept: HEMATOLOGY/ONCOLOGY | Age: 43
End: 2023-01-25
Attending: INTERNAL MEDICINE
Payer: COMMERCIAL

## 2023-01-25 VITALS
HEART RATE: 76 BPM | OXYGEN SATURATION: 99 % | RESPIRATION RATE: 18 BRPM | DIASTOLIC BLOOD PRESSURE: 70 MMHG | TEMPERATURE: 98 F | SYSTOLIC BLOOD PRESSURE: 108 MMHG

## 2023-01-25 DIAGNOSIS — E88.01 ALPHA-1-ANTITRYPSIN DEFICIENCY (HCC): Primary | ICD-10-CM

## 2023-01-25 PROCEDURE — 96365 THER/PROPH/DIAG IV INF INIT: CPT

## 2023-01-25 RX ORDER — METHYLPREDNISOLONE SODIUM SUCCINATE 40 MG/ML
40 INJECTION, POWDER, LYOPHILIZED, FOR SOLUTION INTRAMUSCULAR; INTRAVENOUS ONCE
OUTPATIENT
Start: 2023-02-01

## 2023-01-25 RX ORDER — FAMOTIDINE 10 MG/ML
20 INJECTION, SOLUTION INTRAVENOUS ONCE
OUTPATIENT
Start: 2023-02-01

## 2023-01-25 RX ORDER — DIPHENHYDRAMINE HYDROCHLORIDE 50 MG/ML
25 INJECTION INTRAMUSCULAR; INTRAVENOUS ONCE
OUTPATIENT
Start: 2023-02-01

## 2023-01-25 RX ORDER — METHYLPREDNISOLONE SODIUM SUCCINATE 125 MG/2ML
125 INJECTION, POWDER, LYOPHILIZED, FOR SOLUTION INTRAMUSCULAR; INTRAVENOUS ONCE
OUTPATIENT
Start: 2023-02-01

## 2023-01-25 RX ORDER — MEPERIDINE HYDROCHLORIDE 25 MG/ML
25 INJECTION INTRAMUSCULAR; INTRAVENOUS; SUBCUTANEOUS ONCE
OUTPATIENT
Start: 2023-02-01

## 2023-01-25 NOTE — PROGRESS NOTES
Education Record    Learner:  Patient    Disease / Diagnosis: patient here for prolastin infusion     Barriers / Limitations:  None    Method:  Brief focused, printed material and  reinforcement    General Topics:  Plan of care reviewed    Outcome:  Shows understanding  Patient tolerated infusion. No c/o.

## 2023-01-30 DIAGNOSIS — F41.1 GAD (GENERALIZED ANXIETY DISORDER): ICD-10-CM

## 2023-01-30 RX ORDER — ALPRAZOLAM 0.25 MG/1
TABLET ORAL
Qty: 60 TABLET | Refills: 0 | Status: SHIPPED | OUTPATIENT
Start: 2023-02-02

## 2023-02-01 ENCOUNTER — APPOINTMENT (OUTPATIENT)
Dept: HEMATOLOGY/ONCOLOGY | Age: 43
End: 2023-02-01
Attending: INTERNAL MEDICINE
Payer: COMMERCIAL

## 2023-02-02 ENCOUNTER — OFFICE VISIT (OUTPATIENT)
Dept: HEMATOLOGY/ONCOLOGY | Age: 43
End: 2023-02-02
Attending: INTERNAL MEDICINE
Payer: COMMERCIAL

## 2023-02-02 ENCOUNTER — LAB ENCOUNTER (OUTPATIENT)
Dept: LAB | Age: 43
End: 2023-02-02
Attending: ALLERGY & IMMUNOLOGY
Payer: COMMERCIAL

## 2023-02-02 VITALS
HEART RATE: 77 BPM | OXYGEN SATURATION: 100 % | TEMPERATURE: 98 F | RESPIRATION RATE: 18 BRPM | DIASTOLIC BLOOD PRESSURE: 71 MMHG | SYSTOLIC BLOOD PRESSURE: 113 MMHG

## 2023-02-02 DIAGNOSIS — D80.1 HYPOGAMMAGLOBULINEMIA (HCC): Primary | ICD-10-CM

## 2023-02-02 DIAGNOSIS — E88.01 ALPHA-1-ANTITRYPSIN DEFICIENCY (HCC): Primary | ICD-10-CM

## 2023-02-02 PROCEDURE — 86774 TETANUS ANTIBODY: CPT

## 2023-02-02 PROCEDURE — 96365 THER/PROPH/DIAG IV INF INIT: CPT

## 2023-02-02 PROCEDURE — 36415 COLL VENOUS BLD VENIPUNCTURE: CPT

## 2023-02-02 PROCEDURE — 86317 IMMUNOASSAY INFECTIOUS AGENT: CPT

## 2023-02-02 PROCEDURE — 86648 DIPHTHERIA ANTIBODY: CPT

## 2023-02-02 RX ORDER — DIPHENHYDRAMINE HYDROCHLORIDE 50 MG/ML
25 INJECTION INTRAMUSCULAR; INTRAVENOUS ONCE
OUTPATIENT
Start: 2023-02-08

## 2023-02-02 RX ORDER — METHYLPREDNISOLONE SODIUM SUCCINATE 125 MG/2ML
125 INJECTION, POWDER, LYOPHILIZED, FOR SOLUTION INTRAMUSCULAR; INTRAVENOUS ONCE
OUTPATIENT
Start: 2023-02-08

## 2023-02-02 RX ORDER — FAMOTIDINE 10 MG/ML
20 INJECTION, SOLUTION INTRAVENOUS ONCE
OUTPATIENT
Start: 2023-02-08

## 2023-02-02 RX ORDER — METHYLPREDNISOLONE SODIUM SUCCINATE 40 MG/ML
40 INJECTION, POWDER, LYOPHILIZED, FOR SOLUTION INTRAMUSCULAR; INTRAVENOUS ONCE
OUTPATIENT
Start: 2023-02-08

## 2023-02-02 RX ORDER — MEPERIDINE HYDROCHLORIDE 25 MG/ML
25 INJECTION INTRAMUSCULAR; INTRAVENOUS; SUBCUTANEOUS ONCE
OUTPATIENT
Start: 2023-02-08

## 2023-02-05 LAB
DIPHTHERIA ANTIBODY, IGG: 7.5 IU/ML
PNEUMOCOCCAL SEROTYPE 1 IGG: 0.71 UG/ML
PNEUMOCOCCAL SEROTYPE 10A IGG: 15.92 UG/ML
PNEUMOCOCCAL SEROTYPE 11A IGG: 18.54 UG/ML
PNEUMOCOCCAL SEROTYPE 12F IGG: 0.64 UG/ML
PNEUMOCOCCAL SEROTYPE 14* IGG: 4.32 UG/ML
PNEUMOCOCCAL SEROTYPE 15B IGG: 33.91 UG/ML
PNEUMOCOCCAL SEROTYPE 17F IGG: 44.28 UG/ML
PNEUMOCOCCAL SEROTYPE 18C* IGG: 2.26 UG/ML
PNEUMOCOCCAL SEROTYPE 19F* IGG: 2.83 UG/ML
PNEUMOCOCCAL SEROTYPE 2 IGG: 0.67 UG/ML
PNEUMOCOCCAL SEROTYPE 20 IGG: 18.05 UG/ML
PNEUMOCOCCAL SEROTYPE 22F IGG: 6.23 UG/ML
PNEUMOCOCCAL SEROTYPE 23F* IGG: 5.98 UG/ML
PNEUMOCOCCAL SEROTYPE 3 IGG: 5.38 UG/ML
PNEUMOCOCCAL SEROTYPE 33F IGG: 2.37 UG/ML
PNEUMOCOCCAL SEROTYPE 4* IGG: 2.23 UG/ML
PNEUMOCOCCAL SEROTYPE 5 IGG: 13.84 UG/ML
PNEUMOCOCCAL SEROTYPE 6B* IGG: 12.03 UG/ML
PNEUMOCOCCAL SEROTYPE 7F IGG: >35.89 UG/ML
PNEUMOCOCCAL SEROTYPE 8 IGG: 2.28 UG/ML
PNEUMOCOCCAL SEROTYPE 9N IGG: 0.93 UG/ML
PNEUMOCOCCAL SEROTYPE 9V*, IGG: 1.12 UG/ML
TETANUS ANTIBODY, IGG: 13 IU/ML

## 2023-02-06 ENCOUNTER — PATIENT MESSAGE (OUTPATIENT)
Dept: FAMILY MEDICINE CLINIC | Facility: CLINIC | Age: 43
End: 2023-02-06

## 2023-02-08 ENCOUNTER — OFFICE VISIT (OUTPATIENT)
Dept: HEMATOLOGY/ONCOLOGY | Age: 43
End: 2023-02-08
Attending: INTERNAL MEDICINE
Payer: COMMERCIAL

## 2023-02-08 ENCOUNTER — HOSPITAL ENCOUNTER (OUTPATIENT)
Dept: CT IMAGING | Facility: HOSPITAL | Age: 43
Discharge: HOME | End: 2023-02-08
Attending: INTERNAL MEDICINE
Payer: COMMERCIAL

## 2023-02-08 ENCOUNTER — LAB ENCOUNTER (OUTPATIENT)
Dept: LAB | Facility: HOSPITAL | Age: 43
End: 2023-02-08
Attending: INTERNAL MEDICINE
Payer: COMMERCIAL

## 2023-02-08 VITALS
RESPIRATION RATE: 16 BRPM | DIASTOLIC BLOOD PRESSURE: 73 MMHG | SYSTOLIC BLOOD PRESSURE: 117 MMHG | OXYGEN SATURATION: 100 % | HEART RATE: 96 BPM | TEMPERATURE: 97 F

## 2023-02-08 DIAGNOSIS — D80.1 HYPOGAMMAGLOBULINEMIA (HCC): ICD-10-CM

## 2023-02-08 DIAGNOSIS — E88.01 ALPHA-1-ANTITRYPSIN DEFICIENCY (HCC): Primary | ICD-10-CM

## 2023-02-08 DIAGNOSIS — R10.13 ABDOMINAL PAIN, EPIGASTRIC: ICD-10-CM

## 2023-02-08 DIAGNOSIS — R63.4 LOSS OF WEIGHT: ICD-10-CM

## 2023-02-08 DIAGNOSIS — Z98.84 BARIATRIC SURGERY STATUS: ICD-10-CM

## 2023-02-08 DIAGNOSIS — R19.15 ABNORMAL BOWEL SOUNDS: ICD-10-CM

## 2023-02-08 LAB
CREAT BLD-MCNC: 0.7 MG/DL
GFR SERPLBLD BASED ON 1.73 SQ M-ARVRAT: 111 ML/MIN/1.73M2 (ref 60–?)

## 2023-02-08 PROCEDURE — 96365 THER/PROPH/DIAG IV INF INIT: CPT

## 2023-02-08 PROCEDURE — 74177 CT ABD & PELVIS W/CONTRAST: CPT | Performed by: INTERNAL MEDICINE

## 2023-02-08 PROCEDURE — 82565 ASSAY OF CREATININE: CPT

## 2023-02-08 PROCEDURE — 86356 MONONUCLEAR CELL ANTIGEN: CPT

## 2023-02-08 PROCEDURE — 36415 COLL VENOUS BLD VENIPUNCTURE: CPT

## 2023-02-08 RX ORDER — METHYLPREDNISOLONE SODIUM SUCCINATE 125 MG/2ML
125 INJECTION, POWDER, LYOPHILIZED, FOR SOLUTION INTRAMUSCULAR; INTRAVENOUS ONCE
OUTPATIENT
Start: 2023-02-15

## 2023-02-08 RX ORDER — FAMOTIDINE 10 MG/ML
20 INJECTION, SOLUTION INTRAVENOUS ONCE
OUTPATIENT
Start: 2023-02-15

## 2023-02-08 RX ORDER — DIPHENHYDRAMINE HYDROCHLORIDE 50 MG/ML
25 INJECTION INTRAMUSCULAR; INTRAVENOUS ONCE
OUTPATIENT
Start: 2023-02-15

## 2023-02-08 RX ORDER — METHYLPREDNISOLONE SODIUM SUCCINATE 40 MG/ML
40 INJECTION, POWDER, LYOPHILIZED, FOR SOLUTION INTRAMUSCULAR; INTRAVENOUS ONCE
OUTPATIENT
Start: 2023-02-15

## 2023-02-08 RX ORDER — MEPERIDINE HYDROCHLORIDE 25 MG/ML
25 INJECTION INTRAMUSCULAR; INTRAVENOUS; SUBCUTANEOUS ONCE
OUTPATIENT
Start: 2023-02-15

## 2023-02-08 NOTE — PROGRESS NOTES
Education Record    Learner:  Patient    Disease / Diagnosis: Pt presented for Prolastin infusion. Barriers / Limitations:  None    Method:  Brief focused, printed material and  reinforcement    General Topics:  Plan of care reviewed    Outcome:  Shows understanding. Pt tolerated infusion without complication. Pt discharged with IV in place for CT at [facility].

## 2023-02-10 PROBLEM — R63.4 LOSS OF WEIGHT: Status: ACTIVE | Noted: 2023-01-26

## 2023-02-10 PROBLEM — J44.9 CHRONIC BRONCHIOLITIS (HCC): Status: ACTIVE | Noted: 2023-01-30

## 2023-02-10 PROBLEM — J44.89 CHRONIC BRONCHIOLITIS (HCC): Status: ACTIVE | Noted: 2023-01-30

## 2023-02-10 PROBLEM — J44.9 CHRONIC BRONCHIOLITIS: Status: ACTIVE | Noted: 2023-01-30

## 2023-02-10 PROBLEM — J44.89 CHRONIC BRONCHIOLITIS: Status: ACTIVE | Noted: 2023-01-30

## 2023-02-10 LAB
ACTIVATED CD21LOW CD38- %: 2.8 % OF CD19
ACTIVATED CD21LOW CD38-: 6 CELLS/UL
CD19+ B CELLS %: 10.4 % LYMPHS
CD19+ B CELLS: 206 CELLS/UL
CD20+ %: 99.3 % OF CD19
CD20+: 205 CELLS/UL
CLASS-SWITCHED CD27+IGD-IGM- %: 9.6 % OF CD19
CLASS-SWITCHED CD27+IGD-IGM-: 20 CELLS/UL
NON SWITCHED CD27+IGD+IGM+ %: 5.9 % OF CD19
NON SWITCHED CD27+IGD+IGM+: 12 CELLS/UL
PLASMABLASTS CD38+IGM- %: 0.6 % OF CD19
PLASMABLASTS CD38+IGM-: 1 CELLS/UL
TOTAL MEMORY CD27+ %: 17.3 % OF CD19
TOTAL MEMORY CD27+: 36 CELLS/UL
TRANSITIONAL CD38+IGM+ %: 12.3 % OF CD19
TRANSITIONAL CD38+IGM+: 25 CELLS/UL

## 2023-02-11 ENCOUNTER — TELEMEDICINE (OUTPATIENT)
Dept: FAMILY MEDICINE CLINIC | Facility: CLINIC | Age: 43
End: 2023-02-11
Payer: COMMERCIAL

## 2023-02-11 ENCOUNTER — PATIENT MESSAGE (OUTPATIENT)
Dept: FAMILY MEDICINE CLINIC | Facility: CLINIC | Age: 43
End: 2023-02-11

## 2023-02-11 VITALS — WEIGHT: 171 LBS | BODY MASS INDEX: 26 KG/M2

## 2023-02-11 DIAGNOSIS — F41.1 ANXIETY REACTION: ICD-10-CM

## 2023-02-11 DIAGNOSIS — R35.0 URINARY FREQUENCY: ICD-10-CM

## 2023-02-11 DIAGNOSIS — G44.221 CHRONIC TENSION-TYPE HEADACHE, INTRACTABLE: Primary | ICD-10-CM

## 2023-02-11 DIAGNOSIS — F41.1 GAD (GENERALIZED ANXIETY DISORDER): ICD-10-CM

## 2023-02-11 DIAGNOSIS — N13.30 HYDRONEPHROSIS, UNSPECIFIED HYDRONEPHROSIS TYPE: ICD-10-CM

## 2023-02-11 RX ORDER — BUTALBITAL/ACETAMINOPHEN 50MG-325MG
1 TABLET ORAL 2 TIMES DAILY PRN
Qty: 15 TABLET | Refills: 0 | Status: SHIPPED | OUTPATIENT
Start: 2023-02-11

## 2023-02-11 RX ORDER — AZELASTINE 1 MG/ML
2 SPRAY, METERED NASAL 2 TIMES DAILY
Refills: 0 | COMMUNITY
Start: 2023-02-11

## 2023-02-11 RX ORDER — LORATADINE 10 MG/1
10 TABLET ORAL DAILY
Refills: 0 | COMMUNITY
Start: 2023-02-11

## 2023-02-13 ENCOUNTER — HOSPITAL ENCOUNTER (OUTPATIENT)
Dept: ULTRASOUND IMAGING | Age: 43
Discharge: HOME | End: 2023-02-13
Attending: FAMILY MEDICINE
Payer: COMMERCIAL

## 2023-02-13 DIAGNOSIS — N13.30 HYDRONEPHROSIS, UNSPECIFIED HYDRONEPHROSIS TYPE: ICD-10-CM

## 2023-02-13 DIAGNOSIS — R35.0 URINARY FREQUENCY: ICD-10-CM

## 2023-02-13 PROCEDURE — 76770 US EXAM ABDO BACK WALL COMP: CPT | Performed by: FAMILY MEDICINE

## 2023-02-13 NOTE — TELEPHONE ENCOUNTER
From: Sandra Esparza  To: Terrance Tate PA-C  Sent: 2/11/2023 9:47 AM CST  Subject: Meds/ultrasound     Hi Eva Naylor,  Are you sending over the butalbital over to [facility] today? Also is the order for the ultrasound going in today?   Thank you,   Zeeshan Dsouza

## 2023-02-14 NOTE — PROGRESS NOTES
The left-sided hydronephrosis according to radiologist has resolved. Normal appearance of the kidneys. There is a simple right ovarian cyst no further follow-up needed.

## 2023-02-15 ENCOUNTER — APPOINTMENT (OUTPATIENT)
Dept: ULTRASOUND IMAGING | Age: 43
End: 2023-02-15
Attending: EMERGENCY MEDICINE
Payer: COMMERCIAL

## 2023-02-15 ENCOUNTER — OFFICE VISIT (OUTPATIENT)
Dept: HEMATOLOGY/ONCOLOGY | Age: 43
End: 2023-02-15
Attending: INTERNAL MEDICINE
Payer: COMMERCIAL

## 2023-02-15 ENCOUNTER — OFFICE VISIT (OUTPATIENT)
Dept: FAMILY MEDICINE CLINIC | Facility: CLINIC | Age: 43
End: 2023-02-15
Payer: COMMERCIAL

## 2023-02-15 ENCOUNTER — HOSPITAL ENCOUNTER (EMERGENCY)
Age: 43
Discharge: HOME | End: 2023-02-16
Attending: EMERGENCY MEDICINE
Payer: COMMERCIAL

## 2023-02-15 ENCOUNTER — APPOINTMENT (OUTPATIENT)
Dept: HEMATOLOGY/ONCOLOGY | Age: 43
End: 2023-02-15
Attending: INTERNAL MEDICINE
Payer: COMMERCIAL

## 2023-02-15 ENCOUNTER — TELEPHONE (OUTPATIENT)
Dept: FAMILY MEDICINE CLINIC | Facility: CLINIC | Age: 43
End: 2023-02-15

## 2023-02-15 VITALS
HEART RATE: 89 BPM | SYSTOLIC BLOOD PRESSURE: 132 MMHG | TEMPERATURE: 98 F | RESPIRATION RATE: 18 BRPM | DIASTOLIC BLOOD PRESSURE: 73 MMHG | OXYGEN SATURATION: 99 %

## 2023-02-15 VITALS
DIASTOLIC BLOOD PRESSURE: 78 MMHG | SYSTOLIC BLOOD PRESSURE: 118 MMHG | RESPIRATION RATE: 16 BRPM | WEIGHT: 170 LBS | HEART RATE: 90 BPM | BODY MASS INDEX: 25.47 KG/M2 | HEIGHT: 68.5 IN | OXYGEN SATURATION: 100 % | TEMPERATURE: 98 F

## 2023-02-15 VITALS
RESPIRATION RATE: 16 BRPM | HEART RATE: 78 BPM | DIASTOLIC BLOOD PRESSURE: 81 MMHG | OXYGEN SATURATION: 96 % | SYSTOLIC BLOOD PRESSURE: 135 MMHG | BODY MASS INDEX: 25.76 KG/M2 | TEMPERATURE: 98 F | HEIGHT: 68 IN | WEIGHT: 170 LBS

## 2023-02-15 DIAGNOSIS — E88.01 ALPHA-1-ANTITRYPSIN DEFICIENCY (HCC): Primary | ICD-10-CM

## 2023-02-15 DIAGNOSIS — T80.90XA: Primary | ICD-10-CM

## 2023-02-15 DIAGNOSIS — M79.602 LEFT ARM PAIN: Primary | ICD-10-CM

## 2023-02-15 PROCEDURE — 99284 EMERGENCY DEPT VISIT MOD MDM: CPT | Performed by: EMERGENCY MEDICINE

## 2023-02-15 PROCEDURE — 93931 UPPER EXTREMITY STUDY: CPT | Performed by: EMERGENCY MEDICINE

## 2023-02-15 PROCEDURE — 96365 THER/PROPH/DIAG IV INF INIT: CPT

## 2023-02-15 PROCEDURE — 99213 OFFICE O/P EST LOW 20 MIN: CPT | Performed by: PHYSICIAN ASSISTANT

## 2023-02-15 PROCEDURE — 3008F BODY MASS INDEX DOCD: CPT | Performed by: PHYSICIAN ASSISTANT

## 2023-02-15 PROCEDURE — 3074F SYST BP LT 130 MM HG: CPT | Performed by: PHYSICIAN ASSISTANT

## 2023-02-15 PROCEDURE — 93971 EXTREMITY STUDY: CPT | Performed by: EMERGENCY MEDICINE

## 2023-02-15 PROCEDURE — 3078F DIAST BP <80 MM HG: CPT | Performed by: PHYSICIAN ASSISTANT

## 2023-02-15 RX ORDER — FAMOTIDINE 10 MG/ML
20 INJECTION, SOLUTION INTRAVENOUS ONCE
OUTPATIENT
Start: 2023-02-22

## 2023-02-15 RX ORDER — METHYLPREDNISOLONE SODIUM SUCCINATE 40 MG/ML
40 INJECTION, POWDER, LYOPHILIZED, FOR SOLUTION INTRAMUSCULAR; INTRAVENOUS ONCE
OUTPATIENT
Start: 2023-02-22

## 2023-02-15 RX ORDER — MEPERIDINE HYDROCHLORIDE 25 MG/ML
25 INJECTION INTRAMUSCULAR; INTRAVENOUS; SUBCUTANEOUS ONCE
OUTPATIENT
Start: 2023-02-22

## 2023-02-15 RX ORDER — DIPHENHYDRAMINE HYDROCHLORIDE 50 MG/ML
25 INJECTION INTRAMUSCULAR; INTRAVENOUS ONCE
OUTPATIENT
Start: 2023-02-22

## 2023-02-15 RX ORDER — METHYLPREDNISOLONE SODIUM SUCCINATE 125 MG/2ML
125 INJECTION, POWDER, LYOPHILIZED, FOR SOLUTION INTRAMUSCULAR; INTRAVENOUS ONCE
OUTPATIENT
Start: 2023-02-22

## 2023-02-15 NOTE — TELEPHONE ENCOUNTER
Patient had prolastin infusion today. During treatment, developed dull aching pain at finger tips on left side. Informed nurses at infusion center, she instructed patient to apply warm compress when she gets home . When she got home, she noticed the pain went up her arm to her shoulder. Advised to try tylenol, apply warm compress. Per , try rolling shoulders, okay to take tylenol, apply warm compress. If no improvement in a few days, contact office. Called patient back, stated pain is worse, she is currently at .      Correction: is being seen at [address] in [place]

## 2023-02-16 DIAGNOSIS — R10.13 EPIGASTRIC ABDOMINAL PAIN: ICD-10-CM

## 2023-02-16 NOTE — ED INITIAL ASSESSMENT (HPI)
Pt to ed with pain in left arm that radiates into left shoulder, let hand seems cooler than the right. PT had an infusion today and left arm was used.

## 2023-02-17 DIAGNOSIS — G44.221 CHRONIC TENSION-TYPE HEADACHE, INTRACTABLE: ICD-10-CM

## 2023-02-17 RX ORDER — FAMOTIDINE 40 MG/1
TABLET, FILM COATED ORAL
Qty: 90 TABLET | Refills: 2 | Status: SHIPPED | OUTPATIENT
Start: 2023-02-17

## 2023-02-18 RX ORDER — TRAZODONE HYDROCHLORIDE 50 MG/1
TABLET ORAL
Qty: 120 TABLET | Refills: 0 | Status: SHIPPED | OUTPATIENT
Start: 2023-02-18

## 2023-02-18 RX ORDER — TIZANIDINE 4 MG/1
TABLET ORAL
Qty: 30 TABLET | Refills: 2 | Status: SHIPPED | OUTPATIENT
Start: 2023-02-18

## 2023-02-20 ENCOUNTER — TELEPHONE (OUTPATIENT)
Dept: FAMILY MEDICINE CLINIC | Facility: CLINIC | Age: 43
End: 2023-02-20

## 2023-02-20 ENCOUNTER — PATIENT MESSAGE (OUTPATIENT)
Dept: FAMILY MEDICINE CLINIC | Facility: CLINIC | Age: 43
End: 2023-02-20

## 2023-02-20 NOTE — TELEPHONE ENCOUNTER
Pt calling to inform us pt's pharmacy is requesting a dx code for her new prescription of Tramadol. Pt would like to  today.

## 2023-02-21 PROBLEM — F41.1 ANXIETY AS ACUTE REACTION TO EXCEPTIONAL STRESS: Status: ACTIVE | Noted: 2023-02-21

## 2023-02-21 PROBLEM — F43.0 ANXIETY AS ACUTE REACTION TO EXCEPTIONAL STRESS: Status: ACTIVE | Noted: 2023-02-21

## 2023-02-21 NOTE — TELEPHONE ENCOUNTER
From: Harish Shook  To: Tommas Galeazzi, PA-C  Sent: 2/20/2023 4:13 PM CST  Subject: Question     Hi Lester John,   I forgot to mention another thing to you at my appointment. Dr. Shena Arrington mentioned at my last appointment that I should have the high resolution Cat scan that I had in [place] repeated to ensure that it does not continue to show inflammation in my abdomen.      Josh Lowe

## 2023-02-21 NOTE — TELEPHONE ENCOUNTER
She can send that message to rheumatology but I do not believe it is necessary with her just having a CT of the abdomen.

## 2023-02-22 ENCOUNTER — APPOINTMENT (OUTPATIENT)
Dept: HEMATOLOGY/ONCOLOGY | Age: 43
End: 2023-02-22
Attending: INTERNAL MEDICINE
Payer: COMMERCIAL

## 2023-02-22 ENCOUNTER — OFFICE VISIT (OUTPATIENT)
Dept: HEMATOLOGY/ONCOLOGY | Age: 43
End: 2023-02-22
Attending: INTERNAL MEDICINE
Payer: COMMERCIAL

## 2023-02-22 VITALS
DIASTOLIC BLOOD PRESSURE: 72 MMHG | HEART RATE: 76 BPM | SYSTOLIC BLOOD PRESSURE: 112 MMHG | OXYGEN SATURATION: 100 % | RESPIRATION RATE: 16 BRPM | TEMPERATURE: 98 F

## 2023-02-22 DIAGNOSIS — E88.01 ALPHA-1-ANTITRYPSIN DEFICIENCY (HCC): Primary | ICD-10-CM

## 2023-02-22 PROCEDURE — 96365 THER/PROPH/DIAG IV INF INIT: CPT

## 2023-02-22 RX ORDER — FAMOTIDINE 10 MG/ML
20 INJECTION, SOLUTION INTRAVENOUS ONCE
OUTPATIENT
Start: 2023-03-01

## 2023-02-22 RX ORDER — DIPHENHYDRAMINE HYDROCHLORIDE 50 MG/ML
25 INJECTION INTRAMUSCULAR; INTRAVENOUS ONCE
OUTPATIENT
Start: 2023-03-01

## 2023-02-22 RX ORDER — MEPERIDINE HYDROCHLORIDE 25 MG/ML
25 INJECTION INTRAMUSCULAR; INTRAVENOUS; SUBCUTANEOUS ONCE
OUTPATIENT
Start: 2023-03-01

## 2023-02-22 RX ORDER — METHYLPREDNISOLONE SODIUM SUCCINATE 125 MG/2ML
125 INJECTION, POWDER, LYOPHILIZED, FOR SOLUTION INTRAMUSCULAR; INTRAVENOUS ONCE
OUTPATIENT
Start: 2023-03-01

## 2023-02-22 RX ORDER — METHYLPREDNISOLONE SODIUM SUCCINATE 40 MG/ML
40 INJECTION, POWDER, LYOPHILIZED, FOR SOLUTION INTRAMUSCULAR; INTRAVENOUS ONCE
OUTPATIENT
Start: 2023-03-01

## 2023-02-22 NOTE — PROGRESS NOTES
Education Record    Learner:  Patient    Disease / Diagnosis:pt here for prolastin    Barriers / Limitations:  None    Method:  Brief focused, printed material and  reinforcement    General Topics:  Plan of care reviewed    Outcome: no c/o.  Tolerated infusion

## 2023-02-23 ENCOUNTER — PATIENT MESSAGE (OUTPATIENT)
Dept: FAMILY MEDICINE CLINIC | Facility: CLINIC | Age: 43
End: 2023-02-23

## 2023-02-23 DIAGNOSIS — G44.221 CHRONIC TENSION-TYPE HEADACHE, INTRACTABLE: ICD-10-CM

## 2023-02-23 NOTE — TELEPHONE ENCOUNTER
Since  tramadol  was the only new medication I guess she is having a sensitivity reaction to the tramadol switch her back to butalbital 3 times a week for the tension headaches

## 2023-02-24 RX ORDER — BUTALBITAL/ACETAMINOPHEN 50MG-325MG
1 TABLET ORAL 2 TIMES DAILY PRN
Qty: 15 TABLET | Refills: 0 | Status: SHIPPED | OUTPATIENT
Start: 2023-02-24

## 2023-03-01 ENCOUNTER — OFFICE VISIT (OUTPATIENT)
Dept: HEMATOLOGY/ONCOLOGY | Age: 43
End: 2023-03-01
Attending: INTERNAL MEDICINE
Payer: COMMERCIAL

## 2023-03-01 VITALS
RESPIRATION RATE: 18 BRPM | DIASTOLIC BLOOD PRESSURE: 75 MMHG | TEMPERATURE: 98 F | OXYGEN SATURATION: 100 % | SYSTOLIC BLOOD PRESSURE: 112 MMHG | HEART RATE: 73 BPM

## 2023-03-01 DIAGNOSIS — E88.01 ALPHA-1-ANTITRYPSIN DEFICIENCY (HCC): Primary | ICD-10-CM

## 2023-03-01 PROCEDURE — 96365 THER/PROPH/DIAG IV INF INIT: CPT

## 2023-03-01 RX ORDER — METHYLPREDNISOLONE SODIUM SUCCINATE 40 MG/ML
40 INJECTION, POWDER, LYOPHILIZED, FOR SOLUTION INTRAMUSCULAR; INTRAVENOUS ONCE
OUTPATIENT
Start: 2023-03-08

## 2023-03-01 RX ORDER — METHYLPREDNISOLONE SODIUM SUCCINATE 125 MG/2ML
125 INJECTION, POWDER, LYOPHILIZED, FOR SOLUTION INTRAMUSCULAR; INTRAVENOUS ONCE
OUTPATIENT
Start: 2023-03-08

## 2023-03-01 RX ORDER — FAMOTIDINE 10 MG/ML
20 INJECTION, SOLUTION INTRAVENOUS ONCE
OUTPATIENT
Start: 2023-03-08

## 2023-03-01 RX ORDER — MEPERIDINE HYDROCHLORIDE 25 MG/ML
25 INJECTION INTRAMUSCULAR; INTRAVENOUS; SUBCUTANEOUS ONCE
OUTPATIENT
Start: 2023-03-08

## 2023-03-01 RX ORDER — DIPHENHYDRAMINE HYDROCHLORIDE 50 MG/ML
25 INJECTION INTRAMUSCULAR; INTRAVENOUS ONCE
OUTPATIENT
Start: 2023-03-08

## 2023-03-01 NOTE — PROGRESS NOTES
Education Record    Learner:  Patient    Disease / Diagnosis: patient  Here for prolastin     Barriers / Limitations:  None    Method:  Brief focused, printed material and  reinforcement    General Topics:  Plan of care reviewed    Outcome:  Shows understanding  Patient tolerated infusion, no c/o.

## 2023-03-02 DIAGNOSIS — M53.3 SACROILIAC JOINT DYSFUNCTION OF LEFT SIDE: ICD-10-CM

## 2023-03-02 DIAGNOSIS — R10.12 ABDOMINAL PAIN, CHRONIC, LEFT UPPER QUADRANT: ICD-10-CM

## 2023-03-02 DIAGNOSIS — F41.1 GAD (GENERALIZED ANXIETY DISORDER): ICD-10-CM

## 2023-03-02 DIAGNOSIS — G89.29 ABDOMINAL PAIN, CHRONIC, LEFT UPPER QUADRANT: ICD-10-CM

## 2023-03-03 RX ORDER — PREGABALIN 75 MG/1
CAPSULE ORAL
Qty: 90 CAPSULE | Refills: 0 | Status: SHIPPED | OUTPATIENT
Start: 2023-03-03

## 2023-03-03 RX ORDER — ALPRAZOLAM 0.25 MG/1
TABLET ORAL
Qty: 60 TABLET | Refills: 0 | Status: SHIPPED | OUTPATIENT
Start: 2023-03-03

## 2023-03-06 ENCOUNTER — TELEPHONE (OUTPATIENT)
Dept: HEMATOLOGY/ONCOLOGY | Facility: HOSPITAL | Age: 43
End: 2023-03-06

## 2023-03-06 NOTE — TELEPHONE ENCOUNTER
Patient calling to schedule consult with Dr Nohemi Jacobs    Referring Provider: Ruth Printers Referred To Provider: Nabil Jaimes   Diagnosis: R74.8 (ICD-10-CM) - 790.99 (ICD-9-CM) - Elevated vitamin B12 level     Do not see [facility] labs    [facility]

## 2023-03-07 NOTE — PROGRESS NOTES
Alis Hernadez is a 45year old female. HPI:   Patient is in for follow-up on anxiety was started on Remeron for nighttime anxiety, insomnia and to help with eating. Patient does state it has help with her sleeping and nighttime anxiety.   Has been ab more than 4-5/week 18 pills maximum per month. Current Outpatient Medications:  [START ON 10/22/2018] ALPRAZolam 0.25 MG Oral Tab Take 1-2 tablets (0.25-0.5 mg total) by mouth 2 (two) times daily as needed for Anxiety.  Disp: 120 tablet Rfl: 0   Ambar reflux    • Extrinsic asthma, unspecified     2014 was in ER and was hospitalized over night   • Hx of gastric bypass 12/18/2017   • Hypothyroid    • Hypothyroidism    • Infertility, female    • Organic hypersomnia, unspecified DMG DX 11-2-12    AHI 2 RDI sensorimotor deficit  Psychological: Mood anxious with flat affect   Good communication skills.   ASSESSMENT AND PLAN:     Chronic tension-type headache, intractable  (primary encounter diagnosis)  Dmitriy (generalized anxiety disorder)  Chronic abdominal pain capsule (25 mg total) by mouth 2 (two) times daily. Dispense: 60 capsule; Refill: 1    3. Chronic abdominal pain  Patient florinda intermittent chronic pain, somatic in nature secondary to testing shows no significant findings.   Limit amount of narcotic and C 6

## 2023-03-08 ENCOUNTER — OFFICE VISIT (OUTPATIENT)
Dept: HEMATOLOGY/ONCOLOGY | Age: 43
End: 2023-03-08
Attending: INTERNAL MEDICINE
Payer: COMMERCIAL

## 2023-03-08 VITALS
DIASTOLIC BLOOD PRESSURE: 80 MMHG | OXYGEN SATURATION: 100 % | RESPIRATION RATE: 16 BRPM | HEART RATE: 84 BPM | TEMPERATURE: 98 F | SYSTOLIC BLOOD PRESSURE: 122 MMHG

## 2023-03-08 DIAGNOSIS — E88.01 ALPHA-1-ANTITRYPSIN DEFICIENCY (HCC): Primary | ICD-10-CM

## 2023-03-08 PROCEDURE — 96365 THER/PROPH/DIAG IV INF INIT: CPT

## 2023-03-08 RX ORDER — DIPHENHYDRAMINE HYDROCHLORIDE 50 MG/ML
25 INJECTION INTRAMUSCULAR; INTRAVENOUS ONCE
OUTPATIENT
Start: 2023-03-15

## 2023-03-08 RX ORDER — METHYLPREDNISOLONE SODIUM SUCCINATE 125 MG/2ML
125 INJECTION, POWDER, LYOPHILIZED, FOR SOLUTION INTRAMUSCULAR; INTRAVENOUS ONCE
OUTPATIENT
Start: 2023-03-15

## 2023-03-08 RX ORDER — FAMOTIDINE 10 MG/ML
20 INJECTION, SOLUTION INTRAVENOUS ONCE
OUTPATIENT
Start: 2023-03-15

## 2023-03-08 RX ORDER — METHYLPREDNISOLONE SODIUM SUCCINATE 40 MG/ML
40 INJECTION, POWDER, LYOPHILIZED, FOR SOLUTION INTRAMUSCULAR; INTRAVENOUS ONCE
OUTPATIENT
Start: 2023-03-15

## 2023-03-08 RX ORDER — MEPERIDINE HYDROCHLORIDE 25 MG/ML
25 INJECTION INTRAMUSCULAR; INTRAVENOUS; SUBCUTANEOUS ONCE
OUTPATIENT
Start: 2023-03-15

## 2023-03-08 NOTE — PROGRESS NOTES
Education Record    Learner:  Patient    Disease / Diagnosis: Pt presented for Prolastin infusion. Barriers / Limitations:  None    Method:  Brief focused, printed material and  reinforcement    General Topics:  Plan of care reviewed    Outcome:  Shows understanding. Pt tolerated infusion without complication. Pt discharged home in stable condition.

## 2023-03-11 ENCOUNTER — OFFICE VISIT (OUTPATIENT)
Dept: FAMILY MEDICINE CLINIC | Facility: CLINIC | Age: 43
End: 2023-03-11
Payer: COMMERCIAL

## 2023-03-11 VITALS
DIASTOLIC BLOOD PRESSURE: 66 MMHG | HEART RATE: 86 BPM | WEIGHT: 183 LBS | OXYGEN SATURATION: 98 % | RESPIRATION RATE: 18 BRPM | TEMPERATURE: 98 F | BODY MASS INDEX: 27.74 KG/M2 | HEIGHT: 68 IN | SYSTOLIC BLOOD PRESSURE: 126 MMHG

## 2023-03-11 DIAGNOSIS — R05.3 CHRONIC COUGH: Primary | ICD-10-CM

## 2023-03-11 DIAGNOSIS — R05.8 RECURRENT COUGH: ICD-10-CM

## 2023-03-11 DIAGNOSIS — F43.0 ANXIETY AS ACUTE REACTION TO EXCEPTIONAL STRESS: ICD-10-CM

## 2023-03-11 DIAGNOSIS — J45.998 ASTHMA, PERSISTENT CONTROLLED: ICD-10-CM

## 2023-03-11 DIAGNOSIS — F41.1 ANXIETY AS ACUTE REACTION TO EXCEPTIONAL STRESS: ICD-10-CM

## 2023-03-11 DIAGNOSIS — F41.1 GAD (GENERALIZED ANXIETY DISORDER): ICD-10-CM

## 2023-03-11 DIAGNOSIS — G44.221 CHRONIC TENSION-TYPE HEADACHE, INTRACTABLE: ICD-10-CM

## 2023-03-11 DIAGNOSIS — E88.01 ALPHA-1-ANTITRYPSIN DEFICIENCY (HCC): ICD-10-CM

## 2023-03-11 PROBLEM — R93.3 ABNORMAL CT SCAN, ESOPHAGUS: Status: RESOLVED | Noted: 2021-12-14 | Resolved: 2023-03-11

## 2023-03-11 PROCEDURE — 3008F BODY MASS INDEX DOCD: CPT | Performed by: FAMILY MEDICINE

## 2023-03-11 PROCEDURE — 99214 OFFICE O/P EST MOD 30 MIN: CPT | Performed by: FAMILY MEDICINE

## 2023-03-11 PROCEDURE — 3078F DIAST BP <80 MM HG: CPT | Performed by: FAMILY MEDICINE

## 2023-03-11 PROCEDURE — 3074F SYST BP LT 130 MM HG: CPT | Performed by: FAMILY MEDICINE

## 2023-03-11 RX ORDER — PYRAZINAMIDE 500 MG/1
1 TABLET ORAL 2 TIMES DAILY PRN
Qty: 10 TABLET | Refills: 0 | Status: SHIPPED | OUTPATIENT
Start: 2023-03-11

## 2023-03-15 ENCOUNTER — OFFICE VISIT (OUTPATIENT)
Dept: HEMATOLOGY/ONCOLOGY | Age: 43
End: 2023-03-15
Attending: INTERNAL MEDICINE
Payer: COMMERCIAL

## 2023-03-15 ENCOUNTER — PATIENT MESSAGE (OUTPATIENT)
Dept: FAMILY MEDICINE CLINIC | Facility: CLINIC | Age: 43
End: 2023-03-15

## 2023-03-15 VITALS
DIASTOLIC BLOOD PRESSURE: 69 MMHG | TEMPERATURE: 98 F | OXYGEN SATURATION: 98 % | RESPIRATION RATE: 16 BRPM | HEART RATE: 87 BPM | SYSTOLIC BLOOD PRESSURE: 127 MMHG

## 2023-03-15 DIAGNOSIS — E88.01 ALPHA-1-ANTITRYPSIN DEFICIENCY (HCC): Primary | ICD-10-CM

## 2023-03-15 DIAGNOSIS — R05.3 CHRONIC COUGH: ICD-10-CM

## 2023-03-15 PROCEDURE — 96365 THER/PROPH/DIAG IV INF INIT: CPT

## 2023-03-15 RX ORDER — METHYLPREDNISOLONE SODIUM SUCCINATE 125 MG/2ML
125 INJECTION, POWDER, LYOPHILIZED, FOR SOLUTION INTRAMUSCULAR; INTRAVENOUS ONCE
OUTPATIENT
Start: 2023-03-22

## 2023-03-15 RX ORDER — MEPERIDINE HYDROCHLORIDE 25 MG/ML
25 INJECTION INTRAMUSCULAR; INTRAVENOUS; SUBCUTANEOUS ONCE
OUTPATIENT
Start: 2023-03-22

## 2023-03-15 RX ORDER — METHYLPREDNISOLONE SODIUM SUCCINATE 40 MG/ML
40 INJECTION, POWDER, LYOPHILIZED, FOR SOLUTION INTRAMUSCULAR; INTRAVENOUS ONCE
OUTPATIENT
Start: 2023-03-22

## 2023-03-15 RX ORDER — DIPHENHYDRAMINE HYDROCHLORIDE 50 MG/ML
25 INJECTION INTRAMUSCULAR; INTRAVENOUS ONCE
OUTPATIENT
Start: 2023-03-22

## 2023-03-15 RX ORDER — FAMOTIDINE 10 MG/ML
20 INJECTION, SOLUTION INTRAVENOUS ONCE
OUTPATIENT
Start: 2023-03-22

## 2023-03-15 NOTE — PROGRESS NOTES
Education Record    Learner:  Patient    Disease / Diagnosis: Pt presented for Prolastin infusion. Barriers / Limitations:  None    Method:  Brief focused, discussion and  reinforcement    General Topics:  Plan of care reviewed    Outcome:  Shows understanding. Pt tolerated infusion without complication. Pt discharged home in stable condition. Pre-op dx:  3  29year old  at 38w6d  2. Admission diagnosis:  Fetal tachycardia  3. Pregnancy complicated by:  Obesity be with BMI 46. GBS colonization in urine    Post-op dx:  1. Delivery complicated by:  None    Procedure: The patient was admitted for evaluation of fetal tachycardia. The decision was made to induce. Category 1 fetal heart tones throughout labor. Normal baseline. Penicillin G prophylaxis given for GBS colonization positive. AROM performed at 3:00 p.m. [facility] Fluid clear. Patient requested received an epidural.  Patient progressed to complete at 2130 and began pushing at 9:42 p.m. [facility] Patient delivered vaginally at 10:00 p.m. in [place] position with no shoulder dystocia. Nuchal cord x1. Loose. Slipped. Left shoulder under pubic bone with no difficulty. Mouth and nares suctioned on the perineum. Infant vigorous. Cord doubly clamped and cut. Infant to warmer at the request of the patient. Cord sample obtained for blood type. Placenta delivered spontaneously intact. Uterus contracted down nicely with massage and Pitocin. Small second-degree perineal laceration repaired using 3 0 Vicryl in 2 layers. Small bilateral periurethral tears not bleeding and not repaired. Uterine cavity sweep revealed no retained membranes or placenta. No complications. Surgeon:  Santos Whittington MD    Anesthesia:  Epidurald    Findings:  1. A viable Male [2]  at 10:00 PM , weight 9 lb 12.6 oz (4440 g) , and Apgars of  8  and 9     2. Placenta delivered spontaneously and intact. 3.  Episiotomy/laceration:  Small second-degree perineal laceration repaired using 3 0 Vicryl.   Small bilateral nonbleeding periurethral tears not repaired      Complications: none    QBL: 181 ml

## 2023-03-16 ENCOUNTER — OFFICE VISIT (OUTPATIENT)
Dept: HEMATOLOGY/ONCOLOGY | Age: 43
End: 2023-03-16
Attending: INTERNAL MEDICINE
Payer: COMMERCIAL

## 2023-03-16 VITALS
RESPIRATION RATE: 18 BRPM | OXYGEN SATURATION: 99 % | TEMPERATURE: 98 F | BODY MASS INDEX: 26.99 KG/M2 | DIASTOLIC BLOOD PRESSURE: 79 MMHG | HEIGHT: 68.7 IN | SYSTOLIC BLOOD PRESSURE: 117 MMHG | WEIGHT: 180.13 LBS | HEART RATE: 91 BPM

## 2023-03-16 DIAGNOSIS — R79.89 HIGH SERUM VITAMIN B12: Primary | ICD-10-CM

## 2023-03-16 LAB
ALBUMIN SERPL-MCNC: 3 G/DL (ref 3.4–5)
ALP LIVER SERPL-CCNC: 67 U/L
ALT SERPL-CCNC: 22 U/L
ANION GAP SERPL CALC-SCNC: 3 MMOL/L (ref 0–18)
AST SERPL-CCNC: 15 U/L (ref 15–37)
BASOPHILS # BLD AUTO: 0.03 X10(3) UL (ref 0–0.2)
BASOPHILS NFR BLD AUTO: 0.5 %
BILIRUB DIRECT SERPL-MCNC: <0.1 MG/DL (ref 0–0.2)
BILIRUB SERPL-MCNC: 0.3 MG/DL (ref 0.1–2)
BUN BLD-MCNC: 10 MG/DL (ref 7–18)
CALCIUM BLD-MCNC: 8.2 MG/DL (ref 8.5–10.1)
CHLORIDE SERPL-SCNC: 107 MMOL/L (ref 98–112)
CO2 SERPL-SCNC: 27 MMOL/L (ref 21–32)
CREAT BLD-MCNC: 0.68 MG/DL
CRP SERPL-MCNC: <0.29 MG/DL (ref ?–0.3)
EOSINOPHIL # BLD AUTO: 0.2 X10(3) UL (ref 0–0.7)
EOSINOPHIL NFR BLD AUTO: 3.1 %
ERYTHROCYTE [DISTWIDTH] IN BLOOD BY AUTOMATED COUNT: 12.2 %
ERYTHROCYTE [SEDIMENTATION RATE] IN BLOOD: 12 MM/HR
FASTING STATUS PATIENT QL REPORTED: NO
GFR SERPLBLD BASED ON 1.73 SQ M-ARVRAT: 111 ML/MIN/1.73M2 (ref 60–?)
GLUCOSE BLD-MCNC: 122 MG/DL (ref 70–99)
HCT VFR BLD AUTO: 38.3 %
HGB BLD-MCNC: 12.9 G/DL
IMM GRANULOCYTES # BLD AUTO: 0.02 X10(3) UL (ref 0–1)
IMM GRANULOCYTES NFR BLD: 0.3 %
LYMPHOCYTES # BLD AUTO: 1.11 X10(3) UL (ref 1–4)
LYMPHOCYTES NFR BLD AUTO: 17.3 %
MCH RBC QN AUTO: 31.5 PG (ref 26–34)
MCHC RBC AUTO-ENTMCNC: 33.7 G/DL (ref 31–37)
MCV RBC AUTO: 93.6 FL
MONOCYTES # BLD AUTO: 0.49 X10(3) UL (ref 0.1–1)
MONOCYTES NFR BLD AUTO: 7.6 %
NEUTROPHILS # BLD AUTO: 4.56 X10 (3) UL (ref 1.5–7.7)
NEUTROPHILS # BLD AUTO: 4.56 X10(3) UL (ref 1.5–7.7)
NEUTROPHILS NFR BLD AUTO: 71.2 %
OSMOLALITY SERPL CALC.SUM OF ELEC: 284 MOSM/KG (ref 275–295)
PLATELET # BLD AUTO: 200 10(3)UL (ref 150–450)
POTASSIUM SERPL-SCNC: 4.5 MMOL/L (ref 3.5–5.1)
PROT SERPL-MCNC: 6.3 G/DL (ref 6.4–8.2)
RBC # BLD AUTO: 4.09 X10(6)UL
SODIUM SERPL-SCNC: 137 MMOL/L (ref 136–145)
VIT B12 SERPL-MCNC: 938 PG/ML (ref 193–986)
WBC # BLD AUTO: 6.4 X10(3) UL (ref 4–11)

## 2023-03-16 PROCEDURE — 99243 OFF/OP CNSLTJ NEW/EST LOW 30: CPT | Performed by: INTERNAL MEDICINE

## 2023-03-16 RX ORDER — PYRAZINAMIDE 500 MG/1
1 TABLET ORAL 2 TIMES DAILY PRN
Qty: 10 TABLET | Refills: 0 | Status: SHIPPED | OUTPATIENT
Start: 2023-03-16

## 2023-03-16 NOTE — PROGRESS NOTES
Education Record    Learner:  Patient    Disease / Rissa Loud labs,   Elevated B12 GI referred    Barriers / Limitations:  None   Comments:    Method:  Discussion   Comments:    General Topics:  Plan of care reviewed   Comments:    Outcome:  Shows understanding   Comments:

## 2023-03-17 RX ORDER — TRAZODONE HYDROCHLORIDE 50 MG/1
TABLET ORAL
Qty: 120 TABLET | Refills: 0 | Status: SHIPPED | OUTPATIENT
Start: 2023-03-17

## 2023-03-18 DIAGNOSIS — G44.221 CHRONIC TENSION-TYPE HEADACHE, INTRACTABLE: ICD-10-CM

## 2023-03-21 RX ORDER — BUTALBITAL/ACETAMINOPHEN 50MG-325MG
TABLET ORAL
Qty: 15 TABLET | Refills: 0 | Status: SHIPPED | OUTPATIENT
Start: 2023-03-21

## 2023-03-22 ENCOUNTER — OFFICE VISIT (OUTPATIENT)
Dept: HEMATOLOGY/ONCOLOGY | Age: 43
End: 2023-03-22
Attending: INTERNAL MEDICINE
Payer: COMMERCIAL

## 2023-03-22 VITALS
DIASTOLIC BLOOD PRESSURE: 78 MMHG | TEMPERATURE: 98 F | RESPIRATION RATE: 16 BRPM | HEART RATE: 92 BPM | SYSTOLIC BLOOD PRESSURE: 121 MMHG | OXYGEN SATURATION: 100 %

## 2023-03-22 DIAGNOSIS — E88.01 ALPHA-1-ANTITRYPSIN DEFICIENCY (HCC): Primary | ICD-10-CM

## 2023-03-22 DIAGNOSIS — F41.1 GAD (GENERALIZED ANXIETY DISORDER): ICD-10-CM

## 2023-03-22 PROCEDURE — 96365 THER/PROPH/DIAG IV INF INIT: CPT

## 2023-03-22 RX ORDER — BUSPIRONE HYDROCHLORIDE 5 MG/1
TABLET ORAL
Qty: 90 TABLET | Refills: 2 | Status: SHIPPED | OUTPATIENT
Start: 2023-03-22

## 2023-03-22 RX ORDER — METHYLPREDNISOLONE SODIUM SUCCINATE 40 MG/ML
40 INJECTION, POWDER, LYOPHILIZED, FOR SOLUTION INTRAMUSCULAR; INTRAVENOUS ONCE
OUTPATIENT
Start: 2023-03-29

## 2023-03-22 RX ORDER — FAMOTIDINE 10 MG/ML
20 INJECTION, SOLUTION INTRAVENOUS ONCE
OUTPATIENT
Start: 2023-03-29

## 2023-03-22 RX ORDER — DIPHENHYDRAMINE HYDROCHLORIDE 50 MG/ML
25 INJECTION INTRAMUSCULAR; INTRAVENOUS ONCE
OUTPATIENT
Start: 2023-03-29

## 2023-03-22 RX ORDER — MEPERIDINE HYDROCHLORIDE 25 MG/ML
25 INJECTION INTRAMUSCULAR; INTRAVENOUS; SUBCUTANEOUS ONCE
OUTPATIENT
Start: 2023-03-29

## 2023-03-22 RX ORDER — METHYLPREDNISOLONE SODIUM SUCCINATE 125 MG/2ML
125 INJECTION, POWDER, LYOPHILIZED, FOR SOLUTION INTRAMUSCULAR; INTRAVENOUS ONCE
OUTPATIENT
Start: 2023-03-29

## 2023-03-22 NOTE — PROGRESS NOTES
Education Record    Learner:  Patient    Disease / Diagnosis:pt here for prolastin    Barriers / Limitations:  None    Method:  Brief focused, printed material and  reinforcement    General Topics:  Plan of care reviewed    Outcome:  Shows understanding  Pt tolerated treatment with no c/o.  Pt confirmed that today is her last treatment at [place] [id number]

## 2023-03-24 ENCOUNTER — OFFICE VISIT (OUTPATIENT)
Dept: FAMILY MEDICINE CLINIC | Facility: CLINIC | Age: 43
End: 2023-03-24
Payer: COMMERCIAL

## 2023-03-24 VITALS
BODY MASS INDEX: 27.43 KG/M2 | SYSTOLIC BLOOD PRESSURE: 128 MMHG | RESPIRATION RATE: 18 BRPM | OXYGEN SATURATION: 98 % | HEIGHT: 68 IN | WEIGHT: 181 LBS | HEART RATE: 98 BPM | TEMPERATURE: 99 F | DIASTOLIC BLOOD PRESSURE: 78 MMHG

## 2023-03-24 DIAGNOSIS — J01.21 ACUTE RECURRENT ETHMOIDAL SINUSITIS: Primary | ICD-10-CM

## 2023-03-24 DIAGNOSIS — B37.0 THRUSH: ICD-10-CM

## 2023-03-24 DIAGNOSIS — R07.89 LEFT-SIDED CHEST WALL PAIN: ICD-10-CM

## 2023-03-24 DIAGNOSIS — E88.01 ALPHA-1-ANTITRYPSIN DEFICIENCY (HCC): ICD-10-CM

## 2023-03-24 DIAGNOSIS — R05.3 CHRONIC COUGH: ICD-10-CM

## 2023-03-24 DIAGNOSIS — J42: ICD-10-CM

## 2023-03-24 DIAGNOSIS — G44.221 CHRONIC TENSION-TYPE HEADACHE, INTRACTABLE: ICD-10-CM

## 2023-03-24 DIAGNOSIS — J45.998 ASTHMA, PERSISTENT CONTROLLED: ICD-10-CM

## 2023-03-24 PROCEDURE — 3078F DIAST BP <80 MM HG: CPT | Performed by: FAMILY MEDICINE

## 2023-03-24 PROCEDURE — 99214 OFFICE O/P EST MOD 30 MIN: CPT | Performed by: FAMILY MEDICINE

## 2023-03-24 PROCEDURE — 3074F SYST BP LT 130 MM HG: CPT | Performed by: FAMILY MEDICINE

## 2023-03-24 PROCEDURE — 3008F BODY MASS INDEX DOCD: CPT | Performed by: FAMILY MEDICINE

## 2023-03-24 RX ORDER — AMOXICILLIN AND CLAVULANATE POTASSIUM 875; 125 MG/1; MG/1
1 TABLET, FILM COATED ORAL 2 TIMES DAILY
Qty: 20 TABLET | Refills: 0 | Status: SHIPPED | OUTPATIENT
Start: 2023-03-24 | End: 2023-04-03

## 2023-03-24 RX ORDER — GUAIFENESIN/DEXTROMETHORPHAN 600MG-30MG
1 TABLET, EXTENDED RELEASE 12 HR ORAL EVERY 12 HOURS
COMMUNITY

## 2023-03-24 RX ORDER — PYRAZINAMIDE 500 MG/1
TABLET ORAL 2 TIMES DAILY PRN
Qty: 10 TABLET | Refills: 0 | Status: SHIPPED | OUTPATIENT
Start: 2023-03-24

## 2023-03-26 PROBLEM — R63.4 WEIGHT LOSS: Status: RESOLVED | Noted: 2022-03-03 | Resolved: 2023-03-26

## 2023-03-26 PROBLEM — J01.21 ACUTE RECURRENT ETHMOIDAL SINUSITIS: Status: ACTIVE | Noted: 2023-03-26

## 2023-03-26 PROBLEM — J42: Status: ACTIVE | Noted: 2023-03-26

## 2023-03-26 PROBLEM — R05.3 CHRONIC COUGH: Status: ACTIVE | Noted: 2019-03-06

## 2023-03-27 ENCOUNTER — PATIENT MESSAGE (OUTPATIENT)
Dept: FAMILY MEDICINE CLINIC | Facility: CLINIC | Age: 43
End: 2023-03-27

## 2023-03-27 DIAGNOSIS — R05.3 CHRONIC COUGH: ICD-10-CM

## 2023-03-28 RX ORDER — PYRAZINAMIDE 500 MG/1
1 TABLET ORAL 2 TIMES DAILY PRN
Qty: 10 TABLET | Refills: 0 | Status: SHIPPED | OUTPATIENT
Start: 2023-03-28 | End: 2023-04-03

## 2023-03-28 NOTE — TELEPHONE ENCOUNTER
From: Sandra Esparza  To: Terrance Tate PA-C  Sent: 3/27/2023 9:42 AM CDT  Subject: Vilma Sherwood,  Unfortunately, I am still struggling. I still have a lot of drainage, which is causing me to cough a lot. It is a lot less after I take a Tyelnol #3, but that only lasts 4-5 hours, and then I cough consistently until I take another one before bed. Right now, I am taking one in the morning and one at night. The cough is still more bronchial and sounds the same as it did on Friday when I saw you. However, I just do not feel very well still. The rib in my chest also hurts quite a bit. I think I poped it out a bit more as it can be found much easier on the outside than on Friday. Is it time for me to start to steriods? Also, I am going to be out of the Tylenol #3 tomorrow. Can you send more in? I do not love that idea, but I need to get this under control and kick this before I get on a plane on Monday. Also, if you think I should start the steriod, how much should I take? I have it at home and I believe they are either 10 or 20 mg pills.     Thanks,   Zesehan Dsouza

## 2023-03-28 NOTE — TELEPHONE ENCOUNTER
Okay for refill on the codeine will send over.     If she has enough prednisone at home have her start 40 mg 3 days then 20 mg for 3 days then 10 mg for 3 days

## 2023-03-29 ENCOUNTER — APPOINTMENT (OUTPATIENT)
Dept: HEMATOLOGY/ONCOLOGY | Age: 43
End: 2023-03-29
Attending: INTERNAL MEDICINE
Payer: COMMERCIAL

## 2023-03-30 DIAGNOSIS — G89.29 ABDOMINAL PAIN, CHRONIC, LEFT UPPER QUADRANT: ICD-10-CM

## 2023-03-30 DIAGNOSIS — M53.3 SACROILIAC JOINT DYSFUNCTION OF LEFT SIDE: ICD-10-CM

## 2023-03-30 DIAGNOSIS — R10.12 ABDOMINAL PAIN, CHRONIC, LEFT UPPER QUADRANT: ICD-10-CM

## 2023-03-30 DIAGNOSIS — F41.1 GAD (GENERALIZED ANXIETY DISORDER): ICD-10-CM

## 2023-03-31 RX ORDER — PREGABALIN 75 MG/1
CAPSULE ORAL
Qty: 90 CAPSULE | Refills: 0 | Status: SHIPPED | OUTPATIENT
Start: 2023-03-31

## 2023-03-31 RX ORDER — ALPRAZOLAM 0.25 MG/1
TABLET ORAL
Qty: 60 TABLET | Refills: 0 | Status: SHIPPED | OUTPATIENT
Start: 2023-03-31

## 2023-04-03 ENCOUNTER — OFFICE VISIT (OUTPATIENT)
Dept: FAMILY MEDICINE CLINIC | Facility: CLINIC | Age: 43
End: 2023-04-03
Payer: COMMERCIAL

## 2023-04-03 VITALS
HEART RATE: 98 BPM | TEMPERATURE: 100 F | WEIGHT: 186 LBS | BODY MASS INDEX: 28.19 KG/M2 | OXYGEN SATURATION: 98 % | SYSTOLIC BLOOD PRESSURE: 132 MMHG | RESPIRATION RATE: 18 BRPM | DIASTOLIC BLOOD PRESSURE: 78 MMHG | HEIGHT: 68 IN

## 2023-04-03 DIAGNOSIS — J45.998 ASTHMA, PERSISTENT CONTROLLED: ICD-10-CM

## 2023-04-03 DIAGNOSIS — E88.01 ALPHA-1-ANTITRYPSIN DEFICIENCY (HCC): ICD-10-CM

## 2023-04-03 DIAGNOSIS — J40 BRONCHIAL INFECTION: Primary | ICD-10-CM

## 2023-04-03 DIAGNOSIS — J42: ICD-10-CM

## 2023-04-03 DIAGNOSIS — R05.3 CHRONIC COUGH: ICD-10-CM

## 2023-04-03 RX ORDER — PYRAZINAMIDE 500 MG/1
1 TABLET ORAL 2 TIMES DAILY PRN
Qty: 20 TABLET | Refills: 0 | Status: SHIPPED | OUTPATIENT
Start: 2023-04-03 | End: 2023-04-10

## 2023-04-04 PROBLEM — R35.0 URINARY FREQUENCY: Status: RESOLVED | Noted: 2023-02-11 | Resolved: 2023-04-04

## 2023-04-05 DIAGNOSIS — R10.12 LEFT UPPER QUADRANT ABDOMINAL PAIN: ICD-10-CM

## 2023-04-05 DIAGNOSIS — R11.0 NAUSEA: ICD-10-CM

## 2023-04-05 RX ORDER — ONDANSETRON HYDROCHLORIDE 8 MG/1
TABLET, FILM COATED ORAL
Qty: 30 TABLET | Refills: 1 | Status: SHIPPED | OUTPATIENT
Start: 2023-04-05

## 2023-04-10 ENCOUNTER — PATIENT MESSAGE (OUTPATIENT)
Dept: FAMILY MEDICINE CLINIC | Facility: CLINIC | Age: 43
End: 2023-04-10

## 2023-04-10 ENCOUNTER — OFFICE VISIT (OUTPATIENT)
Dept: FAMILY MEDICINE CLINIC | Facility: CLINIC | Age: 43
End: 2023-04-10
Payer: COMMERCIAL

## 2023-04-10 VITALS
DIASTOLIC BLOOD PRESSURE: 70 MMHG | SYSTOLIC BLOOD PRESSURE: 128 MMHG | HEIGHT: 67.72 IN | OXYGEN SATURATION: 96 % | HEART RATE: 88 BPM | WEIGHT: 189 LBS | TEMPERATURE: 98 F | BODY MASS INDEX: 28.98 KG/M2

## 2023-04-10 DIAGNOSIS — R05.3 CHRONIC COUGH: Primary | ICD-10-CM

## 2023-04-10 DIAGNOSIS — E88.01 ALPHA-1-ANTITRYPSIN DEFICIENCY (HCC): ICD-10-CM

## 2023-04-10 DIAGNOSIS — J42: ICD-10-CM

## 2023-04-10 DIAGNOSIS — J45.998 ASTHMA, PERSISTENT CONTROLLED: ICD-10-CM

## 2023-04-10 DIAGNOSIS — J40 BRONCHIAL INFECTION: ICD-10-CM

## 2023-04-10 RX ORDER — BENZONATATE 200 MG/1
200 CAPSULE ORAL 3 TIMES DAILY PRN
Qty: 30 CAPSULE | Refills: 0 | Status: SHIPPED | OUTPATIENT
Start: 2023-04-10

## 2023-04-10 RX ORDER — FLUTICASONE PROPIONATE 50 MCG
1 SPRAY, SUSPENSION (ML) NASAL 2 TIMES DAILY
COMMUNITY

## 2023-04-10 RX ORDER — PYRAZINAMIDE 500 MG/1
1 TABLET ORAL 3 TIMES DAILY PRN
Qty: 20 TABLET | Refills: 0 | Status: SHIPPED | OUTPATIENT
Start: 2023-04-10

## 2023-04-12 ENCOUNTER — HOSPITAL ENCOUNTER (OUTPATIENT)
Dept: GENERAL RADIOLOGY | Age: 43
Discharge: HOME | End: 2023-04-12
Attending: FAMILY MEDICINE
Payer: COMMERCIAL

## 2023-04-12 DIAGNOSIS — R05.3 CHRONIC COUGH: ICD-10-CM

## 2023-04-12 PROCEDURE — 71046 X-RAY EXAM CHEST 2 VIEWS: CPT | Performed by: FAMILY MEDICINE

## 2023-04-12 NOTE — TELEPHONE ENCOUNTER
From: Jakob Olvera  To: Aicha Johnson MD  Sent: 4/10/2023 7:21 PM CDT  Subject: Medication did not go to pharmacy     Hi Dr. Arianna Weir,  I just wanted to let you know that the Tylenol #3 never went over to the pharmacy today. Called to see if they received and order, and they said no. I get prescriptions from the [place] [address]  Phone number [phone]  Hernesto Burdick

## 2023-04-13 DIAGNOSIS — G44.221 CHRONIC TENSION-TYPE HEADACHE, INTRACTABLE: ICD-10-CM

## 2023-04-14 ENCOUNTER — PATIENT MESSAGE (OUTPATIENT)
Dept: FAMILY MEDICINE CLINIC | Facility: CLINIC | Age: 43
End: 2023-04-14

## 2023-04-14 RX ORDER — BUTALBITAL/ACETAMINOPHEN 50MG-325MG
TABLET ORAL
Qty: 15 TABLET | Refills: 0 | Status: SHIPPED | OUTPATIENT
Start: 2023-04-14

## 2023-04-14 RX ORDER — TRAZODONE HYDROCHLORIDE 50 MG/1
TABLET ORAL
Qty: 120 TABLET | Refills: 0 | Status: SHIPPED | OUTPATIENT
Start: 2023-04-14

## 2023-04-14 NOTE — TELEPHONE ENCOUNTER
From: Del Conteh  To: Chris Gorman PA-C  Sent: 4/14/2023 2:19 PM CDT  Subject: Lisa Mayo,   I came back from [place] Break with a summons for [employer]. This would take place for 8 weeks and would be Monday - Thursday from 8:30 -5:00 in the [facility]. I have a message into the pulminologist asking for a letter to excuse me as I have my infusions on Tuesday at 4:30, and would not be able to get my infusions for 8 weeks in the event that I am chosen. However, I need to have the request for being excused faxed no later than Friday. If I do not hear back from the pulminologist in ample time, are you able to write a letter for me stating that I have a medical condition, what it is, and that it requires weekly infusions and if I am required to attend jury duty, I will be unable to get those infusions. That would then have a significant effect on my health?  Feel free to call me with any questions [phone]    Thank you,   Daily Yoder

## 2023-04-16 ENCOUNTER — OFFICE VISIT (OUTPATIENT)
Dept: FAMILY MEDICINE CLINIC | Facility: CLINIC | Age: 43
End: 2023-04-16
Payer: COMMERCIAL

## 2023-04-16 VITALS
OXYGEN SATURATION: 97 % | DIASTOLIC BLOOD PRESSURE: 70 MMHG | TEMPERATURE: 97 F | HEIGHT: 68.5 IN | SYSTOLIC BLOOD PRESSURE: 109 MMHG | BODY MASS INDEX: 26.97 KG/M2 | HEART RATE: 64 BPM | RESPIRATION RATE: 16 BRPM | WEIGHT: 180 LBS

## 2023-04-16 DIAGNOSIS — J02.9 SORE THROAT: ICD-10-CM

## 2023-04-16 DIAGNOSIS — R09.82 POST-NASAL DRAINAGE: Primary | ICD-10-CM

## 2023-04-16 LAB
CONTROL LINE PRESENT WITH A CLEAR BACKGROUND (YES/NO): YES YES/NO
KIT LOT #: NORMAL NUMERIC
STREP GRP A CUL-SCR: NEGATIVE

## 2023-04-16 PROCEDURE — 3008F BODY MASS INDEX DOCD: CPT

## 2023-04-16 PROCEDURE — 87880 STREP A ASSAY W/OPTIC: CPT

## 2023-04-16 PROCEDURE — 87081 CULTURE SCREEN ONLY: CPT

## 2023-04-16 PROCEDURE — 3078F DIAST BP <80 MM HG: CPT

## 2023-04-16 PROCEDURE — 3074F SYST BP LT 130 MM HG: CPT

## 2023-04-16 PROCEDURE — 99213 OFFICE O/P EST LOW 20 MIN: CPT

## 2023-04-18 DIAGNOSIS — J45.50 SEVERE PERSISTENT ASTHMA WITHOUT COMPLICATION: ICD-10-CM

## 2023-04-18 RX ORDER — ALBUTEROL SULFATE 90 UG/1
AEROSOL, METERED RESPIRATORY (INHALATION)
Qty: 8.5 G | Refills: 1 | Status: SHIPPED | OUTPATIENT
Start: 2023-04-18

## 2023-04-26 ENCOUNTER — PATIENT MESSAGE (OUTPATIENT)
Dept: FAMILY MEDICINE CLINIC | Facility: CLINIC | Age: 43
End: 2023-04-26

## 2023-04-26 DIAGNOSIS — R10.12 ABDOMINAL PAIN, CHRONIC, LEFT UPPER QUADRANT: ICD-10-CM

## 2023-04-26 DIAGNOSIS — G89.29 ABDOMINAL PAIN, CHRONIC, LEFT UPPER QUADRANT: ICD-10-CM

## 2023-04-26 DIAGNOSIS — F41.1 GAD (GENERALIZED ANXIETY DISORDER): ICD-10-CM

## 2023-04-26 DIAGNOSIS — M53.3 SACROILIAC JOINT DYSFUNCTION OF LEFT SIDE: ICD-10-CM

## 2023-04-27 RX ORDER — ALPRAZOLAM 0.25 MG/1
TABLET ORAL
Qty: 60 TABLET | Refills: 0 | Status: SHIPPED | OUTPATIENT
Start: 2023-04-27

## 2023-04-27 RX ORDER — PREGABALIN 75 MG/1
CAPSULE ORAL
Qty: 90 CAPSULE | Refills: 0 | Status: SHIPPED | OUTPATIENT
Start: 2023-04-27

## 2023-04-27 NOTE — TELEPHONE ENCOUNTER
From: Rosie Hawk  To: Alejandra Burns PA-C  Sent: 4/26/2023 8:30 AM CDT  Subject: Brianna Suarez,   For about the last two weeks, I have had horrible headaches. So bad that my vision at times also if effected. I am taking ibprofen multiple times a day so that I do not take more than 3-4 of the butalbital a week. I did take Monday off work to see if resting throughout the day woudl help, but no luck there. I am still seeing the chiropractor weekly and doing the exercises, but the headaches are still really bad. I am sure that the up and down weather is playing a huge role in me getting them everyday. I also am trying to get into the dentist as two of my molars hurt really bad. So, I am wonderig if that is playing a role as well. I am also congested again and coughing a bt. I am still doing the nose sprays, the allergy medication and such, but I am at a loss of what else to do at this point. I know I can not continue taking 4+ of ibprofen a day, but I also need to be able to do function properly. Is there anything you can do?      Thank you,   Tremaine Landry

## 2023-04-30 ENCOUNTER — APPOINTMENT (OUTPATIENT)
Dept: GENERAL RADIOLOGY | Facility: HOSPITAL | Age: 43
End: 2023-04-30
Attending: EMERGENCY MEDICINE
Payer: COMMERCIAL

## 2023-04-30 ENCOUNTER — HOSPITAL ENCOUNTER (EMERGENCY)
Age: 43
Discharge: STILL ADMITTED OR ADMINISTRATIVE | End: 2023-04-30
Payer: COMMERCIAL

## 2023-04-30 ENCOUNTER — HOSPITAL ENCOUNTER (EMERGENCY)
Facility: HOSPITAL | Age: 43
Discharge: HOME | End: 2023-04-30
Attending: EMERGENCY MEDICINE
Payer: COMMERCIAL

## 2023-04-30 VITALS
DIASTOLIC BLOOD PRESSURE: 67 MMHG | WEIGHT: 180 LBS | TEMPERATURE: 99 F | OXYGEN SATURATION: 99 % | HEIGHT: 68 IN | BODY MASS INDEX: 27.28 KG/M2 | HEART RATE: 96 BPM | SYSTOLIC BLOOD PRESSURE: 116 MMHG | RESPIRATION RATE: 20 BRPM

## 2023-04-30 DIAGNOSIS — R05.3 CHRONIC COUGH: Primary | ICD-10-CM

## 2023-04-30 LAB
ADENOVIRUS PCR:: NOT DETECTED
ALBUMIN SERPL-MCNC: 3.6 G/DL (ref 3.4–5)
ALBUMIN/GLOB SERPL: 1.1 {RATIO} (ref 1–2)
ALP LIVER SERPL-CCNC: 72 U/L
ALT SERPL-CCNC: 26 U/L
ANION GAP SERPL CALC-SCNC: 3 MMOL/L (ref 0–18)
AST SERPL-CCNC: 22 U/L (ref 15–37)
B PARAPERT DNA SPEC QL NAA+PROBE: NOT DETECTED
B PERT DNA SPEC QL NAA+PROBE: NOT DETECTED
B-HCG UR QL: NEGATIVE
BASOPHILS # BLD AUTO: 0.05 X10(3) UL (ref 0–0.2)
BASOPHILS NFR BLD AUTO: 0.7 %
BILIRUB SERPL-MCNC: 0.2 MG/DL (ref 0.1–2)
BUN BLD-MCNC: 16 MG/DL (ref 7–18)
C PNEUM DNA SPEC QL NAA+PROBE: NOT DETECTED
CALCIUM BLD-MCNC: 8.9 MG/DL (ref 8.5–10.1)
CHLORIDE SERPL-SCNC: 107 MMOL/L (ref 98–112)
CO2 SERPL-SCNC: 31 MMOL/L (ref 21–32)
CORONAVIRUS 229E PCR:: NOT DETECTED
CORONAVIRUS HKU1 PCR:: NOT DETECTED
CORONAVIRUS NL63 PCR:: NOT DETECTED
CORONAVIRUS OC43 PCR:: NOT DETECTED
CREAT BLD-MCNC: 0.77 MG/DL
D DIMER PPP FEU-MCNC: <0.27 UG/ML FEU (ref ?–0.5)
EOSINOPHIL # BLD AUTO: 0.19 X10(3) UL (ref 0–0.7)
EOSINOPHIL NFR BLD AUTO: 2.7 %
ERYTHROCYTE [DISTWIDTH] IN BLOOD BY AUTOMATED COUNT: 12.2 %
FLUAV RNA SPEC QL NAA+PROBE: NOT DETECTED
FLUBV RNA SPEC QL NAA+PROBE: NOT DETECTED
GFR SERPLBLD BASED ON 1.73 SQ M-ARVRAT: 99 ML/MIN/1.73M2 (ref 60–?)
GLOBULIN PLAS-MCNC: 3.2 G/DL (ref 2.8–4.4)
GLUCOSE BLD-MCNC: 94 MG/DL (ref 70–99)
HCT VFR BLD AUTO: 39.7 %
HGB BLD-MCNC: 13.4 G/DL
IMM GRANULOCYTES # BLD AUTO: 0.01 X10(3) UL (ref 0–1)
IMM GRANULOCYTES NFR BLD: 0.1 %
LYMPHOCYTES # BLD AUTO: 2.44 X10(3) UL (ref 1–4)
LYMPHOCYTES NFR BLD AUTO: 34.6 %
MCH RBC QN AUTO: 31.2 PG (ref 26–34)
MCHC RBC AUTO-ENTMCNC: 33.8 G/DL (ref 31–37)
MCV RBC AUTO: 92.5 FL
METAPNEUMOVIRUS PCR:: NOT DETECTED
MONOCYTES # BLD AUTO: 0.77 X10(3) UL (ref 0.1–1)
MONOCYTES NFR BLD AUTO: 10.9 %
MYCOPLASMA PNEUMONIA PCR:: NOT DETECTED
NEUTROPHILS # BLD AUTO: 3.6 X10 (3) UL (ref 1.5–7.7)
NEUTROPHILS # BLD AUTO: 3.6 X10(3) UL (ref 1.5–7.7)
NEUTROPHILS NFR BLD AUTO: 51 %
OSMOLALITY SERPL CALC.SUM OF ELEC: 293 MOSM/KG (ref 275–295)
PARAINFLUENZA 1 PCR:: NOT DETECTED
PARAINFLUENZA 2 PCR:: NOT DETECTED
PARAINFLUENZA 3 PCR:: NOT DETECTED
PARAINFLUENZA 4 PCR:: NOT DETECTED
PLATELET # BLD AUTO: 301 10(3)UL (ref 150–450)
POTASSIUM SERPL-SCNC: 4.3 MMOL/L (ref 3.5–5.1)
PROT SERPL-MCNC: 6.8 G/DL (ref 6.4–8.2)
RBC # BLD AUTO: 4.29 X10(6)UL
RHINOVIRUS/ENTERO PCR:: NOT DETECTED
RSV RNA SPEC QL NAA+PROBE: NOT DETECTED
SARS-COV-2 RNA NPH QL NAA+NON-PROBE: NOT DETECTED
SARS-COV-2 RNA RESP QL NAA+PROBE: NOT DETECTED
SODIUM SERPL-SCNC: 141 MMOL/L (ref 136–145)
TROPONIN I HIGH SENSITIVITY: <3 NG/L
WBC # BLD AUTO: 7.1 X10(3) UL (ref 4–11)

## 2023-04-30 PROCEDURE — 94799 UNLISTED PULMONARY SVC/PX: CPT

## 2023-04-30 PROCEDURE — 93010 ELECTROCARDIOGRAM REPORT: CPT

## 2023-04-30 PROCEDURE — 80053 COMPREHEN METABOLIC PANEL: CPT | Performed by: EMERGENCY MEDICINE

## 2023-04-30 PROCEDURE — 99285 EMERGENCY DEPT VISIT HI MDM: CPT

## 2023-04-30 PROCEDURE — 96375 TX/PRO/DX INJ NEW DRUG ADDON: CPT

## 2023-04-30 PROCEDURE — 71045 X-RAY EXAM CHEST 1 VIEW: CPT | Performed by: EMERGENCY MEDICINE

## 2023-04-30 PROCEDURE — 85379 FIBRIN DEGRADATION QUANT: CPT | Performed by: EMERGENCY MEDICINE

## 2023-04-30 PROCEDURE — 93005 ELECTROCARDIOGRAM TRACING: CPT

## 2023-04-30 PROCEDURE — 0202U NFCT DS 22 TRGT SARS-COV-2: CPT | Performed by: EMERGENCY MEDICINE

## 2023-04-30 PROCEDURE — 96374 THER/PROPH/DIAG INJ IV PUSH: CPT

## 2023-04-30 PROCEDURE — 85025 COMPLETE CBC W/AUTO DIFF WBC: CPT | Performed by: EMERGENCY MEDICINE

## 2023-04-30 PROCEDURE — 94640 AIRWAY INHALATION TREATMENT: CPT

## 2023-04-30 PROCEDURE — 81025 URINE PREGNANCY TEST: CPT

## 2023-04-30 PROCEDURE — 96361 HYDRATE IV INFUSION ADD-ON: CPT

## 2023-04-30 PROCEDURE — 84484 ASSAY OF TROPONIN QUANT: CPT | Performed by: EMERGENCY MEDICINE

## 2023-04-30 RX ORDER — HALOPERIDOL 5 MG/ML
2.5 INJECTION INTRAMUSCULAR ONCE
Status: COMPLETED | OUTPATIENT
Start: 2023-04-30 | End: 2023-04-30

## 2023-04-30 RX ORDER — LORAZEPAM 2 MG/ML
1 INJECTION INTRAMUSCULAR ONCE
Status: COMPLETED | OUTPATIENT
Start: 2023-04-30 | End: 2023-04-30

## 2023-04-30 RX ORDER — IPRATROPIUM BROMIDE AND ALBUTEROL SULFATE 2.5; .5 MG/3ML; MG/3ML
3 SOLUTION RESPIRATORY (INHALATION) ONCE
Status: COMPLETED | OUTPATIENT
Start: 2023-04-30 | End: 2023-04-30

## 2023-04-30 RX ORDER — BENZONATATE 100 MG/1
100 CAPSULE ORAL ONCE
Status: COMPLETED | OUTPATIENT
Start: 2023-04-30 | End: 2023-04-30

## 2023-04-30 RX ORDER — ACETAMINOPHEN AND CODEINE PHOSPHATE 300; 30 MG/1; MG/1
1 TABLET ORAL ONCE
Status: COMPLETED | OUTPATIENT
Start: 2023-04-30 | End: 2023-04-30

## 2023-04-30 RX ORDER — DEXAMETHASONE SODIUM PHOSPHATE 10 MG/ML
10 INJECTION, SOLUTION INTRAMUSCULAR; INTRAVENOUS ONCE
Status: COMPLETED | OUTPATIENT
Start: 2023-04-30 | End: 2023-04-30

## 2023-04-30 RX ORDER — HALOPERIDOL 5 MG/ML
5 INJECTION INTRAMUSCULAR ONCE
Status: DISCONTINUED | OUTPATIENT
Start: 2023-04-30 | End: 2023-04-30

## 2023-04-30 NOTE — ED QUICK NOTES
Patient notified of possible CT scan and need to establish new IV since previous one was accidentally removed by patient    Patient states Paige Garrett there any other tests we could try because I have had over 30 CTs in my life\"    Notified MD, will come reassess and speak to patient  No further orders at this time    Will hold off from new PIV placement at this time

## 2023-04-30 NOTE — ED INITIAL ASSESSMENT (HPI)
A&Ox3 patient bib  from home for c/o cough starting this morning and then started developing L sided \"throbbing\" chest pain, non-radiating    Patient endorses pmh asthma    Tachypnic, +cough, speaking in full clear sentences

## 2023-05-01 ENCOUNTER — PATIENT MESSAGE (OUTPATIENT)
Dept: FAMILY MEDICINE CLINIC | Facility: CLINIC | Age: 43
End: 2023-05-01

## 2023-05-01 LAB
ATRIAL RATE: 104 BPM
P AXIS: 0 DEGREES
P-R INTERVAL: 120 MS
Q-T INTERVAL: 342 MS
QRS DURATION: 114 MS
QTC CALCULATION (BEZET): 449 MS
R AXIS: -11 DEGREES
T AXIS: 25 DEGREES
VENTRICULAR RATE: 104 BPM

## 2023-05-03 ENCOUNTER — OFFICE VISIT (OUTPATIENT)
Dept: FAMILY MEDICINE CLINIC | Facility: CLINIC | Age: 43
End: 2023-05-03
Payer: COMMERCIAL

## 2023-05-03 VITALS
OXYGEN SATURATION: 98 % | WEIGHT: 185 LBS | HEART RATE: 104 BPM | SYSTOLIC BLOOD PRESSURE: 124 MMHG | RESPIRATION RATE: 18 BRPM | HEIGHT: 68 IN | TEMPERATURE: 99 F | DIASTOLIC BLOOD PRESSURE: 68 MMHG | BODY MASS INDEX: 28.04 KG/M2

## 2023-05-03 DIAGNOSIS — R77.0 LOW SERUM ALBUMIN: ICD-10-CM

## 2023-05-03 DIAGNOSIS — R05.3 CHRONIC COUGH: ICD-10-CM

## 2023-05-03 DIAGNOSIS — E88.01 ALPHA-1-ANTITRYPSIN DEFICIENCY (HCC): Primary | ICD-10-CM

## 2023-05-03 DIAGNOSIS — R76.8 LOW SERUM IGG FOR AGE: ICD-10-CM

## 2023-05-03 PROCEDURE — 99215 OFFICE O/P EST HI 40 MIN: CPT | Performed by: FAMILY MEDICINE

## 2023-05-03 PROCEDURE — 87880 STREP A ASSAY W/OPTIC: CPT | Performed by: FAMILY MEDICINE

## 2023-05-03 PROCEDURE — 3008F BODY MASS INDEX DOCD: CPT | Performed by: FAMILY MEDICINE

## 2023-05-03 PROCEDURE — 3078F DIAST BP <80 MM HG: CPT | Performed by: FAMILY MEDICINE

## 2023-05-03 PROCEDURE — 3074F SYST BP LT 130 MM HG: CPT | Performed by: FAMILY MEDICINE

## 2023-05-03 RX ORDER — PYRAZINAMIDE 500 MG/1
1 TABLET ORAL 2 TIMES DAILY PRN
Qty: 20 TABLET | Refills: 0 | Status: SHIPPED | OUTPATIENT
Start: 2023-05-03

## 2023-05-04 ENCOUNTER — OFFICE VISIT (OUTPATIENT)
Dept: SURGERY | Facility: CLINIC | Age: 43
End: 2023-05-04
Payer: COMMERCIAL

## 2023-05-04 ENCOUNTER — LAB ENCOUNTER (OUTPATIENT)
Dept: LAB | Facility: HOSPITAL | Age: 43
End: 2023-05-04
Attending: FAMILY MEDICINE
Payer: COMMERCIAL

## 2023-05-04 VITALS
HEIGHT: 68.5 IN | HEART RATE: 97 BPM | DIASTOLIC BLOOD PRESSURE: 76 MMHG | BODY MASS INDEX: 28.66 KG/M2 | WEIGHT: 191.31 LBS | OXYGEN SATURATION: 100 % | SYSTOLIC BLOOD PRESSURE: 112 MMHG

## 2023-05-04 DIAGNOSIS — E44.1 MILD PROTEIN-CALORIE MALNUTRITION (HCC): Primary | ICD-10-CM

## 2023-05-04 DIAGNOSIS — F43.9 STRESS: ICD-10-CM

## 2023-05-04 DIAGNOSIS — E88.01 ALPHA-1-ANTITRYPSIN DEFICIENCY (HCC): ICD-10-CM

## 2023-05-04 DIAGNOSIS — L30.4 INTERTRIGO: ICD-10-CM

## 2023-05-04 DIAGNOSIS — E77.8 HYPOPROTEINEMIA (HCC): ICD-10-CM

## 2023-05-04 DIAGNOSIS — R63.5 WEIGHT GAIN: ICD-10-CM

## 2023-05-04 DIAGNOSIS — R63.2 BINGE EATING: ICD-10-CM

## 2023-05-04 DIAGNOSIS — E66.3 OVERWEIGHT WITH BODY MASS INDEX (BMI) 25.0-29.9: ICD-10-CM

## 2023-05-04 DIAGNOSIS — R76.8 LOW SERUM IGG FOR AGE: ICD-10-CM

## 2023-05-04 DIAGNOSIS — R77.0 LOW SERUM ALBUMIN: ICD-10-CM

## 2023-05-04 DIAGNOSIS — Z98.84 HISTORY OF ROUX-EN-Y GASTRIC BYPASS: ICD-10-CM

## 2023-05-04 LAB
ALBUMIN SERPL-MCNC: 3.2 G/DL (ref 3.4–5)
ALP LIVER SERPL-CCNC: 69 U/L
ALT SERPL-CCNC: 26 U/L
AST SERPL-CCNC: 17 U/L (ref 15–37)
BILIRUB DIRECT SERPL-MCNC: <0.1 MG/DL (ref 0–0.2)
BILIRUB SERPL-MCNC: 0.1 MG/DL (ref 0.1–2)
IGA SERPL-MCNC: 255 MG/DL (ref 70–312)
IGM SERPL-MCNC: 44.6 MG/DL (ref 43–279)
IMMUNOGLOBULIN PNL SER-MCNC: 717 MG/DL (ref 791–1643)
PROT SERPL-MCNC: 6.2 G/DL (ref 6.4–8.2)

## 2023-05-04 PROCEDURE — 80076 HEPATIC FUNCTION PANEL: CPT | Performed by: FAMILY MEDICINE

## 2023-05-04 PROCEDURE — 3078F DIAST BP <80 MM HG: CPT | Performed by: INTERNAL MEDICINE

## 2023-05-04 PROCEDURE — 3074F SYST BP LT 130 MM HG: CPT | Performed by: INTERNAL MEDICINE

## 2023-05-04 PROCEDURE — 82784 ASSAY IGA/IGD/IGG/IGM EACH: CPT | Performed by: FAMILY MEDICINE

## 2023-05-04 PROCEDURE — 3008F BODY MASS INDEX DOCD: CPT | Performed by: INTERNAL MEDICINE

## 2023-05-04 PROCEDURE — 99214 OFFICE O/P EST MOD 30 MIN: CPT | Performed by: INTERNAL MEDICINE

## 2023-05-04 RX ORDER — NYSTATIN 100000 [USP'U]/G
1 POWDER TOPICAL 4 TIMES DAILY
Qty: 30 G | Refills: 1 | Status: SHIPPED | OUTPATIENT
Start: 2023-05-04

## 2023-05-05 NOTE — PROGRESS NOTES
Immunoglobulin G is low again though the total protein/albumin is low again which can possibly make the immunoglobulin G appear low.   You can always seek a second opinion with a new immunologist but I would suggest first talking with Dr. Chastity Mak the pulmonologist,

## 2023-05-08 ENCOUNTER — OFFICE VISIT (OUTPATIENT)
Dept: FAMILY MEDICINE CLINIC | Facility: CLINIC | Age: 43
End: 2023-05-08
Payer: COMMERCIAL

## 2023-05-08 VITALS
RESPIRATION RATE: 18 BRPM | HEART RATE: 98 BPM | WEIGHT: 186 LBS | HEIGHT: 68.5 IN | BODY MASS INDEX: 27.87 KG/M2 | DIASTOLIC BLOOD PRESSURE: 64 MMHG | SYSTOLIC BLOOD PRESSURE: 118 MMHG | TEMPERATURE: 97 F | OXYGEN SATURATION: 98 %

## 2023-05-08 DIAGNOSIS — K20.90 ESOPHAGITIS: ICD-10-CM

## 2023-05-08 DIAGNOSIS — E77.8 HYPOPROTEINEMIA (HCC): Primary | ICD-10-CM

## 2023-05-08 DIAGNOSIS — K59.01 SLOW TRANSIT CONSTIPATION: ICD-10-CM

## 2023-05-08 DIAGNOSIS — G44.221 CHRONIC TENSION-TYPE HEADACHE, INTRACTABLE: ICD-10-CM

## 2023-05-08 DIAGNOSIS — R77.0 LOW SERUM ALBUMIN: ICD-10-CM

## 2023-05-08 PROCEDURE — 3078F DIAST BP <80 MM HG: CPT | Performed by: FAMILY MEDICINE

## 2023-05-08 PROCEDURE — 99214 OFFICE O/P EST MOD 30 MIN: CPT | Performed by: FAMILY MEDICINE

## 2023-05-08 PROCEDURE — 3008F BODY MASS INDEX DOCD: CPT | Performed by: FAMILY MEDICINE

## 2023-05-08 PROCEDURE — 3074F SYST BP LT 130 MM HG: CPT | Performed by: FAMILY MEDICINE

## 2023-05-08 RX ORDER — BUTALBITAL/ACETAMINOPHEN 50MG-325MG
1 TABLET ORAL 2 TIMES DAILY PRN
Qty: 15 TABLET | Refills: 0 | Status: SHIPPED | OUTPATIENT
Start: 2023-05-08

## 2023-05-08 RX ORDER — TRAZODONE HYDROCHLORIDE 50 MG/1
TABLET ORAL
Qty: 120 TABLET | Refills: 0 | Status: SHIPPED | OUTPATIENT
Start: 2023-05-08

## 2023-05-09 PROBLEM — R63.4 LOSS OF WEIGHT: Status: RESOLVED | Noted: 2023-01-26 | Resolved: 2023-05-09

## 2023-05-09 PROBLEM — J42: Status: RESOLVED | Noted: 2023-03-26 | Resolved: 2023-05-09

## 2023-05-09 PROBLEM — J44.89 CHRONIC BRONCHIOLITIS: Status: RESOLVED | Noted: 2023-01-30 | Resolved: 2023-05-09

## 2023-05-09 PROBLEM — J44.89 CHRONIC BRONCHIOLITIS (HCC): Status: RESOLVED | Noted: 2023-01-30 | Resolved: 2023-05-09

## 2023-05-09 PROBLEM — R05.3 CHRONIC COUGH: Status: RESOLVED | Noted: 2019-03-06 | Resolved: 2023-05-09

## 2023-05-09 PROBLEM — F43.0 ANXIETY AS ACUTE REACTION TO EXCEPTIONAL STRESS: Status: RESOLVED | Noted: 2023-02-21 | Resolved: 2023-05-09

## 2023-05-09 PROBLEM — F43.9 STRESS: Status: RESOLVED | Noted: 2020-04-17 | Resolved: 2023-05-09

## 2023-05-09 PROBLEM — F41.1 ANXIETY AS ACUTE REACTION TO EXCEPTIONAL STRESS: Status: RESOLVED | Noted: 2023-02-21 | Resolved: 2023-05-09

## 2023-05-09 PROBLEM — L30.4 INTERTRIGO: Status: RESOLVED | Noted: 2018-12-17 | Resolved: 2023-05-09

## 2023-05-09 PROBLEM — J44.9 CHRONIC BRONCHIOLITIS: Status: RESOLVED | Noted: 2023-01-30 | Resolved: 2023-05-09

## 2023-05-09 PROBLEM — R63.2 BINGE EATING: Status: RESOLVED | Noted: 2017-06-19 | Resolved: 2023-05-09

## 2023-05-09 PROBLEM — R77.0 LOW SERUM ALBUMIN: Status: RESOLVED | Noted: 2023-05-04 | Resolved: 2023-05-09

## 2023-05-09 PROBLEM — J44.9 CHRONIC BRONCHIOLITIS (HCC): Status: RESOLVED | Noted: 2023-01-30 | Resolved: 2023-05-09

## 2023-05-09 LAB
ALBUMIN SERPL ELPH-MCNC: 3.75 G/DL (ref 3.75–5.21)
ALBUMIN/GLOB SERPL: 1.6 {RATIO} (ref 1–2)
ALPHA1 GLOB SERPL ELPH-MCNC: 0.27 G/DL (ref 0.19–0.46)
ALPHA2 GLOB SERPL ELPH-MCNC: 0.61 G/DL (ref 0.48–1.05)
B-GLOBULIN SERPL ELPH-MCNC: 0.76 G/DL (ref 0.68–1.23)
GAMMA GLOB SERPL ELPH-MCNC: 0.71 G/DL (ref 0.62–1.7)
KAPPA LC FREE SER-MCNC: 2.04 MG/DL (ref 0.33–1.94)
KAPPA LC FREE/LAMBDA FREE SER NEPH: 0.72 {RATIO} (ref 0.26–1.65)
LAMBDA LC FREE SERPL-MCNC: 2.84 MG/DL (ref 0.57–2.63)
PROT SERPL-MCNC: 6.1 G/DL (ref 6.4–8.2)

## 2023-05-09 RX ORDER — TRAZODONE HYDROCHLORIDE 50 MG/1
TABLET ORAL
Qty: 120 TABLET | Refills: 0 | OUTPATIENT
Start: 2023-05-09

## 2023-05-09 RX ORDER — BUTALBITAL/ACETAMINOPHEN 50MG-325MG
TABLET ORAL
Qty: 15 TABLET | Refills: 0 | OUTPATIENT
Start: 2023-05-09

## 2023-05-10 ENCOUNTER — PATIENT MESSAGE (OUTPATIENT)
Dept: FAMILY MEDICINE CLINIC | Facility: CLINIC | Age: 43
End: 2023-05-10

## 2023-05-10 DIAGNOSIS — R76.8 ELEVATED SERUM IMMUNOGLOBULIN FREE LIGHT CHAIN LEVEL: Primary | ICD-10-CM

## 2023-05-11 ENCOUNTER — TELEPHONE (OUTPATIENT)
Dept: FAMILY MEDICINE CLINIC | Facility: CLINIC | Age: 43
End: 2023-05-11

## 2023-05-11 ENCOUNTER — TELEPHONE (OUTPATIENT)
Dept: SURGERY | Facility: CLINIC | Age: 43
End: 2023-05-11

## 2023-05-11 NOTE — TELEPHONE ENCOUNTER
Spoke to patient. She states on the night of May 10th, she was incontinent a large amount of diarrheal stool during the night and did not wake up. Yesterday, did not feel well. Today has had watery stools all day. She also complains of nausea. No vomiting but only able to consume a small amt of liquids due to the nausea. She is also complaining of left lower quadrant abd pain. Rates it #6. Denies fever or chills. Patient states she does have a history of diverticulosis as well as being a gastric bypass patient. I did advise patient proceed to ER for evaluation and treatment. She VU and is in agreement.

## 2023-05-12 ENCOUNTER — HOSPITAL ENCOUNTER (OUTPATIENT)
Dept: GENERAL RADIOLOGY | Age: 43
Discharge: HOME | End: 2023-05-12
Attending: FAMILY MEDICINE
Payer: COMMERCIAL

## 2023-05-12 DIAGNOSIS — K52.9 GASTROENTERITIS: ICD-10-CM

## 2023-05-12 DIAGNOSIS — R10.32 ABDOMINAL PAIN, LEFT LOWER QUADRANT: ICD-10-CM

## 2023-05-12 PROCEDURE — 74019 RADEX ABDOMEN 2 VIEWS: CPT | Performed by: FAMILY MEDICINE

## 2023-05-12 NOTE — TELEPHONE ENCOUNTER
From: Talya Archer  To: Rainer Swete PA-C  Sent: 5/10/2023 12:04 PM CDT  Subject: Phone Call     Darian Zelaya,   I am wondering if you could give me a call. Something happened last night that I am concerned about, but I am not comfortable putting it through [address] [place] [address] [address].  Do not worry, no ER visit. :)  Thank you,   Brooke Quivers   [phone]

## 2023-05-12 NOTE — TELEPHONE ENCOUNTER
Spoke with patient on the phone @ 6:30 PM.  Patient is not having any acute abdominal pain has discomfort in the left lower quadrant and watery diarrhea having several per day for past 24 hours with nausea and vomiting since Wednesday. Did have 1 episode of incontinence fecal at night when this first started. Denies blood or mucus in stools and denies any fevers, chills or body aches. Does state that she feels that she is hydrated and she is not feeling dizzy yet does have Zofran at home took  8 mg and shortly after did throw up but did not see the Zofran in the vomit. Encouraged patient to slowly hydrate with 5 to 10 mL of electrolyte based fluids every couple of minutes as tolerated, and take  probiotic. Differential discussed including gastroenteritis, C. difficile, intestinal obstruction/infection. To the ER if abdominal pain is worsening or symptoms of dehydration including lightheadedness when walking/standing. Patient verbalized understanding will work on hydration. Update tomorrow.

## 2023-05-13 ENCOUNTER — LAB ENCOUNTER (OUTPATIENT)
Dept: LAB | Age: 43
End: 2023-05-13
Attending: FAMILY MEDICINE
Payer: COMMERCIAL

## 2023-05-13 DIAGNOSIS — R10.32 ABDOMINAL PAIN, LEFT LOWER QUADRANT: ICD-10-CM

## 2023-05-13 DIAGNOSIS — K52.9 GASTROENTERITIS: ICD-10-CM

## 2023-05-13 LAB
ALBUMIN SERPL-MCNC: 3.7 G/DL (ref 3.4–5)
ALBUMIN/GLOB SERPL: 1.1 {RATIO} (ref 1–2)
ALP LIVER SERPL-CCNC: 78 U/L
ALT SERPL-CCNC: 23 U/L
ANION GAP SERPL CALC-SCNC: 3 MMOL/L (ref 0–18)
AST SERPL-CCNC: 16 U/L (ref 15–37)
BASOPHILS # BLD AUTO: 0.02 X10(3) UL (ref 0–0.2)
BASOPHILS NFR BLD AUTO: 0.3 %
BILIRUB SERPL-MCNC: 0.3 MG/DL (ref 0.1–2)
BUN BLD-MCNC: 6 MG/DL (ref 7–18)
CALCIUM BLD-MCNC: 8.7 MG/DL (ref 8.5–10.1)
CHLORIDE SERPL-SCNC: 111 MMOL/L (ref 98–112)
CO2 SERPL-SCNC: 24 MMOL/L (ref 21–32)
CREAT BLD-MCNC: 0.72 MG/DL
EOSINOPHIL # BLD AUTO: 0.28 X10(3) UL (ref 0–0.7)
EOSINOPHIL NFR BLD AUTO: 3.8 %
ERYTHROCYTE [DISTWIDTH] IN BLOOD BY AUTOMATED COUNT: 12.8 %
FASTING STATUS PATIENT QL REPORTED: YES
GFR SERPLBLD BASED ON 1.73 SQ M-ARVRAT: 107 ML/MIN/1.73M2 (ref 60–?)
GLOBULIN PLAS-MCNC: 3.3 G/DL (ref 2.8–4.4)
GLUCOSE BLD-MCNC: 83 MG/DL (ref 70–99)
HCT VFR BLD AUTO: 43.5 %
HGB BLD-MCNC: 14.3 G/DL
IMM GRANULOCYTES # BLD AUTO: 0.01 X10(3) UL (ref 0–1)
IMM GRANULOCYTES NFR BLD: 0.1 %
LIPASE SERPL-CCNC: 35 U/L (ref 13–75)
LYMPHOCYTES # BLD AUTO: 1.09 X10(3) UL (ref 1–4)
LYMPHOCYTES NFR BLD AUTO: 14.6 %
MCH RBC QN AUTO: 31.4 PG (ref 26–34)
MCHC RBC AUTO-ENTMCNC: 32.9 G/DL (ref 31–37)
MCV RBC AUTO: 95.4 FL
MONOCYTES # BLD AUTO: 0.38 X10(3) UL (ref 0.1–1)
MONOCYTES NFR BLD AUTO: 5.1 %
NEUTROPHILS # BLD AUTO: 5.67 X10 (3) UL (ref 1.5–7.7)
NEUTROPHILS # BLD AUTO: 5.67 X10(3) UL (ref 1.5–7.7)
NEUTROPHILS NFR BLD AUTO: 76.1 %
OSMOLALITY SERPL CALC.SUM OF ELEC: 283 MOSM/KG (ref 275–295)
PLATELET # BLD AUTO: 223 10(3)UL (ref 150–450)
POTASSIUM SERPL-SCNC: 4 MMOL/L (ref 3.5–5.1)
PROT SERPL-MCNC: 7 G/DL (ref 6.4–8.2)
RBC # BLD AUTO: 4.56 X10(6)UL
SODIUM SERPL-SCNC: 138 MMOL/L (ref 136–145)
WBC # BLD AUTO: 7.5 X10(3) UL (ref 4–11)

## 2023-05-13 PROCEDURE — 80053 COMPREHEN METABOLIC PANEL: CPT | Performed by: FAMILY MEDICINE

## 2023-05-13 PROCEDURE — 85025 COMPLETE CBC W/AUTO DIFF WBC: CPT | Performed by: FAMILY MEDICINE

## 2023-05-13 PROCEDURE — 83690 ASSAY OF LIPASE: CPT | Performed by: FAMILY MEDICINE

## 2023-05-15 DIAGNOSIS — G44.221 CHRONIC TENSION-TYPE HEADACHE, INTRACTABLE: ICD-10-CM

## 2023-05-15 RX ORDER — TIZANIDINE 4 MG/1
TABLET ORAL
Qty: 30 TABLET | Refills: 2 | Status: SHIPPED | OUTPATIENT
Start: 2023-05-15

## 2023-05-15 NOTE — PROGRESS NOTES
Labs are all normal.  Normal kidney function, liver function, lipase, red blood cells and white blood cells.

## 2023-05-22 DIAGNOSIS — R10.12 ABDOMINAL PAIN, CHRONIC, LEFT UPPER QUADRANT: ICD-10-CM

## 2023-05-22 DIAGNOSIS — G89.29 ABDOMINAL PAIN, CHRONIC, LEFT UPPER QUADRANT: ICD-10-CM

## 2023-05-22 DIAGNOSIS — M53.3 SACROILIAC JOINT DYSFUNCTION OF LEFT SIDE: ICD-10-CM

## 2023-05-23 NOTE — PROGRESS NOTES
[facility] Outpatient Program  Group Therapy        Date of Service: 5/23/2023  Start time: 1000  Stop time: 1050  Type of session: Therapy Group    Problem number: 1. Dep F 33.1    Short term goal (STG): B. Pt will identify and share with group behaviors that contribute to depressed mood and identify an alternative behavior and share instances of making healthy choices    Intervention/techniques: Informed, Validated/Supported, Prompted/Cued, Listened/Empathized, Observed/Monitored, and Provided Feedback    Patient mental status/affect: Anxious, Congruent, Flat, and Preoccupied    Patient behavior/appearance: Neatly Groomed, Attentive, Cooperative, and Needed Prompting    Special patient treatment accommodations provided (describe): None    Patient response and progress towards goals: Focus of session was a discussion on what are essentially three stages of treatment/therapy. I discussed with group that the first stage is typically developing insight and awareness about symptoms, reactions and behaviors that we engage in, what triggers us, how we may be vulnerable to triggers and emotions, and looking inside to see how we impact our day to day life in negative and positive ways. We spoke about the 2nd phase is typically gaining knowledge essentially about how to do things in positive ways, how we can change our reactions, behaviors, and set goals for change and how to go about it. Third phase is typically implementation of coping skills and creating positive changes. I spoke with group about how many people get stuck in entering the third phase of doing the work to change and why and we discussed where group members believe that they are and assisted in finding ways forward in their progress of treatment. Mir Alcazar participated in group with prompting. She spoke about how she has noticed that she has been \"in more of a funk again. \"  She spoke about how she is back to watching a lot of TV and playing rx for fioricet has been phoned into pharmacy [facility] [address] you sign off on this rx?

## 2023-05-24 DIAGNOSIS — F41.1 GAD (GENERALIZED ANXIETY DISORDER): ICD-10-CM

## 2023-05-26 RX ORDER — ALPRAZOLAM 0.25 MG/1
TABLET ORAL
Qty: 60 TABLET | Refills: 0 | Status: SHIPPED | OUTPATIENT
Start: 2023-05-26

## 2023-05-26 RX ORDER — PREGABALIN 75 MG/1
CAPSULE ORAL
Qty: 90 CAPSULE | Refills: 0 | Status: SHIPPED | OUTPATIENT
Start: 2023-05-26

## 2023-05-26 NOTE — TELEPHONE ENCOUNTER
Pt called to check status on refill requests. Pt was informed clinical staff are currently working on them and will be taken care of today. Pt voiced understanding.

## 2023-05-30 DIAGNOSIS — G44.221 CHRONIC TENSION-TYPE HEADACHE, INTRACTABLE: ICD-10-CM

## 2023-05-30 RX ORDER — BUTALBITAL/ACETAMINOPHEN 50MG-325MG
TABLET ORAL
Qty: 15 TABLET | Refills: 0 | Status: SHIPPED | OUTPATIENT
Start: 2023-05-30

## 2023-05-31 ENCOUNTER — PATIENT MESSAGE (OUTPATIENT)
Dept: HEMATOLOGY/ONCOLOGY | Age: 43
End: 2023-05-31

## 2023-05-31 ENCOUNTER — PATIENT MESSAGE (OUTPATIENT)
Dept: FAMILY MEDICINE CLINIC | Facility: CLINIC | Age: 43
End: 2023-05-31

## 2023-05-31 DIAGNOSIS — J45.998 ASTHMA, PERSISTENT CONTROLLED: ICD-10-CM

## 2023-05-31 DIAGNOSIS — R76.8 LOW SERUM IGG FOR AGE: Primary | ICD-10-CM

## 2023-05-31 DIAGNOSIS — E88.01 ALPHA-1-ANTITRYPSIN DEFICIENCY (HCC): ICD-10-CM

## 2023-06-05 ENCOUNTER — MED REC SCAN ONLY (OUTPATIENT)
Dept: FAMILY MEDICINE CLINIC | Facility: CLINIC | Age: 43
End: 2023-06-05

## 2023-06-08 PROBLEM — R63.5 WEIGHT GAIN: Status: RESOLVED | Noted: 2019-03-11 | Resolved: 2023-06-08

## 2023-06-09 ENCOUNTER — MED REC SCAN ONLY (OUTPATIENT)
Dept: FAMILY MEDICINE CLINIC | Facility: CLINIC | Age: 43
End: 2023-06-09

## 2023-06-09 ENCOUNTER — PATIENT MESSAGE (OUTPATIENT)
Dept: FAMILY MEDICINE CLINIC | Facility: CLINIC | Age: 43
End: 2023-06-09

## 2023-06-09 DIAGNOSIS — F41.1 GAD (GENERALIZED ANXIETY DISORDER): ICD-10-CM

## 2023-06-09 DIAGNOSIS — M53.3 SACROILIAC JOINT DYSFUNCTION OF LEFT SIDE: ICD-10-CM

## 2023-06-09 DIAGNOSIS — R10.12 ABDOMINAL PAIN, CHRONIC, LEFT UPPER QUADRANT: ICD-10-CM

## 2023-06-09 DIAGNOSIS — G89.29 ABDOMINAL PAIN, CHRONIC, LEFT UPPER QUADRANT: ICD-10-CM

## 2023-06-09 RX ORDER — ALPRAZOLAM 0.25 MG/1
0.25 TABLET ORAL 2 TIMES DAILY
Qty: 60 TABLET | Refills: 0
Start: 2023-06-09

## 2023-06-09 RX ORDER — PREGABALIN 75 MG/1
75 CAPSULE ORAL 3 TIMES DAILY
Qty: 90 CAPSULE | Refills: 0
Start: 2023-06-09

## 2023-06-09 NOTE — TELEPHONE ENCOUNTER
From: France Dee  To: Brady Zamora MD  Sent: 5/31/2023 9:45 AM CDT  Subject: Darian Key,  I saw you a couple of months ago about an elevated B12. My primary doctor also ran a protein serum panel on me and my Lamda and Maypearl Free Light Chain were both high. My doctor recommended I reach out to you to see if this is a concern or not.  Also, do you deal with IGG Test Results, or would I need to go to an immunologist?     Thank you using much,   Mark Vogel

## 2023-06-09 NOTE — TELEPHONE ENCOUNTER
From: Harish Shook  To: Tommas Galeazzi, PA-C  Sent: 6/9/2023 3:14 PM CDT  Subject: Lyrica and Xanax Refills     Hi Daniela,   I also realized that I am going to be in [place] when Xanax and Lyrica need to be refilled. Both are scheduled to be refilled and I will run out about 5 days before I am scheduled to return. How do you want me to handle those? Is there a way for me to request them and have them filled out in [place], because I do not believe those can be transferred. I know the Xanax had strict full dates as it is not allowed to be filled anymore than one day earlier.      Let me know how you want me to handle it,   Josh Lowe

## 2023-06-09 NOTE — TELEPHONE ENCOUNTER
Per my chart message:    Patti Laws,   I also realized that I am going to be in [place] when Xanax and Lyrica need to be refilled. Both are scheduled to be refilled and I will run out about 5 days before I am scheduled to return. How do you want me to handle those? Is there a way for me to request them and have them filled out in [place], because I do not believe those can be transferred. I know the Xanax had strict full dates as it is not allowed to be filled anymore than one day earlier.      Orders pended

## 2023-06-09 NOTE — TELEPHONE ENCOUNTER
Spoke to Ketty and reviewed her recent labs results. Her IgG level is only minimally decreased and has been chronically low; IgG in the 600-700 range since 2021 with normal subclasses. 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 SPEP showed no evidence of monoclonal protein but mild elevation in both serum free kappa and lambda chains with normal ratio consistent with an inflammatory response, and not indicative of a plasma cell disorder. Patient was reassured and advised to monitor her labs and follow up with her care team as directed.

## 2023-06-11 ENCOUNTER — PATIENT MESSAGE (OUTPATIENT)
Dept: FAMILY MEDICINE CLINIC | Facility: CLINIC | Age: 43
End: 2023-06-11

## 2023-06-11 DIAGNOSIS — F41.1 GAD (GENERALIZED ANXIETY DISORDER): ICD-10-CM

## 2023-06-11 DIAGNOSIS — G89.29 ABDOMINAL PAIN, CHRONIC, LEFT UPPER QUADRANT: ICD-10-CM

## 2023-06-11 DIAGNOSIS — R10.12 ABDOMINAL PAIN, CHRONIC, LEFT UPPER QUADRANT: ICD-10-CM

## 2023-06-11 DIAGNOSIS — M53.3 SACROILIAC JOINT DYSFUNCTION OF LEFT SIDE: ICD-10-CM

## 2023-06-12 RX ORDER — PREGABALIN 75 MG/1
75 CAPSULE ORAL 3 TIMES DAILY
Qty: 90 CAPSULE | Refills: 0 | Status: SHIPPED | OUTPATIENT
Start: 2023-06-21

## 2023-06-12 RX ORDER — ALPRAZOLAM 0.25 MG/1
0.25 TABLET ORAL 2 TIMES DAILY
Qty: 60 TABLET | Refills: 0 | Status: SHIPPED | OUTPATIENT
Start: 2023-06-21

## 2023-06-12 NOTE — TELEPHONE ENCOUNTER
Orders pended for Lyrica and Xanax for June 21st, 2023 to [facility] in [place], [place]. Please sign if you approve. Thanks.

## 2023-06-17 RX ORDER — TRAZODONE HYDROCHLORIDE 50 MG/1
100 TABLET ORAL NIGHTLY
Qty: 60 TABLET | Refills: 1 | Status: SHIPPED | OUTPATIENT
Start: 2023-06-17

## 2023-06-24 DIAGNOSIS — R10.12 LEFT UPPER QUADRANT ABDOMINAL PAIN: ICD-10-CM

## 2023-06-24 DIAGNOSIS — R11.0 NAUSEA: ICD-10-CM

## 2023-06-25 RX ORDER — ONDANSETRON HYDROCHLORIDE 8 MG/1
TABLET, FILM COATED ORAL
Qty: 30 TABLET | Refills: 1 | Status: SHIPPED | OUTPATIENT
Start: 2023-06-25

## 2023-06-30 ENCOUNTER — PATIENT MESSAGE (OUTPATIENT)
Dept: FAMILY MEDICINE CLINIC | Facility: CLINIC | Age: 43
End: 2023-06-30

## 2023-06-30 DIAGNOSIS — R10.31 RLQ ABDOMINAL PAIN: Primary | ICD-10-CM

## 2023-06-30 DIAGNOSIS — Z87.42 HISTORY OF OVARIAN CYST: ICD-10-CM

## 2023-07-05 ENCOUNTER — OFFICE VISIT (OUTPATIENT)
Facility: CLINIC | Age: 43
End: 2023-07-05
Payer: COMMERCIAL

## 2023-07-05 VITALS
BODY MASS INDEX: 25.47 KG/M2 | DIASTOLIC BLOOD PRESSURE: 74 MMHG | SYSTOLIC BLOOD PRESSURE: 122 MMHG | RESPIRATION RATE: 16 BRPM | WEIGHT: 170 LBS | HEIGHT: 68.5 IN | OXYGEN SATURATION: 98 % | HEART RATE: 100 BPM

## 2023-07-05 DIAGNOSIS — R05.3 CHRONIC COUGH: Primary | ICD-10-CM

## 2023-07-05 PROCEDURE — 99204 OFFICE O/P NEW MOD 45 MIN: CPT | Performed by: INTERNAL MEDICINE

## 2023-07-05 PROCEDURE — 3008F BODY MASS INDEX DOCD: CPT | Performed by: INTERNAL MEDICINE

## 2023-07-05 PROCEDURE — 3074F SYST BP LT 130 MM HG: CPT | Performed by: INTERNAL MEDICINE

## 2023-07-05 PROCEDURE — 3078F DIAST BP <80 MM HG: CPT | Performed by: INTERNAL MEDICINE

## 2023-07-05 RX ORDER — BECLOMETHASONE DIPROPIONATE HFA 80 UG/1
1 AEROSOL, METERED RESPIRATORY (INHALATION) 2 TIMES DAILY
Qty: 1 EACH | Refills: 3 | Status: SHIPPED | OUTPATIENT
Start: 2023-07-05

## 2023-07-05 NOTE — PATIENT INSTRUCTIONS
plan-- I will review records            - to begin qvar steroid inhaler one puff twice a day - rinse and spit             - consider neb or rescue puffer 2-3 times per day             - see me in 2-3 months             -       Tim Hull MD  Pulmonary Medicine  7/5/2023

## 2023-07-06 ENCOUNTER — OFFICE VISIT (OUTPATIENT)
Dept: FAMILY MEDICINE CLINIC | Facility: CLINIC | Age: 43
End: 2023-07-06
Payer: COMMERCIAL

## 2023-07-06 VITALS
HEART RATE: 93 BPM | OXYGEN SATURATION: 98 % | BODY MASS INDEX: 25.18 KG/M2 | SYSTOLIC BLOOD PRESSURE: 104 MMHG | TEMPERATURE: 98 F | RESPIRATION RATE: 18 BRPM | WEIGHT: 170 LBS | DIASTOLIC BLOOD PRESSURE: 50 MMHG | HEIGHT: 69 IN

## 2023-07-06 DIAGNOSIS — J02.9 SORE THROAT: Primary | ICD-10-CM

## 2023-07-06 LAB
CONTROL LINE PRESENT WITH A CLEAR BACKGROUND (YES/NO): YES YES/NO
KIT LOT #: NORMAL NUMERIC

## 2023-07-06 PROCEDURE — 3008F BODY MASS INDEX DOCD: CPT | Performed by: NURSE PRACTITIONER

## 2023-07-06 PROCEDURE — 87081 CULTURE SCREEN ONLY: CPT | Performed by: NURSE PRACTITIONER

## 2023-07-06 PROCEDURE — 87880 STREP A ASSAY W/OPTIC: CPT | Performed by: NURSE PRACTITIONER

## 2023-07-06 PROCEDURE — 3078F DIAST BP <80 MM HG: CPT | Performed by: NURSE PRACTITIONER

## 2023-07-06 PROCEDURE — 3074F SYST BP LT 130 MM HG: CPT | Performed by: NURSE PRACTITIONER

## 2023-07-06 PROCEDURE — 99213 OFFICE O/P EST LOW 20 MIN: CPT | Performed by: NURSE PRACTITIONER

## 2023-07-06 NOTE — PATIENT INSTRUCTIONS
Avoid citrus, spicy and caffeine for now during sore throat. We will send a throat culture and prescribe medication if needed for strep. Use Tylenol for pain as needed. Cold drinks may feel better to the throat than warm. Gargle and rinse mouth after inhalers, then drink water to dilute steroid left behind after using inhalers during illness. Avoid sharing drinks/towels with others. Wash all dishes in hot soapy water. May gargle with 1 teaspoon of Mylanta or other liquid antacid if needed for pain/comfort. Follow up with Dr. Bird Loya if symptoms persist. Seek emergency care if symptoms significantly worsen.

## 2023-07-08 ENCOUNTER — TELEMEDICINE (OUTPATIENT)
Dept: FAMILY MEDICINE CLINIC | Facility: CLINIC | Age: 43
End: 2023-07-08

## 2023-07-08 DIAGNOSIS — E88.01 ALPHA-1-ANTITRYPSIN DEFICIENCY (HCC): ICD-10-CM

## 2023-07-08 DIAGNOSIS — R05.8 RECURRENT COUGH: Primary | ICD-10-CM

## 2023-07-08 DIAGNOSIS — J45.50 SEVERE PERSISTENT ASTHMA WITHOUT COMPLICATION: ICD-10-CM

## 2023-07-08 DIAGNOSIS — J02.9 SORE THROAT: ICD-10-CM

## 2023-07-08 PROCEDURE — 99214 OFFICE O/P EST MOD 30 MIN: CPT | Performed by: FAMILY MEDICINE

## 2023-07-08 RX ORDER — ACETAMINOPHEN AND CODEINE PHOSPHATE 120; 12 MG/5ML; MG/5ML
SOLUTION ORAL EVERY 6 HOURS PRN
Qty: 118 ML | Refills: 0 | Status: SHIPPED | OUTPATIENT
Start: 2023-07-08 | End: 2023-07-13

## 2023-07-08 RX ORDER — LISDEXAMFETAMINE DIMESYLATE 50 MG
CAPSULE ORAL
COMMUNITY
Start: 2023-06-07
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
## 2023-07-10 ENCOUNTER — HOSPITAL ENCOUNTER (OUTPATIENT)
Dept: MAMMOGRAPHY | Age: 43
Discharge: HOME | End: 2023-07-10
Attending: FAMILY MEDICINE
Payer: COMMERCIAL

## 2023-07-10 DIAGNOSIS — R92.2 DENSE BREASTS: Primary | ICD-10-CM

## 2023-07-10 DIAGNOSIS — Z12.31 VISIT FOR SCREENING MAMMOGRAM: ICD-10-CM

## 2023-07-10 DIAGNOSIS — R92.2 DENSE BREAST TISSUE ON MAMMOGRAM: ICD-10-CM

## 2023-07-10 PROCEDURE — 77067 SCR MAMMO BI INCL CAD: CPT | Performed by: FAMILY MEDICINE

## 2023-07-10 PROCEDURE — 77063 BREAST TOMOSYNTHESIS BI: CPT | Performed by: FAMILY MEDICINE

## 2023-07-11 DIAGNOSIS — G44.221 CHRONIC TENSION-TYPE HEADACHE, INTRACTABLE: ICD-10-CM

## 2023-07-11 NOTE — PROGRESS NOTES
Asymmetries in both breasts additional imaging recommended with additional mammogram views you would also benefit from a bilateral breast ultrasound so I am placing orders for that as well

## 2023-07-12 RX ORDER — BUTALBITAL/ACETAMINOPHEN 50MG-325MG
TABLET ORAL
Qty: 15 TABLET | Refills: 0 | Status: SHIPPED | OUTPATIENT
Start: 2023-07-12

## 2023-07-17 DIAGNOSIS — F41.1 GAD (GENERALIZED ANXIETY DISORDER): ICD-10-CM

## 2023-07-17 DIAGNOSIS — M53.3 SACROILIAC JOINT DYSFUNCTION OF LEFT SIDE: ICD-10-CM

## 2023-07-17 DIAGNOSIS — G89.29 ABDOMINAL PAIN, CHRONIC, LEFT UPPER QUADRANT: ICD-10-CM

## 2023-07-17 DIAGNOSIS — R10.12 ABDOMINAL PAIN, CHRONIC, LEFT UPPER QUADRANT: ICD-10-CM

## 2023-07-19 DIAGNOSIS — F41.1 GAD (GENERALIZED ANXIETY DISORDER): ICD-10-CM

## 2023-07-20 ENCOUNTER — HOSPITAL ENCOUNTER (OUTPATIENT)
Dept: MAMMOGRAPHY | Facility: HOSPITAL | Age: 43
Discharge: HOME | End: 2023-07-20
Attending: FAMILY MEDICINE
Payer: COMMERCIAL

## 2023-07-20 ENCOUNTER — TELEPHONE (OUTPATIENT)
Dept: FAMILY MEDICINE CLINIC | Facility: CLINIC | Age: 43
End: 2023-07-20

## 2023-07-20 DIAGNOSIS — R92.2 INCONCLUSIVE MAMMOGRAM: ICD-10-CM

## 2023-07-20 DIAGNOSIS — F41.1 GAD (GENERALIZED ANXIETY DISORDER): ICD-10-CM

## 2023-07-20 PROCEDURE — 77066 DX MAMMO INCL CAD BI: CPT | Performed by: FAMILY MEDICINE

## 2023-07-20 PROCEDURE — 76642 ULTRASOUND BREAST LIMITED: CPT | Performed by: FAMILY MEDICINE

## 2023-07-20 PROCEDURE — 77062 BREAST TOMOSYNTHESIS BI: CPT | Performed by: FAMILY MEDICINE

## 2023-07-20 RX ORDER — PREGABALIN 75 MG/1
75 CAPSULE ORAL 3 TIMES DAILY
Qty: 90 CAPSULE | Refills: 0 | Status: SHIPPED | OUTPATIENT
Start: 2023-07-20

## 2023-07-20 RX ORDER — ALPRAZOLAM 0.25 MG/1
0.25 TABLET ORAL 2 TIMES DAILY
Qty: 60 TABLET | Refills: 0 | OUTPATIENT
Start: 2023-07-20

## 2023-07-20 RX ORDER — ALPRAZOLAM 0.25 MG/1
0.25 TABLET ORAL 2 TIMES DAILY
Qty: 60 TABLET | Refills: 0 | Status: SHIPPED | OUTPATIENT
Start: 2023-07-20

## 2023-07-20 RX ORDER — ALPRAZOLAM 0.25 MG/1
0.25 TABLET ORAL 2 TIMES DAILY
Qty: 60 TABLET | Refills: 0 | Status: CANCELLED | OUTPATIENT
Start: 2023-07-20

## 2023-07-20 NOTE — TELEPHONE ENCOUNTER
Requested Prescriptions     Pending Prescriptions Disp Refills    pregabalin 75 MG Oral Cap 90 capsule 0     Sig: Take 1 capsule (75 mg total) by mouth 3 (three) times daily. ALPRAZolam 0.25 MG Oral Tab 60 tablet 0     Sig: Take 1 tablet (0.25 mg total) by mouth 2 (two) times daily.          Last office visit 6/7/23    Future office visit  8/2/23                    Last refill  both 6/21/23

## 2023-07-20 NOTE — TELEPHONE ENCOUNTER
Requested Prescriptions     Refused Prescriptions Disp Refills    ALPRAZOLAM 0.25 MG Oral Tab [Pharmacy Med Name: ALPRAZOLAM 0.25MG TABLETS] 60 tablet 0     Sig: TAKE 1 TABLET BY MOUTH TWICE DAILY     Refused By: Ana Castillo     Reason for Refusal: Duplicate refill request

## 2023-07-20 NOTE — TELEPHONE ENCOUNTER
Pt. Calling stating her ALPRAZolam 0.25 MG Oral Tab  was denied (duplicate request) She needs this rx filled as well.   Please advise

## 2023-07-20 NOTE — IMAGING NOTE
Zeeshan Dsouza is recommended for a stereotactic biopsy of the right breast and an ultrasound guided biopsy of the right breast by . 1545: Spoke with Zeeshan Dsouza at this time    History Inconclusive mammogram   Findings Asymmetries within the right breast are again noted. Sonography was performed to further evaluate. Grouping of calcifications within the 11 o'clock position of the right breast again noted. right breast 9 o'clock position 4 cm from nipple there is a hypoechoic nodule measuring 0.6 x 0.5 x 0.5 cm   Recommendation-STEREOTACTIC BREAST BIOPSY: RIGHT BREAST    ULTRASOUND-GUIDED BIOPSY: RIGHT BREAST     See EMR for complete imaging report    Medications and allergies reviewed:  Current Outpatient Medications   Medication Sig Dispense Refill    BUTALBITAL-ACETAMINOPHEN  MG Oral Tab TAKE 1 TABLET BY MOUTH TWICE DAILY AS NEEDED 15 tablet 0    VYVANSE 50 MG Oral Cap       Beclomethasone Diprop HFA (QVAR REDIHALER) 80 MCG/ACT Inhalation Aerosol, Breath Activated Inhale 1 puff into the lungs in the morning and 1 puff before bedtime. 1 each 3    ondansetron (ZOFRAN) 8 MG tablet TAKE 1 TABLET(8 MG) BY MOUTH EVERY 12 HOURS AS NEEDED FOR NAUSEA 30 tablet 1    traZODone 50 MG Oral Tab Take 2 tablets (100 mg total) by mouth nightly. 60 tablet 1    ALPRAZolam 0.25 MG Oral Tab Take 1 tablet (0.25 mg total) by mouth 2 (two) times daily. 60 tablet 0    pregabalin 75 MG Oral Cap Take 1 capsule (75 mg total) by mouth 3 (three) times daily. 90 capsule 0    Multiple Vitamins-Minerals (BARIATRIC MULTIVITAMINS/IRON) Oral Cap Take by mouth As Directed. pantoprazole 40 MG Oral Tab EC Take 1 tablet (40 mg total) by mouth 2 (two) times daily. sucralfate 1 g Oral Tab Take 1 tablet (1 g total) by mouth 4 (four) times daily. BEFORE MEALS      TIZANIDINE 4 MG Oral Tab TAKE 1 TABLET(4 MG) BY MOUTH EVERY NIGHT AS NEEDED 30 tablet 2    Nystatin 071055 UNIT/GM External Powder Apply 1 Application. topically 4 (four) times daily. Apply to affected areas 30 g 1    ALBUTEROL 108 (90 Base) MCG/ACT Inhalation Aero Soln INHALE 2 PUFFS INTO THE LUNGS EVERY 4 HOURS AS NEEDED FOR WHEEZING OR SHORTNESS OF BREATH 8.5 g 1    fluticasone propionate 50 MCG/ACT Nasal Suspension 1 spray by Nasal route in the morning and 1 spray before bedtime. BUSPIRONE 5 MG Oral Tab TAKE 1 TO 2 TABLETS(5 TO 10 MG) BY MOUTH THREE TIMES DAILY AS NEEDED 90 tablet 2    azelastine 0.1 % Nasal Solution 2 sprays by Nasal route 2 (two) times daily. 0    loratadine 10 MG Oral Tab Take 1 tablet (10 mg total) by mouth daily. 0    alpha 1-proteinase inhibitor 1000 MG/20ML Intravenous Solution Inject into the vein. ipratropium-albuterol 0.5-2.5 (3) MG/3ML Inhalation Solution Take 3 mL by nebulization every 4 (four) hours as needed. Use only if the albuterol inhalation albuterol is not working 25 each 0    UNITHROID 100 MCG Oral Tab TAKE 1 TABLET BY MOUTH EVERY MORNING WITH BREAKFAST WITH 88MCG FOR TOTAL DOSE OF 188MCG 30 tablet 5    SPIRIVA RESPIMAT 2.5 MCG/ACT Inhalation Aero Soln INHALE 2 PUFFS INTO THE LUNGS DAILY 12 g 2    UNITHROID 88 MCG Oral Tab TAKE 1 TABLET BY MOUTH EVERY MORNING BEFORE BREAKFAST 30 tablet 9    Calcium Carb-Cholecalciferol (CALCIUM 600/VITAMIN D3) 600-800 MG-UNIT Oral Tab Take 1 tablet by mouth 3 (three) times daily. SYMBICORT 160-4.5 MCG/ACT Inhalation Aerosol INHALE 2 PUFFS INTO THE LUNGS TWICE DAILY 3 Inhaler 3    Cholecalciferol (VITAMIN D) 50 MCG (2000 UT) Oral Cap Take 1 capsule (2,000 Units total) by mouth in the morning and 1 capsule (2,000 Units total) before bedtime. magnesium 250 MG Oral Tab Take 1 tablet (250 mg total) by mouth daily. NON FORMULARY Take 1 tablet by mouth daily. BARIATRIC CHOICE VITAMIN      aspirin 81 MG Oral Tab Take 1 tablet (81 mg total) by mouth daily. The following allergies were reported:   Adhesive TapeHIVES  CephalosporinsHIVES  ChocolateHIVES  DoxycyclineHIVES  TramadolHIVES, RASH  AzithromycinOTHER (SEE COMMENTS), RESTLESSNESS  HaloperidolANXIETY  MetoclopramideANXIETY  Toradol [Ketorolac Tromethamine]RASH  Norflex Tablets [Orphenadrine]OTHER (SEE COMMENTS)  Cheratussin Ac [Guaifenesin-codeine]NAUSEA ONLY  NsaidsOTHER (SEE COMMENTS)    Marchelle Lesch denies the use of prescribed anticoagulants, denies known bleeding disorders and/or liver disease, denies chemotherapy    Procedure explained and questions answered. Marchelle Lesch provided with written educational material.by the imaging staff at the time of the recommendation     Ms. Temple Katie instructed to take 1000 mg of acetaminophen on the day of the biopsy, eat a light meal, and bring or wear a sport bra. Post biopsy care and instruction reviewed: including no lifting more than five pounds, no upper body exercise, icing of biopsy site, no submerging in water. Marchelle Lesch verbalized understanding. Ms. Maverick Wilson informed that the [facility] would be contacting her once the biopsy order is received to schedule an appointment.

## 2023-07-20 NOTE — TELEPHONE ENCOUNTER
Patient calling - needing refill Pregabalin 75 mg Oral 3 times daily she left a [employer] message as well. She has 5 pills left sent to [facility] on file. Please advise.

## 2023-07-21 NOTE — PROGRESS NOTES
RECOMMENDATIONS:    STEREOTACTIC BREAST BIOPSY: RIGHT BREAST     ULTRASOUND-GUIDED BIOPSY: RIGHT BREAST     Orders placed   Scheduling Instructions: To schedule an appointment for your radiology test please call Feliz Coleman Scheduling at [phone].

## 2023-07-24 DIAGNOSIS — G44.221 CHRONIC TENSION-TYPE HEADACHE, INTRACTABLE: ICD-10-CM

## 2023-07-24 NOTE — BH NUTRITION NOTE
INFECTIOUS DISEASE CONSULT/INITIAL HOSPITAL VISIT    Jermaine Singh  1945  [id number]    Date of Consult: 7/24/2023    Admission Date: 7/23/2023      Requesting Provider: No ref. provider found  Evaluating Physician: Lincoln Padilla MD    Reason for Consultation: Left diabetic foot infection    History of present illness:    Patient is a 78 y.o. male History of type 2 diabetes mellitus, peripheral neuropathy, Stage III chronic kidney disease, bilateral DVTs on Eliquis therapy, Paroxysmal atrial fibrillation, sick sinus syndrome status post pacemaker placement, CHF, CAD status post CABG in 2019 and peripheral vascular disease who is seen today for evaluation of left foot infection after undergoing prior left hallux and second toe amputations. He underwent left hallux Amputation on 8/2/22 and then underwent a left second toe amputation on 1/17/23. Cultures from 1/16/23 and 1/17/23 grew coagulase-negative staph. He has noted a worsening left forefoot infection over 2 days prior to admission and then presented to the emergency room yesterday. Laboratory studies revealed a white blood cell count of 10.9, C-reactive protein of 3.6, creatinine of 1.65, troponin of 104, And BNP of 33,171. He has remained afebrile since admission. He has a history of vancomycin allergy consisting of renal failure.  He was started on intravenous ceftriaxone along with Zyvox. He indicates that his left foot erythema has dramatically improved today.    Past Medical History:   Diagnosis Date    Allergic     Arthritis     Asthma     Coronary artery disease     Diabetes mellitus 2000    started on inuslin 12/2018; started on po meds in 2000; checking blood sugars daily     Disease of thyroid gland     po meds daily for hypothyroidism     Elevated serum creatinine 11/15/2022    Elevated troponin 10/02/2022    Generalized weakness 11/15/2022    History of fracture as a child     rt leg- severe     Hyperlipidemia     Hypertension   Bariatric Inpatient Nutrition Note      Raheem Esteves is a 40year old female.      Procedure: Laparoscopic gastric bypass surgery  Surgery Date: 12.18.2017  Medical Diagnosis: Morbid obesity    Height:  Ht Readings from Last 1 Encounters:  12/18/17    Hypothyroidism     PAF (paroxysmal atrial fibrillation) 07/23/2023    Peripheral neuropathy     Peripheral vascular disease     s/p angiogram 2/19-needs stent in left leg     Pleural effusion, bilateral 11/15/2022    Proximal phalanx fracture of the second digit extending into the second metatarsal joint 07/28/2022    RBBB     Right knee pain     Unstable angina 02/12/2019    Added automatically from request for surgery [id number]    Vitamin D deficiency        Past Surgical History:   Procedure Laterality Date    AMPUTATION DIGIT Left 01/17/2023    Procedure: AMPUTATION DIGIT LEFT;  Surgeon: Gopal Márquez MD;  Location:  [facility];  Service: Vascular;  Laterality: Left;    APPENDECTOMY      CARDIAC CATHETERIZATION N/A 02/14/2019    Procedure: Left Heart Cath;  Surgeon: Cooper Apodaca MD;  Location: [facility] CATH INVASIVE LOCATION;  Service: Cardiology    CARDIAC ELECTROPHYSIOLOGY PROCEDURE N/A 05/18/2022    Procedure: DEVICE IMPLANT;  Surgeon: Cooper Apodaca MD;  Location:  [facility] CATH INVASIVE LOCATION;  Service: Cardiology;  Laterality: N/A;    CARDIAC SURGERY      COLONOSCOPY      CORONARY ARTERY BYPASS GRAFT N/A 03/22/2019    Procedure: MEDIAN STERNOTOMY, CORONARY ARTERY BYPASS GRAFT X3, UTILIZING THE LEFT INTERNAL MAMMARY ARTERY, EVH AND OPEN HARVEST OF THE RIGHT GREATER SAPHENOUS VEIN, EXPLORATION OF THE LEFT LEG;  Surgeon: Ap Au MD;  Location:  [facility];  Service: Cardiothoracic    EYE SURGERY Bilateral     cataracts     FRACTURE SURGERY      INTERVENTIONAL RADIOLOGY PROCEDURE N/A 07/29/2021    Procedure: Abdominal Aortagram with Runoff;  Surgeon: Jermaine Hernandez MD;  Location:  [facility] CATH INVASIVE LOCATION;  Service: Cardiovascular;  Laterality: N/A;    KNEE ARTHROSCOPY      right x 2, left x 1    LACERATION REPAIR      right leg    LEG SURGERY      2 for fracture of rt leg     TONSILLECTOMY      Adnoidectomy    TRANS METATARSAL AMPUTATION Left 08/02/2022    Procedure: GREAT TOE  Inhalation 2 times daily   [COMPLETED] acetaminophen (TYLENOL EXTRA STRENGTH) tab 1,000 mg 1,000 mg Oral Once   [COMPLETED] Heparin Sodium (Porcine) 5000 UNIT/ML injection 5,000 Units 5,000 Units Subcutaneous Once   Normal Saline Flush 0.9 % injection 10 m AMPUTATION LEFT;  Surgeon: Gopal Márquez MD;  Location: [facility];  Service: Vascular;  Laterality: Left;       Family History   Problem Relation Age of Onset    Coronary artery disease Mother     Diabetes Mother     Hyperlipidemia Mother     Cancer Father        Social History     Socioeconomic History    Marital status:     Number of children: 2   Tobacco Use    Smoking status: Never     Passive exposure: Past    Smokeless tobacco: Never   Vaping Use    Vaping Use: Never used   Substance and Sexual Activity    Alcohol use: Not Currently     Comment: every 2-3 months    Drug use: No    Sexual activity: Defer       Allergies   Allergen Reactions    Vancomycin Other (See Comments)     Kidney failure per last admission         Medication:    Current Facility-Administered Medications:     acetaminophen (TYLENOL) tablet 650 mg, 650 mg, Oral, Q4H PRN, Fariba Olmos MD    aspirin chewable tablet 81 mg, 81 mg, Oral, Daily, Fariba Olmos MD    atorvastatin (LIPITOR) tablet 20 mg, 20 mg, Oral, Nightly, Fariba Olmos MD, 20 mg at 07/23/23 2301    sennosides-docusate (PERICOLACE) 8.6-50 MG per tablet 2 tablet, 2 tablet, Oral, BID **AND** polyethylene glycol (MIRALAX) packet 17 g, 17 g, Oral, Daily PRN **AND** bisacodyl (DULCOLAX) EC tablet 5 mg, 5 mg, Oral, Daily PRN **AND** bisacodyl (DULCOLAX) suppository 10 mg, 10 mg, Rectal, Daily PRN, Fariba Olmos MD    bumetanide (BUMEX) tablet 1 mg, 1 mg, Oral, BID, Fariba Olmos MD    calcium carbonate (TUMS) chewable tablet 500 mg (200 mg elemental), 2 tablet, Oral, BID PRN, Fariba Olmos MD    carvedilol (COREG) tablet 3.125 mg, 3.125 mg, Oral, BID With Meals, Fariba Olmos MD, 3.125 mg at 07/23/23 2300    cefTRIAXone (ROCEPHIN) 1000 mg/100 mL 0.9% NS (MBP), 1,000 mg, Intravenous, Q24H, Fariba Olmos MD    dextrose (D50W) (25 g/50 mL) IV injection 25 g, 25 g, Intravenous, Q15 Min PRN, Fariba Olmos MD    dextrose (GLUTOSE) oral gel 15 g, 15 g, Oral, Q15 Min PRN, Fariba Olmos MD     through bariatric diet stages as directed and as tolerated, Start bariatric vitamins Bariatric Choice QID at home, Call the registered dietitian for questions and Return to clinic  2-3 weeks post-op    Monitor/Evaluate: Anthropometric measurements, Food/fl famotidine (PEPCID) tablet 20 mg, 20 mg, Oral, Daily, Fariba Olmos MD    glucagon (GLUCAGEN) injection 1 mg, 1 mg, Intramuscular, Q15 Min PRN, Fariba Olmos MD    HYDROcodone-acetaminophen (NORCO) 5-325 MG per tablet 1 tablet, 1 tablet, Oral, Q4H PRN, Fariba Olmos MD    insulin detemir (LEVEMIR) injection 8 Units, 8 Units, Subcutaneous, Nightly, Fariba Olmos MD    Insulin Lispro (humaLOG) injection 2-9 Units, 2-9 Units, Subcutaneous, 4x Daily AC & at Bedtime, Fariba Olmos MD    levothyroxine (SYNTHROID, LEVOTHROID) tablet 88 mcg, 88 mcg, Oral, Q AM, Fariba Olmos MD, 88 mcg at 07/24/23 0547    Linezolid (ZYVOX) 600 mg 300 mL, 600 mg, Intravenous, Q12H, Fariba Olmos MD    losartan (COZAAR) tablet 100 mg, 100 mg, Oral, Daily, Fariba Olmos MD    magnesium oxide (MAG-OX) tablet 400 mg, 400 mg, Oral, Daily, Fariba Olmos MD    morphine injection 1 mg, 1 mg, Intravenous, Q4H PRN **AND** naloxone (NARCAN) injection 0.4 mg, 0.4 mg, Intravenous, Q5 Min PRN, Fariba Olmos MD    nitroglycerin (NITROSTAT) SL tablet 0.4 mg, 0.4 mg, Sublingual, Q5 Min PRN, Fariba Olmos MD    nystatin (MYCOSTATIN) powder, , Topical, BID, Fariba Olmos MD, Given at 07/23/23 2064    ondansetron (ZOFRAN) tablet 4 mg, 4 mg, Oral, Q6H PRN **OR** ondansetron (ZOFRAN) injection 4 mg, 4 mg, Intravenous, Q6H PRN, Fariba Olmos MD    Pharmacy Consult - Pharmacy to dose, , Does not apply, Continuous PRN, Marilu Walker,     QUEtiapine (SEROquel) tablet 12.5 mg, 12.5 mg, Oral, Nightly PRN, Fariba Olmos MD    sodium chloride 0.9 % flush 10 mL, 10 mL, Intravenous, PRN, Jayden Bryant MD    sodium chloride 0.9 % flush 10 mL, 10 mL, Intravenous, Q12H, Fariba Olmos MD, 10 mL at 07/23/23 2303    sodium chloride 0.9 % flush 10 mL, 10 mL, Intravenous, PRN, Fariba Olmos MD    sodium chloride 0.9 % infusion 40 mL, 40 mL, Intravenous, PRN, Fariba Olmos MD    tamsulosin (FLOMAX) 24 hr capsule 0.4 mg, 0.4 mg, Oral, Daily, Fariba Olmos,  MD    Antibiotics:  Anti-Infectives (From admission, onward)      Ordered     Dose/Rate Route Frequency Start Stop    23 2213  cefTRIAXone (ROCEPHIN) 1000 mg/100 mL 0.9% NS (MBP)        Ordering Provider: Fariba Olmos MD    1,000 mg  over 30 Minutes Intravenous Every 24 Hours 23 1200 23 1159    23 2213  Linezolid (ZYVOX) 600 mg 300 mL        Ordering Provider: Fariba Olmos MD    600 mg  300 mL/hr over 60 Minutes Intravenous Every 12 Hours 23 0900 23 0859    23 1912  cefTRIAXone (ROCEPHIN) 2000 mg/100 mL 0.9% NS IVPB (MBP)        Ordering Provider: Jayden Bryant MD    2,000 mg  over 30 Minutes Intravenous Once 23 1928 23 2242    23 191  Linezolid (ZYVOX) 600 mg 300 mL        Ordering Provider: Jayden Bryant MD    600 mg  300 mL/hr over 60 Minutes Intravenous Once 23 1927 23 2301              Review of Systems:  Constitutional-- No Fever, chills or sweats.    HEENT-- No new vision, hearing or throat complaints.   CV-- Coronary artery disease status post CABG, sick sinus syndrome status post pacemaker placement, paroxysmal atrial fibrillation  Resp-- No SOB/cough  GI- No nausea, vomiting, or diarrhea.    -- No dysuria, hematuria, or flank pain. History of stage IIIb chronic kidney disease  Heme- No active bruising or bleeding; History of bilateral DVTs  MS-- Left forefoot erythema and swelling  Neuro-- Peripheral neuropathy  Skin--No rashes or lesions  Endocrine-diabetes mellitus      Physical Exam:   Vital Signs  Temp (24hrs), Av.5 °F (36.9 °C), Min:98.2 °F (36.8 °C), Max:98.8 °F (37.1 °C)    Temp  Min: 98.2 °F (36.8 °C)  Max: 98.8 °F (37.1 °C)  BP  Min: 90/77  Max: 135/88  Pulse  Min: 55  Max: 97  Resp  Min: 16  Max: 20  SpO2  Min: 96 %  Max: 99 %    GENERAL: Awake and alert, in no acute distress.   HEENT: Normocephalic, atraumatic.  PERRL. EOMI. No conjunctival injection. No icterus. Oropharynx clear without evidence of  thrush or exudate.   NECK: Supple   HEART: RRR; No murmur, rubs, gallops.   LUNGS: Clear to auscultation bilaterally without wheezing, rales, rhonchi. Normal respiratory effort. Nonlabored. .  ABDOMEN: Soft, nontender, nondistended.  No rebound or guarding. NO mass or HSM.  EXT:  He still has significant erythema over the left first metatarsal region with a ruptured bullous lesion that is approximately 3 cm in diameter with no purulent drainage.  :  Without Fonseca catheter.  MSK: No joint effusions or erythema  SKIN: No diffuse rash present  NEURO: Oriented to PPT.  Motor 5/5 strength  PSYCHIATRIC: Normal insight and judgment. Cooperative with PE    Laboratory Data    Results from last 7 days   Lab Units 07/23/23  1746   WBC 10*3/mm3 10.88*   HEMOGLOBIN g/dL 12.2*   HEMATOCRIT % 39.0   PLATELETS 10*3/mm3 179     Results from last 7 days   Lab Units 07/23/23  1823   SODIUM mmol/L 142   POTASSIUM mmol/L 4.8   CHLORIDE mmol/L 105   CO2 mmol/L 25.0   BUN mg/dL 46*   CREATININE mg/dL 1.65*   GLUCOSE mg/dL 250*   CALCIUM mg/dL 8.8     Results from last 7 days   Lab Units 07/23/23  1823   ALK PHOS U/L 114   BILIRUBIN mg/dL 0.5   ALT (SGPT) U/L 28   AST (SGOT) U/L 25     Results from last 7 days   Lab Units 07/23/23  1746   SED RATE mm/hr 30*     Results from last 7 days   Lab Units 07/23/23  1823   CRP mg/dL 3.60*     Results from last 7 days   Lab Units 07/23/23  1746   LACTATE mmol/L 1.6             Estimated Creatinine Clearance: 42.4 mL/min (A) (by C-G formula based on SCr of 1.65 mg/dL (H)).      Microbiology:  No results found for: ACANTHNAEG, AFBCX, BPERTUSSISCX, BLOODCX  No results found for: BCIDPCR, CXREFLEX, CSFCX, CULTURETIS  No results found for: CULTURES, HSVCX, URCX  No results found for: EYECULTURE, GCCX, HSVCULTURE, LABHSV  No results found for: LEGIONELLA, MRSACX, MUMPSCX, MYCOPLASCX  No results found for: NOCARDIACX, STOOLCX  No results found for: THROATCX, UNSTIMCULT, URINECX, CULTURE, VZVCULTUR  No  results found for: VIRALCULTU, WOUNDCX        Radiology:  Imaging Results (Last 72 Hours)       Procedure Component Value Units Date/Time    XR Foot 3+ View Left [[id number]] Collected: 07/23/23 1719     Updated: 07/23/23 1725    Narrative:      XR FOOT 3+ VW LEFT    Date of Exam: 7/23/2023 5:03 PM EDT    Indication: Left foot swelling, redness, and blistering, previous partial forefoot amputation.    Comparison: Left foot x-ray performed on 7/27/2022 and a left toe x-ray from 1/16/2023.    Findings:  The patient is status post amputation of the left great toe and the distal first metatarsal bone. The patient's had interval amputation of most of the left second toe. The base of the proximal phalanx remains. There is no cortical destruction or   suspicious periostitis to indicate radiographic changes of osteomyelitis. There is mature periosteal reaction along the shafts of the second through fourth metatarsal bones which can be seen with venous stasis. There is mild spurring within the midfoot   consistent with arthritis. There is a prominent calcaneal enthesophyte. There are scattered vascular calcifications.        Impression:      Impression:    1. Status post amputation procedures of the left foot as described.  2. No radiographic changes of osteomyelitis.  3. Additional incidental findings as noted above.      Electronically Signed: Pan Sharp    7/23/2023 5:22 PM EDT    Workstation ID: [id number]    XR Chest 1 View [[id number]] Collected: 07/23/23 1653     Updated: 07/23/23 1657    Narrative:      XR CHEST 1 VW    Date of Exam: 7/23/2023 4:47 PM EDT    Indication: Chest Pain Triage Protocol    Comparison: 1/16/2023    Findings:  Patient is status post median sternotomy and CABG. Left chest wall pacemaker is present. Cardiac silhouette is enlarged. No consolidation or mass is seen. No pleural effusion or pneumothorax is seen. No acute osseous lesion is seen.      Impression:      Impression:  1.Cardiomegaly. No  acute radiographic abnormality is identified.      Electronically Signed: Lucio Lanier    7/23/2023 4:54 PM EDT    Workstation ID: [id number]              Impression:   Left forefoot diabetic wound infection/cellulitis-the appearance and rapidity of onset suggests a beta streptococcal or staphylococcal infection. He has significantly improved overnight on intravenous ceftriaxone and Zyvox.  I will plan to leave him on this antibiotic regimen for the time being.   Leukocytosisneutrophilia-secondary to the left foot infection  Diabetic peripheral vascular disease  Type 2 diabetes mellitus  Diabetic peripheral neuropathy  Stage IIIb chronic kidney disease-We will need to try to avoid potentially nephrotoxic antibiotic therapy due to his chronic kidney disease  Chronic diastolic CHF  Paroxysmal atrial fibrillation  Sick sinus syndrome-status post pacemaker placement  Coronary artery disease-status post CABG on 3/22/19    PLAN/RECOMMENDATIONS:   Thank you for asking us to see Jermaine Singh, I recommend the following:  Blood cultures-pending  Vascular surgery consultation  Continue intravenous ceftriaxone  Continue intravenous Zyvox       Lincoln Padilla MD  7/24/2023  08:28 EDT

## 2023-07-25 ENCOUNTER — HOSPITAL ENCOUNTER (OUTPATIENT)
Dept: MAMMOGRAPHY | Facility: HOSPITAL | Age: 43
Discharge: HOME | End: 2023-07-25
Attending: FAMILY MEDICINE
Payer: COMMERCIAL

## 2023-07-25 ENCOUNTER — MOBILE ENCOUNTER (OUTPATIENT)
Dept: FAMILY MEDICINE CLINIC | Facility: CLINIC | Age: 43
End: 2023-07-25

## 2023-07-25 DIAGNOSIS — R92.8 ABNORMAL MAMMOGRAM OF RIGHT BREAST: ICD-10-CM

## 2023-07-25 PROCEDURE — 88305 TISSUE EXAM BY PATHOLOGIST: CPT | Performed by: FAMILY MEDICINE

## 2023-07-25 PROCEDURE — 19081 BX BREAST 1ST LESION STRTCTC: CPT | Performed by: FAMILY MEDICINE

## 2023-07-25 PROCEDURE — 76642 ULTRASOUND BREAST LIMITED: CPT | Performed by: FAMILY MEDICINE

## 2023-07-25 RX ORDER — TIZANIDINE 4 MG/1
4 TABLET ORAL NIGHTLY PRN
Qty: 30 TABLET | Refills: 2 | Status: SHIPPED | OUTPATIENT
Start: 2023-07-25

## 2023-07-26 ENCOUNTER — OFFICE VISIT (OUTPATIENT)
Dept: FAMILY MEDICINE CLINIC | Facility: CLINIC | Age: 43
End: 2023-07-26
Payer: COMMERCIAL

## 2023-07-26 VITALS
HEART RATE: 74 BPM | TEMPERATURE: 101 F | RESPIRATION RATE: 18 BRPM | WEIGHT: 170 LBS | SYSTOLIC BLOOD PRESSURE: 120 MMHG | BODY MASS INDEX: 25 KG/M2 | OXYGEN SATURATION: 98 % | DIASTOLIC BLOOD PRESSURE: 76 MMHG

## 2023-07-26 DIAGNOSIS — S20.01XA POSTTRAUMATIC HEMATOMA OF RIGHT BREAST, INITIAL ENCOUNTER: Primary | ICD-10-CM

## 2023-07-26 DIAGNOSIS — R50.82 POST-PROCEDURAL FEVER: ICD-10-CM

## 2023-07-26 DIAGNOSIS — S20.212A RIB CONTUSION, LEFT, INITIAL ENCOUNTER: ICD-10-CM

## 2023-07-26 PROCEDURE — 99214 OFFICE O/P EST MOD 30 MIN: CPT | Performed by: FAMILY MEDICINE

## 2023-07-26 PROCEDURE — 3078F DIAST BP <80 MM HG: CPT | Performed by: FAMILY MEDICINE

## 2023-07-26 PROCEDURE — 3074F SYST BP LT 130 MM HG: CPT | Performed by: FAMILY MEDICINE

## 2023-07-26 RX ORDER — ACETAMINOPHEN AND CODEINE PHOSPHATE 300; 30 MG/1; MG/1
1 TABLET ORAL NIGHTLY PRN
Qty: 5 TABLET | Refills: 0 | Status: SHIPPED | OUTPATIENT
Start: 2023-07-26

## 2023-07-26 RX ORDER — AMOXICILLIN AND CLAVULANATE POTASSIUM 875; 125 MG/1; MG/1
1 TABLET, FILM COATED ORAL 2 TIMES DAILY
Qty: 20 TABLET | Refills: 0 | Status: SHIPPED | OUTPATIENT
Start: 2023-07-26 | End: 2023-08-05

## 2023-07-27 NOTE — IMAGING NOTE
1520: Spoke with Eber Peterson post stereotactic right breast biopsy. Introduced myself as breast care coordinator and informed Ms. Poornima Lal of the purpose of my call. Name and date of birth verified by patient. Reinforced post biopsy care and instruction. Ms. Poornima Lal denies biopsy site bleeding or drainage  Ms. Poornima Lal reported small hematoma developed post biopsy-\"gumball-size\" which has decreased in size since first noted. Ms. Poornima Lal reported that she was examined for this hematoma in her primary care providers office at which time she had a fever and was placed on antibiotics. Denies drainage from biopsy site. Denies redness at biopsy site. See providers note 7-26-23 in the EMR   Encouraged Ms. Poornima Lal to follow-up with the breast center or her provider Cassandra Puri if symptoms persist or worsen. Eber Peterson verbalized agreement. Pathology results and recommendations discussed as follows:   Final Diagnosis:   Right breast calcifications at 11:00, stereotactic core biopsy:  -Breast tissue with fibrocystic changes including stromal fibrosis, columnar cell change, usual duct hyperplasia, and microcalcification. No evidence of malignancy in the material examined  See EMR fr complete pathology report. Pathology is reported as benign,  Per Dr. Stephanie Kramer- findings concordant with the imaging assessment. If there is no change in the clinical breast exam,recommend annual screening mammography. Eber Peterson verbalized understanding and agreement to the above.

## 2023-08-02 ENCOUNTER — OFFICE VISIT (OUTPATIENT)
Dept: FAMILY MEDICINE CLINIC | Facility: CLINIC | Age: 43
End: 2023-08-02
Payer: COMMERCIAL

## 2023-08-02 ENCOUNTER — LAB ENCOUNTER (OUTPATIENT)
Dept: LAB | Age: 43
End: 2023-08-02
Attending: FAMILY MEDICINE
Payer: COMMERCIAL

## 2023-08-02 ENCOUNTER — HOSPITAL ENCOUNTER (OUTPATIENT)
Dept: ULTRASOUND IMAGING | Facility: HOSPITAL | Age: 43
Discharge: HOME | End: 2023-08-02
Attending: FAMILY MEDICINE
Payer: COMMERCIAL

## 2023-08-02 ENCOUNTER — HOSPITAL ENCOUNTER (OUTPATIENT)
Dept: GENERAL RADIOLOGY | Age: 43
Discharge: HOME | End: 2023-08-02
Attending: FAMILY MEDICINE
Payer: COMMERCIAL

## 2023-08-02 VITALS
OXYGEN SATURATION: 100 % | SYSTOLIC BLOOD PRESSURE: 138 MMHG | DIASTOLIC BLOOD PRESSURE: 80 MMHG | HEIGHT: 69 IN | RESPIRATION RATE: 18 BRPM | BODY MASS INDEX: 25.18 KG/M2 | WEIGHT: 170 LBS | TEMPERATURE: 98 F | HEART RATE: 82 BPM

## 2023-08-02 DIAGNOSIS — R10.9 LEFT FLANK PAIN: ICD-10-CM

## 2023-08-02 DIAGNOSIS — R10.9 LEFT FLANK PAIN: Primary | ICD-10-CM

## 2023-08-02 DIAGNOSIS — I87.1 MAY-THURNER SYNDROME: ICD-10-CM

## 2023-08-02 DIAGNOSIS — G44.221 CHRONIC TENSION-TYPE HEADACHE, INTRACTABLE: ICD-10-CM

## 2023-08-02 DIAGNOSIS — S20.212A CONTUSION OF LEFT CHEST WALL, INITIAL ENCOUNTER: ICD-10-CM

## 2023-08-02 DIAGNOSIS — R07.82 INTERCOSTAL PAIN: ICD-10-CM

## 2023-08-02 LAB
BASOPHILS # BLD AUTO: 0.03 X10(3) UL (ref 0–0.2)
BASOPHILS NFR BLD AUTO: 0.6 %
BILIRUBIN: NEGATIVE
D DIMER PPP FEU-MCNC: 0.35 UG/ML FEU (ref ?–0.5)
EOSINOPHIL # BLD AUTO: 0.06 X10(3) UL (ref 0–0.7)
EOSINOPHIL NFR BLD AUTO: 1.3 %
ERYTHROCYTE [DISTWIDTH] IN BLOOD BY AUTOMATED COUNT: 12.6 %
GLUCOSE (URINE DIPSTICK): NEGATIVE MG/DL
HCT VFR BLD AUTO: 40 %
HGB BLD-MCNC: 13.2 G/DL
IMM GRANULOCYTES # BLD AUTO: 0.01 X10(3) UL (ref 0–1)
IMM GRANULOCYTES NFR BLD: 0.2 %
KETONES (URINE DIPSTICK): NEGATIVE MG/DL
LEUKOCYTES: NEGATIVE
LYMPHOCYTES # BLD AUTO: 0.92 X10(3) UL (ref 1–4)
LYMPHOCYTES NFR BLD AUTO: 19.4 %
MCH RBC QN AUTO: 31.2 PG (ref 26–34)
MCHC RBC AUTO-ENTMCNC: 33 G/DL (ref 31–37)
MCV RBC AUTO: 94.6 FL
MONOCYTES # BLD AUTO: 0.37 X10(3) UL (ref 0.1–1)
MONOCYTES NFR BLD AUTO: 7.8 %
MULTISTIX LOT#: NORMAL NUMERIC
NEUTROPHILS # BLD AUTO: 3.36 X10 (3) UL (ref 1.5–7.7)
NEUTROPHILS # BLD AUTO: 3.36 X10(3) UL (ref 1.5–7.7)
NEUTROPHILS NFR BLD AUTO: 70.7 %
NITRITE, URINE: NEGATIVE
OCCULT BLOOD: NEGATIVE
PH, URINE: 5.5 (ref 4.5–8)
PLATELET # BLD AUTO: 188 10(3)UL (ref 150–450)
RBC # BLD AUTO: 4.23 X10(6)UL
SPECIFIC GRAVITY: 1.03 (ref 1–1.03)
UROBILINOGEN,SEMI-QN: 0.2 MG/DL (ref 0–1.9)
WBC # BLD AUTO: 4.8 X10(3) UL (ref 4–11)

## 2023-08-02 PROCEDURE — 3075F SYST BP GE 130 - 139MM HG: CPT | Performed by: FAMILY MEDICINE

## 2023-08-02 PROCEDURE — 85025 COMPLETE CBC W/AUTO DIFF WBC: CPT

## 2023-08-02 PROCEDURE — 85379 FIBRIN DEGRADATION QUANT: CPT

## 2023-08-02 PROCEDURE — 36415 COLL VENOUS BLD VENIPUNCTURE: CPT

## 2023-08-02 PROCEDURE — 3008F BODY MASS INDEX DOCD: CPT | Performed by: FAMILY MEDICINE

## 2023-08-02 PROCEDURE — 71101 X-RAY EXAM UNILAT RIBS/CHEST: CPT | Performed by: FAMILY MEDICINE

## 2023-08-02 PROCEDURE — 99214 OFFICE O/P EST MOD 30 MIN: CPT | Performed by: FAMILY MEDICINE

## 2023-08-02 PROCEDURE — 76700 US EXAM ABDOM COMPLETE: CPT | Performed by: FAMILY MEDICINE

## 2023-08-02 PROCEDURE — 3079F DIAST BP 80-89 MM HG: CPT | Performed by: FAMILY MEDICINE

## 2023-08-02 PROCEDURE — 81003 URINALYSIS AUTO W/O SCOPE: CPT | Performed by: FAMILY MEDICINE

## 2023-08-02 RX ORDER — ACETAMINOPHEN AND CODEINE PHOSPHATE 300; 30 MG/1; MG/1
1 TABLET ORAL EVERY 6 HOURS PRN
Qty: 15 TABLET | Refills: 0 | Status: SHIPPED | OUTPATIENT
Start: 2023-08-02

## 2023-08-02 RX ORDER — BUTALBITAL/ACETAMINOPHEN 50MG-325MG
1 TABLET ORAL 2 TIMES DAILY PRN
Qty: 15 TABLET | Refills: 0 | Status: SHIPPED | OUTPATIENT
Start: 2023-08-10

## 2023-08-03 PROBLEM — J01.21 ACUTE RECURRENT ETHMOIDAL SINUSITIS: Status: RESOLVED | Noted: 2023-03-26 | Resolved: 2023-08-03

## 2023-08-03 NOTE — PROGRESS NOTES
Ultrasound abdomen is normal.  Normal pancreas, spleen and kidney no signs of swelling.   If pain is not improving then to the emergency department  message left on voicemail to check MyChart for test results

## 2023-08-07 RX ORDER — TRAZODONE HYDROCHLORIDE 50 MG/1
100 TABLET ORAL NIGHTLY
Qty: 60 TABLET | Refills: 1 | Status: SHIPPED | OUTPATIENT
Start: 2023-08-07

## 2023-08-10 ENCOUNTER — OFFICE VISIT (OUTPATIENT)
Dept: SURGERY | Facility: CLINIC | Age: 43
End: 2023-08-10
Payer: COMMERCIAL

## 2023-08-10 VITALS
BODY MASS INDEX: 24.87 KG/M2 | HEIGHT: 68.5 IN | DIASTOLIC BLOOD PRESSURE: 72 MMHG | WEIGHT: 166 LBS | SYSTOLIC BLOOD PRESSURE: 110 MMHG

## 2023-08-10 DIAGNOSIS — Z98.84 HISTORY OF ROUX-EN-Y GASTRIC BYPASS: ICD-10-CM

## 2023-08-10 DIAGNOSIS — K27.9 PUD (PEPTIC ULCER DISEASE): ICD-10-CM

## 2023-08-10 DIAGNOSIS — F43.9 STRESS: ICD-10-CM

## 2023-08-10 DIAGNOSIS — R63.2 BINGE EATING: ICD-10-CM

## 2023-08-10 DIAGNOSIS — G89.29 ABDOMINAL PAIN, CHRONIC, LEFT UPPER QUADRANT: ICD-10-CM

## 2023-08-10 DIAGNOSIS — M53.3 SACROILIAC JOINT DYSFUNCTION OF LEFT SIDE: ICD-10-CM

## 2023-08-10 DIAGNOSIS — E44.1 MILD PROTEIN-CALORIE MALNUTRITION (HCC): Primary | ICD-10-CM

## 2023-08-10 DIAGNOSIS — R10.12 ABDOMINAL PAIN, CHRONIC, LEFT UPPER QUADRANT: ICD-10-CM

## 2023-08-10 PROCEDURE — 3078F DIAST BP <80 MM HG: CPT | Performed by: INTERNAL MEDICINE

## 2023-08-10 PROCEDURE — 99214 OFFICE O/P EST MOD 30 MIN: CPT | Performed by: INTERNAL MEDICINE

## 2023-08-10 PROCEDURE — 3008F BODY MASS INDEX DOCD: CPT | Performed by: INTERNAL MEDICINE

## 2023-08-10 PROCEDURE — 3074F SYST BP LT 130 MM HG: CPT | Performed by: INTERNAL MEDICINE

## 2023-08-11 RX ORDER — PREGABALIN 75 MG/1
75 CAPSULE ORAL 3 TIMES DAILY
Qty: 90 CAPSULE | Refills: 0 | Status: SHIPPED | OUTPATIENT
Start: 2023-08-11

## 2023-08-16 DIAGNOSIS — F41.1 GAD (GENERALIZED ANXIETY DISORDER): ICD-10-CM

## 2023-08-16 RX ORDER — ALPRAZOLAM 0.25 MG/1
0.25 TABLET ORAL 2 TIMES DAILY
Qty: 60 TABLET | Refills: 2 | Status: SHIPPED | OUTPATIENT
Start: 2023-08-16

## 2023-08-16 NOTE — TELEPHONE ENCOUNTER
Requesting refill on alprazolam.   LOV 8/2/2023. LF 7/20/2023. Please approve or deny pending rx. Thank you.

## 2023-08-22 DIAGNOSIS — R63.2 BINGE EATING: Primary | ICD-10-CM

## 2023-09-08 PROBLEM — K27.9 PUD (PEPTIC ULCER DISEASE): Status: RESOLVED | Noted: 2023-08-10 | Resolved: 2023-09-08

## 2023-09-08 PROBLEM — S20.212A RIB CONTUSION, LEFT, INITIAL ENCOUNTER: Status: RESOLVED | Noted: 2023-07-26 | Resolved: 2023-09-08

## 2023-09-08 PROBLEM — F43.9 STRESS: Status: RESOLVED | Noted: 2020-04-17 | Resolved: 2023-09-08

## 2023-09-08 PROBLEM — N13.30 HYDRONEPHROSIS: Status: RESOLVED | Noted: 2023-02-11 | Resolved: 2023-09-08

## 2023-09-10 ENCOUNTER — PATIENT MESSAGE (OUTPATIENT)
Dept: FAMILY MEDICINE CLINIC | Facility: CLINIC | Age: 43
End: 2023-09-10

## 2023-09-11 ENCOUNTER — TELEPHONE (OUTPATIENT)
Dept: FAMILY MEDICINE CLINIC | Facility: CLINIC | Age: 43
End: 2023-09-11

## 2023-09-13 ENCOUNTER — OFFICE VISIT (OUTPATIENT)
Dept: FAMILY MEDICINE CLINIC | Facility: CLINIC | Age: 43
End: 2023-09-13
Payer: COMMERCIAL

## 2023-09-13 VITALS
HEIGHT: 68 IN | HEART RATE: 76 BPM | TEMPERATURE: 98 F | DIASTOLIC BLOOD PRESSURE: 60 MMHG | WEIGHT: 160 LBS | OXYGEN SATURATION: 100 % | SYSTOLIC BLOOD PRESSURE: 92 MMHG | BODY MASS INDEX: 24.25 KG/M2

## 2023-09-13 DIAGNOSIS — E88.01 ALPHA-1-ANTITRYPSIN DEFICIENCY (HCC): ICD-10-CM

## 2023-09-13 DIAGNOSIS — J45.41 ASTHMA EXACERBATION, NON-ALLERGIC, MODERATE PERSISTENT: ICD-10-CM

## 2023-09-13 DIAGNOSIS — J06.9 URI WITH COUGH AND CONGESTION: Primary | ICD-10-CM

## 2023-09-13 PROCEDURE — 99213 OFFICE O/P EST LOW 20 MIN: CPT | Performed by: FAMILY MEDICINE

## 2023-09-13 PROCEDURE — 3008F BODY MASS INDEX DOCD: CPT | Performed by: FAMILY MEDICINE

## 2023-09-13 PROCEDURE — 3074F SYST BP LT 130 MM HG: CPT | Performed by: FAMILY MEDICINE

## 2023-09-13 PROCEDURE — 3078F DIAST BP <80 MM HG: CPT | Performed by: FAMILY MEDICINE

## 2023-09-13 RX ORDER — DEXTROMETHORPHAN HYDROBROMIDE AND PROMETHAZINE HYDROCHLORIDE 15; 6.25 MG/5ML; MG/5ML
5 SYRUP ORAL 4 TIMES DAILY PRN
Qty: 118 ML | Refills: 0 | Status: SHIPPED | OUTPATIENT
Start: 2023-09-13 | End: 2023-09-18

## 2023-09-14 ENCOUNTER — HOSPITAL ENCOUNTER (OUTPATIENT)
Dept: GENERAL RADIOLOGY | Age: 43
Discharge: HOME | End: 2023-09-14
Attending: FAMILY MEDICINE
Payer: COMMERCIAL

## 2023-09-14 DIAGNOSIS — E88.01 ALPHA-1-ANTITRYPSIN DEFICIENCY (HCC): ICD-10-CM

## 2023-09-14 DIAGNOSIS — J45.41 ASTHMA EXACERBATION, NON-ALLERGIC, MODERATE PERSISTENT: ICD-10-CM

## 2023-09-14 DIAGNOSIS — J06.9 URI WITH COUGH AND CONGESTION: ICD-10-CM

## 2023-09-14 PROCEDURE — 71046 X-RAY EXAM CHEST 2 VIEWS: CPT | Performed by: FAMILY MEDICINE

## 2023-09-17 ENCOUNTER — PATIENT MESSAGE (OUTPATIENT)
Dept: FAMILY MEDICINE CLINIC | Facility: CLINIC | Age: 43
End: 2023-09-17

## 2023-09-18 ENCOUNTER — TELEPHONE (OUTPATIENT)
Dept: FAMILY MEDICINE CLINIC | Facility: CLINIC | Age: 43
End: 2023-09-18

## 2023-09-18 ENCOUNTER — TELEMEDICINE (OUTPATIENT)
Dept: FAMILY MEDICINE CLINIC | Facility: CLINIC | Age: 43
End: 2023-09-18
Payer: COMMERCIAL

## 2023-09-18 DIAGNOSIS — Z02.9 ENCOUNTERS FOR ADMINISTRATIVE PURPOSES: Primary | ICD-10-CM

## 2023-09-18 DIAGNOSIS — J45.41 ASTHMA EXACERBATION, NON-ALLERGIC, MODERATE PERSISTENT: ICD-10-CM

## 2023-09-18 DIAGNOSIS — J06.9 URI WITH COUGH AND CONGESTION: Primary | ICD-10-CM

## 2023-09-18 DIAGNOSIS — E88.01 ALPHA-1-ANTITRYPSIN DEFICIENCY (HCC): ICD-10-CM

## 2023-09-18 PROCEDURE — 99442 PHONE E/M BY PHYS 11-20 MIN: CPT | Performed by: FAMILY MEDICINE

## 2023-09-18 RX ORDER — GUAIFENESIN AND CODEINE PHOSPHATE 100; 10 MG/5ML; MG/5ML
5 SOLUTION ORAL 3 TIMES DAILY PRN
Qty: 118 ML | Refills: 0 | Status: SHIPPED | OUTPATIENT
Start: 2023-09-18 | End: 2023-10-02

## 2023-09-18 RX ORDER — PROMETHAZINE HYDROCHLORIDE AND CODEINE PHOSPHATE 6.25; 1 MG/5ML; MG/5ML
SYRUP ORAL EVERY 6 HOURS PRN
Qty: 118 ML | Refills: 0 | Status: SHIPPED | OUTPATIENT
Start: 2023-09-18 | End: 2023-09-18

## 2023-09-19 PROBLEM — J45.41: Status: ACTIVE | Noted: 2020-10-24

## 2023-09-19 PROBLEM — J45.41 ASTHMA EXACERBATION, NON-ALLERGIC, MODERATE PERSISTENT: Status: ACTIVE | Noted: 2020-10-24

## 2023-09-19 NOTE — PROGRESS NOTES
Virtual Telephone Check-In    Leticia Ferro verbally consents to a Virtual/Telephone Check-In visit on 09/19/23. Patient has been referred to the [facility] website at [email or web address] to review the yearly Consent to Treat document. Patient understands and accepts financial responsibility for any deductible, co-insurance and/or co-pays associated with this service. Leticia Ferro is a 43year old female. HPI:   Please note that the following visit was completed using audio communication. This has been done in good radha to provide continuity of care in the best interest of the provider-patient relationship, due to the ongoing public health crisis/national emergency and because of restrictions of visitation. There are limitations of this visit as no physical exam could be performed. Every conscious effort was taken to allow for sufficient and adequate time. This billing was spent on reviewing labs, medications, radiology tests and decision making. Appropriate medical decision-making and tests are ordered as detailed in the plan of care above. Audio call  with patient secondary to patient being unable to get onto [employer]  Patient has a persistent cough that has been present for the past week got sick from her  and son. Congestion is improved but cough is getting worse. Has yellow congestion but no fevers, chills or body aches no nausea, vomiting or diarrhea. Ears no pain or hearing change, throat mild discomfort from chronic cough along with rib pain hurting from coughing all night long. No sinus pain chest does feel tighter using nebulizer 3 times a day with minimal relief. Also using Mucinex and cough syrup with promethazine 4 times a day cause some nausea and sleepiness did not help the cough at night. Had chest x-ray done which was normal.  Does weekly alpha 1 antitrypsin infusions. Presently states that she feels worse with the chest congestion and the cough.   Is taking her chronic medication including the Symbicort, Flonase, DuoNeb via nebulizer, Spiriva is not using the Qvar. 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 COVID-19 testing negative. Current Outpatient Medications   Medication Sig Dispense Refill    guaiFENesin-Codeine 100-10 MG/5ML Oral Syrup Take 5 mL by mouth 3 (three) times daily as needed (cough  stop Tizanidine and Xanax). 118 mL 0    pregabalin 75 MG Oral Cap Take 1 capsule (75 mg total) by mouth 3 (three) times daily. 90 capsule 2    Butalbital-Acetaminophen  MG Oral Tab Take 1 tablet by mouth 2 (two) times daily as needed. 15 tablet 0    ALPRAZolam 0.25 MG Oral Tab Take 1 tablet (0.25 mg total) by mouth 2 (two) times daily. 60 tablet 2    traZODone 50 MG Oral Tab Take 2 tablets (100 mg total) by mouth nightly. 60 tablet 1    tiZANidine 4 MG Oral Tab Take 1 tablet (4 mg total) by mouth nightly as needed. 30 tablet 2    ondansetron (ZOFRAN) 8 MG tablet TAKE 1 TABLET(8 MG) BY MOUTH EVERY 12 HOURS AS NEEDED FOR NAUSEA 30 tablet 1    Multiple Vitamins-Minerals (BARIATRIC MULTIVITAMINS/IRON) Oral Cap Take by mouth As Directed. pantoprazole 40 MG Oral Tab EC Take 1 tablet (40 mg total) by mouth 2 (two) times daily. Nystatin 007254 UNIT/GM External Powder Apply 1 Application. topically 4 (four) times daily. Apply to affected areas 30 g 1    ALBUTEROL 108 (90 Base) MCG/ACT Inhalation Aero Soln INHALE 2 PUFFS INTO THE LUNGS EVERY 4 HOURS AS NEEDED FOR WHEEZING OR SHORTNESS OF BREATH 8.5 g 1    fluticasone propionate 50 MCG/ACT Nasal Suspension 1 spray by Nasal route in the morning and 1 spray before bedtime. loratadine 10 MG Oral Tab Take 1 tablet (10 mg total) by mouth daily. 0    alpha 1-proteinase inhibitor 1000 MG/20ML Intravenous Solution Inject into the vein.       ipratropium-albuterol 0.5-2.5 (3) MG/3ML Inhalation Solution Take 3 mL by nebulization every 4 (four) hours as needed. Use only if the albuterol inhalation albuterol is not working 25 each 0    UNITHROID 100 MCG Oral Tab TAKE 1 TABLET BY MOUTH EVERY MORNING WITH BREAKFAST WITH 88MCG FOR TOTAL DOSE OF 188MCG 30 tablet 5    SPIRIVA RESPIMAT 2.5 MCG/ACT Inhalation Aero Soln INHALE 2 PUFFS INTO THE LUNGS DAILY 12 g 2    UNITHROID 88 MCG Oral Tab TAKE 1 TABLET BY MOUTH EVERY MORNING BEFORE BREAKFAST 30 tablet 9    Calcium Carb-Cholecalciferol (CALCIUM 600/VITAMIN D3) 600-800 MG-UNIT Oral Tab Take 1 tablet by mouth 3 (three) times daily. SYMBICORT 160-4.5 MCG/ACT Inhalation Aerosol INHALE 2 PUFFS INTO THE LUNGS TWICE DAILY 3 Inhaler 3    Cholecalciferol (VITAMIN D) 50 MCG (2000 UT) Oral Cap Take 1 capsule (2,000 Units total) by mouth in the morning and 1 capsule (2,000 Units total) before bedtime. magnesium 250 MG Oral Tab Take 1 tablet (250 mg total) by mouth daily. NON FORMULARY Take 1 tablet by mouth daily. BARIATRIC CHOICE VITAMIN      aspirin 81 MG Oral Tab Take 1 tablet (81 mg total) by mouth daily. Past Medical History:   Diagnosis Date    Abdominal pain 12/2020    Abnormal CT scan, esophagus 12/14/2021    Alpha-1-antitrypsin deficiency ([facility] [address] 11/17/2021    Anxiety     Arrhythmia     RIGHT BUNDLE BRACH BLOCK     Arthritis     Asthma     Back pain 2013    I have Spinal Stenosis that has gotten worse over the years.     Bloating 02/2021    Calculus of kidney 2003    Cancer [facility])     Thyroid    Change in hair 2020    Chest pain     Chronic cough 2007    Constipation 01/2021    COPD (chronic obstructive pulmonary disease) ([facility] [address]     COVID-19 virus infection 06/12/2022    Depression     Diarrhea, unspecified 12/2020    Disorder of thyroid     Diverticulosis     Easy bruising 01/2017    Endometriosis     Esophageal reflux     Fatigue 01/2021    Food intolerance 01/2018    Frequent use of laxatives 01/2021    Gastric ulcer     Headache disorder 01/2009    Hemorrhoids 04/2006 History of gastric bypass 07/06/2020    Hoarseness, chronic 2011    Hx of gastric bypass 12/18/2017    Hx of motion sickness     Hydronephrosis 02/11/2023    Hypokalemia 02/23/2019    Hypothyroidism     Infertility, female     Intertrigo 12/17/2018    Irregular bowel habits     Loss of appetite 12/2020    Migraines     Nausea 12/2020    Nephrolithiasis     Organic hypersomnia, unspecified DMG DX 11-2-12    AHI 2 RDI 2 REM AHI 6 SaO2 curtis 89%    Osteoarthritis     Painful urination 2014    Pancreatitis     Pneumonia due to organism     Problems with swallowing [id number]    PUD (peptic ulcer disease) 08/10/2023    Rib contusion, left, initial encounter 07/26/2023    Severe persistent asthma with exacerbation 05/17/2021    Shortness of breath 2007    Sleep disturbance 10/2020    Stented coronary artery     Stress 04/17/2020    Thyroid cancer ([address]     Visual impairment     glasses    Vocal cord dysfunction     Wears glasses 2018    Weight gain 03/11/2019    Weight loss 12/2020      Social History:  Social History     Socioeconomic History    Marital status:    Tobacco Use    Smoking status: Never     Passive exposure: Past    Smokeless tobacco: Never   Vaping Use    Vaping Use: Never used   Substance and Sexual Activity    Alcohol use: Not Currently    Drug use: No    Sexual activity: Yes     Partners: Male   Other Topics Concern     Service No    Blood Transfusions No    Caffeine Concern Yes     Comment: 32 oz of tea daily    Occupational Exposure No    Hobby Hazards No    Sleep Concern No    Stress Concern No    Weight Concern No    Special Diet No    Back Care No    Exercise Yes     Comment: Daily walking    Bike Helmet No    Seat Belt Yes    Self-Exams No   Social History Narrative    ** Merged History Encounter **             REVIEW OF SYSTEMS:   GENERAL HEALTH: feels well otherwise  SKIN: denies any unusual skin lesions or rashes  RESPIRATORY: cough shortness of breath with exertion  CARDIOVASCULAR: denies chest pain on exertion  GI: denies abdominal pain and denies heartburn  NEURO: denies headaches  Musculoskeletal: No motor deficits  HEENT/cough see above  EXAM:   No vitals taken due to tele-visit. 20 minutes time was spent reviewing patient's inquiry, patient's record including prior labs, developing management plan and ordering tests and prescriptions. Full exam was not done secondary to COVID-19 pandemic. Reviewed medications, problem list and allergies. GENERAL: Patient is speaking in full sentences no increased work in breathing patient is alert and orientated x3. Psych patient's mood is normal and communication skills are good    ASSESSMENT AND PLAN:   Venida Gang with cough and congestion  (primary encounter diagnosis)  Asthma exacerbation, non-allergic, moderate persistent  Alpha-1-antitrypsin deficiency (hcc)    No orders of the defined types were placed in this encounter. Meds & Refills for this Visit:  Requested Prescriptions      No prescriptions requested or ordered in this encounter       Imaging & Consults:  None  URI with cough and congestion  Asthma exacerbation, non-allergic, moderate persistent  Alpha-1-antitrypsin deficiency ([facility] [address]  Continue with Spiriva, DuoNeb 3 times a day, Symbicort twice a day  Guaifenesin with codeine cough syrup 5 mL up to 3 times a day as needed for cough stop tizanidine and Xanax with cough syrup. Reviewed medication benefits and side effects. Use, risks, benefits and precautions of codeine discussed. Including not to drive, operate machinery or drink alcohol when taking this medication. The patient indicates understanding of these issues and agrees to the plan. The patient is asked to return  or as needed.     Duration of the service: 20 minutes

## 2023-09-27 RX ORDER — TRAZODONE HYDROCHLORIDE 50 MG/1
100 TABLET ORAL NIGHTLY
Qty: 60 TABLET | Refills: 1 | Status: SHIPPED | OUTPATIENT
Start: 2023-09-27

## 2023-09-27 NOTE — TELEPHONE ENCOUNTER
Medication(s) to Refill:   Requested Prescriptions     Pending Prescriptions Disp Refills    TRAZODONE 50 MG Oral Tab [Pharmacy Med Name: TRAZODONE 50MG TABLETS] 60 tablet 1     Sig: TAKE 2 TABLETS(100 MG) BY MOUTH EVERY NIGHT       Last Time Medication was Filled: 9/1/23    Recent Visits  Date Type Provider Dept   09/13/23 Office Visit Matias De La Rosa PA-C [facility]     Future Appointments  Date Type Provider Dept   10/04/23 Appointment Matias De La Rosa PA-C [facility]   11/20/23 Appointment Matias De La Rosa PA-C [facility]

## 2023-10-02 RX ORDER — VALACYCLOVIR HYDROCHLORIDE 1 G/1
2000 TABLET, FILM COATED ORAL EVERY 12 HOURS
Qty: 30 TABLET | Refills: 0 | Status: SHIPPED | OUTPATIENT
Start: 2023-10-02

## 2023-10-02 NOTE — TELEPHONE ENCOUNTER
Requested Prescriptions     Signed Prescriptions Disp Refills    VALACYCLOVIR 1 G Oral Tab 30 tablet 0     Sig: TAKE 2 TABLETS(2000 MG) BY MOUTH EVERY 12 HOURS FOR 1 DAY     Authorizing Provider: Sukhwinder Peter     Ordering User: Talita Delgado         Refill approved per protocol.

## 2023-10-04 ENCOUNTER — OFFICE VISIT (OUTPATIENT)
Dept: FAMILY MEDICINE CLINIC | Facility: CLINIC | Age: 43
End: 2023-10-04
Payer: COMMERCIAL

## 2023-10-04 VITALS
OXYGEN SATURATION: 100 % | BODY MASS INDEX: 25 KG/M2 | HEART RATE: 82 BPM | SYSTOLIC BLOOD PRESSURE: 102 MMHG | DIASTOLIC BLOOD PRESSURE: 64 MMHG | TEMPERATURE: 98 F | WEIGHT: 163.38 LBS

## 2023-10-04 DIAGNOSIS — F51.01 PRIMARY INSOMNIA: ICD-10-CM

## 2023-10-04 DIAGNOSIS — R10.31 ABDOMINAL DISCOMFORT IN RIGHT LOWER QUADRANT: ICD-10-CM

## 2023-10-04 DIAGNOSIS — J45.998 ASTHMA, PERSISTENT CONTROLLED: ICD-10-CM

## 2023-10-04 DIAGNOSIS — G44.221 CHRONIC TENSION-TYPE HEADACHE, INTRACTABLE: Primary | ICD-10-CM

## 2023-10-04 DIAGNOSIS — F41.1 GAD (GENERALIZED ANXIETY DISORDER): ICD-10-CM

## 2023-10-04 DIAGNOSIS — E88.01 ALPHA-1-ANTITRYPSIN DEFICIENCY (HCC): ICD-10-CM

## 2023-10-04 DIAGNOSIS — R35.0 URINARY FREQUENCY: ICD-10-CM

## 2023-10-04 LAB
APPEARANCE: CLEAR
BILIRUBIN: NEGATIVE
GLUCOSE (URINE DIPSTICK): NEGATIVE MG/DL
KETONES (URINE DIPSTICK): NEGATIVE MG/DL
LEUKOCYTES: NEGATIVE
MULTISTIX EXPIRATION DATE: ABNORMAL DATE
MULTISTIX LOT#: ABNORMAL NUMERIC
NITRITE, URINE: NEGATIVE
OCCULT BLOOD: NEGATIVE
PH, URINE: 6 (ref 4.5–8)
POCT UROBILINOGEN URINE: 2 MG/DL
PROTEIN (URINE DIPSTICK): NEGATIVE MG/DL
SPECIFIC GRAVITY: 1.03 (ref 1–1.03)
URINE-COLOR: YELLOW

## 2023-10-04 PROCEDURE — 3078F DIAST BP <80 MM HG: CPT | Performed by: FAMILY MEDICINE

## 2023-10-04 PROCEDURE — 3074F SYST BP LT 130 MM HG: CPT | Performed by: FAMILY MEDICINE

## 2023-10-04 PROCEDURE — 99214 OFFICE O/P EST MOD 30 MIN: CPT | Performed by: FAMILY MEDICINE

## 2023-10-04 PROCEDURE — 81003 URINALYSIS AUTO W/O SCOPE: CPT | Performed by: FAMILY MEDICINE

## 2023-10-04 RX ORDER — BUTALBITAL/ACETAMINOPHEN 50MG-325MG
1 TABLET ORAL 2 TIMES DAILY PRN
Qty: 15 TABLET | Refills: 0 | Status: SHIPPED | OUTPATIENT
Start: 2023-10-04

## 2023-10-05 PROBLEM — R10.31 ABDOMINAL DISCOMFORT IN RIGHT LOWER QUADRANT: Status: ACTIVE | Noted: 2023-10-05

## 2023-10-05 PROBLEM — S20.01XA POSTTRAUMATIC HEMATOMA OF RIGHT BREAST: Status: RESOLVED | Noted: 2023-07-26 | Resolved: 2023-10-05

## 2023-10-10 ENCOUNTER — PATIENT MESSAGE (OUTPATIENT)
Dept: FAMILY MEDICINE CLINIC | Facility: CLINIC | Age: 43
End: 2023-10-10

## 2023-10-10 DIAGNOSIS — G44.221 CHRONIC TENSION-TYPE HEADACHE, INTRACTABLE: ICD-10-CM

## 2023-10-10 RX ORDER — TIZANIDINE 4 MG/1
4 TABLET ORAL NIGHTLY PRN
Qty: 30 TABLET | Refills: 2 | Status: SHIPPED | OUTPATIENT
Start: 2023-10-10

## 2023-10-10 NOTE — TELEPHONE ENCOUNTER
From: Sofiya Larry  To: Sarath South  Sent: 10/10/2023 4:43 PM CDT  Subject: Refill of Tizanidine    Umang Suarez,   Can you send a refill in for the Tizanidine? However, I need you to put that I will not take the Valtrx with it. When the Valtrex was picked up, they said that the two can't be taken together. However, I do not take the Valtrex often at all. I only take it if I am getting a cold sore. If the Tizanidine prescription is just sent in, they are not going to fill it without talking to someone unless it states I will not take the two together.     Thank you,   Noam Red

## 2023-10-14 ENCOUNTER — HOSPITAL ENCOUNTER (EMERGENCY)
Age: 43
Discharge: HOME | End: 2023-10-14
Payer: COMMERCIAL

## 2023-10-14 ENCOUNTER — APPOINTMENT (OUTPATIENT)
Dept: GENERAL RADIOLOGY | Age: 43
End: 2023-10-14
Payer: COMMERCIAL

## 2023-10-14 ENCOUNTER — TELEPHONE (OUTPATIENT)
Dept: FAMILY MEDICINE CLINIC | Facility: CLINIC | Age: 43
End: 2023-10-14

## 2023-10-14 VITALS
HEIGHT: 68 IN | DIASTOLIC BLOOD PRESSURE: 87 MMHG | TEMPERATURE: 98 F | SYSTOLIC BLOOD PRESSURE: 114 MMHG | OXYGEN SATURATION: 99 % | RESPIRATION RATE: 18 BRPM | BODY MASS INDEX: 24.25 KG/M2 | WEIGHT: 160 LBS | HEART RATE: 83 BPM
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
## 2023-10-14 DIAGNOSIS — S39.012A BACK STRAIN, INITIAL ENCOUNTER: ICD-10-CM

## 2023-10-14 DIAGNOSIS — S93.602A SPRAIN OF LEFT FOOT, INITIAL ENCOUNTER: ICD-10-CM

## 2023-10-14 PROCEDURE — 73610 X-RAY EXAM OF ANKLE: CPT

## 2023-10-14 PROCEDURE — 73630 X-RAY EXAM OF FOOT: CPT

## 2023-10-14 PROCEDURE — 99284 EMERGENCY DEPT VISIT MOD MDM: CPT

## 2023-10-14 RX ORDER — ACETAMINOPHEN AND CODEINE PHOSPHATE 300; 30 MG/1; MG/1
1-2 TABLET ORAL EVERY 6 HOURS PRN
Qty: 10 TABLET | Refills: 0 | Status: SHIPPED | OUTPATIENT
Start: 2023-10-14 | End: 2023-10-18 | Stop reason: ALTCHOICE

## 2023-10-14 NOTE — ED QUICK NOTES
We do not have post op shoes. Patient discussed wearing a boot at home with APRN. She stated it would be ok.

## 2023-10-14 NOTE — TELEPHONE ENCOUNTER
Incoming call from patient who reports she fell down the stairs and is unable to bear weight on her left foot due to pain. Patient denies hitting her head. Patient advised to go to [facility] immediate care as she will likely need imaging for the foot, however, patient elects to go to the [facility] emergency department.     FYI to [address] [place] [address]

## 2023-10-14 NOTE — ED INITIAL ASSESSMENT (HPI)
Pt lost her balance and fell down a couple stairs injuring her left foot and lower r back.  No loc no head

## 2023-10-18 ENCOUNTER — OFFICE VISIT (OUTPATIENT)
Dept: FAMILY MEDICINE CLINIC | Facility: CLINIC | Age: 43
End: 2023-10-18
Payer: COMMERCIAL

## 2023-10-18 VITALS
TEMPERATURE: 98 F | SYSTOLIC BLOOD PRESSURE: 100 MMHG | DIASTOLIC BLOOD PRESSURE: 60 MMHG | HEART RATE: 96 BPM | OXYGEN SATURATION: 97 %

## 2023-10-18 DIAGNOSIS — S93.692A RUPTURE OF PLANTAR FASCIA OF LEFT FOOT, INITIAL ENCOUNTER: ICD-10-CM

## 2023-10-18 DIAGNOSIS — S93.402A SPRAIN OF LEFT ANKLE, UNSPECIFIED LIGAMENT, INITIAL ENCOUNTER: ICD-10-CM

## 2023-10-18 DIAGNOSIS — M79.662 PAIN OF LEFT CALF: Primary | ICD-10-CM

## 2023-10-18 PROCEDURE — 3074F SYST BP LT 130 MM HG: CPT | Performed by: FAMILY MEDICINE

## 2023-10-18 PROCEDURE — 3078F DIAST BP <80 MM HG: CPT | Performed by: FAMILY MEDICINE

## 2023-10-18 PROCEDURE — 99214 OFFICE O/P EST MOD 30 MIN: CPT | Performed by: FAMILY MEDICINE

## 2023-10-18 RX ORDER — ACETAMINOPHEN AND CODEINE PHOSPHATE 300; 30 MG/1; MG/1
1 TABLET ORAL 3 TIMES DAILY PRN
Qty: 12 TABLET | Refills: 0 | Status: SHIPPED | OUTPATIENT
Start: 2023-10-18 | End: 2023-10-23 | Stop reason: ALTCHOICE

## 2023-10-18 RX ORDER — LISDEXAMFETAMINE DIMESYLATE 50 MG
50 CAPSULE ORAL EVERY MORNING
COMMUNITY
Start: 2023-09-23

## 2023-10-19 NOTE — PROGRESS NOTES
Note was reviewed and addended patient was seen by Eliazarpreston Lion PA-C with  Torrey Schulte, RN, APN student

## 2023-10-23 ENCOUNTER — OFFICE VISIT (OUTPATIENT)
Dept: FAMILY MEDICINE CLINIC | Facility: CLINIC | Age: 43
End: 2023-10-23

## 2023-10-23 VITALS
OXYGEN SATURATION: 98 % | TEMPERATURE: 97 F | SYSTOLIC BLOOD PRESSURE: 110 MMHG | HEART RATE: 88 BPM | DIASTOLIC BLOOD PRESSURE: 68 MMHG
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
## 2023-10-23 DIAGNOSIS — M89.8X6 PAIN IN LEFT TIBIA: Primary | ICD-10-CM

## 2023-10-23 DIAGNOSIS — R52 PAIN MANAGEMENT: ICD-10-CM

## 2023-10-23 DIAGNOSIS — S99.912A INJURY OF LEFT ANKLE, INITIAL ENCOUNTER: ICD-10-CM

## 2023-10-23 DIAGNOSIS — S89.92XA INJURY OF LEFT TIBIA: ICD-10-CM

## 2023-10-23 PROCEDURE — 3078F DIAST BP <80 MM HG: CPT | Performed by: FAMILY MEDICINE

## 2023-10-23 PROCEDURE — 99214 OFFICE O/P EST MOD 30 MIN: CPT | Performed by: FAMILY MEDICINE

## 2023-10-23 PROCEDURE — 3074F SYST BP LT 130 MM HG: CPT | Performed by: FAMILY MEDICINE

## 2023-10-23 RX ORDER — ACETAMINOPHEN AND CODEINE PHOSPHATE 300; 30 MG/1; MG/1
TABLET ORAL 2 TIMES DAILY PRN
Qty: 10 TABLET | Refills: 0 | Status: SHIPPED | OUTPATIENT
Start: 2023-10-23

## 2023-10-24 PROBLEM — S89.92XA: Status: ACTIVE | Noted: 2023-10-24

## 2023-10-24 PROBLEM — S99.912A INJURY OF LEFT ANKLE: Status: ACTIVE | Noted: 2023-10-24

## 2023-10-24 PROBLEM — M89.8X6 PAIN IN LEFT TIBIA: Status: ACTIVE | Noted: 2023-10-24

## 2023-10-25 ENCOUNTER — HOSPITAL ENCOUNTER (OUTPATIENT)
Dept: GENERAL RADIOLOGY | Age: 43
Discharge: HOME | End: 2023-10-25
Attending: FAMILY MEDICINE

## 2023-10-25 DIAGNOSIS — S89.92XA INJURY OF LEFT TIBIA: ICD-10-CM
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
## 2023-10-25 DIAGNOSIS — M89.8X6 PAIN IN LEFT TIBIA: ICD-10-CM

## 2023-10-25 PROCEDURE — 73590 X-RAY EXAM OF LOWER LEG: CPT | Performed by: FAMILY MEDICINE

## 2023-10-26 NOTE — PROGRESS NOTES
No acute fracture to the tibia, discuss your physical symptoms with orthopedic specialist as planned there was some soft tissue swelling of the distal aspect of the tibia.

## 2023-10-27 ENCOUNTER — HOSPITAL ENCOUNTER (OUTPATIENT)
Dept: ULTRASOUND IMAGING | Facility: HOSPITAL | Age: 43
Discharge: HOME | End: 2023-10-27
Attending: FAMILY MEDICINE

## 2023-10-27 ENCOUNTER — TELEPHONE (OUTPATIENT)
Dept: FAMILY MEDICINE CLINIC | Facility: CLINIC | Age: 43
End: 2023-10-27

## 2023-10-27 DIAGNOSIS — M79.662 PAIN OF LEFT CALF: ICD-10-CM

## 2023-10-27 PROCEDURE — 93971 EXTREMITY STUDY: CPT | Performed by: FAMILY MEDICINE

## 2023-10-27 NOTE — TELEPHONE ENCOUNTER
Patient requesting order ; be changed to stat patient is trying to get this appt booked today. Patient requesting a calll back once order has been changed to stat.  Please advise     Referral Information:    Order Date: Oct 18, 2023  Expected Date: 10/18/2023  Expiration Date: Oct 18, 2024 Epic Order #: [id number]   Referral Type: US VENOUS DOPPLER LEG LEFT - DIAG IMG (CPT=93971) Dx: Pain of left calf (S11.113)   Signed Referral Summay:

## 2023-10-29 DIAGNOSIS — G44.221 CHRONIC TENSION-TYPE HEADACHE, INTRACTABLE: ICD-10-CM

## 2023-10-31 RX ORDER — BUTALBITAL/ACETAMINOPHEN 50MG-325MG
1 TABLET ORAL 2 TIMES DAILY PRN
Qty: 15 TABLET | Refills: 0 | Status: SHIPPED | OUTPATIENT
Start: 2023-10-31

## 2023-11-03 ENCOUNTER — PATIENT MESSAGE (OUTPATIENT)
Dept: FAMILY MEDICINE CLINIC | Facility: CLINIC | Age: 43
End: 2023-11-03

## 2023-11-04 RX ORDER — DIAZEPAM 5 MG/1
TABLET ORAL
Qty: 2 TABLET | Refills: 0 | Status: SHIPPED | OUTPATIENT
Start: 2023-11-04

## 2023-11-06 ENCOUNTER — OFFICE VISIT (OUTPATIENT)
Dept: FAMILY MEDICINE CLINIC | Facility: CLINIC | Age: 43
End: 2023-11-06
Payer: COMMERCIAL

## 2023-11-06 VITALS
OXYGEN SATURATION: 96 % | TEMPERATURE: 99 F | HEIGHT: 68 IN | HEART RATE: 86 BPM | SYSTOLIC BLOOD PRESSURE: 118 MMHG | DIASTOLIC BLOOD PRESSURE: 68 MMHG | BODY MASS INDEX: 23.95 KG/M2 | RESPIRATION RATE: 24 BRPM | WEIGHT: 158 LBS

## 2023-11-06 DIAGNOSIS — E88.01 ALPHA-1-ANTITRYPSIN DEFICIENCY (HCC): ICD-10-CM

## 2023-11-06 DIAGNOSIS — R05.1 ACUTE COUGH: ICD-10-CM

## 2023-11-06 DIAGNOSIS — J45.41 ASTHMA EXACERBATION, NON-ALLERGIC, MODERATE PERSISTENT: ICD-10-CM

## 2023-11-06 DIAGNOSIS — J01.20 ACUTE NON-RECURRENT ETHMOIDAL SINUSITIS: Primary | ICD-10-CM

## 2023-11-06 PROBLEM — M62.172: Status: ACTIVE | Noted: 2023-10-17

## 2023-11-06 PROBLEM — I82.402 ACUTE DEEP VEIN THROMBOSIS (DVT) OF LEFT LOWER EXTREMITY (HCC): Status: ACTIVE | Noted: 2023-10-27

## 2023-11-06 PROBLEM — S93.402A SPRAIN OF LEFT ANKLE: Status: ACTIVE | Noted: 2023-10-17

## 2023-11-06 PROBLEM — R60.0 EDEMA OF LEFT LOWER LEG: Status: ACTIVE | Noted: 2023-10-27

## 2023-11-06 PROCEDURE — 3074F SYST BP LT 130 MM HG: CPT | Performed by: FAMILY MEDICINE

## 2023-11-06 PROCEDURE — 99213 OFFICE O/P EST LOW 20 MIN: CPT | Performed by: FAMILY MEDICINE

## 2023-11-06 PROCEDURE — 3008F BODY MASS INDEX DOCD: CPT | Performed by: FAMILY MEDICINE

## 2023-11-06 PROCEDURE — 3078F DIAST BP <80 MM HG: CPT | Performed by: FAMILY MEDICINE

## 2023-11-06 RX ORDER — AMOXICILLIN AND CLAVULANATE POTASSIUM 875; 125 MG/1; MG/1
1 TABLET, FILM COATED ORAL 2 TIMES DAILY
Qty: 20 TABLET | Refills: 0 | Status: SHIPPED | OUTPATIENT
Start: 2023-11-06 | End: 2023-11-16

## 2023-11-06 RX ORDER — DEXTROMETHORPHAN HYDROBROMIDE AND PROMETHAZINE HYDROCHLORIDE 15; 6.25 MG/5ML; MG/5ML
5 SYRUP ORAL 4 TIMES DAILY PRN
Qty: 240 ML | Refills: 0 | Status: SHIPPED | OUTPATIENT
Start: 2023-11-06 | End: 2023-11-11

## 2023-11-08 DIAGNOSIS — F41.1 GAD (GENERALIZED ANXIETY DISORDER): ICD-10-CM

## 2023-11-09 RX ORDER — ALPRAZOLAM 0.25 MG/1
0.25 TABLET ORAL 2 TIMES DAILY
Qty: 60 TABLET | Refills: 0 | Status: SHIPPED | OUTPATIENT
Start: 2023-11-09

## 2023-11-11 ENCOUNTER — TELEMEDICINE (OUTPATIENT)
Dept: FAMILY MEDICINE CLINIC | Facility: CLINIC | Age: 43
End: 2023-11-11
Payer: COMMERCIAL

## 2023-11-11 DIAGNOSIS — E88.01 ALPHA-1-ANTITRYPSIN DEFICIENCY (HCC): ICD-10-CM

## 2023-11-11 DIAGNOSIS — J45.41 ASTHMA EXACERBATION, NON-ALLERGIC, MODERATE PERSISTENT: ICD-10-CM

## 2023-11-11 DIAGNOSIS — J20.9 ACUTE BRONCHITIS DUE TO INFECTION: Primary | ICD-10-CM

## 2023-11-11 RX ORDER — PREDNISONE 10 MG/1
TABLET ORAL
Qty: 40 TABLET | Refills: 0 | Status: SHIPPED | OUTPATIENT
Start: 2023-11-11 | End: 2023-11-21

## 2023-11-11 RX ORDER — GUAIFENESIN AND CODEINE PHOSPHATE 100; 10 MG/5ML; MG/5ML
5 SOLUTION ORAL 2 TIMES DAILY PRN
Qty: 118 ML | Refills: 0 | Status: SHIPPED | OUTPATIENT
Start: 2023-11-11 | End: 2023-11-25

## 2023-11-11 NOTE — PROGRESS NOTES
Garcia Marshall is a 37year old female. HPI:   Please note that the following visit was completed using two-way, real-time interactive audio and video communication. This has been done in good radha to provide continuity of care in the best interest of the provider-patient relationship, due to the ongoing public health crisis/national emergency and because of restrictions of visitation. There are limitations of this visit as no physical exam could be performed. Every conscious effort was taken to allow for sufficient and adequate time. This billing was spent on reviewing labs, medications, radiology tests and decision making. Appropriate medical decision-making and tests are ordered as detailed in the plan of care above. Audiovideo call with patient    To discuss her continued dry cough. Had to miss work this week and half the day on Wednesday secondary to getting dizzy from the cough and then on Thursday had a holding off because of the persistent cough. States that the mucus has cleared no more yellow thick mucus with the Augmentin. Has not had any fevers, chills or body aches. No nausea, vomiting or diarrhea. Is having shortness of breath without chest pain with deep breath pulse ox 98-99 with a pulse of 100 810 secondary to using nebulizers every 4 hours. The nebulizer helps to relax the cough a little but then she gets a coughing fit. Did get her antitrypsin infusion on Tuesday and symptoms seem to get worse with the cough. 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 Is taking pantoprazole 40 mg twice a day does not have any codeine for cough suppressant tried promethazine dextromethorphan with no relief. Was trying to avoid prednisone due to history of stomach ulcers but feels she may need it now.   Was going to get a steroid injection but did not end up going to the immediate care center feels that it is probably time for the oral steroid and would like codeine cough syrup to suppress the cough. Denies ear pain, postnasal drainage is better, no sore throat or sinus pain. Using [employer], NyQuil, promethazine DM cough syrup and Mucinex 1200 mg twice a day with no relief from the cough. Current Outpatient Medications   Medication Sig Dispense Refill    predniSONE 10 MG Oral Tab Take 6 tablets (60 mg total) by mouth daily for 3 days, THEN 4 tablets (40 mg total) daily for 3 days, THEN 3 tablets (30 mg total) daily for 2 days, THEN 2 tablets (20 mg total) daily for 2 days. No Vyvanse with prednisone. 40 tablet 0    guaiFENesin-Codeine 100-10 MG/5ML Oral Syrup Take 5 mL by mouth 2 (two) times daily as needed (cough). 118 mL 0    ALPRAZolam 0.25 MG Oral Tab Take 1 tablet (0.25 mg total) by mouth 2 (two) times daily. 60 tablet 0    amoxicillin clavulanate 875-125 MG Oral Tab Take 1 tablet by mouth 2 (two) times daily for 10 days. 20 tablet 0    diazePAM (VALIUM) 5 MG Oral Tab Take 5 mg 30 minutes before MRI repeat another 5 mg dose at time of MRI if needed. Do not take Xanax or tizanidine on that day. 2 tablet 0    BUTALBITAL-ACETAMINOPHEN  MG Oral Tab TAKE 1 TABLET BY MOUTH TWICE DAILY AS NEEDED 15 tablet 0    tiZANidine 4 MG Oral Tab Take 1 tablet (4 mg total) by mouth nightly as needed (pain spasm). Do not take with Valtrex 30 tablet 2    VALACYCLOVIR 1 G Oral Tab TAKE 2 TABLETS(2000 MG) BY MOUTH EVERY 12 HOURS FOR 1 DAY 30 tablet 0    traZODone 50 MG Oral Tab Take 2 tablets (100 mg total) by mouth nightly. 60 tablet 1    pregabalin 75 MG Oral Cap Take 1 capsule (75 mg total) by mouth 3 (three) times daily. 90 capsule 2    ondansetron (ZOFRAN) 8 MG tablet TAKE 1 TABLET(8 MG) BY MOUTH EVERY 12 HOURS AS NEEDED FOR NAUSEA 30 tablet 1    Multiple Vitamins-Minerals (BARIATRIC MULTIVITAMINS/IRON) Oral Cap Take by mouth As Directed. pantoprazole 40 MG Oral Tab EC Take 1 tablet (40 mg total) by mouth 2 (two) times daily.       Nystatin 942432 UNIT/GM External Powder Apply 1 Application. topically 4 (four) times daily. Apply to affected areas 30 g 1    ALBUTEROL 108 (90 Base) MCG/ACT Inhalation Aero Soln INHALE 2 PUFFS INTO THE LUNGS EVERY 4 HOURS AS NEEDED FOR WHEEZING OR SHORTNESS OF BREATH 8.5 g 1    fluticasone propionate 50 MCG/ACT Nasal Suspension 1 spray by Nasal route in the morning and 1 spray before bedtime. loratadine 10 MG Oral Tab Take 1 tablet (10 mg total) by mouth daily. 0    alpha 1-proteinase inhibitor 1000 MG/20ML Intravenous Solution Inject into the vein. ipratropium-albuterol 0.5-2.5 (3) MG/3ML Inhalation Solution Take 3 mL by nebulization every 4 (four) hours as needed. Use only if the albuterol inhalation albuterol is not working 25 each 0    UNITHROID 100 MCG Oral Tab TAKE 1 TABLET BY MOUTH EVERY MORNING WITH BREAKFAST WITH 88MCG FOR TOTAL DOSE OF 188MCG 30 tablet 5    UNITHROID 88 MCG Oral Tab TAKE 1 TABLET BY MOUTH EVERY MORNING BEFORE BREAKFAST 30 tablet 9    Calcium Carb-Cholecalciferol (CALCIUM 600/VITAMIN D3) 600-800 MG-UNIT Oral Tab Take 1 tablet by mouth 3 (three) times daily. SYMBICORT 160-4.5 MCG/ACT Inhalation Aerosol INHALE 2 PUFFS INTO THE LUNGS TWICE DAILY 3 Inhaler 3    Cholecalciferol (VITAMIN D) 50 MCG (2000 UT) Oral Cap Take 1 capsule (2,000 Units total) by mouth in the morning and 1 capsule (2,000 Units total) before bedtime. magnesium 250 MG Oral Tab Take 1 tablet (250 mg total) by mouth daily. aspirin 81 MG Oral Tab Take 1 tablet (81 mg total) by mouth daily. VYVANSE 50 MG Oral Cap Take 1 capsule (50 mg total) by mouth every morning.  (Patient not taking: Reported on 11/11/2023)      SPIRIVA RESPIMAT 2.5 MCG/ACT Inhalation Aero Soln INHALE 2 PUFFS INTO THE LUNGS DAILY 12 g 2      Past Medical History:   Diagnosis Date    Abdominal pain 12/2020    Abnormal CT scan, esophagus 12/14/2021    Alpha-1-antitrypsin deficiency ([facility] [address] 11/17/2021    Anxiety     Arrhythmia     RIGHT BUNDLE BRACH BLOCK Arthritis     Asthma     Back pain 2013    I have Spinal Stenosis that has gotten worse over the years.     Bloating 02/2021    Calculus of kidney 2003    Cancer [facility])     Thyroid    Change in hair 2020    Chest pain     Chronic cough 2007    Constipation 01/2021    COPD (chronic obstructive pulmonary disease) ([facility] [address]     COVID-19 virus infection 06/12/2022    Depression     Diarrhea, unspecified 12/2020    Disorder of thyroid     Diverticulosis     Easy bruising 01/2017    Endometriosis     Esophageal reflux     Fatigue 01/2021    Food intolerance 01/2018    Frequent use of laxatives 01/2021    Gastric ulcer     Headache disorder 01/2009    Hemorrhoids 04/2006    History of gastric bypass 07/06/2020    Hoarseness, chronic 2011    Hx of gastric bypass 12/18/2017    Hx of motion sickness     Hydronephrosis 02/11/2023    Hypokalemia 02/23/2019    Hypothyroidism     Infertility, female     Intertrigo 12/17/2018    Irregular bowel habits     Loss of appetite 12/2020    Migraines     Nausea 12/2020    Nephrolithiasis     Organic hypersomnia, unspecified DMG DX 11-2-12    AHI 2 RDI 2 REM AHI 6 SaO2 curtis 89%    Osteoarthritis     Painful urination 2014    Pancreatitis     Pneumonia due to organism     Problems with swallowing 8120-1784    PUD (peptic ulcer disease) 08/10/2023    Rib contusion, left, initial encounter 07/26/2023    Severe persistent asthma with exacerbation 05/17/2021    Shortness of breath 2007    Sleep disturbance 10/2020    Stented coronary artery     Stress 04/17/2020    Thyroid cancer ([facility] [address]     Visual impairment     glasses    Vocal cord dysfunction     Wears glasses 2018    Weight gain 03/11/2019    Weight loss 12/2020      Social History:  Social History     Socioeconomic History    Marital status:    Tobacco Use    Smoking status: Never     Passive exposure: Past    Smokeless tobacco: Never   Vaping Use    Vaping Use: Never used   Substance and Sexual Activity    Alcohol use: Not Currently Drug use: No    Sexual activity: Yes     Partners: Male   Other Topics Concern     Service No    Blood Transfusions No    Caffeine Concern Yes     Comment: 32 oz of tea daily    Occupational Exposure No    Hobby Hazards No    Sleep Concern No    Stress Concern No    Weight Concern No    Special Diet No    Back Care No    Exercise No     Comment: Daily walking-on hold    Bike Helmet No    Seat Belt Yes    Self-Exams No   Social History Narrative    ** Merged History Encounter **             REVIEW OF SYSTEMS:   GENERAL HEALTH: feels well otherwise  SKIN: denies any unusual skin lesions or rashes  RESPIRATORY: Chest tightness shortness of breath with exertion without chest pain persistent cough causing shortness of breath  CARDIOVASCULAR: denies chest pain on exertion  GI: denies abdominal pain and denies heartburn  NEURO: denies headaches  Musculoskeletal: No motor deficits  HEENT see above  EXAM:   No vitals taken due to tele-visit. 30 minutes time was spent reviewing patient's inquiry, patient's record including prior labs, developing management plan and ordering tests and prescriptions. Full exam was not done secondary to virtual visit. Reviewed medications, problem list and allergies. GENERAL: Patient is speaking in full sentences no increased work in breathing patient is alert and orientated x3. Psych patient's mood is normal and communication skills are good    ASSESSMENT AND PLAN:     Encounter Diagnoses   Name Primary? Acute bronchitis due to infection Yes    Asthma exacerbation, non-allergic, moderate persistent     Alpha-1-antitrypsin deficiency ([facility] [address]        No orders of the defined types were placed in this encounter.       Meds & Refills for this Visit:  Requested Prescriptions     Signed Prescriptions Disp Refills    predniSONE 10 MG Oral Tab 40 tablet 0     Sig: Take 6 tablets (60 mg total) by mouth daily for 3 days, THEN 4 tablets (40 mg total) daily for 3 days, THEN 3 tablets (30 mg total) daily for 2 days, THEN 2 tablets (20 mg total) daily for 2 days. No Vyvanse with prednisone. guaiFENesin-Codeine 100-10 MG/5ML Oral Syrup 118 mL 0     Sig: Take 5 mL by mouth 2 (two) times daily as needed (cough). Imaging & Consults:  None  1. Acute bronchitis due to infection   Asthma exacerbation, non-allergic, moderate persistent  Alpha-1-antitrypsin deficiency (HCC)  No Vyvanse with prednisone  Started at 40 mg if not better after 24 hours then do the 60 mg taper as instructed divide the prednisone in half and take with food daily  Continue with pantoprazole twice a day and add Pepcid twice a day as needed  Discussed use of codeine. 1 teaspoon twice a day as needed for cough. Discussed her overall chronic use of the codeine for various issues patient states that she can go several days without the codeine had been off of it for 10 days without any issues. Does state that she has no other choices for cough suppression since nothing ever works and is limited for pain treatment due to inability to take nonsteroidals due to history of ulcer    - predniSONE 10 MG Oral Tab; Take 6 tablets (60 mg total) by mouth daily for 3 days, THEN 4 tablets (40 mg total) daily for 3 days, THEN 3 tablets (30 mg total) daily for 2 days, THEN 2 tablets (20 mg total) daily for 2 days. No Vyvanse with prednisone. Dispense: 40 tablet; Refill: 0  - guaiFENesin-Codeine 100-10 MG/5ML Oral Syrup; Take 5 mL by mouth 2 (two) times daily as needed (cough). Dispense: 118 mL; Refill: 0    Continue with alpha 1 antitrypsin infusions    Continue with Spiriva, Symbicort and ipratropium/albuterol nebulizer every 4-6 hours as needed  Use the nebulizer every 4-6 hours as needed    The patient indicates understanding of these issues and agrees to the plan.   The patient is asked to return as needed has a follow-up appointment on 10/20/2023 for complete physical.

## 2023-11-13 ENCOUNTER — PATIENT MESSAGE (OUTPATIENT)
Dept: FAMILY MEDICINE CLINIC | Facility: CLINIC | Age: 43
End: 2023-11-13

## 2023-11-13 DIAGNOSIS — R06.89 DECREASED LUNG SOUNDS: Primary | ICD-10-CM

## 2023-11-14 NOTE — TELEPHONE ENCOUNTER
From: Tenna Fabry  To: Hoang Enrike  Sent: 11/13/2023 6:01 PM CST  Subject: Couple of things    Hi Daniela,   First off, the results from the MRI are attached to this message. I am in the boot for another 4 weeks and then I will see Dr. Silvana Meek again. I am still having a very hard time with my breathing and lungs. I did not take the steriods today, because last night, I had the same reaction as I did on Saturday when I talked to you. Except, it was much worse. I was having a hard time breathing and my O2 was sitting between 92-94% most of the time. There were a few instances when it went up to 97-98%, but not often. I do not feel much better today. My O2 is sitting higher, but, I am still having a very difficult time breathing and taking a deep breath. I had my infusion today and my nurse listened to me and said that the lower right lobe is very diminished. She also said when she listened, all my lungs are slightly diminshed and that I am not able to fill them all of the way. My heart rate is also high and that is without taking any steriods today. I am not really sure what the next step would be or if it is just going to take time to get better.   Thank you,   Carlos Chowdhury

## 2023-11-15 ENCOUNTER — HOSPITAL ENCOUNTER (OUTPATIENT)
Dept: GENERAL RADIOLOGY | Age: 43
Discharge: HOME | End: 2023-11-15
Attending: FAMILY MEDICINE
Payer: COMMERCIAL

## 2023-11-15 DIAGNOSIS — R06.89 DECREASED LUNG SOUNDS: ICD-10-CM

## 2023-11-15 PROCEDURE — 71046 X-RAY EXAM CHEST 2 VIEWS: CPT | Performed by: FAMILY MEDICINE

## 2023-11-15 NOTE — PROGRESS NOTES
No pneumonia or bronchitis. Try to do full breaths using the diaphragm. Schedule with pulmonology for follow up due to persistent symptoms and normal chest Xray.

## 2023-11-20 ENCOUNTER — OFFICE VISIT (OUTPATIENT)
Dept: SURGERY | Facility: CLINIC | Age: 43
End: 2023-11-20
Payer: COMMERCIAL

## 2023-11-20 ENCOUNTER — OFFICE VISIT (OUTPATIENT)
Dept: FAMILY MEDICINE CLINIC | Facility: CLINIC | Age: 43
End: 2023-11-20
Payer: COMMERCIAL

## 2023-11-20 VITALS
HEART RATE: 98 BPM | TEMPERATURE: 98 F | SYSTOLIC BLOOD PRESSURE: 122 MMHG | RESPIRATION RATE: 18 BRPM | HEIGHT: 68 IN | BODY MASS INDEX: 25.31 KG/M2 | OXYGEN SATURATION: 95 % | DIASTOLIC BLOOD PRESSURE: 78 MMHG | WEIGHT: 167 LBS

## 2023-11-20 VITALS
HEIGHT: 68.5 IN | HEART RATE: 100 BPM | OXYGEN SATURATION: 95 % | BODY MASS INDEX: 24.82 KG/M2 | SYSTOLIC BLOOD PRESSURE: 106 MMHG | WEIGHT: 165.69 LBS | DIASTOLIC BLOOD PRESSURE: 69 MMHG

## 2023-11-20 DIAGNOSIS — R63.2 BINGE EATING: ICD-10-CM

## 2023-11-20 DIAGNOSIS — Z98.84 HISTORY OF ROUX-EN-Y GASTRIC BYPASS: ICD-10-CM

## 2023-11-20 DIAGNOSIS — J20.9 ACUTE BRONCHITIS DUE TO INFECTION: ICD-10-CM

## 2023-11-20 DIAGNOSIS — Z13.29 SCREENING FOR ENDOCRINE, NUTRITIONAL, METABOLIC AND IMMUNITY DISORDER: ICD-10-CM

## 2023-11-20 DIAGNOSIS — Z13.21 SCREENING FOR ENDOCRINE, NUTRITIONAL, METABOLIC AND IMMUNITY DISORDER: ICD-10-CM

## 2023-11-20 DIAGNOSIS — Z00.00 WELL ADULT EXAM: Primary | ICD-10-CM

## 2023-11-20 DIAGNOSIS — F51.01 PRIMARY INSOMNIA: ICD-10-CM

## 2023-11-20 DIAGNOSIS — F43.9 STRESS: ICD-10-CM

## 2023-11-20 DIAGNOSIS — S93.409A GRADE 2 ANKLE SPRAIN: ICD-10-CM

## 2023-11-20 DIAGNOSIS — F13.20 SEDATIVE, HYPNOTIC OR ANXIOLYTIC DEPENDENCE, UNCOMPLICATED (HCC): ICD-10-CM

## 2023-11-20 DIAGNOSIS — Z79.899 MEDICATION MANAGEMENT: ICD-10-CM

## 2023-11-20 DIAGNOSIS — Z13.6 ENCOUNTER FOR LIPID SCREENING FOR CARDIOVASCULAR DISEASE: ICD-10-CM

## 2023-11-20 DIAGNOSIS — R05.3 PERSISTENT COUGH: ICD-10-CM

## 2023-11-20 DIAGNOSIS — Z13.220 ENCOUNTER FOR LIPID SCREENING FOR CARDIOVASCULAR DISEASE: ICD-10-CM

## 2023-11-20 DIAGNOSIS — Z13.228 SCREENING FOR ENDOCRINE, NUTRITIONAL, METABOLIC AND IMMUNITY DISORDER: ICD-10-CM

## 2023-11-20 DIAGNOSIS — J45.41 ASTHMA EXACERBATION, NON-ALLERGIC, MODERATE PERSISTENT: ICD-10-CM

## 2023-11-20 DIAGNOSIS — E44.1 MILD PROTEIN-CALORIE MALNUTRITION (HCC): Primary | ICD-10-CM

## 2023-11-20 DIAGNOSIS — Z13.0 SCREENING FOR ENDOCRINE, NUTRITIONAL, METABOLIC AND IMMUNITY DISORDER: ICD-10-CM

## 2023-11-20 DIAGNOSIS — F41.1 GAD (GENERALIZED ANXIETY DISORDER): ICD-10-CM

## 2023-11-20 PROCEDURE — 3078F DIAST BP <80 MM HG: CPT | Performed by: FAMILY MEDICINE

## 2023-11-20 PROCEDURE — 99214 OFFICE O/P EST MOD 30 MIN: CPT | Performed by: INTERNAL MEDICINE

## 2023-11-20 PROCEDURE — 99396 PREV VISIT EST AGE 40-64: CPT | Performed by: FAMILY MEDICINE

## 2023-11-20 PROCEDURE — 3008F BODY MASS INDEX DOCD: CPT | Performed by: FAMILY MEDICINE

## 2023-11-20 PROCEDURE — 3074F SYST BP LT 130 MM HG: CPT | Performed by: FAMILY MEDICINE

## 2023-11-20 PROCEDURE — 3074F SYST BP LT 130 MM HG: CPT | Performed by: INTERNAL MEDICINE

## 2023-11-20 PROCEDURE — 99213 OFFICE O/P EST LOW 20 MIN: CPT | Performed by: FAMILY MEDICINE

## 2023-11-20 PROCEDURE — 3078F DIAST BP <80 MM HG: CPT | Performed by: INTERNAL MEDICINE

## 2023-11-20 PROCEDURE — 3008F BODY MASS INDEX DOCD: CPT | Performed by: INTERNAL MEDICINE

## 2023-11-20 RX ORDER — LISDEXAMFETAMINE DIMESYLATE CAPSULES 50 MG/1
50 CAPSULE ORAL DAILY
Qty: 30 CAPSULE | Refills: 0 | Status: SHIPPED | OUTPATIENT
Start: 2023-11-20 | End: 2023-12-20

## 2023-11-20 RX ORDER — LISDEXAMFETAMINE DIMESYLATE CAPSULES 50 MG/1
50 CAPSULE ORAL DAILY
Qty: 30 CAPSULE | Refills: 0 | Status: SHIPPED | OUTPATIENT
Start: 2024-01-21 | End: 2024-02-20

## 2023-11-20 RX ORDER — GUAIFENESIN AND CODEINE PHOSPHATE 100; 10 MG/5ML; MG/5ML
5 SOLUTION ORAL 2 TIMES DAILY PRN
Qty: 118 ML | Refills: 0 | Status: SHIPPED | OUTPATIENT
Start: 2023-11-20 | End: 2023-12-04

## 2023-11-20 RX ORDER — LISDEXAMFETAMINE DIMESYLATE CAPSULES 50 MG/1
50 CAPSULE ORAL DAILY
Qty: 30 CAPSULE | Refills: 0 | Status: SHIPPED | OUTPATIENT
Start: 2023-12-21 | End: 2024-01-20

## 2023-11-20 RX ORDER — GUAIFENESIN 1200 MG/1
TABLET, EXTENDED RELEASE ORAL
COMMUNITY

## 2023-11-20 RX ORDER — TRAZODONE HYDROCHLORIDE 50 MG/1
100 TABLET ORAL NIGHTLY
Qty: 60 TABLET | Refills: 1 | OUTPATIENT
Start: 2023-11-20

## 2023-11-20 RX ORDER — TRAZODONE HYDROCHLORIDE 50 MG/1
TABLET ORAL NIGHTLY
Qty: 180 TABLET | Refills: 0 | Status: SHIPPED | OUTPATIENT
Start: 2023-11-20 | End: 2024-02-18

## 2023-11-21 PROBLEM — M89.8X6 PAIN IN LEFT TIBIA: Status: RESOLVED | Noted: 2023-10-24 | Resolved: 2023-11-21

## 2023-11-21 PROBLEM — R63.2 BINGE EATING: Status: RESOLVED | Noted: 2017-06-19 | Resolved: 2023-11-21

## 2023-11-21 PROBLEM — S89.92XA: Status: RESOLVED | Noted: 2023-10-24 | Resolved: 2023-11-21

## 2023-11-21 PROBLEM — J45.998 ASTHMA, PERSISTENT CONTROLLED (HCC): Status: RESOLVED | Noted: 2022-06-02 | Resolved: 2023-11-21

## 2023-11-21 PROBLEM — R60.0 EDEMA OF LEFT LOWER LEG: Status: RESOLVED | Noted: 2023-10-27 | Resolved: 2023-11-21

## 2023-11-21 PROBLEM — F43.9 STRESS: Status: RESOLVED | Noted: 2020-04-17 | Resolved: 2023-11-21

## 2023-11-21 PROBLEM — J45.50 SEVERE PERSISTENT ASTHMA WITHOUT COMPLICATION (HCC): Status: RESOLVED | Noted: 2019-02-22 | Resolved: 2023-11-21

## 2023-11-21 PROBLEM — E66.3 OVERWEIGHT WITH BODY MASS INDEX (BMI) 25.0-29.9: Status: RESOLVED | Noted: 2018-12-17 | Resolved: 2023-11-21

## 2023-11-21 PROBLEM — J45.50 SEVERE PERSISTENT ASTHMA WITHOUT COMPLICATION: Status: RESOLVED | Noted: 2019-02-22 | Resolved: 2023-11-21

## 2023-11-21 PROBLEM — J45.998 ASTHMA, PERSISTENT CONTROLLED: Status: RESOLVED | Noted: 2022-06-02 | Resolved: 2023-11-21

## 2023-11-21 PROBLEM — S99.912A INJURY OF LEFT ANKLE: Status: RESOLVED | Noted: 2023-10-24 | Resolved: 2023-11-21

## 2023-11-21 PROBLEM — R10.31 ABDOMINAL DISCOMFORT IN RIGHT LOWER QUADRANT: Status: RESOLVED | Noted: 2023-10-05 | Resolved: 2023-11-21

## 2023-11-21 PROBLEM — I82.402 ACUTE DEEP VEIN THROMBOSIS (DVT) OF LEFT LOWER EXTREMITY (HCC): Status: RESOLVED | Noted: 2023-10-27 | Resolved: 2023-11-21

## 2023-11-22 ENCOUNTER — LAB ENCOUNTER (OUTPATIENT)
Dept: LAB | Age: 43
End: 2023-11-22
Attending: FAMILY MEDICINE
Payer: COMMERCIAL

## 2023-11-22 DIAGNOSIS — Z13.220 ENCOUNTER FOR LIPID SCREENING FOR CARDIOVASCULAR DISEASE: ICD-10-CM

## 2023-11-22 DIAGNOSIS — Z13.29 SCREENING FOR ENDOCRINE, NUTRITIONAL, METABOLIC AND IMMUNITY DISORDER: ICD-10-CM

## 2023-11-22 DIAGNOSIS — Z13.0 SCREENING FOR ENDOCRINE, NUTRITIONAL, METABOLIC AND IMMUNITY DISORDER: ICD-10-CM

## 2023-11-22 DIAGNOSIS — Z98.84 HISTORY OF ROUX-EN-Y GASTRIC BYPASS: ICD-10-CM

## 2023-11-22 DIAGNOSIS — Z13.6 ENCOUNTER FOR LIPID SCREENING FOR CARDIOVASCULAR DISEASE: ICD-10-CM

## 2023-11-22 DIAGNOSIS — R63.2 BINGE EATING: ICD-10-CM

## 2023-11-22 DIAGNOSIS — Z13.228 SCREENING FOR ENDOCRINE, NUTRITIONAL, METABOLIC AND IMMUNITY DISORDER: ICD-10-CM

## 2023-11-22 DIAGNOSIS — S93.409A GRADE 2 ANKLE SPRAIN: ICD-10-CM

## 2023-11-22 DIAGNOSIS — J20.9 ACUTE BRONCHITIS DUE TO INFECTION: ICD-10-CM

## 2023-11-22 DIAGNOSIS — R05.3 PERSISTENT COUGH: ICD-10-CM

## 2023-11-22 DIAGNOSIS — Z13.21 SCREENING FOR ENDOCRINE, NUTRITIONAL, METABOLIC AND IMMUNITY DISORDER: ICD-10-CM

## 2023-11-22 DIAGNOSIS — F43.9 STRESS: ICD-10-CM

## 2023-11-22 DIAGNOSIS — E44.1 MILD PROTEIN-CALORIE MALNUTRITION (HCC): ICD-10-CM

## 2023-11-22 LAB
ALBUMIN SERPL-MCNC: 3.3 G/DL (ref 3.4–5)
ALBUMIN/GLOB SERPL: 1.1 {RATIO} (ref 1–2)
ALP LIVER SERPL-CCNC: 66 U/L
ALT SERPL-CCNC: 27 U/L
ANION GAP SERPL CALC-SCNC: 4 MMOL/L (ref 0–18)
AST SERPL-CCNC: 16 U/L (ref 15–37)
BASOPHILS # BLD AUTO: 0.03 X10(3) UL (ref 0–0.2)
BASOPHILS NFR BLD AUTO: 0.5 %
BILIRUB SERPL-MCNC: 0.5 MG/DL (ref 0.1–2)
BUN BLD-MCNC: 11 MG/DL (ref 9–23)
CALCIUM BLD-MCNC: 8.4 MG/DL (ref 8.5–10.1)
CHLORIDE SERPL-SCNC: 107 MMOL/L (ref 98–112)
CHOLEST SERPL-MCNC: 176 MG/DL (ref ?–200)
CO2 SERPL-SCNC: 28 MMOL/L (ref 21–32)
CREAT BLD-MCNC: 0.74 MG/DL
D DIMER PPP FEU-MCNC: <0.27 UG/ML FEU (ref ?–0.5)
EGFRCR SERPLBLD CKD-EPI 2021: 103 ML/MIN/1.73M2 (ref 60–?)
EOSINOPHIL # BLD AUTO: 0.1 X10(3) UL (ref 0–0.7)
EOSINOPHIL NFR BLD AUTO: 1.7 %
ERYTHROCYTE [DISTWIDTH] IN BLOOD BY AUTOMATED COUNT: 12.5 %
FASTING PATIENT LIPID ANSWER: NO
FASTING STATUS PATIENT QL REPORTED: NO
GLOBULIN PLAS-MCNC: 3.1 G/DL (ref 2.8–4.4)
GLUCOSE BLD-MCNC: 102 MG/DL (ref 70–99)
HCT VFR BLD AUTO: 40.6 %
HDLC SERPL-MCNC: 87 MG/DL (ref 40–59)
HGB BLD-MCNC: 13.6 G/DL
IMM GRANULOCYTES # BLD AUTO: 0.03 X10(3) UL (ref 0–1)
IMM GRANULOCYTES NFR BLD: 0.5 %
LDLC SERPL CALC-MCNC: 75 MG/DL (ref ?–100)
LYMPHOCYTES # BLD AUTO: 1.17 X10(3) UL (ref 1–4)
LYMPHOCYTES NFR BLD AUTO: 20.3 %
MCH RBC QN AUTO: 31.2 PG (ref 26–34)
MCHC RBC AUTO-ENTMCNC: 33.5 G/DL (ref 31–37)
MCV RBC AUTO: 93.1 FL
MONOCYTES # BLD AUTO: 0.57 X10(3) UL (ref 0.1–1)
MONOCYTES NFR BLD AUTO: 9.9 %
NEUTROPHILS # BLD AUTO: 3.87 X10 (3) UL (ref 1.5–7.7)
NEUTROPHILS # BLD AUTO: 3.87 X10(3) UL (ref 1.5–7.7)
NEUTROPHILS NFR BLD AUTO: 67.1 %
NONHDLC SERPL-MCNC: 89 MG/DL (ref ?–130)
OSMOLALITY SERPL CALC.SUM OF ELEC: 288 MOSM/KG (ref 275–295)
PLATELET # BLD AUTO: 194 10(3)UL (ref 150–450)
POTASSIUM SERPL-SCNC: 4 MMOL/L (ref 3.5–5.1)
PROT SERPL-MCNC: 6.4 G/DL (ref 6.4–8.2)
RBC # BLD AUTO: 4.36 X10(6)UL
SODIUM SERPL-SCNC: 139 MMOL/L (ref 136–145)
TRIGL SERPL-MCNC: 73 MG/DL (ref 30–149)
VLDLC SERPL CALC-MCNC: 11 MG/DL (ref 0–30)
WBC # BLD AUTO: 5.8 X10(3) UL (ref 4–11)

## 2023-11-22 PROCEDURE — 80061 LIPID PANEL: CPT

## 2023-11-22 PROCEDURE — 36415 COLL VENOUS BLD VENIPUNCTURE: CPT

## 2023-11-22 PROCEDURE — 85025 COMPLETE CBC W/AUTO DIFF WBC: CPT

## 2023-11-22 PROCEDURE — 84425 ASSAY OF VITAMIN B-1: CPT

## 2023-11-22 PROCEDURE — 80053 COMPREHEN METABOLIC PANEL: CPT

## 2023-11-22 PROCEDURE — 85379 FIBRIN DEGRADATION QUANT: CPT

## 2023-11-23 DIAGNOSIS — E83.51 LOW CALCIUM LEVELS: Primary | ICD-10-CM

## 2023-11-23 NOTE — PROGRESS NOTES
Lipid panel HDL is elevated and cardioprotective rest of the profile is normal.  Metabolic panel low calcium this can occur with low vitamin D or low calcium intake take 500 mg of calcium twice a day and 5,000 international units of vitamin D daily. Will place an order for vitamin D level that you can get done at the next time you get blood work.   Kidney function liver function tests are normal.  Total protein looks stable with a subtle decrease in your albumin, again focus on getting enough protein daily 60 g/day  CBC normal red blood cell and white blood cell counts

## 2023-11-27 DIAGNOSIS — G44.221 CHRONIC TENSION-TYPE HEADACHE, INTRACTABLE: ICD-10-CM

## 2023-11-27 LAB — VITAMIN B1 WHOLE BLD: 203.2 NMOL/L

## 2023-11-28 RX ORDER — BUTALBITAL/ACETAMINOPHEN 50MG-325MG
1 TABLET ORAL 2 TIMES DAILY PRN
Qty: 15 TABLET | Refills: 0 | Status: SHIPPED | OUTPATIENT
Start: 2023-11-28

## 2023-12-04 ENCOUNTER — PATIENT MESSAGE (OUTPATIENT)
Dept: FAMILY MEDICINE CLINIC | Facility: CLINIC | Age: 43
End: 2023-12-04

## 2023-12-04 DIAGNOSIS — G89.18 PAIN ASSOCIATED WITH SURGICAL PROCEDURE: Primary | ICD-10-CM

## 2023-12-04 RX ORDER — HYDROCODONE BITARTRATE AND ACETAMINOPHEN 5; 325 MG/1; MG/1
TABLET ORAL
Qty: 32 TABLET | Refills: 0 | Status: SHIPPED | OUTPATIENT
Start: 2023-12-04 | End: 2023-12-14

## 2023-12-04 NOTE — TELEPHONE ENCOUNTER
From: Sofiya Larry  To: Sarath South  Sent: 12/4/2023 1:03 PM CST  Subject: Medication    Hi Hazel Mitchell,   You asked me to send you a reminder message for you to send in the pain medication for my procedure on Wednesday. What is the dosage and directions going to be? I feel like when I had a lap in July 2022, we started we 5mg every 6 hours (I think) and I believe I ended up needing to up it to 10mg for the first few days. However, I am not sure I am able to look back in the messages far enough to see if that was the case.     Thank you,   Noam Red

## 2023-12-05 DIAGNOSIS — R10.12 ABDOMINAL PAIN, CHRONIC, LEFT UPPER QUADRANT: ICD-10-CM

## 2023-12-05 DIAGNOSIS — G89.29 ABDOMINAL PAIN, CHRONIC, LEFT UPPER QUADRANT: ICD-10-CM

## 2023-12-06 RX ORDER — PREGABALIN 75 MG/1
75 CAPSULE ORAL 3 TIMES DAILY
Qty: 90 CAPSULE | Refills: 0 | Status: SHIPPED | OUTPATIENT
Start: 2023-12-06

## 2023-12-07 DIAGNOSIS — F41.1 GAD (GENERALIZED ANXIETY DISORDER): ICD-10-CM

## 2023-12-07 RX ORDER — ALPRAZOLAM 0.25 MG/1
0.25 TABLET ORAL 2 TIMES DAILY
Qty: 60 TABLET | Refills: 0 | Status: SHIPPED | OUTPATIENT
Start: 2023-12-07

## 2023-12-11 ENCOUNTER — OFFICE VISIT (OUTPATIENT)
Dept: FAMILY MEDICINE CLINIC | Facility: CLINIC | Age: 43
End: 2023-12-11
Payer: COMMERCIAL

## 2023-12-11 ENCOUNTER — PATIENT MESSAGE (OUTPATIENT)
Dept: FAMILY MEDICINE CLINIC | Facility: CLINIC | Age: 43
End: 2023-12-11

## 2023-12-11 VITALS
BODY MASS INDEX: 25.32 KG/M2 | WEIGHT: 169 LBS | HEART RATE: 77 BPM | HEIGHT: 68.5 IN | OXYGEN SATURATION: 99 % | DIASTOLIC BLOOD PRESSURE: 66 MMHG | TEMPERATURE: 99 F | RESPIRATION RATE: 18 BRPM | SYSTOLIC BLOOD PRESSURE: 122 MMHG

## 2023-12-11 DIAGNOSIS — G89.18 POSTOPERATIVE ABDOMINAL PAIN: ICD-10-CM

## 2023-12-11 DIAGNOSIS — Z98.890 STATUS POST LAPAROSCOPIC PROCEDURE: ICD-10-CM

## 2023-12-11 DIAGNOSIS — G89.18 PAIN ASSOCIATED WITH SURGICAL PROCEDURE: ICD-10-CM

## 2023-12-11 DIAGNOSIS — R10.9 POSTOPERATIVE ABDOMINAL PAIN: ICD-10-CM

## 2023-12-11 DIAGNOSIS — R52 PAIN MANAGEMENT: ICD-10-CM

## 2023-12-11 PROCEDURE — 3078F DIAST BP <80 MM HG: CPT | Performed by: FAMILY MEDICINE

## 2023-12-11 PROCEDURE — 3008F BODY MASS INDEX DOCD: CPT | Performed by: FAMILY MEDICINE

## 2023-12-11 PROCEDURE — 99214 OFFICE O/P EST MOD 30 MIN: CPT | Performed by: FAMILY MEDICINE

## 2023-12-11 PROCEDURE — 3074F SYST BP LT 130 MM HG: CPT | Performed by: FAMILY MEDICINE

## 2023-12-11 RX ORDER — HYDROCODONE BITARTRATE AND ACETAMINOPHEN 5; 325 MG/1; MG/1
1 TABLET ORAL EVERY 6 HOURS PRN
Qty: 24 TABLET | Refills: 0 | Status: SHIPPED | OUTPATIENT
Start: 2023-12-11

## 2023-12-11 NOTE — PATIENT INSTRUCTIONS
Norco taper  Every 6 hours for 4 days  Every 8 hours for 3 days   Every 12 hours for 3 days   1/2 every 12 hours for 2 days

## 2023-12-17 DIAGNOSIS — F51.01 PRIMARY INSOMNIA: ICD-10-CM

## 2023-12-18 RX ORDER — TRAZODONE HYDROCHLORIDE 50 MG/1
TABLET ORAL NIGHTLY
Qty: 180 TABLET | Refills: 0 | Status: SHIPPED | OUTPATIENT
Start: 2023-12-18

## 2023-12-21 DIAGNOSIS — R10.12 LEFT UPPER QUADRANT ABDOMINAL PAIN: ICD-10-CM

## 2023-12-21 DIAGNOSIS — R11.0 NAUSEA: ICD-10-CM

## 2023-12-21 RX ORDER — ONDANSETRON HYDROCHLORIDE 8 MG/1
TABLET, FILM COATED ORAL
Qty: 30 TABLET | Refills: 1 | Status: SHIPPED | OUTPATIENT
Start: 2023-12-21

## 2023-12-21 NOTE — TELEPHONE ENCOUNTER
Requested Prescriptions     Pending Prescriptions Disp Refills    ondansetron (ZOFRAN) 8 MG tablet [Pharmacy Med Name: ONDANSETRON 8MG TABLETS] 30 tablet 1     Sig: TAKE 1 TABLET(8 MG) BY MOUTH EVERY 12 HOURS AS NEEDED FOR NAUSEA     Last refill: 6/25/23 30 days 1 refill    Last Appointment: 12/11/23    Next Appointment: 1/15/24

## 2023-12-26 DIAGNOSIS — G44.221 CHRONIC TENSION-TYPE HEADACHE, INTRACTABLE: ICD-10-CM

## 2023-12-26 RX ORDER — BUTALBITAL/ACETAMINOPHEN 50MG-325MG
1 TABLET ORAL 2 TIMES DAILY PRN
Qty: 15 TABLET | Refills: 0 | Status: SHIPPED | OUTPATIENT
Start: 2023-12-26

## 2023-12-27 ENCOUNTER — OFFICE VISIT (OUTPATIENT)
Dept: RHEUMATOLOGY | Facility: CLINIC | Age: 43
End: 2023-12-27
Payer: COMMERCIAL

## 2023-12-27 VITALS
RESPIRATION RATE: 16 BRPM | SYSTOLIC BLOOD PRESSURE: 108 MMHG | WEIGHT: 161 LBS | OXYGEN SATURATION: 99 % | HEIGHT: 68.5 IN | TEMPERATURE: 98 F | DIASTOLIC BLOOD PRESSURE: 60 MMHG | HEART RATE: 92 BPM | BODY MASS INDEX: 24.12 KG/M2

## 2023-12-27 DIAGNOSIS — E88.01 ALPHA-1-ANTITRYPSIN DEFICIENCY (HCC): ICD-10-CM

## 2023-12-27 DIAGNOSIS — M25.50 POLYARTHRALGIA: Primary | ICD-10-CM

## 2023-12-27 DIAGNOSIS — H04.123 DRY EYE SYNDROME OF BOTH EYES: ICD-10-CM

## 2023-12-27 DIAGNOSIS — R76.8 ANA POSITIVE: ICD-10-CM

## 2023-12-27 PROCEDURE — 3008F BODY MASS INDEX DOCD: CPT | Performed by: INTERNAL MEDICINE

## 2023-12-27 PROCEDURE — 3074F SYST BP LT 130 MM HG: CPT | Performed by: INTERNAL MEDICINE

## 2023-12-27 PROCEDURE — 3078F DIAST BP <80 MM HG: CPT | Performed by: INTERNAL MEDICINE

## 2023-12-27 PROCEDURE — 99215 OFFICE O/P EST HI 40 MIN: CPT | Performed by: INTERNAL MEDICINE

## 2023-12-27 NOTE — PATIENT INSTRUCTIONS
-- try systane or refresh for dry eyes  -- if feels more allergy related dry eyes, okay to try pataday   -- try warm compresses at night for the eyes  -- get updated labs, I will call/message with those results  -- consider annual visit or prn visits  -- call/message with questions/concerns or worsened symptoms.      Dr. Lexi Chavarria

## 2023-12-28 ENCOUNTER — LAB ENCOUNTER (OUTPATIENT)
Dept: LAB | Age: 43
End: 2023-12-28
Attending: INTERNAL MEDICINE
Payer: COMMERCIAL

## 2023-12-28 DIAGNOSIS — M25.50 POLYARTHRALGIA: ICD-10-CM

## 2023-12-28 DIAGNOSIS — R76.8 ANA POSITIVE: ICD-10-CM

## 2023-12-28 DIAGNOSIS — E83.51 LOW CALCIUM LEVELS: ICD-10-CM

## 2023-12-28 LAB
CRP SERPL-MCNC: <0.29 MG/DL (ref ?–0.3)
ERYTHROCYTE [SEDIMENTATION RATE] IN BLOOD: 2 MM/HR
IGA SERPL-MCNC: 246 MG/DL (ref 70–312)
IGM SERPL-MCNC: 36.9 MG/DL (ref 43–279)
IMMUNOGLOBULIN PNL SER-MCNC: 650 MG/DL (ref 791–1643)
VIT D+METAB SERPL-MCNC: 68.8 NG/ML (ref 30–100)

## 2023-12-28 PROCEDURE — 85652 RBC SED RATE AUTOMATED: CPT | Performed by: INTERNAL MEDICINE

## 2023-12-28 PROCEDURE — 82784 ASSAY IGA/IGD/IGG/IGM EACH: CPT | Performed by: INTERNAL MEDICINE

## 2023-12-28 PROCEDURE — 82306 VITAMIN D 25 HYDROXY: CPT | Performed by: FAMILY MEDICINE

## 2023-12-28 PROCEDURE — 86140 C-REACTIVE PROTEIN: CPT | Performed by: INTERNAL MEDICINE

## 2023-12-28 PROCEDURE — 86038 ANTINUCLEAR ANTIBODIES: CPT | Performed by: INTERNAL MEDICINE

## 2024-01-02 ENCOUNTER — PATIENT MESSAGE (OUTPATIENT)
Dept: FAMILY MEDICINE CLINIC | Facility: CLINIC | Age: 44
End: 2024-01-02

## 2024-01-02 ENCOUNTER — TELEPHONE (OUTPATIENT)
Dept: FAMILY MEDICINE CLINIC | Facility: CLINIC | Age: 44
End: 2024-01-02

## 2024-01-02 DIAGNOSIS — F41.1 GAD (GENERALIZED ANXIETY DISORDER): ICD-10-CM

## 2024-01-02 DIAGNOSIS — G89.29 ABDOMINAL PAIN, CHRONIC, LEFT UPPER QUADRANT: ICD-10-CM

## 2024-01-02 DIAGNOSIS — R10.12 ABDOMINAL PAIN, CHRONIC, LEFT UPPER QUADRANT: ICD-10-CM

## 2024-01-02 RX ORDER — ALPRAZOLAM 0.25 MG/1
0.25 TABLET ORAL 2 TIMES DAILY
Qty: 60 TABLET | Refills: 0 | OUTPATIENT
Start: 2024-01-02

## 2024-01-02 RX ORDER — PREGABALIN 75 MG/1
75 CAPSULE ORAL 3 TIMES DAILY
Qty: 90 CAPSULE | Refills: 1 | Status: SHIPPED | OUTPATIENT
Start: 2024-01-02

## 2024-01-02 RX ORDER — ALPRAZOLAM 0.25 MG/1
0.25 TABLET ORAL 2 TIMES DAILY
Qty: 60 TABLET | Refills: 0 | Status: SHIPPED | OUTPATIENT
Start: 2024-01-05

## 2024-01-02 NOTE — TELEPHONE ENCOUNTER
pregabalin 75 MG Oral Cap   Patient is in [place] until Friday night she will be out of medication Friday, she is asking for it to be sent to [facility] in [place] - in chart   Please advise.

## 2024-01-02 NOTE — TELEPHONE ENCOUNTER
Okay for the early refill she has  enough until Thursday but because she is out of state wanted to have it on hand earlier

## 2024-01-02 NOTE — TELEPHONE ENCOUNTER
From: Debbi Prasad [facility]  To: Daniela More  Sent: 1/2/2024 3:14 PM CST  Subject: Xanax     Umang Suarez,   I sent in a request through [employer] to have my Xanax refilled (it’s due Saturday) to the [facility] in [place]. The Lyrica was sent to a [facility] because I am here until the weekend. But, I requested the Xanax to go to [place]. However, I got a message it was denied. I just wanted to see why.     Thank you so much,   Debbi Trivedi

## 2024-01-02 NOTE — TELEPHONE ENCOUNTER
[facility] calling to see if ok to give early refill as this is a controlled rx and in a different state. If you could give them a call back to clarify [phone]

## 2024-01-03 ENCOUNTER — PATIENT MESSAGE (OUTPATIENT)
Dept: FAMILY MEDICINE CLINIC | Facility: CLINIC | Age: 44
End: 2024-01-03

## 2024-01-03 ENCOUNTER — TELEPHONE (OUTPATIENT)
Dept: FAMILY MEDICINE CLINIC | Facility: CLINIC | Age: 44
End: 2024-01-03

## 2024-01-03 NOTE — TELEPHONE ENCOUNTER
Patient states pharmacy will be closed by the time she picks up medication. She would like medication to be re routed back to [facility].     [id number] [address] AT [facility] [address] , [phone], [phone] [[id number]]

## 2024-01-03 NOTE — TELEPHONE ENCOUNTER
Spoke to patient and she said that the pharmacy in [facility] told her they were still waiting to hear back from the doctor.  Discussed with patient that they were told yesterday ok to fill tomorrow.  Pt states that the pharmacy is not open tomorrow or Friday.  Explained to pt will reach out to them again. Pt wanted the med sent to the [facility] locally but discussed with patient that will not be going back and forth between pharmacies at which point I explained I would contact the pharmacy and discuss further.       Spoke to Andrés Hinds and gave permission to fill today.  They will have ready for pt after 2pm today.  They were told by the patient that she will have it filled at another store so they put this refill on hold.  Explained that ok to refill today.  They are also open today, tomorrow and Friday and they only close on the weekends.    Left msg for pt that this med would be ready for her today after 2pm at the [facility] pharmacy as was requested yesterday

## 2024-01-03 NOTE — TELEPHONE ENCOUNTER
Pt. Experiencing fever of 101.4 today with no other symptoms. She says she feels like she always feels when she has the flu and wants you to send in Tamiflu to the requested [facility] pharmacy, Please advise.

## 2024-01-03 NOTE — TELEPHONE ENCOUNTER
URGENT SICK CALL    Debbi Prasad [facility]  LOV: 4/10/2023     Patient experiencing fever 101.4 . States that is has been going on since last night. Patient would like to be prescribed ; Temaflu and sent to [facility].   [facility] #[id number] - [place], [place] - [address] AT [facility] [address] [facility] [address] [place] [address], [phone], [phone] [[id number]]       Please advise.

## 2024-01-04 NOTE — TELEPHONE ENCOUNTER
From: Debbi Trivedi  To: Daniela More  Sent: 1/3/2024 12:47 PM CST  Subject: Lyrica     Umang Suarez,   I know you sent the lyrica into the pharmacy here in [facility], but I just found out they are closed Friday - Sunday so I won’t be able to get the prescription before I leave to come home. Can you cancel the prescription in [facility] and instead send it to the [facility] in [place]? The phone number is [phone]. It’s the [facility] at [address] and .     Thank you and sorry for the confusion. I didn’t realize the one here closes on weekends.     Thank you,  Debbi Trivedi

## 2024-01-06 DIAGNOSIS — G44.221 CHRONIC TENSION-TYPE HEADACHE, INTRACTABLE: ICD-10-CM

## 2024-01-08 LAB — NUCLEAR IGG TITR SER IF: POSITIVE {TITER}

## 2024-01-09 LAB
ANA NUCLEOLAR TITR SER IF: 80 {TITER}
DSDNA AB TITR SER: <10 {TITER}

## 2024-01-09 RX ORDER — TIZANIDINE 4 MG/1
TABLET ORAL
Qty: 30 TABLET | Refills: 2 | Status: SHIPPED | OUTPATIENT
Start: 2024-01-09

## 2024-01-10 ENCOUNTER — APPOINTMENT (OUTPATIENT)
Dept: CT IMAGING | Age: 44
End: 2024-01-10
Attending: PHYSICIAN ASSISTANT

## 2024-01-10 ENCOUNTER — HOSPITAL ENCOUNTER (EMERGENCY)
Age: 44
Discharge: HOME | End: 2024-01-10
Attending: EMERGENCY MEDICINE

## 2024-01-10 ENCOUNTER — APPOINTMENT (OUTPATIENT)
Dept: GENERAL RADIOLOGY | Age: 44
End: 2024-01-10
Attending: PHYSICIAN ASSISTANT

## 2024-01-10 VITALS
BODY MASS INDEX: 23.77 KG/M2 | HEART RATE: 81 BPM | SYSTOLIC BLOOD PRESSURE: 95 MMHG | RESPIRATION RATE: 16 BRPM | OXYGEN SATURATION: 98 % | TEMPERATURE: 98.4 F | DIASTOLIC BLOOD PRESSURE: 68 MMHG | HEIGHT: 69 IN | WEIGHT: 160.5 LBS

## 2024-01-10 DIAGNOSIS — S13.9XXA NECK SPRAIN, INITIAL ENCOUNTER: ICD-10-CM
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
## 2024-01-10 DIAGNOSIS — S20.219A CONTUSION OF STERNUM, INITIAL ENCOUNTER: ICD-10-CM

## 2024-01-10 LAB
ALBUMIN SERPL-MCNC: 3.2 G/DL (ref 3.6–5.1)
ALBUMIN/GLOB SERPL: 1.1 {RATIO} (ref 1–2.4)
ALP SERPL-CCNC: 71 UNITS/L (ref 45–117)
ALT SERPL-CCNC: 27 UNITS/L
ANION GAP SERPL CALC-SCNC: 8 MMOL/L (ref 7–19)
AST SERPL-CCNC: 11 UNITS/L
BASOPHILS # BLD: 0 K/MCL (ref 0–0.3)
BASOPHILS NFR BLD: 1 %
BILIRUB SERPL-MCNC: 0.2 MG/DL (ref 0.2–1)
BUN SERPL-MCNC: 7 MG/DL (ref 6–20)
BUN/CREAT SERPL: 10 (ref 7–25)
CALCIUM SERPL-MCNC: 8.3 MG/DL (ref 8.4–10.2)
CENTROMERE IGG SER-ACNC: <0.4 U/ML
CHLORIDE SERPL-SCNC: 109 MMOL/L (ref 97–110)
CO2 SERPL-SCNC: 30 MMOL/L (ref 21–32)
CREAT SERPL-MCNC: 0.7 MG/DL (ref 0.51–0.95)
DEPRECATED RDW RBC: 42.2 FL (ref 39–50)
EGFRCR SERPLBLD CKD-EPI 2021: >90 ML/MIN/{1.73_M2}
ENA JO1 AB SER IA-ACNC: <0.3 U/ML
ENA RNP IGG SER IA-ACNC: 0.7 U/ML
ENA SCL70 IGG SER IA-ACNC: <0.6 U/ML
ENA SM IGG SER IA-ACNC: <0.7 U/ML
ENA SS-A IGG SER IA-ACNC: 0.7 U/ML
ENA SS-B IGG SER IA-ACNC: <0.4 U/ML
EOSINOPHIL # BLD: 0.3 K/MCL (ref 0–0.5)
EOSINOPHIL NFR BLD: 5 %
ERYTHROCYTE [DISTWIDTH] IN BLOOD: 12.1 % (ref 11–15)
FASTING DURATION TIME PATIENT: ABNORMAL H
GLOBULIN SER-MCNC: 2.9 G/DL (ref 2–4)
GLUCOSE SERPL-MCNC: 95 MG/DL (ref 70–99)
HCT VFR BLD CALC: 38.6 % (ref 36–46.5)
HGB BLD-MCNC: 12.7 G/DL (ref 12–15.5)
IMM GRANULOCYTES # BLD AUTO: 0 K/MCL (ref 0–0.2)
IMM GRANULOCYTES # BLD: 0 %
LYMPHOCYTES # BLD: 2.4 K/MCL (ref 1–4.8)
LYMPHOCYTES NFR BLD: 45 %
MCH RBC QN AUTO: 30.8 PG (ref 26–34)
MCHC RBC AUTO-ENTMCNC: 32.9 G/DL (ref 32–36.5)
MCV RBC AUTO: 93.7 FL (ref 78–100)
MONOCYTES # BLD: 0.4 K/MCL (ref 0.3–0.9)
MONOCYTES NFR BLD: 8 %
NEUTROPHILS # BLD: 2.2 K/MCL (ref 1.8–7.7)
NEUTROPHILS NFR BLD: 41 %
NRBC BLD MANUAL-RTO: 0 /100 WBC
PLATELET # BLD AUTO: 250 K/MCL (ref 140–450)
POTASSIUM SERPL-SCNC: 3.9 MMOL/L (ref 3.4–5.1)
PROT SERPL-MCNC: 6.1 G/DL (ref 6.4–8.2)
RBC # BLD: 4.12 MIL/MCL (ref 4–5.2)
SODIUM SERPL-SCNC: 143 MMOL/L (ref 135–145)
TROPONIN I SERPL DL<=0.01 NG/ML-MCNC: <4 NG/L
U1 SNRNP IGG SER IA-ACNC: 1.3 U/ML
WBC # BLD: 5.3 K/MCL (ref 4.2–11)

## 2024-01-10 PROCEDURE — 10005281 CT THORACIC SPINE 2D REFORMATTED

## 2024-01-10 PROCEDURE — 73030 X-RAY EXAM OF SHOULDER: CPT

## 2024-01-10 PROCEDURE — 93005 ELECTROCARDIOGRAM TRACING: CPT | Performed by: PHYSICIAN ASSISTANT

## 2024-01-10 PROCEDURE — 10002803 HB RX 637: Performed by: PHYSICIAN ASSISTANT

## 2024-01-10 PROCEDURE — 84484 ASSAY OF TROPONIN QUANT: CPT | Performed by: EMERGENCY MEDICINE

## 2024-01-10 PROCEDURE — 72125 CT NECK SPINE W/O DYE: CPT

## 2024-01-10 PROCEDURE — 71250 CT THORAX DX C-: CPT

## 2024-01-10 PROCEDURE — 73110 X-RAY EXAM OF WRIST: CPT

## 2024-01-10 PROCEDURE — 85025 COMPLETE CBC W/AUTO DIFF WBC: CPT | Performed by: EMERGENCY MEDICINE

## 2024-01-10 PROCEDURE — 80053 COMPREHEN METABOLIC PANEL: CPT | Performed by: EMERGENCY MEDICINE

## 2024-01-10 PROCEDURE — 10002800 HB RX 250 W HCPCS: Performed by: PHYSICIAN ASSISTANT

## 2024-01-10 RX ORDER — ACETAMINOPHEN AND CODEINE PHOSPHATE 300; 30 MG/1; MG/1
1 TABLET ORAL EVERY 6 HOURS PRN
Qty: 12 TABLET | Refills: 0 | Status: SHIPPED | OUTPATIENT
Start: 2024-01-10

## 2024-01-10 RX ORDER — ONDANSETRON 4 MG/1
4 TABLET, ORALLY DISINTEGRATING ORAL ONCE
Status: COMPLETED | OUTPATIENT
Start: 2024-01-10 | End: 2024-01-10

## 2024-01-10 RX ORDER — HYDROCODONE BITARTRATE AND ACETAMINOPHEN 5; 325 MG/1; MG/1
1 TABLET ORAL ONCE
Status: COMPLETED | OUTPATIENT
Start: 2024-01-10 | End: 2024-01-10

## 2024-01-10 RX ADMIN — HYDROCODONE BITARTRATE AND ACETAMINOPHEN 1 TABLET: 5; 325 TABLET ORAL at 20:14

## 2024-01-10 RX ADMIN — MORPHINE SULFATE 4 MG: 4 INJECTION INTRAVENOUS at 21:18

## 2024-01-10 RX ADMIN — ONDANSETRON 4 MG: 4 TABLET, ORALLY DISINTEGRATING ORAL at 20:14

## 2024-01-10 ASSESSMENT — PAIN SCALES - GENERAL
PAINLEVEL_OUTOF10: 7
PAINLEVEL_OUTOF10: 3
PAINLEVEL_OUTOF10: 7

## 2024-01-11 LAB
ATRIAL RATE (BPM): 80
P AXIS (DEGREES): 58
PR-INTERVAL (MSEC): 142
QRS-INTERVAL (MSEC): 106
QT-INTERVAL (MSEC): 392
QTC: 452
R AXIS (DEGREES): 1
RAINBOW EXTRA TUBES HOLD SPECIMEN: NORMAL
REPORT TEXT: NORMAL
T AXIS (DEGREES): 40
VENTRICULAR RATE EKG/MIN (BPM): 80

## 2024-01-15 ENCOUNTER — OFFICE VISIT (OUTPATIENT)
Dept: FAMILY MEDICINE CLINIC | Facility: CLINIC | Age: 44
End: 2024-01-15
Payer: COMMERCIAL

## 2024-01-15 VITALS
DIASTOLIC BLOOD PRESSURE: 64 MMHG | SYSTOLIC BLOOD PRESSURE: 110 MMHG | RESPIRATION RATE: 18 BRPM | OXYGEN SATURATION: 98 % | HEART RATE: 88 BPM | TEMPERATURE: 97 F | WEIGHT: 158 LBS | BODY MASS INDEX: 23.67 KG/M2 | HEIGHT: 68.5 IN

## 2024-01-15 DIAGNOSIS — G44.209 ACUTE NON INTRACTABLE TENSION-TYPE HEADACHE: ICD-10-CM
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
## 2024-01-15 DIAGNOSIS — G44.221 CHRONIC TENSION-TYPE HEADACHE, INTRACTABLE: ICD-10-CM

## 2024-01-15 DIAGNOSIS — S16.1XXA STRAIN OF NECK MUSCLE, INITIAL ENCOUNTER: ICD-10-CM

## 2024-01-15 PROCEDURE — 3078F DIAST BP <80 MM HG: CPT | Performed by: FAMILY MEDICINE

## 2024-01-15 PROCEDURE — 99214 OFFICE O/P EST MOD 30 MIN: CPT | Performed by: FAMILY MEDICINE

## 2024-01-15 PROCEDURE — 3074F SYST BP LT 130 MM HG: CPT | Performed by: FAMILY MEDICINE

## 2024-01-15 PROCEDURE — 3008F BODY MASS INDEX DOCD: CPT | Performed by: FAMILY MEDICINE

## 2024-01-15 RX ORDER — BUTALBITAL/ACETAMINOPHEN 50MG-325MG
1 TABLET ORAL 2 TIMES DAILY PRN
Qty: 20 TABLET | Refills: 0 | Status: SHIPPED | OUTPATIENT
Start: 2024-01-15

## 2024-01-15 RX ORDER — ACETAMINOPHEN AND CODEINE PHOSPHATE 300; 30 MG/1; MG/1
1 TABLET ORAL EVERY 6 HOURS PRN
COMMUNITY
Start: 2024-01-10
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
## 2024-01-18 ENCOUNTER — PATIENT MESSAGE (OUTPATIENT)
Dept: FAMILY MEDICINE CLINIC | Facility: CLINIC | Age: 44
End: 2024-01-18

## 2024-01-19 NOTE — TELEPHONE ENCOUNTER
From: Debbi Trivedi  To: Daniela More  Sent: 1/18/2024 3:20 PM CST  Subject: TPN    Hi Daniela,   I am sending this to Dr. Pizano as well, but below is the response from the Bariatric Surgeon.     Debbi Trivedi     Good Morning Debbi,      I spoke with Dr. Castillo and at this time he is not recommending you to have TPN nutrition. He did state that if your Rheumatologist is recommending it then either himself or your primary will need to order. Please let us know if you have any other questions.   Thank you.

## 2024-01-29 ENCOUNTER — PATIENT MESSAGE (OUTPATIENT)
Dept: FAMILY MEDICINE CLINIC | Facility: CLINIC | Age: 44
End: 2024-01-29

## 2024-01-29 DIAGNOSIS — R10.12 ABDOMINAL PAIN, CHRONIC, LEFT UPPER QUADRANT: ICD-10-CM

## 2024-01-29 DIAGNOSIS — G89.29 ABDOMINAL PAIN, CHRONIC, LEFT UPPER QUADRANT: ICD-10-CM

## 2024-01-29 RX ORDER — PREGABALIN 75 MG/1
75 CAPSULE ORAL 3 TIMES DAILY
Qty: 90 CAPSULE | Refills: 1 | Status: SHIPPED | OUTPATIENT
Start: 2024-01-29

## 2024-01-29 NOTE — TELEPHONE ENCOUNTER
From: Debbi Trivedi  To: Daniela More  Sent: 1/29/2024 2:43 PM CST  Subject: Pregablin     Hi Daniela,   I wanted to send you a message to request a refill for my Pregablin 75mg. I currently have a refill on the medication. However, the refill is on the script that was sent to [facility] when I was on vacation. Unfortunately, Pregablin is not able to be transferred. So, can you send a refill into my regular [facility] on [address] in [place]?     Thank you so much,  Debbi Trivedi

## 2024-01-31 ENCOUNTER — PATIENT MESSAGE (OUTPATIENT)
Dept: FAMILY MEDICINE CLINIC | Facility: CLINIC | Age: 44
End: 2024-01-31

## 2024-01-31 DIAGNOSIS — F41.1 GAD (GENERALIZED ANXIETY DISORDER): ICD-10-CM

## 2024-01-31 RX ORDER — ALPRAZOLAM 0.25 MG/1
0.25 TABLET ORAL 2 TIMES DAILY
Qty: 60 TABLET | Refills: 0 | Status: SHIPPED | OUTPATIENT
Start: 2024-01-31

## 2024-01-31 NOTE — TELEPHONE ENCOUNTER
Requested Prescriptions     Pending Prescriptions Disp Refills    ALPRAZOLAM 0.25 MG Oral Tab [Pharmacy Med Name: ALPRAZOLAM 0.25MG TABLETS] 60 tablet 0     Sig: TAKE 1 TABLET(0.25 MG) BY MOUTH TWICE DAILY     Last refill: 1/5/24 60 tabs 0 refills    Last Appointment: 1/15/24    Next Appointment: 2/19/24

## 2024-02-02 ENCOUNTER — LAB ENCOUNTER (OUTPATIENT)
Dept: LAB | Age: 44
End: 2024-02-02
Attending: OTOLARYNGOLOGY
Payer: COMMERCIAL

## 2024-02-02 DIAGNOSIS — C73 THYROID CANCER (HCC): ICD-10-CM

## 2024-02-02 DIAGNOSIS — E07.9 THYROID DYSFUNCTION: ICD-10-CM

## 2024-02-02 DIAGNOSIS — D80.1 HYPOGAMMAGLOBULINEMIA (HCC): Primary | ICD-10-CM

## 2024-02-02 LAB
T4 FREE SERPL-MCNC: 1.6 NG/DL (ref 0.8–1.7)
TSI SER-ACNC: <0.005 MIU/ML (ref 0.36–3.74)

## 2024-02-02 PROCEDURE — 86774 TETANUS ANTIBODY: CPT

## 2024-02-02 PROCEDURE — 84443 ASSAY THYROID STIM HORMONE: CPT

## 2024-02-02 PROCEDURE — 86800 THYROGLOBULIN ANTIBODY: CPT

## 2024-02-02 PROCEDURE — 86317 IMMUNOASSAY INFECTIOUS AGENT: CPT

## 2024-02-02 PROCEDURE — 36415 COLL VENOUS BLD VENIPUNCTURE: CPT

## 2024-02-02 PROCEDURE — 84439 ASSAY OF FREE THYROXINE: CPT

## 2024-02-02 PROCEDURE — 86648 DIPHTHERIA ANTIBODY: CPT

## 2024-02-05 ENCOUNTER — PATIENT MESSAGE (OUTPATIENT)
Dept: FAMILY MEDICINE CLINIC | Facility: CLINIC | Age: 44
End: 2024-02-05

## 2024-02-05 DIAGNOSIS — G44.221 CHRONIC TENSION-TYPE HEADACHE, INTRACTABLE: ICD-10-CM

## 2024-02-05 LAB
THYROGLOB AB: <1 IU/ML
THYROGLOB IMA: <0.1 NG/ML

## 2024-02-05 RX ORDER — BUTALBITAL/ACETAMINOPHEN 50MG-325MG
1 TABLET ORAL 2 TIMES DAILY PRN
Qty: 15 TABLET | Refills: 0 | Status: SHIPPED | OUTPATIENT
Start: 2024-02-05

## 2024-02-05 NOTE — TELEPHONE ENCOUNTER
From: Debbi Trivedi  To: Daniela More  Sent: 2/5/2024 1:46 PM CST  Subject: Medication     Hi Daniela,   I am reaching out as I will need a refill for the Butalbital/Acetaminophen next week. I did not request a refill through the pharmacy as it is currently 5 pills a week so the prescription was written for 20 a month. However, I did not know if you wanted to go back to 15 a month.    Thank you,   Debbi Trivedi
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
## 2024-02-06 LAB
DIPHTHERIA ANTITOXOID AB: 1.95 IU/ML
TETANUS ANTITOXOID IGG AB: 3.67 IU/ML

## 2024-02-09 LAB
PNEU AB TYPE 1*: 2.2 UG/ML
PNEU AB TYPE 12 (12F)*: 0.5 UG/ML
PNEU AB TYPE 14*: 2.8 UG/ML
PNEU AB TYPE 17 (17F)*: 4.8 UG/ML
PNEU AB TYPE 19 (19F)*: 2.9 UG/ML
PNEU AB TYPE 2*: 3.8 UG/ML
PNEU AB TYPE 20*: 6.9 UG/ML
PNEU AB TYPE 22 (22F)*: 4.7 UG/ML
PNEU AB TYPE 23 (23F)*: 2.6 UG/ML
PNEU AB TYPE 26 (6B)*: 7.9 UG/ML
PNEU AB TYPE 3*: 1.6 UG/ML
PNEU AB TYPE 34 (10A)*: 2.7 UG/ML
PNEU AB TYPE 4*: 1.6 UG/ML
PNEU AB TYPE 43 (11A)*: 3.7 UG/ML
PNEU AB TYPE 5*: 0.8 UG/ML
PNEU AB TYPE 51 (7F)*: 1 UG/ML
PNEU AB TYPE 54 (15B)*: >22 UG/ML
PNEU AB TYPE 56 (18C)*: 1 UG/ML
PNEU AB TYPE 57 (19A)*: 2 UG/ML
PNEU AB TYPE 68 (9V)*: 0.7 UG/ML
PNEU AB TYPE 70 (33F)*: 1.5 UG/ML
PNEU AB TYPE 8*: 1.3 UG/ML
PNEU AB TYPE 9 (9N)*: 1.2 UG/ML

## 2024-02-19 ENCOUNTER — OFFICE VISIT (OUTPATIENT)
Dept: FAMILY MEDICINE CLINIC | Facility: CLINIC | Age: 44
End: 2024-02-19
Payer: COMMERCIAL

## 2024-02-19 VITALS
DIASTOLIC BLOOD PRESSURE: 68 MMHG | TEMPERATURE: 98 F | SYSTOLIC BLOOD PRESSURE: 118 MMHG | HEIGHT: 68.5 IN | RESPIRATION RATE: 18 BRPM | HEART RATE: 94 BPM | WEIGHT: 163 LBS | BODY MASS INDEX: 24.42 KG/M2 | OXYGEN SATURATION: 96 %

## 2024-02-19 DIAGNOSIS — G44.221 CHRONIC TENSION-TYPE HEADACHE, INTRACTABLE: Primary | ICD-10-CM

## 2024-02-19 DIAGNOSIS — Z79.899 MEDICATION MANAGEMENT: ICD-10-CM

## 2024-02-19 DIAGNOSIS — F41.1 GAD (GENERALIZED ANXIETY DISORDER): ICD-10-CM

## 2024-02-19 DIAGNOSIS — S20.01XA POSTTRAUMATIC HEMATOMA OF RIGHT BREAST, INITIAL ENCOUNTER: ICD-10-CM

## 2024-02-19 PROBLEM — S93.402A SPRAIN OF LEFT ANKLE: Status: RESOLVED | Noted: 2023-10-17 | Resolved: 2024-02-19

## 2024-02-19 PROBLEM — M62.172: Status: RESOLVED | Noted: 2023-10-17 | Resolved: 2024-02-19

## 2024-02-19 PROCEDURE — 3074F SYST BP LT 130 MM HG: CPT | Performed by: FAMILY MEDICINE

## 2024-02-19 PROCEDURE — 99214 OFFICE O/P EST MOD 30 MIN: CPT | Performed by: FAMILY MEDICINE

## 2024-02-19 PROCEDURE — 3008F BODY MASS INDEX DOCD: CPT | Performed by: FAMILY MEDICINE

## 2024-02-19 PROCEDURE — 3078F DIAST BP <80 MM HG: CPT | Performed by: FAMILY MEDICINE

## 2024-02-19 RX ORDER — BUTALBITAL/ACETAMINOPHEN 50MG-325MG
TABLET ORAL 2 TIMES DAILY PRN
Qty: 30 TABLET | Refills: 0 | Status: SHIPPED | OUTPATIENT
Start: 2024-02-23
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
## 2024-02-28 DIAGNOSIS — F41.1 GAD (GENERALIZED ANXIETY DISORDER): ICD-10-CM

## 2024-02-28 RX ORDER — ALPRAZOLAM 0.25 MG/1
0.25 TABLET ORAL 2 TIMES DAILY
Qty: 60 TABLET | Refills: 0 | Status: SHIPPED | OUTPATIENT
Start: 2024-02-28

## 2024-02-28 NOTE — TELEPHONE ENCOUNTER
Requested Prescriptions     Pending Prescriptions Disp Refills    ALPRAZOLAM 0.25 MG Oral Tab [Pharmacy Med Name: ALPRAZOLAM 0.25MG TABLETS] 60 tablet 0     Sig: TAKE 1 TABLET(0.25 MG) BY MOUTH TWICE DAILY     Last refill: 1/31/24 60 tabs 0 refills    Last Appointment: 2/19/24    Next Appointment: 3/18/24

## 2024-03-08 DIAGNOSIS — R63.2 BINGE EATING: Primary | ICD-10-CM

## 2024-03-08 RX ORDER — LISDEXAMFETAMINE DIMESYLATE CAPSULES 50 MG/1
50 CAPSULE ORAL DAILY
Qty: 30 CAPSULE | Refills: 0 | Status: SHIPPED | OUTPATIENT
Start: 2024-05-09 | End: 2024-06-08

## 2024-03-08 RX ORDER — LISDEXAMFETAMINE DIMESYLATE CAPSULES 50 MG/1
50 CAPSULE ORAL DAILY
Qty: 30 CAPSULE | Refills: 0 | Status: SHIPPED | OUTPATIENT
Start: 2024-03-08 | End: 2024-04-07

## 2024-03-08 RX ORDER — LISDEXAMFETAMINE DIMESYLATE CAPSULES 50 MG/1
50 CAPSULE ORAL DAILY
Qty: 30 CAPSULE | Refills: 0 | Status: SHIPPED | OUTPATIENT
Start: 2024-04-08 | End: 2024-05-08

## 2024-03-15 ENCOUNTER — TELEPHONE (OUTPATIENT)
Dept: FAMILY MEDICINE CLINIC | Facility: CLINIC | Age: 44
End: 2024-03-15

## 2024-03-15 ENCOUNTER — HOSPITAL ENCOUNTER (OUTPATIENT)
Dept: ULTRASOUND IMAGING | Age: 44
Discharge: HOME | End: 2024-03-15
Attending: FAMILY MEDICINE
Payer: COMMERCIAL

## 2024-03-15 ENCOUNTER — HOSPITAL ENCOUNTER (OUTPATIENT)
Dept: MAMMOGRAPHY | Age: 44
Discharge: HOME | End: 2024-03-15
Attending: FAMILY MEDICINE
Payer: COMMERCIAL

## 2024-03-15 DIAGNOSIS — R92.8 ABNORMAL MAMMOGRAM: ICD-10-CM

## 2024-03-15 DIAGNOSIS — S20.01XA POSTTRAUMATIC HEMATOMA OF RIGHT BREAST, INITIAL ENCOUNTER: ICD-10-CM

## 2024-03-15 DIAGNOSIS — R92.8 ABNORMAL MAMMOGRAM: Primary | ICD-10-CM

## 2024-03-15 PROCEDURE — 77065 DX MAMMO INCL CAD UNI: CPT | Performed by: FAMILY MEDICINE

## 2024-03-15 PROCEDURE — 77061 BREAST TOMOSYNTHESIS UNI: CPT | Performed by: FAMILY MEDICINE

## 2024-03-15 PROCEDURE — 76642 ULTRASOUND BREAST LIMITED: CPT | Performed by: FAMILY MEDICINE

## 2024-03-15 NOTE — TELEPHONE ENCOUNTER
[facility] at [facility] calling to have a Right Diagnostic Mammogram ordered. Pt is there now and just had breast ultrasound.  Recommending pt get the mammogram on the same day.    Please advise.

## 2024-03-18 ENCOUNTER — OFFICE VISIT (OUTPATIENT)
Dept: FAMILY MEDICINE CLINIC | Facility: CLINIC | Age: 44
End: 2024-03-18
Payer: COMMERCIAL

## 2024-03-18 VITALS
HEART RATE: 94 BPM | SYSTOLIC BLOOD PRESSURE: 112 MMHG | RESPIRATION RATE: 16 BRPM | DIASTOLIC BLOOD PRESSURE: 74 MMHG | WEIGHT: 166 LBS | OXYGEN SATURATION: 98 % | TEMPERATURE: 98 F | BODY MASS INDEX: 24.87 KG/M2 | HEIGHT: 68.5 IN

## 2024-03-18 DIAGNOSIS — Z79.899 MEDICATION MANAGEMENT: ICD-10-CM

## 2024-03-18 DIAGNOSIS — F41.1 GAD (GENERALIZED ANXIETY DISORDER): ICD-10-CM

## 2024-03-18 DIAGNOSIS — G44.221 CHRONIC TENSION-TYPE HEADACHE, INTRACTABLE: Primary | ICD-10-CM

## 2024-03-18 PROCEDURE — 3074F SYST BP LT 130 MM HG: CPT | Performed by: FAMILY MEDICINE

## 2024-03-18 PROCEDURE — 3078F DIAST BP <80 MM HG: CPT | Performed by: FAMILY MEDICINE

## 2024-03-18 PROCEDURE — 99214 OFFICE O/P EST MOD 30 MIN: CPT | Performed by: FAMILY MEDICINE

## 2024-03-18 PROCEDURE — 3008F BODY MASS INDEX DOCD: CPT | Performed by: FAMILY MEDICINE

## 2024-03-18 RX ORDER — BUTALBITAL/ACETAMINOPHEN 50MG-325MG
TABLET ORAL 2 TIMES DAILY PRN
Qty: 30 TABLET | Refills: 0 | Status: SHIPPED | OUTPATIENT
Start: 2024-03-18
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
## 2024-03-20 ENCOUNTER — PATIENT MESSAGE (OUTPATIENT)
Dept: FAMILY MEDICINE CLINIC | Facility: CLINIC | Age: 44
End: 2024-03-20

## 2024-03-20 DIAGNOSIS — G89.29 ABDOMINAL PAIN, CHRONIC, LEFT UPPER QUADRANT: ICD-10-CM

## 2024-03-20 DIAGNOSIS — R10.12 ABDOMINAL PAIN, CHRONIC, LEFT UPPER QUADRANT: ICD-10-CM

## 2024-03-20 RX ORDER — PREGABALIN 75 MG/1
75 CAPSULE ORAL 3 TIMES DAILY
Qty: 90 CAPSULE | Refills: 2 | Status: SHIPPED | OUTPATIENT
Start: 2024-03-20

## 2024-03-20 NOTE — TELEPHONE ENCOUNTER
From: Debbi Trivedi  To: Daniela More  Sent: 3/20/2024 12:50 PM CDT  Subject: Medication    Hi Daniela,   As discussed at my appointment on Monday, March 18th, I am messaging you to ask for the Pregablin 75mg be sent into [facility] for an early fill on Friday, March 22nd. The early fill is due to me leaving on vacation early morning on Saturday, March 23rd. I am thinking of just having it sent to my normal [facility] ([address]) and I am hoping to pick it up Friday before they close at 7:00. If they can't have it ready for me to  then, I will need to have it called into the [facility] on [address]. Please let me know which one it gets sent to.     Thank you so much,   Debbi Trivedi

## 2024-03-26 ENCOUNTER — PATIENT MESSAGE (OUTPATIENT)
Dept: FAMILY MEDICINE CLINIC | Facility: CLINIC | Age: 44
End: 2024-03-26

## 2024-03-26 DIAGNOSIS — F41.1 GAD (GENERALIZED ANXIETY DISORDER): ICD-10-CM

## 2024-03-26 DIAGNOSIS — G44.221 CHRONIC TENSION-TYPE HEADACHE, INTRACTABLE: ICD-10-CM

## 2024-03-27 RX ORDER — ALPRAZOLAM 0.25 MG/1
0.25 TABLET ORAL 2 TIMES DAILY
Qty: 60 TABLET | Refills: 0 | Status: SHIPPED | OUTPATIENT
Start: 2024-03-27

## 2024-03-27 RX ORDER — TIZANIDINE 4 MG/1
TABLET ORAL
Qty: 30 TABLET | Refills: 2 | Status: SHIPPED | OUTPATIENT
Start: 2024-03-27

## 2024-03-27 NOTE — TELEPHONE ENCOUNTER
From: Debbi Prasad [facility]  To: Daniela More  Sent: 3/26/2024 10:55 AM CDT  Subject: Medications     Hi Daniela,   I’m reaching out as we discussed at my appointment on March 18th about sending medication to the [facility] as we are not coming home until Saturday after my [facility] is closed. Can you send both my Xanax and Tizanidine to the [facility] at [facility] and [address]. The address and phone number is below.     [id number] [facility]   [phone]    Thank you,   [facility]bi [facility]Ow[facility]

## 2024-04-01 ENCOUNTER — PATIENT MESSAGE (OUTPATIENT)
Dept: FAMILY MEDICINE CLINIC | Facility: CLINIC | Age: 44
End: 2024-04-01

## 2024-04-01 RX ORDER — TRIAMCINOLONE ACETONIDE 1 MG/G
CREAM TOPICAL 2 TIMES DAILY PRN
Qty: 45 G | Refills: 0 | Status: SHIPPED | OUTPATIENT
Start: 2024-04-01

## 2024-04-02 NOTE — TELEPHONE ENCOUNTER
From: Debbi Trivedi  To: Daniela More  Sent: 4/1/2024 4:47 PM CDT  Subject: Rash type thing     Umang Suarez,   I started to notice after the surgery I had on December 6th that my abdomen was very itchy. I chalked it up to healing and the stitches. However, it is spreading and becoming more itchy and it is prickly. I showed it to my infusion nurse and she said it looks like some type of Dermatitis. I attached a picture for you.     Debbi Trivedi

## 2024-04-03 ENCOUNTER — PATIENT MESSAGE (OUTPATIENT)
Dept: FAMILY MEDICINE CLINIC | Facility: CLINIC | Age: 44
End: 2024-04-03

## 2024-04-03 DIAGNOSIS — G44.221 CHRONIC TENSION-TYPE HEADACHE, INTRACTABLE: ICD-10-CM

## 2024-04-03 RX ORDER — BUTALBITAL/ACETAMINOPHEN 50MG-325MG
TABLET ORAL
Qty: 20 TABLET | Refills: 0 | Status: SHIPPED | OUTPATIENT
Start: 2024-04-11

## 2024-04-03 NOTE — TELEPHONE ENCOUNTER
From: Debbi Trivedi  To: Daniela More  Sent: 4/3/2024 2:01 PM CDT  Subject: Question about meds    Hi Daniela,   I know you mentioned at my last appointment that you are out of town one of the weeks in April, so I wanted to check with you now before you leave. I will be done with the current presciption of the Tension Headache Medication I believe at the end of next week. I am still taking 1.5 a day, but I am ready to drop that down after next week. The question is, will I be ok going from 7 a week (1.5 a day) back down to about 3? Or, do I need to be weaned off to hopefully keep me from getting bad rebound headaches? I am confortable doing whatever you think is best, but I know that I would like to come down from the 30 a month. Also, let me know when you would like me to send a message to request a refill so that I have it prior to running out.    Thank you so much,   Debbi Trivedi
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
## 2024-04-19 ENCOUNTER — TELEPHONE (OUTPATIENT)
Dept: UROLOGY | Facility: CLINIC | Age: 44
End: 2024-04-19

## 2024-04-22 ENCOUNTER — OFFICE VISIT (OUTPATIENT)
Dept: UROLOGY | Facility: CLINIC | Age: 44
End: 2024-04-22
Attending: OBSTETRICS & GYNECOLOGY
Payer: COMMERCIAL

## 2024-04-22 VITALS
RESPIRATION RATE: 16 BRPM | WEIGHT: 168 LBS | SYSTOLIC BLOOD PRESSURE: 102 MMHG | DIASTOLIC BLOOD PRESSURE: 62 MMHG | HEIGHT: 68.5 IN | BODY MASS INDEX: 25.17 KG/M2 | TEMPERATURE: 98 F

## 2024-04-22 DIAGNOSIS — N94.810 VESTIBULITIS, VULVAR: ICD-10-CM

## 2024-04-22 DIAGNOSIS — N32.81 OAB (OVERACTIVE BLADDER): Primary | ICD-10-CM

## 2024-04-22 DIAGNOSIS — R10.2 PELVIC PAIN: ICD-10-CM

## 2024-04-22 PROCEDURE — 99212 OFFICE O/P EST SF 10 MIN: CPT

## 2024-04-22 RX ORDER — SUCRALFATE 1 G/1
1 TABLET ORAL
COMMUNITY

## 2024-04-25 ENCOUNTER — OFFICE VISIT (OUTPATIENT)
Dept: FAMILY MEDICINE CLINIC | Facility: CLINIC | Age: 44
End: 2024-04-25
Payer: COMMERCIAL

## 2024-04-25 VITALS
TEMPERATURE: 98 F | HEIGHT: 68.5 IN | HEART RATE: 104 BPM | DIASTOLIC BLOOD PRESSURE: 74 MMHG | RESPIRATION RATE: 16 BRPM | OXYGEN SATURATION: 98 % | BODY MASS INDEX: 25.47 KG/M2 | SYSTOLIC BLOOD PRESSURE: 134 MMHG | WEIGHT: 170 LBS

## 2024-04-25 DIAGNOSIS — E44.1 MILD PROTEIN-CALORIE MALNUTRITION (HCC): ICD-10-CM

## 2024-04-25 DIAGNOSIS — E88.01 ALPHA-1-ANTITRYPSIN DEFICIENCY (HCC): ICD-10-CM

## 2024-04-25 DIAGNOSIS — N94.810 VULVAR VESTIBULITIS: ICD-10-CM

## 2024-04-25 DIAGNOSIS — F11.21 HISTORY OF NARCOTIC ADDICTION (HCC): ICD-10-CM

## 2024-04-25 DIAGNOSIS — F41.1 GAD (GENERALIZED ANXIETY DISORDER): ICD-10-CM

## 2024-04-25 DIAGNOSIS — J44.89 CHRONIC BRONCHIOLITIS (HCC): ICD-10-CM

## 2024-04-25 DIAGNOSIS — N32.81 OVERACTIVE BLADDER: ICD-10-CM

## 2024-04-25 DIAGNOSIS — F32.5 MAJOR DEPRESSION IN REMISSION (HCC): ICD-10-CM

## 2024-04-25 DIAGNOSIS — R52 PAIN MANAGEMENT: ICD-10-CM

## 2024-04-25 DIAGNOSIS — Z01.818 PREOP EXAMINATION: Primary | ICD-10-CM

## 2024-04-25 DIAGNOSIS — K21.9 GASTROESOPHAGEAL REFLUX DISEASE WITHOUT ESOPHAGITIS: ICD-10-CM

## 2024-04-25 DIAGNOSIS — I87.1 MAY-THURNER SYNDROME: ICD-10-CM

## 2024-04-25 LAB
ANION GAP SERPL CALC-SCNC: 4 MMOL/L (ref 0–18)
BASOPHILS # BLD AUTO: 0.04 X10(3) UL (ref 0–0.2)
BASOPHILS NFR BLD AUTO: 0.8 %
BUN BLD-MCNC: 9 MG/DL (ref 9–23)
CALCIUM BLD-MCNC: 8.9 MG/DL (ref 8.5–10.1)
CHLORIDE SERPL-SCNC: 104 MMOL/L (ref 98–112)
CO2 SERPL-SCNC: 30 MMOL/L (ref 21–32)
CREAT BLD-MCNC: 0.76 MG/DL
EGFRCR SERPLBLD CKD-EPI 2021: 100 ML/MIN/1.73M2 (ref 60–?)
EOSINOPHIL # BLD AUTO: 0.14 X10(3) UL (ref 0–0.7)
EOSINOPHIL NFR BLD AUTO: 2.9 %
ERYTHROCYTE [DISTWIDTH] IN BLOOD BY AUTOMATED COUNT: 12.1 %
FASTING STATUS PATIENT QL REPORTED: YES
GLUCOSE BLD-MCNC: 87 MG/DL (ref 70–99)
HCT VFR BLD AUTO: 40.1 %
HGB BLD-MCNC: 13.1 G/DL
IMM GRANULOCYTES # BLD AUTO: 0.01 X10(3) UL (ref 0–1)
IMM GRANULOCYTES NFR BLD: 0.2 %
LYMPHOCYTES # BLD AUTO: 1.52 X10(3) UL (ref 1–4)
LYMPHOCYTES NFR BLD AUTO: 31 %
MCH RBC QN AUTO: 31.6 PG (ref 26–34)
MCHC RBC AUTO-ENTMCNC: 32.7 G/DL (ref 31–37)
MCV RBC AUTO: 96.9 FL
MONOCYTES # BLD AUTO: 0.48 X10(3) UL (ref 0.1–1)
MONOCYTES NFR BLD AUTO: 9.8 %
NEUTROPHILS # BLD AUTO: 2.71 X10 (3) UL (ref 1.5–7.7)
NEUTROPHILS # BLD AUTO: 2.71 X10(3) UL (ref 1.5–7.7)
NEUTROPHILS NFR BLD AUTO: 55.3 %
OSMOLALITY SERPL CALC.SUM OF ELEC: 284 MOSM/KG (ref 275–295)
PLATELET # BLD AUTO: 218 10(3)UL (ref 150–450)
POTASSIUM SERPL-SCNC: 4.1 MMOL/L (ref 3.5–5.1)
RBC # BLD AUTO: 4.14 X10(6)UL
SODIUM SERPL-SCNC: 138 MMOL/L (ref 136–145)
WBC # BLD AUTO: 4.9 X10(3) UL (ref 4–11)

## 2024-04-25 PROCEDURE — 85025 COMPLETE CBC W/AUTO DIFF WBC: CPT | Performed by: FAMILY MEDICINE

## 2024-04-25 PROCEDURE — 3078F DIAST BP <80 MM HG: CPT | Performed by: FAMILY MEDICINE

## 2024-04-25 PROCEDURE — 80048 BASIC METABOLIC PNL TOTAL CA: CPT | Performed by: FAMILY MEDICINE

## 2024-04-25 PROCEDURE — 3075F SYST BP GE 130 - 139MM HG: CPT | Performed by: FAMILY MEDICINE

## 2024-04-25 PROCEDURE — 3008F BODY MASS INDEX DOCD: CPT | Performed by: FAMILY MEDICINE

## 2024-04-25 PROCEDURE — 99243 OFF/OP CNSLTJ NEW/EST LOW 30: CPT | Performed by: FAMILY MEDICINE

## 2024-04-25 RX ORDER — ALPRAZOLAM 0.25 MG/1
0.25 TABLET ORAL 2 TIMES DAILY
Qty: 60 TABLET | Refills: 0 | Status: SHIPPED | OUTPATIENT
Start: 2024-04-25

## 2024-04-25 NOTE — H&P (VIEW-ONLY)
Debbi Prasad [facility] is a 43 year old female who presents for a pre-operative risk assessment.    Patient is to have valvular vestibulectomy and cystoscopy, to be done by Dr. Waller at [facility] on 5/1/2024  She needs a CBC and a BMP.      Prior anesthesia:  no issues with general anesthesia in the past    PMH negative for:   angina, arrhythmia,, CAD, CHF, liver disease, kidney disease, no coagulation delay, no primary hypercoagulability, no PE or DVT history, no jaw problems, no seizure disorder.       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 Person 4/22/2024 stable asthma and alpha-1 antitrypsin deficiency presently stable does infusions which has helped minimize symptoms    SH:  Smoking history never;   Alcohol use none;   Caffeine 32 ounces per day   Exercise walk,  family support      ALLIE risk:   BMI 25  Snoring none  Fatigue no issues     No family history of:   adverse reaction to anesthesia, no aneurysm, no bleeding problems, no clotting disorder, no sudden cardiac death, no stroke or ischemic heart disease    Family history positive for:  Dad AMI bypass       Allergies and Medications reviewed and as listed below       Results for orders placed or performed in visit on 04/25/24   Basic Metabolic Panel (8) [E]   Result Value Ref Range    Glucose 87 70 - 99 mg/dL    Sodium 138 136 - 145 mmol/L    Potassium 4.1 3.5 - 5.1 mmol/L    Chloride 104 98 - 112 mmol/L    CO2 30.0 21.0 - 32.0 mmol/L    Anion Gap 4 0 - 18 mmol/L    BUN 9 9 - 23 mg/dL    Creatinine 0.76 0.55 - 1.02 mg/dL    Calcium, Total 8.9 8.5 - 10.1 mg/dL    Calculated Osmolality 284 275 - 295 mOsm/kg    eGFR-Cr 100 >=60 mL/min/1.73m2    Patient Fasting for BMP? Yes    CBC W/  DIFFERENTIAL   Result Value Ref Range    WBC 4.9 4.0 - 11.0 x10(3) uL    RBC 4.14 3.80 - 5.30 x10(6)uL    HGB 13.1 12.0 - 16.0 g/dL    HCT 40.1 35.0 - 48.0 %    .0 150.0 - 450.0 10(3)uL    MCV 96.9 80.0 - 100.0 fL    MCH 31.6 26.0 - 34.0 pg    MCHC 32.7 31.0 - 37.0 g/dL    RDW 12.1 %    Neutrophil Absolute Prelim 2.71 1.50 - 7.70 x10 (3) uL    Neutrophil Absolute 2.71 1.50 - 7.70 x10(3) uL    Lymphocyte Absolute 1.52 1.00 - 4.00 x10(3) uL    Monocyte Absolute 0.48 0.10 - 1.00 x10(3) uL    Eosinophil Absolute 0.14 0.00 - 0.70 x10(3) uL    Basophil Absolute 0.04 0.00 - 0.20 x10(3) uL    Immature Granulocyte Absolute 0.01 0.00 - 1.00 x10(3) uL    Neutrophil % 55.3 %    Lymphocyte % 31.0 %    Monocyte % 9.8 %    Eosinophil % 2.9 %    Basophil % 0.8 %    Immature Granulocyte % 0.2 %     *Note: Due to a large number of results and/or encounters for the requested time period, some results have not been displayed. A complete set of results can be found in Results Review.       HPI:   History of present illness vulvar vestibulitis,  urinary frequency stress  incontinence    Patient is requesting refill on promethazine cough syrup for when she does develop a cough history of alpha 1 antitrypsin deficiency on infusions and chronic bronchiolitis presently under good control has not had a cough, shortness of breath or tightness is taking inhalers regularly and had consultation with pulmonologist on Monday.  Current Outpatient Medications   Medication Sig Dispense Refill    ALPRAZolam 0.25 MG Oral Tab Take 1 tablet (0.25 mg total) by mouth 2 (two) times daily. 60 tablet 0    Promethazine HCl 6.25 MG/5ML Oral Solution Take 5 mL by mouth every 6 (six) hours as needed. 120 mL 0    sucralfate 1 g Oral Tab Take 1 tablet (1 g total) by mouth 4 (four) times daily before meals and nightly.      Butalbital-Acetaminophen  MG Oral Tab Take 0.5-1 tablets by mouth daily as needed (tension headaches). 20 tablet 0    triamcinolone  0.1 % External Cream Apply topically 2 (two) times daily as needed. 45 g 0    tiZANidine 4 MG Oral Tab TAKE 1 TABLET(4 MG) BY MOUTH EVERY NIGHT AS NEEDED FOR PAIN OR SPASM. DO NOT TAKE WITH VALTREX 30 tablet 2    pregabalin 75 MG Oral Cap Take 1 capsule (75 mg total) by mouth 3 (three) times daily. OK to refill early (3/22/24) 90 capsule 2    ondansetron (ZOFRAN) 8 MG tablet TAKE 1 TABLET(8 MG) BY MOUTH EVERY 12 HOURS AS NEEDED FOR NAUSEA 30 tablet 1    traZODone 50 MG Oral Tab Take 1-2 tablets ( mg total) by mouth nightly. 180 tablet 0    VALACYCLOVIR 1 G Oral Tab TAKE 2 TABLETS(2000 MG) BY MOUTH EVERY 12 HOURS FOR 1 DAY 30 tablet 0    pantoprazole 40 MG Oral Tab EC Take 1 tablet (40 mg total) by mouth 2 (two) times daily.      ALBUTEROL 108 (90 Base) MCG/ACT Inhalation Aero Soln INHALE 2 PUFFS INTO THE LUNGS EVERY 4 HOURS AS NEEDED FOR WHEEZING OR SHORTNESS OF BREATH 8.5 g 1    alpha 1-proteinase inhibitor 1000 MG/20ML Intravenous Solution Inject into the vein once. weekly      ipratropium-albuterol 0.5-2.5 (3) MG/3ML Inhalation Solution Take 3 mL by nebulization every 4 (four) hours as needed. Use only if the albuterol inhalation albuterol is not working 25 each 0    UNITHROID 100 MCG Oral Tab TAKE 1 TABLET BY MOUTH EVERY MORNING WITH BREAKFAST WITH 88MCG FOR TOTAL DOSE OF 188MCG 30 tablet 5    SPIRIVA RESPIMAT 2.5 MCG/ACT Inhalation Aero Soln INHALE 2 PUFFS INTO THE LUNGS DAILY 12 g 2    UNITHROID 88 MCG Oral Tab TAKE 1 TABLET BY MOUTH EVERY MORNING BEFORE BREAKFAST 30 tablet 9    SYMBICORT 160-4.5 MCG/ACT Inhalation Aerosol INHALE 2 PUFFS INTO THE LUNGS TWICE DAILY 3 Inhaler 3    lisdexamfetamine (VYVANSE) 50 MG Oral Cap Take 1 capsule (50 mg total) by mouth daily. (Patient not taking: Reported on 4/25/2024) 30 capsule 0    [START ON 5/9/2024] lisdexamfetamine (VYVANSE) 50 MG Oral Cap Take 1 capsule (50 mg total) by mouth daily. (Patient not taking: Reported on 4/25/2024) 30 capsule 0    Multiple  Vitamins-Minerals (BARIATRIC MULTIVITAMINS/IRON) Oral Cap Take by mouth As Directed. (Patient not taking: Reported on 4/25/2024)      Nystatin 845954 UNIT/GM External Powder Apply 1 Application. topically 4 (four) times daily. Apply to affected areas (Patient not taking: Reported on 4/25/2024) 30 g 1    fluticasone propionate 50 MCG/ACT Nasal Suspension 1 spray by Nasal route in the morning and 1 spray before bedtime.      loratadine 10 MG Oral Tab Take 1 tablet (10 mg total) by mouth daily. (Patient not taking: Reported on 4/25/2024)  0    Calcium Carb-Cholecalciferol (CALCIUM 600/VITAMIN D3) 600-800 MG-UNIT Oral Tab Take 1 tablet by mouth 3 (three) times daily.      Cholecalciferol (VITAMIN D) 50 MCG (2000 UT) Oral Cap Take 1 capsule (2,000 Units total) by mouth in the morning and 1 capsule (2,000 Units total) before bedtime. (Patient not taking: Reported on 4/25/2024)      magnesium 250 MG Oral Tab Take 1 tablet (250 mg total) by mouth daily. (Patient not taking: Reported on 4/25/2024)      aspirin 81 MG Oral Tab Take 1 tablet (81 mg total) by mouth daily. (Patient not taking: Reported on 4/25/2024)        Allergies:   Allergies   Allergen Reactions    Adhesive Tape HIVES     Patient got welts when she use the adhesive tape for 48-hour Holter monitor.    Cephalosporins HIVES    Chocolate HIVES     Dark chocolate    Doxycycline HIVES     Hives and fever   Hives and fever     Tramadol HIVES and RASH    Azithromycin OTHER (SEE COMMENTS) and RESTLESSNESS     tingling  Restless leg syndrome      Haloperidol ANXIETY    Metoclopramide ANXIETY     Feels like she is going to jump out of her skin    Toradol [Ketorolac Tromethamine] RASH    Norflex Tablets [Orphenadrine] OTHER (SEE COMMENTS)     Dilated [pupils ? / pt is not sure of reaction    Cheratussin Ac [Guaifenesin-Codeine] NAUSEA ONLY     Only in liquid form; tolerates gel caps    Nsaids OTHER (SEE COMMENTS)       Other reaction(s): GI UPSET  Hx of gastric bypass       Past Medical History:    Abdominal discomfort in right lower quadrant    Abdominal pain    Abnormal CT scan, esophagus    Acute deep vein thrombosis (DVT) of left lower extremity (HCC)    Alpha-1-antitrypsin deficiency (HCC)    Anxiety    Arrhythmia    RIGHT BUNDLE BRACH BLOCK     Arthritis    Asherman syndrome    Asthma (HCC)    Back pain    I have Spinal Stenosis that has gotten worse over the years.    Bloating    Calculus of kidney    Cancer (HCC)    Thyroid    Change in hair    Chest pain    Chronic cough    Constipation    COPD (chronic obstructive pulmonary disease) (HCC)    no Oxygen    COVID-19 virus infection    Depression    Diarrhea, unspecified    Disorder of thyroid    Diverticulosis    Easy bruising    Endometriosis    Esophageal reflux    Fatigue    Food intolerance    Frequent use of laxatives    Gastric ulcer    Headache disorder    Hemorrhoids    History of gastric bypass    Hoarseness, chronic    Hx of gastric bypass    Hx of motion sickness    Hydronephrosis    Hypokalemia    Hypothyroidism    Infertility, female    Intertrigo    Irregular bowel habits    Loss of appetite    Migraines    Nausea    Nephrolithiasis    Organic hypersomnia, unspecified    AHI 2 RDI 2 REM AHI 6 SaO2 curtis 89%    Osteoarthritis    Painful urination    Pancreatitis (HCC)    Pneumonia due to organism    PONV (postoperative nausea and vomiting)    Problems with swallowing    PUD (peptic ulcer disease)    Rib contusion, left, initial encounter    Severe persistent asthma with exacerbation (HCC)    Shortness of breath    Sleep disturbance    Stented coronary artery    Stress    Thyroid cancer (HCC)    Visual impairment    glasses    Vocal cord dysfunction    Wears glasses    Weight gain    Weight loss      Past Surgical History:   Procedure Laterality Date    Ankle scope,part debridement Left 06/19/2015    Procedure: ARTHROSCOPY ANKLE WITH DEBRIDEMENT;  Surgeon: Marcial Evans MD;  Location: [facility]  thermoplasty 1 lobe Right 04/15/2021    Bronch thermoplasty 1 lobe Left 2021      2006    Cholecystectomy      Colonoscopy  2021    Colonoscopy      Colonoscopy,diagnostic  10/22/2013    Procedure: COLONOSCOPY, POSSIBLE BIOPSY, POSSIBLE POLYPECTOMY 61710;  Surgeon: Marcello Ferreira MD;  Location: [facility]    Ct angiography, chest (jrs=[id number])  2021    D & c      x4    Dilation/curettage,diagnostic      Egd  2021    Gastric bypass,obese<100cm magy-en-y  2017    DR. SEGAL    Gastric bypass,obesity,sb reconstruc      Gastrocnemius recession Left 2015    Procedure: GASTROC RECESSION FOOT;  Surgeon: Marcial Evans MD;  Location: [facility]    Hysterectomy      Hysteroscopy      Inj paravert f jnt l/s 1 lev Bilateral 2015    Procedure: FACET INJECTION UNDER FLUOROSCOPY;  Surgeon: Dusty Munson DO;  Location: [facility]    Laparoscopic cholecystectomy N/A 2016    Procedure: LAPAROSCOPIC CHOLECYSTECTOMY;  Surgeon: Dai Sanchez MD;  Location:  MAIN OR    Laparoscopy,diagnostic      Laparoscopy,diagnostic  2022    Lysis of adhesions      M-sedaj by sm phys perfrmg svc 5+ yr Bilateral 2015    Procedure: FACET INJECTION UNDER FLUOROSCOPY;  Surgeon: Dusty Munson DO;  Location: [facility]    Domonique biopsy stereo nodule 1 site right (cpt=[id number])  2023    stromal fibrosis    Oophorectomy Left     Other  NECK SCAR REVISION    Other      gastric bypass    Other surgical history      partial thyroidectomy with subsequent scar revision    Other surgical history      laparoscopies x2    Removal gallbladder      Removal of ovarian cyst(s) Right 2020    Surgical stent Bilateral 2015    Bilateral iliac stents    Surgical stent      stent to iliac crest bone    Thyroidectomy  2011    Total abdom hysterectomy      Upper gi endo no barretts  2015    normal    Upper gi endoscopy  performed  01/25/2017    Rivera Donaldson M.D.    Upper gi endoscopy,biopsy N/A 12/19/2015    Procedure: ESOPHAGOGASTRODUODENOSCOPY, POSSIBLE BIOPSY, POSSIBLE POLYPECTOMY 79293;  Surgeon: Brayden Giordano MD;  Location: [facility]    Upper gi endoscopy,exam        Family History   Problem Relation Age of Onset    Heart Disorder Father     Heart Disease Father     Heart Attack Father     Other (Other) Mother         ALLIE    Breast Cancer Maternal Grandmother 75    Cancer Maternal Grandfather         blood cancer    Diabetes Paternal Grandmother     Heart Disorder Paternal Grandmother     Heart Disease Paternal Grandmother     Asthma Paternal Grandmother     Heart Attack Paternal Grandmother     Stroke Neg       Social History:   Social History     Socioeconomic History    Marital status:    Tobacco Use    Smoking status: Never     Passive exposure: Past    Smokeless tobacco: Never   Vaping Use    Vaping status: Never Used   Substance and Sexual Activity    Alcohol use: Not Currently    Drug use: No    Sexual activity: Yes     Partners: Male   Other Topics Concern     Service No    Blood Transfusions No    Caffeine Concern Yes    Occupational Exposure No    Hobby Hazards No    Sleep Concern No    Stress Concern No    Weight Concern No    Special Diet No    Back Care No    Exercise No    Bike Helmet No    Seat Belt Yes    Self-Exams No   Social History Narrative    ** Merged History Encounter **          Social Determinants of Health     Housing Stability: Low Risk  (9/11/2023)    [facility]    Housing Stability   Recent Concern: Housing Stability - At Risk (8/18/2023)    Received from [facility]      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   Exercise: walking.  Diet: watches fats closely and watches sugar closely     REVIEW OF SYSTEMS:   GENERAL: feels well otherwise  SKIN: denies any unusual skin  lesions or rashes  EYES:denies blurred vision, recent change in vision or double vision  HEENT: denies nasal congestion, ear pain, sinus pain or ST  LUNGS: denies shortness of breath with exertion, denies cough, denies wheezing, denies chest tightness or general shortness of breath  CARDIOVASCULAR: denies chest pain on exertion, palpitations, lightheadedness/fainting, swelling  GI: stable minimal  abdominal pain, stable with RX GERD symptoms no nausea, vomiting or diarrhea   : denies dysuria,  vaginal discharge or itching, vulva and labia inflammed     MUSCULOSKELETAL: denies back pain or neck pain no prior neck or back surgeries or injuries   NEURO: denies headaches, numbness, tingling or weakness  PSYCHE: stable depression or anxiety  HEMATOLOGIC: denies hx of anemia, bleeding problems or clotting disorders  ENDOCRINE: denies thyroid history or diabetes  ALL/ASTHMA: Alpha 1 and chronic bronchiolitis  EXAM:   /74   Pulse 104   Temp 98.1 °F (36.7 °C) (Temporal)   Resp 16   Ht 5' 8.5\" (1.74 m)   Wt 170 lb (77.1 kg)   LMP 05/01/2013   SpO2 98%   BMI 25.47 kg/m²   GENERAL: well developed, well nourished,in no apparent distress  SKIN: no rashes,no suspicious lesions  HEENT: atraumatic, normocephalic, TM clear bilaterally, throat: no erythema, exudates or swelling.  Nose: no congestion turbinates normal   EYES:PERRLA, EOMI, conjunctiva are clear without signs of infection  NECK: supple,no adenopathy, thyroid normal to palpation no carotid bruits   CHEST: no chest tenderness with palpation  LUNGS: clear to auscultation no rales, rhonchi or wheezes  CARDIO: RRR without murmur  GI: Normal bowel sounds, no masses, HSM or tenderness  : deferred   MUSCULOSKELETAL: Range of motion back and neck are normal ; no weakness to the upper or lower extremities   EXTREMITIES: no cyanosis, clubbing or edema normal distal pulses  NEURO: Oriented times three,cranial nerves are intact,motor and sensory are grossly  intact    ASSESSMENT AND PLAN:   Debbi Prasad [facility] is a 43 year old female who presents for a pre-operative physical exam.   Encounter Diagnoses   Name Primary?    Preop examination Yes    Overactive bladder     Vulvar vestibulitis     Alpha-1-antitrypsin deficiency (HCC)     Chronic bronchiolitis (HCC)     Gastroesophageal reflux disease without esophagitis     OMAR (generalized anxiety disorder)     Major depression in remission (HCC)     May-Thurner syndrome     Mild protein-calorie malnutrition (HCC)     History of narcotic addiction (HCC)        Orders Placed This Encounter   Procedures    Basic Metabolic Panel (8) [E]    CBC With Differential With Platelet       Meds & Refills for this Visit:  Requested Prescriptions     Signed Prescriptions Disp Refills    ALPRAZolam 0.25 MG Oral Tab 60 tablet 0     Sig: Take 1 tablet (0.25 mg total) by mouth 2 (two) times daily.    Promethazine HCl 6.25 MG/5ML Oral Solution 120 mL 0     Sig: Take 5 mL by mouth every 6 (six) hours as needed.       Imaging & Consults:  None  1. Preop examination  Overactive bladder  Vulvar vestibulitis  Alpha-1-antitrypsin deficiency (HCC)  Chronic bronchiolitis (HCC)  Patient is to have valvular vestibulectomy and cystoscopy, to be done by Dr. Waller at [facility] on 5/1/2024    Patient is at an increased risk for surgery secondary to chronic health issues but acceptable risk for surgery at this time.    She can proceed on with surgery with increased risk for complications for surgery secondary to chronic health risk factors including alpha-1 antitrypsin deficiency and chronic bronchiolitis/asthma    Recommend the following:    Pulmonology clearance requested with Dr. Person had consultation on 4/22/2024     Given patient's upper airway history of bronchiolitis and alpha 1 antitrypsin deficiency patient is at increased risk for complications related to anesthesia and intubation including atelectasis, pneumonia and respiratory failure.   Patient should have continuous pulse oximetry and Nasal intermittent positive pressure ventilation (NIPPV) available in the perioperative period for potential respiratory distress, also limit intubation time, limit sedating medications, aggressive pulmonary hygiene with frequent incentive spirometry use and ambulation once extubated.     Avoid nonsteroidals or over-the-counter supplements for 7 days prior to surgery.    - Basic Metabolic Panel (8) [E]; Future  - CBC With Differential With Platelet; Future  CBC CMP normal  2. Gastroesophageal reflux disease without esophagitis  Patient's symptoms are stable with pantoprazole 40 mg twice daily  Trying to avoid nonsteroidals history of bariatric surgery and peptic ulcers occasional mid epigastric pain    3. OMAR (generalized anxiety disorder)  Major depression in remission (HCC)  Use, risks, benefits and precautions of alprazolam discussed. Including not to drive, operate machinery or drink alcohol when taking this medication.  - ALPRAZolam 0.25 MG Oral Tab; Take 1 tablet (0.25 mg total) by mouth 2 (two) times daily.  Dispense: 60 tablet; Refill: 0  Patient is presently stable with mood    4. May-Thurner syndrome  Incidental finding on testing bilateral iliac stents present no history of DVT or PE    5 Mild protein-calorie malnutrition (HCC)  Intermittent issue secondary to history of gastric bypass has been increasing her protein and less abdominal pain    6. History of narcotic addiction (HCC)  Pain management  Hydrocodone management for pain will be done at primary care office with a tapering hydrocodone for pain control  Benefits and side effects of hydrocodone discussed patient was told to use the smallest dose of hydrocodone that is needed for the pain can use Tylenol unable to use  ibuprofen or nonsteroidals due to history of gastric ulcers    - HYDROcodone-acetaminophen (NORCO) 5-325 MG Oral Tab; Take 1 tablet by mouth every 4 (four) hours for 3 days, THEN 1  tablet every 6 (six) hours for 2 days, THEN 1 tablet every 8 (eight) hours for 1 day, THEN 1 tablet every 12 (twelve) hours for 1 day.  Dispense: 31 tablet; Refill: 0    This consult was sent back the referring physician, Dr. Waller      Time spent was 40 minutes more than 50% was spent on counseling regarding medical conditions and treatment.  Rest of time was spent reviewing chart, reviewing blood work and radiology tests.

## 2024-04-25 NOTE — H&P
Debbi Prasad [facility] is a 43 year old female who presents for a pre-operative risk assessment.    Patient is to have valvular vestibulectomy and cystoscopy, to be done by Dr. Waller at [facility] on 5/1/2024  She needs a CBC and a BMP.      Prior anesthesia:  no issues with general anesthesia in the past    PMH negative for:   angina, arrhythmia,, CAD, CHF, liver disease, kidney disease, no coagulation delay, no primary hypercoagulability, no PE or DVT history, no jaw problems, no seizure disorder.       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 Person 4/22/2024 stable asthma and alpha-1 antitrypsin deficiency presently stable does infusions which has helped minimize symptoms    SH:  Smoking history never;   Alcohol use none;   Caffeine 32 ounces per day   Exercise walk,  family support      ALLIE risk:   BMI 25  Snoring none  Fatigue no issues     No family history of:   adverse reaction to anesthesia, no aneurysm, no bleeding problems, no clotting disorder, no sudden cardiac death, no stroke or ischemic heart disease    Family history positive for:  Dad AMI bypass       Allergies and Medications reviewed and as listed below       Results for orders placed or performed in visit on 04/25/24   Basic Metabolic Panel (8) [E]   Result Value Ref Range    Glucose 87 70 - 99 mg/dL    Sodium 138 136 - 145 mmol/L    Potassium 4.1 3.5 - 5.1 mmol/L    Chloride 104 98 - 112 mmol/L    CO2 30.0 21.0 - 32.0 mmol/L    Anion Gap 4 0 - 18 mmol/L    BUN 9 9 - 23 mg/dL    Creatinine 0.76 0.55 - 1.02 mg/dL    Calcium, Total 8.9 8.5 - 10.1 mg/dL    Calculated Osmolality 284 275 - 295 mOsm/kg    eGFR-Cr 100 >=60 mL/min/1.73m2    Patient Fasting for BMP? Yes    CBC W/  DIFFERENTIAL   Result Value Ref Range    WBC 4.9 4.0 - 11.0 x10(3) uL    RBC 4.14 3.80 - 5.30 x10(6)uL    HGB 13.1 12.0 - 16.0 g/dL    HCT 40.1 35.0 - 48.0 %    .0 150.0 - 450.0 10(3)uL    MCV 96.9 80.0 - 100.0 fL    MCH 31.6 26.0 - 34.0 pg    MCHC 32.7 31.0 - 37.0 g/dL    RDW 12.1 %    Neutrophil Absolute Prelim 2.71 1.50 - 7.70 x10 (3) uL    Neutrophil Absolute 2.71 1.50 - 7.70 x10(3) uL    Lymphocyte Absolute 1.52 1.00 - 4.00 x10(3) uL    Monocyte Absolute 0.48 0.10 - 1.00 x10(3) uL    Eosinophil Absolute 0.14 0.00 - 0.70 x10(3) uL    Basophil Absolute 0.04 0.00 - 0.20 x10(3) uL    Immature Granulocyte Absolute 0.01 0.00 - 1.00 x10(3) uL    Neutrophil % 55.3 %    Lymphocyte % 31.0 %    Monocyte % 9.8 %    Eosinophil % 2.9 %    Basophil % 0.8 %    Immature Granulocyte % 0.2 %     *Note: Due to a large number of results and/or encounters for the requested time period, some results have not been displayed. A complete set of results can be found in Results Review.       HPI:   History of present illness vulvar vestibulitis,  urinary frequency stress  incontinence    Patient is requesting refill on promethazine cough syrup for when she does develop a cough history of alpha 1 antitrypsin deficiency on infusions and chronic bronchiolitis presently under good control has not had a cough, shortness of breath or tightness is taking inhalers regularly and had consultation with pulmonologist on Monday.  Current Outpatient Medications   Medication Sig Dispense Refill    ALPRAZolam 0.25 MG Oral Tab Take 1 tablet (0.25 mg total) by mouth 2 (two) times daily. 60 tablet 0    Promethazine HCl 6.25 MG/5ML Oral Solution Take 5 mL by mouth every 6 (six) hours as needed. 120 mL 0    sucralfate 1 g Oral Tab Take 1 tablet (1 g total) by mouth 4 (four) times daily before meals and nightly.      Butalbital-Acetaminophen  MG Oral Tab Take 0.5-1 tablets by mouth daily as needed (tension headaches). 20 tablet 0    triamcinolone  0.1 % External Cream Apply topically 2 (two) times daily as needed. 45 g 0    tiZANidine 4 MG Oral Tab TAKE 1 TABLET(4 MG) BY MOUTH EVERY NIGHT AS NEEDED FOR PAIN OR SPASM. DO NOT TAKE WITH VALTREX 30 tablet 2    pregabalin 75 MG Oral Cap Take 1 capsule (75 mg total) by mouth 3 (three) times daily. OK to refill early (3/22/24) 90 capsule 2    ondansetron (ZOFRAN) 8 MG tablet TAKE 1 TABLET(8 MG) BY MOUTH EVERY 12 HOURS AS NEEDED FOR NAUSEA 30 tablet 1    traZODone 50 MG Oral Tab Take 1-2 tablets ( mg total) by mouth nightly. 180 tablet 0    VALACYCLOVIR 1 G Oral Tab TAKE 2 TABLETS(2000 MG) BY MOUTH EVERY 12 HOURS FOR 1 DAY 30 tablet 0    pantoprazole 40 MG Oral Tab EC Take 1 tablet (40 mg total) by mouth 2 (two) times daily.      ALBUTEROL 108 (90 Base) MCG/ACT Inhalation Aero Soln INHALE 2 PUFFS INTO THE LUNGS EVERY 4 HOURS AS NEEDED FOR WHEEZING OR SHORTNESS OF BREATH 8.5 g 1    alpha 1-proteinase inhibitor 1000 MG/20ML Intravenous Solution Inject into the vein once. weekly      ipratropium-albuterol 0.5-2.5 (3) MG/3ML Inhalation Solution Take 3 mL by nebulization every 4 (four) hours as needed. Use only if the albuterol inhalation albuterol is not working 25 each 0    UNITHROID 100 MCG Oral Tab TAKE 1 TABLET BY MOUTH EVERY MORNING WITH BREAKFAST WITH 88MCG FOR TOTAL DOSE OF 188MCG 30 tablet 5    SPIRIVA RESPIMAT 2.5 MCG/ACT Inhalation Aero Soln INHALE 2 PUFFS INTO THE LUNGS DAILY 12 g 2    UNITHROID 88 MCG Oral Tab TAKE 1 TABLET BY MOUTH EVERY MORNING BEFORE BREAKFAST 30 tablet 9    SYMBICORT 160-4.5 MCG/ACT Inhalation Aerosol INHALE 2 PUFFS INTO THE LUNGS TWICE DAILY 3 Inhaler 3    lisdexamfetamine (VYVANSE) 50 MG Oral Cap Take 1 capsule (50 mg total) by mouth daily. (Patient not taking: Reported on 4/25/2024) 30 capsule 0    [START ON 5/9/2024] lisdexamfetamine (VYVANSE) 50 MG Oral Cap Take 1 capsule (50 mg total) by mouth daily. (Patient not taking: Reported on 4/25/2024) 30 capsule 0    Multiple  Vitamins-Minerals (BARIATRIC MULTIVITAMINS/IRON) Oral Cap Take by mouth As Directed. (Patient not taking: Reported on 4/25/2024)      Nystatin 378753 UNIT/GM External Powder Apply 1 Application. topically 4 (four) times daily. Apply to affected areas (Patient not taking: Reported on 4/25/2024) 30 g 1    fluticasone propionate 50 MCG/ACT Nasal Suspension 1 spray by Nasal route in the morning and 1 spray before bedtime.      loratadine 10 MG Oral Tab Take 1 tablet (10 mg total) by mouth daily. (Patient not taking: Reported on 4/25/2024)  0    Calcium Carb-Cholecalciferol (CALCIUM 600/VITAMIN D3) 600-800 MG-UNIT Oral Tab Take 1 tablet by mouth 3 (three) times daily.      Cholecalciferol (VITAMIN D) 50 MCG (2000 UT) Oral Cap Take 1 capsule (2,000 Units total) by mouth in the morning and 1 capsule (2,000 Units total) before bedtime. (Patient not taking: Reported on 4/25/2024)      magnesium 250 MG Oral Tab Take 1 tablet (250 mg total) by mouth daily. (Patient not taking: Reported on 4/25/2024)      aspirin 81 MG Oral Tab Take 1 tablet (81 mg total) by mouth daily. (Patient not taking: Reported on 4/25/2024)        Allergies:   Allergies   Allergen Reactions    Adhesive Tape HIVES     Patient got welts when she use the adhesive tape for 48-hour Holter monitor.    Cephalosporins HIVES    Chocolate HIVES     Dark chocolate    Doxycycline HIVES     Hives and fever   Hives and fever     Tramadol HIVES and RASH    Azithromycin OTHER (SEE COMMENTS) and RESTLESSNESS     tingling  Restless leg syndrome      Haloperidol ANXIETY    Metoclopramide ANXIETY     Feels like she is going to jump out of her skin    Toradol [Ketorolac Tromethamine] RASH    Norflex Tablets [Orphenadrine] OTHER (SEE COMMENTS)     Dilated [pupils ? / pt is not sure of reaction    Cheratussin Ac [Guaifenesin-Codeine] NAUSEA ONLY     Only in liquid form; tolerates gel caps    Nsaids OTHER (SEE COMMENTS)       Other reaction(s): GI UPSET  Hx of gastric bypass       Past Medical History:    Abdominal discomfort in right lower quadrant    Abdominal pain    Abnormal CT scan, esophagus    Acute deep vein thrombosis (DVT) of left lower extremity (HCC)    Alpha-1-antitrypsin deficiency (HCC)    Anxiety    Arrhythmia    RIGHT BUNDLE BRACH BLOCK     Arthritis    Asherman syndrome    Asthma (HCC)    Back pain    I have Spinal Stenosis that has gotten worse over the years.    Bloating    Calculus of kidney    Cancer (HCC)    Thyroid    Change in hair    Chest pain    Chronic cough    Constipation    COPD (chronic obstructive pulmonary disease) (HCC)    no Oxygen    COVID-19 virus infection    Depression    Diarrhea, unspecified    Disorder of thyroid    Diverticulosis    Easy bruising    Endometriosis    Esophageal reflux    Fatigue    Food intolerance    Frequent use of laxatives    Gastric ulcer    Headache disorder    Hemorrhoids    History of gastric bypass    Hoarseness, chronic    Hx of gastric bypass    Hx of motion sickness    Hydronephrosis    Hypokalemia    Hypothyroidism    Infertility, female    Intertrigo    Irregular bowel habits    Loss of appetite    Migraines    Nausea    Nephrolithiasis    Organic hypersomnia, unspecified    AHI 2 RDI 2 REM AHI 6 SaO2 curtis 89%    Osteoarthritis    Painful urination    Pancreatitis (HCC)    Pneumonia due to organism    PONV (postoperative nausea and vomiting)    Problems with swallowing    PUD (peptic ulcer disease)    Rib contusion, left, initial encounter    Severe persistent asthma with exacerbation (HCC)    Shortness of breath    Sleep disturbance    Stented coronary artery    Stress    Thyroid cancer (HCC)    Visual impairment    glasses    Vocal cord dysfunction    Wears glasses    Weight gain    Weight loss      Past Surgical History:   Procedure Laterality Date    Ankle scope,part debridement Left 06/19/2015    Procedure: ARTHROSCOPY ANKLE WITH DEBRIDEMENT;  Surgeon: Marcial Evans MD;  Location: [facility]  thermoplasty 1 lobe Right 04/15/2021    Bronch thermoplasty 1 lobe Left 2021      2006    Cholecystectomy      Colonoscopy  2021    Colonoscopy      Colonoscopy,diagnostic  10/22/2013    Procedure: COLONOSCOPY, POSSIBLE BIOPSY, POSSIBLE POLYPECTOMY 76651;  Surgeon: Marcello Ferreira MD;  Location: [facility]    Ct angiography, chest (rdq=97447)  2021    D & c      x4    Dilation/curettage,diagnostic      Egd  2021    Gastric bypass,obese<100cm magy-en-y  2017    DR. SEGAL    Gastric bypass,obesity,sb reconstruc      Gastrocnemius recession Left 2015    Procedure: GASTROC RECESSION FOOT;  Surgeon: Marcial Evans MD;  Location: [facility]    Hysterectomy      Hysteroscopy      Inj paravert f jnt l/s 1 lev Bilateral 2015    Procedure: FACET INJECTION UNDER FLUOROSCOPY;  Surgeon: Dsuty Munson DO;  Location: [facility]    Laparoscopic cholecystectomy N/A 2016    Procedure: LAPAROSCOPIC CHOLECYSTECTOMY;  Surgeon: Dai Sanchez MD;  Location:  MAIN OR    Laparoscopy,diagnostic      Laparoscopy,diagnostic  2022    Lysis of adhesions      M-sedaj by sm phys perfrmg svc 5+ yr Bilateral 2015    Procedure: FACET INJECTION UNDER FLUOROSCOPY;  Surgeon: uDsty Munson DO;  Location: [facility]    Domonique biopsy stereo nodule 1 site right (cpt=19081)  2023    stromal fibrosis    Oophorectomy Left     Other  NECK SCAR REVISION    Other      gastric bypass    Other surgical history      partial thyroidectomy with subsequent scar revision    Other surgical history      laparoscopies x2    Removal gallbladder      Removal of ovarian cyst(s) Right 2020    Surgical stent Bilateral 2015    Bilateral iliac stents    Surgical stent      stent to iliac crest bone    Thyroidectomy  2011    Total abdom hysterectomy      Upper gi endo no barretts  2015    normal    Upper gi endoscopy  performed  01/25/2017    Rivera Donaldson M.D.    Upper gi endoscopy,biopsy N/A 12/19/2015    Procedure: ESOPHAGOGASTRODUODENOSCOPY, POSSIBLE BIOPSY, POSSIBLE POLYPECTOMY 03718;  Surgeon: Brayden Giordano MD;  [facility]    Upper gi endoscopy,exam        Family History   Problem Relation Age of Onset    Heart Disorder Father     Heart Disease Father     Heart Attack Father     Other (Other) Mother         ALLIE    Breast Cancer Maternal Grandmother 75    Cancer Maternal Grandfather         blood cancer    Diabetes Paternal Grandmother     Heart Disorder Paternal Grandmother     Heart Disease Paternal Grandmother     Asthma Paternal Grandmother     Heart Attack Paternal Grandmother     Stroke Neg       Social History:   Social History     Socioeconomic History    Marital status:    Tobacco Use    Smoking status: Never     Passive exposure: Past    Smokeless tobacco: Never   Vaping Use    Vaping status: Never Used   Substance and Sexual Activity    Alcohol use: Not Currently    Drug use: No    Sexual activity: Yes     Partners: Male   Other Topics Concern     Service No    Blood Transfusions No    Caffeine Concern Yes    Occupational Exposure No    Hobby Hazards No    Sleep Concern No    Stress Concern No    Weight Concern No    Special Diet No    Back Care No    Exercise No    Bike Helmet No    Seat Belt Yes    Self-Exams No   Social History Narrative    ** Merged History Encounter **          Social Determinants of Health     Housing Stability: Low Risk  (9/11/2023)    [facility]    Housing Stability   Recent Concern: Housing Stability - At Risk (8/18/2023)    Received from [facility]      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   Exercise: walking.  Diet: watches fats closely and watches sugar closely     REVIEW OF SYSTEMS:   GENERAL: feels well otherwise  SKIN: denies any unusual skin  lesions or rashes  EYES:denies blurred vision, recent change in vision or double vision  HEENT: denies nasal congestion, ear pain, sinus pain or ST  LUNGS: denies shortness of breath with exertion, denies cough, denies wheezing, denies chest tightness or general shortness of breath  CARDIOVASCULAR: denies chest pain on exertion, palpitations, lightheadedness/fainting, swelling  GI: stable minimal  abdominal pain, stable with RX GERD symptoms no nausea, vomiting or diarrhea   : denies dysuria,  vaginal discharge or itching, vulva and labia inflammed     MUSCULOSKELETAL: denies back pain or neck pain no prior neck or back surgeries or injuries   NEURO: denies headaches, numbness, tingling or weakness  PSYCHE: stable depression or anxiety  HEMATOLOGIC: denies hx of anemia, bleeding problems or clotting disorders  ENDOCRINE: denies thyroid history or diabetes  ALL/ASTHMA: Alpha 1 and chronic bronchiolitis  EXAM:   /74   Pulse 104   Temp 98.1 °F (36.7 °C) (Temporal)   Resp 16   Ht 5' 8.5\" (1.74 m)   Wt 170 lb (77.1 kg)   LMP 05/01/2013   SpO2 98%   BMI 25.47 kg/m²   GENERAL: well developed, well nourished,in no apparent distress  SKIN: no rashes,no suspicious lesions  HEENT: atraumatic, normocephalic, TM clear bilaterally, throat: no erythema, exudates or swelling.  Nose: no congestion turbinates normal   EYES:PERRLA, EOMI, conjunctiva are clear without signs of infection  NECK: supple,no adenopathy, thyroid normal to palpation no carotid bruits   CHEST: no chest tenderness with palpation  LUNGS: clear to auscultation no rales, rhonchi or wheezes  CARDIO: RRR without murmur  GI: Normal bowel sounds, no masses, HSM or tenderness  : deferred   MUSCULOSKELETAL: Range of motion back and neck are normal ; no weakness to the upper or lower extremities   EXTREMITIES: no cyanosis, clubbing or edema normal distal pulses  NEURO: Oriented times three,cranial nerves are intact,motor and sensory are grossly  intact    ASSESSMENT AND PLAN:   Debbi Prasad [facility] is a 43 year old female who presents for a pre-operative physical exam.   Encounter Diagnoses   Name Primary?    Preop examination Yes    Overactive bladder     Vulvar vestibulitis     Alpha-1-antitrypsin deficiency (HCC)     Chronic bronchiolitis (HCC)     Gastroesophageal reflux disease without esophagitis     OMAR (generalized anxiety disorder)     Major depression in remission (HCC)     May-Thurner syndrome     Mild protein-calorie malnutrition (HCC)     History of narcotic addiction (HCC)        Orders Placed This Encounter   Procedures    Basic Metabolic Panel (8) [E]    CBC With Differential With Platelet       Meds & Refills for this Visit:  Requested Prescriptions     Signed Prescriptions Disp Refills    ALPRAZolam 0.25 MG Oral Tab 60 tablet 0     Sig: Take 1 tablet (0.25 mg total) by mouth 2 (two) times daily.    Promethazine HCl 6.25 MG/5ML Oral Solution 120 mL 0     Sig: Take 5 mL by mouth every 6 (six) hours as needed.       Imaging & Consults:  None  1. Preop examination  Overactive bladder  Vulvar vestibulitis  Alpha-1-antitrypsin deficiency (HCC)  Chronic bronchiolitis (HCC)  Patient is to have valvular vestibulectomy and cystoscopy, to be done by Dr. Waller at [facility] on 5/1/2024    Patient is at an increased risk for surgery secondary to chronic health issues but acceptable risk for surgery at this time.    She can proceed on with surgery with increased risk for complications for surgery secondary to chronic health risk factors including alpha-1 antitrypsin deficiency and chronic bronchiolitis/asthma    Recommend the following:    Pulmonology clearance requested with Dr. Person had consultation on 4/22/2024     Given patient's upper airway history of bronchiolitis and alpha 1 antitrypsin deficiency patient is at increased risk for complications related to anesthesia and intubation including atelectasis, pneumonia and respiratory failure.   Patient should have continuous pulse oximetry and Nasal intermittent positive pressure ventilation (NIPPV) available in the perioperative period for potential respiratory distress, also limit intubation time, limit sedating medications, aggressive pulmonary hygiene with frequent incentive spirometry use and ambulation once extubated.     Avoid nonsteroidals or over-the-counter supplements for 7 days prior to surgery.    - Basic Metabolic Panel (8) [E]; Future  - CBC With Differential With Platelet; Future  CBC CMP normal  2. Gastroesophageal reflux disease without esophagitis  Patient's symptoms are stable with pantoprazole 40 mg twice daily  Trying to avoid nonsteroidals history of bariatric surgery and peptic ulcers occasional mid epigastric pain    3. OMAR (generalized anxiety disorder)  Major depression in remission (HCC)  Use, risks, benefits and precautions of alprazolam discussed. Including not to drive, operate machinery or drink alcohol when taking this medication.  - ALPRAZolam 0.25 MG Oral Tab; Take 1 tablet (0.25 mg total) by mouth 2 (two) times daily.  Dispense: 60 tablet; Refill: 0  Patient is presently stable with mood    4. May-Thurner syndrome  Incidental finding on testing bilateral iliac stents present no history of DVT or PE    5 Mild protein-calorie malnutrition (HCC)  Intermittent issue secondary to history of gastric bypass has been increasing her protein and less abdominal pain    6. History of narcotic addiction (HCC)  Pain management  Hydrocodone management for pain will be done at primary care office with a tapering hydrocodone for pain control  Benefits and side effects of hydrocodone discussed patient was told to use the smallest dose of hydrocodone that is needed for the pain can use Tylenol unable to use  ibuprofen or nonsteroidals due to history of gastric ulcers    - HYDROcodone-acetaminophen (NORCO) 5-325 MG Oral Tab; Take 1 tablet by mouth every 4 (four) hours for 3 days, THEN 1  tablet every 6 (six) hours for 2 days, THEN 1 tablet every 8 (eight) hours for 1 day, THEN 1 tablet every 12 (twelve) hours for 1 day.  Dispense: 31 tablet; Refill: 0    This consult was sent back the referring physician, Dr. Waller      Time spent was 40 minutes more than 50% was spent on counseling regarding medical conditions and treatment.  Rest of time was spent reviewing chart, reviewing blood work and radiology tests.

## 2024-04-26 RX ORDER — HYDROCODONE BITARTRATE AND ACETAMINOPHEN 5; 325 MG/1; MG/1
TABLET ORAL
Qty: 31 TABLET | Refills: 0 | Status: SHIPPED | OUTPATIENT
Start: 2024-04-30 | End: 2024-05-07

## 2024-04-29 ENCOUNTER — VIRTUAL PHONE E/M (OUTPATIENT)
Dept: SURGERY | Facility: CLINIC | Age: 44
End: 2024-04-29
Payer: COMMERCIAL

## 2024-04-29 ENCOUNTER — TELEPHONE (OUTPATIENT)
Dept: SURGERY | Facility: CLINIC | Age: 44
End: 2024-04-29

## 2024-04-29 VITALS — WEIGHT: 170 LBS | HEIGHT: 68.5 IN | BODY MASS INDEX: 25.47 KG/M2

## 2024-04-29 DIAGNOSIS — K27.9 PUD (PEPTIC ULCER DISEASE): ICD-10-CM

## 2024-04-29 DIAGNOSIS — R63.2 BINGE EATING: ICD-10-CM

## 2024-04-29 DIAGNOSIS — E66.3 OVERWEIGHT WITH BODY MASS INDEX (BMI) 25.0-29.9: ICD-10-CM

## 2024-04-29 DIAGNOSIS — E44.1 MILD PROTEIN-CALORIE MALNUTRITION (HCC): Primary | ICD-10-CM

## 2024-04-29 DIAGNOSIS — F43.9 STRESS: ICD-10-CM

## 2024-04-29 DIAGNOSIS — Z98.84 HISTORY OF ROUX-EN-Y GASTRIC BYPASS: ICD-10-CM

## 2024-04-29 RX ORDER — LISDEXAMFETAMINE DIMESYLATE 50 MG
50 CAPSULE ORAL DAILY
Qty: 30 CAPSULE | Refills: 0 | Status: SHIPPED | OUTPATIENT
Start: 2024-04-29 | End: 2024-05-29

## 2024-04-29 RX ORDER — LISDEXAMFETAMINE DIMESYLATE 50 MG
50 CAPSULE ORAL DAILY
Qty: 30 CAPSULE | Refills: 0 | Status: SHIPPED | OUTPATIENT
Start: 2024-06-30 | End: 2024-07-30

## 2024-04-29 RX ORDER — LISDEXAMFETAMINE DIMESYLATE 50 MG
50 CAPSULE ORAL DAILY
Qty: 30 CAPSULE | Refills: 0 | Status: SHIPPED | OUTPATIENT
Start: 2024-05-30 | End: 2024-06-29

## 2024-04-29 NOTE — PROGRESS NOTES
[facility], [facility], [place]  [address]  [facility] [address]  Dept: [phone]    Virtual Telephone Check-In    Debbi Trivedi verbally consents to a Virtual/Telephone Check-In visit on 24.  Patient has been referred to the [facility] website at [email or web address] to review the yearly Consent to Treat document.    Patient understands and accepts financial responsibility for any deductible, co-insurance and/or co-pays associated with this service.    Duration of the service: 22 minutes      Tele visit        Patient:  Debbi Trivedi  :      1980  MRN:      [id number]    Referring Provider: HENRY JORDAN MD     Chief Complaint:     Chief Complaint   Patient presents with    Follow - Up    Post-Op     Post op follow up. Tele visit       Date of Surgery:   2017  Surgery Type:   Laparoscopic gastric bypass surgery     Subjective     Satiety:  positive  Food Intake:  <1 cup(s)  Fluid Intake:  48 oz  Protein Intake:  inad grams  Vitamin:  Yes   bariatric  Exercise: Yes ADL's  Comorbidities:  Back pain-Improvement?  yes, Joint pain-Improvement?  yes and GUILLE-Improvement?  yes    Objective     Vitals: Ht 5' 8.5\" (1.74 m)   Wt 170 lb (77.1 kg)   LMP 2013   BMI 25.47 kg/m²     Starting weight: 280   Current weight:    Interval weight loss:+05   Total weight loss:  -110    Last 3 Weights   24 1730 170 lb (77.1 kg)   24 0657 170 lb (77.1 kg)   24 1656 169 lb (76.7 kg)       Patient Medications:    Current Outpatient Medications:     [START ON 2024] HYDROcodone-acetaminophen (NORCO) 5-325 MG Oral Tab, Take 1 tablet by mouth every 4 (four) hours for 3 days, THEN 1 tablet every 6 (six) hours for 2 days, THEN 1 tablet every 8 (eight) hours for 1 day, THEN 1 tablet every 12 (twelve) hours for 1 day., Disp: 31 tablet, Rfl: 0    ALPRAZolam 0.25 MG Oral Tab, Take 1 tablet (0.25 mg total) by mouth 2 (two) times  daily., Disp: 60 tablet, Rfl: 0    Promethazine HCl 6.25 MG/5ML Oral Solution, Take 5 mL by mouth every 6 (six) hours as needed., Disp: 120 mL, Rfl: 0    sucralfate 1 g Oral Tab, Take 1 tablet (1 g total) by mouth 4 (four) times daily before meals and nightly., Disp: , Rfl:     Butalbital-Acetaminophen  MG Oral Tab, Take 0.5-1 tablets by mouth daily as needed (tension headaches)., Disp: 20 tablet, Rfl: 0    triamcinolone 0.1 % External Cream, Apply topically 2 (two) times daily as needed., Disp: 45 g, Rfl: 0    tiZANidine 4 MG Oral Tab, TAKE 1 TABLET(4 MG) BY MOUTH EVERY NIGHT AS NEEDED FOR PAIN OR SPASM. DO NOT TAKE WITH VALTREX, Disp: 30 tablet, Rfl: 2    pregabalin 75 MG Oral Cap, Take 1 capsule (75 mg total) by mouth 3 (three) times daily. OK to refill early (3/22/24), Disp: 90 capsule, Rfl: 2    lisdexamfetamine (VYVANSE) 50 MG Oral Cap, Take 1 capsule (50 mg total) by mouth daily. (Patient not taking: Reported on 4/25/2024), Disp: 30 capsule, Rfl: 0    [START ON 5/9/2024] lisdexamfetamine (VYVANSE) 50 MG Oral Cap, Take 1 capsule (50 mg total) by mouth daily. (Patient not taking: Reported on 4/25/2024), Disp: 30 capsule, Rfl: 0    ondansetron (ZOFRAN) 8 MG tablet, TAKE 1 TABLET(8 MG) BY MOUTH EVERY 12 HOURS AS NEEDED FOR NAUSEA, Disp: 30 tablet, Rfl: 1    traZODone 50 MG Oral Tab, Take 1-2 tablets ( mg total) by mouth nightly., Disp: 180 tablet, Rfl: 0    VALACYCLOVIR 1 G Oral Tab, TAKE 2 TABLETS(2000 MG) BY MOUTH EVERY 12 HOURS FOR 1 DAY, Disp: 30 tablet, Rfl: 0    Multiple Vitamins-Minerals (BARIATRIC MULTIVITAMINS/IRON) Oral Cap, Take by mouth As Directed. (Patient not taking: Reported on 4/25/2024), Disp: , Rfl:     pantoprazole 40 MG Oral Tab EC, Take 1 tablet (40 mg total) by mouth 2 (two) times daily., Disp: , Rfl:     Nystatin 532562 UNIT/GM External Powder, Apply 1 Application. topically 4 (four) times daily. Apply to affected areas (Patient not taking: Reported on 4/25/2024), Disp: 30 g,  Rfl: 1    ALBUTEROL 108 (90 Base) MCG/ACT Inhalation Aero Soln, INHALE 2 PUFFS INTO THE LUNGS EVERY 4 HOURS AS NEEDED FOR WHEEZING OR SHORTNESS OF BREATH, Disp: 8.5 g, Rfl: 1    fluticasone propionate 50 MCG/ACT Nasal Suspension, 1 spray by Nasal route in the morning and 1 spray before bedtime., Disp: , Rfl:     loratadine 10 MG Oral Tab, Take 1 tablet (10 mg total) by mouth daily. (Patient not taking: Reported on 4/25/2024), Disp: , Rfl: 0    alpha 1-proteinase inhibitor 1000 MG/20ML Intravenous Solution, Inject into the vein once. weekly, Disp: , Rfl:     ipratropium-albuterol 0.5-2.5 (3) MG/3ML Inhalation Solution, Take 3 mL by nebulization every 4 (four) hours as needed. Use only if the albuterol inhalation albuterol is not working, Disp: 25 each, Rfl: 0    UNITHROID 100 MCG Oral Tab, TAKE 1 TABLET BY MOUTH EVERY MORNING WITH BREAKFAST WITH 88MCG FOR TOTAL DOSE OF 188MCG, Disp: 30 tablet, Rfl: 5    SPIRIVA RESPIMAT 2.5 MCG/ACT Inhalation Aero Soln, INHALE 2 PUFFS INTO THE LUNGS DAILY, Disp: 12 g, Rfl: 2    UNITHROID 88 MCG Oral Tab, TAKE 1 TABLET BY MOUTH EVERY MORNING BEFORE BREAKFAST, Disp: 30 tablet, Rfl: 9    Calcium Carb-Cholecalciferol (CALCIUM 600/VITAMIN D3) 600-800 MG-UNIT Oral Tab, Take 1 tablet by mouth 3 (three) times daily., Disp: , Rfl:     SYMBICORT 160-4.5 MCG/ACT Inhalation Aerosol, INHALE 2 PUFFS INTO THE LUNGS TWICE DAILY, Disp: 3 Inhaler, Rfl: 3    Cholecalciferol (VITAMIN D) 50 MCG (2000 UT) Oral Cap, Take 1 capsule (2,000 Units total) by mouth in the morning and 1 capsule (2,000 Units total) before bedtime. (Patient not taking: Reported on 4/25/2024), Disp: , Rfl:     magnesium 250 MG Oral Tab, Take 1 tablet (250 mg total) by mouth daily. (Patient not taking: Reported on 4/25/2024), Disp: , Rfl:     aspirin 81 MG Oral Tab, Take 1 tablet (81 mg total) by mouth daily. (Patient not taking: Reported on 4/25/2024), Disp: , Rfl:     Allergies:  Adhesive tape, Cephalosporins, Chocolate,  Doxycycline, Tramadol, Azithromycin, Haloperidol, Metoclopramide, Toradol [ketorolac tromethamine], Norflex tablets [orphenadrine], Cheratussin ac [guaifenesin-codeine], and Nsaids     Social History:    Social History     Socioeconomic History    Marital status:    Tobacco Use    Smoking status: Never     Passive exposure: Past    Smokeless tobacco: Never   Vaping Use    Vaping status: Never Used   Substance and Sexual Activity    Alcohol use: Not Currently    Drug use: No    Sexual activity: Yes     Partners: Male   Other Topics Concern     Service No    Blood Transfusions No    Caffeine Concern Yes    Occupational Exposure No    Hobby Hazards No    Sleep Concern No    Stress Concern No    Weight Concern No    Special Diet No    Back Care No    Exercise No    Bike Helmet No    Seat Belt Yes    Self-Exams No   Social History Narrative    ** Merged History Encounter **          Social Determinants of Health     Housing Stability: Low Risk  (2023)    Received from [facility], [facility]    Housing Stability   Recent Concern: Housing Stability - At Risk (2023)    Received from [facility]     Surgical History:    Past Surgical History:   Procedure Laterality Date    Ankle scope,part debridement Left 2015    Procedure: ARTHROSCOPY ANKLE WITH DEBRIDEMENT;  Surgeon: Marcial Evans MD;  Location: [facility]    Bronch thermoplasty 1 lobe Right 04/15/2021    Bronch thermoplasty 1 lobe Left 2021      2006    Cholecystectomy      Colonoscopy  2021    Colonoscopy      Colonoscopy,diagnostic  10/22/2013    Procedure: COLONOSCOPY, POSSIBLE BIOPSY, POSSIBLE POLYPECTOMY 70905;  Surgeon: Marcello Ferreira MD;  [facility]    Ct angiography, chest (fmk=36714)  2021    D & c      x4    Dilation/curettage,diagnostic      Egd  2021    Gastric bypass,obese<100cm magy-en-y  2017     DR. SEGAL    Gastric bypass,obesity,sb reconstruc      Gastrocnemius recession Left 06/19/2015    Procedure: GASTROC RECESSION FOOT;  Surgeon: Marcial Evans MD;  Location: [facility]    Hysterectomy  2013    Hysteroscopy  2011    Inj paravert f jnt l/s 1 lev Bilateral 12/14/2015    Procedure: FACET INJECTION UNDER FLUOROSCOPY;  Surgeon: Dusty Munson DO;  Location: [facility]    Laparoscopic cholecystectomy N/A 11/23/2016    Procedure: LAPAROSCOPIC CHOLECYSTECTOMY;  Surgeon: Dai Sanchez MD;  Location:  MAIN OR    Laparoscopy,diagnostic      Laparoscopy,diagnostic  06/25/2022    Lysis of adhesions  2010    M-sedaj by sm phys perfrmg svc 5+ yr Bilateral 12/14/2015    Procedure: FACET INJECTION UNDER FLUOROSCOPY;  Surgeon: Dusty Munson DO;  Location: [facility]    Domonique biopsy stereo nodule 1 site right (cpt=19081)  07/2023    stromal fibrosis    Oophorectomy Left     Other  NECK SCAR REVISION    Other      gastric bypass    Other surgical history      partial thyroidectomy with subsequent scar revision    Other surgical history      laparoscopies x2    Removal gallbladder      Removal of ovarian cyst(s) Right 07/08/2020    Surgical stent Bilateral 03/2015    Bilateral iliac stents    Surgical stent      stent to iliac crest bone    Thyroidectomy  04/2011    Total abdom hysterectomy      Upper gi endo no barretts  12/2015    normal    Upper gi endoscopy performed  01/25/2017    Rivera Donaldson M.D.    Upper gi endoscopy,biopsy N/A 12/19/2015    Procedure: ESOPHAGOGASTRODUODENOSCOPY, POSSIBLE BIOPSY, POSSIBLE POLYPECTOMY 60587;  Surgeon: Brayden Giordano MD;  Location: [facility]    Upper gi endoscopy,exam         Family History:    Family History   Problem Relation Age of Onset    Heart Disorder Father     Heart Disease Father     Heart Attack Father     Other (Other) Mother         ALLIE    Breast Cancer Maternal Grandmother 75    Cancer Maternal  Grandfather         blood cancer    Diabetes Paternal Grandmother     Heart Disorder Paternal Grandmother     Heart Disease Paternal Grandmother     Asthma Paternal Grandmother     Heart Attack Paternal Grandmother     Stroke Neg        Physical Exam:  Vital signs: Height 5' 8.5\" (1.74 m), weight 170 lb (77.1 kg), last menstrual period 05/01/2013, not currently breastfeeding.  General appearance: alert, appears stated age and cooperative  Head: Normocephalic, without obvious abnormality, atraumatic  Eyes: conjunctivae/corneas clear. PERRL, EOM's intact. Fundi benign.  Lungs: clear to auscultation bilaterally  Heart: S1, S2 normal, no murmur, click, rub or gallop, regular rate and rhythm  Abdomen: soft, non-tender; bowel sounds normal; no masses,  no organomegaly  Extremities: extremities normal, atraumatic, no cyanosis or edema  Pulses: 2+ and symmetric  Skin:  irritation noted under skin folds      ROS:    Constitutional: negative  Respiratory: negative  Cardiovascular: negative  Gastrointestinal: positive for abdominal pain  Musculoskeletal:negative  Neurological: negative  Behavioral/Psych: positive for anxiety  All other systems were reviewed and are negative    Assessment       Encounter Diagnosis(ses):   Encounter Diagnoses   Name Primary?    Mild protein-calorie malnutrition (HCC) Yes    History of Mingo-en-Y gastric bypass     Stress     Binge eating     PUD (peptic ulcer disease)     Overweight with body mass index (BMI) 25.0-29.9        Plan     S/P mingo en y 12/18/2017      Continue PPI    No orders of the defined types were placed in this encounter.      Intertrigo:  Has irritation under skin folds.   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        Chapito Haskins MD

## 2024-05-01 ENCOUNTER — HOSPITAL ENCOUNTER (OUTPATIENT)
Facility: HOSPITAL | Age: 44
Setting detail: HOSPITAL OUTPATIENT SURGERY
Discharge: HOME | End: 2024-05-01
Attending: OBSTETRICS & GYNECOLOGY | Admitting: OBSTETRICS & GYNECOLOGY
Payer: COMMERCIAL

## 2024-05-01 ENCOUNTER — ANESTHESIA (OUTPATIENT)
Dept: SURGERY | Facility: HOSPITAL | Age: 44
End: 2024-05-01
Payer: COMMERCIAL

## 2024-05-01 ENCOUNTER — ANESTHESIA EVENT (OUTPATIENT)
Dept: SURGERY | Facility: HOSPITAL | Age: 44
End: 2024-05-01
Payer: COMMERCIAL

## 2024-05-01 VITALS
TEMPERATURE: 99 F | RESPIRATION RATE: 16 BRPM | SYSTOLIC BLOOD PRESSURE: 102 MMHG | OXYGEN SATURATION: 98 % | HEIGHT: 68.5 IN | DIASTOLIC BLOOD PRESSURE: 73 MMHG | BODY MASS INDEX: 26.27 KG/M2 | WEIGHT: 175.38 LBS | HEART RATE: 94 BPM

## 2024-05-01 PROCEDURE — 88305 TISSUE EXAM BY PATHOLOGIST: CPT | Performed by: OBSTETRICS & GYNECOLOGY

## 2024-05-01 PROCEDURE — 0UBMXZZ EXCISION OF VULVA, EXTERNAL APPROACH: ICD-10-PCS | Performed by: OBSTETRICS & GYNECOLOGY

## 2024-05-01 RX ORDER — ACETAMINOPHEN 500 MG
1000 TABLET ORAL ONCE
Status: DISCONTINUED | OUTPATIENT
Start: 2024-05-01 | End: 2024-05-01 | Stop reason: HOSPADM

## 2024-05-01 RX ORDER — ONDANSETRON 2 MG/ML
4 INJECTION INTRAMUSCULAR; INTRAVENOUS EVERY 6 HOURS PRN
Status: DISCONTINUED | OUTPATIENT
Start: 2024-05-01 | End: 2024-05-01

## 2024-05-01 RX ORDER — HYDROMORPHONE HYDROCHLORIDE 1 MG/ML
0.4 INJECTION, SOLUTION INTRAMUSCULAR; INTRAVENOUS; SUBCUTANEOUS EVERY 5 MIN PRN
Status: DISCONTINUED | OUTPATIENT
Start: 2024-05-01 | End: 2024-05-01

## 2024-05-01 RX ORDER — ACETAMINOPHEN 10 MG/ML
INJECTION, SOLUTION INTRAVENOUS AS NEEDED
Status: DISCONTINUED | OUTPATIENT
Start: 2024-05-01 | End: 2024-05-01 | Stop reason: SURG

## 2024-05-01 RX ORDER — DEXAMETHASONE SODIUM PHOSPHATE 4 MG/ML
VIAL (ML) INJECTION AS NEEDED
Status: DISCONTINUED | OUTPATIENT
Start: 2024-05-01 | End: 2024-05-01 | Stop reason: SURG

## 2024-05-01 RX ORDER — PROCHLORPERAZINE EDISYLATE 5 MG/ML
5 INJECTION INTRAMUSCULAR; INTRAVENOUS EVERY 8 HOURS PRN
Status: DISCONTINUED | OUTPATIENT
Start: 2024-05-01 | End: 2024-05-01

## 2024-05-01 RX ORDER — MEPERIDINE HYDROCHLORIDE 25 MG/ML
12.5 INJECTION INTRAMUSCULAR; INTRAVENOUS; SUBCUTANEOUS AS NEEDED
Status: DISCONTINUED | OUTPATIENT
Start: 2024-05-01 | End: 2024-05-01

## 2024-05-01 RX ORDER — LIDOCAINE HYDROCHLORIDE 10 MG/ML
INJECTION, SOLUTION EPIDURAL; INFILTRATION; INTRACAUDAL; PERINEURAL AS NEEDED
Status: DISCONTINUED | OUTPATIENT
Start: 2024-05-01 | End: 2024-05-01 | Stop reason: SURG

## 2024-05-01 RX ORDER — DIPHENHYDRAMINE HYDROCHLORIDE 50 MG/ML
12.5 INJECTION INTRAMUSCULAR; INTRAVENOUS AS NEEDED
Status: DISCONTINUED | OUTPATIENT
Start: 2024-05-01 | End: 2024-05-01

## 2024-05-01 RX ORDER — HYDROMORPHONE HYDROCHLORIDE 1 MG/ML
0.6 INJECTION, SOLUTION INTRAMUSCULAR; INTRAVENOUS; SUBCUTANEOUS EVERY 5 MIN PRN
Status: DISCONTINUED | OUTPATIENT
Start: 2024-05-01 | End: 2024-05-01

## 2024-05-01 RX ORDER — OXYCODONE HYDROCHLORIDE 5 MG/1
5 TABLET ORAL ONCE AS NEEDED
Status: DISCONTINUED | OUTPATIENT
Start: 2024-05-01 | End: 2024-05-01

## 2024-05-01 RX ORDER — MIDAZOLAM HYDROCHLORIDE 1 MG/ML
1 INJECTION INTRAMUSCULAR; INTRAVENOUS EVERY 5 MIN PRN
Status: DISCONTINUED | OUTPATIENT
Start: 2024-05-01 | End: 2024-05-01

## 2024-05-01 RX ORDER — BUPIVACAINE HYDROCHLORIDE AND EPINEPHRINE 2.5; 5 MG/ML; UG/ML
INJECTION, SOLUTION EPIDURAL; INFILTRATION; INTRACAUDAL; PERINEURAL AS NEEDED
Status: DISCONTINUED | OUTPATIENT
Start: 2024-05-01 | End: 2024-05-01 | Stop reason: HOSPADM

## 2024-05-01 RX ORDER — VANCOMYCIN HYDROCHLORIDE
15 ONCE
Status: COMPLETED | OUTPATIENT
Start: 2024-05-01 | End: 2024-05-01

## 2024-05-01 RX ORDER — SODIUM CHLORIDE, SODIUM LACTATE, POTASSIUM CHLORIDE, CALCIUM CHLORIDE 600; 310; 30; 20 MG/100ML; MG/100ML; MG/100ML; MG/100ML
INJECTION, SOLUTION INTRAVENOUS CONTINUOUS
Status: DISCONTINUED | OUTPATIENT
Start: 2024-05-01 | End: 2024-05-01

## 2024-05-01 RX ORDER — HYDROMORPHONE HYDROCHLORIDE 1 MG/ML
INJECTION, SOLUTION INTRAMUSCULAR; INTRAVENOUS; SUBCUTANEOUS
Status: COMPLETED
Start: 2024-05-01 | End: 2024-05-01

## 2024-05-01 RX ORDER — NALOXONE HYDROCHLORIDE 0.4 MG/ML
0.08 INJECTION, SOLUTION INTRAMUSCULAR; INTRAVENOUS; SUBCUTANEOUS AS NEEDED
Status: DISCONTINUED | OUTPATIENT
Start: 2024-05-01 | End: 2024-05-01

## 2024-05-01 RX ORDER — OXYCODONE HYDROCHLORIDE 5 MG/1
10 TABLET ORAL ONCE AS NEEDED
Status: DISCONTINUED | OUTPATIENT
Start: 2024-05-01 | End: 2024-05-01

## 2024-05-01 RX ORDER — HYDROMORPHONE HYDROCHLORIDE 1 MG/ML
0.2 INJECTION, SOLUTION INTRAMUSCULAR; INTRAVENOUS; SUBCUTANEOUS EVERY 5 MIN PRN
Status: DISCONTINUED | OUTPATIENT
Start: 2024-05-01 | End: 2024-05-01

## 2024-05-01 RX ORDER — ONDANSETRON 2 MG/ML
INJECTION INTRAMUSCULAR; INTRAVENOUS AS NEEDED
Status: DISCONTINUED | OUTPATIENT
Start: 2024-05-01 | End: 2024-05-01 | Stop reason: SURG

## 2024-05-01 RX ADMIN — SODIUM CHLORIDE, SODIUM LACTATE, POTASSIUM CHLORIDE, CALCIUM CHLORIDE: 600; 310; 30; 20 INJECTION, SOLUTION INTRAVENOUS at 16:59:00

## 2024-05-01 RX ADMIN — ACETAMINOPHEN 1000 MG: 10 INJECTION, SOLUTION INTRAVENOUS at 16:45:00

## 2024-05-01 RX ADMIN — DEXAMETHASONE SODIUM PHOSPHATE 8 MG: 4 MG/ML VIAL (ML) INJECTION at 16:02:00

## 2024-05-01 RX ADMIN — ONDANSETRON 4 MG: 2 INJECTION INTRAMUSCULAR; INTRAVENOUS at 16:02:00

## 2024-05-01 RX ADMIN — SODIUM CHLORIDE, SODIUM LACTATE, POTASSIUM CHLORIDE, CALCIUM CHLORIDE: 600; 310; 30; 20 INJECTION, SOLUTION INTRAVENOUS at 15:51:00

## 2024-05-01 RX ADMIN — LIDOCAINE HYDROCHLORIDE 100 MG: 10 INJECTION, SOLUTION EPIDURAL; INFILTRATION; INTRACAUDAL; PERINEURAL at 15:53:00

## 2024-05-01 RX ADMIN — VANCOMYCIN HYDROCHLORIDE 1.25 G: at 15:56:00

## 2024-05-01 NOTE — ANESTHESIA PROCEDURE NOTES
Airway  Date/Time: 5/1/2024 3:55 PM  Urgency: elective      General Information and Staff    Patient location during procedure: OR  Anesthesiologist: Bo Nettles MD  Resident/CRNA: Shad Reed CRNA  Performed: CRNA   Performed by: Shad Reed CRNA  Authorized by: Bo Nettles MD      Indications and Patient Condition  Indications for airway management: anesthesia  Sedation level: deep  Preoxygenated: yes  Patient position: sniffing  Mask difficulty assessment: 1 - vent by mask    Final Airway Details  Final airway type: supraglottic airway      Successful airway: classic  Size 3       Number of attempts at approach: 1

## 2024-05-01 NOTE — ANESTHESIA POSTPROCEDURE EVALUATION
[facility] Patient Status:  Hospital Outpatient Surgery   Age/Gender 43 year old female MRN [id number]   Location [facility] SURGERY Attending Brandon Waller MD   Hosp Day # 0 PCP HENRY JORDAN MD       Anesthesia Post-op Note    VULVAR VESTIBULECTOMY, CYSTOSCOPY    Procedure Summary       Date: 05/01/24 Room / Location:  MAIN OR 06 / [facility] MAIN OR    Anesthesia Start: 1548 Anesthesia Stop: 1701    Procedure: VULVAR VESTIBULECTOMY, CYSTOSCOPY (Perineum) Diagnosis: (PELVIC PAIN, OVERACTIVE BLADDER, VULVER VESTIBULITIS)    Surgeons: Brandon Waller MD Anesthesiologist: Bo Nettles MD    Anesthesia Type: general ASA Status: 2            Anesthesia Type: general    Vitals Value Taken Time   /79 05/01/24 1703   Temp 99.1 05/01/24 1706   Pulse 73 05/01/24 1705   Resp 14 05/01/24 1705   SpO2 100 % 05/01/24 1705   Vitals shown include unfiled device data.    Patient Location: PACU    Anesthesia Type: general    Airway Patency: patent    Postop Pain Control: adequate    Mental Status: mildly sedated but able to meaningfully participate in the post-anesthesia evaluation    Nausea/Vomiting: none    Cardiopulmonary/Hydration status: stable euvolemic    Complications: no apparent anesthesia related complications    Postop vital signs: stable    Dental Exam: Unchanged from Preop    Patient to be discharged from PACU when criteria met.

## 2024-05-01 NOTE — ANESTHESIA PREPROCEDURE EVALUATION
PRE-OP EVALUATION    Patient Name: Debbi Prasad [facility]    Admit Diagnosis: PELVIC PAIN, OVERACTIVE BLADDER, VULVER VESTIBULITIS    Pre-op Diagnosis: PELVIC PAIN, OVERACTIVE BLADDER, VULVER VESTIBULITIS    VULVAR VESTIBULECTOMY, CYSTOSCOPY    Anesthesia Procedure: VULVAR VESTIBULECTOMY, CYSTOSCOPY    Surgeons and Role:     * Brandon Waller MD - Primary    Pre-op vitals reviewed.  Temp: 97.5 °F (36.4 °C)  Pulse: 73  Resp: 16  BP: 104/68  SpO2: 100 %  Body mass index is 26.28 kg/m².    Current medications reviewed.  Hospital Medications:  • [Transfer Hold] acetaminophen (Tylenol Extra Strength) tab 1,000 mg  1,000 mg Oral Once   • lactated ringers infusion   Intravenous Continuous   • vancomycin (Vancocin) 1.25 g in sodium chloride 0.9% 250mL IVPB premix  15 mg/kg Intravenous Once    And   • gentamicin (Garamycin) 320 mg in sodium chloride 0.9% 100 mL IVPB  5 mg/kg (Ideal) Intravenous Once       Outpatient Medications:     Medications Prior to Admission   Medication Sig Dispense Refill Last Dose   • ALPRAZolam 0.25 MG Oral Tab Take 1 tablet (0.25 mg total) by mouth 2 (two) times daily. 60 tablet 0 5/1/2024 at 1000   • Promethazine HCl 6.25 MG/5ML Oral Solution Take 5 mL by mouth every 6 (six) hours as needed. 120 mL 0 4/23/2024 at 0730   • sucralfate 1 g Oral Tab Take 1 tablet (1 g total) by mouth 4 (four) times daily before meals and nightly.   5/1/2024 at 0730   • Butalbital-Acetaminophen  MG Oral Tab Take 0.5-1 tablets by mouth daily as needed (tension headaches). 20 tablet 0 4/30/2024   • triamcinolone 0.1 % External Cream Apply topically 2 (two) times daily as needed. 45 g 0    • tiZANidine 4 MG Oral Tab TAKE 1 TABLET(4 MG) BY MOUTH EVERY NIGHT AS NEEDED FOR PAIN OR SPASM. DO NOT TAKE WITH VALTREX 30 tablet 2 4/27/2024   • pregabalin 75 MG Oral Cap Take 1 capsule (75 mg total) by mouth 3 (three) times daily. OK to refill early (3/22/24) 90 capsule 2 5/1/2024 at 1230   • ondansetron (ZOFRAN) 8 MG tablet  TAKE 1 TABLET(8 MG) BY MOUTH EVERY 12 HOURS AS NEEDED FOR NAUSEA 30 tablet 1 2024   • [] lisdexamfetamine (VYVANSE) 50 MG Oral Cap Take 1 capsule (50 mg total) by mouth daily. 30 capsule 0    • Multiple Vitamins-Minerals (BARIATRIC MULTIVITAMINS/IRON) Oral Cap Take by mouth As Directed.   2024   • pantoprazole 40 MG Oral Tab EC Take 1 tablet (40 mg total) by mouth 2 (two) times daily.      • fluticasone propionate 50 MCG/ACT Nasal Suspension 1 spray by Nasal route in the morning and 1 spray before bedtime.   2024   • loratadine 10 MG Oral Tab Take 1 tablet (10 mg total) by mouth daily.  0 2024   • alpha 1-proteinase inhibitor 1000 MG/20ML Intravenous Solution Inject into the vein once. weekly   2024   • UNITHROID 100 MCG Oral Tab TAKE 1 TABLET BY MOUTH EVERY MORNING WITH BREAKFAST WITH 88MCG FOR TOTAL DOSE OF 188MCG 30 tablet 5 2024 at 0730   • SPIRIVA RESPIMAT 2.5 MCG/ACT Inhalation Aero Soln INHALE 2 PUFFS INTO THE LUNGS DAILY 12 g 2 2024 at 0730   • UNITHROID 88 MCG Oral Tab TAKE 1 TABLET BY MOUTH EVERY MORNING BEFORE BREAKFAST 30 tablet 9 2024 at 0730   • Calcium Carb-Cholecalciferol (CALCIUM 600/VITAMIN D3) 600-800 MG-UNIT Oral Tab Take 1 tablet by mouth 3 (three) times daily.   2024   • SYMBICORT 160-4.5 MCG/ACT Inhalation Aerosol INHALE 2 PUFFS INTO THE LUNGS TWICE DAILY 3 Inhaler 3 2024 at 1230   • Cholecalciferol (VITAMIN D) 50 MCG (2000 UT) Oral Cap Take 1 capsule (2,000 Units total) by mouth in the morning and 1 capsule (2,000 Units total) before bedtime.   2024   • magnesium 250 MG Oral Tab Take 1 tablet (250 mg total) by mouth daily.   2024   • aspirin 81 MG Oral Tab Take 1 tablet (81 mg total) by mouth daily.   2024   • VYVANSE 50 MG Oral Cap Take 1 capsule (50 mg total) by mouth daily. 30 capsule 0 2024   • [START ON 2024] VYVANSE 50 MG Oral Cap Take 1 capsule (50 mg total) by mouth daily. 30 capsule 0    • [START ON  2024] VYVANSE 50 MG Oral Cap Take 1 capsule (50 mg total) by mouth daily. 30 capsule 0    • HYDROcodone-acetaminophen (NORCO) 5-325 MG Oral Tab Take 1 tablet by mouth every 4 (four) hours for 3 days, THEN 1 tablet every 6 (six) hours for 2 days, THEN 1 tablet every 8 (eight) hours for 1 day, THEN 1 tablet every 12 (twelve) hours for 1 day. 31 tablet 0  at post op   • [] lisdexamfetamine (VYVANSE) 50 MG Oral Cap Take 1 capsule (50 mg total) by mouth daily. 30 capsule 0    • lisdexamfetamine (VYVANSE) 50 MG Oral Cap Take 1 capsule (50 mg total) by mouth daily. (Patient not taking: Reported on 2024) 30 capsule 0    • [START ON 2024] lisdexamfetamine (VYVANSE) 50 MG Oral Cap Take 1 capsule (50 mg total) by mouth daily. (Patient not taking: Reported on 2024) 30 capsule 0    • traZODone 50 MG Oral Tab Take 1-2 tablets ( mg total) by mouth nightly. 180 tablet 0 2024   • VALACYCLOVIR 1 G Oral Tab TAKE 2 TABLETS(2000 MG) BY MOUTH EVERY 12 HOURS FOR 1 DAY 30 tablet 0 More than a month   • Nystatin 945499 UNIT/GM External Powder Apply 1 Application. topically 4 (four) times daily. Apply to affected areas (Patient not taking: Reported on 2024) 30 g 1 More than a month   • ALBUTEROL 108 (90 Base) MCG/ACT Inhalation Aero Soln INHALE 2 PUFFS INTO THE LUNGS EVERY 4 HOURS AS NEEDED FOR WHEEZING OR SHORTNESS OF BREATH 8.5 g 1 More than a month   • ipratropium-albuterol 0.5-2.5 (3) MG/3ML Inhalation Solution Take 3 mL by nebulization every 4 (four) hours as needed. Use only if the albuterol inhalation albuterol is not working 25 each 0 More than a month       Allergies: Adhesive tape, Cephalosporins, Chocolate, Doxycycline, Tramadol, Azithromycin, Haloperidol, Metoclopramide, Toradol [ketorolac tromethamine], Norflex tablets [orphenadrine], Cheratussin ac [guaifenesin-codeine], and Nsaids      Anesthesia Evaluation    Patient summary reviewed.    Anesthetic Complications  (+) history of  anesthetic complications         GI/Hepatic/Renal      (+) GERD          (+) liver disease                 Cardiovascular                                                       Endo/Other                                  Pulmonary      (+) asthma  (+) COPD       (+) shortness of breath            Neuro/Psych      (+) depression                              Past Surgical History:   Procedure Laterality Date   • Ankle scope,part debridement Left 2015    Procedure: ARTHROSCOPY ANKLE WITH DEBRIDEMENT;  Surgeon: Marcial Evans MD;  Location: [facility]   • Bronch thermoplasty 1 lobe Right 04/15/2021   • Bronch thermoplasty 1 lobe Left 2021   •   2006   • Cholecystectomy     • Colonoscopy  2021   • Colonoscopy     • Colonoscopy,diagnostic  10/22/2013    Procedure: COLONOSCOPY, POSSIBLE BIOPSY, POSSIBLE POLYPECTOMY 77026;  Surgeon: Marcello Ferreira MD;  Location: [facility]   • Ct angiography, chest (cpt=71275)  2021   • D & c      x4   • Dilation/curettage,diagnostic     • Egd  2021   • Gastric bypass,obese<100cm magy-en-y  2017    DR. SEGAL   • Gastric bypass,obesity,sb reconstruc     • Gastrocnemius recession Left 2015    Procedure: GASTROC RECESSION FOOT;  Surgeon: Marcial Evans MD;  Location: [facility]   • Hysterectomy     • Hysteroscopy     • Inj paravert f jnt l/s 1 lev Bilateral 2015    Procedure: FACET INJECTION UNDER FLUOROSCOPY;  Surgeon: Dusty Munson DO;  Location: [facility]   • Laparoscopic cholecystectomy N/A 2016    Procedure: LAPAROSCOPIC CHOLECYSTECTOMY;  Surgeon: Dai Sanchez MD;  Location:  MAIN OR   • Laparoscopy,diagnostic     • Laparoscopy,diagnostic  2022   • Lysis of adhesions     • M-sedaj by sm phys perfrmg svc 5+ yr Bilateral 2015    Procedure: FACET INJECTION UNDER FLUOROSCOPY;  Surgeon: Dusty Munson DO;  Location: [facility]   •  Domonique biopsy stereo nodule 1 site right (cpt=19081)  07/2023    stromal fibrosis   • Oophorectomy Left    • Other  NECK SCAR REVISION   • Other      gastric bypass   • Other surgical history      partial thyroidectomy with subsequent scar revision   • Other surgical history      laparoscopies x2   • Removal gallbladder     • Removal of ovarian cyst(s) Right 07/08/2020   • Surgical stent Bilateral 03/2015    Bilateral iliac stents   • Surgical stent      stent to iliac crest bone   • Thyroidectomy  04/2011   • Total abdom hysterectomy     • Upper gi endo no barretts  12/2015    normal   • Upper gi endoscopy performed  01/25/2017    Rivera Donaldson M.D.   • Upper gi endoscopy,biopsy N/A 12/19/2015    Procedure: ESOPHAGOGASTRODUODENOSCOPY, POSSIBLE BIOPSY, POSSIBLE POLYPECTOMY 46057;  Surgeon: Brayden Giordano MD;  [place] [facility] [place] [facility]   • Upper gi endoscopy,exam       Social History     Socioeconomic History   • Marital status:    Tobacco Use   • Smoking status: Never     Passive exposure: Past   • Smokeless tobacco: Never   Vaping Use   • Vaping status: Never Used   Substance and Sexual Activity   • Alcohol use: Not Currently   • Drug use: No   • Sexual activity: Yes     Partners: Male   Other Topics Concern   •  Service No   • Blood Transfusions No   • Caffeine Concern Yes   • Occupational Exposure No   • Hobby Hazards No   • Sleep Concern No   • Stress Concern No   • Weight Concern No   • Special Diet No   • Back Care No   • Exercise No   • Bike Helmet No   • Seat Belt Yes   • Self-Exams No     History   Drug Use No     Available pre-op labs reviewed.  Lab Results   Component Value Date    WBC 4.9 04/25/2024    RBC 4.14 04/25/2024    HGB 13.1 04/25/2024    HCT 40.1 04/25/2024    MCV 96.9 04/25/2024    MCH 31.6 04/25/2024    MCHC 32.7 04/25/2024    RDW 12.1 04/25/2024    .0 04/25/2024     Lab Results   Component Value Date     04/25/2024    K 4.1 04/25/2024      04/25/2024    CO2 30.0 04/25/2024    BUN 9 04/25/2024    CREATSERUM 0.76 04/25/2024    GLU 87 04/25/2024    CA 8.9 04/25/2024            Airway      Mallampati: I  Mouth opening: >3 FB  TM distance: > 6 cm  Neck ROM: full Cardiovascular    Cardiovascular exam normal.  Rhythm: regular  Rate: normal     Dental             Pulmonary    Pulmonary exam normal.                 Other findings          ASA: 2   Plan: general  NPO status verified and patient meets guidelines.          Plan/risks discussed with: patient and significant other            Present on Admission:  **None**

## 2024-05-01 NOTE — OPERATIVE REPORT
Pre-Operative Diagnosis: Vulvar vestibulitis; Pelvic Pain; Overactive bladder    Post-Operative Diagnosis: Same.    Procedure Performed:  Vulvar vestibulectomy; cystourethroscopy    Surgeon:  Brandon Waller MD    Assistant:  Bebe Faulkner    Anesthesia: General - LMA    EBL: 20 ml    Complications: None    Specimen:  Vulvar vestibule    The patient was taken to the operating room, induced under general anesthesia, then placed in the dorsal lithotomy position and prepped and draped in the usual sterile fashion.  The [employer] vaginal Bookwalter retractor was utilized with [employer]est[employer] stays to provide retraction at the vaginal introitus. The area of inflamed tissue on the vestibule was outlined from the 3 to 9 o'clock positions and local anesthetic was infiltrated.  The vestibule tissue was excised and the vaginal epithelium was mobilized.  Cautery was used to attain hemostasis.  2-0 vicryl was used to reapproximate the deep layer, then 3-0 vicryl suture was used to reapproximate the vaginal epithelium to the perineal skin.    At this point, cystourethroscopy was performed.  There was mild bladder trabeculation and increased vascularity without active hemorrhage.  There were no stones, tumors or foreign bodies noted.  There were no urethral abnormalities noted.  The bladder was emptied and the patient was removed from the dorsal lithotomy position, awakened, extubated and transferred from the operating room to the recovery room in stable condition, having tolerated the procedure well.

## 2024-05-01 NOTE — DISCHARGE INSTRUCTIONS
Pelvic Medicine Postoperative Instructions:    PAIN CONTROL  Take Norco as prescribed by your Primary Physician    1. AVOID CONSTIPATION:   -Take Miralax one capful in water or juice each morning.    -Take Fiber supplement along with Miralax as well.  These can be mixed together in the same glass of liquid.  -On any day that you don't have a bowel movement, you can take 1-2 teaspoons of Milk of Magnesia that evening   2. No lifting over 15 pounds (1 gallon of milk is 8 pounds).  3. Showers and tub baths are okay.  4. You may ride in a car immediately.  5. You may drive after 1-2 days as long as you are not taking any narcotic pain medications    Please call us for any of the following:  -Temperature above 100.5 for 4 hours or above 101.0 at any time.  -Chest pain or trouble breathing.  -Vaginal bleeding heavier than a period.  -Redness, tenderness or swelling of your legs  -Pain or burning when you urinate; or if you go home with a catheter, trouble with catheter draining.  -Redness, pain or foul discharge from incision.    Office telephone: [phone]  After hours: [phone]

## 2024-05-01 NOTE — INTERVAL H&P NOTE
Pre-op Diagnosis: PELVIC PAIN, OVERACTIVE BLADDER, VULVER VESTIBULITIS    The above referenced H&P was reviewed by Brandon Waller MD on 5/1/2024, the patient was examined and no significant changes have occurred in the patient's condition since the H&P was performed.  I discussed with the patient and her  the potential benefits, risks and side effects of this procedure; the likelihood of the patient achieving goals; and potential problems that might occur during recuperation.  I discussed reasonable alternatives to the procedure, including risks, benefits and side effects related to the alternatives and risks related to not receiving this procedure.  We will proceed with vulvar vestibulectomy and cystoscopy as planned.

## 2024-05-04 ENCOUNTER — PATIENT MESSAGE (OUTPATIENT)
Dept: FAMILY MEDICINE CLINIC | Facility: CLINIC | Age: 44
End: 2024-05-04

## 2024-05-04 DIAGNOSIS — Z01.818 PREOP EXAMINATION: ICD-10-CM

## 2024-05-04 DIAGNOSIS — R52 PAIN MANAGEMENT: ICD-10-CM

## 2024-05-06 RX ORDER — HYDROCODONE BITARTRATE AND ACETAMINOPHEN 5; 325 MG/1; MG/1
TABLET ORAL
Qty: 20 TABLET | Refills: 0 | Status: SHIPPED | OUTPATIENT
Start: 2024-05-07 | End: 2024-05-13

## 2024-05-06 NOTE — TELEPHONE ENCOUNTER
From: Debbi [facility]  To: Daniela More  Sent: 5/4/2024 8:18 AM CDT  Subject: Pain from procedure     Hi Daniela,   First of all, thank you for sending in 10 mg pills for me to take for 24 hours for the pain from the Vulvar VESTIBULECTOMY. I took the 10 mg pills every 4 hours from Thursday, May 2nd at 3:00 - yesterday at 3:00. That amount seemed to take the edge off and help me quite a bit. Especially when I am going to the bathroom so often. Then, I went back to the 5 mg pills every 4 hours at 7:00 last night (5/3) However, I’m in a great deal of pain with the 5mg every 4 hours and unable to get any sleep. I’m using ice and luke warm temps to try and ease the discomfort, but with the ice and warmth and the 5mg of medication, the pain is still bad. I didn’t want to call Dr. Waller’s after hours number as the medication has been prescribed from you and no one from your office is on call.     Thank you,  Debbi [facility]

## 2024-05-06 NOTE — TELEPHONE ENCOUNTER
Per patient she using Norco 5mg every 4-5 hours. She is icing are as well. Please advise, thanks.

## 2024-05-06 NOTE — TELEPHONE ENCOUNTER
Call and get update I would prefer to not give the 10 mg Norco again.  The message was from Saturday see if she has been able to reduce the amount of pain medication at this point.

## 2024-05-08 DIAGNOSIS — G44.221 CHRONIC TENSION-TYPE HEADACHE, INTRACTABLE: ICD-10-CM

## 2024-05-08 DIAGNOSIS — F51.01 PRIMARY INSOMNIA: ICD-10-CM

## 2024-05-09 RX ORDER — TRAZODONE HYDROCHLORIDE 50 MG/1
TABLET ORAL NIGHTLY
Qty: 180 TABLET | Refills: 0 | Status: SHIPPED | OUTPATIENT
Start: 2024-05-09

## 2024-05-09 NOTE — TELEPHONE ENCOUNTER
Requested Prescriptions     Pending Prescriptions Disp Refills    BUTALBITAL-ACETAMINOPHEN  MG Oral Tab [Pharmacy Med Name: BUT/ACETAMINOPHEN 50-325MG TABS] 20 tablet 0     Sig: TAKE 1/2 TO 1 TABLET BY MOUTH DAILY AS NEEDED FOR TENSION HEADACHE     Last refill: 4/11/24 20 tabs 0 refills    Last Appointment: 4/25/24    Next Appointment: 5/29/24

## 2024-05-09 NOTE — TELEPHONE ENCOUNTER
Requested Prescriptions     Signed Prescriptions Disp Refills    traZODone 50 MG Oral Tab 180 tablet 0     Sig: Take 1-2 tablets ( mg total) by mouth nightly.     Authorizing Provider: LAURA BAILEY     Ordering User: REBEKAH FELTON     Refilled per protocol/OV notes  Next OV 5/29/24

## 2024-05-10 RX ORDER — BUTALBITAL/ACETAMINOPHEN 50MG-325MG
TABLET ORAL
Qty: 20 TABLET | Refills: 0 | Status: SHIPPED | OUTPATIENT
Start: 2024-05-10

## 2024-05-21 DIAGNOSIS — F41.1 GAD (GENERALIZED ANXIETY DISORDER): ICD-10-CM

## 2024-05-22 RX ORDER — ALPRAZOLAM 0.25 MG/1
0.25 TABLET ORAL 2 TIMES DAILY
Qty: 60 TABLET | Refills: 0 | Status: SHIPPED | OUTPATIENT
Start: 2024-05-22

## 2024-05-29 ENCOUNTER — PATIENT MESSAGE (OUTPATIENT)
Dept: FAMILY MEDICINE CLINIC | Facility: CLINIC | Age: 44
End: 2024-05-29

## 2024-05-29 ENCOUNTER — OFFICE VISIT (OUTPATIENT)
Dept: FAMILY MEDICINE CLINIC | Facility: CLINIC | Age: 44
End: 2024-05-29

## 2024-05-29 VITALS
BODY MASS INDEX: 25.32 KG/M2 | TEMPERATURE: 98 F | HEIGHT: 68.5 IN | RESPIRATION RATE: 16 BRPM | WEIGHT: 169 LBS | DIASTOLIC BLOOD PRESSURE: 70 MMHG | OXYGEN SATURATION: 96 % | HEART RATE: 93 BPM | SYSTOLIC BLOOD PRESSURE: 126 MMHG

## 2024-05-29 DIAGNOSIS — F41.1 GAD (GENERALIZED ANXIETY DISORDER): ICD-10-CM

## 2024-05-29 DIAGNOSIS — G44.221 CHRONIC TENSION-TYPE HEADACHE, INTRACTABLE: ICD-10-CM

## 2024-05-29 DIAGNOSIS — Z79.899 MEDICATION MANAGEMENT: ICD-10-CM

## 2024-05-29 DIAGNOSIS — G62.9 NEUROPATHY: ICD-10-CM

## 2024-05-29 DIAGNOSIS — F13.20 SEDATIVE, HYPNOTIC OR ANXIOLYTIC DEPENDENCE, UNCOMPLICATED (HCC): ICD-10-CM

## 2024-05-29 DIAGNOSIS — J45.41: ICD-10-CM

## 2024-05-29 DIAGNOSIS — E88.01 ALPHA-1-ANTITRYPSIN DEFICIENCY (HCC): Primary | ICD-10-CM

## 2024-05-29 PROBLEM — F43.9 STRESS: Status: RESOLVED | Noted: 2020-04-17 | Resolved: 2024-05-29

## 2024-05-29 PROBLEM — R63.2 BINGE EATING: Status: RESOLVED | Noted: 2017-06-19 | Resolved: 2024-05-29

## 2024-05-29 PROCEDURE — 3008F BODY MASS INDEX DOCD: CPT | Performed by: FAMILY MEDICINE

## 2024-05-29 PROCEDURE — 99215 OFFICE O/P EST HI 40 MIN: CPT | Performed by: FAMILY MEDICINE

## 2024-05-29 PROCEDURE — 3074F SYST BP LT 130 MM HG: CPT | Performed by: FAMILY MEDICINE

## 2024-05-29 PROCEDURE — 3078F DIAST BP <80 MM HG: CPT | Performed by: FAMILY MEDICINE

## 2024-05-29 RX ORDER — PROMETHAZINE HYDROCHLORIDE 6.25 MG/5ML
5 SYRUP ORAL EVERY 6 HOURS PRN
Qty: 240 ML | Refills: 0 | Status: SHIPPED | OUTPATIENT
Start: 2024-05-29

## 2024-05-29 RX ORDER — PREDNISONE 20 MG/1
40 TABLET ORAL DAILY
Qty: 10 TABLET | Refills: 0 | Status: SHIPPED | OUTPATIENT
Start: 2024-05-29 | End: 2024-06-03

## 2024-05-29 RX ORDER — PROMETHAZINE HYDROCHLORIDE 6.25 MG/5ML
5 SYRUP ORAL EVERY 6 HOURS PRN
COMMUNITY
Start: 2024-04-25 | End: 2024-05-29

## 2024-05-29 RX ORDER — AMOXICILLIN AND CLAVULANATE POTASSIUM 875; 125 MG/1; MG/1
1 TABLET, FILM COATED ORAL 2 TIMES DAILY
Qty: 20 TABLET | Refills: 0 | Status: SHIPPED | OUTPATIENT
Start: 2024-05-29 | End: 2024-06-08

## 2024-05-29 RX ORDER — IPRATROPIUM BROMIDE AND ALBUTEROL SULFATE 2.5; .5 MG/3ML; MG/3ML
3 SOLUTION RESPIRATORY (INHALATION) EVERY 4 HOURS PRN
Qty: 25 EACH | Refills: 1 | Status: SHIPPED | OUTPATIENT
Start: 2024-05-29

## 2024-05-29 NOTE — TELEPHONE ENCOUNTER
From: Debbi Trivedi  To: Daniela More  Sent: 5/29/2024 2:34 PM CDT  Subject: Medication question     Umang Suarez,   I forgot to ask you today when saw you,but my butalbital/acetaminophen is due on June 11th. [place] Can it be filled before I leave on June 6th.      Thanks,  Debbi Trivedi

## 2024-05-29 NOTE — PROGRESS NOTES
Debbi Prasad [facility] is a 43 year old female.  HPI:   Follow up OMAR, alpha-1 antitrypsin deficiency with intermittent asthma exacerbations,    --- OMAR  Still using Xanax 0.25 mg twice a day.  We have discussed tapering off.  I encouraged her during the summer it would be wise to start breaking the Xanax in half and only using one half twice a day patient is ready to try this summer.  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    ---Chronic pain neuropathy  Has been taking Lyrica for multiple years now on 75 mg 3 times a day.  Had been increased 0.8 gastroenterologist secondary to pain did seem to help decrease the pain she was experiencing.  Helps with the aching she gets in her legs and neuropathy that was diagnosed years ago.    No complaints of edema or side effects.  Does help with generalized aches and pains especially to her legs, aches are from having varicose veins    --- Intermittent abdominal pain post bariatric surgery 2017  Episodic peptic ulcer disease states that she is getting some irritation again does take pantoprazole 40 mg twice a day managed by bariatric surgeon.  Bariatric surgeon appointment Monday for peptic ulcer follow up     11/28/23 last endoscopy healed ulcer   Admits to occasionally still using ibuprofen when her pain is not manageable presently not using ibuprofen  Unsure of how many calories she is eating and unsure of protein feels the protein is less than 60 g/day uses the Vyvanse about 3 times a week.       Off Norco since 5/12/24 used for postop pain surgery 5/1/2024  Vulvar vestibulectomy; cystourethroscopy    --Coughing/asthma/alpha-1 antitrypsin  deficiency  Asthma exacerbation on Thursday 5/23/2024 secondary to weather changes which is always a trigger   Alpha 1 antitrypsin infusion weekly Tuesday done this week did not make much of a difference with symptoms  No fever, chills or bodyaches.  No nausea vomiting or diarrhea.  No nasal congestion, no ear issues no throat pain.  Tightness lungs along the sternal with repetitive coughing denies any active wheezing  Used DuoNeb only during the exacerbation on Thursday  Promethazine cough syrup not helping as much  Mucinex 1,200 mg every 12 hours   Anxious because she is leaving 6/6/2024 to [place] and will be on a cruise with her family  Results for orders placed or performed during the hospital encounter of 05/01/24   Specimen to Pathology Tissue   Result Value Ref Range    Case Report       Surgical Pathology                                Case: [id number]                                   Authorizing Provider:  Brandon Waller MD        Collected:           05/01/2024 04:18 PM          Ordering Location:     [facility]    Received:            05/01/2024 05:19 PM          Pathologist:           Marcelino Escobar MD                                                             Specimen:    Vulva, vulvar vestibule                                                                    Final Diagnosis:       Vulvar vestibule, excision:  -Fragments of squamous mucosa with mild chronic inflammation.  -Negative for dysplasia or malignancy.      Embedded Images      Clinical Information       Pelvic Pain, Overactive Bladder, Vulver Vestibulitis.        Gross Description       A. Labeled with the patient's name, MRN, and \"vulvar vestibule\"  Received in formalin: An unoriented irregular, hyperemic,Soft, red-brown to gray-tan tissue fragment measuring 4.1 x 1.3 x 1.0 cm.  The specimen is serially sectioned to reveal cysts a soft, pink-tan cut surface.  The specimen is submitted entirely in A1-A4.    (STORM)       *Note:  Due to a large number of results and/or encounters for the requested time period, some results have not been displayed. A complete set of results can be found in Results Review.       Current Outpatient Medications   Medication Sig Dispense Refill    dextromethorphan-guaifenesin ER  MG Oral Tablet 12 Hr Take 1 tablet by mouth every 12 (twelve) hours.      ipratropium-albuterol 0.5-2.5 (3) MG/3ML Inhalation Solution Take 3 mL by nebulization every 4 (four) hours as needed. Use only if the albuterol inhalation albuterol is not working 25 each 1    promethazine 6.25 MG/5ML Oral Solution Take 5 mL (6.25 mg total) by mouth every 6 (six) hours as needed. 240 mL 0    predniSONE 20 MG Oral Tab Take 2 tablets (40 mg total) by mouth daily for 5 days. 10 tablet 0    amoxicillin clavulanate 875-125 MG Oral Tab Take 1 tablet by mouth 2 (two) times daily for 10 days. 20 tablet 0    ALPRAZolam 0.25 MG Oral Tab Take 1 tablet (0.25 mg total) by mouth 2 (two) times daily. 60 tablet 0    BUTALBITAL-ACETAMINOPHEN  MG Oral Tab TAKE 1/2 TO 1 TABLET BY MOUTH DAILY AS NEEDED FOR TENSION HEADACHE 20 tablet 0    traZODone 50 MG Oral Tab Take 1-2 tablets ( mg total) by mouth nightly. 180 tablet 0    VYVANSE 50 MG Oral Cap Take 1 capsule (50 mg total) by mouth daily. 30 capsule 0    [START ON 5/30/2024] VYVANSE 50 MG Oral Cap Take 1 capsule (50 mg total) by mouth daily. 30 capsule 0    [START ON 6/30/2024] VYVANSE 50 MG Oral Cap Take 1 capsule (50 mg total) by mouth daily. 30 capsule 0    sucralfate 1 g Oral Tab Take 1 tablet (1 g total) by mouth 4 (four) times daily before meals and nightly.      tiZANidine 4 MG Oral Tab TAKE 1 TABLET(4 MG) BY MOUTH EVERY NIGHT AS NEEDED FOR PAIN OR SPASM. DO NOT TAKE WITH VALTREX 30 tablet 2    pregabalin 75 MG Oral Cap Take 1 capsule (75 mg total) by mouth 3 (three) times daily. OK to refill early (3/22/24) 90 capsule 2    ondansetron (ZOFRAN) 8 MG tablet TAKE 1 TABLET(8 MG) BY MOUTH EVERY  12 HOURS AS NEEDED FOR NAUSEA 30 tablet 1    VALACYCLOVIR 1 G Oral Tab TAKE 2 TABLETS(2000 MG) BY MOUTH EVERY 12 HOURS FOR 1 DAY 30 tablet 0    Multiple Vitamins-Minerals (BARIATRIC MULTIVITAMINS/IRON) Oral Cap Take by mouth As Directed.      pantoprazole 40 MG Oral Tab EC Take 1 tablet (40 mg total) by mouth 2 (two) times daily.      Nystatin 642886 UNIT/GM External Powder Apply 1 Application. topically 4 (four) times daily. Apply to affected areas 30 g 1    ALBUTEROL 108 (90 Base) MCG/ACT Inhalation Aero Soln INHALE 2 PUFFS INTO THE LUNGS EVERY 4 HOURS AS NEEDED FOR WHEEZING OR SHORTNESS OF BREATH 8.5 g 1    fluticasone propionate 50 MCG/ACT Nasal Suspension 1 spray by Nasal route in the morning and 1 spray before bedtime.      loratadine 10 MG Oral Tab Take 1 tablet (10 mg total) by mouth daily.  0    alpha 1-proteinase inhibitor 1000 MG/20ML Intravenous Solution Inject into the vein once. weekly      UNITHROID 100 MCG Oral Tab TAKE 1 TABLET BY MOUTH EVERY MORNING WITH BREAKFAST WITH 88MCG FOR TOTAL DOSE OF 188MCG 30 tablet 5    SPIRIVA RESPIMAT 2.5 MCG/ACT Inhalation Aero Soln INHALE 2 PUFFS INTO THE LUNGS DAILY 12 g 2    UNITHROID 88 MCG Oral Tab TAKE 1 TABLET BY MOUTH EVERY MORNING BEFORE BREAKFAST 30 tablet 9    Calcium Carb-Cholecalciferol (CALCIUM 600/VITAMIN D3) 600-800 MG-UNIT Oral Tab Take 1 tablet by mouth 3 (three) times daily.      SYMBICORT 160-4.5 MCG/ACT Inhalation Aerosol INHALE 2 PUFFS INTO THE LUNGS TWICE DAILY 3 Inhaler 3    Cholecalciferol (VITAMIN D) 50 MCG (2000 UT) Oral Cap Take 1 capsule (2,000 Units total) by mouth in the morning and 1 capsule (2,000 Units total) before bedtime.      magnesium 250 MG Oral Tab Take 1 tablet (250 mg total) by mouth daily.      aspirin 81 MG Oral Tab Take 1 tablet (81 mg total) by mouth daily.      lisdexamfetamine (VYVANSE) 50 MG Oral Cap Take 1 capsule (50 mg total) by mouth daily. (Patient not taking: Reported on 4/25/2024) 30 capsule 0      Past Medical  History:    Abdominal discomfort in right lower quadrant    Abdominal pain    Abnormal CT scan, esophagus    Acute deep vein thrombosis (DVT) of left lower extremity (HCC)    Alpha-1-antitrypsin deficiency (HCC)    Anxiety    Arrhythmia    RIGHT BUNDLE BRACH BLOCK     Arthritis    Asherman syndrome    Asthma (HCC)    Back pain    I have Spinal Stenosis that has gotten worse over the years.    Bloating    Calculus of kidney    Cancer (HCC)    Thyroid    Change in hair    Chest pain    Chronic cough    Constipation    COPD (chronic obstructive pulmonary disease) (HCC)    no Oxygen    COVID-19 virus infection    Depression    Diarrhea, unspecified    Disorder of thyroid    Diverticulosis    Easy bruising    Endometriosis    Esophageal reflux    Fatigue    Food intolerance    Frequent use of laxatives    Gastric ulcer    Headache disorder    Hemorrhoids    History of gastric bypass    Hoarseness, chronic    Hx of gastric bypass    Hx of motion sickness    Hydronephrosis    Hypokalemia    Hypothyroidism    Infertility, female    Intertrigo    Irregular bowel habits    Loss of appetite    Migraines    Nausea    Nephrolithiasis    Organic hypersomnia, unspecified    AHI 2 RDI 2 REM AHI 6 SaO2 curtis 89%    Osteoarthritis    Painful urination    Pancreatitis (HCC)    Pneumonia due to organism    PONV (postoperative nausea and vomiting)    Problems with swallowing    PUD (peptic ulcer disease)    Rib contusion, left, initial encounter    Severe persistent asthma with exacerbation (HCC)    Shortness of breath    Sleep disturbance    Stented coronary artery    Stress    Thyroid cancer (HCC)    Visual impairment    glasses    Vocal cord dysfunction    Wears glasses    Weight gain    Weight loss      Social History:  Social History     Socioeconomic History    Marital status:    Tobacco Use    Smoking status: Never     Passive exposure: Past    Smokeless tobacco: Never   Vaping Use    Vaping status: Never Used   Substance  and Sexual Activity    Alcohol use: Not Currently    Drug use: No    Sexual activity: Yes     Partners: Male   Other Topics Concern     Service No    Blood Transfusions No    Caffeine Concern Yes    Occupational Exposure No    Hobby Hazards No    Sleep Concern No    Stress Concern No    Weight Concern No    Special Diet No    Back Care No    Exercise No    Bike Helmet No    Seat Belt Yes    Self-Exams No   Social History Narrative    ** Merged History Encounter **          Social Determinants of Health     Housing Stability: Low Risk  (9/11/2023)    [facility]    Housing Stability   Recent Concern: Housing Stability - At Risk (8/18/2023)    Received from [facility]        REVIEW OF SYSTEMS:   GENERAL HEALTH: feels well otherwise  SKIN: denies any unusual skin lesions or rashes  RESPIRATORY: Cough repetitive and dry   HEENT see above negative  CARDIOVASCULAR: denies chest pain on exertion  GI: denies abdominal pain and denies heartburn  NEURO: denies headaches  Musculoskeletal: No motor deficits  EXAM:   /70   Pulse 93   Temp 98.1 °F (36.7 °C) (Temporal)   Resp 16   Ht 5' 8.5\" (1.74 m)   Wt 169 lb (76.7 kg)   LMP 05/01/2013   SpO2 96%   BMI 25.32 kg/m²   GENERAL: well developed, well nourished,in no apparent distress  SKIN: no rashes,no suspicious lesions  HEENT: TM clear bilaterally, nose no congestion, throat cloudy postnasal drainage no erythema without mass.   EYES: PERRLA, EOM intact, sclera clear without injection  NECK: supple,no adenopathy, thyroid normal to palpation  LUNGS: clear to auscultation no rales, rhonchi or wheezes  CARDIO: RRR without murmur  EXTREMITIES: no cyanosis, clubbing or edema  Musculoskeletal: No gross deficit  Neurological: nerves II through XII grossly intact no sensorimotor deficit  Psychological: Mood and affect are normal.  Good communication skills.  ASSESSMENT AND PLAN:      Encounter Diagnoses   Name Primary?    Alpha-1-antitrypsin deficiency (HCC) Yes    Asthma exacerbation, non-allergic, moderate persistent (HCC)     OMAR (generalized anxiety disorder)     Neuropathy     Sedative, hypnotic or anxiolytic dependence, uncomplicated (HCC)     Medication management        No orders of the defined types were placed in this encounter.      Meds & Refills for this Visit:  Requested Prescriptions     Signed Prescriptions Disp Refills    ipratropium-albuterol 0.5-2.5 (3) MG/3ML Inhalation Solution 25 each 1     Sig: Take 3 mL by nebulization every 4 (four) hours as needed. Use only if the albuterol inhalation albuterol is not working    promethazine 6.25 MG/5ML Oral Solution 240 mL 0     Sig: Take 5 mL (6.25 mg total) by mouth every 6 (six) hours as needed.    predniSONE 20 MG Oral Tab 10 tablet 0     Sig: Take 2 tablets (40 mg total) by mouth daily for 5 days.    amoxicillin clavulanate 875-125 MG Oral Tab 20 tablet 0     Sig: Take 1 tablet by mouth 2 (two) times daily for 10 days.       Imaging & Consults:  None  1. Alpha-1-antitrypsin deficiency (HCC)  Continue with infusions weekly    2. Asthma exacerbation, non-allergic, moderate persistent (HCC)  Start the nebulizer and bring nebulizer with application 3 times a day  If symptoms do not continue to improve or worsen with prednisone take 2 by mouth daily for 5 days with food avoid ibuprofen and Aleve continue with pantoprazole twice a day and Pepcid if needed  Augmentin to be brought with complication started if sinus symptoms progress or asthma symptoms worsened secondary to risk of lower respiratory infection complications with alpha 1 antitrypsin deficiency  - ipratropium-albuterol 0.5-2.5 (3) MG/3ML Inhalation Solution; Take 3 mL by nebulization every 4 (four) hours as needed. Use only if the albuterol inhalation albuterol is not working  Dispense: 25 each; Refill: 1  - promethazine 6.25 MG/5ML Oral Solution; Take 5 mL (6.25 mg  total) by mouth every 6 (six) hours as needed.  Dispense: 240 mL; Refill: 0  - predniSONE 20 MG Oral Tab; Take 2 tablets (40 mg total) by mouth daily for 5 days.  Dispense: 10 tablet; Refill: 0  - amoxicillin clavulanate 875-125 MG Oral Tab; Take 1 tablet by mouth 2 (two) times daily for 10 days.  Dispense: 20 tablet; Refill: 0  If Augmentin is taken take with food and probiotic  3. OMAR (generalized anxiety disorder)  Lower Xanax from 0.25 mg twice a day to a half of the 0.25 mg twice a day  Continue with counseling    4. Neuropathy  Continue with Lyrica 75 mg 3 times a day for neuropathy  Unclear as to when the neuropathy started with symptoms and they have not worsened as discussed with Dr Pizano rheumatologist,  5. Sedative, hypnotic or anxiolytic dependence, uncomplicated (HCC)  Discussed importance of trying to lower her Xanax use to a half twice a day benefits of reduction discussed long-term risks of chronic benzodiazepine reviewed in the past    6. Medication management  As above    Continue with specialist pulmonologist, rheumatologist, bariatric surgeon, urogynecologist and Dr. Lopez ENT  Time spent was 40 minutes more than 50% was spent on counseling regarding medical conditions and treatment.  Rest of time was spent reviewing chart, reviewing blood work and radiology tests.     The patient indicates understanding of these issues and agrees to the plan.  The patient is asked to return in follow-up appointment scheduled for 7/1/2024 for chronic health issues.

## 2024-05-30 RX ORDER — BUTALBITAL/ACETAMINOPHEN 50MG-325MG
TABLET ORAL
Qty: 20 TABLET | Refills: 0 | Status: SHIPPED | OUTPATIENT
Start: 2024-05-30

## 2024-05-30 NOTE — TELEPHONE ENCOUNTER
Patient will need refill, I did pend to you w/ a note for early refill. Could you please send? Thanks.

## 2024-05-31 RX ORDER — VALACYCLOVIR HYDROCHLORIDE 1 G/1
2000 TABLET, FILM COATED ORAL EVERY 12 HOURS
Qty: 30 TABLET | Refills: 0 | Status: SHIPPED | OUTPATIENT
Start: 2024-05-31

## 2024-05-31 NOTE — TELEPHONE ENCOUNTER
Requested Prescriptions     Signed Prescriptions Disp Refills    VALACYCLOVIR 1 G Oral Tab 30 tablet 0     Sig: TAKE 2 TABLETS(2000 MG) BY MOUTH EVERY 12 HOURS FOR 1 DAY     Authorizing Provider: LAURA BAILEY     Ordering User: REBEKAH FELTON        Refilled per protocol/OV notes

## 2024-06-03 ENCOUNTER — OFFICE VISIT (OUTPATIENT)
Dept: UROLOGY | Facility: CLINIC | Age: 44
End: 2024-06-03
Attending: OBSTETRICS & GYNECOLOGY
Payer: COMMERCIAL

## 2024-06-03 VITALS — BODY MASS INDEX: 25.02 KG/M2 | HEIGHT: 68.5 IN | WEIGHT: 167 LBS | TEMPERATURE: 98 F

## 2024-06-03 DIAGNOSIS — N32.81 OAB (OVERACTIVE BLADDER): ICD-10-CM

## 2024-06-03 DIAGNOSIS — Z98.890 POSTOPERATIVE STATE: Primary | ICD-10-CM

## 2024-06-03 PROCEDURE — 99212 OFFICE O/P EST SF 10 MIN: CPT

## 2024-06-03 RX ORDER — OXYBUTYNIN CHLORIDE 10 MG/1
10 TABLET, EXTENDED RELEASE ORAL DAILY
Qty: 30 TABLET | Refills: 2 | Status: SHIPPED | OUTPATIENT
Start: 2024-06-03

## 2024-06-07 ENCOUNTER — TELEPHONE (OUTPATIENT)
Dept: FAMILY MEDICINE CLINIC | Facility: CLINIC | Age: 44
End: 2024-06-07

## 2024-06-07 DIAGNOSIS — J45.41: ICD-10-CM

## 2024-06-07 RX ORDER — PROMETHAZINE HYDROCHLORIDE 6.25 MG/5ML
5 SYRUP ORAL EVERY 6 HOURS PRN
Qty: 240 ML | Refills: 0 | Status: SHIPPED | OUTPATIENT
Start: 2024-06-07

## 2024-06-07 NOTE — TELEPHONE ENCOUNTER
Daniela More PAC on call 6/7/24 patient is presently in [place]  she has developed thicker congestion which is causing the cough to be more productive.  Does have a prescription for Augmentin and is wondering if it is time to start it and would also like to have a refill on the promethazine 6.25 mg which she uses at night to help suppress the cough.  She does state that the promethazine has been working.  Since the cough has become more thick and is worse she is to start the Augmentin 875 mg twice a day for 10 days prescription was already given to patient at last office visit.  Patient verbalized understanding.

## 2024-06-11 DIAGNOSIS — G44.221 CHRONIC TENSION-TYPE HEADACHE, INTRACTABLE: ICD-10-CM

## 2024-06-12 RX ORDER — TIZANIDINE 4 MG/1
TABLET ORAL
Qty: 30 TABLET | Refills: 2 | Status: SHIPPED | OUTPATIENT
Start: 2024-06-12

## 2024-06-13 DIAGNOSIS — R10.12 ABDOMINAL PAIN, CHRONIC, LEFT UPPER QUADRANT: ICD-10-CM

## 2024-06-13 DIAGNOSIS — G89.29 ABDOMINAL PAIN, CHRONIC, LEFT UPPER QUADRANT: ICD-10-CM

## 2024-06-14 RX ORDER — PREGABALIN 75 MG/1
75 CAPSULE ORAL 3 TIMES DAILY
Qty: 90 CAPSULE | Refills: 2 | Status: SHIPPED | OUTPATIENT
Start: 2024-06-14

## 2024-06-17 ENCOUNTER — PATIENT MESSAGE (OUTPATIENT)
Dept: FAMILY MEDICINE CLINIC | Facility: CLINIC | Age: 44
End: 2024-06-17

## 2024-06-17 NOTE — TELEPHONE ENCOUNTER
From: Debbi Trivedi  To: Daniela More  Sent: 6/17/2024 7:47 AM CDT  Subject: Thrush     Hi Daniela,   Can you send it the medication for oral thrush? I’m pretty confident that is what I have. My tongue has the white coating.    Thank you,   Debbi Trivedi

## 2024-06-18 DIAGNOSIS — F41.1 GAD (GENERALIZED ANXIETY DISORDER): ICD-10-CM

## 2024-06-20 ENCOUNTER — OFFICE VISIT (OUTPATIENT)
Dept: FAMILY MEDICINE CLINIC | Facility: CLINIC | Age: 44
End: 2024-06-20

## 2024-06-20 VITALS
HEART RATE: 84 BPM | SYSTOLIC BLOOD PRESSURE: 128 MMHG | BODY MASS INDEX: 24.87 KG/M2 | HEIGHT: 68.5 IN | WEIGHT: 166 LBS | TEMPERATURE: 98 F | DIASTOLIC BLOOD PRESSURE: 74 MMHG | RESPIRATION RATE: 16 BRPM | OXYGEN SATURATION: 98 %

## 2024-06-20 DIAGNOSIS — B37.0 THRUSH: Primary | ICD-10-CM

## 2024-06-20 DIAGNOSIS — F41.1 GAD (GENERALIZED ANXIETY DISORDER): ICD-10-CM

## 2024-06-20 PROBLEM — J44.89 CHRONIC BRONCHIOLITIS (HCC): Status: RESOLVED | Noted: 2023-01-30 | Resolved: 2024-06-20

## 2024-06-20 PROCEDURE — 99214 OFFICE O/P EST MOD 30 MIN: CPT | Performed by: FAMILY MEDICINE

## 2024-06-20 PROCEDURE — 3008F BODY MASS INDEX DOCD: CPT | Performed by: FAMILY MEDICINE

## 2024-06-20 PROCEDURE — 3078F DIAST BP <80 MM HG: CPT | Performed by: FAMILY MEDICINE

## 2024-06-20 PROCEDURE — 3074F SYST BP LT 130 MM HG: CPT | Performed by: FAMILY MEDICINE

## 2024-06-20 RX ORDER — FLUCONAZOLE 150 MG/1
TABLET ORAL
Qty: 3 TABLET | Refills: 0 | Status: SHIPPED | OUTPATIENT
Start: 2024-06-20

## 2024-06-20 RX ORDER — ALPRAZOLAM 0.25 MG/1
0.25 TABLET ORAL 2 TIMES DAILY
Qty: 60 TABLET | Refills: 0 | Status: SHIPPED | OUTPATIENT
Start: 2024-06-20

## 2024-06-20 RX ORDER — LIDOCAINE HYDROCHLORIDE 20 MG/ML
5 SOLUTION OROPHARYNGEAL 3 TIMES DAILY PRN
Qty: 100 ML | Refills: 0 | Status: SHIPPED | OUTPATIENT
Start: 2024-06-20 | End: 2024-06-27

## 2024-06-20 RX ORDER — ALPRAZOLAM 0.25 MG/1
0.25 TABLET ORAL 2 TIMES DAILY
Qty: 60 TABLET | Refills: 0 | OUTPATIENT
Start: 2024-06-20

## 2024-06-21 NOTE — PROGRESS NOTES
Debbi Prasad [facility] is a 43 year old female.  HPI:     Patient is in for follow-up on generalized anxiety disorder and since Monday has had thrush symptoms.    OMAR (generalized anxiety disorder)  Needs refill on Xanax has been on it for many years has been able to taper down from 1 mg twice a day now to 0.25 mg twice a day is cutting them in half and trying to lower the amount.  She denies any side effects but does state that she has chronic anxiety especially when health issues occur or family issues.  Needs refill on Xanax gets it monthly.  Recently came back from a a cruise with her family had a great time  Talks to her counselor intermittently when needed.  When pain occurs anxiety increases.  Is feeling more anxious recently with the tongue pain from thrush  Thrush  Denies any nausea or vomiting no problems swallowing or signs of candidiasis in the esophagus.  Does have an endoscopy scheduled for 6/28/2024  Started on nystatin suspension on Monday no improvement with symptoms.  States thrush started on Monday has a very painful tongue feels raw glands feel swollen and uncomfortable.  Is using a probiotic regularly and does gargle after using steroid inhaler.  Was on recent antibiotic for respiratory illness which had resolved and did not need to take the prednisone during the illness.  Requests something topically for pain for her tongue    Current Outpatient Medications   Medication Sig Dispense Refill    ALPRAZolam 0.25 MG Oral Tab Take 1 tablet (0.25 mg total) by mouth 2 (two) times daily. 60 tablet 0    fluconazole (DIFLUCAN) 150 MG Oral Tab Take 150 mg every day for 3 days 3 tablet 0    Lidocaine Viscous HCl 2 % Mouth/Throat Solution Take 5 mL by mouth 3 (three) times daily as needed for Pain. Swish and swallow 100 mL 0    nystatin 870527 UNIT/ML Mouth/Throat Suspension Take 5 mL (500,000 Units total) by mouth 4 (four) times daily for 14 days. 280 mL 0    pregabalin 75 MG Oral Cap Take 1 capsule (75 mg  total) by mouth 3 (three) times daily. OK to refill early (3/22/24) 90 capsule 2    TIZANIDINE 4 MG Oral Tab TAKE 1 TABLET(4 MG) BY MOUTH EVERY NIGHT AS NEEDED FOR PAIN OR SPASM. DO NOT TAKE WITH VALTREX 30 tablet 2    oxybutynin ER (DITROPAN XL) 10 MG Oral Tablet 24 Hr Take 1 tablet (10 mg total) by mouth daily. 30 tablet 2    VALACYCLOVIR 1 G Oral Tab TAKE 2 TABLETS(2000 MG) BY MOUTH EVERY 12 HOURS FOR 1 DAY 30 tablet 0    Butalbital-Acetaminophen  MG Oral Tab TAKE 1/2 TO 1 TABLET BY MOUTH DAILY AS NEEDED FOR TENSION HEADACHE 20 tablet 0    ipratropium-albuterol 0.5-2.5 (3) MG/3ML Inhalation Solution Take 3 mL by nebulization every 4 (four) hours as needed. Use only if the albuterol inhalation albuterol is not working 25 each 1    traZODone 50 MG Oral Tab Take 1-2 tablets ( mg total) by mouth nightly. 180 tablet 0    VYVANSE 50 MG Oral Cap Take 1 capsule (50 mg total) by mouth daily. 30 capsule 0    sucralfate 1 g Oral Tab Take 1 tablet (1 g total) by mouth 4 (four) times daily before meals and nightly.      Multiple Vitamins-Minerals (BARIATRIC MULTIVITAMINS/IRON) Oral Cap Take by mouth As Directed.      pantoprazole 40 MG Oral Tab EC Take 1 tablet (40 mg total) by mouth 2 (two) times daily.      Nystatin 289659 UNIT/GM External Powder Apply 1 Application. topically 4 (four) times daily. Apply to affected areas 30 g 1    ALBUTEROL 108 (90 Base) MCG/ACT Inhalation Aero Soln INHALE 2 PUFFS INTO THE LUNGS EVERY 4 HOURS AS NEEDED FOR WHEEZING OR SHORTNESS OF BREATH 8.5 g 1    loratadine 10 MG Oral Tab Take 1 tablet (10 mg total) by mouth daily.  0    alpha 1-proteinase inhibitor 1000 MG/20ML Intravenous Solution Inject into the vein once. weekly      SPIRIVA RESPIMAT 2.5 MCG/ACT Inhalation Aero Soln INHALE 2 PUFFS INTO THE LUNGS DAILY 12 g 2    UNITHROID 88 MCG Oral Tab TAKE 1 TABLET BY MOUTH EVERY MORNING BEFORE BREAKFAST 30 tablet 9    Calcium Carb-Cholecalciferol (CALCIUM 600/VITAMIN D3) 600-800  MG-UNIT Oral Tab Take 1 tablet by mouth 3 (three) times daily.      Cholecalciferol (VITAMIN D) 50 MCG (2000 UT) Oral Cap Take 1 capsule (2,000 Units total) by mouth in the morning and 1 capsule (2,000 Units total) before bedtime.      magnesium 250 MG Oral Tab Take 1 tablet (250 mg total) by mouth daily.      aspirin 81 MG Oral Tab Take 1 tablet (81 mg total) by mouth daily.      promethazine 6.25 MG/5ML Oral Solution Take 5 mL (6.25 mg total) by mouth every 6 (six) hours as needed. (Patient not taking: Reported on 6/20/2024) 240 mL 0    [START ON 6/30/2024] VYVANSE 50 MG Oral Cap Take 1 capsule (50 mg total) by mouth daily. (Patient not taking: Reported on 6/20/2024) 30 capsule 0    ondansetron (ZOFRAN) 8 MG tablet TAKE 1 TABLET(8 MG) BY MOUTH EVERY 12 HOURS AS NEEDED FOR NAUSEA 30 tablet 1    fluticasone propionate 50 MCG/ACT Nasal Suspension 1 spray by Nasal route in the morning and 1 spray before bedtime. (Patient not taking: Reported on 6/20/2024)      UNITHROID 100 MCG Oral Tab TAKE 1 TABLET BY MOUTH EVERY MORNING WITH BREAKFAST WITH 88MCG FOR TOTAL DOSE OF 188MCG 30 tablet 5    SYMBICORT 160-4.5 MCG/ACT Inhalation Aerosol INHALE 2 PUFFS INTO THE LUNGS TWICE DAILY 3 Inhaler 3      Past Medical History:    Abdominal discomfort in right lower quadrant    Abdominal pain    Abnormal CT scan, esophagus    Acute deep vein thrombosis (DVT) of left lower extremity (HCC)    Alpha-1-antitrypsin deficiency (HCC)    Anxiety    Arrhythmia    RIGHT BUNDLE BRACH BLOCK     Arthritis    Asherman syndrome    Asthma (HCC)    Back pain    I have Spinal Stenosis that has gotten worse over the years.    Bloating    Calculus of kidney    Cancer (HCC)    Thyroid    Change in hair    Chest pain    Chronic cough    Constipation    COPD (chronic obstructive pulmonary disease) (HCC)    no Oxygen    COVID-19 virus infection    Depression    Diarrhea, unspecified    Disorder of thyroid    Diverticulosis    Easy bruising    Endometriosis     Esophageal reflux    Fatigue    Food intolerance    Frequent use of laxatives    Gastric ulcer    Headache disorder    Hemorrhoids    History of gastric bypass    Hoarseness, chronic    Hx of gastric bypass    Hx of motion sickness    Hydronephrosis    Hypokalemia    Hypothyroidism    Infertility, female    Intertrigo    Irregular bowel habits    Loss of appetite    Migraines    Nausea    Nephrolithiasis    Organic hypersomnia, unspecified    AHI 2 RDI 2 REM AHI 6 SaO2 curtis 89%    Osteoarthritis    Painful urination    Pancreatitis (HCC)    Pneumonia due to organism    PONV (postoperative nausea and vomiting)    Problems with swallowing    PUD (peptic ulcer disease)    Rib contusion, left, initial encounter    Severe persistent asthma with exacerbation (HCC)    Shortness of breath    Sleep disturbance    Stented coronary artery    Stress    Thyroid cancer (HCC)    Visual impairment    glasses    Vocal cord dysfunction    Wears glasses    Weight gain    Weight loss      Social History:  Social History     Socioeconomic History    Marital status:    Tobacco Use    Smoking status: Never     Passive exposure: Past    Smokeless tobacco: Never   Vaping Use    Vaping status: Never Used   Substance and Sexual Activity    Alcohol use: Not Currently    Drug use: No    Sexual activity: Yes     Partners: Male   Other Topics Concern     Service No    Blood Transfusions No    Caffeine Concern Yes    Occupational Exposure No    Hobby Hazards No    Sleep Concern No    Stress Concern No    Weight Concern No    Special Diet No    Back Care No    Exercise No    Bike Helmet No    Seat Belt Yes    Self-Exams No   Social History Narrative    ** Merged History Encounter **          Social Determinants of Health     Housing Stability: Low Risk  (9/11/2023)    [facility]    Housing Stability   Recent Concern: Housing Stability - At Risk (8/18/2023)     Received from [facility]        REVIEW OF SYSTEMS:   GENERAL HEALTH: feels well otherwise  SKIN: denies any unusual skin lesions or rashes  RESPIRATORY: denies shortness of breath with exertion  CARDIOVASCULAR: denies chest pain on exertion  GI: denies abdominal pain and denies heartburn  NEURO: denies headaches  Musculoskeletal: No motor deficits  EXAM:   /74   Pulse 84   Temp 97.9 °F (36.6 °C) (Temporal)   Resp 16   Ht 5' 8.5\" (1.74 m)   Wt 166 lb (75.3 kg)   LMP 05/01/2013   SpO2 98%   BMI 24.87 kg/m²   GENERAL: well developed, well nourished,in no apparent distress  SKIN: no rashes,no suspicious lesions  HEENT: TM clear bilaterally, nose no congestion, throat clear no erythema without mass.   EYES: PERRLA, EOM intact, sclera clear without injection  NECK: supple,no adenopathy, thyroid normal to palpation  LUNGS: clear to auscultation no rales, rhonchi or wheezes  CARDIO: RRR without murmur  GI: Normal bowel sounds ×4 quadrant, no hepatosplenomegaly or masses, and no tenderness   EXTREMITIES: no cyanosis, clubbing or edema  Musculoskeletal: No gross deficit  Neurological: nerves II through XII grossly intact no sensorimotor deficit  Psychological: Mood and affect are normal.  Good communication skills.  ASSESSMENT AND PLAN:     Encounter Diagnoses   Name Primary?    OMAR (generalized anxiety disorder)     Thrush Yes       No orders of the defined types were placed in this encounter.      Meds & Refills for this Visit:  Requested Prescriptions     Signed Prescriptions Disp Refills    ALPRAZolam 0.25 MG Oral Tab 60 tablet 0     Sig: Take 1 tablet (0.25 mg total) by mouth 2 (two) times daily.    fluconazole (DIFLUCAN) 150 MG Oral Tab 3 tablet 0     Sig: Take 150 mg every day for 3 days    Lidocaine Viscous HCl 2 % Mouth/Throat Solution 100 mL 0     Sig: Take 5 mL by mouth 3 (three) times daily as needed for Pain. Swish and swallow       Imaging & Consults:  None  1. OMAR (generalized  anxiety disorder)  Continue with counseling  Try to get in regular exercise daily  Try to get 8 hours of sleep daily  Healthy eating adequate protein  Try to continue to reduce Xanax continue cutting them in half and try to take half twice a day  Use, risks, benefits and precautions of alprazolam discussed. Including not to drive, operate machinery or drink alcohol when taking this medication.    - ALPRAZolam 0.25 MG Oral Tab; Take 1 tablet (0.25 mg total) by mouth 2 (two) times daily.  Dispense: 60 tablet; Refill: 0    2. Thrush after antibiotics  Lidocaine viscous solution for discomfort  Probiotic granules to be purchased on [employer] can gargle with the probiotic granules and then swallow  Endoscopy planned for this month to make sure that there is no Candida esophagitis presently no issues with swallowing.  Diflucan 150 mg daily for 3 days  Continue with rinsing mouth after steroid inhaler  - fluconazole (DIFLUCAN) 150 MG Oral Tab; Take 150 mg every day for 3 days  Dispense: 3 tablet; Refill: 0  - Lidocaine Viscous HCl 2 % Mouth/Throat Solution; Take 5 mL by mouth 3 (three) times daily as needed for Pain. Swish and swallow  Dispense: 100 mL; Refill: 0    Time spent was 30 minutes more than 50% was spent on counseling regarding medical conditions and treatment.  Rest of time was spent reviewing chart, reviewing blood work and radiology tests.     The patient indicates understanding of these issues and agrees to the plan.  The patient is asked to return in 1 month.

## 2024-06-27 DIAGNOSIS — R11.0 NAUSEA: ICD-10-CM

## 2024-06-27 DIAGNOSIS — R10.12 LEFT UPPER QUADRANT ABDOMINAL PAIN: ICD-10-CM

## 2024-06-27 RX ORDER — ONDANSETRON HYDROCHLORIDE 8 MG/1
TABLET, FILM COATED ORAL
Qty: 30 TABLET | Refills: 1 | Status: SHIPPED | OUTPATIENT
Start: 2024-06-27

## 2024-06-29 ENCOUNTER — APPOINTMENT (OUTPATIENT)
Dept: CT IMAGING | Age: 44
End: 2024-06-29
Attending: EMERGENCY MEDICINE
Payer: COMMERCIAL

## 2024-06-29 ENCOUNTER — HOSPITAL ENCOUNTER (EMERGENCY)
Age: 44
Discharge: HOME | End: 2024-06-29
Attending: EMERGENCY MEDICINE
Payer: COMMERCIAL

## 2024-06-29 VITALS
WEIGHT: 158 LBS | TEMPERATURE: 98 F | RESPIRATION RATE: 16 BRPM | HEIGHT: 68.5 IN | BODY MASS INDEX: 23.67 KG/M2 | SYSTOLIC BLOOD PRESSURE: 119 MMHG | OXYGEN SATURATION: 99 % | HEART RATE: 88 BPM | DIASTOLIC BLOOD PRESSURE: 76 MMHG

## 2024-06-29 DIAGNOSIS — K52.9 ACUTE COLITIS: Primary | ICD-10-CM

## 2024-06-29 DIAGNOSIS — R19.7 DIARRHEA, UNSPECIFIED TYPE: ICD-10-CM

## 2024-06-29 DIAGNOSIS — E87.6 HYPOKALEMIA: ICD-10-CM

## 2024-06-29 LAB
ALBUMIN SERPL-MCNC: 2.6 G/DL (ref 3.4–5)
ALBUMIN/GLOB SERPL: 0.7 {RATIO} (ref 1–2)
ALP LIVER SERPL-CCNC: 82 U/L
ALT SERPL-CCNC: 20 U/L
ANION GAP SERPL CALC-SCNC: 5 MMOL/L (ref 0–18)
AST SERPL-CCNC: 13 U/L (ref 15–37)
BASOPHILS # BLD AUTO: 0.01 X10(3) UL (ref 0–0.2)
BASOPHILS NFR BLD AUTO: 0.2 %
BILIRUB SERPL-MCNC: 0.2 MG/DL (ref 0.1–2)
BILIRUB UR QL STRIP.AUTO: NEGATIVE
BUN BLD-MCNC: 9 MG/DL (ref 9–23)
CALCIUM BLD-MCNC: 8.5 MG/DL (ref 8.5–10.1)
CHLORIDE SERPL-SCNC: 106 MMOL/L (ref 98–112)
CLARITY UR REFRACT.AUTO: CLEAR
CO2 SERPL-SCNC: 28 MMOL/L (ref 21–32)
COLOR UR AUTO: YELLOW
CREAT BLD-MCNC: 0.77 MG/DL
EGFRCR SERPLBLD CKD-EPI 2021: 98 ML/MIN/1.73M2 (ref 60–?)
EOSINOPHIL # BLD AUTO: 0.1 X10(3) UL (ref 0–0.7)
EOSINOPHIL NFR BLD AUTO: 2 %
ERYTHROCYTE [DISTWIDTH] IN BLOOD BY AUTOMATED COUNT: 12.1 %
GLOBULIN PLAS-MCNC: 3.5 G/DL (ref 2.8–4.4)
GLUCOSE BLD-MCNC: 99 MG/DL (ref 70–99)
GLUCOSE UR STRIP.AUTO-MCNC: NEGATIVE MG/DL
HCT VFR BLD AUTO: 39.2 %
HGB BLD-MCNC: 14 G/DL
IMM GRANULOCYTES # BLD AUTO: 0.04 X10(3) UL (ref 0–1)
IMM GRANULOCYTES NFR BLD: 0.8 %
LEUKOCYTE ESTERASE UR QL STRIP.AUTO: NEGATIVE
LIPASE SERPL-CCNC: 60 U/L (ref 13–75)
LYMPHOCYTES # BLD AUTO: 1.46 X10(3) UL (ref 1–4)
LYMPHOCYTES NFR BLD AUTO: 29.8 %
MCH RBC QN AUTO: 31.8 PG (ref 26–34)
MCHC RBC AUTO-ENTMCNC: 35.7 G/DL (ref 31–37)
MCV RBC AUTO: 89.1 FL
MONOCYTES # BLD AUTO: 0.36 X10(3) UL (ref 0.1–1)
MONOCYTES NFR BLD AUTO: 7.3 %
NEUTROPHILS # BLD AUTO: 2.93 X10 (3) UL (ref 1.5–7.7)
NEUTROPHILS # BLD AUTO: 2.93 X10(3) UL (ref 1.5–7.7)
NEUTROPHILS NFR BLD AUTO: 59.9 %
NITRITE UR QL STRIP.AUTO: NEGATIVE
OSMOLALITY SERPL CALC.SUM OF ELEC: 287 MOSM/KG (ref 275–295)
PH UR STRIP.AUTO: 6 [PH] (ref 5–8)
PLATELET # BLD AUTO: 191 10(3)UL (ref 150–450)
POTASSIUM SERPL-SCNC: 3 MMOL/L (ref 3.5–5.1)
PROT SERPL-MCNC: 6.1 G/DL (ref 6.4–8.2)
RBC # BLD AUTO: 4.4 X10(6)UL
RBC UR QL AUTO: NEGATIVE
SODIUM SERPL-SCNC: 139 MMOL/L (ref 136–145)
SP GR UR STRIP.AUTO: 1.02 (ref 1–1.03)
UROBILINOGEN UR STRIP.AUTO-MCNC: 0.2 MG/DL
WBC # BLD AUTO: 4.9 X10(3) UL (ref 4–11)

## 2024-06-29 PROCEDURE — 81003 URINALYSIS AUTO W/O SCOPE: CPT | Performed by: EMERGENCY MEDICINE

## 2024-06-29 PROCEDURE — 74177 CT ABD & PELVIS W/CONTRAST: CPT | Performed by: EMERGENCY MEDICINE

## 2024-06-29 PROCEDURE — 83690 ASSAY OF LIPASE: CPT | Performed by: EMERGENCY MEDICINE

## 2024-06-29 PROCEDURE — 96376 TX/PRO/DX INJ SAME DRUG ADON: CPT

## 2024-06-29 PROCEDURE — 85025 COMPLETE CBC W/AUTO DIFF WBC: CPT | Performed by: EMERGENCY MEDICINE

## 2024-06-29 PROCEDURE — 87507 IADNA-DNA/RNA PROBE TQ 12-25: CPT | Performed by: EMERGENCY MEDICINE

## 2024-06-29 PROCEDURE — 96375 TX/PRO/DX INJ NEW DRUG ADDON: CPT

## 2024-06-29 PROCEDURE — 87177 OVA AND PARASITES SMEARS: CPT | Performed by: EMERGENCY MEDICINE

## 2024-06-29 PROCEDURE — 87493 C DIFF AMPLIFIED PROBE: CPT | Performed by: EMERGENCY MEDICINE

## 2024-06-29 PROCEDURE — 99285 EMERGENCY DEPT VISIT HI MDM: CPT

## 2024-06-29 PROCEDURE — 96374 THER/PROPH/DIAG INJ IV PUSH: CPT

## 2024-06-29 PROCEDURE — 81003 URINALYSIS AUTO W/O SCOPE: CPT

## 2024-06-29 PROCEDURE — 80053 COMPREHEN METABOLIC PANEL: CPT | Performed by: EMERGENCY MEDICINE

## 2024-06-29 PROCEDURE — 99284 EMERGENCY DEPT VISIT MOD MDM: CPT

## 2024-06-29 PROCEDURE — 87209 SMEAR COMPLEX STAIN: CPT | Performed by: EMERGENCY MEDICINE

## 2024-06-29 RX ORDER — ONDANSETRON 2 MG/ML
4 INJECTION INTRAMUSCULAR; INTRAVENOUS ONCE
Status: COMPLETED | OUTPATIENT
Start: 2024-06-29 | End: 2024-06-29

## 2024-06-29 RX ORDER — ONDANSETRON 4 MG/1
4 TABLET, ORALLY DISINTEGRATING ORAL EVERY 4 HOURS PRN
Qty: 10 TABLET | Refills: 0 | Status: SHIPPED | OUTPATIENT
Start: 2024-06-29

## 2024-06-29 RX ORDER — HYDROMORPHONE HYDROCHLORIDE 1 MG/ML
0.5 INJECTION, SOLUTION INTRAMUSCULAR; INTRAVENOUS; SUBCUTANEOUS ONCE
Status: COMPLETED | OUTPATIENT
Start: 2024-06-29 | End: 2024-06-29

## 2024-06-29 RX ORDER — METRONIDAZOLE 500 MG/1
500 TABLET ORAL 3 TIMES DAILY
Qty: 30 TABLET | Refills: 0 | Status: SHIPPED | OUTPATIENT
Start: 2024-06-29 | End: 2024-07-09

## 2024-06-29 RX ORDER — DICYCLOMINE HCL 20 MG
20 TABLET ORAL 4 TIMES DAILY PRN
Qty: 30 TABLET | Refills: 0 | Status: SHIPPED | OUTPATIENT
Start: 2024-06-29 | End: 2024-07-29

## 2024-06-29 RX ORDER — POTASSIUM CHLORIDE 1.5 G/1.58G
20 POWDER, FOR SOLUTION ORAL DAILY
Qty: 5 PACKET | Refills: 0 | Status: SHIPPED | OUTPATIENT
Start: 2024-06-29 | End: 2024-07-04

## 2024-06-29 RX ORDER — POTASSIUM CHLORIDE 20 MEQ/1
40 TABLET, EXTENDED RELEASE ORAL ONCE
Status: COMPLETED | OUTPATIENT
Start: 2024-06-29 | End: 2024-06-29

## 2024-06-29 NOTE — ED PROVIDER NOTES
Patient Seen in: [facility] In [place]      History     Chief Complaint   Patient presents with    Abdomen/Flank Pain    Fever    Nausea/Vomiting/Diarrhea     Stated Complaint: RLQ abd pain, fever, N/V/D    Subjective:   HPI    43 old female presents to the emergency department complaints of diarrhea as well as right lower quadrant pain.  Patient states that she started having diarrhea a few days ago.  She estimates that she has had 5 episodes this morning and states she had more yesterday.  She denies any emeka blood.  She did recently go on a cruise but has been back for almost 2 weeks.  No one else became ill.  She did have a procedure done at the beginning of May and states that she believes she was on antibiotics at that time.  No previous history of C. difficile.  She has had nausea but no distinct vomiting just more poor appetite in general.  She states that typically she has Zofran at home but ran out.  She states that she does have an ovary on that right side she had a removal of her left ovary.  She states the pain is at higher than that.  She does not have a gallbladder.  No other acute complaint    Objective:   Past Medical History:    Abdominal discomfort in right lower quadrant    Abdominal pain    Abnormal CT scan, esophagus    Acute deep vein thrombosis (DVT) of left lower extremity (HCC)    Alpha-1-antitrypsin deficiency (HCC)    Anxiety    Arrhythmia    RIGHT BUNDLE BRACH BLOCK     Arthritis    Asherman syndrome    Asthma (HCC)    Back pain    I have Spinal Stenosis that has gotten worse over the years.    Bloating    Calculus of kidney    Cancer (HCC)    Thyroid    Change in hair    Chest pain    Chronic bronchiolitis (HCC)    Chronic cough    Constipation    COPD (chronic obstructive pulmonary disease) (HCC)    no Oxygen    COVID-19 virus infection    Depression    Diarrhea, unspecified    Disorder of thyroid    Diverticulosis    Easy bruising    Endometriosis    Esophageal  reflux    Fatigue    Food intolerance    Frequent use of laxatives    Gastric ulcer    Headache disorder    Hemorrhoids    History of gastric bypass    Hoarseness, chronic    Hx of gastric bypass    Hx of motion sickness    Hydronephrosis    Hypokalemia    Hypothyroidism    Infertility, female    Intertrigo    Irregular bowel habits    Loss of appetite    Migraines    Nausea    Nephrolithiasis    Organic hypersomnia, unspecified    AHI 2 RDI 2 REM AHI 6 SaO2 curtis 89%    Osteoarthritis    Painful urination    Pancreatitis (HCC)    Pneumonia due to organism    PONV (postoperative nausea and vomiting)    Problems with swallowing    PUD (peptic ulcer disease)    Rib contusion, left, initial encounter    Severe persistent asthma with exacerbation (HCC)    Shortness of breath    Sleep disturbance    Stented coronary artery    Stress    Thyroid cancer (HCC)    Visual impairment    glasses    Vocal cord dysfunction    Wears glasses    Weight gain    Weight loss              Past Surgical History:   Procedure Laterality Date    Ankle scope,part debridement Left 2015    Procedure: ARTHROSCOPY ANKLE WITH DEBRIDEMENT;  Surgeon: Marcial Evans MD;  Location: [facility]    Bronch thermoplasty 1 lobe Right 04/15/2021    Bronch thermoplasty 1 lobe Left 2021      2006    Cholecystectomy      Colonoscopy  2021    Colonoscopy,diagnostic  10/22/2013    Procedure: COLONOSCOPY, POSSIBLE BIOPSY, POSSIBLE POLYPECTOMY 97058;  Surgeon: Marcello Ferreira MD;  Location: [facility]    Ct angiography, chest (ndn=57853)  2021    D & c      x4    Egd  2021    Gastric bypass,obese<100cm magy-en-y  2017    DR. SEGAL    Gastrocnemius recession Left 2015    Procedure: GASTROC RECESSION FOOT;  Surgeon: Marcial Evans MD;  Location: [facility]    Hysterectomy  2013    Hysteroscopy      Inj paravert f jnt l/s 1 lev Bilateral 2015    Procedure: FACET INJECTION  UNDER FLUOROSCOPY;  Surgeon: Dusty Munson DO;  Location: [facility]    Laparoscopic cholecystectomy N/A 11/23/2016    Procedure: LAPAROSCOPIC CHOLECYSTECTOMY;  Surgeon: Dai Sanchez MD;  Location:  MAIN OR    Laparoscopy,diagnostic      Laparoscopy,diagnostic  06/25/2022    Lysis of adhesions  2010    M-sedaj by sm phys perfrmg svc 5+ yr Bilateral 12/14/2015    Procedure: FACET INJECTION UNDER FLUOROSCOPY;  Surgeon: Dusty Munson DO;  Location: [facility]    Domonique biopsy stereo nodule 1 site right (cpt=19081)  07/2023    stromal fibrosis    Oophorectomy Left     Other  NECK SCAR REVISION    Other surgical history      laparoscopies x2    Removal of ovarian cyst(s) Right 07/08/2020    Surgical stent Bilateral 03/2015    Bilateral iliac stents    Thyroidectomy  04/2011    Total abdom hysterectomy      Upper gi endo no barretts  12/2015    normal    Upper gi endoscopy performed  01/25/2017    Rivera Donaldson M.D.    Upper gi endoscopy,biopsy N/A 12/19/2015    Procedure: ESOPHAGOGASTRODUODENOSCOPY, POSSIBLE BIOPSY, POSSIBLE POLYPECTOMY 05390;  Surgeon: Brayden Giordano MD;  Location: [facility]                Social History     Socioeconomic History    Marital status:    Tobacco Use    Smoking status: Never     Passive exposure: Past    Smokeless tobacco: Never   Vaping Use    Vaping status: Never Used   Substance and Sexual Activity    Alcohol use: Not Currently    Drug use: No    Sexual activity: Yes     Partners: Male   Other Topics Concern     Service No    Blood Transfusions No    Caffeine Concern Yes    Occupational Exposure No    Hobby Hazards No    Sleep Concern No    Stress Concern No    Weight Concern No    Special Diet No    Back Care No    Exercise No    Bike Helmet No    Seat Belt Yes    Self-Exams No   Social History Narrative    ** Merged History Encounter **          Social Determinants of Health     Housing Stability: Low Risk   (9/11/2023)    [facility]    Housing Stability   Recent Concern: Housing Stability - At Risk (8/18/2023)    Received from [facility]              Review of Systems   All other systems reviewed and are negative.      Positive for stated Chief Complaint: Abdomen/Flank Pain, Fever, and Nausea/Vomiting/Diarrhea    Other systems are as noted in HPI.  Constitutional and vital signs reviewed.      All other systems reviewed and negative except as noted above.    Physical Exam     ED Triage Vitals [06/29/24 1509]   /68   Pulse 106   Resp 22   Temp 98.8 °F (37.1 °C)   Temp src Temporal   SpO2 97 %   O2 Device None (Room air)       Current Vitals:   Vital Signs  BP: 119/76  Pulse: 88  Resp: 16  Temp: 97.8 °F (36.6 °C)  Temp src: Temporal    Oxygen Therapy  SpO2: 99 %  O2 Device: None (Room air)            Physical Exam  Vitals and nursing note reviewed. Chaperone present: Son in room as well as nursing.   Constitutional:       Appearance: Normal appearance. She is well-developed and normal weight. She is not toxic-appearing or diaphoretic.   HENT:      Head: Normocephalic and atraumatic.   Cardiovascular:      Rate and Rhythm: Normal rate and regular rhythm.      Pulses: Normal pulses.      Heart sounds: Normal heart sounds.   Pulmonary:      Effort: Pulmonary effort is normal.      Breath sounds: Normal breath sounds. No stridor. No wheezing.   Abdominal:      General: Bowel sounds are normal.      Palpations: Abdomen is soft.      Tenderness: There is abdominal tenderness. There is no right CVA tenderness, left CVA tenderness or rebound.      Comments: Abdomen more pronounced in the right lower quadrant with some perhaps very mild focal rebound   Musculoskeletal:         General: No tenderness. Normal range of motion.      Cervical back: Normal range of motion and neck supple.   Lymphadenopathy:      Cervical: No cervical adenopathy.    Skin:     General: Skin is warm and dry.      Capillary Refill: Capillary refill takes less than 2 seconds.      Coloration: Skin is not pale.   Neurological:      General: No focal deficit present.      Mental Status: She is alert and oriented to person, place, and time.      Cranial Nerves: No cranial nerve deficit.      Coordination: Coordination normal.              ED Course     Labs Reviewed   URINALYSIS WITH CULTURE REFLEX - Abnormal; Notable for the following components:       Result Value    Ketones Urine Trace (*)     Protein Urine Trace (*)     All other components within normal limits   COMP METABOLIC PANEL (14) - Abnormal; Notable for the following components:    Potassium 3.0 (*)     AST 13 (*)     Total Protein 6.1 (*)     Albumin 2.6 (*)     A/G Ratio 0.7 (*)     All other components within normal limits   LIPASE - Normal   CBC WITH DIFFERENTIAL WITH PLATELET    Narrative:     The following orders were created for panel order CBC With Differential With Platelet.  Procedure                               Abnormality         Status                     ---------                               -----------         ------                     CBC W/ DIFFERENTIAL[[id number]]                              Final result                 Please view results for these tests on the individual orders.   OVA AND PARASITES   C. DIFFICILE(TOXIGENIC)PCR   GI STOOL PANEL BY PCR   OVA AND PARASITES(P)    Narrative:     The following orders were created for panel order Ova and Parasites(P).  Procedure                               Abnormality         Status                     ---------                               -----------         ------                     Ova and Parasites Once[[id number]]                           In process                   Please view results for these tests on the individual orders.   GI STOOL PANEL BY PCR   CBC W/ DIFFERENTIAL             CT APPENDIX ABD/PEL W CONTRAST (CPT=74177)    Result Date:  6/29/2024  PROCEDURE:  CT APPENDIX ABD/PEL W CONTRAST (CPT=74177)  COMPARISON:  [facility] , CT, CT ENTEROGRAPHY ABD/PEL W CONTRAST SH(CPT=74177), 2/08/2023, 2:13 PM.  INDICATIONS:  RLQ abd pain, fever, N/V/D  TECHNIQUE:  Axial helical acquisitions are obtained from through the abdomen and pelvis after bolus intravenous nonionic contrast administration.  Images of the right lower quadrant reconstructed at 2.5 mm. Coronal MPR imaging was obtained.  Dose reduction techniques were used. Dose information is transmitted to the ACR (American College of Radiology) NRDR (National Radiology Data Registry) which includes the Dose Index Registry.  PATIENT STATED HISTORY:(As transcribed by Technologist)  Patient having right lower quadrant pain that radiates along pelvis with nausea and vomiting.   CONTRAST USED:  80cc of Isovue 370  FINDINGS:  APPENDIX:  The appendix is unremarkable. LIVER:  No enlargement, atrophy, abnormal density, or significant focal lesion.  BILIARY:  No visible dilatation or calcification.  PANCREAS:  No lesion, fluid collection, ductal dilatation, or atrophy.  SPLEEN:  No enlargement or focal lesion.  KIDNEYS:  No mass, obstruction, or calcification.  ADRENALS:  No mass or enlargement.  AORTA/VASCULAR:  Stent in the IVC extending to bilateral common iliac veins is widely patent. RETROPERITONEUM:  No mass or adenopathy.  BOWEL/MESENTERY:  Thickening of multiple loops of small bowel throughout the abdomen empirically the pelvis is noted.  Associated minimal free fluid is noted.  Sequelae of gastric surgery is noted. ABDOMINAL WALL:  No mass or hernia.  URINARY BLADDER:  No visible focal wall thickening, lesion, or calculus.  PELVIC NODES:  No adenopathy.  PELVIC ORGANS:  No visible mass.  Pelvic organs appropriate for patient age.  BONES:  No bony lesion or fracture.  LUNG BASES:  No visible pulmonary or pleural disease.  OTHER:  Negative.             CONCLUSION:   1. The appendix is unremarkable.  2. Diffuse  small bowel thickening throughout the pelvis in particular in the lower abdomen extending to the terminal ileum is noted.  This is suspicious for an infectious/inflammatory etiology.  Ischemia is considered unlikely.  3. Stents extending from IVC to bilateral common iliac veins is widely patent.   LOCATION:  [facility]   Dictated by (CST): Milton Melton MD on 6/29/2024 at 5:17 PM     Finalized by (CST): Milton Melton MD on 6/29/2024 at 5:22 PM              MDM      An IV established and blood work obtained.  She is placed on the monitor.  She has an allergy to NSAIDs.  She was given a dose of Dilaudid as well as Zofran as she does not tolerate Reglan.  She received fluids.  Blood work did not reveal an elevated white blood cell count but she was noted to be hypokalemic.  This would be consistent with her history of having had diarrhea.  Otherwise blood work was unremarkable there is no acute renal failure or liver failure that could potentially be giving her some discomfort.  There is concern about the potential of appendicitis or issue with her ovary subsequently she underwent a CT scan with IV contrast.  Reviewed the films do not appreciate any free air or obvious bowel obstruction reviewed the radiology report they felt that the appendix was unremarkable.  They did note that she had some small bowel thickening particular around the terminal ileum.  That could be consistent with a gastroenteritis type issue.  I feel this is most likely.  She was able to give us a small amount of stool while in the emergency department ultimately.  There was no blood in her stool.  She was given second dose of pain medications but then was also given some Bentyl and was given potassium orally and was able to tolerate this.  I reviewed her results with her we discussed care and treatment.  She was subsequently discharged.                                   Medical Decision Making      Disposition and Plan     Clinical  Impression:  1. Acute colitis    2. Diarrhea, unspecified type    3. Hypokalemia         Disposition:  Discharge  6/29/2024  6:52 pm    Follow-up:  Saul Zaidi MD  [address] [address]  [phone]    Schedule an appointment as soon as possible for a visit            Medications Prescribed:  Discharge Medication List as of 6/29/2024  7:00 PM        START taking these medications    Details   potassium chloride 20 MEQ Oral Powd Pack Take 20 mEq by mouth daily for 5 days., Normal, Disp-5 packet, R-0      dicyclomine 20 MG Oral Tab Take 1 tablet (20 mg total) by mouth 4 (four) times daily as needed., Normal, Disp-30 tablet, R-0      ondansetron 4 MG Oral Tablet Dispersible Take 1 tablet (4 mg total) by mouth every 4 (four) hours as needed for Nausea., Normal, Disp-10 tablet, R-0      metRONIDAZOLE 500 MG Oral Tab Take 1 tablet (500 mg total) by mouth 3 (three) times daily for 10 days., Normal, Disp-30 tablet, R-0

## 2024-06-29 NOTE — ED INITIAL ASSESSMENT (HPI)
Since Tuesday vomiting, diarrhea, and fever and dizziness. Pain to RLQ, describes radiation to LLQ when pressure applied.

## 2024-06-30 ENCOUNTER — PATIENT MESSAGE (OUTPATIENT)
Dept: FAMILY MEDICINE CLINIC | Facility: CLINIC | Age: 44
End: 2024-06-30

## 2024-06-30 LAB
ADENOVIRUS F 40/41 PCR: NEGATIVE
ASTROVIRUS PCR: NEGATIVE
C CAYETANENSIS DNA SPEC QL NAA+PROBE: NEGATIVE
C DIFF TOX B STL QL: NEGATIVE
CAMPY SP DNA.DIARRHEA STL QL NAA+PROBE: POSITIVE
CRYPTOSP DNA SPEC QL NAA+PROBE: NEGATIVE
EAEC PAA PLAS AGGR+AATA ST NAA+NON-PRB: POSITIVE
EC STX1+STX2 + H7 FLIC SPEC NAA+PROBE: NEGATIVE
ENTAMOEBA HISTOLYTICA PCR: NEGATIVE
EPEC EAE GENE STL QL NAA+NON-PROBE: NEGATIVE
ETEC LTA+ST1A+ST1B TOX ST NAA+NON-PROBE: NEGATIVE
GIARDIA LAMBLIA PCR: NEGATIVE
NOROVIRUS GI/GII PCR: NEGATIVE
P SHIGELLOIDES DNA STL QL NAA+PROBE: NEGATIVE
ROTAVIRUS A PCR: NEGATIVE
SALMONELLA DNA SPEC QL NAA+PROBE: NEGATIVE
SAPOVIRUS PCR: NEGATIVE
SHIGELLA SP+EIEC IPAH ST NAA+NON-PROBE: NEGATIVE
V CHOLERAE DNA SPEC QL NAA+PROBE: NEGATIVE
VIBRIO DNA SPEC NAA+PROBE: NEGATIVE
YERSINIA DNA SPEC NAA+PROBE: NEGATIVE

## 2024-07-01 DIAGNOSIS — J45.41: ICD-10-CM

## 2024-07-01 RX ORDER — AZITHROMYCIN 500 MG/1
500 TABLET, FILM COATED ORAL DAILY
Qty: 3 TABLET | Refills: 0 | Status: SHIPPED | OUTPATIENT
Start: 2024-07-01 | End: 2024-07-04

## 2024-07-01 RX ORDER — OXYBUTYNIN CHLORIDE 10 MG/1
10 TABLET, EXTENDED RELEASE ORAL DAILY
Qty: 90 TABLET | Refills: 0 | OUTPATIENT
Start: 2024-07-01

## 2024-07-01 NOTE — TELEPHONE ENCOUNTER
Patient reports having 7-8 episodes of diarrhea and abdominal pain. Does not remember RLS symptoms being horrible but has not taken azithromycin in a while. Please advise, thanks.

## 2024-07-01 NOTE — TELEPHONE ENCOUNTER
Daniela More PA-C  7/1/2024  6:08 AM CDT Back to Top      Campylobacter and E Coli present.  Get update on symptoms.  Antibiotics are not always indicated unless persistent symptoms, 6 stools per day or more,  dehydration, fever, bloody stools.     Both organisms can be treated with Zithromax ask her about the sensitivity of restless legs that is listed for Zithromax.  Prescription is 500 mg daily for 3 days.     SHe can stop the Flagyl

## 2024-07-01 NOTE — TELEPHONE ENCOUNTER
From: Debbi Trivedi  To: Daniela More  Sent: 6/30/2024 8:19 PM CDT  Subject: Brianna    Umang VerdeDaniela,   Well I ended up in the ER yesterday. I had been sick with a fever, vomiting, and constant diarrhea since Tuesday night. I wasn’t able to eat or drink anything so i was extremely dizzy and lightheaded. So, I went to the ER for fluids. I found out that I have Colitis. I also found out today from results that my stool tested positive for E Coli and another bacteria. They wanted me to follow up with you, but there are no openings from late July or until August for anyone in your office.     Thank you,  Debbi Trivedi

## 2024-07-01 NOTE — PROGRESS NOTES
Campylobacter and E Coli present.  Get update on symptoms.  Antibiotics are not always indicated unless persistent symptoms, 6 stools per day or more,  dehydration, fever, bloody stools.    Both organisms can be treated with Zithromax ask her about the sensitivity of restless legs that is listed for Zithromax.  Prescription is 500 mg daily for 3 days.     SHe can stop the Flagyl

## 2024-07-02 RX ORDER — PROMETHAZINE HYDROCHLORIDE 6.25 MG/5ML
SYRUP ORAL
Qty: 240 ML | Refills: 0 | Status: SHIPPED | OUTPATIENT
Start: 2024-07-02

## 2024-07-02 NOTE — TELEPHONE ENCOUNTER
Patient missed an infusion and is coughing again.     Requested Prescriptions     Signed Prescriptions Disp Refills    PROMETHAZINE 6.25 MG/5ML Oral Solution 240 mL 0     Sig: TAKE 5 ML(6.25 MG) BY MOUTH EVERY 6 HOURS AS NEEDED     Authorizing Provider: LAURA BAILEY     Ordering User: REBEKAH FELTON        Refilled per protocol/OV notes

## 2024-07-06 DIAGNOSIS — G44.221 CHRONIC TENSION-TYPE HEADACHE, INTRACTABLE: ICD-10-CM

## 2024-07-08 RX ORDER — BUTALBITAL/ACETAMINOPHEN 50MG-325MG
TABLET ORAL
Qty: 20 TABLET | Refills: 0 | Status: SHIPPED | OUTPATIENT
Start: 2024-07-08

## 2024-07-17 DIAGNOSIS — F41.1 GAD (GENERALIZED ANXIETY DISORDER): ICD-10-CM

## 2024-07-17 RX ORDER — ALPRAZOLAM 0.25 MG/1
0.25 TABLET ORAL 2 TIMES DAILY
Qty: 60 TABLET | Refills: 0 | Status: SHIPPED | OUTPATIENT
Start: 2024-07-17

## 2024-07-17 NOTE — TELEPHONE ENCOUNTER
[facility] requesting medication refill for ALPRAZolam 0.25 MG Oral Tab .    LOV: 6/20/2024   Last Refill Date:   30 or 90 Day Supply:  Pharmacy Location: [place] [address]   Prescribed By: Daniela More PA-C        Informed patient to allow up to 24 to 48 Business hours for a call back from a nurse.

## 2024-07-25 DIAGNOSIS — F51.01 PRIMARY INSOMNIA: ICD-10-CM

## 2024-07-25 RX ORDER — TRAZODONE HYDROCHLORIDE 50 MG/1
TABLET ORAL NIGHTLY
Qty: 180 TABLET | Refills: 0 | Status: SHIPPED | OUTPATIENT
Start: 2024-07-25

## 2024-07-29 ENCOUNTER — LAB ENCOUNTER (OUTPATIENT)
Dept: LAB | Age: 44
End: 2024-07-29
Payer: COMMERCIAL

## 2024-07-29 DIAGNOSIS — D80.1 HYPOGAMMAGLOBULINEMIA (HCC): Primary | ICD-10-CM

## 2024-07-29 PROBLEM — F43.23 ADJUSTMENT REACTION WITH ANXIETY AND DEPRESSION: Status: ACTIVE | Noted: 2024-07-29

## 2024-07-29 LAB
IGA SERPL-MCNC: 279 MG/DL (ref 40–350)
IGM SERPL-MCNC: 39.5 MG/DL (ref 50–300)
IMMUNOGLOBULIN PNL SER-MCNC: 771 MG/DL (ref 650–1600)

## 2024-07-29 PROCEDURE — 86360 T CELL ABSOLUTE COUNT/RATIO: CPT

## 2024-07-29 PROCEDURE — 86357 NK CELLS TOTAL COUNT: CPT

## 2024-07-29 PROCEDURE — 86359 T CELLS TOTAL COUNT: CPT

## 2024-07-29 PROCEDURE — 82784 ASSAY IGA/IGD/IGG/IGM EACH: CPT

## 2024-07-29 PROCEDURE — 86355 B CELLS TOTAL COUNT: CPT

## 2024-07-29 PROCEDURE — 36415 COLL VENOUS BLD VENIPUNCTURE: CPT

## 2024-07-30 LAB
CD19 CELLS NFR SPEC: 9 %
CD3 CELLS NFR SPEC: 75 %
CD3+CD4+ CELLS # BLD: 905 /MM3
CD3+CD4+ CELLS NFR BLD: 48 %
CD3+CD4+ CELLS/CD3+CD8+ CLL SPEC: 2
CD3+CD8+ CELLS NFR SPEC: 24 %
CD3-CD16+CD56+ CELLS NFR SPEC: 14 %

## 2024-08-05 ENCOUNTER — PATIENT MESSAGE (OUTPATIENT)
Dept: FAMILY MEDICINE CLINIC | Facility: CLINIC | Age: 44
End: 2024-08-05

## 2024-08-05 DIAGNOSIS — G44.221 CHRONIC TENSION-TYPE HEADACHE, INTRACTABLE: ICD-10-CM

## 2024-08-06 RX ORDER — BUTALBITAL/ACETAMINOPHEN 50MG-325MG
TABLET ORAL
Qty: 20 TABLET | Refills: 0 | Status: SHIPPED | OUTPATIENT
Start: 2024-08-06

## 2024-08-06 NOTE — TELEPHONE ENCOUNTER
From: Debbi Trivedi  To: Daniela More  Sent: 8/5/2024 5:35 PM CDT  Subject: Foot     Hi Daniela,   I am off the cruise ship, but, now stuck in [place] until Wednesday. The blister popped on Friday and looks like it’s healing. However, my foot and ankle are still swelling and the ankle hurts quite a bit. Do you feel I should see someone when I return? Is an Immediate Care ok? Or should I go to [facility] as I’ve met my deductible and out of pocket. I’m just thinking if you feel they might want to do bloodwork or anything.     Thank you,   Debbi Trivedi

## 2024-08-07 ENCOUNTER — HOSPITAL ENCOUNTER (OUTPATIENT)
Age: 44
Discharge: HOME | End: 2024-08-07
Payer: COMMERCIAL

## 2024-08-07 VITALS
DIASTOLIC BLOOD PRESSURE: 83 MMHG | RESPIRATION RATE: 18 BRPM | SYSTOLIC BLOOD PRESSURE: 129 MMHG | WEIGHT: 165 LBS | HEIGHT: 68 IN | TEMPERATURE: 98 F | BODY MASS INDEX: 25.01 KG/M2 | OXYGEN SATURATION: 98 % | HEART RATE: 90 BPM

## 2024-08-07 DIAGNOSIS — M25.471 ANKLE SWELLING, RIGHT: ICD-10-CM

## 2024-08-07 DIAGNOSIS — S90.821A INFECTED BLISTER OF RIGHT FOOT, INITIAL ENCOUNTER: Primary | ICD-10-CM

## 2024-08-07 DIAGNOSIS — L08.9 INFECTED BLISTER OF RIGHT FOOT, INITIAL ENCOUNTER: Primary | ICD-10-CM

## 2024-08-07 PROCEDURE — 99214 OFFICE O/P EST MOD 30 MIN: CPT

## 2024-08-07 PROCEDURE — 99213 OFFICE O/P EST LOW 20 MIN: CPT

## 2024-08-07 RX ORDER — AMOXICILLIN 875 MG/1
875 TABLET, COATED ORAL 2 TIMES DAILY
Qty: 20 TABLET | Refills: 0 | Status: SHIPPED | OUTPATIENT
Start: 2024-08-07 | End: 2024-08-17

## 2024-08-08 NOTE — DISCHARGE INSTRUCTIONS
Please return to the ER/clinic if symptoms worsen. Follow-up with your PCP in 24-48 hours as needed.    Recommended soaking the foot in Epsom salts.  Use the mupirocin liberally.  If redness and swelling get worse start taking the oral antibiotics.  Recommend rest ice compression elevation of the ankle.  If you start to notice pain at rest throbbing etc. go directly to the ER or return here for ultrasound imaging.

## 2024-08-08 NOTE — ED PROVIDER NOTES
Patient Seen in: [facility]      History     Chief Complaint   Patient presents with    Ankle Pain     Stated Complaint: Blister on foot and anxle swollen    Subjective:   HPI    44 yo female here for evaluation of some right ankle swelling as well as an infected blister to the plantar surface of her right foot.  Patient reports that she was on a trip to [place] and appetite etc. where she was walking excessively.  Patient was initially in flip-flops and switched to gym shoes.  Patient was walking sideways to avoid putting pressure on the infected blister.  Patient denies any injury trauma to the ankle.  Patient denies chest pain, shortness of breath, cough, abdominal pain, nausea, vomiting or diarrhea.  Afebrile.    Objective:   Past Medical History:    Abdominal discomfort in right lower quadrant    Abdominal pain    Abnormal CT scan, esophagus    Acute deep vein thrombosis (DVT) of left lower extremity (HCC)    Alpha-1-antitrypsin deficiency (HCC)    Anxiety    Arrhythmia    RIGHT BUNDLE BRACH BLOCK     Arthritis    Asherman syndrome    Asthma (HCC)    Back pain    I have Spinal Stenosis that has gotten worse over the years.    Bloating    Calculus of kidney    Cancer (HCC)    Thyroid    Change in hair    Chest pain    Chronic bronchiolitis (HCC)    Chronic cough    Constipation    COPD (chronic obstructive pulmonary disease) (HCC)    no Oxygen    COVID-19 virus infection    Depression    Diarrhea, unspecified    Disorder of thyroid    Diverticulosis    Easy bruising    Endometriosis    Esophageal reflux    Fatigue    Food intolerance    Frequent use of laxatives    Gastric ulcer    Headache disorder    Hemorrhoids    History of gastric bypass    Hoarseness, chronic    Hx of gastric bypass    Hx of motion sickness    Hydronephrosis    Hypokalemia    Hypothyroidism    Infertility, female    Intertrigo    Irregular bowel habits    Loss of appetite    Migraines    Nausea    Nephrolithiasis     Organic hypersomnia, unspecified    AHI 2 RDI 2 REM AHI 6 SaO2 curtis 89%    Osteoarthritis    Painful urination    Pancreatitis (HCC)    Pneumonia due to organism    PONV (postoperative nausea and vomiting)    Problems with swallowing    PUD (peptic ulcer disease)    Rib contusion, left, initial encounter    Severe persistent asthma with exacerbation (HCC)    Shortness of breath    Sleep disturbance    Stented coronary artery    Stress    Thyroid cancer (HCC)    Visual impairment    glasses    Vocal cord dysfunction    Wears glasses    Weight gain    Weight loss              The patient's medication list, past medical history and social history elements  as listed in today's nurse's notes are reviewed and agree.   The patient's family history is reviewed and is noncontributory to the presenting problem, except as indicated as above.   No pertinent past surgical history.              No pertinent social history.            Review of Systems    Positive for stated Chief Complaint: Ankle Pain    Other systems are as noted in HPI.  Constitutional and vital signs reviewed.      All other systems reviewed and negative except as noted above.    Physical Exam     ED Triage Vitals [08/07/24 1843]   /83   Pulse 90   Resp 18   Temp 97.8 °F (36.6 °C)   Temp src Temporal   SpO2 98 %   O2 Device None (Room air)       Current Vitals:   Vital Signs  BP: 129/83  Pulse: 90  Resp: 18  Temp: 97.8 °F (36.6 °C)  Temp src: Temporal    Oxygen Therapy  SpO2: 98 %  O2 Device: None (Room air)            Physical Exam  Vitals and nursing note reviewed.   Constitutional:       Appearance: Normal appearance. She is well-developed.   HENT:      Head: Normocephalic.      Right Ear: External ear normal.      Left Ear: External ear normal.      Nose: Nose normal.      Mouth/Throat:      Mouth: Mucous membranes are moist.   Eyes:      Conjunctiva/sclera: Conjunctivae normal.      Pupils: Pupils are equal, round, and reactive to light.    Cardiovascular:      Rate and Rhythm: Normal rate and regular rhythm.      Heart sounds: Normal heart sounds.   Pulmonary:      Effort: Pulmonary effort is normal.      Breath sounds: Normal breath sounds.   Musculoskeletal:      Cervical back: Normal range of motion and neck supple.      Right lower leg: Normal.      Left lower leg: Normal.      Right ankle: Swelling present. No tenderness.      Right Achilles Tendon: Normal.      Left ankle: Normal.      Left Achilles Tendon: Normal.        Legs:       Comments: R lateral aspect minimal swelling noted: no erythema/warmth/fluctuance: FROM, N/V intact,  Strength 5/5:   R plantar surface: approx 4x4 cm area of erythema noted: blister is effaced: FROM, N/V intact, strength 5/5   Skin:     General: Skin is warm.      Capillary Refill: Capillary refill takes less than 2 seconds.   Neurological:      General: No focal deficit present.      Mental Status: She is alert and oriented to person, place, and time.   Psychiatric:         Mood and Affect: Mood normal.         Behavior: Behavior normal.         Thought Content: Thought content normal.         Judgment: Judgment normal.             ED Course     NOTE: Patient defers any xray imaging: Pain only minimal swelling.  She was on the trip.  Patient denies chest pain or shortness of breath.  We do not have ultrasound at this location at this time.  Patient is aware it is not very concerned but wheezes from everting the ankle and walking sideways.  We discussed however if she has pain at rest she is either to return tomorrow morning for an ultrasound or call her PCP for an outpatient order.                 MDM   Clinical Impression: infected blister R foot/ankle swelling  Course of Treatment:   Recommended soaking the foot in Epsom salts.  Use the mupirocin liberally.  If redness and swelling get worse start taking the oral antibiotics.  Recommend rest ice compression elevation of the ankle.  If you start to notice pain  at rest throbbing etc. go directly to the ER or return here for ultrasound imaging.      The patient is encouraged to return if any concerning symptoms arise. Additional verbal discharge instructions are given and discussed. Discharge medications are discussed. The patient is in good condition throughout the visit today and remains so upon discharge. I discuss the plan of care with the patient, who expresses understanding. All questions and concerns are addressed to the patient's satisfaction prior to discharge today.  Previous conversations with PCP and charts were reviewed.                                       Medical Decision Making      Disposition and Plan     Clinical Impression:  1. Infected blister of right foot, initial encounter    2. Ankle swelling, right         Disposition:  Discharge  8/7/2024  7:17 pm    Follow-up:  Saul Zaidi MD  [address] [address] [address] [phone]          [facility] DPM  [address] [facility] [address]  [phone]                Medications Prescribed:  Current Discharge Medication List        START taking these medications    Details   amoxicillin 875 MG Oral Tab Take 1 tablet (875 mg total) by mouth 2 (two) times daily for 10 days.  Qty: 20 tablet, Refills: 0      mupirocin 2 % External Ointment Apply 1 Application topically 3 (three) times daily for 14 days.  Qty: 1 each, Refills: 0

## 2024-08-09 ENCOUNTER — OFFICE VISIT (OUTPATIENT)
Dept: UROLOGY | Facility: CLINIC | Age: 44
End: 2024-08-09
Attending: OBSTETRICS & GYNECOLOGY
Payer: COMMERCIAL

## 2024-08-09 VITALS
SYSTOLIC BLOOD PRESSURE: 90 MMHG | BODY MASS INDEX: 25 KG/M2 | HEIGHT: 68 IN | TEMPERATURE: 98 F | DIASTOLIC BLOOD PRESSURE: 60 MMHG

## 2024-08-09 DIAGNOSIS — N32.81 OAB (OVERACTIVE BLADDER): ICD-10-CM

## 2024-08-09 DIAGNOSIS — N94.810 VESTIBULITIS, VULVAR: ICD-10-CM

## 2024-08-09 DIAGNOSIS — Z98.890 POSTOPERATIVE STATE: Primary | ICD-10-CM

## 2024-08-09 PROCEDURE — 99212 OFFICE O/P EST SF 10 MIN: CPT

## 2024-08-12 DIAGNOSIS — F41.1 GAD (GENERALIZED ANXIETY DISORDER): ICD-10-CM

## 2024-08-12 DIAGNOSIS — R63.2 BINGE EATING: ICD-10-CM

## 2024-08-12 RX ORDER — LISDEXAMFETAMINE DIMESYLATE 50 MG
50 CAPSULE ORAL DAILY
Qty: 30 CAPSULE | Refills: 0 | Status: SHIPPED | OUTPATIENT
Start: 2024-08-12 | End: 2024-09-11

## 2024-08-13 ENCOUNTER — PATIENT MESSAGE (OUTPATIENT)
Dept: FAMILY MEDICINE CLINIC | Facility: CLINIC | Age: 44
End: 2024-08-13

## 2024-08-13 RX ORDER — ALPRAZOLAM 0.25 MG/1
0.25 TABLET ORAL 2 TIMES DAILY
Qty: 60 TABLET | Refills: 0 | Status: SHIPPED | OUTPATIENT
Start: 2024-08-13

## 2024-08-13 NOTE — TELEPHONE ENCOUNTER
Requested Prescriptions     Pending Prescriptions Disp Refills    ALPRAZolam 0.25 MG Oral Tab 60 tablet 0     Sig: Take 1 tablet (0.25 mg total) by mouth 2 (two) times daily.       Last Refill: 7/17    Last OV: 7/29    Next OV: 9/11

## 2024-08-13 NOTE — TELEPHONE ENCOUNTER
Requesting call back from nurse,   She was asked to go immediate care needs to talk to nurse about, regarding immediate care.

## 2024-08-13 NOTE — TELEPHONE ENCOUNTER
From: Debbi Trivedi  To: Daniela More  Sent: 8/13/2024 2:38 PM CDT  Subject: Bloodwork request      Umang Suarez,  I’m wondering if you can put in an order for me to get bloodwork? I have pain that seems to be getting more moderate in the upper left-hand side of my abdomen and is radiating to my back. it is a burning throbbing type pain and I just wanna make sure that it’s not pancreatitis or anything like that.      Thank you, Debbi Trivedi

## 2024-08-13 NOTE — TELEPHONE ENCOUNTER
Spoke with patient. Patient is unable to make it to [facility] before closing. Patient stated she will go to [facility].

## 2024-08-20 DIAGNOSIS — R10.12 ABDOMINAL PAIN, CHRONIC, LEFT UPPER QUADRANT: ICD-10-CM

## 2024-08-20 DIAGNOSIS — G44.221 CHRONIC TENSION-TYPE HEADACHE, INTRACTABLE: ICD-10-CM

## 2024-08-20 DIAGNOSIS — G89.29 ABDOMINAL PAIN, CHRONIC, LEFT UPPER QUADRANT: ICD-10-CM

## 2024-08-20 RX ORDER — PREGABALIN 75 MG/1
75 CAPSULE ORAL 3 TIMES DAILY
Qty: 90 CAPSULE | Refills: 2 | Status: SHIPPED | OUTPATIENT
Start: 2024-08-20

## 2024-08-24 ENCOUNTER — PATIENT MESSAGE (OUTPATIENT)
Dept: FAMILY MEDICINE CLINIC | Facility: CLINIC | Age: 44
End: 2024-08-24

## 2024-08-26 NOTE — TELEPHONE ENCOUNTER
Patient  has appointment on 9/11. She is asking if she should wait until then or Would you like to order the mammogram?

## 2024-08-26 NOTE — TELEPHONE ENCOUNTER
From: Debbi Trivedi  To: Daniela More  Sent: 8/24/2024 3:11 PM CDT  Subject: Lump    Umang Suarez,   I am feeling a lump on my left breast near my armpit. I am not sure if this is something that I have had an ultrsound on before, but I do not want to ignore it if not.  Thank you,   Debbi Trivedi

## 2024-08-26 NOTE — TELEPHONE ENCOUNTER
Patient reporting a small dime sized lump to her left breast near her armpit. No redness or swelling, but does have pain. She had a negative mammogram in March. Please advise, thanks.

## 2024-09-03 DIAGNOSIS — G44.221 CHRONIC TENSION-TYPE HEADACHE, INTRACTABLE: ICD-10-CM

## 2024-09-03 RX ORDER — BUTALBITAL/ACETAMINOPHEN 50MG-325MG
TABLET ORAL
Qty: 20 TABLET | Refills: 0 | Status: SHIPPED | OUTPATIENT
Start: 2024-09-03

## 2024-09-04 RX ORDER — OXYBUTYNIN CHLORIDE 10 MG/1
10 TABLET, EXTENDED RELEASE ORAL DAILY
Qty: 90 TABLET | Refills: 3 | Status: SHIPPED | OUTPATIENT
Start: 2024-09-04

## 2024-09-08 DIAGNOSIS — F41.1 GAD (GENERALIZED ANXIETY DISORDER): ICD-10-CM

## 2024-09-10 NOTE — TELEPHONE ENCOUNTER
Requested Prescriptions     Pending Prescriptions Disp Refills    ALPRAZolam 0.25 MG Oral Tab 60 tablet 0     Sig: Take 1 tablet (0.25 mg total) by mouth 2 (two) times daily.        Last refill: 8/13/24 60 tabs 0 refills    Last Appointment: 7/29/24    Next Appointment: tomorrow

## 2024-09-11 ENCOUNTER — PATIENT MESSAGE (OUTPATIENT)
Dept: FAMILY MEDICINE CLINIC | Facility: CLINIC | Age: 44
End: 2024-09-11

## 2024-09-11 DIAGNOSIS — E88.01 ALPHA-1-ANTITRYPSIN DEFICIENCY (HCC): ICD-10-CM

## 2024-09-11 PROBLEM — J00 ACUTE RHINITIS: Status: ACTIVE | Noted: 2024-09-11

## 2024-09-11 PROBLEM — F33.1 DEPRESSION, MAJOR, RECURRENT, MODERATE (HCC): Status: ACTIVE | Noted: 2024-09-11

## 2024-09-11 PROBLEM — F43.23 ADJUSTMENT REACTION WITH ANXIETY AND DEPRESSION: Status: RESOLVED | Noted: 2024-07-29 | Resolved: 2024-09-11

## 2024-09-11 RX ORDER — ALPRAZOLAM 0.25 MG
0.25 TABLET ORAL 2 TIMES DAILY
Qty: 60 TABLET | Refills: 0 | Status: SHIPPED | OUTPATIENT
Start: 2024-09-11

## 2024-09-11 RX ORDER — PROMETHAZINE HYDROCHLORIDE 6.25 MG/5ML
6.25 SYRUP ORAL 4 TIMES DAILY PRN
Qty: 240 ML | Refills: 0 | Status: SHIPPED | OUTPATIENT
Start: 2024-09-11

## 2024-09-12 NOTE — TELEPHONE ENCOUNTER
9/11/2024 called patient to discuss symptoms was seen today had mild respiratory symptoms starting tonight has hoarseness dry cough.  Encouraged her to try the promethazine again and to hold on any codeine prescription for the cough.  Also to try DuoNeb now and before bedtime.  Observe and call office if fevers or worsening symptoms.

## 2024-09-12 NOTE — TELEPHONE ENCOUNTER
From: Debbi Prasad [facility]  To: Daniela More  Sent: 9/11/2024 2:18 PM CDT  Subject: Today     Umang Suarez,   I am wondering if you can send me in a refill of the Promethazine Syrup? I went to work this morning but ended up leaving at 10:30 because I started coughing a lot and it because so bad that a threw up at school. No I feel like garbage. My chest hurts and I keep trying to take a nap, but, every time I lay down I go into coughing fits. I am hoping the Promethazine helps and calms things down I took what I had left when I got home at 11:00, but no change. I figured I didn’t need to go see anyone again since I saw you this morning. 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   Thank you,   Debbi

## 2024-09-14 ENCOUNTER — LAB ENCOUNTER (OUTPATIENT)
Dept: LAB | Age: 44
End: 2024-09-14
Attending: FAMILY MEDICINE
Payer: COMMERCIAL

## 2024-09-14 DIAGNOSIS — G89.29 CHRONIC ABDOMINAL PAIN: ICD-10-CM

## 2024-09-14 DIAGNOSIS — Z13.0 SCREENING FOR ENDOCRINE, NUTRITIONAL, METABOLIC AND IMMUNITY DISORDER: ICD-10-CM

## 2024-09-14 DIAGNOSIS — R10.9 CHRONIC ABDOMINAL PAIN: ICD-10-CM

## 2024-09-14 DIAGNOSIS — Z13.6 ENCOUNTER FOR LIPID SCREENING FOR CARDIOVASCULAR DISEASE: ICD-10-CM

## 2024-09-14 DIAGNOSIS — Z13.228 SCREENING FOR ENDOCRINE, NUTRITIONAL, METABOLIC AND IMMUNITY DISORDER: ICD-10-CM

## 2024-09-14 DIAGNOSIS — E89.0 POSTOPERATIVE HYPOTHYROIDISM: ICD-10-CM

## 2024-09-14 DIAGNOSIS — Z13.21 SCREENING FOR ENDOCRINE, NUTRITIONAL, METABOLIC AND IMMUNITY DISORDER: ICD-10-CM

## 2024-09-14 DIAGNOSIS — Z13.220 ENCOUNTER FOR LIPID SCREENING FOR CARDIOVASCULAR DISEASE: ICD-10-CM

## 2024-09-14 DIAGNOSIS — Z13.29 SCREENING FOR ENDOCRINE, NUTRITIONAL, METABOLIC AND IMMUNITY DISORDER: ICD-10-CM

## 2024-09-14 DIAGNOSIS — N95.1 PERIMENOPAUSAL: ICD-10-CM

## 2024-09-14 LAB
ALBUMIN SERPL-MCNC: 3.9 G/DL (ref 3.2–4.8)
ALBUMIN/GLOB SERPL: 1.7 {RATIO} (ref 1–2)
ALP LIVER SERPL-CCNC: 70 U/L
ALT SERPL-CCNC: 14 U/L
ANION GAP SERPL CALC-SCNC: 5 MMOL/L (ref 0–18)
AST SERPL-CCNC: 15 U/L (ref ?–34)
BILIRUB SERPL-MCNC: 0.3 MG/DL (ref 0.3–1.2)
BUN BLD-MCNC: 10 MG/DL (ref 9–23)
CALCIUM BLD-MCNC: 9 MG/DL (ref 8.7–10.4)
CHLORIDE SERPL-SCNC: 105 MMOL/L (ref 98–112)
CHOLEST SERPL-MCNC: 176 MG/DL (ref ?–200)
CO2 SERPL-SCNC: 30 MMOL/L (ref 21–32)
CREAT BLD-MCNC: 0.68 MG/DL
EGFRCR SERPLBLD CKD-EPI 2021: 111 ML/MIN/1.73M2 (ref 60–?)
ESTRADIOL SERPL-MCNC: 371.5 PG/ML
FASTING PATIENT LIPID ANSWER: YES
FASTING STATUS PATIENT QL REPORTED: YES
FSH SERPL-ACNC: 7.3 MIU/ML
GLOBULIN PLAS-MCNC: 2.3 G/DL (ref 2–3.5)
GLUCOSE BLD-MCNC: 88 MG/DL (ref 70–99)
HDLC SERPL-MCNC: 84 MG/DL (ref 40–59)
LDLC SERPL CALC-MCNC: 79 MG/DL (ref ?–100)
LH SERPL-ACNC: 7.1 MIU/ML
LIPASE SERPL-CCNC: 45 U/L (ref 12–53)
NONHDLC SERPL-MCNC: 92 MG/DL (ref ?–130)
OSMOLALITY SERPL CALC.SUM OF ELEC: 288 MOSM/KG (ref 275–295)
POTASSIUM SERPL-SCNC: 3.3 MMOL/L (ref 3.5–5.1)
PROT SERPL-MCNC: 6.2 G/DL (ref 5.7–8.2)
SODIUM SERPL-SCNC: 140 MMOL/L (ref 136–145)
T3 SERPL-MCNC: 1.32 NG/ML (ref 0.6–1.81)
T4 FREE SERPL-MCNC: 1.8 NG/DL (ref 0.8–1.7)
TRIGL SERPL-MCNC: 70 MG/DL (ref 30–149)
TSI SER-ACNC: <0.008 MIU/ML (ref 0.55–4.78)
VLDLC SERPL CALC-MCNC: 11 MG/DL (ref 0–30)

## 2024-09-14 PROCEDURE — 84443 ASSAY THYROID STIM HORMONE: CPT

## 2024-09-14 PROCEDURE — 80053 COMPREHEN METABOLIC PANEL: CPT

## 2024-09-14 PROCEDURE — 83002 ASSAY OF GONADOTROPIN (LH): CPT

## 2024-09-14 PROCEDURE — 84439 ASSAY OF FREE THYROXINE: CPT

## 2024-09-14 PROCEDURE — 82670 ASSAY OF TOTAL ESTRADIOL: CPT

## 2024-09-14 PROCEDURE — 83690 ASSAY OF LIPASE: CPT

## 2024-09-14 PROCEDURE — 83001 ASSAY OF GONADOTROPIN (FSH): CPT

## 2024-09-14 PROCEDURE — 80061 LIPID PANEL: CPT

## 2024-09-14 PROCEDURE — 84480 ASSAY TRIIODOTHYRONINE (T3): CPT

## 2024-09-15 DIAGNOSIS — E87.6 HYPOKALEMIA: Primary | ICD-10-CM

## 2024-09-15 RX ORDER — POTASSIUM CHLORIDE 750 MG/1
10 TABLET, EXTENDED RELEASE ORAL DAILY
Qty: 30 TABLET | Refills: 5 | Status: SHIPPED | OUTPATIENT
Start: 2024-09-15

## 2024-09-15 NOTE — PROGRESS NOTES
Potassium is low sending prescription for you to take potassium daily at 10 mill equivalents.  Will repeat potassium levels in 2 weeks kidney function liver function tests are normal total protein normal.  Lipid panel looks great.  Thyroid testing  TSH is too low and T4 free is elevated forwarded to Dr. Lopez for dose adjustment on thyroid medication  Lipase is normal  Estradiol is elevated with a normal LH and FSH no signs of menopause.

## 2024-09-23 DIAGNOSIS — E88.01 ALPHA-1-ANTITRYPSIN DEFICIENCY (HCC): ICD-10-CM

## 2024-09-23 RX ORDER — PROMETHAZINE HYDROCHLORIDE 6.25 MG/5ML
6.25 SYRUP ORAL 4 TIMES DAILY PRN
Qty: 240 ML | Refills: 0 | OUTPATIENT
Start: 2024-09-23

## 2024-09-24 ENCOUNTER — PATIENT MESSAGE (OUTPATIENT)
Dept: FAMILY MEDICINE CLINIC | Facility: CLINIC | Age: 44
End: 2024-09-24

## 2024-09-24 DIAGNOSIS — E88.01 ALPHA-1-ANTITRYPSIN DEFICIENCY (HCC): ICD-10-CM

## 2024-09-24 RX ORDER — PROMETHAZINE HYDROCHLORIDE 6.25 MG/5ML
6.25 SYRUP ORAL 4 TIMES DAILY PRN
Qty: 240 ML | Refills: 0 | Status: SHIPPED | OUTPATIENT
Start: 2024-09-24

## 2024-09-24 NOTE — TELEPHONE ENCOUNTER
Patient had a refill of the promethazine on 9/11/24 and is looking for another refill.  Did deny first request for 2nd refill.      12 day supply is what she got per the pharmacist.    Please advise if 2nd refill request ok

## 2024-09-24 NOTE — TELEPHONE ENCOUNTER
From: Debbi Trivedi  To: Daniela More  Sent: 9/24/2024 12:11 PM CDT  Subject: Medication    Hello,   I wanted to see why the Promethazine was denied for a refill. I am still taking it as I am still coughing consistently throughout the night. My lungs still feel fine and I am not short of breath at all. The only thing that hurts are my ribs from coughing for so long.    Thank you so much,   Debbi Trivedi

## 2024-09-26 ENCOUNTER — PATIENT MESSAGE (OUTPATIENT)
Dept: FAMILY MEDICINE CLINIC | Facility: CLINIC | Age: 44
End: 2024-09-26

## 2024-09-26 DIAGNOSIS — E88.01 ALPHA-1-ANTITRYPSIN DEFICIENCY (HCC): ICD-10-CM

## 2024-09-26 DIAGNOSIS — R05.3 PERSISTENT COUGH FOR 3 WEEKS OR LONGER: Primary | ICD-10-CM

## 2024-09-26 DIAGNOSIS — R76.8 LOW SERUM IGG FOR AGE: ICD-10-CM

## 2024-09-26 NOTE — TELEPHONE ENCOUNTER
From: Debbi Trivedi  To: Daniela More  Sent: 9/26/2024 3:30 PM CDT  Subject: Progress    Umang Suarez,   I am reaching out as I am continuing to cough throughout the day and night. Of course the night is worse, but it is becoming problem some with talking all day. 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 I had my infusion nurse listen to my lungs on Tuesday she said they are clear. My lungs don’t feel heavy and my breathing seems good, but I am still coughing all the time and I’m really tired. I’m at a loss at what to do at this point. I have no green discharge.    Debbi Trivedi

## 2024-09-27 RX ORDER — PREDNISONE 20 MG/1
40 TABLET ORAL DAILY
Qty: 10 TABLET | Refills: 0 | Status: SHIPPED | OUTPATIENT
Start: 2024-09-27 | End: 2024-10-02

## 2024-09-30 ENCOUNTER — OFFICE VISIT (OUTPATIENT)
Dept: SURGERY | Facility: CLINIC | Age: 44
End: 2024-09-30
Payer: COMMERCIAL

## 2024-09-30 VITALS
DIASTOLIC BLOOD PRESSURE: 58 MMHG | OXYGEN SATURATION: 98 % | BODY MASS INDEX: 25.59 KG/M2 | HEART RATE: 101 BPM | WEIGHT: 170.81 LBS | HEIGHT: 68.5 IN | SYSTOLIC BLOOD PRESSURE: 110 MMHG

## 2024-09-30 DIAGNOSIS — E44.1 MILD PROTEIN-CALORIE MALNUTRITION (HCC): Primary | ICD-10-CM

## 2024-09-30 DIAGNOSIS — R63.2 BINGE EATING: ICD-10-CM

## 2024-09-30 DIAGNOSIS — D50.8 IRON DEFICIENCY ANEMIA SECONDARY TO INADEQUATE DIETARY IRON INTAKE: ICD-10-CM

## 2024-09-30 DIAGNOSIS — E55.9 VITAMIN D DEFICIENCY: ICD-10-CM

## 2024-09-30 DIAGNOSIS — E66.3 OVERWEIGHT WITH BODY MASS INDEX (BMI) 25.0-29.9: ICD-10-CM

## 2024-09-30 DIAGNOSIS — F43.9 STRESS: ICD-10-CM

## 2024-09-30 DIAGNOSIS — Z98.84 HISTORY OF ROUX-EN-Y GASTRIC BYPASS: ICD-10-CM

## 2024-09-30 PROCEDURE — 3074F SYST BP LT 130 MM HG: CPT | Performed by: INTERNAL MEDICINE

## 2024-09-30 PROCEDURE — 3008F BODY MASS INDEX DOCD: CPT | Performed by: INTERNAL MEDICINE

## 2024-09-30 PROCEDURE — 99214 OFFICE O/P EST MOD 30 MIN: CPT | Performed by: INTERNAL MEDICINE

## 2024-09-30 PROCEDURE — 3078F DIAST BP <80 MM HG: CPT | Performed by: INTERNAL MEDICINE

## 2024-09-30 RX ORDER — LISDEXAMFETAMINE DIMESYLATE 50 MG/1
50 CAPSULE ORAL DAILY
Qty: 30 CAPSULE | Refills: 0 | Status: SHIPPED | OUTPATIENT
Start: 2024-12-01 | End: 2024-12-31

## 2024-09-30 RX ORDER — LISDEXAMFETAMINE DIMESYLATE 50 MG/1
50 CAPSULE ORAL DAILY
Qty: 30 CAPSULE | Refills: 0 | Status: SHIPPED | OUTPATIENT
Start: 2024-09-30 | End: 2024-10-30

## 2024-09-30 RX ORDER — LISDEXAMFETAMINE DIMESYLATE 50 MG/1
50 CAPSULE ORAL DAILY
Qty: 30 CAPSULE | Refills: 0 | Status: SHIPPED | OUTPATIENT
Start: 2024-10-31 | End: 2024-11-30

## 2024-09-30 NOTE — PROGRESS NOTES
[facility], [facility], [place]  [address]  [facility] [address]  Dept: [phone]          Patient:  Debbi Prasad [facility]  :      1980  MRN:      [id number]    Referring Provider: HENRY JORDAN MD     Chief Complaint:     Chief Complaint   Patient presents with    Follow - Up    Weight Management       Date of Surgery:   2017  Surgery Type:   Laparoscopic gastric bypass surgery     Subjective     Satiety:  positive  Food Intake:  <1 cup(s)  Fluid Intake:  48 oz  Protein Intake:  inad grams  Vitamin:  Yes   bariatric  Exercise: Yes ADL's  Comorbidities:  Back pain-Improvement?  yes, Joint pain-Improvement?  yes and GUILLE-Improvement?  yes    Objective     Vitals: /58   Pulse 101   Ht 5' 8.5\" (1.74 m)   Wt 170 lb 12.8 oz (77.5 kg)   LMP 2013   SpO2 98%   BMI 25.59 kg/m²     Starting weight: 280   Current weight:    Interval weight loss:00   Total weight loss:  -110    Last 3 Weights   24 0943 170 lb 12.8 oz (77.5 kg)   24 0700 166 lb (75.3 kg)   24 1843 165 lb (74.8 kg)       Patient Medications:    Current Outpatient Medications:     lisdexamfetamine (VYVANSE) 50 MG Oral Cap, Take 1 capsule (50 mg total) by mouth daily., Disp: 30 capsule, Rfl: 0    [START ON 10/31/2024] lisdexamfetamine (VYVANSE) 50 MG Oral Cap, Take 1 capsule (50 mg total) by mouth daily., Disp: 30 capsule, Rfl: 0    [START ON 2024] lisdexamfetamine (VYVANSE) 50 MG Oral Cap, Take 1 capsule (50 mg total) by mouth daily., Disp: 30 capsule, Rfl: 0    amoxicillin clavulanate 875-125 MG Oral Tab, Take 1 tablet by mouth 2 (two) times daily for 10 days., Disp: 20 tablet, Rfl: 0    predniSONE 20 MG Oral Tab, Take 2 tablets (40 mg total) by mouth daily for 5 days., Disp: 10 tablet, Rfl: 0    promethazine 6.25 MG/5ML Oral Solution, Take 5 mL (6.25 mg total) by mouth 4 (four) times daily as needed (cough)., Disp: 240 mL, Rfl: 0    Potassium  Chloride ER 10 MEQ Oral Tab CR, Take 1 tablet (10 mEq total) by mouth daily., Disp: 30 tablet, Rfl: 5    ALPRAZolam 0.25 MG Oral Tab, Take 1 tablet (0.25 mg total) by mouth 2 (two) times daily., Disp: 60 tablet, Rfl: 0    ARIPiprazole (ABILIFY) 2 MG Oral Tab, Take 1 tablet (2 mg total) by mouth daily., Disp: 30 tablet, Rfl: 0    FLUoxetine 20 MG Oral Cap, Take 1 capsule (20 mg total) by mouth daily., Disp: 90 capsule, Rfl: 0    oxybutynin ER 10 MG Oral Tablet 24 Hr, TAKE 1 TABLET(10 MG) BY MOUTH DAILY, Disp: 90 tablet, Rfl: 3    Butalbital-Acetaminophen  MG Oral Tab, TAKE 1/2 TO 1 TABLET BY MOUTH DAILY AS NEEDED FOR TENSION HEADACHE, Disp: 20 tablet, Rfl: 0    tiZANidine 4 MG Oral Tab, TAKE 1 TABLET(4 MG) BY MOUTH EVERY NIGHT AS NEEDED FOR PAIN OR SPASM. DO NOT TAKE WITH VALTREX, Disp: 30 tablet, Rfl: 2    pregabalin 75 MG Oral Cap, Take 1 capsule (75 mg total) by mouth 3 (three) times daily. OK to refill early (3/22/24), Disp: 90 capsule, Rfl: 2    traZODone 50 MG Oral Tab, Take 1-2 tablets ( mg total) by mouth nightly., Disp: 180 tablet, Rfl: 0    ondansetron (ZOFRAN) 8 MG tablet, TAKE 1 TABLET(8 MG) BY MOUTH EVERY 12 HOURS AS NEEDED FOR NAUSEA, Disp: 30 tablet, Rfl: 1    VALACYCLOVIR 1 G Oral Tab, TAKE 2 TABLETS(2000 MG) BY MOUTH EVERY 12 HOURS FOR 1 DAY, Disp: 30 tablet, Rfl: 0    ipratropium-albuterol 0.5-2.5 (3) MG/3ML Inhalation Solution, Take 3 mL by nebulization every 4 (four) hours as needed. Use only if the albuterol inhalation albuterol is not working, Disp: 25 each, Rfl: 1    Multiple Vitamins-Minerals (BARIATRIC MULTIVITAMINS/IRON) Oral Cap, Take by mouth As Directed., Disp: , Rfl:     pantoprazole 40 MG Oral Tab EC, Take 1 tablet (40 mg total) by mouth 2 (two) times daily., Disp: , Rfl:     Nystatin 042033 UNIT/GM External Powder, Apply 1 Application. topically 4 (four) times daily. Apply to affected areas, Disp: 30 g, Rfl: 1    ALBUTEROL 108 (90 Base) MCG/ACT Inhalation Aero Soln, INHALE  2 PUFFS INTO THE LUNGS EVERY 4 HOURS AS NEEDED FOR WHEEZING OR SHORTNESS OF BREATH, Disp: 8.5 g, Rfl: 1    loratadine 10 MG Oral Tab, Take 1 tablet (10 mg total) by mouth daily., Disp: , Rfl: 0    alpha 1-proteinase inhibitor 1000 MG/20ML Intravenous Solution, Inject into the vein once. weekly, Disp: , Rfl:     UNITHROID 100 MCG Oral Tab, TAKE 1 TABLET BY MOUTH EVERY MORNING WITH BREAKFAST WITH 88MCG FOR TOTAL DOSE OF 188MCG, Disp: 30 tablet, Rfl: 5    SPIRIVA RESPIMAT 2.5 MCG/ACT Inhalation Aero Soln, INHALE 2 PUFFS INTO THE LUNGS DAILY, Disp: 12 g, Rfl: 2    UNITHROID 88 MCG Oral Tab, TAKE 1 TABLET BY MOUTH EVERY MORNING BEFORE BREAKFAST, Disp: 30 tablet, Rfl: 9    Calcium Carb-Cholecalciferol (CALCIUM 600/VITAMIN D3) 600-800 MG-UNIT Oral Tab, Take 1 tablet by mouth 3 (three) times daily., Disp: , Rfl:     SYMBICORT 160-4.5 MCG/ACT Inhalation Aerosol, INHALE 2 PUFFS INTO THE LUNGS TWICE DAILY, Disp: 3 Inhaler, Rfl: 3    Cholecalciferol (VITAMIN D) 50 MCG (2000 UT) Oral Cap, Take 1 capsule (2,000 Units total) by mouth in the morning and 1 capsule (2,000 Units total) before bedtime., Disp: , Rfl:     magnesium 250 MG Oral Tab, Take 1 tablet (250 mg total) by mouth daily., Disp: , Rfl:     aspirin 81 MG Oral Tab, Take 1 tablet (81 mg total) by mouth daily., Disp: , Rfl:     Allergies:  Adhesive tape, Cephalosporins, Chocolate, Doxycycline, Tramadol, Azithromycin, Haloperidol, Metoclopramide, Toradol [ketorolac tromethamine], Norflex tablets [orphenadrine], Cheratussin ac [guaifenesin-codeine], and Nsaids     Social History:    Social History     Socioeconomic History    Marital status:    Tobacco Use    Smoking status: Never     Passive exposure: Past    Smokeless tobacco: Never   Vaping Use    Vaping status: Never Used   Substance and Sexual Activity    Alcohol use: Not Currently    Drug use: No    Sexual activity: Yes     Partners: Male   Other Topics Concern     Service No    Blood Transfusions No     Caffeine Concern Yes    Occupational Exposure No    Hobby Hazards No    Sleep Concern No    Stress Concern No    Weight Concern No    Special Diet No    Back Care No    Exercise No    Bike Helmet No    Seat Belt Yes    Self-Exams No   Social History Narrative    ** Merged History Encounter **          Social Determinants of Health     Housing Stability: Low Risk  (2023)    Received [facility], [facility]    Housing Stability   Recent Concern: Housing Stability - At Risk (2023)    Received from [facility], [facility]     Surgical History:    Past Surgical History:   Procedure Laterality Date    Ankle scope,part debridement Left 2015    Procedure: ARTHROSCOPY ANKLE WITH DEBRIDEMENT;  Surgeon: Marcial Evans MD;  Location: [facility]    Bronch thermoplasty 1 lobe Right 04/15/2021    Bronch thermoplasty 1 lobe Left 2021      2006    Cholecystectomy      Colonoscopy  2021    Colonoscopy,diagnostic  10/22/2013    Procedure: COLONOSCOPY, POSSIBLE BIOPSY, POSSIBLE POLYPECTOMY 52594;  Surgeon: Marcello Ferreira MD;  Location: [facility]    Cta chest (wci=41744)  2021    D & c      x4    Egd  2021    Gastric bypass,obese<100cm magy-en-y  2017    DR. SEGLA    Gastrocnemius recession Left 2015    Procedure: GASTROC RECESSION FOOT;  Surgeon: Marcial Evans MD;  Location: [facility]    Hysterectomy      Hysteroscopy      Inj paravert f jnt l/s 1 lev Bilateral 2015    Procedure: FACET INJECTION UNDER FLUOROSCOPY;  Surgeon: Dusty Munson DO;  Location: [facility]    Laparoscopic cholecystectomy N/A 2016    Procedure: LAPAROSCOPIC CHOLECYSTECTOMY;  Surgeon: Dai Sanchez MD;  Location:  MAIN OR    Laparoscopy,diagnostic      Laparoscopy,diagnostic  2022    Lysis of adhesions      M-sedaj by sm phys perfrmg svc 5+ yr  Bilateral 12/14/2015    Procedure: FACET INJECTION UNDER FLUOROSCOPY;  Surgeon: Dusty Munson DO;  Location: [facility], [facility]    Domonique biopsy stereo nodule 1 site right (cpt=19081)  07/2023    stromal fibrosis    Oophorectomy Left     Other  NECK SCAR REVISION    Other surgical history      laparoscopies x2    Removal of ovarian cyst(s) Right 07/08/2020    Surgical stent Bilateral 03/2015    Bilateral iliac stents    Thyroidectomy  04/2011    Total abdom hysterectomy      Upper gi endo no barretts  12/2015    normal    Upper gi endoscopy performed  01/25/2017    Rivera Donaldson M.D.    Upper gi endoscopy,biopsy N/A 12/19/2015    Procedure: ESOPHAGOGASTRODUODENOSCOPY, POSSIBLE BIOPSY, POSSIBLE POLYPECTOMY 38241;  Surgeon: Brayden Giordano MD;  Location: [facility]       Family History:    Family History   Problem Relation Age of Onset    Heart Disorder Father     Heart Disease Father     Heart Attack Father     Other (Other) Mother         ALLIE    Breast Cancer Maternal Grandmother 75    Cancer Maternal Grandfather         blood cancer    Diabetes Paternal Grandmother     Heart Disorder Paternal Grandmother     Heart Disease Paternal Grandmother     Asthma Paternal Grandmother     Heart Attack Paternal Grandmother     Stroke Neg        Physical Exam:  Vital signs: Blood pressure 110/58, pulse 101, height 5' 8.5\" (1.74 m), weight 170 lb 12.8 oz (77.5 kg), last menstrual period 05/01/2013, SpO2 98%, not currently breastfeeding.  General appearance: alert, appears stated age and cooperative  Head: Normocephalic, without obvious abnormality, atraumatic  Eyes: conjunctivae/corneas clear. PERRL, EOM's intact. Fundi benign.  Lungs: clear to auscultation bilaterally  Heart: S1, S2 normal, no murmur, click, rub or gallop, regular rate and rhythm  Abdomen: soft, non-tender; bowel sounds normal; no masses,  no organomegaly  Extremities: extremities normal, atraumatic, no cyanosis or edema  Pulses:  2+ and symmetric  Skin:  irritation noted under skin folds      ROS:    Constitutional: negative  Respiratory: negative  Cardiovascular: negative  Gastrointestinal: positive for abdominal pain  Musculoskeletal:negative  Neurological: negative  Behavioral/Psych: positive for anxiety  All other systems were reviewed and are negative    Assessment       Encounter Diagnosis(ses):   Encounter Diagnoses   Name Primary?    Mild protein-calorie malnutrition (HCC) Yes    History of Mingo-en-Y gastric bypass     Stress     Binge eating     Overweight with body mass index (BMI) 25.0-29.9     Vitamin D deficiency     Iron deficiency anemia secondary to inadequate dietary iron intake        Plan     S/P mingo en y 12/18/2017      Continue PPI    Orders Placed This Encounter   Procedures    Vitamin D, 25-Hydroxy     Standing Status:   Future     Standing Expiration Date:   9/30/2025     Order Specific Question:   Please pick the scenario that best fits the purpose for ordering this test     Answer:   General Screening/Vit D deficiency [25(OH)D]     Order Specific Question:   Release to patient     Answer:   Immediate    Vitamin B12     Standing Status:   Future     Standing Expiration Date:   9/30/2025     Order Specific Question:   Release to patient     Answer:   Immediate    Vitamin B1 (Thiamine), Blood     Standing Status:   Future     Standing Expiration Date:   9/30/2025     Order Specific Question:   Release to patient     Answer:   Immediate       Intertrigo:  Has irritation under skin folds.   Would benefit from skin removal   Skin has been affecting activities of daily living    Breast lift would help with back pain  Has DJD in joints  Irritation under skin folds    Recommend she cuts back on carb intake    Vyvanse at 50 mg  ekg done    Did not tolerate Wegovy    Orders Placed This Encounter   Procedures    Vitamin D, 25-Hydroxy     Standing Status:   Future     Standing Expiration Date:   9/30/2025     Order Specific  Question:   Please pick the scenario that best fits the purpose for ordering this test     Answer:   General Screening/Vit D deficiency [25(OH)D]     Order Specific Question:   Release to patient     Answer:   Immediate    Vitamin B12     Standing Status:   Future     Standing Expiration Date:   9/30/2025     Order Specific Question:   Release to patient     Answer:   Immediate    Vitamin B1 (Thiamine), Blood     Standing Status:   Future     Standing Expiration Date:   9/30/2025     Order Specific Question:   Release to patient     Answer:   Immediate         Chapito Haskins MD

## 2024-10-01 ENCOUNTER — PATIENT MESSAGE (OUTPATIENT)
Dept: FAMILY MEDICINE CLINIC | Facility: CLINIC | Age: 44
End: 2024-10-01

## 2024-10-01 ENCOUNTER — OFFICE VISIT (OUTPATIENT)
Dept: FAMILY MEDICINE CLINIC | Facility: CLINIC | Age: 44
End: 2024-10-01
Payer: COMMERCIAL

## 2024-10-01 VITALS
WEIGHT: 176 LBS | TEMPERATURE: 99 F | RESPIRATION RATE: 16 BRPM | DIASTOLIC BLOOD PRESSURE: 82 MMHG | HEART RATE: 97 BPM | SYSTOLIC BLOOD PRESSURE: 122 MMHG | HEIGHT: 68.5 IN | OXYGEN SATURATION: 99 % | BODY MASS INDEX: 26.37 KG/M2

## 2024-10-01 DIAGNOSIS — J20.9 ACUTE BRONCHITIS DUE TO INFECTION: ICD-10-CM

## 2024-10-01 DIAGNOSIS — R05.1 ACUTE COUGH: Primary | ICD-10-CM

## 2024-10-01 PROCEDURE — 3079F DIAST BP 80-89 MM HG: CPT | Performed by: PHYSICIAN ASSISTANT

## 2024-10-01 PROCEDURE — 3074F SYST BP LT 130 MM HG: CPT | Performed by: PHYSICIAN ASSISTANT

## 2024-10-01 PROCEDURE — 99213 OFFICE O/P EST LOW 20 MIN: CPT | Performed by: PHYSICIAN ASSISTANT

## 2024-10-01 PROCEDURE — 3008F BODY MASS INDEX DOCD: CPT | Performed by: PHYSICIAN ASSISTANT

## 2024-10-01 NOTE — PATIENT INSTRUCTIONS
Complete CXR and follow up with PCP tomorrow  If worsening symptoms to ER  Albuterol  Continue medications as prescribed by PCP

## 2024-10-01 NOTE — PROGRESS NOTES
CHIEF COMPLAINT:     Chief Complaint   Patient presents with    Cough     Cough x 9/11, states it has gotten worse, nasal/chest congestion, rib pan d/t cough, low grade temp of 99 on Sat, runny nose   Has been prescribed promethazine, neb treatments, abx and steroids prescribed by PCP on Friday, currently still taking them   No recent Covid test        HPI:   Debbi Trivedi is a 44 year old female who presents with complaints of feeling sick for one month.  The patient reports nasal congestion, nasal drainage, cough, SOB, and coughing fits.  The patient reports starting 2 days ago she starting running low grade temperatures in the 99s.  The patient reports she contacted her PCP who prescribed her Augmentin, Prednisone and promethazine.   The patient is also taking Claritin and doing Ipratropium nebulizers 4 times a day.  The patient reports she is becoming increasingly SOB and her cough is worsening.  The patient has an order for a CXR from her PCP as well.  Patient with a history of alpha 1 antitrypsin deficiency.     Current Outpatient Medications   Medication Sig Dispense Refill    lisdexamfetamine (VYVANSE) 50 MG Oral Cap Take 1 capsule (50 mg total) by mouth daily. 30 capsule 0    [START ON 10/31/2024] lisdexamfetamine (VYVANSE) 50 MG Oral Cap Take 1 capsule (50 mg total) by mouth daily. 30 capsule 0    [START ON 12/1/2024] lisdexamfetamine (VYVANSE) 50 MG Oral Cap Take 1 capsule (50 mg total) by mouth daily. 30 capsule 0    amoxicillin clavulanate 875-125 MG Oral Tab Take 1 tablet by mouth 2 (two) times daily for 10 days. 20 tablet 0    predniSONE 20 MG Oral Tab Take 2 tablets (40 mg total) by mouth daily for 5 days. 10 tablet 0    promethazine 6.25 MG/5ML Oral Solution Take 5 mL (6.25 mg total) by mouth 4 (four) times daily as needed (cough). 240 mL 0    Potassium Chloride ER 10 MEQ Oral Tab CR Take 1 tablet (10 mEq total) by mouth daily. 30 tablet 5    ALPRAZolam 0.25 MG Oral Tab Take 1 tablet (0.25 mg  total) by mouth 2 (two) times daily. 60 tablet 0    ARIPiprazole (ABILIFY) 2 MG Oral Tab Take 1 tablet (2 mg total) by mouth daily. 30 tablet 0    FLUoxetine 20 MG Oral Cap Take 1 capsule (20 mg total) by mouth daily. 90 capsule 0    oxybutynin ER 10 MG Oral Tablet 24 Hr TAKE 1 TABLET(10 MG) BY MOUTH DAILY 90 tablet 3    Butalbital-Acetaminophen  MG Oral Tab TAKE 1/2 TO 1 TABLET BY MOUTH DAILY AS NEEDED FOR TENSION HEADACHE 20 tablet 0    tiZANidine 4 MG Oral Tab TAKE 1 TABLET(4 MG) BY MOUTH EVERY NIGHT AS NEEDED FOR PAIN OR SPASM. DO NOT TAKE WITH VALTREX 30 tablet 2    pregabalin 75 MG Oral Cap Take 1 capsule (75 mg total) by mouth 3 (three) times daily. OK to refill early (3/22/24) 90 capsule 2    traZODone 50 MG Oral Tab Take 1-2 tablets ( mg total) by mouth nightly. 180 tablet 0    ondansetron (ZOFRAN) 8 MG tablet TAKE 1 TABLET(8 MG) BY MOUTH EVERY 12 HOURS AS NEEDED FOR NAUSEA 30 tablet 1    VALACYCLOVIR 1 G Oral Tab TAKE 2 TABLETS(2000 MG) BY MOUTH EVERY 12 HOURS FOR 1 DAY 30 tablet 0    ipratropium-albuterol 0.5-2.5 (3) MG/3ML Inhalation Solution Take 3 mL by nebulization every 4 (four) hours as needed. Use only if the albuterol inhalation albuterol is not working 25 each 1    Multiple Vitamins-Minerals (BARIATRIC MULTIVITAMINS/IRON) Oral Cap Take by mouth As Directed.      pantoprazole 40 MG Oral Tab EC Take 1 tablet (40 mg total) by mouth 2 (two) times daily.      Nystatin [id number] UNIT/GM External Powder Apply 1 Application. topically 4 (four) times daily. Apply to affected areas 30 g 1    ALBUTEROL 108 (90 Base) MCG/ACT Inhalation Aero Soln INHALE 2 PUFFS INTO THE LUNGS EVERY 4 HOURS AS NEEDED FOR WHEEZING OR SHORTNESS OF BREATH 8.5 g 1    loratadine 10 MG Oral Tab Take 1 tablet (10 mg total) by mouth daily.  0    alpha 1-proteinase inhibitor 1000 MG/20ML Intravenous Solution Inject into the vein once. weekly      UNITHROID 100 MCG Oral Tab TAKE 1 TABLET BY MOUTH EVERY MORNING WITH BREAKFAST  WITH 88MCG FOR TOTAL DOSE OF 188MCG 30 tablet 5    SPIRIVA RESPIMAT 2.5 MCG/ACT Inhalation Aero Soln INHALE 2 PUFFS INTO THE LUNGS DAILY 12 g 2    UNITHROID 88 MCG Oral Tab TAKE 1 TABLET BY MOUTH EVERY MORNING BEFORE BREAKFAST 30 tablet 9    Calcium Carb-Cholecalciferol (CALCIUM 600/VITAMIN D3) 600-800 MG-UNIT Oral Tab Take 1 tablet by mouth 3 (three) times daily.      SYMBICORT 160-4.5 MCG/ACT Inhalation Aerosol INHALE 2 PUFFS INTO THE LUNGS TWICE DAILY 3 Inhaler 3    Cholecalciferol (VITAMIN D) 50 MCG (2000 UT) Oral Cap Take 1 capsule (2,000 Units total) by mouth in the morning and 1 capsule (2,000 Units total) before bedtime.      magnesium 250 MG Oral Tab Take 1 tablet (250 mg total) by mouth daily.      aspirin 81 MG Oral Tab Take 1 tablet (81 mg total) by mouth daily.        Past Medical History:    Abdominal discomfort in right lower quadrant    Abdominal pain    Abnormal CT scan, esophagus    Acute deep vein thrombosis (DVT) of left lower extremity (HCC)    Alpha-1-antitrypsin deficiency (HCC)    Anxiety    Arrhythmia    RIGHT BUNDLE BRACH BLOCK     Arthritis    Asherman syndrome    Asthma (HCC)    Back pain    I have Spinal Stenosis that has gotten worse over the years.    Bloating    Calculus of kidney    Cancer (HCC)    Thyroid    Change in hair    Chest pain    Chronic bronchiolitis (HCC)    Chronic cough    Constipation    COPD (chronic obstructive pulmonary disease) (HCC)    no Oxygen    COVID-19 virus infection    Depression    Diarrhea, unspecified    Disorder of thyroid    Diverticulosis    Easy bruising    Endometriosis    Esophageal reflux    Fatigue    Food intolerance    Frequent use of laxatives    Gastric ulcer    Headache disorder    Hemorrhoids    History of gastric bypass    Hoarseness, chronic    Hx of gastric bypass    Hx of motion sickness    Hydronephrosis    Hypokalemia    Hypothyroidism    Infertility, female    Intertrigo    Irregular bowel habits    Loss of appetite    Migraines     Nausea    Nephrolithiasis    Organic hypersomnia, unspecified    AHI 2 RDI 2 REM AHI 6 SaO2 curtis 89%    Osteoarthritis    Painful urination    Pancreatitis (HCC)    Pneumonia due to organism    PONV (postoperative nausea and vomiting)    Problems with swallowing    PUD (peptic ulcer disease)    Rib contusion, left, initial encounter    Severe persistent asthma with exacerbation (HCC)    Shortness of breath    Sleep disturbance    Stented coronary artery    Stress    Thyroid cancer (HCC)    Visual impairment    glasses    Vocal cord dysfunction    Wears glasses    Weight gain    Weight loss      Social History:  Social History     Socioeconomic History    Marital status:    Tobacco Use    Smoking status: Never     Passive exposure: Past    Smokeless tobacco: Never   Vaping Use    Vaping status: Never Used   Substance and Sexual Activity    Alcohol use: Not Currently    Drug use: No    Sexual activity: Yes     Partners: Male   Other Topics Concern     Service No    Blood Transfusions No    Caffeine Concern Yes    Occupational Exposure No    Hobby Hazards No    Sleep Concern No    Stress Concern No    Weight Concern No    Special Diet No    Back Care No    Exercise No    Bike Helmet No    Seat Belt Yes    Self-Exams No   Social History Narrative    ** Merged History Encounter **          Social Determinants of Health     Housing Stability: Low Risk  (9/11/2023)    Received from [facility], [facility]    Housing Stability   Recent Concern: Housing Stability - At Risk (8/18/2023)    [facility]        REVIEW OF SYSTEMS:   GENERAL: See HPI  SKIN: Denies rashes, skin wounds or ulcers.  EYES: Denies blurred vision or double vision  HENT: see HPI  CHEST: Denies chest pain, or palpitations  LUNGS: See HPI  GI: Denies abdominal pain, N/V/C/D.   MUSCULOSKELETAL: no arthralgia or swollen joints  LYMPH:  Denies  lymphadenopathy  NEURO: Denies headaches or lightheadedness      EXAM:   /82   Pulse 97   Temp 99.1 °F (37.3 °C) (Temporal)   Resp 16   Ht 5' 8.5\" (1.74 m)   Wt 176 lb (79.8 kg)   LMP 05/01/2013   SpO2 99%   BMI 26.37 kg/m²   GENERAL: well developed, well nourished,in no apparent distress, cooperative   SKIN: no rashes, nosuspicious lesions, no abnormal pigmentation  HEAD: atraumatic, normocephalic  EYES: EOM intact, PERRL.  Conjunctiva normal.  Cornea clear.  Lid margins normal.  No active drainage.  EARS: Right TM normal, no bulging, no retraction, no fluid, bony landmarks normal.  Left TM normal, no bulging, no retraction, no fluid, bony landmarks normal.    NOSE: nostrils patent, no discharge, nasal mucosa pink and not inflamed.  No sinus tenderness.  THROAT: oral mucosa pink, moist and intact. No visible dental caries. Posterior pharynx without erythema or exudates. +Copious post nasal drip  NECK: supple, non-tender.  LUNGS: Minimally coarse breath sounds bilaterally. Breathing is non labored. +coughing fits  CARDIO: RRR without murmur  GI: No visible scars, or masses. BS's present x4. No palpable masses or hepatosplenomegaly.  Non tender.  No guarding or rebound tenderness  EXTREMITIES: no cyanosis, clubbing or edema.  Homans NEG.  Dorsalis Pedis 2+.  LYMPH:  No lymphadenopathy.    NEURO: A&Ox3.  CN II-XII intact.  No focal deficits.  Coordination and Gait normal.  Kernig and Brudzinski's are negative.    Discussed the limitations of [facility].  Advised a higher level of care for CXR.   The patient would prefer to complete the order from her PCP and follow up with her tomorrow.       ASSESSMENT AND PLAN:     ASSESSMENT:  Encounter Diagnosis   Name Primary?    Acute cough Yes       PLAN:    Patient Instructions   Complete CXR and follow up with PCP tomorrow  If worsening symptoms to ER  Albuterol  Continue medications as prescribed by PCP

## 2024-10-02 ENCOUNTER — HOSPITAL ENCOUNTER (OUTPATIENT)
Dept: GENERAL RADIOLOGY | Age: 44
Discharge: HOME | End: 2024-10-02
Attending: FAMILY MEDICINE
Payer: COMMERCIAL

## 2024-10-02 DIAGNOSIS — R76.8 LOW SERUM IGG FOR AGE: ICD-10-CM

## 2024-10-02 DIAGNOSIS — E88.01 ALPHA-1-ANTITRYPSIN DEFICIENCY (HCC): ICD-10-CM

## 2024-10-02 DIAGNOSIS — R05.3 PERSISTENT COUGH FOR 3 WEEKS OR LONGER: ICD-10-CM

## 2024-10-02 PROCEDURE — 71046 X-RAY EXAM CHEST 2 VIEWS: CPT | Performed by: FAMILY MEDICINE

## 2024-10-02 RX ORDER — CODEINE PHOSPHATE/GUAIFENESIN 10-100MG/5
5 LIQUID (ML) ORAL 2 TIMES DAILY PRN
Qty: 118 ML | Refills: 0 | Status: SHIPPED | OUTPATIENT
Start: 2024-10-02 | End: 2024-10-12

## 2024-10-02 NOTE — TELEPHONE ENCOUNTER
From: Debbi Trivedi  To: Daniela More  Sent: 10/1/2024 5:54 PM CDT  Subject: Condition    Hi Daniela,  I am just leaving the [facility] because my cough is continually getting worse even while on the steroids and the antibiotic. I’m continuing to cough all day and all night. I also now have a low-grade temp. I believe it was 99.1 at the [facility]. I’m gonna go and get the x-ray done. I just don’t know if I should go get it done yet as the low-grade temp just started and the PA at the [facility] said that so far it’s not being heard in my Lungs. I am just not sure at this point. What to do. I did take off work yesterday to stay home and rest. But, I am still not feeling any better And on top of it, I am exhausted, which I’m sure is not helping either.    Debbi Trivedi  
Patient did complete CXR this am 10/2. Also see at [facility] 10/1    Please review and advise.  
normal...

## 2024-10-05 DIAGNOSIS — F33.1 DEPRESSION, MAJOR, RECURRENT, MODERATE (HCC): ICD-10-CM

## 2024-10-05 DIAGNOSIS — F41.1 GAD (GENERALIZED ANXIETY DISORDER): ICD-10-CM

## 2024-10-05 RX ORDER — ALPRAZOLAM 0.25 MG/1
0.25 TABLET ORAL 2 TIMES DAILY
Qty: 60 TABLET | Refills: 0 | Status: CANCELLED | OUTPATIENT
Start: 2024-10-05

## 2024-10-07 ENCOUNTER — PATIENT MESSAGE (OUTPATIENT)
Dept: FAMILY MEDICINE CLINIC | Facility: CLINIC | Age: 44
End: 2024-10-07

## 2024-10-07 RX ORDER — ARIPIPRAZOLE 2 MG/1
2 TABLET ORAL DAILY
Qty: 30 TABLET | Refills: 0 | OUTPATIENT
Start: 2024-10-07

## 2024-10-08 NOTE — TELEPHONE ENCOUNTER
From: Debbi Trivedi  Sent: 10/7/2024 5:31 PM CDT  To: [facility] Clinical Staff  Subject: Appointment    Sounds good..I’ll do the 11/26 then.    Debbi Trivedi

## 2024-10-16 NOTE — MR AVS SNAPSHOT
After Visit Summary   6/11/2019    Galilea Vazquez    MRN: [id number]           Visit Information     Date & Time  6/11/2019  9:45 AM Provider  Raymond Thakur MD [address] [place] [address],[address], [facility]/[employer]  Phone BUTALBITAL-APAP-CAFFEINE -40 MG Oral Tab TAKE ONE TABLET BY MOUTH TWICE DAILY AS NEEDED FOR PAIN OR HEADACHES MAX 4 TO 5 TIMES PER WEEK    pregabalin (LYRICA) 75 MG Oral Cap Take 1 capsule (75 mg total) by mouth 2 (two) times daily.     Amitriptyline If you receive a survey from [employer], please take a few minutes to complete it and provide feedback. We strive to deliver the best patient experience and are looking for ways to make improvements. Your feedback will help us do so.  For more information Instructions: This plan will send the code FBSD to the PM system.  DO NOT or CHANGE the price. Price (Do Not Change): 0.00 Detail Level: Simple

## 2024-10-17 ENCOUNTER — PATIENT MESSAGE (OUTPATIENT)
Dept: FAMILY MEDICINE CLINIC | Facility: CLINIC | Age: 44
End: 2024-10-17

## 2024-10-17 DIAGNOSIS — R05.8 RECURRENT DRY COUGH: ICD-10-CM

## 2024-10-17 DIAGNOSIS — J18.9 PNEUMONIA OF RIGHT LOWER LOBE DUE TO INFECTIOUS ORGANISM: ICD-10-CM

## 2024-10-17 NOTE — TELEPHONE ENCOUNTER
Preston Peacock patient requesting refill of Tylenol 3. She is no longer taking the cough medicine, has been taking promethazine at night. She is still coughing a lot and her sinuses hurt.     Please review and advise.

## 2024-10-18 RX ORDER — ACETAMINOPHEN AND CODEINE PHOSPHATE 300; 30 MG/1; MG/1
1 TABLET ORAL
Qty: 7 TABLET | Refills: 0 | Status: SHIPPED | OUTPATIENT
Start: 2024-10-18

## 2024-10-20 DIAGNOSIS — F51.01 PRIMARY INSOMNIA: ICD-10-CM

## 2024-10-22 RX ORDER — TRAZODONE HYDROCHLORIDE 50 MG/1
TABLET, FILM COATED ORAL NIGHTLY
Qty: 180 TABLET | Refills: 0 | Status: SHIPPED | OUTPATIENT
Start: 2024-10-22

## 2024-10-30 DIAGNOSIS — F41.1 GAD (GENERALIZED ANXIETY DISORDER): ICD-10-CM

## 2024-10-30 DIAGNOSIS — G44.221 CHRONIC TENSION-TYPE HEADACHE, INTRACTABLE: ICD-10-CM

## 2024-10-30 RX ORDER — ALPRAZOLAM 0.25 MG/1
0.25 TABLET ORAL 2 TIMES DAILY
Qty: 60 TABLET | Refills: 0 | Status: SHIPPED | OUTPATIENT
Start: 2024-10-30

## 2024-10-30 RX ORDER — BUTALBITAL AND ACETAMINOPHEN 325; 50 MG/1; MG/1
TABLET ORAL
Qty: 13 TABLET | Refills: 0 | Status: SHIPPED | OUTPATIENT
Start: 2024-10-30

## 2024-10-31 ENCOUNTER — HOSPITAL ENCOUNTER (EMERGENCY)
Age: 44
Discharge: HOME | End: 2024-11-01
Attending: EMERGENCY MEDICINE
Payer: COMMERCIAL

## 2024-10-31 ENCOUNTER — APPOINTMENT (OUTPATIENT)
Dept: ULTRASOUND IMAGING | Age: 44
End: 2024-10-31
Attending: PHYSICIAN ASSISTANT
Payer: COMMERCIAL

## 2024-10-31 DIAGNOSIS — M54.50 LEFT-SIDED LOW BACK PAIN WITHOUT SCIATICA, UNSPECIFIED CHRONICITY: ICD-10-CM

## 2024-10-31 DIAGNOSIS — R10.9 LEFT FLANK PAIN: Primary | ICD-10-CM

## 2024-10-31 DIAGNOSIS — R10.12 ABDOMINAL PAIN, CHRONIC, LEFT UPPER QUADRANT: ICD-10-CM

## 2024-10-31 DIAGNOSIS — G44.221 CHRONIC TENSION-TYPE HEADACHE, INTRACTABLE: ICD-10-CM

## 2024-10-31 DIAGNOSIS — G89.29 ABDOMINAL PAIN, CHRONIC, LEFT UPPER QUADRANT: ICD-10-CM

## 2024-10-31 LAB
ALBUMIN SERPL-MCNC: 3.6 G/DL (ref 3.4–5)
ALBUMIN/GLOB SERPL: 1.1 {RATIO} (ref 1–2)
ALP LIVER SERPL-CCNC: 82 U/L
ALT SERPL-CCNC: 37 U/L
ANION GAP SERPL CALC-SCNC: 3 MMOL/L (ref 0–18)
AST SERPL-CCNC: 16 U/L (ref 15–37)
BASOPHILS # BLD AUTO: 0.04 X10(3) UL (ref 0–0.2)
BASOPHILS NFR BLD AUTO: 0.7 %
BILIRUB SERPL-MCNC: 0.2 MG/DL (ref 0.1–2)
BILIRUB UR QL STRIP.AUTO: NEGATIVE
BUN BLD-MCNC: 9 MG/DL (ref 9–23)
CALCIUM BLD-MCNC: 8.6 MG/DL (ref 8.5–10.1)
CHLORIDE SERPL-SCNC: 105 MMOL/L (ref 98–112)
CLARITY UR REFRACT.AUTO: CLEAR
CO2 SERPL-SCNC: 29 MMOL/L (ref 21–32)
COLOR UR AUTO: YELLOW
CREAT BLD-MCNC: 0.75 MG/DL
EGFRCR SERPLBLD CKD-EPI 2021: 101 ML/MIN/1.73M2 (ref 60–?)
EOSINOPHIL # BLD AUTO: 0.17 X10(3) UL (ref 0–0.7)
EOSINOPHIL NFR BLD AUTO: 2.9 %
ERYTHROCYTE [DISTWIDTH] IN BLOOD BY AUTOMATED COUNT: 12.4 %
GLOBULIN PLAS-MCNC: 3.4 G/DL (ref 2.8–4.4)
GLUCOSE BLD-MCNC: 88 MG/DL (ref 70–99)
GLUCOSE UR STRIP.AUTO-MCNC: NEGATIVE MG/DL
HCT VFR BLD AUTO: 39.9 %
HGB BLD-MCNC: 13.8 G/DL
IMM GRANULOCYTES # BLD AUTO: 0 X10(3) UL (ref 0–1)
IMM GRANULOCYTES NFR BLD: 0 %
KETONES UR STRIP.AUTO-MCNC: NEGATIVE MG/DL
LEUKOCYTE ESTERASE UR QL STRIP.AUTO: NEGATIVE
LIPASE SERPL-CCNC: 56 U/L (ref 12–53)
LYMPHOCYTES # BLD AUTO: 2.12 X10(3) UL (ref 1–4)
LYMPHOCYTES NFR BLD AUTO: 36.7 %
MCH RBC QN AUTO: 31.9 PG (ref 26–34)
MCHC RBC AUTO-ENTMCNC: 34.6 G/DL (ref 31–37)
MCV RBC AUTO: 92.4 FL
MONOCYTES # BLD AUTO: 0.54 X10(3) UL (ref 0.1–1)
MONOCYTES NFR BLD AUTO: 9.4 %
NEUTROPHILS # BLD AUTO: 2.9 X10 (3) UL (ref 1.5–7.7)
NEUTROPHILS # BLD AUTO: 2.9 X10(3) UL (ref 1.5–7.7)
NEUTROPHILS NFR BLD AUTO: 50.3 %
NITRITE UR QL STRIP.AUTO: NEGATIVE
OSMOLALITY SERPL CALC.SUM OF ELEC: 282 MOSM/KG (ref 275–295)
PH UR STRIP.AUTO: 5.5 [PH] (ref 5–8)
PLATELET # BLD AUTO: 295 10(3)UL (ref 150–450)
POTASSIUM SERPL-SCNC: 3.9 MMOL/L (ref 3.5–5.1)
PROT SERPL-MCNC: 7 G/DL (ref 6.4–8.2)
PROT UR STRIP.AUTO-MCNC: NEGATIVE MG/DL
RBC # BLD AUTO: 4.32 X10(6)UL
RBC UR QL AUTO: NEGATIVE
SODIUM SERPL-SCNC: 137 MMOL/L (ref 136–145)
SP GR UR STRIP.AUTO: 1.02 (ref 1–1.03)
UROBILINOGEN UR STRIP.AUTO-MCNC: 0.2 MG/DL
WBC # BLD AUTO: 5.8 X10(3) UL (ref 4–11)

## 2024-10-31 PROCEDURE — 85025 COMPLETE CBC W/AUTO DIFF WBC: CPT | Performed by: EMERGENCY MEDICINE

## 2024-10-31 PROCEDURE — 83690 ASSAY OF LIPASE: CPT | Performed by: PHYSICIAN ASSISTANT

## 2024-10-31 PROCEDURE — 96374 THER/PROPH/DIAG INJ IV PUSH: CPT

## 2024-10-31 PROCEDURE — 81003 URINALYSIS AUTO W/O SCOPE: CPT | Performed by: PHYSICIAN ASSISTANT

## 2024-10-31 PROCEDURE — 96375 TX/PRO/DX INJ NEW DRUG ADDON: CPT

## 2024-10-31 PROCEDURE — 96376 TX/PRO/DX INJ SAME DRUG ADON: CPT

## 2024-10-31 PROCEDURE — 99284 EMERGENCY DEPT VISIT MOD MDM: CPT

## 2024-10-31 PROCEDURE — 80053 COMPREHEN METABOLIC PANEL: CPT | Performed by: EMERGENCY MEDICINE

## 2024-10-31 PROCEDURE — 99285 EMERGENCY DEPT VISIT HI MDM: CPT

## 2024-10-31 PROCEDURE — 76770 US EXAM ABDO BACK WALL COMP: CPT | Performed by: PHYSICIAN ASSISTANT

## 2024-10-31 RX ORDER — MORPHINE SULFATE 4 MG/ML
4 INJECTION, SOLUTION INTRAMUSCULAR; INTRAVENOUS ONCE
Status: COMPLETED | OUTPATIENT
Start: 2024-10-31 | End: 2024-10-31

## 2024-10-31 RX ORDER — ONDANSETRON 2 MG/ML
4 INJECTION INTRAMUSCULAR; INTRAVENOUS ONCE
Status: COMPLETED | OUTPATIENT
Start: 2024-10-31 | End: 2024-10-31

## 2024-10-31 RX ORDER — GARLIC EXTRACT 500 MG
1 CAPSULE ORAL DAILY
COMMUNITY

## 2024-11-01 ENCOUNTER — APPOINTMENT (OUTPATIENT)
Dept: CT IMAGING | Age: 44
End: 2024-11-01
Attending: EMERGENCY MEDICINE
Payer: COMMERCIAL

## 2024-11-01 ENCOUNTER — PATIENT MESSAGE (OUTPATIENT)
Dept: FAMILY MEDICINE CLINIC | Facility: CLINIC | Age: 44
End: 2024-11-01

## 2024-11-01 VITALS
BODY MASS INDEX: 26.52 KG/M2 | HEART RATE: 82 BPM | DIASTOLIC BLOOD PRESSURE: 68 MMHG | WEIGHT: 175 LBS | OXYGEN SATURATION: 98 % | HEIGHT: 68 IN | SYSTOLIC BLOOD PRESSURE: 101 MMHG | RESPIRATION RATE: 16 BRPM | TEMPERATURE: 99 F

## 2024-11-01 DIAGNOSIS — G44.221 CHRONIC TENSION-TYPE HEADACHE, INTRACTABLE: ICD-10-CM

## 2024-11-01 PROBLEM — J00 ACUTE RHINITIS: Status: RESOLVED | Noted: 2024-09-11 | Resolved: 2024-11-01

## 2024-11-01 PROCEDURE — 74177 CT ABD & PELVIS W/CONTRAST: CPT | Performed by: EMERGENCY MEDICINE

## 2024-11-01 RX ORDER — CYCLOBENZAPRINE HCL 10 MG
5 TABLET ORAL 3 TIMES DAILY PRN
Qty: 4 TABLET | Refills: 0 | Status: SHIPPED | OUTPATIENT
Start: 2024-11-01

## 2024-11-01 RX ORDER — PREGABALIN 75 MG/1
75 CAPSULE ORAL 3 TIMES DAILY
Qty: 90 CAPSULE | Refills: 2 | Status: SHIPPED | OUTPATIENT
Start: 2024-11-01

## 2024-11-01 RX ORDER — MORPHINE SULFATE 4 MG/ML
1 INJECTION, SOLUTION INTRAMUSCULAR; INTRAVENOUS ONCE
Status: COMPLETED | OUTPATIENT
Start: 2024-11-01 | End: 2024-11-01

## 2024-11-01 NOTE — ED QUICK NOTES
The pt was remedicated as ordered for pain. Dr Veronica spoke with the pt regarding her dc instr and test results.

## 2024-11-01 NOTE — DISCHARGE INSTRUCTIONS
You were seen in the Emergency Room for back pain. Your CT scan and labs were reassuring.     - You can take the flexeril as needed instead of the tizanidine. Do not take these together as they can make you drowsy.   -Apply heat and lidocaine patches to the effected area.  Do some gentle stretching daily.  -Return to the ER if you develop severe pain, fevers, vomiting, urinary incontinence or retention, stool incontinence, numbness or weakness in the legs, or any new concerns or worsening symptoms.  Please follow-up closely with your primary care physician for reevaluation.  You may benefit from physical therapy.

## 2024-11-01 NOTE — PROGRESS NOTES
Reviewed notes and test results from ER try lidocaine patch at night and during the day Biofreeze, CBD cream along the sacroiliac joint along with gentle stretching of the SI joint.  If persists I can refer you to pain clinic for possible injection.

## 2024-11-01 NOTE — ED INITIAL ASSESSMENT (HPI)
Pt c/o back pain for a few weeks. States has been progressively getting worse. States bad smell to urine but no other symptoms. States stool has been a little loose but not diarrhea. Pt dry heaving. States that started just while she was here. No vomit.

## 2024-11-01 NOTE — ED QUICK NOTES
The Heplock was dc'd with the tip intact. DC instr were given to take the Flexeril as prescribed(drowsiness prec given). To stop the Tizanidine. To use Lidocaine patches to the site. To limit her lifting/bending. To f/u with her PMD in several days. To return to the nearest ER if any problems develop. She expressed an understanding and was dc'd home,feeling better,compared to her arrival.   Spouse

## 2024-11-01 NOTE — ED QUICK NOTES
To ER for eval of left flank pain with radiation to the llq of the abdomen intermittently for the past few weeks. She has a hx of renal stones and sciatica and states this feels different from her usual symptoms. The pt is wretching and spitting out \"foam\". A 20 G heplock was inserted to the left a/c and labs were drawn and sent.

## 2024-11-01 NOTE — ED PROVIDER NOTES
Patient Seen in: [facility] [place]      History     Chief Complaint   Patient presents with    Abdomen/Flank Pain     Stated Complaint: left sided pain. denies n/v. urine has an odor.    Subjective:   HPI  44 year old female with past medical history of asthma, Asherman syndrome, alpha-1 antitrypsin deficiency, DVT, COPD, thyroid cancer, presents to the ER for left-sided lower back pain ongoing for the past month or so, does not recall any specific injury or trauma prior to onset of pain.  She has been taking Tylenol with no relief.  Reports over the past several days the pain has worsened and now with associated left lower quadrant abdominal pain.  Also with foul-smelling urine over the past week.  Also with nausea that started today.  Denies any constipation or diarrhea, fever, radiation of pain down into her leg, vaginal discharge or any other complaints.  She has had multiple abdominal surgeries including hysterectomy, left-sided oophorectomy, cholecystectomy.  She has had a history of kidney stones, last one was several years ago.      Objective:     Past Medical History:    Abdominal discomfort in right lower quadrant    Abdominal pain    Abnormal CT scan, esophagus    Acute deep vein thrombosis (DVT) of left lower extremity (HCC)    Alpha-1-antitrypsin deficiency (HCC)    Anxiety    Arrhythmia    RIGHT BUNDLE BRACH BLOCK     Arthritis    Asherman syndrome    Asthma (HCC)    Back pain    I have Spinal Stenosis that has gotten worse over the years.    Bloating    Calculus of kidney    Cancer (HCC)    Thyroid    Change in hair    Chest pain    Chronic bronchiolitis (HCC)    Chronic cough    Constipation    COPD (chronic obstructive pulmonary disease) (HCC)    no Oxygen    COVID-19 virus infection    Depression    Diarrhea, unspecified    Disorder of thyroid    Diverticulosis    Easy bruising    Endometriosis    Esophageal reflux    Fatigue    Food intolerance    Frequent use of  laxatives    Gastric ulcer    Headache disorder    Hemorrhoids    History of gastric bypass    Hoarseness, chronic    Hx of gastric bypass    Hx of motion sickness    Hydronephrosis    Hypokalemia    Hypothyroidism    Infertility, female    Intertrigo    Irregular bowel habits    Loss of appetite    Migraines    Nausea    Nephrolithiasis    Organic hypersomnia, unspecified    AHI 2 RDI 2 REM AHI 6 SaO2 curtis 89%    Osteoarthritis    Painful urination    Pancreatitis (HCC)    Pneumonia due to organism    PONV (postoperative nausea and vomiting)    Problems with swallowing    PUD (peptic ulcer disease)    Rib contusion, left, initial encounter    Severe persistent asthma with exacerbation (HCC)    Shortness of breath    Sleep disturbance    Stented coronary artery    Stress    Thyroid cancer (HCC)    Visual impairment    glasses    Vocal cord dysfunction    Wears glasses    Weight gain    Weight loss              Past Surgical History:   Procedure Laterality Date    Ankle scope,part debridement Left 2015    Procedure: ARTHROSCOPY ANKLE WITH DEBRIDEMENT;  Surgeon: Marcial Evans MD;  Location: [facility]    Bronch thermoplasty 1 lobe Right 04/15/2021    Bronch thermoplasty 1 lobe Left 2021      2006    Cholecystectomy      Colonoscopy  2021    Colonoscopy      Colonoscopy,diagnostic  10/22/2013    Procedure: COLONOSCOPY, POSSIBLE BIOPSY, POSSIBLE POLYPECTOMY 11474;  Surgeon: Marcello Ferreira MD;  [facility]: [facility]    Cta chest (hil=[id number])  2021    D & c      x4    Egd  2021    Gastric bypass,obese<100cm magy-en-y  2017    DR. SEGAL    Gastrocnemius recession Left 2015    Procedure: GASTROC RECESSION FOOT;  Surgeon: Marcial Evans MD;  Location: [facility]    Hysterectomy  2013    Hysteroscopy      Inj paravert f jnt l/s 1 lev Bilateral 2015    Procedure: FACET INJECTION UNDER FLUOROSCOPY;  Surgeon: Dusty Munson DO;   Location: [facility]    Laparoscopic cholecystectomy N/A 11/23/2016    Procedure: LAPAROSCOPIC CHOLECYSTECTOMY;  Surgeon: Dai Sanchez MD;  Location:  MAIN OR    Laparoscopy,diagnostic      Laparoscopy,diagnostic  06/25/2022    Lysis of adhesions  2010    M-sedaj by kritsin phys perfrmg svc 5+ yr Bilateral 12/14/2015    Procedure: FACET INJECTION UNDER FLUOROSCOPY;  Surgeon: Dusty Munson DO;  Location: [facility]    Domonique biopsy stereo nodule 1 site right (cpt=19081)  07/2023    stromal fibrosis    Oophorectomy Left     Other  NECK SCAR REVISION    Other surgical history      laparoscopies x2    Removal of ovarian cyst(s) Right 07/08/2020    Surgical stent Bilateral 03/2015    Bilateral iliac stents    Thyroidectomy  04/2011    Total abdom hysterectomy      Upper gi endo no barretts  12/2015    normal    Upper gi endoscopy performed  01/25/2017    Rivera Donaldson M.D.    Upper gi endoscopy,biopsy N/A 12/19/2015    Procedure: ESOPHAGOGASTRODUODENOSCOPY, POSSIBLE BIOPSY, POSSIBLE POLYPECTOMY 88551;  Surgeon: Brayden Giordano MD;  Location: [facility]                Social History     Socioeconomic History    Marital status:    Tobacco Use    Smoking status: Never     Passive exposure: Past    Smokeless tobacco: Never   Vaping Use    Vaping status: Never Used   Substance and Sexual Activity    Alcohol use: Not Currently    Drug use: No    Sexual activity: Yes     Partners: Male   Other Topics Concern     Service No    Blood Transfusions No    Caffeine Concern No    Occupational Exposure No    Hobby Hazards No    Sleep Concern No    Stress Concern No    Weight Concern No    Special Diet No    Back Care No    Exercise Yes    Bike Helmet No    Seat Belt Yes    Self-Exams No   Social History Narrative    ** Merged History Encounter **          Social Drivers of Health     Housing Stability: Low Risk  (9/11/2023)    Received from [facility]  [place]    Housing Stability   Recent Concern: Housing Stability - At Risk (8/18/2023)    Received from [facility]                  Physical Exam     ED Triage Vitals [10/31/24 2116]   /85   Pulse 97   Resp 19   Temp 98.4 °F (36.9 °C)   Temp src    SpO2 97 %   O2 Device None (Room air)       Current Vitals:   Vital Signs  BP: 99/60  Pulse: 89  Resp: 18  Temp: 98.4 °F (36.9 °C)    Oxygen Therapy  SpO2: 100 %  O2 Device: None (Room air)        Physical Exam  GENERAL APPEARANCE:  AxOx4, generally well-appearing, no acute distress.  HEENT:  NC, AT. MMM. EOMI, clear conjunctiva, oropharynx clear.  NECK:  Supple without lymphadenopathy.  No stiffness or restricted ROM.  HEART:  Normal rate and regular rhythm, normal S1/S1, no m/r/g  LUNGS:  CTAB, moving air well. No crackles or wheezes are heard.  ABDOMEN:  Soft, left-sided flank tenderness, left SI joint tenderness, left lower quadrant tenderness, nondistended with good bowel sounds heard.  BACK: , no obvious deformity.  No bony tenderness noted.  EXTREMITIES:  Without cyanosis, clubbing or edema.  NEUROLOGICAL:  Grossly nonfocal. Alert and oriented, moving all 4 extremities. CN not formally tested but appear grossly intact. Observed to ambulate with normal gait.  Skin:  Warm and dry without any rash.        ED Course     Labs Reviewed   LIPASE - Abnormal; Notable for the following components:       Result Value    Lipase 56 (*)     All other components within normal limits   COMP METABOLIC PANEL (14) - Normal   CBC WITH DIFFERENTIAL WITH PLATELET   URINALYSIS WITH CULTURE REFLEX            US KIDNEY/BLADDER (CPT=76770)    Result Date: 10/31/2024  PROCEDURE:  US KIDNEY/BLADDER (CPT=76770)  COMPARISON:  [facility],  KIDNEY/BLADDER (CPT=76770), 2/13/2023, 5:18 PM.  INDICATIONS:  left sided pain. denies n/v. urine has an odor.  TECHNIQUE:  Transabdominal gray scale ultrasound imaging of the bilateral  kidneys and bladder was performed.  Routine technique was utilized.   PATIENT STATED HISTORY: (As transcribed by Technologist)  Patient presents with left flank pain for about a month. Patient has a history of kidney stones.    FINDINGS:   RIGHT KIDNEY MEASUREMENTS:  11.2 x 6.5 x 5.1 cm ECHOGENICITY:  Normal. HYDRONEPHROSIS:  None. CYSTS/STONES/MASSES:  None.  LEFT KIDNEY MEASUREMENTS:  10.4 x 6.6 x 4.9 cm ECHOGENICITY:  Normal. HYDRONEPHROSIS:  None. CYSTS/STONES/MASSES:  None.  BLADDER:  Normal.  Prevoid volume is 145 cc and postvoid volume is 12 cc. OTHER:  Negative.            CONCLUSION:  Unremarkable ultrasound the kidneys and bladder.   LOCATION:  [facility]     Dictated by (CST): Johnny Boyd MD on 10/31/2024 at 11:12 PM     Finalized by (CST): Johnny Boyd MD on 10/31/2024 at 11:13 PM       XR CHEST PA + LAT CHEST (CPT=71046)    Result Date: 10/2/2024  PROCEDURE:  XR CHEST PA + LAT CHEST (CPT=71046)  INDICATIONS:  E88.01 Alpha-1-antitrypsin deficiency (HCC) R76.8 Low serum IgG for age R05.3 Persistent cough for 3 weeks or longer  COMPARISON:  [facility], XR, XR CHEST PA + LAT CHEST (CPT=71046), 11/15/2023, 7:36 AM.  TECHNIQUE:  PA and lateral chest radiographs were obtained.  PATIENT STATED HISTORY: (As transcribed by Technologist)  Patient had onset of a dry cough on 9/11/24.  The cough is becoming more productive.  She is also now experiencing heaviness to her chest.  She has had intermittent low grade fevers for the past few days as well.  Patient has a history of asthma.               CONCLUSION:   Stable cardiac and mediastinal contours.  The lungs and pleural spaces are clear.  Regional osseous structures are normal.  Postoperative changes of thyroidectomy.   LOCATION:  [facility]   Dictated by (CST): Leanne Yu MD on 10/02/2024 at 8:57 AM     Finalized by (CST): Leanne Yu MD on 10/02/2024 at 8:58 AM             MDM      44-year-old female who presents ER for left sided flank pain, urinary symptoms and  now with abdominal pain and vomiting.  Vitals within normal limits.  Appears uncomfortable.  Differential diagnosis includes but does not exclude kidney stone versus kidney infection versus diverticulitis versus muscle strain.  Patient declined CT scan on initial interview given she has had multiple in her lifetime.  Ultrasound does not show any signs of any acute abnormalities, no stones and no hydronephrosis.  Labs including CBC, CMP and lipase and UA all reassuring.  Pain had improved with medications given initially but then returned, ordered more pain and nausea medicines.  Given severity of pain and persistence, plan for CT imaging at this time.  Patient signed out to Dr. Orosco pending CT imaging.        Medical Decision Making      Disposition and Plan     Clinical Impression:  1. Left flank pain         Disposition:  There is no disposition on file for this visit.  10/31/2024 10:21 pm    Follow-up:  No follow-up provider specified.        Medications Prescribed:  Current Discharge Medication List              Supplementary Documentation:

## 2024-11-03 DIAGNOSIS — R11.0 NAUSEA: ICD-10-CM

## 2024-11-03 DIAGNOSIS — R10.12 LEFT UPPER QUADRANT ABDOMINAL PAIN: ICD-10-CM

## 2024-11-04 RX ORDER — ONDANSETRON 8 MG/1
TABLET, FILM COATED ORAL
Qty: 30 TABLET | Refills: 1 | Status: SHIPPED | OUTPATIENT
Start: 2024-11-04

## 2024-11-08 ENCOUNTER — PATIENT MESSAGE (OUTPATIENT)
Dept: FAMILY MEDICINE CLINIC | Facility: CLINIC | Age: 44
End: 2024-11-08

## 2024-11-08 DIAGNOSIS — M54.50 LUMBAR PAIN: Primary | ICD-10-CM

## 2024-11-11 ENCOUNTER — TELEPHONE (OUTPATIENT)
Dept: FAMILY MEDICINE CLINIC | Facility: CLINIC | Age: 44
End: 2024-11-11

## 2024-11-11 ENCOUNTER — PATIENT MESSAGE (OUTPATIENT)
Dept: FAMILY MEDICINE CLINIC | Facility: CLINIC | Age: 44
End: 2024-11-11

## 2024-11-11 DIAGNOSIS — R63.2 BINGE EATING: Primary | ICD-10-CM

## 2024-11-11 RX ORDER — LISDEXAMFETAMINE DIMESYLATE 60 MG/1
60 CAPSULE ORAL DAILY
Qty: 30 CAPSULE | Refills: 0 | Status: SHIPPED | OUTPATIENT
Start: 2024-12-12 | End: 2025-01-11

## 2024-11-11 RX ORDER — LISDEXAMFETAMINE DIMESYLATE 60 MG/1
60 CAPSULE ORAL DAILY
Qty: 30 CAPSULE | Refills: 0 | Status: SHIPPED | OUTPATIENT
Start: 2025-01-12 | End: 2025-02-11

## 2024-11-11 RX ORDER — LISDEXAMFETAMINE DIMESYLATE 60 MG/1
60 CAPSULE ORAL DAILY
Qty: 30 CAPSULE | Refills: 0 | Status: SHIPPED | OUTPATIENT
Start: 2024-11-11 | End: 2024-12-11

## 2024-11-11 RX ORDER — METAXALONE 800 MG/1
800 TABLET ORAL
Qty: 20 TABLET | Refills: 0 | Status: SHIPPED | OUTPATIENT
Start: 2024-11-11 | End: 2024-12-01

## 2024-11-11 NOTE — TELEPHONE ENCOUNTER
According to [facility] lowest price out of pocket is 40.72 at [facility]      Please advise another muscle relaxer

## 2024-11-11 NOTE — TELEPHONE ENCOUNTER
Patient calling asking for something other than Metaxalone (SKELAXIN) 800 MG Oral Tab  as it is not covered by insurance and would cost her $121 out of pocket     Please advise.

## 2024-11-11 NOTE — TELEPHONE ENCOUNTER
She asked for a nonsedating muscle relaxant this is the only nonsedating muscle relaxant otherwise she can try half of the tizanidine in the morning and see if it does not make her drowsy.

## 2024-11-13 ENCOUNTER — TELEMEDICINE (OUTPATIENT)
Dept: FAMILY MEDICINE CLINIC | Facility: CLINIC | Age: 44
End: 2024-11-13
Payer: COMMERCIAL

## 2024-11-13 DIAGNOSIS — M54.50 ACUTE LEFT-SIDED LOW BACK PAIN WITHOUT SCIATICA: Primary | ICD-10-CM

## 2024-11-13 PROCEDURE — 99214 OFFICE O/P EST MOD 30 MIN: CPT | Performed by: FAMILY MEDICINE

## 2024-11-13 PROCEDURE — G2211 COMPLEX E/M VISIT ADD ON: HCPCS | Performed by: FAMILY MEDICINE

## 2024-11-13 RX ORDER — ACETAMINOPHEN AND CODEINE PHOSPHATE 300; 30 MG/1; MG/1
1 TABLET ORAL 3 TIMES DAILY PRN
Qty: 12 TABLET | Refills: 0 | Status: SHIPPED | OUTPATIENT
Start: 2024-11-13 | End: 2024-11-16 | Stop reason: ALTCHOICE

## 2024-11-14 NOTE — PROGRESS NOTES
Debbi Trivedi is a 44 year old female.  HPI:   Please note that the following visit was completed using two-way, real-time interactive audio and video communication.  This has been done in good radha to provide continuity of care in the best interest of the provider-patient relationship,  There are limitations of this visit as no physical exam could be performed.  Every conscious effort was taken to allow for sufficient and adequate time.  This billing was spent on reviewing labs, medications, radiology tests and decision making.  Appropriate medical decision-making and tests are ordered as detailed in the plan of care above.      Audiovideo call with patient to discuss lower back pain.  In October she started experiencing lower back pain it progressively got worse throughout October and then on 10/31/2024 was unable to walk and was getting emesis from the pain went to the emergency department had ultrasound kidney, bladder and CT scan all were normal.  She had a history of kidney stones and was worried it was a kidney stone.  Her urination and bowel movements have been normal and no signs of incontinence.  She states the pain is constant on the left side of the lower mid back.  There is no radiation of the pain no numbness, tingling down the legs.  Pain is made worse with sitting, twisting/turning or walking.  Symptoms do improve when she stands still.  Emergency department gave her for Flexeril she has a difficult time taking nonsteroidals because of history of bariatric surgery and stomach ulcer.  Is now seeing chiropractor Dr. Castro and has gone 2 times with the third 1 today.  She states that he believes it is a herniated disc so she has appointment with pain [facility] on Monday, 11/18/2024.  States that the pain affects her and causes her to feel nauseous and want to vomit is requesting some pain medication for the weekend.  Uses tizanidine at night but is unable to take it to the day because  it makes her too tired.  No history of back injury no history of back surgery.    CONCLUSION: 11/1/2024 CT abdomen and pelvis  1. Questionable mild distal esophageal wall thickening, perhaps representing mild esophagitis.  Please correlate clinically.   2. Otherwise, no acute intra-abdominal or pelvic process noted.  Stable changes of prior gastric bypass procedure.   3. The preliminary report was reviewed.   Results for orders placed or performed during the hospital encounter of 10/31/24   CBC With Differential With Platelet    Collection Time: 10/31/24  9:42 PM   Result Value Ref Range    WBC 5.8 4.0 - 11.0 x10(3) uL    RBC 4.32 3.80 - 5.30 x10(6)uL    HGB 13.8 12.0 - 16.0 g/dL    HCT 39.9 35.0 - 48.0 %    .0 150.0 - 450.0 10(3)uL    MCV 92.4 80.0 - 100.0 fL    MCH 31.9 26.0 - 34.0 pg    MCHC 34.6 31.0 - 37.0 g/dL    RDW 12.4 %    Neutrophil Absolute Prelim 2.90 1.50 - 7.70 x10 (3) uL    Neutrophil Absolute 2.90 1.50 - 7.70 x10(3) uL    Lymphocyte Absolute 2.12 1.00 - 4.00 x10(3) uL    Monocyte Absolute 0.54 0.10 - 1.00 x10(3) uL    Eosinophil Absolute 0.17 0.00 - 0.70 x10(3) uL    Basophil Absolute 0.04 0.00 - 0.20 x10(3) uL    Immature Granulocyte Absolute 0.00 0.00 - 1.00 x10(3) uL    Neutrophil % 50.3 %    Lymphocyte % 36.7 %    Monocyte % 9.4 %    Eosinophil % 2.9 %    Basophil % 0.7 %    Immature Granulocyte % 0.0 %   Comp Metabolic Panel (14)    Collection Time: 10/31/24  9:42 PM   Result Value Ref Range    Glucose 88 70 - 99 mg/dL    Sodium 137 136 - 145 mmol/L    Potassium 3.9 3.5 - 5.1 mmol/L    Chloride 105 98 - 112 mmol/L    CO2 29.0 21.0 - 32.0 mmol/L    Anion Gap 3 0 - 18 mmol/L    BUN 9 9 - 23 mg/dL    Creatinine 0.75 0.55 - 1.02 mg/dL    Calcium, Total 8.6 8.5 - 10.1 mg/dL    Calculated Osmolality 282 275 - 295 mOsm/kg    eGFR-Cr 101 >=60 mL/min/1.73m2    AST 16 15 - 37 U/L    ALT 37 13 - 56 U/L    Alkaline Phosphatase 82 37 - 98 U/L    Bilirubin, Total 0.2 0.1 - 2.0 mg/dL    Total Protein  7.0 6.4 - 8.2 g/dL    Albumin 3.6 3.4 - 5.0 g/dL    Globulin  3.4 2.8 - 4.4 g/dL    A/G Ratio 1.1 1.0 - 2.0   Lipase    Collection Time: 10/31/24  9:42 PM   Result Value Ref Range    Lipase 56 (H) 12 - 53 U/L   Urinalysis with Culture Reflex    Collection Time: 10/31/24 10:07 PM    Specimen: Urine, clean catch   Result Value Ref Range    Urine Color Yellow Yellow    Clarity Urine Clear Clear    Spec Gravity 1.020 1.005 - 1.030    Glucose Urine Negative Negative mg/dL    Bilirubin Urine Negative Negative    Ketones Urine Negative Negative mg/dL    Blood Urine Negative Negative    pH Urine 5.5 5.0 - 8.0    Protein Urine Negative Negative mg/dL    Urobilinogen Urine 0.2 <2.0 mg/dL    Nitrite Urine Negative Negative    Leukocyte Esterase Urine Negative Negative    Microscopic Microscopic not indicated      *Note: Due to a large number of results and/or encounters for the requested time period, some results have not been displayed. A complete set of results can be found in Results Review.       Current Outpatient Medications   Medication Sig Dispense Refill    acetaminophen-codeine 300-30 MG Oral Tab Take 1 tablet by mouth 3 (three) times daily as needed for Pain (lumbar pain). 12 tablet 0    Lisdexamfetamine Dimesylate (VYVANSE) 60 MG Oral Cap Take 1 capsule (60 mg total) by mouth daily. 30 capsule 0    [START ON 12/12/2024] Lisdexamfetamine Dimesylate (VYVANSE) 60 MG Oral Cap Take 1 capsule (60 mg total) by mouth daily. 30 capsule 0    [START ON 1/12/2025] Lisdexamfetamine Dimesylate (VYVANSE) 60 MG Oral Cap Take 1 capsule (60 mg total) by mouth daily. 30 capsule 0    Metaxalone (SKELAXIN) 800 MG Oral Tab Take 1 tablet (800 mg total) by mouth daily with breakfast for 20 days. 20 tablet 0    ondansetron (ZOFRAN) 8 MG tablet TAKE 1 TABLET(8 MG) BY MOUTH EVERY 12 HOURS AS NEEDED FOR NAUSEA 30 tablet 1    pregabalin 75 MG Oral Cap Take 1 capsule (75 mg total) by mouth 3 (three) times daily. OK to refill early (3/22/24) 90  capsule 2    cyclobenzaprine 10 MG Oral Tab Take 0.5 tablets (5 mg total) by mouth 3 (three) times daily as needed for Muscle spasms. 4 tablet 0    tiZANidine 4 MG Oral Tab TAKE 1 TABLET(4 MG) BY MOUTH EVERY NIGHT AS NEEDED FOR PAIN OR SPASM. DO NOT TAKE WITH VALTREX can start after finishing flexeril 30 tablet 2    acidophilus-pectin Oral Cap Take 1 capsule by mouth daily.      ALPRAZolam 0.25 MG Oral Tab Take 1 tablet (0.25 mg total) by mouth 2 (two) times daily. 60 tablet 0    Butalbital-Acetaminophen  MG Oral Tab TAKE 1/2 TO 1 TABLET BY MOUTH DAILY AS NEEDED FOR TENSION HEADACHE 13 tablet 0    TRAZODONE 50 MG Oral Tab TAKE 1 TO 2 TABLETS(50  MG) BY MOUTH EVERY NIGHT 180 tablet 0    ARIPiprazole (ABILIFY) 2 MG Oral Tab Take 1 tablet (2 mg total) by mouth daily. 90 tablet 0    Potassium Chloride ER 10 MEQ Oral Tab CR Take 1 tablet (10 mEq total) by mouth daily. 30 tablet 5    FLUoxetine 20 MG Oral Cap Take 1 capsule (20 mg total) by mouth daily. 90 capsule 0    oxybutynin ER 10 MG Oral Tablet 24 Hr TAKE 1 TABLET(10 MG) BY MOUTH DAILY 90 tablet 3    VALACYCLOVIR 1 G Oral Tab TAKE 2 TABLETS(2000 MG) BY MOUTH EVERY 12 HOURS FOR 1 DAY (Patient taking differently: Take 2 tablets (2,000 mg total) by mouth Q12H. As Needed) 30 tablet 0    ipratropium-albuterol 0.5-2.5 (3) MG/3ML Inhalation Solution Take 3 mL by nebulization every 4 (four) hours as needed. Use only if the albuterol inhalation albuterol is not working 25 each 1    Multiple Vitamins-Minerals (BARIATRIC MULTIVITAMINS/IRON) Oral Cap Take by mouth As Directed.      pantoprazole 40 MG Oral Tab EC Take 1 tablet (40 mg total) by mouth 2 (two) times daily.      Nystatin 726799 UNIT/GM External Powder Apply 1 Application. topically 4 (four) times daily. Apply to affected areas 30 g 1    ALBUTEROL 108 (90 Base) MCG/ACT Inhalation Aero Soln INHALE 2 PUFFS INTO THE LUNGS EVERY 4 HOURS AS NEEDED FOR WHEEZING OR SHORTNESS OF BREATH 8.5 g 1    loratadine 10 MG  Oral Tab Take 1 tablet (10 mg total) by mouth daily.  0    alpha 1-proteinase inhibitor 1000 MG/20ML Intravenous Solution Inject into the vein once. weekly      UNITHROID 100 MCG Oral Tab TAKE 1 TABLET BY MOUTH EVERY MORNING WITH BREAKFAST WITH 88MCG FOR TOTAL DOSE OF 188MCG 30 tablet 5    SPIRIVA RESPIMAT 2.5 MCG/ACT Inhalation Aero Soln INHALE 2 PUFFS INTO THE LUNGS DAILY 12 g 2    UNITHROID 88 MCG Oral Tab TAKE 1 TABLET BY MOUTH EVERY MORNING BEFORE BREAKFAST 30 tablet 9    Calcium Carb-Cholecalciferol (CALCIUM 600/VITAMIN D3) 600-800 MG-UNIT Oral Tab Take 1 tablet by mouth 3 (three) times daily.      SYMBICORT 160-4.5 MCG/ACT Inhalation Aerosol INHALE 2 PUFFS INTO THE LUNGS TWICE DAILY 3 Inhaler 3    Cholecalciferol (VITAMIN D) 50 MCG (2000 UT) Oral Cap Take 1 capsule (2,000 Units total) by mouth in the morning and 1 capsule (2,000 Units total) before bedtime.      magnesium 250 MG Oral Tab Take 1 tablet (250 mg total) by mouth daily.      aspirin 81 MG Oral Tab Take 1 tablet (81 mg total) by mouth daily.        Past Medical History:    Abdominal discomfort in right lower quadrant    Abdominal pain    Abnormal CT scan, esophagus    Acute deep vein thrombosis (DVT) of left lower extremity (HCC)    Alpha-1-antitrypsin deficiency (HCC)    Anxiety    Arrhythmia    RIGHT BUNDLE BRACH BLOCK     Arthritis    Asherman syndrome    Asthma (HCC)    Back pain    I have Spinal Stenosis that has gotten worse over the years.    Bloating    Calculus of kidney    Cancer (HCC)    Thyroid    Change in hair    Chest pain    Chronic bronchiolitis (HCC)    Chronic cough    Constipation    COPD (chronic obstructive pulmonary disease) (HCC)    no Oxygen    COVID-19 virus infection    Depression    Diarrhea, unspecified    Disorder of thyroid    Diverticulosis    Easy bruising    Endometriosis    Esophageal reflux    Fatigue    Food intolerance    Frequent use of laxatives    Gastric ulcer    Headache disorder    Hemorrhoids    History  of gastric bypass    Hoarseness, chronic    Hx of gastric bypass    Hx of motion sickness    Hydronephrosis    Hypokalemia    Hypothyroidism    Infertility, female    Intertrigo    Irregular bowel habits    Loss of appetite    Migraines    Nausea    Nephrolithiasis    Organic hypersomnia, unspecified    AHI 2 RDI 2 REM AHI 6 SaO2 curtis 89%    Osteoarthritis    Painful urination    Pancreatitis (HCC)    Pneumonia due to organism    PONV (postoperative nausea and vomiting)    Problems with swallowing    PUD (peptic ulcer disease)    Rib contusion, left, initial encounter    Severe persistent asthma with exacerbation (HCC)    Shortness of breath    Sleep disturbance    Stented coronary artery    Stress    Thyroid cancer (HCC)    Visual impairment    glasses    Vocal cord dysfunction    Wears glasses    Weight gain    Weight loss      Social History:  Social History     Socioeconomic History    Marital status:    Tobacco Use    Smoking status: Never     Passive exposure: Past    Smokeless tobacco: Never   Vaping Use    Vaping status: Never Used   Substance and Sexual Activity    Alcohol use: Not Currently    Drug use: No    Sexual activity: Yes     Partners: Male   Other Topics Concern     Service No    Blood Transfusions No    Caffeine Concern No    Occupational Exposure No    Hobby Hazards No    Sleep Concern No    Stress Concern No    Weight Concern No    Special Diet No    Back Care No    Exercise Yes    Bike Helmet No    Seat Belt Yes    Self-Exams No   Social History Narrative    ** Merged History Encounter **          Social Drivers of Health     Housing Stability: Low Risk  (9/11/2023)    [facility], [facility]    Housing Stability   Recent Concern: Housing Stability - At Risk (8/18/2023)    [facility] [employer]        REVIEW OF SYSTEMS:   GENERAL HEALTH: feels well otherwise  SKIN: denies any unusual skin  lesions or rashes  RESPIRATORY: denies shortness of breath with exertion  CARDIOVASCULAR: denies chest pain on exertion  GI: denies abdominal pain and denies heartburn  NEURO: denies headaches  Musculoskeletal:  see above  EXAM:   No vitals taken due to tele-visit.  30 minutes time was spent reviewing patient's inquiry, patient's record including prior labs, developing management plan and ordering tests and prescriptions.    Full exam was not done secondary to virtual visit.  Reviewed medications, problem list and allergies.  GENERAL: Patient is speaking in full sentences no increased work in breathing patient is alert and orientated x3.    Psych patient's mood is normal and communication skills are good  Patient's range of motion is limited she is only able to go down 20 degrees with flexion, extension 10 degrees pain with side-to-side movement which is limited and twisting to the right causes pain to the left.  Patient tried to do her own DTRs was positive for patellar DTR bilateral unable to do the Achilles DTR.    Heel and toe walk per patient during video visit  ASSESSMENT AND PLAN:     Encounter Diagnosis   Name Primary?    Acute left-sided low back pain without sciatica Yes       No orders of the defined types were placed in this encounter.      Meds & Refills for this Visit:  Requested Prescriptions     Signed Prescriptions Disp Refills    acetaminophen-codeine 300-30 MG Oral Tab 12 tablet 0     Sig: Take 1 tablet by mouth 3 (three) times daily as needed for Pain (lumbar pain).       Imaging & Consults:  None  1. Acute left-sided low back pain without sciatica  Topicals discussed she had tried lidocaine patch, topical CBD menthol with no relief  Using tizanidine at night does not feel the muscle relaxants help feels the pain is too deep.  Continue working with chiropractor using stim and ice  Use, risks, benefits and precautions of codeine discussed. Including not to drive, operate machinery or drink alcohol  when taking this medication.  Advised of risk of addiction to codeine  Maximum  3/day Tylenol with codeine.    [facility] Data Reviewed.  Continue with tizanidine at night unable to tolerate during the day and Skelaxin was not covered by insurance  - acetaminophen-codeine 300-30 MG Oral Tab; Take 1 tablet by mouth 3 (three) times daily as needed for Pain (lumbar pain).  Dispense: 12 tablet; Refill: 0  Provider patient relationship has had a longitudinal long term relationship to address and treat complex conditions.      The patient indicates understanding of these issues and agrees to the plan.  The patient is asked to return 11/26/2024 for complete physical has follow-up appointment on chronic medication or as needed.    This note was created by [employer] voice recognition. Errors in content may be related to improper recognition by the system; efforts to review and correct have been done but errors may still exist.  Note to patient: The 21st Century Cures Act makes medical notes like these available to patients in the interest of transparency. However, be advised this is a medical document. It is intended as peer to peer communication. It is written in medical language and may contain abbreviations or verbiage that are unfamiliar. It may appear blunt or direct. Medical documents are intended to carry relevant information, facts as evident, and the clinical opinion of the practitioner.

## 2024-11-16 ENCOUNTER — TELEPHONE (OUTPATIENT)
Dept: FAMILY MEDICINE CLINIC | Facility: CLINIC | Age: 44
End: 2024-11-16

## 2024-11-16 DIAGNOSIS — M54.32 LEFT SIDED SCIATICA: Primary | ICD-10-CM

## 2024-11-16 RX ORDER — HYDROCODONE BITARTRATE AND ACETAMINOPHEN 5; 325 MG/1; MG/1
1 TABLET ORAL EVERY 6 HOURS PRN
Qty: 12 TABLET | Refills: 0 | Status: SHIPPED | OUTPATIENT
Start: 2024-11-16

## 2024-11-17 NOTE — TELEPHONE ENCOUNTER
Spoke with the patient. Had a telehealth visit with PCP on 11/13/24 for left sided low back pain. Scheduled to be seen at pain clinic on 11/18/24. Has been taking Tylenol #3 tid. Pain has been worsening today and has caused vomiting. Pain is now radiating down the left leg. Had a Medrol dose pack at the end of October for her lungs. Does not want to go to the ER as she had been seen on 10/31/24. Wants a change in her pain medication. Denies fever, hematuria, incontinence of urine or stool. Will send 12 Norco 5/325 one every 6 hours prn pain to her pharmacy. Advised her to stop the Tylenol # 3. Instructed her to go to the ER if her pain is not controlled. Keep appointment with the pain clinic as scheduled. Patient voiced understanding of these instructions.

## 2024-11-18 ENCOUNTER — HOSPITAL ENCOUNTER (OUTPATIENT)
Dept: GENERAL RADIOLOGY | Age: 44
Discharge: HOME | End: 2024-11-18
Attending: PHYSICIAN ASSISTANT
Payer: COMMERCIAL

## 2024-11-18 ENCOUNTER — OFFICE VISIT (OUTPATIENT)
Dept: PAIN CLINIC | Facility: CLINIC | Age: 44
End: 2024-11-18
Payer: COMMERCIAL

## 2024-11-18 VITALS
SYSTOLIC BLOOD PRESSURE: 128 MMHG | HEART RATE: 112 BPM | DIASTOLIC BLOOD PRESSURE: 62 MMHG | BODY MASS INDEX: 27 KG/M2 | WEIGHT: 175 LBS | OXYGEN SATURATION: 98 %

## 2024-11-18 DIAGNOSIS — M54.16 LUMBAR RADICULOPATHY: ICD-10-CM

## 2024-11-18 DIAGNOSIS — M54.16 LUMBAR RADICULOPATHY: Primary | ICD-10-CM

## 2024-11-18 PROCEDURE — 99214 OFFICE O/P EST MOD 30 MIN: CPT | Performed by: PHYSICIAN ASSISTANT

## 2024-11-18 PROCEDURE — 3074F SYST BP LT 130 MM HG: CPT | Performed by: PHYSICIAN ASSISTANT

## 2024-11-18 PROCEDURE — 72114 X-RAY EXAM L-S SPINE BENDING: CPT | Performed by: PHYSICIAN ASSISTANT

## 2024-11-18 PROCEDURE — 3078F DIAST BP <80 MM HG: CPT | Performed by: PHYSICIAN ASSISTANT

## 2024-11-18 RX ORDER — METHYLPREDNISOLONE 4 MG/1
TABLET ORAL
Qty: 21 TABLET | Refills: 0 | Status: SHIPPED | OUTPATIENT
Start: 2024-11-18

## 2024-11-18 NOTE — PATIENT INSTRUCTIONS
Refill policies:    Allow 2-3 business days for refills; controlled substances may take longer.  Contact your pharmacy at least 5 days prior to running out of medication and have them send an electronic request or submit request through the “request refill” option in your [facility] account.  Refills are not addressed on weekends; covering physicians do not authorize routine medications on weekends.  No narcotics or controlled substances are refilled after noon on Fridays or by on call physicians.  By law, narcotics must be electronically prescribed.  A 30 day supply with no refills is the maximum allowed.  If your prescription is due for a refill, you may be due for a follow up appointment.  To best provide you care, patients receiving routine medications need to be seen at least once a year.  Patients receiving narcotic/controlled substance medications need to be seen at least once every 3 months.  In the event that your preferred pharmacy does not have the requested medication in stock (e.g. Backordered), it is your responsibility to find another pharmacy that has the requested medication available.  We will gladly send a new prescription to that pharmacy at your request.    Scheduling Tests:    If your physician has ordered radiology tests such as MRI or CT scans, please contact Central Scheduling at [phone] right away to schedule the test.  Once scheduled, the [facility] Centralized Referral Team will work with your insurance carrier to obtain pre-certification or prior authorization.  Depending on your insurance carrier, approval may take 3-10 days.  It is highly recommended patients assure they have received an authorization before having a test performed.  If test is done without insurance authorization, patient may be responsible for the entire amount billed.      Precertification and Prior Authorizations:  If your physician has recommended that you have a procedure or additional testing performed the [facility]  Centralized Referral Team will contact your insurance carrier to obtain pre-certification or prior authorization.    You are strongly encouraged to contact your insurance carrier to verify that your procedure/test has been approved and is a COVERED benefit.  Although the [facility] Centralized Referral Team does its due diligence, the insurance carrier gives the disclaimer that \"Although the procedure is authorized, this does not guarantee payment.\"    Ultimately the patient is responsible for payment.   Thank you for your understanding in this matter.  Paperwork Completion:  If you require FMLA or disability paperwork for your recovery, please make sure to either drop it off or have it faxed to our office at [phone]. Be sure the form has your name and date of birth on it.  The form will be faxed to our Forms Department and they will complete it for you.  There is a 25$ fee for all forms that need to be filled out.  Please be aware there is a 10-14 day turnaround time.  You will need to sign a release of information (MARILIN) form if your paperwork does not come with one.  You may call the Forms Department with any questions at [phone].  Their fax number is [phone].

## 2024-11-18 NOTE — PROGRESS NOTES
Subjective:   Patient ID: Debbi Prasad RUST is a 44 year old female.    HPI    History/Other:   Review of Systems  Current Outpatient Medications   Medication Sig Dispense Refill   • HYDROcodone-acetaminophen (NORCO) 5-325 MG Oral Tab Take 1 tablet by mouth every 6 (six) hours as needed for Pain. 12 tablet 0   • Lisdexamfetamine Dimesylate (VYVANSE) 60 MG Oral Cap Take 1 capsule (60 mg total) by mouth daily. 30 capsule 0   • [START ON 12/12/2024] Lisdexamfetamine Dimesylate (VYVANSE) 60 MG Oral Cap Take 1 capsule (60 mg total) by mouth daily. 30 capsule 0   • [START ON 1/12/2025] Lisdexamfetamine Dimesylate (VYVANSE) 60 MG Oral Cap Take 1 capsule (60 mg total) by mouth daily. 30 capsule 0   • Metaxalone (SKELAXIN) 800 MG Oral Tab Take 1 tablet (800 mg total) by mouth daily with breakfast for 20 days. 20 tablet 0   • ondansetron (ZOFRAN) 8 MG tablet TAKE 1 TABLET(8 MG) BY MOUTH EVERY 12 HOURS AS NEEDED FOR NAUSEA 30 tablet 1   • pregabalin 75 MG Oral Cap Take 1 capsule (75 mg total) by mouth 3 (three) times daily. OK to refill early (3/22/24) 90 capsule 2   • cyclobenzaprine 10 MG Oral Tab Take 0.5 tablets (5 mg total) by mouth 3 (three) times daily as needed for Muscle spasms. 4 tablet 0   • tiZANidine 4 MG Oral Tab TAKE 1 TABLET(4 MG) BY MOUTH EVERY NIGHT AS NEEDED FOR PAIN OR SPASM. DO NOT TAKE WITH VALTREX can start after finishing flexeril 30 tablet 2   • acidophilus-pectin Oral Cap Take 1 capsule by mouth daily.     • ALPRAZolam 0.25 MG Oral Tab Take 1 tablet (0.25 mg total) by mouth 2 (two) times daily. 60 tablet 0   • Butalbital-Acetaminophen  MG Oral Tab TAKE 1/2 TO 1 TABLET BY MOUTH DAILY AS NEEDED FOR TENSION HEADACHE 13 tablet 0   • TRAZODONE 50 MG Oral Tab TAKE 1 TO 2 TABLETS(50  MG) BY MOUTH EVERY NIGHT 180 tablet 0   • ARIPiprazole (ABILIFY) 2 MG Oral Tab Take 1 tablet (2 mg total) by mouth daily. 90 tablet 0   • Potassium Chloride ER 10 MEQ Oral Tab CR Take 1 tablet (10 mEq total) by  mouth daily. 30 tablet 5   • FLUoxetine 20 MG Oral Cap Take 1 capsule (20 mg total) by mouth daily. 90 capsule 0   • oxybutynin ER 10 MG Oral Tablet 24 Hr TAKE 1 TABLET(10 MG) BY MOUTH DAILY 90 tablet 3   • VALACYCLOVIR 1 G Oral Tab TAKE 2 TABLETS(2000 MG) BY MOUTH EVERY 12 HOURS FOR 1 DAY (Patient taking differently: Take 2 tablets (2,000 mg total) by mouth Q12H. As Needed) 30 tablet 0   • ipratropium-albuterol 0.5-2.5 (3) MG/3ML Inhalation Solution Take 3 mL by nebulization every 4 (four) hours as needed. Use only if the albuterol inhalation albuterol is not working 25 each 1   • Multiple Vitamins-Minerals (BARIATRIC MULTIVITAMINS/IRON) Oral Cap Take by mouth As Directed.     • pantoprazole 40 MG Oral Tab EC Take 1 tablet (40 mg total) by mouth 2 (two) times daily.     • Nystatin 768610 UNIT/GM External Powder Apply 1 Application. topically 4 (four) times daily. Apply to affected areas 30 g 1   • ALBUTEROL 108 (90 Base) MCG/ACT Inhalation Aero Soln INHALE 2 PUFFS INTO THE LUNGS EVERY 4 HOURS AS NEEDED FOR WHEEZING OR SHORTNESS OF BREATH 8.5 g 1   • loratadine 10 MG Oral Tab Take 1 tablet (10 mg total) by mouth daily.  0   • alpha 1-proteinase inhibitor 1000 MG/20ML Intravenous Solution Inject into the vein once. weekly     • UNITHROID 100 MCG Oral Tab TAKE 1 TABLET BY MOUTH EVERY MORNING WITH BREAKFAST WITH 88MCG FOR TOTAL DOSE OF 188MCG 30 tablet 5   • SPIRIVA RESPIMAT 2.5 MCG/ACT Inhalation Aero Soln INHALE 2 PUFFS INTO THE LUNGS DAILY 12 g 2   • UNITHROID 88 MCG Oral Tab TAKE 1 TABLET BY MOUTH EVERY MORNING BEFORE BREAKFAST 30 tablet 9   • Calcium Carb-Cholecalciferol (CALCIUM 600/VITAMIN D3) 600-800 MG-UNIT Oral Tab Take 1 tablet by mouth 3 (three) times daily.     • SYMBICORT 160-4.5 MCG/ACT Inhalation Aerosol INHALE 2 PUFFS INTO THE LUNGS TWICE DAILY 3 Inhaler 3   • Cholecalciferol (VITAMIN D) 50 MCG (2000 UT) Oral Cap Take 1 capsule (2,000 Units total) by mouth in the morning and 1 capsule (2,000 Units  total) before bedtime.     • magnesium 250 MG Oral Tab Take 1 tablet (250 mg total) by mouth daily.     • aspirin 81 MG Oral Tab Take 1 tablet (81 mg total) by mouth daily.       Allergies:Allergies[1]    Objective:   Physical Exam  Constitutional:          Assessment & Plan:   No diagnosis found.    No orders of the defined types were placed in this encounter.      Meds This Visit:  Requested Prescriptions      No prescriptions requested or ordered in this encounter       Imaging & Referrals:  None        Location of Pain: left side lumbar spine    Date Pain Began: 10/04          Work Related:   No        Receiving Work Comp/Disability:   No    Numeric Rating Scale:  Pain at Present:  7                                                                                                            (No Pain) 0  to  10 (Worst Pain)                 Minimum Pain:   4  Maximum Pain  9    Distribution of Pain:    left    Quality of Pain:   sharp/stabbing and throbbing    Origin of Pain:    Other unsure    Aggravating Factors:    Sitting    Past Treatments for Current Pain Condition:   Physical Therapy    Prior diagnostic testing for your pain:  US and CT scan          [1]   Allergies  Allergen Reactions   • Adhesive Tape HIVES     Patient got welts when she use the adhesive tape for 48-hour Holter monitor.   • Cephalosporins HIVES   • Chocolate HIVES     Dark chocolate   • Doxycycline HIVES     Hives and fever      • Tramadol HIVES and RASH   • Haloperidol ANXIETY   • Metoclopramide ANXIETY     Feels like she is going to jump out of her skin   • Toradol [Ketorolac Tromethamine] RASH   • Norflex Tablets [Orphenadrine] OTHER (SEE COMMENTS)     Dilated [pupils ? / pt is not sure of reaction   • Cheratussin Ac [Guaifenesin-Codeine] NAUSEA ONLY     Only in liquid form; tolerates gel caps   • Nsaids OTHER (SEE COMMENTS)       Other reaction(s): GI UPSET  Hx of gastric bypass

## 2024-11-18 NOTE — PROGRESS NOTES
Patient: Debbi Trivedi  Medical Record Number: [id number]  Site: [facility]  Referring Physician:  Daniela More  PCP: same    Dear Dr. More:    Thank you very much for requesting this consultation. I had the opportunity to evaluate and initiate care for your patient today, as per your request.    HISTORY OF CHIEF COMPLAINT:      Debbi Trivedi is a 44 year old female, who complains of L LBP with radiating gluteal pain and n/t to anterior thigh and proximal anterior lower leg.      Pt is here today at request of PCP with pain in above described distrubution that began atraumatically 10/2024.  Initially, pain was less frequent and intense, though rapidly increased, and was seen in ER 10/31/24.  Worked up for stone/UTI, which was negative.  Discharged with flexeril, which was sedating, and saw PCP.  Given zanaflex, without relief, and tried T#3.  Given norco when this was ineffective, and sent here.  She is working with PT and chiropractor, to limited relief.      VAS Pain Score:  4-8/10    Aggravating Factors: Relieving Factors:   Lumbar flexion   Walking  Norco   Ice   Cbd lotion   Stretching      Past Treatment Attempted/Patient’s Response:  As above     Past Medical History:    Abdominal discomfort in right lower quadrant    Abdominal pain    Abnormal CT scan, esophagus    Acute deep vein thrombosis (DVT) of left lower extremity (HCC)    Alpha-1-antitrypsin deficiency (HCC)    Anxiety    Arrhythmia    RIGHT BUNDLE BRACH BLOCK     Arthritis    Asherman syndrome    Asthma (HCC)    Back pain    I have Spinal Stenosis that has gotten worse over the years.    Bloating    Calculus of kidney    Cancer (HCC)    Thyroid    Change in hair    Chest pain    Chronic bronchiolitis (HCC)    Chronic cough    Constipation    COPD (chronic obstructive pulmonary disease) (HCC)    no Oxygen    COVID-19 virus infection    Depression    Diarrhea, unspecified    Disorder of thyroid    Diverticulosis     Easy bruising    Endometriosis    Esophageal reflux    Fatigue    Food intolerance    Frequent use of laxatives    Gastric ulcer    Headache disorder    Hemorrhoids    History of gastric bypass    Hoarseness, chronic    Hx of gastric bypass    Hx of motion sickness    Hydronephrosis    Hypokalemia    Hypothyroidism    Infertility, female    Intertrigo    Irregular bowel habits    Loss of appetite    Migraines    Nausea    Nephrolithiasis    Organic hypersomnia, unspecified    AHI 2 RDI 2 REM AHI 6 SaO2 curtis 89%    Osteoarthritis    Painful urination    Pancreatitis (HCC)    Pneumonia due to organism    PONV (postoperative nausea and vomiting)    Problems with swallowing    PUD (peptic ulcer disease)    Rib contusion, left, initial encounter    Severe persistent asthma with exacerbation (HCC)    Shortness of breath    Sleep disturbance    Stented coronary artery    Stress    Thyroid cancer (HCC)    Visual impairment    glasses    Vocal cord dysfunction    Wears glasses    Weight gain    Weight loss      Past Surgical History:   Procedure Laterality Date    Ankle scope,part debridement Left 2015    Procedure: ARTHROSCOPY ANKLE WITH DEBRIDEMENT;  Surgeon: Marcial Evans MD;  [facility]: [facility]    Bronch thermoplasty 1 lobe Right 04/15/2021    Bronch thermoplasty 1 lobe Left 2021      2006    Cholecystectomy      Colonoscopy  2021    Colonoscopy      Colonoscopy,diagnostic  10/22/2013    Procedure: COLONOSCOPY, POSSIBLE BIOPSY, POSSIBLE POLYPECTOMY 84553;  Surgeon: Marcello Ferreira MD;  Location: [facility] SURGICAL [facility] chest (mpx=[id number])  2021    D & c      x4    Egd  2021    Gastric bypass,obese<100cm magy-en-y  2017    DR. SEGAL    Gastrocnemius recession Left 2015    Procedure: GASTROC RECESSION FOOT;  Surgeon: Marcial Evans MD;  Location: [facility]    Hysterectomy  2013    Hysteroscopy      Inj paravert f jnt l/s 1 lev  Bilateral 12/14/2015    Procedure: FACET INJECTION UNDER FLUOROSCOPY;  Surgeon: Dusty Munson DO;  Location: [facility]    Laparoscopic cholecystectomy N/A 11/23/2016    Procedure: LAPAROSCOPIC CHOLECYSTECTOMY;  Surgeon: Dai Sanchez MD;  Location:  MAIN OR    Laparoscopy,diagnostic      Laparoscopy,diagnostic  06/25/2022    Lysis of adhesions  2010    M-sedaj by sm phys perfrmg svc 5+ yr Bilateral 12/14/2015    Procedure: FACET INJECTION UNDER FLUOROSCOPY;  Surgeon: Dusty Munson DO;  Location: [facility]    Domonique biopsy stereo nodule 1 site right (cpt=19081)  07/2023    stromal fibrosis    Oophorectomy Left     Other  NECK SCAR REVISION    Other surgical history      laparoscopies x2    Removal of ovarian cyst(s) Right 07/08/2020    Surgical stent Bilateral 03/2015    Bilateral iliac stents    Thyroidectomy  04/2011    Total abdom hysterectomy      Upper gi endo no barretts  12/2015    normal    Upper gi endoscopy performed  01/25/2017    Rivera Donaldson M.D.    Upper gi endoscopy,biopsy N/A 12/19/2015    Procedure: ESOPHAGOGASTRODUODENOSCOPY, POSSIBLE BIOPSY, POSSIBLE POLYPECTOMY 00836;  Surgeon: Brayden Giordano MD;  Location: [facility]      Family History   Problem Relation Age of Onset    Heart Disorder Father     Heart Disease Father     Heart Attack Father     Other (Other) Mother         ALLIE    Breast Cancer Maternal Grandmother 75    Cancer Maternal Grandfather         blood cancer    Diabetes Paternal Grandmother     Heart Disorder Paternal Grandmother     Heart Disease Paternal Grandmother     Asthma Paternal Grandmother     Heart Attack Paternal Grandmother     Stroke Neg       Social History     Socioeconomic History    Marital status:    Tobacco Use    Smoking status: Never     Passive exposure: Past    Smokeless tobacco: Never   Vaping Use    Vaping status: Never Used   Substance and Sexual Activity    Alcohol use: Not Currently     Drug use: No    Sexual activity: Yes     Partners: Male   Other Topics Concern     Service No    Blood Transfusions No    Caffeine Concern No    Occupational Exposure No    Hobby Hazards No    Sleep Concern No    Stress Concern No    Weight Concern No    Special Diet No    Back Care No    Exercise Yes    Bike Helmet No    Seat Belt Yes    Self-Exams No      Current Medications:  Current Outpatient Medications   Medication Sig Dispense Refill    HYDROcodone-acetaminophen (NORCO) 5-325 MG Oral Tab Take 1 tablet by mouth every 6 (six) hours as needed for Pain. 12 tablet 0    Lisdexamfetamine Dimesylate (VYVANSE) 60 MG Oral Cap Take 1 capsule (60 mg total) by mouth daily. 30 capsule 0    [START ON 12/12/2024] Lisdexamfetamine Dimesylate (VYVANSE) 60 MG Oral Cap Take 1 capsule (60 mg total) by mouth daily. 30 capsule 0    [START ON 1/12/2025] Lisdexamfetamine Dimesylate (VYVANSE) 60 MG Oral Cap Take 1 capsule (60 mg total) by mouth daily. 30 capsule 0    ondansetron (ZOFRAN) 8 MG tablet TAKE 1 TABLET(8 MG) BY MOUTH EVERY 12 HOURS AS NEEDED FOR NAUSEA 30 tablet 1    pregabalin 75 MG Oral Cap Take 1 capsule (75 mg total) by mouth 3 (three) times daily. OK to refill early (3/22/24) 90 capsule 2    tiZANidine 4 MG Oral Tab TAKE 1 TABLET(4 MG) BY MOUTH EVERY NIGHT AS NEEDED FOR PAIN OR SPASM. DO NOT TAKE WITH VALTREX can start after finishing flexeril 30 tablet 2    acidophilus-pectin Oral Cap Take 1 capsule by mouth daily.      ALPRAZolam 0.25 MG Oral Tab Take 1 tablet (0.25 mg total) by mouth 2 (two) times daily. 60 tablet 0    Butalbital-Acetaminophen  MG Oral Tab TAKE 1/2 TO 1 TABLET BY MOUTH DAILY AS NEEDED FOR TENSION HEADACHE 13 tablet 0    TRAZODONE 50 MG Oral Tab TAKE 1 TO 2 TABLETS(50  MG) BY MOUTH EVERY NIGHT 180 tablet 0    ARIPiprazole (ABILIFY) 2 MG Oral Tab Take 1 tablet (2 mg total) by mouth daily. 90 tablet 0    Potassium Chloride ER 10 MEQ Oral Tab CR Take 1 tablet (10 mEq total) by mouth  daily. 30 tablet 5    oxybutynin ER 10 MG Oral Tablet 24 Hr TAKE 1 TABLET(10 MG) BY MOUTH DAILY 90 tablet 3    VALACYCLOVIR 1 G Oral Tab TAKE 2 TABLETS(2000 MG) BY MOUTH EVERY 12 HOURS FOR 1 DAY 30 tablet 0    ipratropium-albuterol 0.5-2.5 (3) MG/3ML Inhalation Solution Take 3 mL by nebulization every 4 (four) hours as needed. Use only if the albuterol inhalation albuterol is not working 25 each 1    Multiple Vitamins-Minerals (BARIATRIC MULTIVITAMINS/IRON) Oral Cap Take by mouth As Directed.      pantoprazole 40 MG Oral Tab EC Take 1 tablet (40 mg total) by mouth 2 (two) times daily.      Nystatin 158614 UNIT/GM External Powder Apply 1 Application. topically 4 (four) times daily. Apply to affected areas 30 g 1    ALBUTEROL 108 (90 Base) MCG/ACT Inhalation Aero Soln INHALE 2 PUFFS INTO THE LUNGS EVERY 4 HOURS AS NEEDED FOR WHEEZING OR SHORTNESS OF BREATH 8.5 g 1    loratadine 10 MG Oral Tab Take 1 tablet (10 mg total) by mouth daily.  0    alpha 1-proteinase inhibitor 1000 MG/20ML Intravenous Solution Inject into the vein once. weekly      UNITHROID 100 MCG Oral Tab TAKE 1 TABLET BY MOUTH EVERY MORNING WITH BREAKFAST WITH 88MCG FOR TOTAL DOSE OF 188MCG 30 tablet 5    SPIRIVA RESPIMAT 2.5 MCG/ACT Inhalation Aero Soln INHALE 2 PUFFS INTO THE LUNGS DAILY 12 g 2    UNITHROID 88 MCG Oral Tab TAKE 1 TABLET BY MOUTH EVERY MORNING BEFORE BREAKFAST 30 tablet 9    Calcium Carb-Cholecalciferol (CALCIUM 600/VITAMIN D3) 600-800 MG-UNIT Oral Tab Take 1 tablet by mouth 3 (three) times daily.      SYMBICORT 160-4.5 MCG/ACT Inhalation Aerosol INHALE 2 PUFFS INTO THE LUNGS TWICE DAILY 3 Inhaler 3    Cholecalciferol (VITAMIN D) 50 MCG (2000 UT) Oral Cap Take 1 capsule (2,000 Units total) by mouth in the morning and 1 capsule (2,000 Units total) before bedtime.      magnesium 250 MG Oral Tab Take 1 tablet (250 mg total) by mouth daily.      aspirin 81 MG Oral Tab Take 1 tablet (81 mg total) by mouth daily.          Functional  Assessment: Patient reports that they are able to complete all of their ADL's such as eating, bathing, using the toilet, dressing and getting up from a bed or a chair independently.    Work History:  The patient currently works full time as teacher.       REVIEW OF SYSTEMS:   10 point review of systems is otherwise negative,unless otherwise in HPI.      Radiology/Lab Test Reviewed:  no relevant imaging.      CBC:    Lab Results   Component Value Date    WBC 5.8 10/31/2024    WBC 4.9 06/29/2024    WBC 4.9 04/25/2024     Lab Results   Component Value Date    HEMOGLOBIN 14.2 08/01/2013     Lab Results   Component Value Date    .0 10/31/2024    .0 06/29/2024    .0 04/25/2024         PHYSICAL EXAMIMATION   PHYSICAL EXAMINATION: Debbi Trivedi is a 44 year old female who is observed sitting comfortably on a chair in the exam room alert and oriented times three. She looks consistent with her stated age.    Ht Readings from Last 1 Encounters:   10/31/24 68\"     Wt Readings from Last 1 Encounters:   10/31/24 175 lb (79.4 kg)     The patient is well developed, well nourished, normal body habitus, well muscled. She moves independently from sitting to standing with ease.       Inspection:  No acute distress    Patient displays Antalgic gait, and is able to Normal heel walk, Normal toe walk.    Coordination:  Well-coordinated, fluent gait, able to engage in rapid alternating movements of upper and lower extremities.    ROM Lumbar Spine:  See chart below:  Motion Right (+ or -) Left (+ or -)   Lumbar Flexion - +   Lumbar Extension - +   Lumbar Bending - +   Lumbar Extension/ twisting motion - +     Lumbar/Sacral Integument:  Skin over lumbar sacral spine is intact without rashes, excoriations, lesions or erythema noted    Palpation:  See chart below:  Palpation of lumbar area Right (+ or -) Left (+ or -)   Lumbar facets - -   Lumbar paraspinals - -   Piriformis - -   SIJ - -   Trochanteric Bursa - -      Deep Tendon Reflexes:  Grossly intact to bilateral lower extremities 2/4 throughout    Strength:  Strength of bilateral lower extremities grossly intact; 5/5 throughout    Sensation:  No sensory deficits noted on bilateral lower extremities to light touch    Tests:  Test Right (+ or -) Left (+ or -)   SLR + on L +   Rico’s - -   Babinski     Clonus       HEAD/NECK: Head is normocephalic, neck supple  EYES: EOMI, MAT  SKIN EXAM: Skin is intact, head, neck, trunk and arms/legs. No rashes, mottling or ulcerations.  LYMPH EXAM: There is no lymph edema in either lower extremity.  VASCULAR EXAM: Pedal pulses are normal bilaterally, with good distal perfusion. No clubbing or cyanosis.  ABDOMINAL EXAM: Abdomen is soft without masses palpated.  HEART: normal, regular, S1 and S2  LUNGS:CTA  MUSCULOSKELETAL: Smooth, pain-free ROM to bilat hips, ankles, and knees.     Do you have any known blood/bleeding disorders?  No  Does patient currently take blood thinners?   None  Does patient currently take any antibiotics?   No      Patient is currently on pain medications:  Yes  Reason pain medications are prescribed: Acute pain  Pain medications are prescribed by: PCP  [place] Prescription Monitoring review: Yes-Compliant  DIRE: N/A    MEDICAL DECISION MAKING:     Impression: left lumbar radiculopathy    Left low back and radiating gluteal pain with numbness and tingling in L anterior thigh to anterior lower leg.  Symptoms progressive present for 2 months, and has failed time, PT, chiropractic care, muscle relaxers, and multiple opiates (T#3 and norco).  Can not take NSAIDs secondary to history of GI ulcer.  On exam, pain with ROM L spine with + L SLR and + contralateral SLR (R reproduces L sided pain).  Will have her obtain XR L spine and MRI.  Discussed tapered PO steroid, though she expresses concerns about her GI status.  Asked that she clear this with GI before starting.      Plan: XR and MRI, f/u to discuss options  based on findings.  Order in for tapered PO steroid, as she can not have NSAID.  That said, she expresses concerns about this, and asked that she clear it with GI or PCP.      The patient indicates understanding of these issues and agrees to the plan.      Thank you very much.     Respectfully yours,    BIA Barbour

## 2024-11-20 ENCOUNTER — TELEMEDICINE (OUTPATIENT)
Dept: FAMILY MEDICINE CLINIC | Facility: CLINIC | Age: 44
End: 2024-11-20
Payer: COMMERCIAL

## 2024-11-20 ENCOUNTER — TELEPHONE (OUTPATIENT)
Dept: FAMILY MEDICINE CLINIC | Facility: CLINIC | Age: 44
End: 2024-11-20

## 2024-11-20 VITALS — HEIGHT: 68 IN | BODY MASS INDEX: 26.52 KG/M2 | WEIGHT: 175 LBS | HEART RATE: 97 BPM

## 2024-11-20 DIAGNOSIS — M54.50 ACUTE LEFT-SIDED LOW BACK PAIN WITHOUT SCIATICA: Primary | ICD-10-CM

## 2024-11-20 DIAGNOSIS — R52 PAIN MANAGEMENT: ICD-10-CM

## 2024-11-20 DIAGNOSIS — Z91.89 AT RISK FOR POLYPHARMACY: ICD-10-CM

## 2024-11-20 DIAGNOSIS — M35.1 MIXED CONNECTIVE TISSUE DISEASE (HCC): ICD-10-CM

## 2024-11-20 PROCEDURE — 99214 OFFICE O/P EST MOD 30 MIN: CPT | Performed by: FAMILY MEDICINE

## 2024-11-20 PROCEDURE — G2211 COMPLEX E/M VISIT ADD ON: HCPCS | Performed by: FAMILY MEDICINE

## 2024-11-20 RX ORDER — DIAZEPAM 5 MG/1
TABLET ORAL
Qty: 2 TABLET | Refills: 0 | Status: SHIPPED | OUTPATIENT
Start: 2024-11-20

## 2024-11-20 RX ORDER — ACETAMINOPHEN AND CODEINE PHOSPHATE 300; 30 MG/1; MG/1
1 TABLET ORAL 2 TIMES DAILY PRN
Qty: 10 TABLET | Refills: 0 | Status: SHIPPED | OUTPATIENT
Start: 2024-11-20 | End: 2024-11-25

## 2024-11-20 NOTE — TELEPHONE ENCOUNTER
Called patient to let her know I faxed over her insurance information and order for her MRI SPIN. 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 Told patient that they should be calling her from [phone]  [facility]  [address] [place] [id number]  Patient verbally understood plan of care.

## 2024-11-21 NOTE — PROGRESS NOTES
[facility] 44 [facility]  HPI:   Please note that the following visit was completed using two-way, real-time interactive audio and video communication.  This has been done in good radha to provide continuity of care in the best interest of the provider-patient relationship,  There are limitations of this visit as no physical exam could be performed.  Every conscious effort was taken to allow for sufficient and adequate time.  This billing was spent on reviewing labs, medications, radiology tests and decision making.  Appropriate medical decision-making and tests are ordered as detailed in the plan of care above.      Audiovideo call with patient to discuss sciatica and x-ray results done by pain clinic.  Wanting to get MRI lumbar spine sooner right now MRI is scheduled for 12/8/2024.  Intermittent severe pain down her leg over the weekend caused her to have vomiting episode was put on hydrocodone slight relief but does not want to be on hydrocodone does state that she would like the codeine  which was working until Saturday.  Request the pain medication until she can get in for an MRI and possibly lumbar epidural.  Chiropractic treatment is put on hold until MRI results are back.  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  States that she is unable to take ibuprofen due to history of gastric bypass and gastric ulcer.  Was reluctant to start n the Medrol  Dosepak prescribed by Kody godoy on 11/18/2024 but did pick it up and is going to start it today after discussing with me.    Requests Valium prior to MRI will have her  drive her will not take Xanax that day.    5/13/2014 MRI Negative MR exam of the lumbosacral spine, without lumbar disc extrusion or specific nerve root  compression     Reviewed lumbar spine x-ray 11/18/2024  FINDINGS:  Vascular stents project over the L4 and L5 vertebral bodies.   Cholecystectomy clips.  There is lumbar lordosis.  There is trace retrolisthesis of L3 on L4.  The vertebral body heights and disc heights are maintained.  Mild facet hypertrophy   L4-5 and L5-S1.  No significant subluxation is seen with flexion or extension.   Current Outpatient Medications   Medication Sig Dispense Refill    diazePAM (VALIUM) 5 MG Oral Tab Take 5-10 mg 30 minutes before procedure (no xanax that day) 2 tablet 0    acetaminophen-codeine 300-30 MG Oral Tab Take 1 tablet by mouth 2 (two) times daily as needed for Pain. 10 tablet 0    ondansetron (ZOFRAN) 8 MG tablet TAKE 1 TABLET(8 MG) BY MOUTH EVERY 12 HOURS AS NEEDED FOR NAUSEA 30 tablet 1    pregabalin 75 MG Oral Cap Take 1 capsule (75 mg total) by mouth 3 (three) times daily. OK to refill early (3/22/24) 90 capsule 2    tiZANidine 4 MG Oral Tab TAKE 1 TABLET(4 MG) BY MOUTH EVERY NIGHT AS NEEDED FOR PAIN OR SPASM. DO NOT TAKE WITH VALTREX can start after finishing flexeril 30 tablet 2    acidophilus-pectin Oral Cap Take 1 capsule by mouth daily.      ALPRAZolam 0.25 MG Oral Tab Take 1 tablet (0.25 mg total) by mouth 2 (two) times daily. 60 tablet 0    Butalbital-Acetaminophen  MG Oral Tab TAKE 1/2 TO 1 TABLET BY MOUTH DAILY AS NEEDED FOR TENSION HEADACHE 13 tablet 0    TRAZODONE 50 MG Oral Tab TAKE 1 TO 2 TABLETS(50  MG) BY MOUTH EVERY NIGHT 180 tablet 0    ARIPiprazole (ABILIFY) 2 MG Oral Tab Take 1 tablet (2 mg total) by mouth daily. 90 tablet 0    Potassium Chloride ER 10 MEQ Oral Tab CR Take 1 tablet (10 mEq total) by mouth daily. 30 tablet 5    oxybutynin ER 10 MG Oral Tablet 24 Hr TAKE 1 TABLET(10 MG) BY MOUTH DAILY 90 tablet 3    VALACYCLOVIR 1 G Oral Tab TAKE 2 TABLETS(2000 MG) BY MOUTH EVERY 12 HOURS FOR 1 DAY 30 tablet 0    ipratropium-albuterol 0.5-2.5 (3) MG/3ML Inhalation Solution Take 3 mL by nebulization every 4 (four) hours as needed. Use only if the albuterol inhalation albuterol is not working 25 each 1    Multiple  Vitamins-Minerals (BARIATRIC MULTIVITAMINS/IRON) Oral Cap Take by mouth As Directed.      pantoprazole 40 MG Oral Tab EC Take 1 tablet (40 mg total) by mouth 2 (two) times daily.      Nystatin 875454 UNIT/GM External Powder Apply 1 Application. topically 4 (four) times daily. Apply to affected areas 30 g 1    ALBUTEROL 108 (90 Base) MCG/ACT Inhalation Aero Soln INHALE 2 PUFFS INTO THE LUNGS EVERY 4 HOURS AS NEEDED FOR WHEEZING OR SHORTNESS OF BREATH 8.5 g 1    loratadine 10 MG Oral Tab Take 1 tablet (10 mg total) by mouth daily.  0    alpha 1-proteinase inhibitor 1000 MG/20ML Intravenous Solution Inject into the vein once. weekly      UNITHROID 100 MCG Oral Tab TAKE 1 TABLET BY MOUTH EVERY MORNING WITH BREAKFAST WITH 88MCG FOR TOTAL DOSE OF 188MCG 30 tablet 5    SPIRIVA RESPIMAT 2.5 MCG/ACT Inhalation Aero Soln INHALE 2 PUFFS INTO THE LUNGS DAILY 12 g 2    UNITHROID 88 MCG Oral Tab TAKE 1 TABLET BY MOUTH EVERY MORNING BEFORE BREAKFAST 30 tablet 9    Calcium Carb-Cholecalciferol (CALCIUM 600/VITAMIN D3) 600-800 MG-UNIT Oral Tab Take 1 tablet by mouth 3 (three) times daily.      SYMBICORT 160-4.5 MCG/ACT Inhalation Aerosol INHALE 2 PUFFS INTO THE LUNGS TWICE DAILY 3 Inhaler 3    Cholecalciferol (VITAMIN D) 50 MCG (2000 UT) Oral Cap Take 1 capsule (2,000 Units total) by mouth in the morning and 1 capsule (2,000 Units total) before bedtime.      magnesium 250 MG Oral Tab Take 1 tablet (250 mg total) by mouth daily.      aspirin 81 MG Oral Tab Take 1 tablet (81 mg total) by mouth daily.      methylPREDNISolone (MEDROL) 4 MG Oral Tablet Therapy Pack Take as directed (Patient not taking: Reported on 11/20/2024) 21 tablet 0    Lisdexamfetamine Dimesylate (VYVANSE) 60 MG Oral Cap Take 1 capsule (60 mg total) by mouth daily. (Patient not taking: Reported on 11/20/2024) 30 capsule 0    [START ON 12/12/2024] Lisdexamfetamine Dimesylate (VYVANSE) 60 MG Oral Cap Take 1 capsule (60 mg total) by mouth daily. (Patient not taking:  Reported on 11/20/2024) 30 capsule 0    [START ON 1/12/2025] Lisdexamfetamine Dimesylate (VYVANSE) 60 MG Oral Cap Take 1 capsule (60 mg total) by mouth daily. (Patient not taking: Reported on 11/20/2024) 30 capsule 0      Past Medical History:    Abdominal discomfort in right lower quadrant    Abdominal pain    Abnormal CT scan, esophagus    Acute deep vein thrombosis (DVT) of left lower extremity (HCC)    Alpha-1-antitrypsin deficiency (HCC)    Anxiety    Arrhythmia    RIGHT BUNDLE BRACH BLOCK     Arthritis    Asherman syndrome    Asthma (HCC)    Back pain    I have Spinal Stenosis that has gotten worse over the years.    Bloating    Calculus of kidney    Cancer (HCC)    Thyroid    Change in hair    Chest pain    Chronic bronchiolitis (HCC)    Chronic cough    Constipation    COPD (chronic obstructive pulmonary disease) (HCC)    no Oxygen    COVID-19 virus infection    Depression    Diarrhea, unspecified    Disorder of thyroid    Diverticulosis    Easy bruising    Endometriosis    Esophageal reflux    Fatigue    Food intolerance    Frequent use of laxatives    Gastric ulcer    Headache disorder    Hemorrhoids    History of gastric bypass    Hoarseness, chronic    Hx of gastric bypass    Hx of motion sickness    Hydronephrosis    Hypokalemia    Hypothyroidism    Infertility, female    Intertrigo    Irregular bowel habits    Loss of appetite    Migraines    Nausea    Nephrolithiasis    Organic hypersomnia, unspecified    AHI 2 RDI 2 REM AHI 6 SaO2 curtis 89%    Osteoarthritis    Painful urination    Pancreatitis (HCC)    Pneumonia due to organism    PONV (postoperative nausea and vomiting)    Problems with swallowing    PUD (peptic ulcer disease)    Rib contusion, left, initial encounter    Severe persistent asthma with exacerbation (HCC)    Shortness of breath    Sleep disturbance    Stented coronary artery    Stress    Thyroid cancer (HCC)    Visual impairment    glasses    Vocal cord dysfunction    Wears glasses     Weight gain    Weight loss      Social History:  Social History     Socioeconomic History    Marital status:    Tobacco Use    Smoking status: Never     Passive exposure: Past    Smokeless tobacco: Never   Vaping Use    Vaping status: Never Used   Substance and Sexual Activity    Alcohol use: Not Currently    Drug use: No    Sexual activity: Yes     Partners: Male   Other Topics Concern     Service No    Blood Transfusions No    Caffeine Concern No    Occupational Exposure No    Hobby Hazards No    Sleep Concern No    Stress Concern No    Weight Concern No    Special Diet No    Back Care No    Exercise Yes    Bike Helmet No    Seat Belt Yes    Self-Exams No   Social History Narrative    ** Merged History Encounter **          Social Drivers of Health     Housing Stability: Low Risk  (9/11/2023)    [facility]    Housing Stability   Recent Concern: Housing Stability - At Risk (8/18/2023)    [facility] [email or web address] [email or web address]    [facility]        REVIEW OF SYSTEMS:   GENERAL HEALTH: feels well otherwise  SKIN: denies any unusual skin lesions or rashes  RESPIRATORY: denies shortness of breath with exertion  CARDIOVASCULAR: denies chest pain on exertion  GI: denies abdominal pain and denies heartburn  NEURO: denies headaches  Musculoskeletal:  see above  EXAM:   No vitals taken due to tele-visit.  30 minutes time was spent reviewing patient's inquiry, patient's record including prior labs, developing management plan and ordering tests and prescriptions.    Full exam was not done secondary to virtual visit.  Reviewed medications, problem list and allergies.  GENERAL: Patient is speaking in full sentences no increased work in breathing patient is alert and orientated x3.    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  ASSESSMENT AND PLAN:     Encounter Diagnosis   Name Primary?    Acute left-sided low back pain without sciatica Yes       No orders of the defined types were placed in this encounter.      Meds & Refills for this Visit:  Requested Prescriptions     Signed Prescriptions Disp Refills    diazePAM (VALIUM) 5 MG Oral Tab 2 tablet 0     Sig: Take 5-10 mg 30 minutes before procedure (no xanax that day)    acetaminophen-codeine 300-30 MG Oral Tab 10 tablet 0     Sig: Take 1 tablet by mouth 2 (two) times daily as needed for Pain.       Imaging & Consults:  None  1. Acute left-sided low back pain without sciatica  Valium to be used on the day of the MRI and no Xanax that day.  No drinking or driving with the Valium  will drive her to MRI  Use, risks, benefits and precautions of codeine discussed. Including not to drive, operate machinery or drink alcohol when taking this medication.  Advised of risk of addiction to codeine avoid using with benzodiazepine due to increased risk for respiratory failure.    [facility] [place], [facility] by [facility] Reviewed.  Continue with back exercises given by chiropractor  Calling [facility] to get location for her to do MRI sooner  Start Medrol Dosepak as instructed by pain clinic reviewed risks advised importance of taking it with full stomach and to avoid lying down afterwards.  No nonsteroidals with Medrol Dosepak  - diazePAM (VALIUM) 5 MG Oral Tab; Take 5-10 mg 30 minutes before procedure (no xanax that day)  Dispense: 2 tablet; Refill: 0  - acetaminophen-codeine 300-30 MG Oral Tab; Take 1 tablet by mouth 2 (two) times daily as needed for Pain.  Dispense: 10 tablet; Refill: 0    Provider patient relationship has had a longitudinal long term relationship to address and treat complex conditions.    The patient indicates understanding of these issues and agrees to the plan.  The patient is asked to return  a complete physical scheduled for 12/23/2024 or as needed.    This note was created by [employer] voice recognition. Errors in content may be related to improper recognition by the system; efforts to review and correct have been done but errors may still exist.  Note to patient: The 21st Century Cures Act makes medical notes like these available to patients in the interest of transparency. However, be advised this is a medical document. It is intended as peer to peer communication. It is written in medical language and may contain abbreviations or verbiage that are unfamiliar. It may appear blunt or direct. Medical documents are intended to carry relevant information, facts as evident, and the clinical opinion of the practitioner.

## 2024-11-25 ENCOUNTER — OFFICE VISIT (OUTPATIENT)
Dept: RHEUMATOLOGY | Facility: CLINIC | Age: 44
End: 2024-11-25
Payer: COMMERCIAL

## 2024-11-25 VITALS
HEART RATE: 92 BPM | DIASTOLIC BLOOD PRESSURE: 58 MMHG | SYSTOLIC BLOOD PRESSURE: 120 MMHG | TEMPERATURE: 98 F | RESPIRATION RATE: 16 BRPM | OXYGEN SATURATION: 99 % | WEIGHT: 177 LBS | BODY MASS INDEX: 26.52 KG/M2 | HEIGHT: 68.5 IN

## 2024-11-25 DIAGNOSIS — R53.82 CHRONIC FATIGUE: ICD-10-CM

## 2024-11-25 DIAGNOSIS — M25.50 POLYARTHRALGIA: ICD-10-CM

## 2024-11-25 DIAGNOSIS — G44.221 CHRONIC TENSION-TYPE HEADACHE, INTRACTABLE: ICD-10-CM

## 2024-11-25 DIAGNOSIS — G89.29 CHRONIC PAIN OF RIGHT KNEE: ICD-10-CM

## 2024-11-25 DIAGNOSIS — E88.01 ALPHA-1-ANTITRYPSIN DEFICIENCY (HCC): ICD-10-CM

## 2024-11-25 DIAGNOSIS — M25.561 CHRONIC PAIN OF RIGHT KNEE: ICD-10-CM

## 2024-11-25 DIAGNOSIS — R76.8 ANA POSITIVE: Primary | ICD-10-CM

## 2024-11-25 DIAGNOSIS — F41.1 GAD (GENERALIZED ANXIETY DISORDER): ICD-10-CM

## 2024-11-25 DIAGNOSIS — H04.123 DRY EYE SYNDROME OF BOTH EYES: ICD-10-CM

## 2024-11-25 PROCEDURE — 99214 OFFICE O/P EST MOD 30 MIN: CPT | Performed by: INTERNAL MEDICINE

## 2024-11-25 PROCEDURE — 3078F DIAST BP <80 MM HG: CPT | Performed by: INTERNAL MEDICINE

## 2024-11-25 PROCEDURE — 3008F BODY MASS INDEX DOCD: CPT | Performed by: INTERNAL MEDICINE

## 2024-11-25 PROCEDURE — 3074F SYST BP LT 130 MM HG: CPT | Performed by: INTERNAL MEDICINE

## 2024-11-25 RX ORDER — BUTALBITAL AND ACETAMINOPHEN 325; 50 MG/1; MG/1
TABLET ORAL
Qty: 13 TABLET | Refills: 0 | Status: SHIPPED | OUTPATIENT
Start: 2024-11-25

## 2024-11-25 RX ORDER — ALPRAZOLAM 0.25 MG/1
0.25 TABLET ORAL 2 TIMES DAILY
Qty: 60 TABLET | Refills: 0 | Status: SHIPPED | OUTPATIENT
Start: 2024-11-25

## 2024-11-25 NOTE — PROGRESS NOTES
?  RHEUMATOLOGY FOLLOW UP   Date of visit: 11/25/2024  ?  Chief Complaint   Patient presents with    Follow - Up     One year f/u. Feeling ok. No joint pain or swelling. Still having the dry eyes and dry skin. No other new symptoms. Converted rapid score of 3.7     ASSESSMENT, DISCUSSION & PLAN   Assessment:  1. EVETTE positive    2. Polyarthralgia    3. Dry eye syndrome of both eyes    4. Chronic fatigue    5. Alpha-1-antitrypsin deficiency (HCC)    6. Chronic pain of right knee          Discussion:  [facility] 45 [facility] with a somewhat complicated history. She has been suffering from chronic lung issues for several years, was told at one point that she had asthma but then that she didn't. She would require at least once yearly hospitalizations with IV steroids as well as oral steroids as outpatient with temporary relief.  She was sent to a specialty clinic in [place] who after further work-up diagnosed the patient with alpha-1 antitrypsin deficiency as well as small airway disease.  As part of her work-up she was found to have an EVETTE that was positive but apparently remainder of LIDIA panel was negative.  Per records the only specific LIDIA's that I could see checked were in SSA and and SCL 70.  ANCA studies were negative at that time.  High-resolution CT does not show any signs of interstitial lung disease per the records that I reviewed.    At her last visit, her autoimmune workup was grossly negative with the exception of borderline low IgG levels.  She has been seen by immunology and reports workup was grossly negative.  Was told not concerned.    At this time, she does continue to have fatigue, dry eyes and dry skin.  Previously thought that the majority of her other symptoms are related to her ongoing GI issues and likely malnutrition.  Recommended that she continue following up with her PCP and GI. Did suffer campylobacter and e coli infection earlier this year. Prior low IgG resolved but  still with borderline low IgM. Seen by immunology int he past, no further recs.   For the dry eyes, recommended over-the-counter remedies including warm compresses.  Will get updated serologies but I expect these to be negative.  Okay to follow-up in another 1 year or sooner depending on what these test results show.  Encouraged to reach out if symptoms change/worsen in the meantime  Will hold off on immunosuppression for now.   Will get updated knee xrays due to some right medial knee pain     Patient verbalized understanding of above instructions. No further questions at this time.    Code selection for this visit was based on time spent (32min) on date of service in preparing to see the patient, obtaining and/or reviewing separately obtained history, performing a medically appropriate examination, counseling and educating the patient/family/caregiver, ordering medications or testing, referring and communicating with other healthcare providers, documenting clinical information in the E HR, independently interpreting results and communicating results to the patient/family/caregiver and care coordination with the patient's other providers.      Plan:  Diagnoses and all orders for this visit:    EVETTE positive  -     Anti-Nuclear Antibody (EVETTE) by IFA, Reflex Titer + Specific Antibodies; Future  -     Sed Rate, Westergren (Automated) [E]; Future  -     C-Reactive Protein [E]; Future  -     Complement C4, Serum; Future  -     Complement C3, Serum; Future  -     XR KNEE BILAT STANDING (CPT=73565); Future    Polyarthralgia  -     Anti-Nuclear Antibody (EVETTE) by IFA, Reflex Titer + Specific Antibodies; Future  -     Sed Rate, Westergren (Automated) [E]; Future  -     C-Reactive Protein [E]; Future  -     Complement C4, Serum; Future  -     Complement C3, Serum; Future    Dry eye syndrome of both eyes  -     Anti-Nuclear Antibody (EVETTE) by IFA, Reflex Titer + Specific Antibodies; Future  -     Sed Rate, Westergren (Automated)  [E]; Future  -     C-Reactive Protein [E]; Future  -     Complement C4, Serum; Future  -     Complement C3, Serum; Future    Chronic fatigue  -     Anti-Nuclear Antibody (EVETTE) by IFA, Reflex Titer + Specific Antibodies; Future  -     Sed Rate, Westergren (Automated) [E]; Future  -     C-Reactive Protein [E]; Future  -     Complement C4, Serum; Future  -     Complement C3, Serum; Future    Alpha-1-antitrypsin deficiency (HCC)    Chronic pain of right knee  -     XR KNEE BILAT STANDING (CPT=73565); Future            Return in about 1 year (around 11/25/2025).  ?  HPI   Debbi Prasad [facility] is a 44 year old female with the following active problems who was seen initially for evaluation of EVETTE positivity and hx of alpha 1 antitrypsin deficiency.     She has been doing okay.  Feels about the same.  Denies recent infections- suffered infection with campylobacter and e coli at the same time. Did not require hospitalized- given abx but had to switch once cultures came back.   + dry eyes- not using drops currently. Continues to follow with eye doctor regularly. Denies inflammation of the eyes  + dry mouth- thinks relates to medication   Denies oral or nasal ulcers.   + significant dry skin. Uses alcohol and fragrance free soaps and lotions   + rashes over abdomen- itching and can scarring.   + chronic fatigue, stable    On PPI 40mg now down to once daily   Continues with weekly infusions for the AATD. Has not been hospitalized since starting infusions.   + easy bruising, stable  States joint pain tolerable but feels like not as much muscle mass and less strength.   Is walking regularly but not doing weights due lower back pain and plans for MRI later this week.       HPI from initial consultation  referred for rheumatologic evaluation due to EVETTE positivity.     Has been suffering from ongoing pulmonary issues- told throughout the years that she has diminished breath sounds without wheezing but shortness of breath. PFTs have  shown asthma. Requires hospitalization yearly and requires steroids and would get worse when tapering steroids.   Tried dupixent without improvement.  Hx of gastric bypass surgery in 2017- lost over 100#. Has been able maintain weight (average 170 since that time, previously 280).   Was referred to specialty center in [place]- told inflammation in the lungs as well as small airway disease.   Had EVETTE 1:320 speckled but negative LIDIA panel.   Also found to have genetic marker for alpha 1 antitrypsin - started on prolastin infusions over the past one year. And has not required hospitalization since starting. Still gets some shortness of breath and feels infections take longer to heal from.      Fluctuates between constipation/diarrhea for years, unclear if worsened after surgery. Does have low protein levels.     + dry eyes and blurred vision, seen by eye doctor within 6 months but still having blurred vision. Dryness started after that appt. Can get eye pain without redness  + dry skin and brittle nails.     + hx of thyroid nodules s/p thyroidectomy and eventually dx with cancer in 2016  + Ford Glover syndrome- takes aspirin daily dx 2012- tilt table venogram due to pelvic pain  + neuropathy diffusely- takes lyrica, recently increased due to abdominal pain but has not helped.   + nausea without vomiting, takes zofran- ever since surgery.     + joint pain in knees, can get swelling in the R>L. Can be hard to ambulate at times and can get some crepitus.   + stiffness in joints of hands, can swell as well  + swelling in the mornings, lasts for a few hours- puffiness over PIPs. Not tender to the touch.   + varicose veins in the legs   + hair loss/thinning- worsened over the past one year ?multifactorial   + some photosensitivity over the anterior chest and gets blotchy easily- never scarring   + hx of right pinky finger turned purple- told raynaud's. Only happened once and recently recurred in all fingers but not as  severe   + had clot in cord with son, but otherwise he is healthy   + hx of miscarriages- one at 12w, one at 5w; not able to conceive after those. Require second d&c due to retained products.   + hx of Asherman's syndrome   + LUQ pain constant- has plans to see GI tomorrow   + hx of pancreatitis and required gall bladder removal month later   + hx of plantar fasciitis in 2016 required surgery. Symptoms resolved after surgery (was prior to gastric bypass).   + hx of dental issues- requires fillings, root canal etc   + right side submandibular firmness when yawning, lasts for 4-5 min then resolves on its own     The patient denies oral or nasal ulcers, elevated or scarring rashes, prior hematologic abnormality, prior renal or liver disease, or history of seizures.  No history of prior blood clot in the legs or lungs, strokes or ischemic phenomenon.  Denies nonhealing ulcers on the fingertips, trouble swallowing, or severe acid reflux. (swallow study previously normal)   The patient denies any history of uveitis, nodular painful shin bruises, Achilles heel pain, psoriatic lesions, spooning or pitting of the nails.   There are no symptoms of severe dry mouth.    No fevers, chills, lymphadenopathy, night sweats, unexpected weight loss, bony pain, easy bruising or bleeding, or unexplained weakness.      Family hx:   Paternal grandmother with severe asthma/copd/lung issues  Mother is carrier for alpha 1 antitrypsin   Maternal aunt and uncle with Grave's disease       Past Medical History:  Past Medical History:    Abdominal discomfort in right lower quadrant    Abdominal pain    Abnormal CT scan, esophagus    Acute deep vein thrombosis (DVT) of left lower extremity (HCC)    Alpha-1-antitrypsin deficiency (HCC)    Anxiety    Arrhythmia    RIGHT BUNDLE BRACH BLOCK     Arthritis    Asherman syndrome    Asthma (HCC)    Back pain    I have Spinal Stenosis that has gotten worse over the years.    Bloating    Calculus of kidney     Cancer (HCC)    Thyroid    Change in hair    Chest pain    Chronic bronchiolitis (HCC)    Chronic cough    Constipation    COPD (chronic obstructive pulmonary disease) (HCC)    no Oxygen    COVID-19 virus infection    Depression    Diarrhea, unspecified    Disorder of thyroid    Diverticulosis    Easy bruising    Endometriosis    Esophageal reflux    Fatigue    Food intolerance    Frequent use of laxatives    Gastric ulcer    Headache disorder    Hemorrhoids    History of gastric bypass    Hoarseness, chronic    Hx of gastric bypass    Hx of motion sickness    Hydronephrosis    Hypokalemia    Hypothyroidism    Infertility, female    Intertrigo    Irregular bowel habits    Loss of appetite    Migraines    Nausea    Nephrolithiasis    Organic hypersomnia, unspecified    AHI 2 RDI 2 REM AHI 6 SaO2 curtis 89%    Osteoarthritis    Painful urination    Pancreatitis (HCC)    Pneumonia due to organism    PONV (postoperative nausea and vomiting)    Problems with swallowing    PUD (peptic ulcer disease)    Rib contusion, left, initial encounter    Severe persistent asthma with exacerbation (HCC)    Shortness of breath    Sleep disturbance    Stented coronary artery    Stress    Thyroid cancer (HCC)    Visual impairment    glasses    Vocal cord dysfunction    Wears glasses    Weight gain    Weight loss     Past Surgical History:  Past Surgical History:   Procedure Laterality Date    Ankle scope,part debridement Left 2015    Procedure: ARTHROSCOPY ANKLE WITH DEBRIDEMENT;  Surgeon: Marcial Evans MD;  Location: [facility]    Bronch thermoplasty 1 lobe Right 04/15/2021    Bronch thermoplasty 1 lobe Left 2021      2006    Cholecystectomy      Colonoscopy  2021    Colonoscopy      Colonoscopy,diagnostic  10/22/2013    Procedure: COLONOSCOPY, POSSIBLE BIOPSY, POSSIBLE POLYPECTOMY 48126;  Surgeon: Marcello Ferreira MD;  [facility]    Cta chest (dcp=49651)  2021    D &  c      x4    Egd  03/31/2021    Gastric bypass,obese<100cm magy-en-y  12/18/2017    DR. SEGAL    Gastrocnemius recession Left 06/19/2015    Procedure: GASTROC RECESSION FOOT;  Surgeon: Marcial Evans MD;  Location: [facility]    Hysterectomy  2013    Hysteroscopy  2011    Inj paravert f jnt l/s 1 lev Bilateral 12/14/2015    Procedure: FACET INJECTION UNDER FLUOROSCOPY;  Surgeon: Dusty Munson DO;  Location: [facility]    Laparoscopic cholecystectomy N/A 11/23/2016    Procedure: LAPAROSCOPIC CHOLECYSTECTOMY;  Surgeon: Dai Sanchez MD;  Location:  MAIN OR    Laparoscopy,diagnostic      Laparoscopy,diagnostic  06/25/2022    Lysis of adhesions  2010    M-sedaj by  phys perfrmg svc 5+ yr Bilateral 12/14/2015    Procedure: FACET INJECTION UNDER FLUOROSCOPY;  Surgeon: Dusty Munson DO;  Location: [facility]    Domonique biopsy stereo nodule 1 site right (cpt=19081)  07/2023    stromal fibrosis    Oophorectomy Left     Other  NECK SCAR REVISION    Other surgical history      laparoscopies x2    Removal of ovarian cyst(s) Right 07/08/2020    Surgical stent Bilateral 03/2015    Bilateral iliac stents    Thyroidectomy  04/2011    Total abdom hysterectomy      Upper gi endo no barretts  12/2015    normal    Upper gi endoscopy performed  01/25/2017    Rivera Donaldson M.D.    Upper gi endoscopy,biopsy N/A 12/19/2015    Procedure: ESOPHAGOGASTRODUODENOSCOPY, POSSIBLE BIOPSY, POSSIBLE POLYPECTOMY 20607;  Surgeon: Brayden Giordano MD;  Location: [facility]     Family History:  Family History   Problem Relation Age of Onset    Heart Disorder Father     Heart Disease Father     Heart Attack Father     Other (Other) Mother         ALLIE    Breast Cancer Maternal Grandmother 75    Cancer Maternal Grandfather         blood cancer    Diabetes Paternal Grandmother     Heart Disorder Paternal Grandmother     Heart Disease Paternal Grandmother     Asthma Paternal Grandmother      Heart Attack Paternal Grandmother     Stroke Neg      Social History:  Social History     Socioeconomic History    Marital status:    Tobacco Use    Smoking status: Never     Passive exposure: Past    Smokeless tobacco: Never   Vaping Use    Vaping status: Never Used   Substance and Sexual Activity    Alcohol use: Not Currently    Drug use: No    Sexual activity: Yes     Partners: Male   Other Topics Concern     Service No    Blood Transfusions No    Caffeine Concern No    Occupational Exposure No    Hobby Hazards No    Sleep Concern No    Stress Concern No    Weight Concern No    Special Diet No    Back Care No    Exercise Yes    Bike Helmet No    Seat Belt Yes    Self-Exams No   Social History Narrative    ** Merged History Encounter **          Social Drivers of Health     Housing Stability: Low Risk  (9/11/2023)    [facility]    H[facility] Stability   Recent Concern: Housing Stability - At Risk (8/18/2023)    [facility] [employer]     Medications:  Outpatient Medications Marked as Taking for the 11/25/24 encounter (Office Visit) with Annika Pizano,    Medication Sig Dispense Refill    diazePAM (VALIUM) 5 MG Oral Tab Take 5-10 mg 30 minutes before procedure (no xanax that day) 2 tablet 0    Lisdexamfetamine Dimesylate (VYVANSE) 60 MG Oral Cap Take 1 capsule (60 mg total) by mouth daily. 30 capsule 0    ondansetron (ZOFRAN) 8 MG tablet TAKE 1 TABLET(8 MG) BY MOUTH EVERY 12 HOURS AS NEEDED FOR NAUSEA 30 tablet 1    pregabalin 75 MG Oral Cap Take 1 capsule (75 mg total) by mouth 3 (three) times daily. OK to refill early (3/22/24) 90 capsule 2    tiZANidine 4 MG Oral Tab TAKE 1 TABLET(4 MG) BY MOUTH EVERY NIGHT AS NEEDED FOR PAIN OR SPASM. DO NOT TAKE WITH VALTREX can start after finishing flexeril 30 tablet 2    acidophilus-pectin Oral Cap Take 1 capsule by mouth daily.      [DISCONTINUED] ALPRAZolam  0.25 MG Oral Tab Take 1 tablet (0.25 mg total) by mouth 2 (two) times daily. 60 tablet 0    [DISCONTINUED] Butalbital-Acetaminophen  MG Oral Tab TAKE 1/2 TO 1 TABLET BY MOUTH DAILY AS NEEDED FOR TENSION HEADACHE 13 tablet 0    TRAZODONE 50 MG Oral Tab TAKE 1 TO 2 TABLETS(50  MG) BY MOUTH EVERY NIGHT 180 tablet 0    ARIPiprazole (ABILIFY) 2 MG Oral Tab Take 1 tablet (2 mg total) by mouth daily. 90 tablet 0    Potassium Chloride ER 10 MEQ Oral Tab CR Take 1 tablet (10 mEq total) by mouth daily. 30 tablet 5    oxybutynin ER 10 MG Oral Tablet 24 Hr TAKE 1 TABLET(10 MG) BY MOUTH DAILY 90 tablet 3    VALACYCLOVIR 1 G Oral Tab TAKE 2 TABLETS(2000 MG) BY MOUTH EVERY 12 HOURS FOR 1 DAY 30 tablet 0    ipratropium-albuterol 0.5-2.5 (3) MG/3ML Inhalation Solution Take 3 mL by nebulization every 4 (four) hours as needed. Use only if the albuterol inhalation albuterol is not working 25 each 1    Multiple Vitamins-Minerals (BARIATRIC MULTIVITAMINS/IRON) Oral Cap Take by mouth As Directed.      pantoprazole 40 MG Oral Tab EC Take 1 tablet (40 mg total) by mouth 2 (two) times daily.      Nystatin 763674 UNIT/GM External Powder Apply 1 Application. topically 4 (four) times daily. Apply to affected areas 30 g 1    ALBUTEROL 108 (90 Base) MCG/ACT Inhalation Aero Soln INHALE 2 PUFFS INTO THE LUNGS EVERY 4 HOURS AS NEEDED FOR WHEEZING OR SHORTNESS OF BREATH 8.5 g 1    loratadine 10 MG Oral Tab Take 1 tablet (10 mg total) by mouth daily.  0    alpha 1-proteinase inhibitor 1000 MG/20ML Intravenous Solution Inject into the vein once. weekly      UNITHROID 100 MCG Oral Tab TAKE 1 TABLET BY MOUTH EVERY MORNING WITH BREAKFAST WITH 88MCG FOR TOTAL DOSE OF 188MCG 30 tablet 5    SPIRIVA RESPIMAT 2.5 MCG/ACT Inhalation Aero Soln INHALE 2 PUFFS INTO THE LUNGS DAILY 12 g 2    UNITHROID 88 MCG Oral Tab TAKE 1 TABLET BY MOUTH EVERY MORNING BEFORE BREAKFAST 30 tablet 9    Calcium Carb-Cholecalciferol (CALCIUM 600/VITAMIN D3) 600-800 MG-UNIT  Oral Tab Take 1 tablet by mouth 3 (three) times daily.      SYMBICORT 160-4.5 MCG/ACT Inhalation Aerosol INHALE 2 PUFFS INTO THE LUNGS TWICE DAILY 3 Inhaler 3    Cholecalciferol (VITAMIN D) 50 MCG (2000 UT) Oral Cap Take 1 capsule (2,000 Units total) by mouth in the morning and 1 capsule (2,000 Units total) before bedtime.      magnesium 250 MG Oral Tab Take 1 tablet (250 mg total) by mouth daily.      aspirin 81 MG Oral Tab Take 1 tablet (81 mg total) by mouth daily.       Modified Medications    Modified Medication Previous Medication    ALPRAZOLAM 0.25 MG ORAL TAB ALPRAZolam 0.25 MG Oral Tab       TAKE 1 TABLET(0.25 MG) BY MOUTH TWICE DAILY    Take 1 tablet (0.25 mg total) by mouth 2 (two) times daily.    BUTALBITAL-ACETAMINOPHEN  MG ORAL TAB Butalbital-Acetaminophen  MG Oral Tab       TAKE 1/2 TO 1 TABLET BY MOUTH DAILY AS NEEDED FOR TENSION HEADACHE    TAKE 1/2 TO 1 TABLET BY MOUTH DAILY AS NEEDED FOR TENSION HEADACHE     Medications Discontinued During This Encounter   Medication Reason    acetaminophen-codeine 300-30 MG Oral Tab     methylPREDNISolone (MEDROL) 4 MG Oral Tablet Therapy Pack        ?  ?  Allergies:  Allergies   Allergen Reactions    Adhesive Tape HIVES     Patient got welts when she use the adhesive tape for 48-hour Holter monitor.    Cephalosporins HIVES    Chocolate HIVES     Dark chocolate    Doxycycline HIVES     Hives and fever       Tramadol HIVES and RASH    Haloperidol ANXIETY    Metoclopramide ANXIETY     Feels like she is going to jump out of her skin    Toradol [Ketorolac Tromethamine] RASH    Norflex Tablets [Orphenadrine] OTHER (SEE COMMENTS)     Dilated [pupils ? / pt is not sure of reaction    Cheratussin Ac [Guaifenesin-Codeine] NAUSEA ONLY     Only in liquid form; tolerates gel caps    Nsaids OTHER (SEE COMMENTS)       Other reaction(s): GI UPSET  Hx of gastric bypass     ?  REVIEW OF SYSTEMS   ?  Review of Systems   Constitutional:  Positive for malaise/fatigue.  Negative for chills, fever and weight loss.   Eyes:  Positive for blurred vision. Negative for pain and redness.   Respiratory:  Positive for cough (more dry) and shortness of breath. Negative for hemoptysis and wheezing.         Stable   Cardiovascular:  Positive for leg swelling (varicose veins). Negative for chest pain and palpitations.   Gastrointestinal:  Positive for abdominal pain and blood in stool (intermittently). Negative for constipation, diarrhea, heartburn, nausea and vomiting.        Relates to ongoing GI issues   Genitourinary:  Positive for frequency and urgency. Negative for dysuria and hematuria.   Musculoskeletal:  Positive for joint pain. Negative for back pain, myalgias and neck pain.   Skin:  Positive for rash. Negative for itching.   Neurological:  Positive for weakness (generalized) and headaches. Negative for dizziness, tingling, focal weakness and seizures.   Endo/Heme/Allergies:  Positive for environmental allergies. Bruises/bleeds easily.   Psychiatric/Behavioral:  Negative for depression. The patient is nervous/anxious and has insomnia.      PHYSICAL EXAM   Today's Vitals:  Temperature Blood Pressure Heart Rate Resp Rate SpO2   Temp: 97.8 °F (36.6 °C) BP: 120/58 Pulse: 92 Resp: 16 SpO2: 99 %   ?  Current Weight Height BMI BSA Pain   Wt Readings from Last 1 Encounters:   11/25/24 177 lb (80.3 kg)    Height: 5' 8.5\" (174 cm) Body mass index is 26.52 kg/m². Body surface area is 1.95 meters squared.         Physical Exam  Vitals and nursing note reviewed.   Constitutional:       General: She is not in acute distress.     Appearance: She is well-developed. She is not diaphoretic.   HENT:      Head: Normocephalic.   Eyes:      General: No scleral icterus.     Extraocular Movements: Extraocular movements intact.      Conjunctiva/sclera: Conjunctivae normal.   Neck:      Vascular: No JVD.      Trachea: No tracheal deviation.   Cardiovascular:      Rate and Rhythm: Normal rate and regular  rhythm.      Heart sounds: Normal heart sounds. No murmur heard.  Pulmonary:      Effort: Pulmonary effort is normal. No respiratory distress.      Breath sounds: Normal breath sounds. No wheezing.   Musculoskeletal:         General: Tenderness present. No swelling or deformity.      Cervical back: Neck supple.      Comments: No evidence of heberden or daniella nodes of any of the fingers, no basilar joint tenderness of the 1st CMC bilaterally.  No swelling, tenderness, redness or restriction of motion of the DIPs, PIPs or MCPs, wrists, elbows, ankles  Bilateral shoulders with full ROM, no evidence of impingement with provocative maneuvers.  Bilateral knees with medial and lateral joint line tenderness (R>L), mild crepitus, no effusion. Some right knee bony enlargement noted   Lymphadenopathy:      Cervical: No cervical adenopathy.   Skin:     General: Skin is warm and dry.      Findings: No erythema or rash.      Comments: Dry skin diffusely  Some cracked skin over finger tips  No fingernail pitting or onycholysis  No digital pits but periungal erythema preset and around toes.    Neurological:      Mental Status: She is alert and oriented to person, place, and time.      Cranial Nerves: No cranial nerve deficit.      Gait: Gait normal.   Psychiatric:         Mood and Affect: Mood normal.         Behavior: Behavior normal.       ?  Radiology review:  XR LUMBAR SPINE COMPLETE W/FLEX + EXT (CPT=72114)    Result Date: 11/18/2024  CONCLUSION:  Mild degenerative changes in the lower lumbar spine. If the patient's symptoms persist or worsen, consider further assessment with MRI.   LOCATION:  [facility]   Dictated by (CST): Stromberg, LeRoy, MD on 11/18/2024 at 7:31 PM     Finalized by (CST): Stromberg, LeRoy, MD on 11/18/2024 at 7:34 PM       PROCEDURE:  XR CHEST PA + LAT CHEST (CPT=71046)     INDICATIONS:  Coughing  R06.89 Decreased lung sounds     COMPARISON:  [facility], XR, XR CHEST PA + LAT CHEST (CPT=71046), 9/14/2023,  4:11 PM.     TECHNIQUE:  PA and lateral chest radiographs were obtained.     PATIENT STATED HISTORY: (As transcribed by Technologist)  Productive cough started about 2 weeks ago, now cough is both dry and productive.  Dimished breath sounds on the right, shortness of breath.  Pt has alpha-1 antitrypsin defeinciency.         FINDINGS:    LUNGS:  No focal consolidation.  Normal vascularity.  CARDIAC:  Normal size cardiac silhouette.  MEDIASTINUM:  Normal.  PLEURA:  Normal.  No pleural effusions.  BONES:  Normal for age.      Impression   CONCLUSION:  Normal        LOCATION:  [facility]        Dictated by (CST): Marcello Gonzalez MD on 11/15/2023 at 8:54 AM      Finalized by (CST): Marcello Gonzalez MD on 11/15/2023 at 8:54 AM      Narrative   PROCEDURE:  XR TIBIA + FIBULA (2 VIEWS), LEFT (CPT=73590)     TECHNIQUE:  AP and lateral views of the tibia and fibula were obtained.     COMPARISON:  None.     INDICATIONS:  W10.8XXA Fall down stairs, initial encounter S89.92XA Injury of left tibia M89.8X6 Pain in left tibia     PATIENT STATED HISTORY: (As transcribed by Technologist)  On 10/14/23 patient fell down the stairs and injured her left leg.  She was seen here and diagnosed with a sprain.  She was seen at [facility] a few days later due to continued pain and swelling and was   diagnosed with a fascial injury.  She has been wearing a boot but continues to have pain to the plantar aspect of the foot, the lateral malleolus and up the medial aspect of the lower leg.         FINDINGS:    No acute fracture or dislocation is seen.  There is normal alignment.  Normal bone mineral density.  No periosteal reaction is seen.  Question mild soft tissue swelling noted of the distal aspect of the tibia anteriorly.  If clinical symptoms persist  then consider follow-up imaging.                   Impression   CONCLUSION:  See above.     A preliminary report was provided by [facility].           LOCATION:  [facility]        Dictated by (CST): Laura  MD Ryne on 10/25/2023 at 9:05 AM      Finalized by (CST): Ryne Sanchez MD on 10/25/2023 at 9:07 AM       PROCEDURE:  XR FOOT, COMPLETE (MIN 3 VIEWS), LEFT (CPT=73630)     TECHNIQUE:  AP, oblique, and lateral views were obtained.     COMPARISON:  [facility], XR, XR ANKLE (MIN 3 VIEWS), LEFT (CPT=73610), 10/14/2023, 1:28 PM.  [facility], XR, XR FOOT, COMPLETE (MIN 3 VIEWS), LEFT (CPT=73630), 9/17/2013, 4:46 PM.     INDICATIONS:  fell down 3-5 steps left foot an lower back pain     PATIENT STATED HISTORY: (As transcribed by Technologist)  Patient has been having medial and plantar foot/ankle pain after falling down 3-5 steps of stairs prior to ER arrival. Rates pain at a 6 out of 10 in resting position and at a 9 out of 10 when  trying to ambulate.        Impression   CONCLUSION:  Normal joint spaces.  No acute fracture.  There is a 5 mm enthesophyte at the origin of plantar fascia and a small 3 mm enthesophyte at the insertion of the left Achilles tendon.        LOCATION:  [facility]        Dictated by (CST): Brandon Barnett MD on 10/14/2023 at 1:44 PM      Finalized by (CST): Brandon Barnett MD on 10/14/2023 at 1:46 PM        Labs:  Lab Results   Component Value Date    WBC 5.8 10/31/2024    RBC 4.32 10/31/2024    HGB 13.8 10/31/2024    HCT 39.9 10/31/2024    .0 10/31/2024    MPV 9.6 10/04/2018    MCV 92.4 10/31/2024    MCH 31.9 10/31/2024    MCHC 34.6 10/31/2024    RDW 12.4 10/31/2024    NEPRELIM 2.90 10/31/2024    NEUTABS 6.90 (H) 10/20/2015    LYMPHABS 2.58 10/20/2015    EOSABS 0.10 10/20/2015    BASABS 0.00 10/20/2015    NEUT 64 10/20/2015    LYMPH 25 10/20/2015    MON 7 10/20/2015    EOS 1 10/20/2015    BASO 0 10/20/2015    NEPERCENT 50.3 10/31/2024    LYPERCENT 36.7 10/31/2024    MOPERCENT 9.4 10/31/2024    EOPERCENT 2.9 10/31/2024    BAPERCENT 0.7 10/31/2024    NE 2.90 10/31/2024    LYMABS 2.12 10/31/2024    MOABSO 0.54 10/31/2024    EOABSO 0.17 10/31/2024    BAABSO 0.04 10/31/2024     Lab Results    Component Value Date    GLU 88 10/31/2024    BUN 9 10/31/2024    BUNCREA 4.7 (L) 07/28/2021    CREATSERUM 0.75 10/31/2024    ANIONGAP 3 10/31/2024     11/08/2021    GFRNAA 108 05/09/2022    GFRAA 125 05/09/2022    CA 8.6 10/31/2024    OSMOCALC 282 10/31/2024    ALKPHO 82 10/31/2024    AST 16 10/31/2024    ALT 37 10/31/2024    BILT 0.2 10/31/2024    TP 7.0 10/31/2024    ALB 3.6 10/31/2024    GLOBULIN 3.4 10/31/2024     10/31/2024    K 3.9 10/31/2024     10/31/2024    CO2 29.0 10/31/2024       Additional Labs:  01/2023  Vitamin D 66 normal  Folate 42.3  B12 1200 elevated  U1 RNP negative  RNP 70 negative  EVETTE screen negative  Myositis antibody panel negative  C3 110 normal  C4 26.6  ESR 9 normal  CRP normal  IgG subclasses grossly normal specifically IgG4 30 normal  IgA, IgM normal  IgG 675 borderline low    08/2022 records reviewed from [facility] [place] in [place]  CCP negative  Extractable nuclear antigens negative (no specifications as to which EVETTE done)  SCL 70 negative ANCA negative  Alpha-1 antitrypsin serum 131 normal  EVETTE dilution 1 1: 320 speckled  EVETTE dilution 2 negative  HIV nonreactive  TSH 0.02 low  CBC grossly normal  Alpha 1 antitrypsin phenotype PI*MZ  Respiratory viral panel negative  HCV nonreactive  Vitamin D80.6  HBV surface antibody reactive  SSA negative  MPO negative  [place] allergy panel grossly negative    VQ scan  Impression:  Low probability for pulmonary embolus.    US abdominal RUQ  Impression:  Normal right upper quadrant ultrasound    CT chest without contrast high resolution/extended  Impression:  1.  Normal bronchial wall thickness.  No significant air trapping on an expiratory images.  No significant mucous plugging.  2.  Few small pulmonary nodules measuring up to 3 mm in size.  The patient is low risk for pulmonary malignancy no further specific follow-up recommendations.  3.  Few areas of mild air feeling the thoracic esophagus, which would raise the  possibility of esophageal dysmotility and/or reflux.  4.  Postsurgical changes from prior [place]-en-Y type gastric bypass surgery and prior cholecystectomy    CT sinuses  Impression:  Mild mucosal thickening in the right maxillary sinus, and minimal mucosal thickening in the left maxillary sinus.  Otherwise patent paranasal sinuses.        Annika Pizano,   [facility]  11/25/2024

## 2024-11-26 ENCOUNTER — HOSPITAL ENCOUNTER (OUTPATIENT)
Dept: GENERAL RADIOLOGY | Age: 44
Discharge: HOME | End: 2024-11-26
Attending: INTERNAL MEDICINE
Payer: COMMERCIAL

## 2024-11-26 ENCOUNTER — OFFICE VISIT (OUTPATIENT)
Dept: FAMILY MEDICINE CLINIC | Facility: CLINIC | Age: 44
End: 2024-11-26
Payer: COMMERCIAL

## 2024-11-26 ENCOUNTER — HOSPITAL ENCOUNTER (OUTPATIENT)
Dept: ULTRASOUND IMAGING | Age: 44
Discharge: HOME | End: 2024-11-26
Attending: FAMILY MEDICINE
Payer: COMMERCIAL

## 2024-11-26 ENCOUNTER — HOSPITAL ENCOUNTER (OUTPATIENT)
Dept: MAMMOGRAPHY | Age: 44
Discharge: HOME | End: 2024-11-26
Attending: FAMILY MEDICINE
Payer: COMMERCIAL

## 2024-11-26 ENCOUNTER — PATIENT MESSAGE (OUTPATIENT)
Dept: FAMILY MEDICINE CLINIC | Facility: CLINIC | Age: 44
End: 2024-11-26

## 2024-11-26 ENCOUNTER — LAB ENCOUNTER (OUTPATIENT)
Dept: LAB | Age: 44
End: 2024-11-26
Attending: FAMILY MEDICINE
Payer: COMMERCIAL

## 2024-11-26 VITALS
HEART RATE: 94 BPM | HEIGHT: 68.5 IN | DIASTOLIC BLOOD PRESSURE: 56 MMHG | BODY MASS INDEX: 25.92 KG/M2 | TEMPERATURE: 99 F | WEIGHT: 173 LBS | SYSTOLIC BLOOD PRESSURE: 98 MMHG | RESPIRATION RATE: 18 BRPM | OXYGEN SATURATION: 98 %

## 2024-11-26 DIAGNOSIS — D50.8 IRON DEFICIENCY ANEMIA SECONDARY TO INADEQUATE DIETARY IRON INTAKE: ICD-10-CM

## 2024-11-26 DIAGNOSIS — H04.123 DRY EYE SYNDROME OF BOTH EYES: ICD-10-CM

## 2024-11-26 DIAGNOSIS — Z98.84 HISTORY OF ROUX-EN-Y GASTRIC BYPASS: ICD-10-CM

## 2024-11-26 DIAGNOSIS — M54.42 ACUTE LEFT-SIDED LOW BACK PAIN WITH LEFT-SIDED SCIATICA: ICD-10-CM

## 2024-11-26 DIAGNOSIS — G89.29 CHRONIC PAIN OF RIGHT KNEE: ICD-10-CM

## 2024-11-26 DIAGNOSIS — M25.561 CHRONIC PAIN OF RIGHT KNEE: ICD-10-CM

## 2024-11-26 DIAGNOSIS — F43.9 STRESS: ICD-10-CM

## 2024-11-26 DIAGNOSIS — E44.1 MILD PROTEIN-CALORIE MALNUTRITION (HCC): ICD-10-CM

## 2024-11-26 DIAGNOSIS — R92.343 EXTREMELY DENSE TISSUE OF BOTH BREASTS ON MAMMOGRAPHY: Primary | ICD-10-CM

## 2024-11-26 DIAGNOSIS — R76.8 ANA POSITIVE: ICD-10-CM

## 2024-11-26 DIAGNOSIS — E88.01 ALPHA-1-ANTITRYPSIN DEFICIENCY (HCC): ICD-10-CM

## 2024-11-26 DIAGNOSIS — M25.50 POLYARTHRALGIA: ICD-10-CM

## 2024-11-26 DIAGNOSIS — R53.82 CHRONIC FATIGUE: ICD-10-CM

## 2024-11-26 DIAGNOSIS — R63.2 BINGE EATING: ICD-10-CM

## 2024-11-26 DIAGNOSIS — E55.9 VITAMIN D DEFICIENCY: ICD-10-CM

## 2024-11-26 DIAGNOSIS — F32.5 MAJOR DEPRESSION IN REMISSION (HCC): ICD-10-CM

## 2024-11-26 DIAGNOSIS — Z00.00 WELL ADULT EXAM: Primary | ICD-10-CM

## 2024-11-26 DIAGNOSIS — E87.6 HYPOKALEMIA: ICD-10-CM

## 2024-11-26 DIAGNOSIS — Z98.84 HISTORY OF BARIATRIC SURGERY: ICD-10-CM

## 2024-11-26 DIAGNOSIS — M35.1 MIXED CONNECTIVE TISSUE DISEASE (HCC): ICD-10-CM

## 2024-11-26 DIAGNOSIS — E89.0 POSTOPERATIVE HYPOTHYROIDISM: ICD-10-CM

## 2024-11-26 DIAGNOSIS — N63.21 MASS OF UPPER OUTER QUADRANT OF LEFT BREAST: ICD-10-CM

## 2024-11-26 DIAGNOSIS — F41.1 GAD (GENERALIZED ANXIETY DISORDER): ICD-10-CM

## 2024-11-26 DIAGNOSIS — E66.3 OVERWEIGHT WITH BODY MASS INDEX (BMI) 25.0-29.9: ICD-10-CM

## 2024-11-26 DIAGNOSIS — Z13.6 SCREENING FOR HEART DISEASE: ICD-10-CM

## 2024-11-26 DIAGNOSIS — Z79.899 MEDICATION MANAGEMENT: ICD-10-CM

## 2024-11-26 LAB
C3 SERPL-MCNC: 109.1 MG/DL (ref 90–170)
C4 SERPL-MCNC: 22 MG/DL (ref 12–36)
CRP SERPL-MCNC: <0.4 MG/DL (ref ?–0.5)
ERYTHROCYTE [SEDIMENTATION RATE] IN BLOOD: 10 MM/HR
IRON SATN MFR SERPL: 43 %
IRON SERPL-MCNC: 133 UG/DL
POTASSIUM SERPL-SCNC: 4.2 MMOL/L (ref 3.5–5.1)
TOTAL IRON BINDING CAPACITY: 311 UG/DL (ref 250–425)
TRANSFERRIN SERPL-MCNC: 246 MG/DL (ref 250–380)
VIT B12 SERPL-MCNC: >2000 PG/ML (ref 211–911)
VIT D+METAB SERPL-MCNC: 57.4 NG/ML (ref 30–100)

## 2024-11-26 PROCEDURE — 84425 ASSAY OF VITAMIN B-1: CPT

## 2024-11-26 PROCEDURE — 86140 C-REACTIVE PROTEIN: CPT

## 2024-11-26 PROCEDURE — 86038 ANTINUCLEAR ANTIBODIES: CPT

## 2024-11-26 PROCEDURE — 77062 BREAST TOMOSYNTHESIS BI: CPT | Performed by: FAMILY MEDICINE

## 2024-11-26 PROCEDURE — 82607 VITAMIN B-12: CPT

## 2024-11-26 PROCEDURE — 82306 VITAMIN D 25 HYDROXY: CPT

## 2024-11-26 PROCEDURE — 84132 ASSAY OF SERUM POTASSIUM: CPT

## 2024-11-26 PROCEDURE — 86160 COMPLEMENT ANTIGEN: CPT

## 2024-11-26 PROCEDURE — 85652 RBC SED RATE AUTOMATED: CPT

## 2024-11-26 PROCEDURE — 36415 COLL VENOUS BLD VENIPUNCTURE: CPT

## 2024-11-26 PROCEDURE — 83540 ASSAY OF IRON: CPT

## 2024-11-26 PROCEDURE — 76642 ULTRASOUND BREAST LIMITED: CPT | Performed by: FAMILY MEDICINE

## 2024-11-26 PROCEDURE — 83550 IRON BINDING TEST: CPT

## 2024-11-26 PROCEDURE — 73565 X-RAY EXAM OF KNEES: CPT | Performed by: INTERNAL MEDICINE

## 2024-11-26 PROCEDURE — 77066 DX MAMMO INCL CAD BI: CPT | Performed by: FAMILY MEDICINE

## 2024-11-26 NOTE — PROGRESS NOTES
Mammogram is normal repeat in one year.    Due to dense breasts bilaterally you would also benefit from doing a bilateral whole breast screening ultrasound for increased sensitivity for detection of breast cancer which could be obscured secondary to dense breasts.  Order placed if you would like to have this done.    Scheduling Instructions:  To schedule an appointment for your radiology test please call [facility] at [phone].      Sincerely,   Daniela More PA-C

## 2024-11-26 NOTE — PROGRESS NOTES
HPI:   Debbi Prasad [facility] is a 44 year old female who presents for a complete physical exam follow-up on persistent cough  and insomnia.      Chronic health issues:  Tension headaches---butalbital and tizanidine used for tension headaches gets them 3 per week     OMAR/anxiety--Xanax 1  bid for anxiety has counselor taking 1 mg of Abilify PHQ-9 is at a 5, OMAR-7 is at a 7.    Insomnia --- using tizanidine and trazodone as needed nightly has helped her sleep with recent back pain       alpha-1 deficiency--- weekly infusions alpha 1 proteinase inhibitor;  follows pulmonologist on Symbicort, Ventolin, Spiriva, DuoNeb as needed    Left sciatica---prednisone helped a little has appointment with pain clinic for follow-up after doing MRI 11/29/2024     History of bariatric surgery--- Vyvanse    Hypothyroidism/thyroid cancer----follows Dr. Ureña had a recent decrease in dose    History neuropathy diffuse/chronic pain abdomen-- uses Lyrica 3 times a day did help with symptoms    Complete physical    --- Specialists  Gynecologist through [facility] every 2-3 years seen 4/2024 referred to [facility] bladder surgery no longer pap smears   Pulmonologist Dr. Person 1-2 times a year 11/2024 had appointment PFT recent was Methacholine challenge negative   Rheumatologist Dr Pizano yesterday  Dr ureña thyroid  Dr Waller  issues  Dr Strong as needed bariatric surgeon    --- Preventative care  Derm yearly   Immunizations Tdap 9/12/2022, PCV 20 8/10/2022, defers COVID and flu vaccines   Dental exams UTD   Eye exams  every year   Exercise  Walking 10,0000 msteps daily   DIet  Protein Fair;life one daily 30 gram, gets 50-60 gram getting better improved protein   Bone density 2021 normal lumbar spine T score 1.3, femoral neck -0.3   Last colonoscopy 3/29/2018 due 3/29/2028  Personal history of thyroid cancer managed by Dr. Ureña does blood work and thyroid ultrasounds    Sleep Apnea  Sleep study done 2017 before the bariatric surgery  was WNL    --- GYN  Symptoms: History of endometriosis denies discharge, itching, burning or dysuria, ALBINO with left oopherectomy  ..    Abnormal pap never abnormal stopped getting Paps since having hysterectomy  Sexually active less pain  Last colonoscopy  3/31/21 due 10 years 2031 snacks  Last mammogram   normal family history  MGM breast cancer 80   STD history none    Social history non-smoker alcohol rare, , 2 kids Works as a teacher exercise minimal walking  FH CAD/cancer  Dad AMI and stents in his 50s, PGM triple bypass 50's, MGF \"blood cancer\", MGM acute MI and breast cancer 80 FH mat cousin thyroid cancer  Wt Readings from Last 6 Encounters:   11/26/24 173 lb (78.5 kg)   11/25/24 177 lb (80.3 kg)   11/20/24 175 lb (79.4 kg)   11/18/24 175 lb (79.4 kg)   10/31/24 175 lb (79.4 kg)   10/07/24 169 lb (76.7 kg)     Body mass index is 25.92 kg/m².       Results for orders placed or performed during the hospital encounter of 10/31/24   CBC With Differential With Platelet    Collection Time: 10/31/24  9:42 PM   Result Value Ref Range    WBC 5.8 4.0 - 11.0 x10(3) uL    RBC 4.32 3.80 - 5.30 x10(6)uL    HGB 13.8 12.0 - 16.0 g/dL    HCT 39.9 35.0 - 48.0 %    .0 150.0 - 450.0 10(3)uL    MCV 92.4 80.0 - 100.0 fL    MCH 31.9 26.0 - 34.0 pg    MCHC 34.6 31.0 - 37.0 g/dL    RDW 12.4 %    Neutrophil Absolute Prelim 2.90 1.50 - 7.70 x10 (3) uL    Neutrophil Absolute 2.90 1.50 - 7.70 x10(3) uL    Lymphocyte Absolute 2.12 1.00 - 4.00 x10(3) uL    Monocyte Absolute 0.54 0.10 - 1.00 x10(3) uL    Eosinophil Absolute 0.17 0.00 - 0.70 x10(3) uL    Basophil Absolute 0.04 0.00 - 0.20 x10(3) uL    Immature Granulocyte Absolute 0.00 0.00 - 1.00 x10(3) uL    Neutrophil % 50.3 %    Lymphocyte % 36.7 %    Monocyte % 9.4 %    Eosinophil % 2.9 %    Basophil % 0.7 %    Immature Granulocyte % 0.0 %   Comp Metabolic Panel (14)    Collection Time: 10/31/24  9:42 PM   Result Value Ref Range    Glucose 88 70 - 99 mg/dL    Sodium 137  136 - 145 mmol/L    Potassium 3.9 3.5 - 5.1 mmol/L    Chloride 105 98 - 112 mmol/L    CO2 29.0 21.0 - 32.0 mmol/L    Anion Gap 3 0 - 18 mmol/L    BUN 9 9 - 23 mg/dL    Creatinine 0.75 0.55 - 1.02 mg/dL    Calcium, Total 8.6 8.5 - 10.1 mg/dL    Calculated Osmolality 282 275 - 295 mOsm/kg    eGFR-Cr 101 >=60 mL/min/1.73m2    AST 16 15 - 37 U/L    ALT 37 13 - 56 U/L    Alkaline Phosphatase 82 37 - 98 U/L    Bilirubin, Total 0.2 0.1 - 2.0 mg/dL    Total Protein 7.0 6.4 - 8.2 g/dL    Albumin 3.6 3.4 - 5.0 g/dL    Globulin  3.4 2.8 - 4.4 g/dL    A/G Ratio 1.1 1.0 - 2.0   Lipase    Collection Time: 10/31/24  9:42 PM   Result Value Ref Range    Lipase 56 (H) 12 - 53 U/L   Urinalysis with Culture Reflex    Collection Time: 10/31/24 10:07 PM    Specimen: Urine, clean catch   Result Value Ref Range    Urine Color Yellow Yellow    Clarity Urine Clear Clear    Spec Gravity 1.020 1.005 - 1.030    Glucose Urine Negative Negative mg/dL    Bilirubin Urine Negative Negative    Ketones Urine Negative Negative mg/dL    Blood Urine Negative Negative    pH Urine 5.5 5.0 - 8.0    Protein Urine Negative Negative mg/dL    Urobilinogen Urine 0.2 <2.0 mg/dL    Nitrite Urine Negative Negative    Leukocyte Esterase Urine Negative Negative    Microscopic Microscopic not indicated      *Note: Due to a large number of results and/or encounters for the requested time period, some results have not been displayed. A complete set of results can be found in Results Review.        Current Outpatient Medications   Medication Sig Dispense Refill    BUTALBITAL-ACETAMINOPHEN  MG Oral Tab TAKE 1/2 TO 1 TABLET BY MOUTH DAILY AS NEEDED FOR TENSION HEADACHE 13 tablet 0    ALPRAZOLAM 0.25 MG Oral Tab TAKE 1 TABLET(0.25 MG) BY MOUTH TWICE DAILY 60 tablet 0    diazePAM (VALIUM) 5 MG Oral Tab Take 5-10 mg 30 minutes before procedure (no xanax that day) 2 tablet 0    Lisdexamfetamine Dimesylate (VYVANSE) 60 MG Oral Cap Take 1 capsule (60 mg total) by mouth  daily. 30 capsule 0    ondansetron (ZOFRAN) 8 MG tablet TAKE 1 TABLET(8 MG) BY MOUTH EVERY 12 HOURS AS NEEDED FOR NAUSEA 30 tablet 1    pregabalin 75 MG Oral Cap Take 1 capsule (75 mg total) by mouth 3 (three) times daily. OK to refill early (3/22/24) 90 capsule 2    tiZANidine 4 MG Oral Tab TAKE 1 TABLET(4 MG) BY MOUTH EVERY NIGHT AS NEEDED FOR PAIN OR SPASM. DO NOT TAKE WITH VALTREX can start after finishing flexeril 30 tablet 2    acidophilus-pectin Oral Cap Take 1 capsule by mouth daily.      TRAZODONE 50 MG Oral Tab TAKE 1 TO 2 TABLETS(50  MG) BY MOUTH EVERY NIGHT 180 tablet 0    ARIPiprazole (ABILIFY) 2 MG Oral Tab Take 1 tablet (2 mg total) by mouth daily. 90 tablet 0    Potassium Chloride ER 10 MEQ Oral Tab CR Take 1 tablet (10 mEq total) by mouth daily. 30 tablet 5    oxybutynin ER 10 MG Oral Tablet 24 Hr TAKE 1 TABLET(10 MG) BY MOUTH DAILY 90 tablet 3    VALACYCLOVIR 1 G Oral Tab TAKE 2 TABLETS(2000 MG) BY MOUTH EVERY 12 HOURS FOR 1 DAY 30 tablet 0    ipratropium-albuterol 0.5-2.5 (3) MG/3ML Inhalation Solution Take 3 mL by nebulization every 4 (four) hours as needed. Use only if the albuterol inhalation albuterol is not working 25 each 1    Multiple Vitamins-Minerals (BARIATRIC MULTIVITAMINS/IRON) Oral Cap Take by mouth As Directed.      pantoprazole 40 MG Oral Tab EC Take 1 tablet (40 mg total) by mouth 2 (two) times daily.      Nystatin 767120 UNIT/GM External Powder Apply 1 Application. topically 4 (four) times daily. Apply to affected areas 30 g 1    ALBUTEROL 108 (90 Base) MCG/ACT Inhalation Aero Soln INHALE 2 PUFFS INTO THE LUNGS EVERY 4 HOURS AS NEEDED FOR WHEEZING OR SHORTNESS OF BREATH 8.5 g 1    loratadine 10 MG Oral Tab Take 1 tablet (10 mg total) by mouth daily.  0    alpha 1-proteinase inhibitor 1000 MG/20ML Intravenous Solution Inject into the vein once. weekly      UNITHROID 100 MCG Oral Tab TAKE 1 TABLET BY MOUTH EVERY MORNING WITH BREAKFAST WITH 88MCG FOR TOTAL DOSE OF 188MCG 30  tablet 5    SPIRIVA RESPIMAT 2.5 MCG/ACT Inhalation Aero Soln INHALE 2 PUFFS INTO THE LUNGS DAILY 12 g 2    UNITHROID 88 MCG Oral Tab TAKE 1 TABLET BY MOUTH EVERY MORNING BEFORE BREAKFAST 30 tablet 9    Calcium Carb-Cholecalciferol (CALCIUM 600/VITAMIN D3) 600-800 MG-UNIT Oral Tab Take 1 tablet by mouth 3 (three) times daily.      SYMBICORT 160-4.5 MCG/ACT Inhalation Aerosol INHALE 2 PUFFS INTO THE LUNGS TWICE DAILY 3 Inhaler 3    Cholecalciferol (VITAMIN D) 50 MCG (2000 UT) Oral Cap Take 1 capsule (2,000 Units total) by mouth in the morning and 1 capsule (2,000 Units total) before bedtime.      magnesium 250 MG Oral Tab Take 1 tablet (250 mg total) by mouth daily.      aspirin 81 MG Oral Tab Take 1 tablet (81 mg total) by mouth daily.      [START ON 12/12/2024] Lisdexamfetamine Dimesylate (VYVANSE) 60 MG Oral Cap Take 1 capsule (60 mg total) by mouth daily. (Patient not taking: Reported on 11/26/2024) 30 capsule 0    [START ON 1/12/2025] Lisdexamfetamine Dimesylate (VYVANSE) 60 MG Oral Cap Take 1 capsule (60 mg total) by mouth daily. (Patient not taking: Reported on 11/26/2024) 30 capsule 0      Past Medical History:    Abdominal discomfort in right lower quadrant    Abdominal pain    Abnormal CT scan, esophagus    Acute deep vein thrombosis (DVT) of left lower extremity (HCC)    Alpha-1-antitrypsin deficiency (HCC)    Anxiety    Arrhythmia    RIGHT BUNDLE BRACH BLOCK     Arthritis    Asherman syndrome    Asthma (HCC)    Back pain    I have Spinal Stenosis that has gotten worse over the years.    Bloating    Calculus of kidney    Cancer (HCC)    Thyroid    Change in hair    Chest pain    Chronic bronchiolitis (HCC)    Chronic cough    Constipation    COPD (chronic obstructive pulmonary disease) (HCC)    no Oxygen    COVID-19 virus infection    Depression    Diarrhea, unspecified    Disorder of thyroid    Diverticulosis    Easy bruising    Endometriosis    Esophageal reflux    Fatigue    Food intolerance    Frequent  use of laxatives    Gastric ulcer    Headache disorder    Hemorrhoids    History of gastric bypass    Hoarseness, chronic    Hx of gastric bypass    Hx of motion sickness    Hydronephrosis    Hypokalemia    Hypothyroidism    Infertility, female    Intertrigo    Irregular bowel habits    Loss of appetite    Migraines    Nausea    Nephrolithiasis    Organic hypersomnia, unspecified    AHI 2 RDI 2 REM AHI 6 SaO2 curtis 89%    Osteoarthritis    Painful urination    Pancreatitis (HCC)    Pneumonia due to organism    PONV (postoperative nausea and vomiting)    Problems with swallowing    PUD (peptic ulcer disease)    Rib contusion, left, initial encounter    Severe persistent asthma with exacerbation (HCC)    Shortness of breath    Sleep disturbance    Stented coronary artery    Stress    Thyroid cancer (HCC)    Visual impairment    glasses    Vocal cord dysfunction    Wears glasses    Weight gain    Weight loss      Past Surgical History:   Procedure Laterality Date    Ankle scope,part debridement Left 2015    Procedure: ARTHROSCOPY ANKLE WITH DEBRIDEMENT;  Surgeon: Marcial Evans MD;  Location: [facility]    Bronch thermoplasty 1 lobe Right 04/15/2021    Bronch thermoplasty 1 lobe Left 2021      2006    Cholecystectomy      Colonoscopy  2021    Colonoscopy      Colonoscopy,diagnostic  10/22/2013    Procedure: COLONOSCOPY, POSSIBLE BIOPSY, POSSIBLE POLYPECTOMY 97577;  Surgeon: Marcello Ferreira MD;  [place] [facility]    Cta chest (uin=[id number])  2021    D & c      x4    Egd  2021    Gastric bypass,obese<100cm magy-en-y  2017    DR. SEGAL    Gastrocnemius recession Left 2015    Procedure: GASTROC RECESSION FOOT;  Surgeon: Marcial Evans MD;  Location: [facility]    Hysterectomy  2013    Hysteroscopy      Inj paravert f jnt l/s 1 lev Bilateral 2015    Procedure: FACET INJECTION UNDER FLUOROSCOPY;  Surgeon: Dusty Munson DO;   Location: [facility]    Laparoscopic cholecystectomy N/A 11/23/2016    Procedure: LAPAROSCOPIC CHOLECYSTECTOMY;  Surgeon: Dai Sanchez MD;  Location:  MAIN OR    Laparoscopy,diagnostic      Laparoscopy,diagnostic  06/25/2022    Lysis of adhesions  2010    M-sedaj by krsitin phys perfrmg svc 5+ yr Bilateral 12/14/2015    Procedure: FACET INJECTION UNDER FLUOROSCOPY;  Surgeon: Dusty Munson DO;  Location: [facility]    Domonique biopsy stereo nodule 1 site right (cpt=19081)  07/2023    stromal fibrosis    Oophorectomy Left     Other  NECK SCAR REVISION    Other surgical history      laparoscopies x2    Removal of ovarian cyst(s) Right 07/08/2020    Surgical stent Bilateral 03/2015    Bilateral iliac stents    Thyroidectomy  04/2011    Total abdom hysterectomy      Upper gi endo no barretts  12/2015    normal    Upper gi endoscopy performed  01/25/2017    Rivera Donaldson M.D.    Upper gi endoscopy,biopsy N/A 12/19/2015    Procedure: ESOPHAGOGASTRODUODENOSCOPY, POSSIBLE BIOPSY, POSSIBLE POLYPECTOMY 57451;  Surgeon: Brayden Giordano MD;  Location: [facility]      Family History   Problem Relation Age of Onset    Heart Disorder Father     Heart Disease Father     Heart Attack Father     Other (Other) Mother         ALLIE    Breast Cancer Maternal Grandmother 75    Cancer Maternal Grandfather         blood cancer    Diabetes Paternal Grandmother     Heart Disorder Paternal Grandmother     Heart Disease Paternal Grandmother     Asthma Paternal Grandmother     Heart Attack Paternal Grandmother     Stroke Neg       Social History:   Social History     Socioeconomic History    Marital status:    Tobacco Use    Smoking status: Never     Passive exposure: Past    Smokeless tobacco: Never   Vaping Use    Vaping status: Never Used   Substance and Sexual Activity    Alcohol use: Not Currently    Drug use: No    Sexual activity: Yes     Partners: Male   Other Topics Concern      Service No    Blood Transfusions No    Caffeine Concern No    Occupational Exposure No    Hobby Hazards No    Sleep Concern No    Stress Concern No    Weight Concern No    Special Diet No    Back Care No    Exercise Yes    Bike Helmet No    Seat Belt Yes    Self-Exams No   Social History Narrative    ** Merged History Encounter **          Social Drivers of Health     Housing Stability: Low Risk  (9/11/2023)    [facility]    Housing Stability   Recent Concern: Housing Stability - At Risk (8/18/2023)    [facility]     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   Exercise: minimal, walking.  Diet: watches fats closely and watches sugar closely     REVIEW OF SYSTEMS:   GENERAL: denies fevers, weakness, Chronic sleeping issues  SKIN: denies any unusual skin lesions or rashes  EYES:denies vision changes  HEENT: No congestion or sore throat no ear discomfort.  LUNGS: Has intermittent cough presently doing okay   CHEST:  denies breast changes or pain  CARDIOVASCULAR: denies chest pain or tightness on exertion: no edema  VASCULAR: denies leg cramps  GI: Chronic left upper quadrant abdominal pain   : denies urinary problems, vaginal discharge or discomfort, history of endometriosis   MUSCULOSKELETAL: has back pain sciatica of left side seeing pain clinic  NEURO: Chronic tension headaches  PSYCHE: Depression in remission chronic daily anxiety   HEMATOLOGIC: denies bleeding abnormalities  ENDOCRINE: denies temperature intolerance, polyuria, or excessive sweating.  LYMPHATICS: denies swollen glands      EXAM:   BP 98/56 (BP Location: Left arm, Patient Position: Sitting, Cuff Size: adult)   Pulse 94   Temp 98.8 °F (37.1 °C) (Temporal)   Resp 18   Ht 5' 8.5\" (1.74 m)   Wt 173 lb (78.5 kg)   LMP 05/01/2013   SpO2 98%   BMI 25.92 kg/m²   Body mass index is 25.92 kg/m².   GENERAL: well developed, well  nourished and in no apparent distress  SKIN: no rashes,no suspicious lesions  HEENT: atraumatic, normocephalic,ears, nose and throat exam normal   EYES: PERRLA, EOMI, sclera normal  NECK: supple,no adenopathy,no carotid bruits no palpable thyroid  CHEST: no chest tenderness  BREAST: No palpable mass no nipple dimpling or discharge   LUNGS: clear to auscultation bilateral, no rales, rhonchi or wheezing   CARDIO: RRR without murmur normal S1S2  ABD:  normal bowel sounds,soft, non tender, no masses, HSM or tenderness   deferred  MUSCULOSKELETAL: gait ritesh,l no gross M/S defect.  Straight leg raise is positive left DTR 2+ knees and patellar bilaterally.  Heel and toe walk normal, hip range of motion is normal  EXTREMITIES: no clubbing, cyanosis, or edema  NEURO: oriented times three, cranial nerves are grossly intact, no gross motor or sensory deficit.  Psych mood is normal affect is normal  good communication skills.  No tardive dyskinesia  PHQ-9 is at a 5-7  ASSESSMENT AND PLAN:   Debbi Prasad [facility] is a 44 year old female who presents for a complete physical exam.    Encounter Diagnoses   Name Primary?    Well adult exam Yes    OMAR (generalized anxiety disorder)     Major depression in remission (HCC)     Postoperative hypothyroidism     Acute left-sided low back pain with left-sided sciatica     Alpha-1-antitrypsin deficiency (HCC)     Mixed connective tissue disease (HCC)     History of bariatric surgery     Screening for heart disease     Need for vaccination          Orders Placed This Encounter   Procedures    Influenza Refused       Meds & Refills for this Visit:  Requested Prescriptions      No prescriptions requested or ordered in this encounter       Imaging & Consults:  INFLUENZA REFUSED EEH  XR DEXA BONE DENSITOMETRY (CPT=77080)  CT CALCIUM SCORING  1. Well adult exam   Self breast exam explained. Health maintenance guidance given including vision and dental exams, vitamin D 1,000 iu daily with  calcium 500 mg daily.  Lifestyle guidance provided recommended low fat diet and aerobic exercise 30 minutes 3-4 times weekly.        2. OMAR (generalized anxiety disorder)  Continue with Xanax 0.25 mg twice a day discussed again her tapering down patient does not feel she is ready to do any tapering down.  Is off of fluoxetine Abilify worked better is on 1 mg Abilify and does feel better with anxiety and depression only side effect is weight gain    3. Major depression in remission (HCC)  As above    4. Postoperative hypothyroidism  Continue with follow-ups with ENT  - XR DEXA BONE DENSITOMETRY (CPT=77080); Future    5. Acute left-sided low back pain with left-sided sciatica  MRI on Friday as planned and then follow-up with pain clinic    6. Alpha-1-antitrypsin deficiency (HCC)  Continue with infusions    7. Mixed connective tissue disease (HCC)  Continue with follow-ups with rheumatologist    8. History of bariatric surgery  Continue with follow-ups with bariatric surgeon  - XR DEXA BONE DENSITOMETRY (CPT=77080); Future    9. Screening for heart disease  - CT CALCIUM SCORING; Future    Time spent was 40 minutes more than 50% was spent on counseling regarding medical conditions and treatment.  Rest of time was spent reviewing chart, reviewing blood work and radiology tests.     The patient indicates understanding of these issues and agrees to the plan.  The patient is asked to return for complete physical yearly.  Medication management every 4 to 6 weeks .

## 2024-12-02 ENCOUNTER — TELEPHONE (OUTPATIENT)
Dept: PAIN CLINIC | Facility: CLINIC | Age: 44
End: 2024-12-02

## 2024-12-02 DIAGNOSIS — N94.89 PELVIC CYST IN FEMALE: Primary | ICD-10-CM

## 2024-12-02 LAB
NUCLEAR IGG TITR SER IF: NEGATIVE {TITER}
VITAMIN B1 WHOLE BLD: 191 NMOL/L

## 2024-12-02 NOTE — TELEPHONE ENCOUNTER
Rec'd DOS 11/29/24 MRI Lumbar Spine WO results for Provider review. Placed on Provider desk in [facility].

## 2024-12-02 NOTE — TELEPHONE ENCOUNTER
Received MRI report, which is negative for any HNP or stenosis that would explain her symptoms.      Incidentally, did note a cystic lesion inferior to R iliac bifurcation, for which, I did discuss with the radiologist, and a contrast only CT is suggested.  Please let patient know, and I have placed order for CT.  She should return to location where the MRI was performed, as they have her MRI images for reference.  Would have her discuss any findings with PCP.

## 2024-12-06 ENCOUNTER — VIRTUAL PHONE E/M (OUTPATIENT)
Dept: PAIN CLINIC | Facility: CLINIC | Age: 44
End: 2024-12-06
Payer: COMMERCIAL

## 2024-12-06 DIAGNOSIS — M51.360 DEGENERATION OF INTERVERTEBRAL DISC OF LUMBAR REGION WITH DISCOGENIC BACK PAIN: Primary | ICD-10-CM

## 2024-12-06 PROCEDURE — 99213 OFFICE O/P EST LOW 20 MIN: CPT | Performed by: PHYSICIAN ASSISTANT

## 2024-12-06 NOTE — PROGRESS NOTES
Debbi Trivedi verbally consents to a tele Visit Check-In service on 12/06/24.    Duration of Service:  20 minutes        HPI:   Debbi Trivedi presents with complaints of Low back pain.    The pain is described as mild aching that is intermittent.  The patient’s activity level has increased since last visit.  The pain is worst unrelated to time of day.    Changes in condition/history since last visit: Patient here today for follow-up after MRI.  Since her initial visit pain has largely resolved following a course of tapered p.o. steroid.  That said, MRI did incidentally find a cystic structure inferior to the right iliac bifurcation.  Wanted to discuss findings.    Last procedure: N/A    date: N/A    Percentage of relief experienced from the procedure: N/A %    Duration of the relief: N/A    The following activities will increase the patient’s pain: nothing specific    The following activities decrease the patient’s pain:  Nothing specific    Functional Assessment: Patient reports that they are able to complete all of their ADL's such as eating, bathing, using the toilet, dressing and getting up from a bed or a chair independently.    Current Medications:  Current Outpatient Medications   Medication Sig Dispense Refill    BUTALBITAL-ACETAMINOPHEN  MG Oral Tab TAKE 1/2 TO 1 TABLET BY MOUTH DAILY AS NEEDED FOR TENSION HEADACHE 13 tablet 0    ALPRAZOLAM 0.25 MG Oral Tab TAKE 1 TABLET(0.25 MG) BY MOUTH TWICE DAILY 60 tablet 0    diazePAM (VALIUM) 5 MG Oral Tab Take 5-10 mg 30 minutes before procedure (no xanax that day) 2 tablet 0    Lisdexamfetamine Dimesylate (VYVANSE) 60 MG Oral Cap Take 1 capsule (60 mg total) by mouth daily. 30 capsule 0    [START ON 12/12/2024] Lisdexamfetamine Dimesylate (VYVANSE) 60 MG Oral Cap Take 1 capsule (60 mg total) by mouth daily. (Patient not taking: Reported on 11/26/2024) 30 capsule 0    [START ON 1/12/2025] Lisdexamfetamine Dimesylate (VYVANSE) 60 MG Oral Cap Take  1 capsule (60 mg total) by mouth daily. (Patient not taking: Reported on 11/26/2024) 30 capsule 0    ondansetron (ZOFRAN) 8 MG tablet TAKE 1 TABLET(8 MG) BY MOUTH EVERY 12 HOURS AS NEEDED FOR NAUSEA 30 tablet 1    pregabalin 75 MG Oral Cap Take 1 capsule (75 mg total) by mouth 3 (three) times daily. OK to refill early (3/22/24) 90 capsule 2    tiZANidine 4 MG Oral Tab TAKE 1 TABLET(4 MG) BY MOUTH EVERY NIGHT AS NEEDED FOR PAIN OR SPASM. DO NOT TAKE WITH VALTREX can start after finishing flexeril 30 tablet 2    acidophilus-pectin Oral Cap Take 1 capsule by mouth daily.      TRAZODONE 50 MG Oral Tab TAKE 1 TO 2 TABLETS(50  MG) BY MOUTH EVERY NIGHT 180 tablet 0    ARIPiprazole (ABILIFY) 2 MG Oral Tab Take 1 tablet (2 mg total) by mouth daily. 90 tablet 0    Potassium Chloride ER 10 MEQ Oral Tab CR Take 1 tablet (10 mEq total) by mouth daily. 30 tablet 5    oxybutynin ER 10 MG Oral Tablet 24 Hr TAKE 1 TABLET(10 MG) BY MOUTH DAILY 90 tablet 3    VALACYCLOVIR 1 G Oral Tab TAKE 2 TABLETS(2000 MG) BY MOUTH EVERY 12 HOURS FOR 1 DAY 30 tablet 0    ipratropium-albuterol 0.5-2.5 (3) MG/3ML Inhalation Solution Take 3 mL by nebulization every 4 (four) hours as needed. Use only if the albuterol inhalation albuterol is not working 25 each 1    Multiple Vitamins-Minerals (BARIATRIC MULTIVITAMINS/IRON) Oral Cap Take by mouth As Directed.      pantoprazole 40 MG Oral Tab EC Take 1 tablet (40 mg total) by mouth 2 (two) times daily.      Nystatin 044779 UNIT/GM External Powder Apply 1 Application. topically 4 (four) times daily. Apply to affected areas 30 g 1    ALBUTEROL 108 (90 Base) MCG/ACT Inhalation Aero Soln INHALE 2 PUFFS INTO THE LUNGS EVERY 4 HOURS AS NEEDED FOR WHEEZING OR SHORTNESS OF BREATH 8.5 g 1    loratadine 10 MG Oral Tab Take 1 tablet (10 mg total) by mouth daily.  0    alpha 1-proteinase inhibitor 1000 MG/20ML Intravenous Solution Inject into the vein once. weekly      UNITHROID 100 MCG Oral Tab TAKE 1 TABLET BY  MOUTH EVERY MORNING WITH BREAKFAST WITH 88MCG FOR TOTAL DOSE OF 188MCG 30 tablet 5    SPIRIVA RESPIMAT 2.5 MCG/ACT Inhalation Aero Soln INHALE 2 PUFFS INTO THE LUNGS DAILY 12 g 2    UNITHROID 88 MCG Oral Tab TAKE 1 TABLET BY MOUTH EVERY MORNING BEFORE BREAKFAST 30 tablet 9    Calcium Carb-Cholecalciferol (CALCIUM 600/VITAMIN D3) 600-800 MG-UNIT Oral Tab Take 1 tablet by mouth 3 (three) times daily.      SYMBICORT 160-4.5 MCG/ACT Inhalation Aerosol INHALE 2 PUFFS INTO THE LUNGS TWICE DAILY 3 Inhaler 3    Cholecalciferol (VITAMIN D) 50 MCG (2000 UT) Oral Cap Take 1 capsule (2,000 Units total) by mouth in the morning and 1 capsule (2,000 Units total) before bedtime.      magnesium 250 MG Oral Tab Take 1 tablet (250 mg total) by mouth daily.      aspirin 81 MG Oral Tab Take 1 tablet (81 mg total) by mouth daily.        Patient requires assistance with: No assistance required    Reviewed Patient History Dated: 11/18/24 no changes noted    Physical Exam:   [facility] 05/01/2013   VAS Pain Score:  1/10  General Appearance: Well developed, well nourished, normal build, independent body habitus, no apparent physical disabilities, well groomed    Neurological Exam: WNL-Orientation to time, place and person, normal mood & effect, normal concentration & attention span  Inspection: This is a televisit  Radiology/Lab Test Reviewed: MRI L-spine  Lab Results   Component Value Date    WBC 5.8 10/31/2024    WBC 4.9 06/29/2024    WBC 4.9 04/25/2024     Lab Results   Component Value Date    HEMOGLOBIN 14.2 08/01/2013     Lab Results   Component Value Date    .0 10/31/2024    .0 06/29/2024    .0 04/25/2024       Do you have any known blood/bleeding disorders?  No  Does patient currently take blood thinners?   None  Does patient currently take any antibiotics?   No  Patient educated and verbalized understanding.  Medical Decision Making:   Diagnosis:    Encounter Diagnosis   Name Primary?    Degeneration of intervertebral  disc of lumbar region with discogenic back pain Yes       Impression: Reviewed MRI with patient, which does reveal mild degenerative changes, as seen on x-ray.  No areas of meaningful stenosis or significant neurologic contact.  Since last visit did end up taking a round of p.o. steroid, which has largely eliminated her symptoms.  Incidentally noted on MRI was a cystic structure inferior to the right iliac bifurcation, and is awaiting CT.  Will discuss this with her primary care physician, though she states she has already been referred to a vascular surgeon.    Plan: Patient to follow up PRN.    No orders of the defined types were placed in this encounter.      Meds & Refills for this Visit:  Requested Prescriptions      No prescriptions requested or ordered in this encounter       Imaging & Consults:  None    The patient indicates understanding of these issues and agrees to the plan.    BIA Barbour

## 2024-12-14 ENCOUNTER — PATIENT MESSAGE (OUTPATIENT)
Dept: FAMILY MEDICINE CLINIC | Facility: CLINIC | Age: 44
End: 2024-12-14

## 2024-12-14 DIAGNOSIS — N83.8 OVARIAN MASS, RIGHT: Primary | ICD-10-CM

## 2024-12-16 ENCOUNTER — TELEPHONE (OUTPATIENT)
Dept: PAIN CLINIC | Facility: CLINIC | Age: 44
End: 2024-12-16

## 2024-12-17 NOTE — TELEPHONE ENCOUNTER
CT abdomen pelvis date of service 12/13/2024    5.4 x 3.9 cm cystic lesion present in the right adnexa.  Will send this for scan and asked that she discuss this with her primary care physician (possible pelvic ultrasound?).

## 2024-12-18 DIAGNOSIS — G44.221 CHRONIC TENSION-TYPE HEADACHE, INTRACTABLE: ICD-10-CM

## 2024-12-19 RX ORDER — BUTALBITAL AND ACETAMINOPHEN 325; 50 MG/1; MG/1
TABLET ORAL
Qty: 13 TABLET | Refills: 0 | Status: SHIPPED | OUTPATIENT
Start: 2024-12-19

## 2024-12-23 ENCOUNTER — HOSPITAL ENCOUNTER (OUTPATIENT)
Dept: ULTRASOUND IMAGING | Facility: HOSPITAL | Age: 44
Discharge: HOME | End: 2024-12-23
Attending: FAMILY MEDICINE
Payer: COMMERCIAL

## 2024-12-23 ENCOUNTER — OFFICE VISIT (OUTPATIENT)
Dept: FAMILY MEDICINE CLINIC | Facility: CLINIC | Age: 44
End: 2024-12-23
Payer: COMMERCIAL

## 2024-12-23 VITALS
RESPIRATION RATE: 18 BRPM | WEIGHT: 175 LBS | HEART RATE: 94 BPM | SYSTOLIC BLOOD PRESSURE: 100 MMHG | TEMPERATURE: 98 F | HEIGHT: 68.5 IN | OXYGEN SATURATION: 100 % | DIASTOLIC BLOOD PRESSURE: 60 MMHG | BODY MASS INDEX: 26.22 KG/M2

## 2024-12-23 DIAGNOSIS — R10.31 RIGHT LOWER QUADRANT ABDOMINAL PAIN: ICD-10-CM

## 2024-12-23 DIAGNOSIS — J44.89 CHRONIC BRONCHIOLITIS (HCC): ICD-10-CM

## 2024-12-23 DIAGNOSIS — F32.5 MAJOR DEPRESSION IN REMISSION (HCC): ICD-10-CM

## 2024-12-23 DIAGNOSIS — E88.01 ALPHA-1-ANTITRYPSIN DEFICIENCY (HCC): ICD-10-CM

## 2024-12-23 DIAGNOSIS — Z79.899 MEDICATION MANAGEMENT: ICD-10-CM

## 2024-12-23 DIAGNOSIS — N94.89 ADNEXAL MASS: Primary | ICD-10-CM

## 2024-12-23 DIAGNOSIS — N94.89 ADNEXAL MASS: ICD-10-CM

## 2024-12-23 DIAGNOSIS — F41.1 GAD (GENERALIZED ANXIETY DISORDER): ICD-10-CM

## 2024-12-23 PROCEDURE — 3008F BODY MASS INDEX DOCD: CPT | Performed by: FAMILY MEDICINE

## 2024-12-23 PROCEDURE — 99215 OFFICE O/P EST HI 40 MIN: CPT | Performed by: FAMILY MEDICINE

## 2024-12-23 PROCEDURE — 3078F DIAST BP <80 MM HG: CPT | Performed by: FAMILY MEDICINE

## 2024-12-23 PROCEDURE — 76830 TRANSVAGINAL US NON-OB: CPT | Performed by: FAMILY MEDICINE

## 2024-12-23 PROCEDURE — 76856 US EXAM PELVIC COMPLETE: CPT | Performed by: FAMILY MEDICINE

## 2024-12-23 PROCEDURE — G2211 COMPLEX E/M VISIT ADD ON: HCPCS | Performed by: FAMILY MEDICINE

## 2024-12-23 PROCEDURE — 3074F SYST BP LT 130 MM HG: CPT | Performed by: FAMILY MEDICINE

## 2024-12-23 RX ORDER — PROMETHAZINE HYDROCHLORIDE 6.25 MG/5ML
5 SYRUP ORAL EVERY 6 HOURS PRN
Qty: 240 ML | Refills: 0 | Status: SHIPPED | OUTPATIENT
Start: 2024-12-23

## 2024-12-23 RX ORDER — ALPRAZOLAM 0.25 MG/1
0.25 TABLET ORAL 2 TIMES DAILY
Qty: 60 TABLET | Refills: 0 | Status: SHIPPED | OUTPATIENT
Start: 2025-12-28

## 2024-12-23 NOTE — PROGRESS NOTES
Debbi Prasad [facility] is a 44 year old female.  HPI:   Patient is in for follow-up on chronic health issues history of tension headaches, OMAR/depression, insomnia, alpha-1 deficiency, sciatica, hypothyroidism, bariatric surgery, history of neuropathy.      --- Sciatica/right adnexal cyst  Was referred to pain clinic secondary to lower back pain causing sciatica.  Had epidural and symptoms did improve.    Had CT abdomen pelvis secondary to abnormal MRI lumbar spine with a cyst 4.1 x 2.0 present inferior to the right iliac bifurcation and degenerative changes lumbar spine  CT scan confirmed 5.4 x 3.9 cm cystic lesion in the right adnexa  History of bilateral iliac stents secondary to incidental finding of May Thurner syndrome during prior vascular studies was referred to Dr. Jarek Faulkner has appointment today to make sure the cyst is not related to the vascular history.  Is scheduled for pelvic ultrasound states that she is getting more discomfort in the right lower pelvic area has a history of hysterectomy and left oophorectomy 2018 secondary to endometriosis.  In the past has seen Dr. High for anything with her ovaries is requesting a sooner pelvic ultrasound secondary to concerns of cyst on the left ovary and more recent discomfort.        --- Cough/history of alpha 1 antitrypsin deficiency and chronic bronchiolitis  Just started feeling a cough in the past couple days.  History of chronic bronchiolitis and alpha-1 deficiency is still getting alpha-1 proteinase inhibitor solution IV weekly. States that so far she has not needed a port but it is getting more difficult for them to find a vein they are trying to hold off on a port as long as possible.  Follows pulmonologist Dr. Person had a recent methacholine challenge which was negative last seen 11/2024.   Has no significant shortness of breath or wheezing is using Symbicort has not needed to use her Ventolin is also on Spiriva.  Has a DuoNeb available at home if  needed.  No complaints of fever, ear pain, sore throat or sinus pain.  Request promethazine has helped with cough in the past    --- Anxiety/depression  PHQ-9 is at a 4 does state that the Abilify tremendously  helped with the depression symptoms has been on it since 9/11/2024 is gaining weight does want to come off of it is at 2 mg presently.  Chronic generalized anxiety disorder tried fluoxetine side effects Lexapro in the past did not seem to do much the Abilify did help with her mood overall not as much with the generalized anxiety.  Does talk to her counselor  1-2 times per month and is on Xanax 0.25 mg twice a day has not been able to wean off of it.  For sleep using tizanidine 4 tension headaches and muscle pain and 100 mg of trazodone still  has issues with sleep is trying magnesium glycinate now  OMAR-7 is at a 12 which she states is kind of her baseline since she does have a \"anxiety as her personality\"     12/23/2024   EEH AMB OMAR-7    Feeling nervous, anxious, or on edge 2    Not being able to stop or control worrying 2    Worrying too much about different things    2    Trouble relaxing 3    Being so restless that it's hard to sit still 1    Becoming easily annoyed or irritable 1    Feeling afraid as if something awful might happen 1    OMAR 7 Total Score 12    If you checked off any problems, how difficult have these made it for you to do your work, take care of things at home, or get along with other people? Somewhat difficult      1. Little interest or pleasure in doing things: Not at all  2. Feeling down, depressed, or hopeless: Not at all  3. Trouble falling or staying asleep, or sleeping too much: More than half the days  4. Feeling tired or having little energy: More than half the days  5. Poor appetite or overeating: Not at all  6. Feeling bad about yourself - or that you are a failure or have let yourself or your family down: Not at all  7. Trouble concentrating on things, such as reading the  newspaper or watching television: Not at all  8. Moving or speaking so slowly that other people could have noticed. Or the opposite - being so fidgety or restless that you have been moving around a lot more than usual: Not at all  9. Thoughts that you would be better off dead, or of hurting yourself in some way: Not at all  PHQ-9 TOTAL SCORE: 4          Current Outpatient Medications   Medication Sig Dispense Refill    [START ON 12/28/2025] ALPRAZolam 0.25 MG Oral Tab Take 1 tablet (0.25 mg total) by mouth 2 (two) times daily. 60 tablet 0    promethazine 6.25 MG/5ML Oral Solution Take 5 mL (6.25 mg total) by mouth every 6 (six) hours as needed. 240 mL 0    BUTALBITAL-ACETAMINOPHEN  MG Oral Tab TAKE 1/2 TO 1 TABLET BY MOUTH DAILY AS NEEDED FOR TENSION HEADACHE 13 tablet 0    Lisdexamfetamine Dimesylate (VYVANSE) 60 MG Oral Cap Take 1 capsule (60 mg total) by mouth daily. 30 capsule 0    [START ON 1/12/2025] Lisdexamfetamine Dimesylate (VYVANSE) 60 MG Oral Cap Take 1 capsule (60 mg total) by mouth daily. 30 capsule 0    ondansetron (ZOFRAN) 8 MG tablet TAKE 1 TABLET(8 MG) BY MOUTH EVERY 12 HOURS AS NEEDED FOR NAUSEA 30 tablet 1    pregabalin 75 MG Oral Cap Take 1 capsule (75 mg total) by mouth 3 (three) times daily. OK to refill early (3/22/24) 90 capsule 2    tiZANidine 4 MG Oral Tab TAKE 1 TABLET(4 MG) BY MOUTH EVERY NIGHT AS NEEDED FOR PAIN OR SPASM. DO NOT TAKE WITH VALTREX can start after finishing flexeril 30 tablet 2    acidophilus-pectin Oral Cap Take 1 capsule by mouth daily.      TRAZODONE 50 MG Oral Tab TAKE 1 TO 2 TABLETS(50  MG) BY MOUTH EVERY NIGHT 180 tablet 0    ARIPiprazole (ABILIFY) 2 MG Oral Tab Take 1 tablet (2 mg total) by mouth daily. 90 tablet 0    Potassium Chloride ER 10 MEQ Oral Tab CR Take 1 tablet (10 mEq total) by mouth daily. 30 tablet 5    oxybutynin ER 10 MG Oral Tablet 24 Hr TAKE 1 TABLET(10 MG) BY MOUTH DAILY 90 tablet 3    VALACYCLOVIR 1 G Oral Tab TAKE 2 TABLETS(2000 MG)  BY MOUTH EVERY 12 HOURS FOR 1 DAY 30 tablet 0    ipratropium-albuterol 0.5-2.5 (3) MG/3ML Inhalation Solution Take 3 mL by nebulization every 4 (four) hours as needed. Use only if the albuterol inhalation albuterol is not working 25 each 1    Multiple Vitamins-Minerals (BARIATRIC MULTIVITAMINS/IRON) Oral Cap Take by mouth As Directed.      pantoprazole 40 MG Oral Tab EC Take 1 tablet (40 mg total) by mouth 2 (two) times daily.      Nystatin 558795 UNIT/GM External Powder Apply 1 Application. topically 4 (four) times daily. Apply to affected areas 30 g 1    ALBUTEROL 108 (90 Base) MCG/ACT Inhalation Aero Soln INHALE 2 PUFFS INTO THE LUNGS EVERY 4 HOURS AS NEEDED FOR WHEEZING OR SHORTNESS OF BREATH 8.5 g 1    loratadine 10 MG Oral Tab Take 1 tablet (10 mg total) by mouth daily.  0    alpha 1-proteinase inhibitor 1000 MG/20ML Intravenous Solution Inject into the vein once. weekly      UNITHROID 100 MCG Oral Tab TAKE 1 TABLET BY MOUTH EVERY MORNING WITH BREAKFAST WITH 88MCG FOR TOTAL DOSE OF 188MCG 30 tablet 5    SPIRIVA RESPIMAT 2.5 MCG/ACT Inhalation Aero Soln INHALE 2 PUFFS INTO THE LUNGS DAILY 12 g 2    UNITHROID 88 MCG Oral Tab TAKE 1 TABLET BY MOUTH EVERY MORNING BEFORE BREAKFAST 30 tablet 9    Calcium Carb-Cholecalciferol (CALCIUM 600/VITAMIN D3) 600-800 MG-UNIT Oral Tab Take 1 tablet by mouth 3 (three) times daily.      SYMBICORT 160-4.5 MCG/ACT Inhalation Aerosol INHALE 2 PUFFS INTO THE LUNGS TWICE DAILY 3 Inhaler 3    Cholecalciferol (VITAMIN D) 50 MCG (2000 UT) Oral Cap Take 1 capsule (2,000 Units total) by mouth in the morning and 1 capsule (2,000 Units total) before bedtime.      magnesium 250 MG Oral Tab Take 1 tablet (250 mg total) by mouth daily.      aspirin 81 MG Oral Tab Take 1 tablet (81 mg total) by mouth daily.        Past Medical History:    Abdominal discomfort in right lower quadrant    Abdominal pain    Abnormal CT scan, esophagus    Acute deep vein thrombosis (DVT) of left lower extremity  (HCC)    Alpha-1-antitrypsin deficiency (HCC)    Anxiety    Arrhythmia    RIGHT BUNDLE BRACH BLOCK     Arthritis    Asherman syndrome    Asthma (HCC)    Back pain    I have Spinal Stenosis that has gotten worse over the years.    Bloating    Calculus of kidney    Cancer (HCC)    Thyroid    Change in hair    Chest pain    Chronic bronchiolitis (HCC)    Chronic cough    Constipation    COPD (chronic obstructive pulmonary disease) (HCC)    no Oxygen    COVID-19 virus infection    Depression    Diarrhea, unspecified    Disorder of thyroid    Diverticulosis    Easy bruising    Endometriosis    Esophageal reflux    Fatigue    Food intolerance    Frequent use of laxatives    Gastric ulcer    Headache disorder    Hemorrhoids    History of gastric bypass    Hoarseness, chronic    Hx of gastric bypass    Hx of motion sickness    Hydronephrosis    Hypokalemia    Hypothyroidism    Infertility, female    Intertrigo    Irregular bowel habits    Loss of appetite    Migraines    Nausea    Nephrolithiasis    Organic hypersomnia, unspecified    AHI 2 RDI 2 REM AHI 6 SaO2 curtis 89%    Osteoarthritis    Painful urination    Pancreatitis (HCC)    Pneumonia due to organism    PONV (postoperative nausea and vomiting)    Problems with swallowing    PUD (peptic ulcer disease)    Rib contusion, left, initial encounter    Severe persistent asthma with exacerbation (HCC)    Shortness of breath    Sleep disturbance    Stented coronary artery    Stress    Thyroid cancer (HCC)    Visual impairment    glasses    Vocal cord dysfunction    Wears glasses    Weight gain    Weight loss      Social History:  Social History     Socioeconomic History    Marital status:    Tobacco Use    Smoking status: Never     Passive exposure: Past    Smokeless tobacco: Never   Vaping Use    Vaping status: Never Used   Substance and Sexual Activity    Alcohol use: Not Currently    Drug use: No    Sexual activity: Yes     Partners: Male   Other Topics Concern      Service No    Blood Transfusions No    Caffeine Concern No    Occupational Exposure No    Hobby Hazards No    Sleep Concern No    Stress Concern No    Weight Concern No    Special Diet No    Back Care No    Exercise Yes    Bike Helmet No    Seat Belt Yes    Self-Exams No   Social History Narrative    ** Merged History Encounter **          Social Drivers of Health     Housing Stability: Low Risk  (9/11/2023)    Received [facility], [facility]    Housing Stability   Recent Concern: Housing Stability - At Risk (8/18/2023)    [facility]        REVIEW OF SYSTEMS:   GENERAL HEALTH: feels well otherwise  SKIN: denies any unusual skin lesions or rashes  RESPIRATORY: denies shortness of breath with exertion  CARDIOVASCULAR: denies chest pain on exertion  GI: Right lower quadrant pain right ovary still present  NEURO: denies headaches  Musculoskeletal: No motor deficits  Psych see above OMAR depression remission  EXAM:   /60 (BP Location: Left arm, Patient Position: Sitting, Cuff Size: adult)   Pulse 94   Temp 98.4 °F (36.9 °C)   Resp 18   Ht 5' 8.5\" (1.74 m)   Wt 175 lb (79.4 kg)   LMP 05/01/2013   SpO2 100%   BMI 26.22 kg/m²   GENERAL: well developed, well nourished,in no apparent distress  SKIN: no rashes,no suspicious lesions  HEENT: TM clear bilaterally, nose no congestion, throat clear no erythema without mass.   EYES: PERRLA, EOM intact, sclera clear without injection  NECK: supple,no adenopathy, thyroid normal to palpation  LUNGS: clear to auscultation no rales, rhonchi or wheezes  CARDIO: RRR without murmur  GI: Normal bowel sounds ×4 quadrant, no hepatosplenomegaly or masses, right lower quadrant tenderness no guarding, rigidity or rebound history of most likely adnexal cyst seen on CT scan needs confirmation with ultrasound  EXTREMITIES: no cyanosis, clubbing or edema  Musculoskeletal: No gross  deficit  Neurological: nerves II through XII grossly intact no sensorimotor deficit  Psychological: Mood and affect are normal.  Good communication skills.  ASSESSMENT AND PLAN:     Encounter Diagnoses   Name Primary?    Adnexal mass Yes    Right lower quadrant abdominal pain     Major depression in remission (HCC)     OMAR (generalized anxiety disorder)     Chronic bronchiolitis (HCC)     Alpha-1-antitrypsin deficiency (HCC)     Medication management        No orders of the defined types were placed in this encounter.      Meds & Refills for this Visit:  Requested Prescriptions     Signed Prescriptions Disp Refills    ALPRAZolam 0.25 MG Oral Tab 60 tablet 0     Sig: Take 1 tablet (0.25 mg total) by mouth 2 (two) times daily.    promethazine 6.25 MG/5ML Oral Solution 240 mL 0     Sig: Take 5 mL (6.25 mg total) by mouth every 6 (six) hours as needed.       Imaging & Consults:  None  1. Adnexal mass  Secondary to increasing discomfort stat ultrasound pelvic ordered today  - US PELVIS (TRANSABDOMINAL AND TRANSVAGINAL) (CPT=76856/89360); Future    2. Right lower quadrant abdominal pain  As above  - US PELVIS (TRANSABDOMINAL AND TRANSVAGINAL) (CPT=76856/81592); Future    3. Major depression in remission (HCC)  Continue with Abilify for at least 6 months ideally 9 months at that point tapering down to 2 mg every other day then discontinuing if depression symptoms return may need to  restart dosing    4. OMAR (generalized anxiety disorder)  Use, risks, benefits and precautions of alprazolam discussed. Including not to drive, operate machinery or drink alcohol when taking this medication.  Discussed chronic use of Xanax again encouraged tapering off of Xanax  Continue with counseling every other week  Magnesium glycinate consider taking 400 mg twice a day  - ALPRAZolam 0.25 MG Oral Tab; Take 1 tablet (0.25 mg total) by mouth 2 (two) times daily.  Dispense: 60 tablet; Refill: 0    5. Chronic bronchiolitis (HCC)  Reviewed  medication benefits and side effects.     - promethazine 6.25 MG/5ML Oral Solution; Take 5 mL (6.25 mg total) by mouth every 6 (six) hours as needed.  Dispense: 240 mL; Refill: 0    6. Alpha-1-antitrypsin deficiency (HCC)  Continue with weekly infusions    7. Medication management  As above medication refills discussed  Time spent was 40 minutes more than 50% was spent on counseling regarding medical conditions and treatment.  Rest of time was spent reviewing chart, reviewing blood work and radiology tests.   Provider patient relationship has had a longitudinal long term relationship to address and treat complex conditions.      The patient indicates understanding of these issues and agrees to the plan.  The patient is asked to return in 1 month secondary to multiple chronic health issues.

## 2024-12-24 ENCOUNTER — HOSPITAL ENCOUNTER (EMERGENCY)
Age: 44
Discharge: HOME | End: 2024-12-25
Attending: EMERGENCY MEDICINE
Payer: COMMERCIAL

## 2024-12-24 VITALS
WEIGHT: 175 LBS | HEIGHT: 68 IN | DIASTOLIC BLOOD PRESSURE: 82 MMHG | TEMPERATURE: 98 F | BODY MASS INDEX: 26.52 KG/M2 | HEART RATE: 82 BPM | OXYGEN SATURATION: 99 % | SYSTOLIC BLOOD PRESSURE: 126 MMHG | RESPIRATION RATE: 16 BRPM

## 2024-12-24 DIAGNOSIS — S69.91XA INJURY OF RIGHT WRIST, INITIAL ENCOUNTER: Primary | ICD-10-CM

## 2024-12-24 PROCEDURE — 99284 EMERGENCY DEPT VISIT MOD MDM: CPT

## 2024-12-24 PROCEDURE — 99283 EMERGENCY DEPT VISIT LOW MDM: CPT

## 2024-12-24 NOTE — PROGRESS NOTES
Patient called with test results 12/23/24 at 5 pm mild complex cyst 3.5 cm repeat the pelvic ultrasound in 1- 2 months

## 2024-12-25 ENCOUNTER — APPOINTMENT (OUTPATIENT)
Dept: GENERAL RADIOLOGY | Age: 44
End: 2024-12-25
Attending: EMERGENCY MEDICINE
Payer: COMMERCIAL

## 2024-12-25 PROCEDURE — 73090 X-RAY EXAM OF FOREARM: CPT | Performed by: EMERGENCY MEDICINE

## 2024-12-25 PROCEDURE — 73110 X-RAY EXAM OF WRIST: CPT | Performed by: EMERGENCY MEDICINE

## 2024-12-25 RX ORDER — HYDROCODONE BITARTRATE AND ACETAMINOPHEN 5; 325 MG/1; MG/1
1-2 TABLET ORAL EVERY 6 HOURS PRN
Qty: 10 TABLET | Refills: 0 | Status: SHIPPED | OUTPATIENT
Start: 2024-12-25 | End: 2024-12-30

## 2024-12-25 RX ORDER — HYDROCODONE BITARTRATE AND ACETAMINOPHEN 5; 325 MG/1; MG/1
1 TABLET ORAL ONCE
Status: COMPLETED | OUTPATIENT
Start: 2024-12-25 | End: 2024-12-25

## 2024-12-25 NOTE — ED PROVIDER NOTES
Patient Seen in: [facility] [place]      History     Chief Complaint   Patient presents with    Arm or Hand Injury     Stated Complaint: fall of counter at 10am. C/O right arm and wrist pain.    Subjective:   HPI      Patient is a 44-year-old female that fell while wiping the top of the fridge and cabinets at 10 AM.  Fell back on a chair and landed somehow on the right arm or wrist area.  Has had worsening pain since then.  Has been taking Motrin but is not supposed to due to history of gastric bypass.  Requesting something else for pain.  Using ice at this time.  No weakness or loss of sensation.  No associated head injury or other injury associated with fall.    Objective:     Past Medical History:    Abdominal discomfort in right lower quadrant    Abdominal pain    Abnormal CT scan, esophagus    Acute deep vein thrombosis (DVT) of left lower extremity (HCC)    Alpha-1-antitrypsin deficiency (HCC)    Anxiety    Arrhythmia    RIGHT BUNDLE BRACH BLOCK     Arthritis    Asherman syndrome    Asthma (HCC)    Back pain    I have Spinal Stenosis that has gotten worse over the years.    Bloating    Calculus of kidney    Cancer (HCC)    Thyroid    Change in hair    Chest pain    Chronic bronchiolitis (HCC)    Chronic cough    Constipation    COPD (chronic obstructive pulmonary disease) (HCC)    no Oxygen    COVID-19 virus infection    Depression    Diarrhea, unspecified    Disorder of thyroid    Diverticulosis    Easy bruising    Endometriosis    Esophageal reflux    Fatigue    Food intolerance    Frequent use of laxatives    Gastric ulcer    Headache disorder    Hemorrhoids    History of gastric bypass    Hoarseness, chronic    Hx of gastric bypass    Hx of motion sickness    Hydronephrosis    Hypokalemia    Hypothyroidism    Infertility, female    Intertrigo    Irregular bowel habits    Loss of appetite    Migraines    Nausea    Nephrolithiasis    Organic hypersomnia, unspecified    AHI 2 RDI 2  REM AHI 6 SaO2 curtis 89%    Osteoarthritis    Painful urination    Pancreatitis (HCC)    Pneumonia due to organism    PONV (postoperative nausea and vomiting)    Problems with swallowing    PUD (peptic ulcer disease)    Rib contusion, left, initial encounter    Severe persistent asthma with exacerbation (HCC)    Shortness of breath    Sleep disturbance    Stented coronary artery    Stress    Thyroid cancer (HCC)    Visual impairment    glasses    Vocal cord dysfunction    Wears glasses    Weight gain    Weight loss              Past Surgical History:   Procedure Laterality Date    Ankle scope,part debridement Left 2015    Procedure: ARTHROSCOPY ANKLE WITH DEBRIDEMENT;  Surgeon: Marcial Evans MD;  Location: [facility]    Bronch thermoplasty 1 lobe Right 04/15/2021    Bronch thermoplasty 1 lobe Left 2021      2006    Cholecystectomy      Colonoscopy  2021    Colonoscopy      Colonoscopy,diagnostic  10/22/2013    Procedure: COLONOSCOPY, POSSIBLE BIOPSY, POSSIBLE POLYPECTOMY 38116;  Surgeon: Marcello Ferreira MD;  Location: [facility]    Cta chest (ccq=40632)  2021    D & c      x4    Egd  2021    Gastric bypass,obese<100cm magy-en-y  2017    DR. SEGAL    Gastrocnemius recession Left 2015    Procedure: GASTROC RECESSION FOOT;  Surgeon: Marcial Evans MD;  Location: [facility]    Hysterectomy      Hysteroscopy      Inj paravert f jnt l/s 1 lev Bilateral 2015    Procedure: FACET INJECTION UNDER FLUOROSCOPY;  Surgeon: Dusty Munson DO;  Location: [facility]    Laparoscopic cholecystectomy N/A 2016    Procedure: LAPAROSCOPIC CHOLECYSTECTOMY;  Surgeon: Dai Sanchez MD;  Location:  MAIN OR    Laparoscopy,diagnostic      Laparoscopy,diagnostic  2022    Lysis of adhesions      M-sedaj by  phys perfrmg svc 5+ yr Bilateral 2015    Procedure: FACET INJECTION UNDER FLUOROSCOPY;  Surgeon:  Dusty Munson R, DO;  Location: [facility]    Domonique biopsy stereo nodule 1 site right (cpt=19081)  07/2023    stromal fibrosis    Oophorectomy Left     Other  NECK SCAR REVISION    Other surgical history      laparoscopies x2    Removal of ovarian cyst(s) Right 07/08/2020    Surgical stent Bilateral 03/2015    Bilateral iliac stents    Thyroidectomy  04/2011    Total abdom hysterectomy      Upper gi endo no barretts  12/2015    normal    Upper gi endoscopy performed  01/25/2017    Rivera Donaldson M.D.    Upper gi endoscopy,biopsy N/A 12/19/2015    Procedure: ESOPHAGOGASTRODUODENOSCOPY, POSSIBLE BIOPSY, POSSIBLE POLYPECTOMY 11182;  Surgeon: Brayden Giordano MD;  Location: [facility]                Social History     Socioeconomic History    Marital status:    Tobacco Use    Smoking status: Never     Passive exposure: Past    Smokeless tobacco: Never   Vaping Use    Vaping status: Never Used   Substance and Sexual Activity    Alcohol use: Not Currently    Drug use: No    Sexual activity: Yes     Partners: Male   Other Topics Concern     Service No    Blood Transfusions No    Caffeine Concern No    Occupational Exposure No    Hobby Hazards No    Sleep Concern No    Stress Concern No    Weight Concern No    Special Diet No    Back Care No    Exercise Yes    Bike Helmet No    Seat Belt Yes    Self-Exams No   Social History Narrative    ** Merged History Encounter **          Social Drivers of Health     Housing Stability: Low Risk  (9/11/2023)    Received from [facility], [facility]    Housing Stability   Recent Concern: Housing Stability - At Risk (8/18/2023)    [facility]                  Physical Exam     ED Triage Vitals [12/24/24 2335]   /82   Pulse 82   Resp 16   Temp 98 °F (36.7 °C)   Temp src    SpO2 99 %   O2 Device None (Room air)       Current Vitals:   Vital Signs  BP:  126/82  Pulse: 82  Resp: 16  Temp: 98 °F (36.7 °C)    Oxygen Therapy  SpO2: 99 %  O2 Device: None (Room air)        Physical Exam  Vitals and nursing note reviewed.   Constitutional:       General: She is not in acute distress.     Appearance: Normal appearance. She is well-developed. She is not ill-appearing or toxic-appearing.   HENT:      Head: Normocephalic and atraumatic.      Right Ear: External ear normal.      Left Ear: External ear normal.      Mouth/Throat:      Mouth: Mucous membranes are moist.      Pharynx: Oropharynx is clear.   Cardiovascular:      Rate and Rhythm: Normal rate and regular rhythm.      Pulses: Normal pulses.      Heart sounds: Normal heart sounds.      Comments: Radial pulse 2/4  Pulmonary:      Effort: Pulmonary effort is normal.      Breath sounds: Normal breath sounds.   Musculoskeletal:      Right elbow: Normal.      Right wrist: Tenderness, bony tenderness and snuff box tenderness present. No swelling, deformity or crepitus. Decreased range of motion (due to pain). Normal pulse.      Right hand: Normal. Normal range of motion. Normal strength. Normal sensation. Normal capillary refill.      Right lower leg: No edema.      Left lower leg: No edema.   Skin:     General: Skin is warm.      Capillary Refill: Capillary refill takes less than 2 seconds.      Findings: No rash.   Neurological:      General: No focal deficit present.      Mental Status: She is alert and oriented to person, place, and time.      Sensory: No sensory deficit.      Motor: No weakness.   Psychiatric:         Mood and Affect: Mood normal.         Behavior: Behavior normal.             ED Course   Labs Reviewed - No data to display                MDM      History is obtained from patient who is a good historian.  Differential diagnosis includes right wrist sprain versus contusion or fracture.  Testing considered and ordered includes x-ray of the right wrist.  The pain was radiating up the forearm.  X-ray of the  right forearm was also ordered while patient was in triage.  These results were independently reviewed without any obvious fracture.  Radiology report also reviewed as noted below.  Previous records were reviewed.  Patient had contacted her primary care provider.  At that time it reported that she was taking Tylenol but it was not helping.  She had also taken ibuprofen 400 mg.  This was at approximately 3:30 PM.    X-RAY RIGHT FOREARM 2 VIEWS 0007 HRS.  X-RAY RIGHT WRIST 3 VIEWS 0005 HRS.       IMPRESSION:  -No evidence of acute bony or joint abnormality.  If there is a concern for an occult scaphoid fracture, consider followup radiographs in 10-14 days, or MRI.      Given that patient does have some tenderness at the anatomical snuffbox, will place in a thumb spica splint which was discussed with ED tech.  Patient was reexamined after splint placement and remains distally neurovascularly intact.  Sling was provided however avoid prolonged use to avoid frozen shoulder.  I did discuss repeat x-rays in 10 to 14 days.  This could be ordered by the patient's primary care provider.  Patient was also asking if she can see Ortho for follow-up.  [facility] information will be provided.  Patient to return to ED if symptoms worsen, persist, or new symptoms develop.  Patient does feel like Tylenol was not helping her pain.  States she cannot tolerate NSAIDs.  Has an allergy to tramadol.  Therefore a short course of Norco was ordered for pain.  Patient was comfortable discharge plan.    Medical Decision Making      Disposition and Plan     Clinical Impression:  No diagnosis found.     Disposition:  There is no disposition on file for this visit.  There is no disposition time on file for this visit.    Follow-up:  No follow-up provider specified.        Medications Prescribed:  Current Discharge Medication List              Supplementary Documentation:

## 2024-12-25 NOTE — DISCHARGE INSTRUCTIONS
Repeat x-ray in 10 to 14 days to rule out scaphoid fracture.  Maintain immobilization until outpatient follow-up with repeat x-rays.

## 2024-12-26 ENCOUNTER — HOSPITAL ENCOUNTER (OUTPATIENT)
Dept: CT IMAGING | Facility: HOSPITAL | Age: 44
Discharge: HOME | End: 2024-12-26
Attending: SURGERY
Payer: COMMERCIAL

## 2024-12-26 DIAGNOSIS — I87.1 MAY-THURNER SYNDROME: ICD-10-CM

## 2024-12-26 LAB
CREAT BLD-MCNC: 0.9 MG/DL
EGFRCR SERPLBLD CKD-EPI 2021: 81 ML/MIN/1.73M2 (ref 60–?)

## 2024-12-26 PROCEDURE — 82565 ASSAY OF CREATININE: CPT

## 2024-12-26 PROCEDURE — 74174 CTA ABD&PLVS W/CONTRAST: CPT | Performed by: SURGERY

## 2024-12-28 ENCOUNTER — PATIENT MESSAGE (OUTPATIENT)
Dept: FAMILY MEDICINE CLINIC | Facility: CLINIC | Age: 44
End: 2024-12-28

## 2025-01-06 DIAGNOSIS — G44.221 CHRONIC TENSION-TYPE HEADACHE, INTRACTABLE: ICD-10-CM

## 2025-01-06 DIAGNOSIS — F32.5 DEPRESSION, MAJOR, IN REMISSION (HCC): ICD-10-CM

## 2025-01-06 RX ORDER — ARIPIPRAZOLE 2 MG/1
2 TABLET ORAL DAILY
Qty: 90 TABLET | Refills: 0 | Status: SHIPPED | OUTPATIENT
Start: 2025-01-06

## 2025-01-11 ENCOUNTER — TELEMEDICINE (OUTPATIENT)
Dept: FAMILY MEDICINE CLINIC | Facility: CLINIC | Age: 45
End: 2025-01-11
Payer: COMMERCIAL

## 2025-01-11 DIAGNOSIS — M25.531 RIGHT WRIST PAIN: Primary | ICD-10-CM

## 2025-01-11 DIAGNOSIS — S69.92XA WRIST INJURY, LEFT, INITIAL ENCOUNTER: ICD-10-CM

## 2025-01-11 RX ORDER — ACETAMINOPHEN AND CODEINE PHOSPHATE 300; 60 MG/1; MG/1
1 TABLET ORAL 2 TIMES DAILY PRN
Qty: 20 TABLET | Refills: 0 | Status: SHIPPED | OUTPATIENT
Start: 2025-01-11

## 2025-01-11 NOTE — PROGRESS NOTES
[facility] 44 [facility].  HPI:   Please note that the following visit was completed using two-way, real-time interactive audio and video communication.  This has been done in good radha to provide continuity of care in the best interest of the provider-patient relationship,  There are limitations of this visit as no physical exam could be performed.  Every conscious effort was taken to allow for sufficient and adequate time.  This billing was spent on reviewing labs, medications, radiology tests and decision making.  Appropriate medical decision-making and tests are ordered as detailed in the plan of care above.      Audiovideo call with patient to follow-up on right wrist pain after having a fall off of a counter and hurting right arm and wrist on 12/24/2024.  Patient states that she was cleaning the refrigerator top and slipped off the counter fell onto the chair and hit her wrist.  Has been seeing the orthopedic hand specialist through [facility] and wearing a splint.  Was placed on 4000 mg of Tylenol taking at 1000 mg every 6 hours and 40 mg of prednisone started on Thursday was given 7 days but states that with being on the prednisone the pain is actually worse and wants to discontinue the prednisone secondary to history of peptic ulcers.  The right hand pain is in the fifth metacarpal and across the wrist into the distal aspect of the forearm.  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  Did add ibuprofen to the Tylenol and still is not getting relief.  Has tried topical CBD, Voltaren, Biofreeze with no relief is looking for a few codeine to take 1 in the morning and 1 at night until she sees the orthopedic again which is scheduled for 1/20/2025.  Also is looking for a referral to a hand specialist to get a second opinion through [facility].  She is  going to keep the appointment with Dr. Kim Craig through [facility] but does not feel like she is giving her good direction on what to do for the pain.  Is wanting to eventually get an MRI for better definition of the area to rule out scaphoid fracture she is worried about avascular necrosis secondary to worsening pain.    12/25/2024 x-ray forearm and x-ray complete wrist right normal except for mild soft tissue swelling.  12/27/2024 x-ray wrist navicular complete is normal  1/6/2020 5 repeat x-ray wrist navicular complete also normal.      Reviewed OMAR-7 and PHQ-9 results with patient stable with anxiety, depression and insomnia trazodone is only as needed and still on Abilify 2 mg and Xanax twice a day has not been using the Vyvanse  Current Outpatient Medications   Medication Sig Dispense Refill    acetaminophen-codeine 300-60 MG Oral Tab Take 1 tablet by mouth 2 (two) times daily as needed for Pain. 20 tablet 0    ARIPIPRAZOLE 2 MG Oral Tab TAKE 1 TABLET(2 MG) BY MOUTH DAILY 90 tablet 0    TIZANIDINE 4 MG Oral Tab TAKE 1 TABLET(4 MG) BY MOUTH EVERY NIGHT AS NEEDED FOR PAIN OR SPASM. DO NOT TAKE WITH VALTREX CAN. START AFTER FINISHING FLEXERIL 30 tablet 2    [START ON 12/28/2025] ALPRAZolam 0.25 MG Oral Tab Take 1 tablet (0.25 mg total) by mouth 2 (two) times daily. 60 tablet 0    promethazine 6.25 MG/5ML Oral Solution Take 5 mL (6.25 mg total) by mouth every 6 (six) hours as needed. 240 mL 0    BUTALBITAL-ACETAMINOPHEN  MG Oral Tab TAKE 1/2 TO 1 TABLET BY MOUTH DAILY AS NEEDED FOR TENSION HEADACHE 13 tablet 0    Lisdexamfetamine Dimesylate (VYVANSE) 60 MG Oral Cap Take 1 capsule (60 mg total) by mouth daily. 30 capsule 0    ondansetron (ZOFRAN) 8 MG tablet TAKE 1 TABLET(8 MG) BY MOUTH EVERY 12 HOURS AS NEEDED FOR NAUSEA 30 tablet 1    pregabalin 75 MG Oral Cap Take 1 capsule (75 mg total) by mouth 3 (three) times daily. OK to refill early (3/22/24) 90 capsule 2    acidophilus-pectin Oral Cap Take 1 capsule by  mouth daily.      TRAZODONE 50 MG Oral Tab TAKE 1 TO 2 TABLETS(50  MG) BY MOUTH EVERY NIGHT 180 tablet 0    Potassium Chloride ER 10 MEQ Oral Tab CR Take 1 tablet (10 mEq total) by mouth daily. 30 tablet 5    oxybutynin ER 10 MG Oral Tablet 24 Hr TAKE 1 TABLET(10 MG) BY MOUTH DAILY 90 tablet 3    VALACYCLOVIR 1 G Oral Tab TAKE 2 TABLETS(2000 MG) BY MOUTH EVERY 12 HOURS FOR 1 DAY 30 tablet 0    ipratropium-albuterol 0.5-2.5 (3) MG/3ML Inhalation Solution Take 3 mL by nebulization every 4 (four) hours as needed. Use only if the albuterol inhalation albuterol is not working 25 each 1    Multiple Vitamins-Minerals (BARIATRIC MULTIVITAMINS/IRON) Oral Cap Take by mouth As Directed.      pantoprazole 40 MG Oral Tab EC Take 1 tablet (40 mg total) by mouth 2 (two) times daily.      ALBUTEROL 108 (90 Base) MCG/ACT Inhalation Aero Soln INHALE 2 PUFFS INTO THE LUNGS EVERY 4 HOURS AS NEEDED FOR WHEEZING OR SHORTNESS OF BREATH 8.5 g 1    loratadine 10 MG Oral Tab Take 1 tablet (10 mg total) by mouth daily.  0    alpha 1-proteinase inhibitor 1000 MG/20ML Intravenous Solution Inject into the vein once. weekly      UNITHROID 100 MCG Oral Tab TAKE 1 TABLET BY MOUTH EVERY MORNING WITH BREAKFAST WITH 88MCG FOR TOTAL DOSE OF 188MCG 30 tablet 5    SPIRIVA RESPIMAT 2.5 MCG/ACT Inhalation Aero Soln INHALE 2 PUFFS INTO THE LUNGS DAILY 12 g 2    UNITHROID 88 MCG Oral Tab TAKE 1 TABLET BY MOUTH EVERY MORNING BEFORE BREAKFAST 30 tablet 9    Calcium Carb-Cholecalciferol (CALCIUM 600/VITAMIN D3) 600-800 MG-UNIT Oral Tab Take 1 tablet by mouth 3 (three) times daily.      SYMBICORT 160-4.5 MCG/ACT Inhalation Aerosol INHALE 2 PUFFS INTO THE LUNGS TWICE DAILY 3 Inhaler 3    Cholecalciferol (VITAMIN D) 50 MCG (2000 UT) Oral Cap Take 1 capsule (2,000 Units total) by mouth in the morning and 1 capsule (2,000 Units total) before bedtime.      magnesium 250 MG Oral Tab Take 1 tablet (250 mg total) by mouth daily.      aspirin 81 MG Oral Tab Take 1  tablet (81 mg total) by mouth daily.      [START ON 1/12/2025] Lisdexamfetamine Dimesylate (VYVANSE) 60 MG Oral Cap Take 1 capsule (60 mg total) by mouth daily. 30 capsule 0    Nystatin 328925 UNIT/GM External Powder Apply 1 Application. topically 4 (four) times daily. Apply to affected areas (Patient not taking: Reported on 1/11/2025) 30 g 1      Past Medical History:    Abdominal discomfort in right lower quadrant    Abdominal pain    Abnormal CT scan, esophagus    Acute deep vein thrombosis (DVT) of left lower extremity (HCC)    Alpha-1-antitrypsin deficiency (HCC)    Anxiety    Arrhythmia    RIGHT BUNDLE BRACH BLOCK     Arthritis    Asherman syndrome    Asthma (HCC)    Back pain    I have Spinal Stenosis that has gotten worse over the years.    Bloating    Calculus of kidney    Cancer (HCC)    Thyroid    Change in hair    Chest pain    Chronic bronchiolitis (HCC)    Chronic cough    Constipation    COPD (chronic obstructive pulmonary disease) (HCC)    no Oxygen    COVID-19 virus infection    Depression    Diarrhea, unspecified    Disorder of thyroid    Diverticulosis    Easy bruising    Endometriosis    Esophageal reflux    Fatigue    Food intolerance    Frequent use of laxatives    Gastric ulcer    Headache disorder    Hemorrhoids    History of gastric bypass    Hoarseness, chronic    Hx of gastric bypass    Hx of motion sickness    Hydronephrosis    Hypokalemia    Hypothyroidism    Infertility, female    Intertrigo    Irregular bowel habits    Loss of appetite    Migraines    Nausea    Nephrolithiasis    Organic hypersomnia, unspecified    AHI 2 RDI 2 REM AHI 6 SaO2 curtis 89%    Osteoarthritis    Painful urination    Pancreatitis (HCC)    Pneumonia due to organism    PONV (postoperative nausea and vomiting)    Problems with swallowing    PUD (peptic ulcer disease)    Rib contusion, left, initial encounter    Severe persistent asthma with exacerbation (HCC)    Shortness of breath    Sleep disturbance    Stented  coronary artery    Stress    Thyroid cancer (HCC)    Visual impairment    glasses    Vocal cord dysfunction    Wears glasses    Weight gain    Weight loss      Social History:  Social History     Socioeconomic History    Marital status:    Tobacco Use    Smoking status: Never     Passive exposure: Past    Smokeless tobacco: Never   Vaping Use    Vaping status: Never Used   Substance and Sexual Activity    Alcohol use: Not Currently    Drug use: No    Sexual activity: Yes     Partners: Male   Other Topics Concern     Service No    Blood Transfusions No    Caffeine Concern No    Occupational Exposure No    Hobby Hazards No    Sleep Concern No    Stress Concern No    Weight Concern No    Special Diet No    Back Care No    Exercise Yes    Bike Helmet No    Seat Belt Yes    Self-Exams No   Social History Narrative    ** Merged History Encounter **          Social Drivers of Health     Housing Stability: Low Risk  (9/11/2023)    Received from [facility], [facility]    Housing Stability   Recent Concern: Housing Stability - At Risk (8/18/2023)    [facility], [facility]        REVIEW OF SYSTEMS:   GENERAL HEALTH: feels well otherwise  SKIN: denies any unusual skin lesions or rashes  RESPIRATORY: denies shortness of breath with exertion  CARDIOVASCULAR: denies chest pain on exertion  GI: denies abdominal pain and denies heartburn  NEURO: denies headaches  Musculoskeletal: See above  EXAM:   No vitals taken due to tele-visit.  35 minutes time was spent reviewing patient's inquiry, patient's record including prior labs, developing management plan and ordering tests and prescriptions.    Full exam was not done secondary to virtual visit.  Reviewed medications, problem list and allergies.  GENERAL: Patient is speaking in full sentences no increased work in breathing patient is alert and orientated x3.    Psych patient's mood is anxious her  affect is normal communication skills are good  Musculoskeletal possibly some swelling in the right fifth metacarpal difficult to tell on video visit patient is able to make a fist and and open and close hand with minimal difficulty.  Patient pinpoints pain to be in the wrist both ulnar and radial and into the fifth metacarpal.  ASSESSMENT AND PLAN:     Encounter Diagnoses   Name Primary?    Right wrist pain Yes    Wrist injury, left, initial encounter        No orders of the defined types were placed in this encounter.      Meds & Refills for this Visit:  Requested Prescriptions     Signed Prescriptions Disp Refills    acetaminophen-codeine 300-60 MG Oral Tab 20 tablet 0     Sig: Take 1 tablet by mouth 2 (two) times daily as needed for Pain.       Imaging & Consults:  ORTHOPEDIC - INTERNAL  1. Right wrist pain  Use, risks, benefits and precautions of codeine discussed. Including not to drive, operate machinery or drink alcohol when taking this medication.  Advised of risk of addiction to codeine especially with additional Xanax  [place]  Data Reviewed.  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   Advised to discontinue steroids and lower the acetaminophen down to 2000 mg maximum a day and is to avoid ibuprofen due to history of peptic ulcer disease  - Ortho Referral - In Network  - acetaminophen-codeine 300-60 MG Oral Tab; Take 1 tablet by mouth 2 (two) times daily as needed for Pain.  Dispense: 20 tablet; Refill: 0    2. Wrist injury, left, initial encounter  As above  - Ortho Referral - In Network  - acetaminophen-codeine 300-60 MG Oral Tab; Take 1 tablet by mouth 2 (two) times daily as needed for Pain.  Dispense: 20 tablet; Refill: 0    Time spent was 35 minutes more than 50% was spent on counseling regarding medical  conditions and treatment.  Rest of time was spent reviewing chart, reviewing blood work and radiology tests.   Provider patient relationship has had a longitudinal long term relationship to address and treat complex conditions.  Reviewed her anxiety which does heightened with pain no medication changes were made continue with counseling  The patient indicates understanding of these issues and agrees to the plan.  The patient is asked to return 1/22/2025 or as needed.    This note was created by [employer] voice recognition. Errors in content may be related to improper recognition by the system; efforts to review and correct have been done but errors may still exist.  Note to patient: The 21st Century Cures Act makes medical notes like these available to patients in the interest of transparency. However, be advised this is a medical document. It is intended as peer to peer communication. It is written in medical language and may contain abbreviations or verbiage that are unfamiliar. It may appear blunt or direct. Medical documents are intended to carry relevant information, facts as evident, and the clinical opinion of the practitioner.

## 2025-01-13 DIAGNOSIS — G44.221 CHRONIC TENSION-TYPE HEADACHE, INTRACTABLE: ICD-10-CM

## 2025-01-14 RX ORDER — BUTALBITAL AND ACETAMINOPHEN 325; 50 MG/1; MG/1
TABLET ORAL
Qty: 13 TABLET | Refills: 0 | Status: SHIPPED | OUTPATIENT
Start: 2025-01-14

## 2025-01-14 NOTE — TELEPHONE ENCOUNTER
Requested Prescriptions     Pending Prescriptions Disp Refills    BUTALBITAL-ACETAMINOPHEN  MG Oral Tab [Pharmacy Med Name: BUT/ACETAMINOPHEN 50-325MG TABS] 13 tablet 0     Sig: TAKE 1/2 TO 1 TABLET BY MOUTH DAILY AS NEEDED FOR TENSION HEADACHE       Last Refill: 12/19/24    Last OV: 12/23/24    Next OV: 1/22/25

## 2025-01-16 DIAGNOSIS — G89.29 ABDOMINAL PAIN, CHRONIC, LEFT UPPER QUADRANT: ICD-10-CM

## 2025-01-16 DIAGNOSIS — F51.01 PRIMARY INSOMNIA: ICD-10-CM

## 2025-01-16 DIAGNOSIS — R10.12 ABDOMINAL PAIN, CHRONIC, LEFT UPPER QUADRANT: ICD-10-CM

## 2025-01-16 RX ORDER — PREGABALIN 75 MG/1
75 CAPSULE ORAL 3 TIMES DAILY
Qty: 90 CAPSULE | Refills: 0 | Status: SHIPPED | OUTPATIENT
Start: 2025-01-16

## 2025-01-16 NOTE — TELEPHONE ENCOUNTER
Requested Prescriptions     Pending Prescriptions Disp Refills    PREGABALIN 75 MG Oral Cap [Pharmacy Med Name: PREGABALIN 75MG CAPSULES] 90 capsule 0     Sig: TAKE 1 CAPSULE BY MOUTH THREE TIMES DAILY       Last Refill: 11/1/24    Last OV: 12/23/24    Next OV: 1/22/25

## 2025-01-17 RX ORDER — TRAZODONE HYDROCHLORIDE 50 MG/1
TABLET, FILM COATED ORAL NIGHTLY
Qty: 60 TABLET | Refills: 0 | Status: SHIPPED | OUTPATIENT
Start: 2025-01-17

## 2025-01-19 DIAGNOSIS — F41.1 GAD (GENERALIZED ANXIETY DISORDER): ICD-10-CM

## 2025-01-20 RX ORDER — ALPRAZOLAM 0.25 MG/1
0.25 TABLET ORAL 2 TIMES DAILY
Qty: 60 TABLET | Refills: 0 | Status: SHIPPED | OUTPATIENT
Start: 2025-01-20

## 2025-01-22 ENCOUNTER — OFFICE VISIT (OUTPATIENT)
Dept: FAMILY MEDICINE CLINIC | Facility: CLINIC | Age: 45
End: 2025-01-22
Payer: COMMERCIAL

## 2025-01-22 VITALS
BODY MASS INDEX: 26.52 KG/M2 | RESPIRATION RATE: 16 BRPM | HEIGHT: 68 IN | SYSTOLIC BLOOD PRESSURE: 118 MMHG | DIASTOLIC BLOOD PRESSURE: 74 MMHG | WEIGHT: 175 LBS | TEMPERATURE: 99 F | HEART RATE: 84 BPM | OXYGEN SATURATION: 98 %

## 2025-01-22 DIAGNOSIS — M25.531 RIGHT WRIST PAIN: ICD-10-CM

## 2025-01-22 DIAGNOSIS — S69.91XA WRIST INJURY, RIGHT, INITIAL ENCOUNTER: Primary | ICD-10-CM

## 2025-01-22 PROCEDURE — 3008F BODY MASS INDEX DOCD: CPT | Performed by: FAMILY MEDICINE

## 2025-01-22 PROCEDURE — 3078F DIAST BP <80 MM HG: CPT | Performed by: FAMILY MEDICINE

## 2025-01-22 PROCEDURE — 3074F SYST BP LT 130 MM HG: CPT | Performed by: FAMILY MEDICINE

## 2025-01-22 PROCEDURE — 99213 OFFICE O/P EST LOW 20 MIN: CPT | Performed by: FAMILY MEDICINE

## 2025-01-22 PROCEDURE — G2211 COMPLEX E/M VISIT ADD ON: HCPCS | Performed by: FAMILY MEDICINE

## 2025-01-22 RX ORDER — HYDROCODONE BITARTRATE AND ACETAMINOPHEN 5; 325 MG/1; MG/1
TABLET ORAL
COMMUNITY
Start: 2024-12-31 | End: 2025-01-22 | Stop reason: ALTCHOICE

## 2025-01-22 RX ORDER — PREDNISONE 20 MG/1
TABLET ORAL
COMMUNITY
Start: 2025-01-09 | End: 2025-01-22 | Stop reason: ALTCHOICE

## 2025-01-22 RX ORDER — ACETAMINOPHEN AND CODEINE PHOSPHATE 300; 30 MG/1; MG/1
TABLET ORAL 2 TIMES DAILY PRN
Qty: 14 TABLET | Refills: 0 | Status: SHIPPED | OUTPATIENT
Start: 2025-01-22 | End: 2025-01-27

## 2025-01-22 RX ORDER — SACCHAROMYCES BOULARDII 250 MG
250 CAPSULE ORAL 2 TIMES DAILY
COMMUNITY

## 2025-01-23 NOTE — PROGRESS NOTES
Debbi Prasad [facility] is a 44 year old female.  HPI:   Patient is in for follow-up on medication management for right wrist pain injury after falling 12/25/2024.  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  Tried oral steroids and on Monday tried a steroid injection pain increased after the injection.  Has had repeat x-rays of the wrist not showing any fracture.  Orthopedic did discuss her getting an MRI she is going to contact the orthopedic to see if she can have it done since her pain is worsening.  Before she had the injections the pain was at a 5 now its at a 7.  Unable to move it wears a splint all day.  Denies any numbness, tingling has difficult time working due to the pain and cannot sleep at night.  Was given codeine and ran out on Monday was using it twice a day in order to sleep and then do her work as a teacher.  Is requesting another week of medication while she is waiting to get the MRI approved.  Reviewed records from orthopedic.  X-rays done 1/20/2025, 1/6/2025, 12/27/2024, 12/25/2024    Current Outpatient Medications   Medication Sig Dispense Refill    saccharomyces boulardii 250 MG Oral Cap Take 1 capsule (250 mg total) by mouth 2 (two) times daily.      acetaminophen-codeine 300-30 MG Oral Tab Take 0.5-1 tablets by mouth 2 (two) times daily as needed for Pain. 14 tablet 0    ALPRAZOLAM 0.25 MG Oral Tab TAKE 1 TABLET(0.25 MG) BY MOUTH TWICE DAILY 60 tablet 0    traZODone 50 MG Oral Tab TAKE 1 TO 2 TABLETS(50  MG) BY MOUTH EVERY NIGHT 60 tablet 0    PREGABALIN 75 MG Oral Cap TAKE 1 CAPSULE BY MOUTH THREE TIMES DAILY 90 capsule 0    BUTALBITAL-ACETAMINOPHEN  MG Oral Tab TAKE 1/2 TO 1 TABLET BY MOUTH DAILY AS NEEDED FOR TENSION HEADACHE 13 tablet 0    ARIPIPRAZOLE 2 MG Oral Tab TAKE 1 TABLET(2 MG) BY MOUTH DAILY 90 tablet 0    TIZANIDINE 4 MG Oral Tab TAKE 1 TABLET(4 MG) BY MOUTH EVERY NIGHT AS NEEDED FOR PAIN OR  SPASM. DO NOT TAKE WITH VALTREX CAN. START AFTER FINISHING FLEXERIL 30 tablet 2    promethazine 6.25 MG/5ML Oral Solution Take 5 mL (6.25 mg total) by mouth every 6 (six) hours as needed. 240 mL 0    Lisdexamfetamine Dimesylate (VYVANSE) 60 MG Oral Cap Take 1 capsule (60 mg total) by mouth daily. 30 capsule 0    ondansetron (ZOFRAN) 8 MG tablet TAKE 1 TABLET(8 MG) BY MOUTH EVERY 12 HOURS AS NEEDED FOR NAUSEA 30 tablet 1    acidophilus-pectin Oral Cap Take 1 capsule by mouth daily.      Potassium Chloride ER 10 MEQ Oral Tab CR Take 1 tablet (10 mEq total) by mouth daily. 30 tablet 5    oxybutynin ER 10 MG Oral Tablet 24 Hr TAKE 1 TABLET(10 MG) BY MOUTH DAILY 90 tablet 3    VALACYCLOVIR 1 G Oral Tab TAKE 2 TABLETS(2000 MG) BY MOUTH EVERY 12 HOURS FOR 1 DAY 30 tablet 0    ipratropium-albuterol 0.5-2.5 (3) MG/3ML Inhalation Solution Take 3 mL by nebulization every 4 (four) hours as needed. Use only if the albuterol inhalation albuterol is not working 25 each 1    Multiple Vitamins-Minerals (BARIATRIC MULTIVITAMINS/IRON) Oral Cap Take by mouth As Directed.      pantoprazole 40 MG Oral Tab EC Take 1 tablet (40 mg total) by mouth 2 (two) times daily.      ALBUTEROL 108 (90 Base) MCG/ACT Inhalation Aero Soln INHALE 2 PUFFS INTO THE LUNGS EVERY 4 HOURS AS NEEDED FOR WHEEZING OR SHORTNESS OF BREATH 8.5 g 1    loratadine 10 MG Oral Tab Take 1 tablet (10 mg total) by mouth daily.  0    alpha 1-proteinase inhibitor 1000 MG/20ML Intravenous Solution Inject into the vein once. weekly      UNITHROID 100 MCG Oral Tab TAKE 1 TABLET BY MOUTH EVERY MORNING WITH BREAKFAST WITH 88MCG FOR TOTAL DOSE OF 188MCG 30 tablet 5    SPIRIVA RESPIMAT 2.5 MCG/ACT Inhalation Aero Soln INHALE 2 PUFFS INTO THE LUNGS DAILY 12 g 2    UNITHROID 88 MCG Oral Tab TAKE 1 TABLET BY MOUTH EVERY MORNING BEFORE BREAKFAST 30 tablet 9    Calcium Carb-Cholecalciferol (CALCIUM 600/VITAMIN D3) 600-800 MG-UNIT Oral Tab Take 1 tablet by mouth 3 (three) times daily.       SYMBICORT 160-4.5 MCG/ACT Inhalation Aerosol INHALE 2 PUFFS INTO THE LUNGS TWICE DAILY 3 Inhaler 3    Cholecalciferol (VITAMIN D) 50 MCG (2000 UT) Oral Cap Take 1 capsule (2,000 Units total) by mouth in the morning and 1 capsule (2,000 Units total) before bedtime.      magnesium 250 MG Oral Tab Take 1 tablet (250 mg total) by mouth daily.      aspirin 81 MG Oral Tab Take 1 tablet (81 mg total) by mouth daily.      Nystatin 168453 UNIT/GM External Powder Apply 1 Application. topically 4 (four) times daily. Apply to affected areas (Patient not taking: Reported on 1/22/2025) 30 g 1      Past Medical History:    Abdominal discomfort in right lower quadrant    Abdominal pain    Abnormal CT scan, esophagus    Acute deep vein thrombosis (DVT) of left lower extremity (HCC)    Alpha-1-antitrypsin deficiency (HCC)    Anxiety    Arrhythmia    RIGHT BUNDLE BRACH BLOCK     Arthritis    Asherman syndrome    Asthma (HCC)    Back pain    I have Spinal Stenosis that has gotten worse over the years.    Bloating    Calculus of kidney    Cancer (HCC)    Thyroid    Change in hair    Chest pain    Chronic bronchiolitis (HCC)    Chronic cough    Constipation    COPD (chronic obstructive pulmonary disease) (HCC)    no Oxygen    COVID-19 virus infection    Depression    Diarrhea, unspecified    Disorder of thyroid    Diverticulosis    Easy bruising    Endometriosis    Esophageal reflux    Fatigue    Food intolerance    Frequent use of laxatives    Gastric ulcer    Headache disorder    Hemorrhoids    History of gastric bypass    Hoarseness, chronic    Hx of gastric bypass    Hx of motion sickness    Hydronephrosis    Hypokalemia    Hypothyroidism    Infertility, female    Intertrigo    Irregular bowel habits    Loss of appetite    Migraines    Nausea    Nephrolithiasis    Organic hypersomnia, unspecified    AHI 2 RDI 2 REM AHI 6 SaO2 curtis 89%    Osteoarthritis    Painful urination    Pancreatitis (HCC)    Pneumonia due to organism    PONV  (postoperative nausea and vomiting)    Problems with swallowing    PUD (peptic ulcer disease)    Rib contusion, left, initial encounter    Severe persistent asthma with exacerbation (HCC)    Shortness of breath    Sleep disturbance    Stented coronary artery    Stress    Thyroid cancer (HCC)    Visual impairment    glasses    Vocal cord dysfunction    Wears glasses    Weight gain    Weight loss      Social History:  Social History     Socioeconomic History    Marital status:    Tobacco Use    Smoking status: Never     Passive exposure: Past    Smokeless tobacco: Never   Vaping Use    Vaping status: Never Used   Substance and Sexual Activity    Alcohol use: Not Currently    Drug use: No    Sexual activity: Yes     Partners: Male   Other Topics Concern     Service No    Blood Transfusions No    Caffeine Concern No    Occupational Exposure No    Hobby Hazards No    Sleep Concern No    Stress Concern No    Weight Concern No    Special Diet No    Back Care No    Exercise Yes    Bike Helmet No    Seat Belt Yes    Self-Exams No   Social History Narrative    ** Merged History Encounter **          Social Drivers of Health     Food Insecurity: No Food Insecurity (1/22/2025)    NCSS - Food Insecurity     Worried About Running Out of Food in the Last Year: No     Ran Out of Food in the Last Year: No   Transportation Needs: No Transportation Needs (1/22/2025)    NCSS - Transportation     Lack of Transportation: No   Housing Stability: Not At Risk (1/22/2025)    NCSS - Housing/Utilities     Has Housing: Yes     Worried About Losing Housing: No     Unable to Get Utilities: No        REVIEW OF SYSTEMS:   GENERAL HEALTH: feels well otherwise  SKIN: denies any unusual skin lesions or rashes  RESPIRATORY: denies shortness of breath with exertion  CARDIOVASCULAR: denies chest pain on exertion  GI: denies abdominal pain and denies heartburn  NEURO: denies headaches  Musculoskeletal: Right wrist pain  EXAM:   /74  (BP Location: Right arm, Patient Position: Sitting, Cuff Size: adult)   Pulse 84   Temp 98.7 °F (37.1 °C) (Temporal)   Resp 16   Ht 5' 8\" (1.727 m)   Wt 175 lb (79.4 kg)   LMP 05/01/2013   SpO2 98%   BMI 26.61 kg/m²   GENERAL: well developed, well nourished,in no apparent distress  SKIN: no rashes,no suspicious lesions  EYES: PERRLA, EOM intact, sclera clear without injection  NECK: supple,no adenopathy, thyroid normal to palpation  LUNGS: clear to auscultation no rales, rhonchi or wheezes  CARDIO: RRR without murmur  EXTREMITIES: no cyanosis, clubbing or edema  Musculoskeletal:   Right wrist tenderness with palpation to the distal ulnar and radial aspects of the wrist positive edema wrist unable to supinate or pronate due to pain hand grasp slightly weaker on the right than the left distal pulses normal  Neurological: nerves II through XII grossly intact no sensorimotor deficit  Psychological: Mood and affect are normal.  Good communication skills.  ASSESSMENT AND PLAN:     Encounter Diagnoses   Name Primary?    Wrist injury, right, initial encounter Yes    Right wrist pain        No orders of the defined types were placed in this encounter.      Meds & Refills for this Visit:  Requested Prescriptions     Signed Prescriptions Disp Refills    acetaminophen-codeine 300-30 MG Oral Tab 14 tablet 0     Sig: Take 0.5-1 tablets by mouth 2 (two) times daily as needed for Pain.       Imaging & Consults:  None  Wrist injury, right, initial encounter  Right wrist pain  - acetaminophen-codeine 300-30 MG Oral Tab; Take 0.5-1 tablets by mouth 2 (two) times daily as needed for Pain.  Dispense: 14 tablet; Refill: 0  Use, risks, benefits and precautions of codeine discussed. Including not to drive, operate machinery or drink alcohol when taking this medication.  Advised of risk of addiction to codeine.    [facility] Data Reviewed.    Advised secondary to level of pain get MRI call Ortho to get order placed.  Time spent  was 20 minutes more than 50% was spent on counseling regarding medical conditions and treatment.  Rest of time was spent reviewing chart, reviewing blood work and radiology tests.   Provider patient relationship has had a longitudinal long term relationship to address and treat complex conditions.      The patient indicates understanding of these issues and agrees to the plan.  The patient is asked to return in as needed has appointment scheduled 2/17/2025 to follow-up on chronic anxiety.

## 2025-01-26 ENCOUNTER — TELEPHONE (OUTPATIENT)
Dept: FAMILY MEDICINE CLINIC | Facility: CLINIC | Age: 45
End: 2025-01-26

## 2025-01-26 ENCOUNTER — HOSPITAL ENCOUNTER (EMERGENCY)
Age: 45
Discharge: LEFT BEFORE DISCHARGE | End: 2025-01-26
Payer: COMMERCIAL

## 2025-01-26 ENCOUNTER — HOSPITAL ENCOUNTER (EMERGENCY)
Facility: HOSPITAL | Age: 45
Discharge: HOME | End: 2025-01-26
Attending: EMERGENCY MEDICINE
Payer: COMMERCIAL

## 2025-01-26 VITALS
DIASTOLIC BLOOD PRESSURE: 68 MMHG | HEART RATE: 84 BPM | RESPIRATION RATE: 16 BRPM | WEIGHT: 174.19 LBS | OXYGEN SATURATION: 97 % | BODY MASS INDEX: 26 KG/M2 | SYSTOLIC BLOOD PRESSURE: 122 MMHG | TEMPERATURE: 98 F

## 2025-01-26 DIAGNOSIS — M25.531 RIGHT WRIST PAIN: Primary | ICD-10-CM

## 2025-01-26 PROCEDURE — 99283 EMERGENCY DEPT VISIT LOW MDM: CPT

## 2025-01-26 PROCEDURE — 96372 THER/PROPH/DIAG INJ SC/IM: CPT

## 2025-01-26 RX ORDER — HYDROMORPHONE HYDROCHLORIDE 1 MG/ML
1 INJECTION, SOLUTION INTRAMUSCULAR; INTRAVENOUS; SUBCUTANEOUS ONCE
Status: COMPLETED | OUTPATIENT
Start: 2025-01-26 | End: 2025-01-26

## 2025-01-27 ENCOUNTER — TELEPHONE (OUTPATIENT)
Dept: FAMILY MEDICINE CLINIC | Facility: CLINIC | Age: 45
End: 2025-01-27

## 2025-01-27 DIAGNOSIS — S69.91XA WRIST INJURY, RIGHT, INITIAL ENCOUNTER: ICD-10-CM

## 2025-01-27 DIAGNOSIS — M25.531 RIGHT WRIST PAIN: ICD-10-CM

## 2025-01-27 RX ORDER — ACETAMINOPHEN AND CODEINE PHOSPHATE 300; 30 MG/1; MG/1
TABLET ORAL 2 TIMES DAILY PRN
Qty: 14 TABLET | Refills: 0 | Status: SHIPPED | OUTPATIENT
Start: 2025-01-27

## 2025-01-27 NOTE — ED INITIAL ASSESSMENT (HPI)
Pt had a right wrist injury 12/24. Pt states increased swelling and pain. Pt is scheduled for MRI 2/19.

## 2025-01-27 NOTE — TELEPHONE ENCOUNTER
Spoke with patient. Fellol and injured wrist and hand on 12/25/24. Seen by ortho. Scheduled for MRi of wrist and hand on 2/19/24.  Has been using a wrist splint during the day. Today, the hand is much more swollen and the pain is shooting up to her shoulder. Has Tylenol #3 for pain and it is not helping. I advised the patient to go to the ER for further assessment to r/o DVT or other vascular compromise. Patient voiced understanding of these instructions.

## 2025-01-27 NOTE — ED PROVIDER NOTES
Patient Seen in: [facility] Emergency Department      History     Chief Complaint   Patient presents with    Arm or Hand Injury     Stated Complaint: R WRIST INJURY 12/24/24. WORSENING PAIN    Subjective:   HPI      This is a 44-year-old female who presents emergency room for evaluation of right wrist/hand pain, patient reports symptoms started after she had a fall on [place], states that she fell from her countertop onto his outstretched hand when she was doing some cleaning.  Patient reports she was seen in the emergency room and follow-up with orthopedics, is had multiple x-rays all which have been unremarkable and she is scheduled for MRI in February 19.  Patient has been wearing a wrist splint.  Patient reports she recently went to orthopedics and had injection performed, on review of medical records through care everywhere patient has been seeing Dr. Craig orthopedics, she has had corticosteroid injection and also Medrol Dosepak was given, is felt the patient symptoms are due to CMC sprain.  Patient denies any acute change in symptoms, was concerned there was maybe some swelling.  Denies any warmth or redness.  Denies any fevers.  Denies any new injuries.  Denies difficulty moving any of the digits.  Denies numbness tingling or weakness.  Objective:     Past Medical History:    Abdominal discomfort in right lower quadrant    Abdominal pain    Abnormal CT scan, esophagus    Acute deep vein thrombosis (DVT) of left lower extremity (HCC)    Alpha-1-antitrypsin deficiency (HCC)    Anxiety    Arrhythmia    RIGHT BUNDLE BRACH BLOCK     Arthritis    Asherman syndrome    Asthma (HCC)    Back pain    I have Spinal Stenosis that has gotten worse over the years.    Bloating    Calculus of kidney    Cancer (HCC)    Thyroid    Change in hair    Chest pain    Chronic bronchiolitis (HCC)    Chronic cough    Constipation    COPD (chronic obstructive pulmonary disease) (HCC)    no Oxygen    COVID-19 virus infection     Depression    Diarrhea, unspecified    Disorder of thyroid    Diverticulosis    Easy bruising    Endometriosis    Esophageal reflux    Fatigue    Food intolerance    Frequent use of laxatives    Gastric ulcer    Headache disorder    Hemorrhoids    History of gastric bypass    Hoarseness, chronic    Hx of gastric bypass    Hx of motion sickness    Hydronephrosis    Hypokalemia    Hypothyroidism    Infertility, female    Intertrigo    Irregular bowel habits    Loss of appetite    Migraines    Nausea    Nephrolithiasis    Organic hypersomnia, unspecified    AHI 2 RDI 2 REM AHI 6 SaO2 curtis 89%    Osteoarthritis    Painful urination    Pancreatitis (HCC)    Pneumonia due to organism    PONV (postoperative nausea and vomiting)    Problems with swallowing    PUD (peptic ulcer disease)    Rib contusion, left, initial encounter    Severe persistent asthma with exacerbation (HCC)    Shortness of breath    Sleep disturbance    Stented coronary artery    Stress    Thyroid cancer (HCC)    Visual impairment    glasses    Vocal cord dysfunction    Wears glasses    Weight gain    Weight loss              Past Surgical History:   Procedure Laterality Date    Ankle scope,part debridement Left 2015    Procedure: ARTHROSCOPY ANKLE WITH DEBRIDEMENT;  Surgeon: Marcial Evans MD;  Location: [facility]    Bronch thermoplasty 1 lobe Right 04/15/2021    Bronch thermoplasty 1 lobe Left 2021      2006    Cholecystectomy      Colonoscopy  2021    Colonoscopy      Colonoscopy,diagnostic  10/22/2013    Procedure: COLONOSCOPY, POSSIBLE BIOPSY, POSSIBLE POLYPECTOMY 79491;  Surgeon: Marcello Ferreira MD;  Location: [facility] chest (sho=[id number])  2021    D & c      x4    Egd  2021    Gastric bypass,obese<100cm magy-en-y  2017    DR. SEGAL    Gastrocnemius recession Left 2015    Procedure: GASTROC RECESSION FOOT;  Surgeon: Marcial Evans MD;  Location: [facility]    Hysterectomy  2013    Hysteroscopy  2011    Inj paravert f jnt l/s 1 lev Bilateral 12/14/2015    Procedure: FACET INJECTION UNDER FLUOROSCOPY;  Surgeon: Dusty Munson DO;  Location: [facility]    Laparoscopic cholecystectomy N/A 11/23/2016    Procedure: LAPAROSCOPIC CHOLECYSTECTOMY;  Surgeon: Dai Sanchez MD;  Location:  MAIN OR    Laparoscopy,diagnostic      Laparoscopy,diagnostic  06/25/2022    Lysis of adhesions  2010    M-sedaj by sm phys perfrmg svc 5+ yr Bilateral 12/14/2015    Procedure: FACET INJECTION UNDER FLUOROSCOPY;  Surgeon: Dusty Munson DO;  Location: [facility]    Domonique biopsy stereo nodule 1 site right (cpt=19081)  07/2023    stromal fibrosis    Oophorectomy Left     Other  NECK SCAR REVISION    Other surgical history      laparoscopies x2    Removal of ovarian cyst(s) Right 07/08/2020    Surgical stent Bilateral 03/2015    Bilateral iliac stents    Thyroidectomy  04/2011    Total abdom hysterectomy      Upper gi endo no barretts  12/2015    normal    Upper gi endoscopy performed  01/25/2017    Rivera Donaldson M.D.    Upper gi endoscopy,biopsy N/A 12/19/2015    Procedure: ESOPHAGOGASTRODUODENOSCOPY, POSSIBLE BIOPSY, POSSIBLE POLYPECTOMY 33596;  Surgeon: Brayden Giordano MD;  Location: [facility]                Social History     Socioeconomic History    Marital status:    Tobacco Use    Smoking status: Never     Passive exposure: Past    Smokeless tobacco: Never   Vaping Use    Vaping status: Never Used   Substance and Sexual Activity    Alcohol use: Not Currently    Drug use: No    Sexual activity: Yes     Partners: Male   Other Topics Concern     Service No    Blood Transfusions No    Caffeine Concern No    Occupational Exposure No    Hobby Hazards No    Sleep Concern No    Stress Concern No    Weight Concern No    Special Diet No    Back Care No    Exercise Yes    Bike Helmet No    Seat Belt Yes    Self-Exams No    Social History Narrative    ** Merged History Encounter **          Social Drivers of Health     Food Insecurity: No Food Insecurity (1/22/2025)    NCSS - Food Insecurity     Worried About Running Out of Food in the Last Year: No     Ran Out of Food in the Last Year: No   Transportation Needs: No Transportation Needs (1/22/2025)    NCSS - Transportation     Lack of Transportation: No   Housing Stability: Not At Risk (1/22/2025)    NCSS - Housing/Utilities     Has Housing: Yes     Worried About Losing Housing: No     Unable to Get Utilities: No                  Physical Exam     ED Triage Vitals [01/26/25 2054]   BP (!) 136/95   Pulse 90   Resp 18   Temp 97.8 °F (36.6 °C)   Temp src Temporal   SpO2 99 %   O2 Device None (Room air)       Current Vitals:   Vital Signs  BP: 122/68  Pulse: 84  Resp: 16  Temp: 97.8 °F (36.6 °C)  Temp src: Temporal  MAP (mmHg): 90    Oxygen Therapy  SpO2: 97 %  O2 Device: None (Room air)        Physical Exam  GENERAL: Patient is awake, alert, well-appearing, in no acute distress.  HEENT:  no scleral icterus.  Mucous membranes are moist,  EXTREMITIES: No tenderness to the right shoulder, upper arm, elbow, forearm.  There is tenderness to the wrist no erythema swelling or warmth, full range of motion.  There is also mild tenderness to the dorsum of the right hand without erythema swelling or warmth.  Patient is able to range all of her digits without difficulty.  Cap refills in 2 seconds all digits.  SKIN: Warm, dry, intact, no rashes.  NEUROLOGIC EXAM: Radial ulnar median nerve tested and intact, sensation intact all digits    ED Course   Labs Reviewed - No data to display                MDM        Differential diagnosis before testing includes but not limited to sprain, strain, neuropathy, which is a medical condition that poses a threat to life/function    Diagnostic tests and medications considered but not ordered considered imaging such as x-rays however patient is had multiple x-rays  performed all which have been unremarkable recently, do not feel there is any acute bony abnormality on exam    External chart review included orthopedic office notes reviewed as in HPI    Medications Provided: IM Dilaudid    Course of Events during Emergency Room Visit include patient's records reviewed, was given pain medication, etiology her symptoms are unclear, she is currently under the care of orthopedics and does have MRI scheduled.  Was instructed to ice affected area, elevate and continue wearing splint as previously.  Patient to return if any change or symptoms.  Agrees with plan and was discharged good condition    Shared decision making was utilized         Discharge  I have discussed with the patient the results of test, differential diagnosis, treatment plan, warning signs and symptoms which should prompt immediate return.  They expressed understanding of these instructions and agrees to the following plan provided.  They were given written discharge instructions and agrees to return for any concerns and voiced understanding and all questions were answered.    Note to patient: The 21st Century Cures Act makes medical notes like these available to patients in the interest of transparency. However, this is a medical document intended as peer to peer communication. It is written in medical language and may contain abbreviations or verbiage that are unfamiliar. It may appear blunt or direct. Medical documents are intended to carry relevant information, facts as evident, and the clinical opinion of the practitioner.                 Medical Decision Making      Disposition and Plan     Clinical Impression:  1. Right wrist pain         Disposition:  Discharge  1/26/2025 10:47 pm    Follow-up:  Saul Zaidi MD  [id number] [id number] [phone]    Call in 1 day(s)            Medications Prescribed:  Discharge Medication List as of 1/26/2025 10:49 PM              Supplementary  Documentation:

## 2025-01-28 RX ORDER — VALACYCLOVIR HYDROCHLORIDE 1 G/1
2000 TABLET, FILM COATED ORAL EVERY 12 HOURS
Qty: 30 TABLET | Refills: 0 | Status: SHIPPED | OUTPATIENT
Start: 2025-01-28

## 2025-01-28 NOTE — TELEPHONE ENCOUNTER
Daniela More PA-C on call at 5:05 pm on 1/27/25.  She was seen in the emergency department for increased pain in wrist was given Dilaudid through IM.  She is out of the codeine and pain control needed.  Is going to try to schedule the MRI that ortho ordered sooner through [facility]. Does not feel she has a clot and ED visit did not indicate any signs of a clot. She is requesting codeine twice daily for the pain until she gets the MRI and ortho decides next steps of treatment.

## 2025-01-29 ENCOUNTER — TELEPHONE (OUTPATIENT)
Dept: FAMILY MEDICINE CLINIC | Facility: CLINIC | Age: 45
End: 2025-01-29

## 2025-01-29 ENCOUNTER — MED REC SCAN ONLY (OUTPATIENT)
Dept: FAMILY MEDICINE CLINIC | Facility: CLINIC | Age: 45
End: 2025-01-29

## 2025-01-29 NOTE — TELEPHONE ENCOUNTER
Received fax on 1/29/2025 requesting medical records. Requesting medical records for  last 3 years medical records .     Name: [employer]   Address: [facility] [id number]  PH: [phone] ext [phone]  Fax:[phone]    Original sent to [employer], copy sent to scanning dept. [employer] Tag # [id number]    To obtain status on medical records please provide [facility] at [phone].

## 2025-02-01 ENCOUNTER — HOSPITAL ENCOUNTER (OUTPATIENT)
Dept: GENERAL RADIOLOGY | Age: 45
Discharge: HOME | End: 2025-02-01
Attending: FAMILY MEDICINE
Payer: COMMERCIAL

## 2025-02-01 ENCOUNTER — TELEPHONE (OUTPATIENT)
Dept: FAMILY MEDICINE CLINIC | Facility: CLINIC | Age: 45
End: 2025-02-01

## 2025-02-01 ENCOUNTER — LAB ENCOUNTER (OUTPATIENT)
Dept: LAB | Age: 45
End: 2025-02-01
Attending: FAMILY MEDICINE
Payer: COMMERCIAL

## 2025-02-01 DIAGNOSIS — M25.531 RIGHT WRIST PAIN: ICD-10-CM

## 2025-02-01 DIAGNOSIS — M79.631 RIGHT FOREARM PAIN: Primary | ICD-10-CM

## 2025-02-01 DIAGNOSIS — M79.669 CALF PAIN, UNSPECIFIED LATERALITY: ICD-10-CM

## 2025-02-01 DIAGNOSIS — S59.911A INJURY OF RIGHT FOREARM, INITIAL ENCOUNTER: ICD-10-CM

## 2025-02-01 DIAGNOSIS — C73 THYROID CANCER (HCC): Primary | ICD-10-CM

## 2025-02-01 DIAGNOSIS — S69.91XA WRIST INJURY, RIGHT, INITIAL ENCOUNTER: ICD-10-CM

## 2025-02-01 DIAGNOSIS — M79.631 RIGHT FOREARM PAIN: ICD-10-CM

## 2025-02-01 DIAGNOSIS — E07.9 THYROID DYSFUNCTION: ICD-10-CM

## 2025-02-01 LAB
D DIMER PPP FEU-MCNC: <0.27 UG/ML FEU (ref ?–0.5)
T3FREE SERPL-MCNC: 3.07 PG/ML (ref 2.4–4.2)
T4 FREE SERPL-MCNC: 1.8 NG/DL (ref 0.8–1.7)
TSI SER-ACNC: <0.01 UIU/ML (ref 0.55–4.78)

## 2025-02-01 PROCEDURE — 84432 ASSAY OF THYROGLOBULIN: CPT

## 2025-02-01 PROCEDURE — 73090 X-RAY EXAM OF FOREARM: CPT | Performed by: FAMILY MEDICINE

## 2025-02-01 PROCEDURE — 85379 FIBRIN DEGRADATION QUANT: CPT

## 2025-02-01 PROCEDURE — 84481 FREE ASSAY (FT-3): CPT

## 2025-02-01 PROCEDURE — 36415 COLL VENOUS BLD VENIPUNCTURE: CPT

## 2025-02-01 PROCEDURE — 84443 ASSAY THYROID STIM HORMONE: CPT

## 2025-02-01 PROCEDURE — 86800 THYROGLOBULIN ANTIBODY: CPT

## 2025-02-01 PROCEDURE — 84439 ASSAY OF FREE THYROXINE: CPT

## 2025-02-01 RX ORDER — HYDROCODONE BITARTRATE AND ACETAMINOPHEN 5; 325 MG/1; MG/1
TABLET ORAL 2 TIMES DAILY PRN
Qty: 8 TABLET | Refills: 0 | Status: SHIPPED | OUTPATIENT
Start: 2025-02-01

## 2025-02-01 NOTE — TELEPHONE ENCOUNTER
Daniela More PA-C on-call 1/31/2025 at 9 PM.  Patient calling right wrist and forearm are causing her significant pain affecting her ADLs and sleep.  Pain does come in sharp waves when it goes into her right forearm and upper arm not constant in the arm but constant in the wrist and distal forearm.  Using Tylenol with codeine twice a day and Tylenol midday unable to sleep.  Unable to take nonsteroidals secondary to history of gastric ulcer and bariatric surgery.  Seen at the emergency department on 1/26/2025 was sent there to rule out DVT no ultrasound was done.  Patient does not want to go back to the emergency department but is requesting a D-dimer states that her calf is uncomfortable and feels may be swollen without any injury.  States that she has never had a DVT/PE before does take 81 mg aspirin daily.  Does not feel that she has a clot but is worried.  Refuses urgent care or emergency department but understands to go to facility if symptoms worsen.  She denies any chest pains or shortness of breath.  Would also like a x-ray of her right forearm states they had been repeating the x-rays to the wrist but never to the forearm.  Is scheduled for an MRI but not until 2/19/2025 did try to get in earlier with [facility] no appointment was available, has  not called [facility] yet.  Is requesting something stronger to help her sleep will not take it with the codeine or Xanax.  Patient does have an orthopedic through [facility] and will try to call the on-call orthopedic to discuss as well.  Call [facility] to see if they can get her in sooner for the MRI to the right wrist and forearm.

## 2025-02-02 ENCOUNTER — PATIENT MESSAGE (OUTPATIENT)
Dept: FAMILY MEDICINE CLINIC | Facility: CLINIC | Age: 45
End: 2025-02-02

## 2025-02-02 DIAGNOSIS — E89.0 POSTOPERATIVE HYPOTHYROIDISM: Primary | ICD-10-CM

## 2025-02-02 NOTE — PROGRESS NOTES
D dimer normal making clot unlikely   TSH is still to low and T4 free too high, please forward the exact dose you are taking of the Unithyroid

## 2025-02-03 RX ORDER — LEVOTHYROXINE SODIUM 175 UG/1
175 TABLET ORAL
Qty: 90 TABLET | Refills: 0 | Status: SHIPPED | OUTPATIENT
Start: 2025-02-03

## 2025-02-04 ENCOUNTER — PATIENT MESSAGE (OUTPATIENT)
Dept: FAMILY MEDICINE CLINIC | Facility: CLINIC | Age: 45
End: 2025-02-04

## 2025-02-04 DIAGNOSIS — M25.531 RIGHT WRIST PAIN: ICD-10-CM

## 2025-02-04 DIAGNOSIS — S69.91XA WRIST INJURY, RIGHT, INITIAL ENCOUNTER: ICD-10-CM

## 2025-02-04 LAB
THYROGLOB AB: <1 IU/ML
THYROGLOB IMA: 0.1 NG/ML

## 2025-02-04 RX ORDER — OSELTAMIVIR PHOSPHATE 75 MG/1
75 CAPSULE ORAL DAILY
Qty: 10 CAPSULE | Refills: 0 | Status: SHIPPED | OUTPATIENT
Start: 2025-02-04 | End: 2025-02-14

## 2025-02-05 DIAGNOSIS — G44.221 CHRONIC TENSION-TYPE HEADACHE, INTRACTABLE: ICD-10-CM

## 2025-02-05 RX ORDER — ACETAMINOPHEN AND CODEINE PHOSPHATE 300; 30 MG/1; MG/1
TABLET ORAL 2 TIMES DAILY PRN
Qty: 14 TABLET | Refills: 0 | Status: SHIPPED | OUTPATIENT
Start: 2025-02-05

## 2025-02-05 NOTE — TELEPHONE ENCOUNTER
Requested Prescriptions     Pending Prescriptions Disp Refills    BUTALBITAL-ACETAMINOPHEN  MG Oral Tab [Pharmacy Med Name: BUT/ACETAMINOPHEN 50-325MG TABS] 13 tablet 0     Sig: TAKE 1/2 TO 1 TABLET BY MOUTH DAILY AS NEEDED FOR TENSION HEADACHE       Last Refill: 1/14    Last OV: 1/22    Next OV: 2/17    Please review and advise

## 2025-02-06 RX ORDER — BUTALBITAL AND ACETAMINOPHEN 325; 50 MG/1; MG/1
TABLET ORAL
Qty: 13 TABLET | Refills: 0 | Status: SHIPPED | OUTPATIENT
Start: 2025-02-06

## 2025-02-17 ENCOUNTER — LAB ENCOUNTER (OUTPATIENT)
Dept: LAB | Facility: HOSPITAL | Age: 45
End: 2025-02-17
Attending: INTERNAL MEDICINE
Payer: COMMERCIAL

## 2025-02-17 ENCOUNTER — PATIENT MESSAGE (OUTPATIENT)
Dept: FAMILY MEDICINE CLINIC | Facility: CLINIC | Age: 45
End: 2025-02-17

## 2025-02-17 ENCOUNTER — OFFICE VISIT (OUTPATIENT)
Dept: SURGERY | Facility: CLINIC | Age: 45
End: 2025-02-17
Payer: COMMERCIAL

## 2025-02-17 VITALS
BODY MASS INDEX: 26.37 KG/M2 | RESPIRATION RATE: 16 BRPM | SYSTOLIC BLOOD PRESSURE: 120 MMHG | HEIGHT: 68.5 IN | HEART RATE: 62 BPM | WEIGHT: 176 LBS | DIASTOLIC BLOOD PRESSURE: 70 MMHG

## 2025-02-17 DIAGNOSIS — F43.9 STRESS: ICD-10-CM

## 2025-02-17 DIAGNOSIS — Z98.84 HISTORY OF ROUX-EN-Y GASTRIC BYPASS: ICD-10-CM

## 2025-02-17 DIAGNOSIS — E66.3 OVERWEIGHT WITH BODY MASS INDEX (BMI) 25.0-29.9: ICD-10-CM

## 2025-02-17 DIAGNOSIS — F51.01 PRIMARY INSOMNIA: ICD-10-CM

## 2025-02-17 DIAGNOSIS — R63.2 BINGE EATING: ICD-10-CM

## 2025-02-17 DIAGNOSIS — R10.12 ABDOMINAL PAIN, CHRONIC, LEFT UPPER QUADRANT: ICD-10-CM

## 2025-02-17 DIAGNOSIS — G89.29 ABDOMINAL PAIN, CHRONIC, LEFT UPPER QUADRANT: ICD-10-CM

## 2025-02-17 DIAGNOSIS — E44.1 MILD PROTEIN-CALORIE MALNUTRITION (HCC): Primary | ICD-10-CM

## 2025-02-17 DIAGNOSIS — E44.1 MILD PROTEIN-CALORIE MALNUTRITION (HCC): ICD-10-CM

## 2025-02-17 DIAGNOSIS — F41.1 GAD (GENERALIZED ANXIETY DISORDER): ICD-10-CM

## 2025-02-17 LAB
ATRIAL RATE: 74 BPM
P AXIS: 158 DEGREES
P-R INTERVAL: 114 MS
Q-T INTERVAL: 380 MS
QRS DURATION: 118 MS
QTC CALCULATION (BEZET): 421 MS
R AXIS: -10 DEGREES
T AXIS: 50 DEGREES
VENTRICULAR RATE: 74 BPM

## 2025-02-17 PROCEDURE — 3078F DIAST BP <80 MM HG: CPT | Performed by: INTERNAL MEDICINE

## 2025-02-17 PROCEDURE — 99214 OFFICE O/P EST MOD 30 MIN: CPT | Performed by: INTERNAL MEDICINE

## 2025-02-17 PROCEDURE — 93005 ELECTROCARDIOGRAM TRACING: CPT

## 2025-02-17 PROCEDURE — 3074F SYST BP LT 130 MM HG: CPT | Performed by: INTERNAL MEDICINE

## 2025-02-17 PROCEDURE — 3008F BODY MASS INDEX DOCD: CPT | Performed by: INTERNAL MEDICINE

## 2025-02-17 PROCEDURE — 93010 ELECTROCARDIOGRAM REPORT: CPT | Performed by: INTERNAL MEDICINE

## 2025-02-17 RX ORDER — LISDEXAMFETAMINE DIMESYLATE 60 MG/1
60 CAPSULE ORAL DAILY
Qty: 30 CAPSULE | Refills: 0 | Status: SHIPPED | OUTPATIENT
Start: 2025-02-17 | End: 2025-03-19

## 2025-02-17 RX ORDER — LISDEXAMFETAMINE DIMESYLATE 60 MG/1
60 CAPSULE ORAL DAILY
Qty: 30 CAPSULE | Refills: 0 | Status: SHIPPED | OUTPATIENT
Start: 2025-03-20 | End: 2025-04-19

## 2025-02-17 RX ORDER — LISDEXAMFETAMINE DIMESYLATE 60 MG/1
60 CAPSULE ORAL DAILY
Qty: 30 CAPSULE | Refills: 0 | Status: SHIPPED | OUTPATIENT
Start: 2025-04-20 | End: 2025-05-20

## 2025-02-17 NOTE — PROGRESS NOTES
[facility], [facility], [place]  [address]  [facility] [address]  Dept: [phone]          Patient:  Debbi Prasad [facility], [place]  :      1980  MRN:      [id number]    Referring Provider: HENRY JORDAN MD     Chief Complaint:     Chief Complaint   Patient presents with    Follow - Up     Up 6       Date of Surgery:   2017  Surgery Type:   Laparoscopic gastric bypass surgery     Subjective     Satiety:  positive  Food Intake:  <1 cup(s)  Fluid Intake:  48 oz  Protein Intake:  inad grams  Vitamin:  Yes   bariatric  Exercise: Yes ADL's  Comorbidities:  Back pain-Improvement?  yes, Joint pain-Improvement?  yes and GUILLE-Improvement?  yes    Objective     Vitals: /70   Pulse 62   Resp 16   Ht 5' 8.5\" (1.74 m)   Wt 176 lb (79.8 kg)   LMP 2013   BMI 26.37 kg/m²     Starting weight: 280   Current weight:    Interval weight loss:+06   Total weight loss:  -104    Last 3 Weights   25 1219 176 lb (79.8 kg)   25 2054 174 lb 2.6 oz (79 kg)   25 0700 175 lb (79.4 kg)       Patient Medications:    Current Outpatient Medications:     Lisdexamfetamine Dimesylate (VYVANSE) 60 MG Oral Cap, Take 1 capsule (60 mg total) by mouth daily., Disp: 30 capsule, Rfl: 0    [START ON 3/20/2025] Lisdexamfetamine Dimesylate (VYVANSE) 60 MG Oral Cap, Take 1 capsule (60 mg total) by mouth daily., Disp: 30 capsule, Rfl: 0    [START ON 2025] Lisdexamfetamine Dimesylate (VYVANSE) 60 MG Oral Cap, Take 1 capsule (60 mg total) by mouth daily., Disp: 30 capsule, Rfl: 0    Lisdexamfetamine Dimesylate 60 MG Oral Cap, , Disp: , Rfl:     BUTALBITAL-ACETAMINOPHEN  MG Oral Tab, TAKE 1/2 TO 1 TABLET BY MOUTH DAILY AS NEEDED FOR TENSION HEADACHE, Disp: 13 tablet, Rfl: 0    levothyroxine (UNITHROID) 175 MCG Oral Tab, Take 1 tablet (175 mcg total) by mouth before breakfast., Disp: 90 tablet, Rfl: 0    valACYclovir 1 G Oral Tab, Take 2 tablets (2,000 mg  total) by mouth Q12H., Disp: 30 tablet, Rfl: 0    saccharomyces boulardii 250 MG Oral Cap, Take 1 capsule (250 mg total) by mouth 2 (two) times daily., Disp: , Rfl:     ALPRAZOLAM 0.25 MG Oral Tab, TAKE 1 TABLET(0.25 MG) BY MOUTH TWICE DAILY, Disp: 60 tablet, Rfl: 0    traZODone 50 MG Oral Tab, TAKE 1 TO 2 TABLETS(50  MG) BY MOUTH EVERY NIGHT, Disp: 60 tablet, Rfl: 0    PREGABALIN 75 MG Oral Cap, TAKE 1 CAPSULE BY MOUTH THREE TIMES DAILY, Disp: 90 capsule, Rfl: 0    TIZANIDINE 4 MG Oral Tab, TAKE 1 TABLET(4 MG) BY MOUTH EVERY NIGHT AS NEEDED FOR PAIN OR SPASM. DO NOT TAKE WITH VALTREX CAN. START AFTER FINISHING FLEXERIL, Disp: 30 tablet, Rfl: 2    ondansetron (ZOFRAN) 8 MG tablet, TAKE 1 TABLET(8 MG) BY MOUTH EVERY 12 HOURS AS NEEDED FOR NAUSEA, Disp: 30 tablet, Rfl: 1    acidophilus-pectin Oral Cap, Take 1 capsule by mouth daily., Disp: , Rfl:     Potassium Chloride ER 10 MEQ Oral Tab CR, Take 1 tablet (10 mEq total) by mouth daily., Disp: 30 tablet, Rfl: 5    oxybutynin ER 10 MG Oral Tablet 24 Hr, TAKE 1 TABLET(10 MG) BY MOUTH DAILY, Disp: 90 tablet, Rfl: 3    ipratropium-albuterol 0.5-2.5 (3) MG/3ML Inhalation Solution, Take 3 mL by nebulization every 4 (four) hours as needed. Use only if the albuterol inhalation albuterol is not working, Disp: 25 each, Rfl: 1    Multiple Vitamins-Minerals (BARIATRIC MULTIVITAMINS/IRON) Oral Cap, Take by mouth As Directed., Disp: , Rfl:     pantoprazole 40 MG Oral Tab EC, Take 1 tablet (40 mg total) by mouth every morning before breakfast., Disp: , Rfl:     Nystatin 030153 UNIT/GM External Powder, Apply 1 Application. topically 4 (four) times daily. Apply to affected areas (Patient not taking: Reported on 1/11/2025), Disp: 30 g, Rfl: 1    ALBUTEROL 108 (90 Base) MCG/ACT Inhalation Aero Soln, INHALE 2 PUFFS INTO THE LUNGS EVERY 4 HOURS AS NEEDED FOR WHEEZING OR SHORTNESS OF BREATH, Disp: 8.5 g, Rfl: 1    loratadine 10 MG Oral Tab, Take 1 tablet (10 mg total) by mouth daily.,  Disp: , Rfl: 0    alpha 1-proteinase inhibitor 1000 MG/20ML Intravenous Solution, Inject into the vein once. weekly, Disp: , Rfl:     SPIRIVA RESPIMAT 2.5 MCG/ACT Inhalation Aero Soln, INHALE 2 PUFFS INTO THE LUNGS DAILY, Disp: 12 g, Rfl: 2    Calcium Carb-Cholecalciferol (CALCIUM 600/VITAMIN D3) 600-800 MG-UNIT Oral Tab, Take 1 tablet by mouth 3 (three) times daily., Disp: , Rfl:     SYMBICORT 160-4.5 MCG/ACT Inhalation Aerosol, INHALE 2 PUFFS INTO THE LUNGS TWICE DAILY, Disp: 3 Inhaler, Rfl: 3    Cholecalciferol (VITAMIN D) 50 MCG (2000 UT) Oral Cap, Take 1 capsule (2,000 Units total) by mouth in the morning and 1 capsule (2,000 Units total) before bedtime., Disp: , Rfl:     magnesium 250 MG Oral Tab, Take 1 tablet (250 mg total) by mouth daily., Disp: , Rfl:     aspirin 81 MG Oral Tab, Take 1 tablet (81 mg total) by mouth daily., Disp: , Rfl:     Allergies:  Adhesive tape, Cephalosporins, Chocolate, Doxycycline, Tramadol, Haloperidol, Metoclopramide, Toradol [ketorolac tromethamine], Norflex tablets [orphenadrine], Cheratussin ac [guaifenesin-codeine], and Nsaids     Social History:    Social History     Socioeconomic History    Marital status:    Tobacco Use    Smoking status: Never     Passive exposure: Past    Smokeless tobacco: Never   Vaping Use    Vaping status: Never Used   Substance and Sexual Activity    Alcohol use: Not Currently    Drug use: No    Sexual activity: Yes     Partners: Male   Other Topics Concern     Service No    Blood Transfusions No    Caffeine Concern No    Occupational Exposure No    Hobby Hazards No    Sleep Concern No    Stress Concern No    Weight Concern No    Special Diet No    Back Care No    Exercise Yes    Bike Helmet No    Seat Belt Yes    Self-Exams No   Social History Narrative    ** Merged History Encounter **          Social Drivers of Health     Food Insecurity: No Food Insecurity (2/17/2025)    NCSS - Food Insecurity     Worried About Running Out of Food  in the Last Year: No     Ran Out of Food in the Last Year: No   Transportation Needs: No Transportation Needs (2025)    NCSS - Transportation     Lack of Transportation: No   Housing Stability: Not At Risk (2025)    NCSS - Housing/Utilities     Has Housing: Yes     Worried About Losing Housing: No     Unable to Get Utilities: No     Surgical History:    Past Surgical History:   Procedure Laterality Date    Ankle scope,part debridement Left 2015    Procedure: ARTHROSCOPY ANKLE WITH DEBRIDEMENT;  Surgeon: Marcial Evans MD;  Location: [facility]    Bronch thermoplasty 1 lobe Right 04/15/2021    Bronch thermoplasty 1 lobe Left 2021      2006    Cholecystectomy      Colonoscopy  2021    Colonoscopy      Colonoscopy,diagnostic  10/22/2013    Procedure: COLONOSCOPY, POSSIBLE BIOPSY, POSSIBLE POLYPECTOMY 88134;  Surgeon: Marcello Ferreira MD;  Location: [facility]    Cta chest (zyi=[id number])  2021    D & c      x4    Egd  2021    Gastric bypass,obese<100cm magy-en-y  2017    DR. SEGAL    Gastrocnemius recession Left 2015    Procedure: GASTROC RECESSION FOOT;  Surgeon: Marcial Evans MD;  Location: [facility]    Hysterectomy      Hysteroscopy      Inj paravert f jnt l/s 1 lev Bilateral 2015    Procedure: FACET INJECTION UNDER FLUOROSCOPY;  Surgeon: Dusty Munson DO;  Location: [facility]    Laparoscopic cholecystectomy N/A 2016    Procedure: LAPAROSCOPIC CHOLECYSTECTOMY;  Surgeon: Dai Sanchez MD;  Location:  MAIN OR    Laparoscopy,diagnostic      Laparoscopy,diagnostic  2022    Lysis of adhesions      M-sedaj by  phys perfrmg svc 5+ yr Bilateral 2015    Procedure: FACET INJECTION UNDER FLUOROSCOPY;  Surgeon: Dusty Munson DO;  Location: [facility]    Domonique biopsy stereo nodule 1 site right (cpt=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    Upper gi endoscopy,biopsy N/A 12/19/2015    Procedure: ESOPHAGOGASTRODUODENOSCOPY, POSSIBLE BIOPSY, POSSIBLE POLYPECTOMY 56641;  Surgeon: Brayden Giordano MD;  Location: [facility]       Family History:    Family History   Problem Relation Age of Onset    Heart Disorder Father     Heart Disease Father     Heart Attack Father     Other (Other) Mother         ALLIE    Breast Cancer Maternal Grandmother 75    Cancer Maternal Grandfather         blood cancer    Diabetes Paternal Grandmother     Heart Disorder Paternal Grandmother     Heart Disease Paternal Grandmother     Asthma Paternal Grandmother     Heart Attack Paternal Grandmother     Stroke Neg        Physical Exam:  Vital signs: Blood pressure 120/70, pulse 62, resp. rate 16, height 5' 8.5\" (1.74 m), weight 176 lb (79.8 kg), last menstrual period 05/01/2013, not currently breastfeeding.  General appearance: alert, appears stated age and cooperative  Head: Normocephalic, without obvious abnormality, atraumatic  Eyes: conjunctivae/corneas clear. PERRL, EOM's intact. Fundi benign.  Lungs: clear to auscultation bilaterally  Heart: S1, S2 normal, no murmur, click, rub or gallop, regular rate and rhythm  Abdomen: soft, non-tender; bowel sounds normal; no masses,  no organomegaly  Extremities: extremities normal, atraumatic, no cyanosis or edema  Pulses: 2+ and symmetric  Skin:  irritation noted under skin folds      ROS:    Constitutional: negative  Respiratory: negative  Cardiovascular: negative  Gastrointestinal: positive for abdominal pain  Musculoskeletal:negative  Neurological:  negative  Behavioral/Psych: positive for anxiety  All other systems were reviewed and are negative    Assessment       Encounter Diagnosis(ses):   Encounter Diagnoses   Name Primary?    Mild protein-calorie malnutrition (HCC) Yes    History of Mingo-en-Y gastric bypass     Stress     Binge eating     Overweight with body mass index (BMI) 25.0-29.9        Plan     S/P mingo en y 12/18/2017      Continue PPI    No orders of the defined types were placed in this encounter.      Intertrigo:  Has irritation under skin folds.   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        Chapito Haskins MD

## 2025-02-17 NOTE — TELEPHONE ENCOUNTER
[facility] requesting medication refill for traZODone 50 MG Oral Tab, PREGABALIN 75 MG Oral Cap, ALPRAZOLAM 0.25 MG Oral Tab  .    LOV: 2/17/2025   Prescribed By: Daniela More PA-C   Pharmacy Location: [facility]     Next Appointment: 3/12/2025 Daniela More PA-C      Informed patient to allow up to 24 to 48 business hours before checking with Pharmacy.

## 2025-02-18 ENCOUNTER — MED REC SCAN ONLY (OUTPATIENT)
Dept: FAMILY MEDICINE CLINIC | Facility: CLINIC | Age: 45
End: 2025-02-18

## 2025-02-18 DIAGNOSIS — F41.1 GAD (GENERALIZED ANXIETY DISORDER): ICD-10-CM

## 2025-02-18 DIAGNOSIS — F51.01 PRIMARY INSOMNIA: ICD-10-CM

## 2025-02-18 DIAGNOSIS — R10.12 ABDOMINAL PAIN, CHRONIC, LEFT UPPER QUADRANT: ICD-10-CM

## 2025-02-18 DIAGNOSIS — G89.29 ABDOMINAL PAIN, CHRONIC, LEFT UPPER QUADRANT: ICD-10-CM

## 2025-02-18 PROBLEM — J44.89 CHRONIC BRONCHIOLITIS (HCC): Status: RESOLVED | Noted: 2023-01-30 | Resolved: 2025-02-18

## 2025-02-18 PROBLEM — E44.1 MILD PROTEIN-CALORIE MALNUTRITION (HCC): Status: RESOLVED | Noted: 2018-01-19 | Resolved: 2025-02-18

## 2025-02-18 RX ORDER — ALPRAZOLAM 0.25 MG
0.25 TABLET ORAL 2 TIMES DAILY
Qty: 60 TABLET | Refills: 0 | OUTPATIENT
Start: 2025-02-18

## 2025-02-18 RX ORDER — TRAZODONE HYDROCHLORIDE 50 MG/1
TABLET ORAL NIGHTLY
Qty: 60 TABLET | Refills: 0 | OUTPATIENT
Start: 2025-02-18

## 2025-02-18 RX ORDER — PREGABALIN 75 MG/1
75 CAPSULE ORAL 3 TIMES DAILY
Qty: 90 CAPSULE | Refills: 0 | OUTPATIENT
Start: 2025-02-18

## 2025-02-22 ENCOUNTER — PATIENT MESSAGE (OUTPATIENT)
Dept: FAMILY MEDICINE CLINIC | Facility: CLINIC | Age: 45
End: 2025-02-22

## 2025-02-22 DIAGNOSIS — J44.89 CHRONIC BRONCHIOLITIS (HCC): ICD-10-CM

## 2025-02-24 DIAGNOSIS — J44.89 CHRONIC BRONCHIOLITIS (HCC): ICD-10-CM

## 2025-02-24 RX ORDER — PROMETHAZINE HYDROCHLORIDE 6.25 MG/5ML
5 SYRUP ORAL EVERY 6 HOURS PRN
Qty: 240 ML | Refills: 0 | Status: SHIPPED | OUTPATIENT
Start: 2025-02-24

## 2025-02-25 ENCOUNTER — PATIENT MESSAGE (OUTPATIENT)
Dept: FAMILY MEDICINE CLINIC | Facility: CLINIC | Age: 45
End: 2025-02-25

## 2025-02-25 RX ORDER — PROMETHAZINE HYDROCHLORIDE 6.25 MG/5ML
SYRUP ORAL
Qty: 240 ML | Refills: 0 | OUTPATIENT
Start: 2025-02-25

## 2025-02-26 RX ORDER — NYSTATIN 100000 [USP'U]/ML
5 SUSPENSION ORAL 4 TIMES DAILY
Qty: 140 ML | Refills: 0 | Status: SHIPPED | OUTPATIENT
Start: 2025-02-26 | End: 2025-03-05

## 2025-02-28 DIAGNOSIS — G44.221 CHRONIC TENSION-TYPE HEADACHE, INTRACTABLE: ICD-10-CM

## 2025-02-28 RX ORDER — BUTALBITAL AND ACETAMINOPHEN 325; 50 MG/1; MG/1
TABLET ORAL
Qty: 13 TABLET | Refills: 0 | Status: SHIPPED | OUTPATIENT
Start: 2025-02-28

## 2025-02-28 NOTE — TELEPHONE ENCOUNTER
Requested Prescriptions     Pending Prescriptions Disp Refills    BUTALBITAL-ACETAMINOPHEN  MG Oral Tab [Pharmacy Med Name: BUT/ACETAMINOPHEN 50-325MG TABS] 13 tablet 0     Sig: TAKE 1/2 TO 1 TABLET BY MOUTH DAILY AS NEEDED FOR TENSION HEADACHE       Last Refill: 2/6    Last OV: 2/17/25    Next OV: 3/12    Please review.

## 2025-03-12 ENCOUNTER — OFFICE VISIT (OUTPATIENT)
Dept: FAMILY MEDICINE CLINIC | Facility: CLINIC | Age: 45
End: 2025-03-12
Payer: COMMERCIAL

## 2025-03-12 VITALS
TEMPERATURE: 97 F | RESPIRATION RATE: 18 BRPM | HEIGHT: 68.5 IN | DIASTOLIC BLOOD PRESSURE: 60 MMHG | HEART RATE: 88 BPM | BODY MASS INDEX: 26.97 KG/M2 | SYSTOLIC BLOOD PRESSURE: 96 MMHG | OXYGEN SATURATION: 100 % | WEIGHT: 180 LBS

## 2025-03-12 DIAGNOSIS — F51.01 PRIMARY INSOMNIA: ICD-10-CM

## 2025-03-12 DIAGNOSIS — D80.1 HYPOGAMMAGLOBULINEMIA (HCC): ICD-10-CM

## 2025-03-12 DIAGNOSIS — F41.1 GAD (GENERALIZED ANXIETY DISORDER): ICD-10-CM

## 2025-03-12 DIAGNOSIS — Z85.850 HISTORY OF THYROID CANCER: ICD-10-CM

## 2025-03-12 DIAGNOSIS — E88.01 ALPHA-1-ANTITRYPSIN DEFICIENCY (HCC): ICD-10-CM

## 2025-03-12 DIAGNOSIS — E89.0 POSTOPERATIVE HYPOTHYROIDISM: Primary | ICD-10-CM

## 2025-03-12 DIAGNOSIS — K21.9 GASTROESOPHAGEAL REFLUX DISEASE WITHOUT ESOPHAGITIS: ICD-10-CM

## 2025-03-12 DIAGNOSIS — F32.5 MAJOR DEPRESSION IN REMISSION: ICD-10-CM

## 2025-03-12 DIAGNOSIS — J45.40 MODERATE PERSISTENT ASTHMA WITHOUT COMPLICATION (HCC): ICD-10-CM

## 2025-03-12 DIAGNOSIS — G62.9 NEUROPATHY: ICD-10-CM

## 2025-03-12 DIAGNOSIS — Z79.899 MEDICATION MANAGEMENT: ICD-10-CM

## 2025-03-12 DIAGNOSIS — G44.221 CHRONIC TENSION-TYPE HEADACHE, INTRACTABLE: ICD-10-CM

## 2025-03-12 PROCEDURE — 3008F BODY MASS INDEX DOCD: CPT | Performed by: FAMILY MEDICINE

## 2025-03-12 PROCEDURE — 3074F SYST BP LT 130 MM HG: CPT | Performed by: FAMILY MEDICINE

## 2025-03-12 PROCEDURE — 3078F DIAST BP <80 MM HG: CPT | Performed by: FAMILY MEDICINE

## 2025-03-12 PROCEDURE — G2211 COMPLEX E/M VISIT ADD ON: HCPCS | Performed by: FAMILY MEDICINE

## 2025-03-12 PROCEDURE — 99215 OFFICE O/P EST HI 40 MIN: CPT | Performed by: FAMILY MEDICINE

## 2025-03-12 NOTE — PROGRESS NOTES
Debbi Prasad [facility] is a 44 year old female.  HPI:   Patient is in for follow-up on chronic health issues is in for follow-up on chronic health issues history of tension headaches, OMAR/depression, insomnia, alpha-1 deficiency, sciatica, hypothyroidism, bariatric surgery, history of neuropathy, chronic pain probably fibromyalgia.     Postoperative hypothyroidism/history of thyroid cancer  She is back down to the 175 mcg levothyroxine  once a day.    At last office visit she had accidentally taken 2 of the 175 mcg over the last month reduce down to the 1 and did feel the anxiety symptoms went down did not realize she was feeling more anxious.  2/1/2025 TSH less than 0.10 and T4 free 1.8 elevated  Has orders to repeat the thyroid testing 2 months after correct dosing at 175 mcg daily due in 1 month        Major depression in remission  Primary insomnia  OMAR (generalized anxiety disorder)  Anxiety stable on 0.25 mg of Xanax daily has life stressors with school.  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  Is not ready to taper off of Xanax.  Trazodone uses on and off about 4 times a week at 100 mg does work well when she uses it  Depression in remission no longer needing antidepressant tapered off of Abilify  Lexapro did not make a difference on the anxiety but feels the Xanax works well without causing drowsiness understand addiction potential with Xanax.  Is still doing counseling as needed  Also tried Cymbalta in the past but it made her nauseous       Gastroesophageal reflux disease without esophagitis  Overall feeling well on the pantoprazole lowered the dose down at 40 mg once a day abdominal pain is not as notable.  Has Zofran rarely uses it    Chronic tension-type headache, intractable  Tizanidine 4 mg at night and  Fioricet 3 times a week does help with tension headaches for at least half the day.  Tension headaches had increased because she is having some right  tooth pain is making appointment to see dentist during spring break.      Alpha-1-antitrypsin deficiency (HCC)  Hypogammaglobulinemia (HCC)  Moderate persistent asthma without complication (HCC)  Continuing with infusions weekly doing well has not had any asthma symptoms occasionally congestion rarely  needing the promethazine cough syrup except for did use last night.  Changes in temperature exacerbation  His albuterol if needed has not needed to use it nebulizer ipratropium albuterol if needed not needed presently  Uses Spiriva 2 puffs daily, Symbicort 160/4.52 puffs twice a day  Follows pulmonologist as needed allergist/immunology as needed for hypogammaglobulinemia infections are less often.    History of neuropathy intermittent chronic pain probably fibromyalgia  Overall feels Lyrica has been beneficial has not had the neuropathy symptoms prior had it with history of foot problems.  Is not experiencing neuropathy presently does not want to taper off of Lyrica feels the dose is beneficial is afraid that she will get neuropathy or fibromyalgia symptoms and feels overall symptoms are under control  Had increased the Lyrica after having abdominal pain prescribed by GI several years ago.  Presently feels it still works for chronic pain and neuropathy taking Lyrica 75 mg 3 times a day tolerated well no side effects.    Chronic right wrist pain   Following specialist and is in a wrist splint may need surgery    Current Outpatient Medications   Medication Sig Dispense Refill    BUTALBITAL-ACETAMINOPHEN  MG Oral Tab TAKE 1/2 TO 1 TABLET BY MOUTH DAILY AS NEEDED FOR TENSION HEADACHE 13 tablet 0    promethazine 6.25 MG/5ML Oral Solution Take 5 mL (6.25 mg total) by mouth every 6 (six) hours as needed. 240 mL 0    ALPRAZolam 0.25 MG Oral Tab Take 1 tablet (0.25 mg total) by mouth 2 (two) times daily. 60 tablet 0    traZODone 50 MG Oral Tab Take 1-2 tablets ( mg total) by mouth nightly. 60 tablet 2     Lisdexamfetamine Dimesylate 60 MG Oral Cap       Lisdexamfetamine Dimesylate (VYVANSE) 60 MG Oral Cap Take 1 capsule (60 mg total) by mouth daily. 30 capsule 0    [START ON 3/20/2025] Lisdexamfetamine Dimesylate (VYVANSE) 60 MG Oral Cap Take 1 capsule (60 mg total) by mouth daily. 30 capsule 0    [START ON 4/20/2025] Lisdexamfetamine Dimesylate (VYVANSE) 60 MG Oral Cap Take 1 capsule (60 mg total) by mouth daily. 30 capsule 0    levothyroxine (UNITHROID) 175 MCG Oral Tab Take 1 tablet (175 mcg total) by mouth before breakfast. 90 tablet 0    valACYclovir 1 G Oral Tab Take 2 tablets (2,000 mg total) by mouth Q12H. 30 tablet 0    saccharomyces boulardii 250 MG Oral Cap Take 1 capsule (250 mg total) by mouth 2 (two) times daily.      TIZANIDINE 4 MG Oral Tab TAKE 1 TABLET(4 MG) BY MOUTH EVERY NIGHT AS NEEDED FOR PAIN OR SPASM. DO NOT TAKE WITH VALTREX CAN. START AFTER FINISHING FLEXERIL 30 tablet 2    ondansetron (ZOFRAN) 8 MG tablet TAKE 1 TABLET(8 MG) BY MOUTH EVERY 12 HOURS AS NEEDED FOR NAUSEA 30 tablet 1    acidophilus-pectin Oral Cap Take 1 capsule by mouth daily.      Potassium Chloride ER 10 MEQ Oral Tab CR Take 1 tablet (10 mEq total) by mouth daily. 30 tablet 5    oxybutynin ER 10 MG Oral Tablet 24 Hr TAKE 1 TABLET(10 MG) BY MOUTH DAILY 90 tablet 3    ipratropium-albuterol 0.5-2.5 (3) MG/3ML Inhalation Solution Take 3 mL by nebulization every 4 (four) hours as needed. Use only if the albuterol inhalation albuterol is not working 25 each 1    Multiple Vitamins-Minerals (BARIATRIC MULTIVITAMINS/IRON) Oral Cap Take by mouth As Directed.      pantoprazole 40 MG Oral Tab EC Take 1 tablet (40 mg total) by mouth every morning before breakfast.      Nystatin 041647 UNIT/GM External Powder Apply 1 Application. topically 4 (four) times daily. Apply to affected areas 30 g 1    ALBUTEROL 108 (90 Base) MCG/ACT Inhalation Aero Soln INHALE 2 PUFFS INTO THE LUNGS EVERY 4 HOURS AS NEEDED FOR WHEEZING OR SHORTNESS OF BREATH  8.5 g 1    loratadine 10 MG Oral Tab Take 1 tablet (10 mg total) by mouth daily.  0    alpha 1-proteinase inhibitor 1000 MG/20ML Intravenous Solution Inject into the vein once. weekly      SPIRIVA RESPIMAT 2.5 MCG/ACT Inhalation Aero Soln INHALE 2 PUFFS INTO THE LUNGS DAILY 12 g 2    Calcium Carb-Cholecalciferol (CALCIUM 600/VITAMIN D3) 600-800 MG-UNIT Oral Tab Take 1 tablet by mouth 3 (three) times daily.      SYMBICORT 160-4.5 MCG/ACT Inhalation Aerosol INHALE 2 PUFFS INTO THE LUNGS TWICE DAILY 3 Inhaler 3    Cholecalciferol (VITAMIN D) 50 MCG (2000 UT) Oral Cap Take 1 capsule (2,000 Units total) by mouth in the morning and 1 capsule (2,000 Units total) before bedtime.      magnesium 250 MG Oral Tab Take 1 tablet (250 mg total) by mouth daily.      aspirin 81 MG Oral Tab Take 1 tablet (81 mg total) by mouth daily.      pregabalin 75 MG Oral Cap Take 1 capsule (75 mg total) by mouth 3 (three) times daily. 90 capsule 5      Past Medical History:    Abdominal discomfort in right lower quadrant    Abdominal pain    Abnormal CT scan, esophagus    Acute deep vein thrombosis (DVT) of left lower extremity (HCC)    Acute left-sided low back pain without sciatica    Alpha-1-antitrypsin deficiency (HCC)    Anxiety    Arrhythmia    RIGHT BUNDLE BRACH BLOCK     Arthritis    Asherman syndrome    Asthma (HCC)    Back pain    I have Spinal Stenosis that has gotten worse over the years.    Bloating    Calculus of kidney    Cancer (HCC)    Thyroid    Change in hair    Chest pain    Chronic bronchiolitis (HCC)    Chronic cough    Constipation    COPD (chronic obstructive pulmonary disease) (HCC)    no Oxygen    COVID-19 virus infection    Depression    Diarrhea, unspecified    Disorder of thyroid    Diverticulosis    Easy bruising    Endometriosis    Esophageal reflux    Fatigue    Food intolerance    Frequent use of laxatives    Gastric ulcer    Headache disorder    Hemorrhoids    History of gastric bypass    Hoarseness, chronic     Hx of gastric bypass    Hx of motion sickness    Hydronephrosis    Hypokalemia    Hypothyroidism    Infertility, female    Intertrigo    Irregular bowel habits    Loss of appetite    Migraines    Nausea    Nephrolithiasis    Organic hypersomnia, unspecified    AHI 2 RDI 2 REM AHI 6 SaO2 curtis 89%    Osteoarthritis    Painful urination    Pancreatitis (HCC)    Pneumonia due to organism    PONV (postoperative nausea and vomiting)    Problems with swallowing    PUD (peptic ulcer disease)    Rib contusion, left, initial encounter    Severe persistent asthma with exacerbation (HCC)    Shortness of breath    Sleep disturbance    Stented coronary artery    Stress    Thyroid cancer (HCC)    Visual impairment    glasses    Vocal cord dysfunction    Wears glasses    Weight gain    Weight loss      Social History:  Social History     Socioeconomic History    Marital status:    Tobacco Use    Smoking status: Never     Passive exposure: Past    Smokeless tobacco: Never   Vaping Use    Vaping status: Never Used   Substance and Sexual Activity    Alcohol use: Not Currently    Drug use: No    Sexual activity: Yes     Partners: Male   Other Topics Concern     Service No    Blood Transfusions No    Caffeine Concern No    Occupational Exposure No    Hobby Hazards No    Sleep Concern No    Stress Concern No    Weight Concern No    Special Diet No    Back Care No    Exercise Yes    Bike Helmet No    Seat Belt Yes    Self-Exams No   Social History Narrative    ** Merged History Encounter **          Social Drivers of Health     Food Insecurity: No Food Insecurity (2/17/2025)    NCSS - Food Insecurity     Worried About Running Out of Food in the Last Year: No     Ran Out of Food in the Last Year: No   Transportation Needs: No Transportation Needs (2/17/2025)    NCSS - Transportation     Lack of Transportation: No   Housing Stability: Not At Risk (2/17/2025)    NCSS - Housing/Utilities     Has Housing: Yes     Worried About  Losing Housing: No     Unable to Get Utilities: No        REVIEW OF SYSTEMS:   GENERAL HEALTH: feels well otherwise  SKIN: denies any unusual skin lesions or rashes  RESPIRATORY: denies shortness of breath with exertion  CARDIOVASCULAR: denies chest pain on exertion  GI: denies abdominal pain and denies heartburn  NEURO: Tension headaches  Musculoskeletal: No motor deficits pain and neuropathy under control with Lyrica  Psych anxiety stable depression in remission  EXAM:   BP 96/60 (BP Location: Left arm, Patient Position: Sitting, Cuff Size: adult)   Pulse 88   Temp 97.1 °F (36.2 °C)   Resp 18   Ht 5' 8.5\" (1.74 m)   Wt 180 lb (81.6 kg)   LMP 05/01/2013   SpO2 100%   BMI 26.97 kg/m²   GENERAL: well developed, well nourished,in no apparent distress  SKIN: no rashes,no suspicious lesions  HEENT: TM clear bilaterally, nose no congestion, throat clear no erythema without mass.   EYES: PERRLA, EOM intact, sclera clear without injection  NECK: supple,no adenopathy, thyroid normal to palpation  LUNGS: clear to auscultation no rales, rhonchi or wheezes  CARDIO: RRR without murmur  GI: Normal bowel sounds ×4 quadrant, no hepatosplenomegaly or masses, and no tenderness   EXTREMITIES: no cyanosis, clubbing or edema  Musculoskeletal: No gross deficit  Neurological: nerves II through XII grossly intact no sensorimotor deficit  Psychological: Mood and affect are normal.  Good communication skills.  ASSESSMENT AND PLAN:     Encounter Diagnoses   Name Primary?    Postoperative hypothyroidism Yes    Primary insomnia     Gastroesophageal reflux disease without esophagitis     Major depression in remission     Chronic tension-type headache, intractable     OMAR (generalized anxiety disorder)     Medication management     Alpha-1-antitrypsin deficiency (HCC)     Neuropathy     History of thyroid cancer        No orders of the defined types were placed in this encounter.      Meds & Refills for this Visit:  Requested  Prescriptions      No prescriptions requested or ordered in this encounter       Imaging & Consults:  None  Postoperative hypothyroidism/history of thyroid cancer  Continue with levothyroxine 175 mcg daily  Check thyroid testing in 1 month    Primary insomnia  Continue with trazodone 50 to 100 mg as needed continue with practicing good sleep hygiene    Gastroesophageal reflux disease without esophagitis  Under good control pantoprazole 40 mg once daily  GERD prevention reviewed avoid caffeine, alcohol, and nonstnoeroidals.  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   Make appointment with dentist to discuss tooth pain that is increasing her tension headaches  Continue with tizanidine try to reduce dose to 2 mg at night.    Medication management  As above    Alpha-1-antitrypsin deficiency (HCC)  Hypogammaglobulinemia  Asthma under good control  Continue with alpha 1 antitrypsin deficiency infusions weekly  Immunologist and pulmonologist as needed  No present infection.  Continue with daily inhaler Symbicort and Spiriva    Neuropathy  Continue with Lyrica 75 mg 3 times a day well-tolerated no side effects symptoms under good control with Lyrica    Time spent was 50 minutes more than 50% was spent on counseling regarding medical conditions and treatment.  Rest of time was spent reviewing chart, reviewing blood work and radiology tests.   Provider patient relationship has had a longitudinal long  term relationship to address and treat complex conditions.    The patient indicates understanding of these issues and agrees to the plan.  The patient is asked to return in 1 month for chronic medication management.

## 2025-03-14 PROBLEM — F33.1 DEPRESSION, MAJOR, RECURRENT, MODERATE (HCC): Status: RESOLVED | Noted: 2024-09-11 | Resolved: 2025-03-14

## 2025-03-14 PROBLEM — M35.1 MIXED CONNECTIVE TISSUE DISEASE (HCC): Status: RESOLVED | Noted: 2022-08-11 | Resolved: 2025-03-14

## 2025-03-14 PROBLEM — D80.1 HYPOGAMMAGLOBULINEMIA (HCC): Status: ACTIVE | Noted: 2025-03-14

## 2025-03-14 PROBLEM — S69.80XA: Status: ACTIVE | Noted: 2025-02-18

## 2025-03-14 PROBLEM — S63.519A SPRAIN OF SCAPHOLUNATE LIGAMENT: Status: ACTIVE | Noted: 2025-02-18

## 2025-03-14 PROBLEM — R52 PAIN MANAGEMENT: Status: RESOLVED | Noted: 2024-11-20 | Resolved: 2025-03-14

## 2025-03-14 PROBLEM — M54.50 ACUTE LEFT-SIDED LOW BACK PAIN WITHOUT SCIATICA: Status: RESOLVED | Noted: 2024-11-20 | Resolved: 2025-03-14

## 2025-03-14 PROBLEM — F43.9 STRESS: Status: RESOLVED | Noted: 2020-04-17 | Resolved: 2025-03-14

## 2025-03-15 DIAGNOSIS — F41.1 GAD (GENERALIZED ANXIETY DISORDER): ICD-10-CM

## 2025-03-17 ENCOUNTER — PATIENT MESSAGE (OUTPATIENT)
Dept: FAMILY MEDICINE CLINIC | Facility: CLINIC | Age: 45
End: 2025-03-17

## 2025-03-17 RX ORDER — ALPRAZOLAM 0.25 MG
0.25 TABLET ORAL 2 TIMES DAILY
Qty: 60 TABLET | Refills: 0 | Status: SHIPPED | OUTPATIENT
Start: 2025-03-17

## 2025-03-18 NOTE — LETTER
10/27/20    Deadra Barthel      To Whom It May Concern: The above patient was seen at [facility] for treatment of a medical condition from 10/24/2020-10/27/2020. The patient may return to work/school on 10/29/2020 without any restrictions.
No

## 2025-03-19 DIAGNOSIS — G44.221 CHRONIC TENSION-TYPE HEADACHE, INTRACTABLE: ICD-10-CM

## 2025-03-19 RX ORDER — BUTALBITAL AND ACETAMINOPHEN 325; 50 MG/1; MG/1
TABLET ORAL
Qty: 13 TABLET | Refills: 0 | Status: SHIPPED | OUTPATIENT
Start: 2025-03-19

## 2025-03-19 NOTE — TELEPHONE ENCOUNTER
Requested Prescriptions     Pending Prescriptions Disp Refills    BUTALBITAL-ACETAMINOPHEN  MG Oral Tab [Pharmacy Med Name: BUT/ACETAMINOPHEN 50-325MG TABS] 13 tablet 0     Sig: TAKE 1/2 TO 1 TABLET BY MOUTH DAILY AS NEEDED FOR TENSION HEADACHE       Last Refill: 2/28/25    Last OV: 3/12/25    Next OV: 4/7/25

## 2025-03-24 ENCOUNTER — PATIENT MESSAGE (OUTPATIENT)
Dept: FAMILY MEDICINE CLINIC | Facility: CLINIC | Age: 45
End: 2025-03-24

## 2025-03-24 DIAGNOSIS — G44.221 CHRONIC TENSION-TYPE HEADACHE, INTRACTABLE: ICD-10-CM

## 2025-03-25 RX ORDER — BUTALBITAL AND ACETAMINOPHEN 325; 50 MG/1; MG/1
TABLET ORAL
Qty: 30 TABLET | Refills: 0 | Status: SHIPPED | OUTPATIENT
Start: 2025-03-25

## 2025-04-01 DIAGNOSIS — G44.221 CHRONIC TENSION-TYPE HEADACHE, INTRACTABLE: ICD-10-CM

## 2025-04-02 DIAGNOSIS — G44.221 CHRONIC TENSION-TYPE HEADACHE, INTRACTABLE: ICD-10-CM

## 2025-04-02 NOTE — TELEPHONE ENCOUNTER
Requested Prescriptions     Pending Prescriptions Disp Refills    TIZANIDINE 4 MG Oral Tab [Pharmacy Med Name: TIZANIDINE 4MG TABLETS] 30 tablet 2     Sig: TAKE 1 TABLET(4 MG) BY MOUTH EVERY NIGHT AS NEEDED FOR PAIN OR SPASM. DO NOT TAKE WITH VALTREX CAN. START AFTER FINISHING FLEXERIL       Last Refill: 1/6    Last OV: 3/12    Next OV: 4/7

## 2025-04-10 DIAGNOSIS — F41.1 GAD (GENERALIZED ANXIETY DISORDER): ICD-10-CM

## 2025-04-11 RX ORDER — ALPRAZOLAM 0.25 MG
0.25 TABLET ORAL 2 TIMES DAILY PRN
Qty: 60 TABLET | Refills: 0 | Status: SHIPPED | OUTPATIENT
Start: 2025-04-11

## 2025-04-11 NOTE — TELEPHONE ENCOUNTER
Requested Prescriptions     Pending Prescriptions Disp Refills    ALPRAZolam 0.25 MG Oral Tab 60 tablet 0     Sig: Take 1 tablet (0.25 mg total) by mouth 2 (two) times daily.       Last Refill: 3/17/25    Last OV: 3/12/25    Next OV: 5/12/25

## 2025-04-22 DIAGNOSIS — G44.221 CHRONIC TENSION-TYPE HEADACHE, INTRACTABLE: ICD-10-CM

## 2025-04-23 RX ORDER — BUTALBITAL AND ACETAMINOPHEN 325; 50 MG/1; MG/1
TABLET ORAL
Qty: 15 TABLET | Refills: 0 | Status: SHIPPED | OUTPATIENT
Start: 2025-04-23

## 2025-04-23 NOTE — TELEPHONE ENCOUNTER
Requested Prescriptions     Pending Prescriptions Disp Refills    Butalbital-Acetaminophen  MG Oral Tab 30 tablet 0     Sig: TAKE 1/2 TO 1 TABLET BY MOUTH DAILY AS NEEDED FOR TENSION HEADACHE       Last Refill: 3/25    Last OV: 4/7    Next OV: 5/12    Patient would like to wean off daily dose.

## 2025-04-29 DIAGNOSIS — E89.0 POSTOPERATIVE HYPOTHYROIDISM: ICD-10-CM

## 2025-04-30 RX ORDER — LEVOTHYROXINE SODIUM 175 UG/1
175 TABLET ORAL
Qty: 90 TABLET | Refills: 0 | OUTPATIENT
Start: 2025-04-30

## 2025-05-01 ENCOUNTER — PATIENT MESSAGE (OUTPATIENT)
Dept: FAMILY MEDICINE CLINIC | Facility: CLINIC | Age: 45
End: 2025-05-01

## 2025-05-01 DIAGNOSIS — E89.0 POSTOPERATIVE HYPOTHYROIDISM: ICD-10-CM

## 2025-05-01 RX ORDER — LEVOTHYROXINE SODIUM 175 UG/1
175 TABLET ORAL
Qty: 30 TABLET | Refills: 0 | Status: SHIPPED | OUTPATIENT
Start: 2025-05-01

## 2025-05-01 RX ORDER — LEVOTHYROXINE SODIUM 175 UG/1
175 TABLET ORAL
Qty: 90 TABLET | Refills: 0 | OUTPATIENT
Start: 2025-05-01

## 2025-05-10 ENCOUNTER — LAB ENCOUNTER (OUTPATIENT)
Dept: LAB | Age: 45
End: 2025-05-10
Attending: FAMILY MEDICINE
Payer: COMMERCIAL

## 2025-05-10 DIAGNOSIS — R82.2 BILIRUBIN IN URINE: ICD-10-CM

## 2025-05-10 DIAGNOSIS — E89.0 POSTOPERATIVE HYPOTHYROIDISM: ICD-10-CM

## 2025-05-10 LAB
T4 FREE SERPL-MCNC: 1.5 NG/DL (ref 0.8–1.7)
TSI SER-ACNC: <0.01 UIU/ML (ref 0.55–4.78)

## 2025-05-10 PROCEDURE — 84443 ASSAY THYROID STIM HORMONE: CPT

## 2025-05-10 PROCEDURE — 36415 COLL VENOUS BLD VENIPUNCTURE: CPT

## 2025-05-10 PROCEDURE — 84439 ASSAY OF FREE THYROXINE: CPT

## 2025-05-10 PROCEDURE — 80076 HEPATIC FUNCTION PANEL: CPT

## 2025-05-11 NOTE — PROGRESS NOTES
TSH is too low change thyroid medication to 150 mcg of  unithroid discontinue the 175 mcg and repeat labs in 8 weeks.

## 2025-05-12 ENCOUNTER — OFFICE VISIT (OUTPATIENT)
Dept: FAMILY MEDICINE CLINIC | Facility: CLINIC | Age: 45
End: 2025-05-12
Payer: COMMERCIAL

## 2025-05-12 VITALS
BODY MASS INDEX: 25.77 KG/M2 | SYSTOLIC BLOOD PRESSURE: 98 MMHG | TEMPERATURE: 98 F | DIASTOLIC BLOOD PRESSURE: 58 MMHG | RESPIRATION RATE: 16 BRPM | HEART RATE: 70 BPM | WEIGHT: 172 LBS | HEIGHT: 68.5 IN | OXYGEN SATURATION: 100 %

## 2025-05-12 DIAGNOSIS — R82.2 BILIRUBIN IN URINE: ICD-10-CM

## 2025-05-12 DIAGNOSIS — Z79.899 MEDICATION MANAGEMENT: ICD-10-CM

## 2025-05-12 DIAGNOSIS — N32.81 OVERACTIVE BLADDER: ICD-10-CM

## 2025-05-12 DIAGNOSIS — J45.40 MODERATE PERSISTENT ASTHMA WITHOUT COMPLICATION (HCC): ICD-10-CM

## 2025-05-12 DIAGNOSIS — M53.3 PAIN OF RIGHT SACROILIAC JOINT: ICD-10-CM

## 2025-05-12 DIAGNOSIS — F41.1 GAD (GENERALIZED ANXIETY DISORDER): ICD-10-CM

## 2025-05-12 DIAGNOSIS — G44.221 CHRONIC TENSION-TYPE HEADACHE, INTRACTABLE: Primary | ICD-10-CM

## 2025-05-12 DIAGNOSIS — E88.01 ALPHA-1-ANTITRYPSIN DEFICIENCY (HCC): ICD-10-CM

## 2025-05-12 DIAGNOSIS — R35.0 URINARY FREQUENCY: ICD-10-CM

## 2025-05-12 DIAGNOSIS — K29.00 ACUTE SUPERFICIAL GASTRITIS WITHOUT HEMORRHAGE: ICD-10-CM

## 2025-05-12 DIAGNOSIS — D80.1 HYPOGAMMAGLOBULINEMIA (HCC): ICD-10-CM

## 2025-05-12 LAB
ALBUMIN SERPL-MCNC: 4.3 G/DL (ref 3.2–4.8)
ALP LIVER SERPL-CCNC: 78 U/L (ref 37–98)
ALT SERPL-CCNC: 20 U/L (ref 10–49)
APPEARANCE: CLEAR
AST SERPL-CCNC: 18 U/L (ref ?–34)
BILIRUB DIRECT SERPL-MCNC: <0.1 MG/DL (ref ?–0.3)
BILIRUB SERPL-MCNC: 0.2 MG/DL (ref 0.3–1.2)
GLUCOSE (URINE DIPSTICK): NEGATIVE MG/DL
KETONES (URINE DIPSTICK): NEGATIVE MG/DL
LEUKOCYTES: NEGATIVE
MULTISTIX LOT#: ABNORMAL NUMERIC
NITRITE, URINE: NEGATIVE
OCCULT BLOOD: NEGATIVE
PH, URINE: 7 (ref 4.5–8)
PROT SERPL-MCNC: 6.7 G/DL (ref 5.7–8.2)
PROTEIN (URINE DIPSTICK): NEGATIVE MG/DL
SPECIFIC GRAVITY: 1.02 (ref 1–1.03)
UROBILINOGEN,SEMI-QN: 0.2 MG/DL (ref 0–1.9)

## 2025-05-12 PROCEDURE — G2211 COMPLEX E/M VISIT ADD ON: HCPCS | Performed by: FAMILY MEDICINE

## 2025-05-12 PROCEDURE — 3078F DIAST BP <80 MM HG: CPT | Performed by: FAMILY MEDICINE

## 2025-05-12 PROCEDURE — 3008F BODY MASS INDEX DOCD: CPT | Performed by: FAMILY MEDICINE

## 2025-05-12 PROCEDURE — 81003 URINALYSIS AUTO W/O SCOPE: CPT | Performed by: FAMILY MEDICINE

## 2025-05-12 PROCEDURE — 3074F SYST BP LT 130 MM HG: CPT | Performed by: FAMILY MEDICINE

## 2025-05-12 PROCEDURE — 99215 OFFICE O/P EST HI 40 MIN: CPT | Performed by: FAMILY MEDICINE

## 2025-05-12 PROCEDURE — 87086 URINE CULTURE/COLONY COUNT: CPT | Performed by: FAMILY MEDICINE

## 2025-05-12 RX ORDER — ALPRAZOLAM 0.25 MG
TABLET ORAL 2 TIMES DAILY PRN
Qty: 60 TABLET | Refills: 0 | Status: SHIPPED | OUTPATIENT
Start: 2025-05-12

## 2025-05-12 RX ORDER — BUTALBITAL AND ACETAMINOPHEN 325; 50 MG/1; MG/1
1 TABLET ORAL 2 TIMES DAILY PRN
Qty: 15 TABLET | Refills: 0 | Status: SHIPPED | OUTPATIENT
Start: 2025-05-12

## 2025-05-12 NOTE — PROGRESS NOTES
Debbi Prasad [facility] is a 44 year old female.  HPI:   Patient is in for follow-up on chronic health issues history of tension headaches, OMAR/depression, insomnia, alpha-1 deficiency, sciatica, hypothyroidism, bariatric surgery, history of neuropathy, chronic pain probably fibromyalgia.     Postoperative hypothyroidism/history of thyroid cancer  She is back down to the 175 mcg levothyroxine  once a day and TSH is still very low T4 is now normal.  Reduction to 150 mcg levothyroxine sent 5/10/2025     Is noticing she still jittery and anxious  We will repeat thyroid testing in 2 months  Prior was seeing Dr. Lopez is now working with primary care provider for adjustments        Component      Latest Ref Rng 2/2/2024 9/14/2024 2/1/2025 5/10/2025   T4,Free (Direct)      0.8 - 1.7 ng/dL 1.6  1.8 (H)  1.8 (H)  1.5    TSH      0.550 - 4.780 uIU/mL <0.005 (L)  <0.008 (L)  <0.010 (L)  <0.010 (L)        Major depression in remission  Primary insomnia  OMAR (generalized anxiety disorder)  Anxiety stable on 0.25 mg of Xanax daily has life stressors with school her  is gone so she is doing the full work which does cause more anxiety..  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  Is not ready to taper off of Xanax is willing to go down to one half twice a day during the summer and see how she feels  Trazodone uses on and off about 4 times a week at 100 mg does work well when she uses it  Depression in remission no longer needing antidepressant tapered off of Abilify  Lexapro did not make a difference on the anxiety but feels the Xanax works well without causing drowsiness understand addiction potential with Xanax.  Is still doing counseling as needed  Also tried Cymbalta in the past but it made her nauseous         Gastroesophageal reflux disease without esophagitis  Gastritis symptoms  Overall feeling well on the pantoprazole lowered the dose down at 40 mg once a  day for a long time was taking it twice a day.    Does have some more left of the epigastric area abdominal pain that restarted but has been using intermittent ibuprofen for the right wrist pain she has had since fall.  Is doing physical therapy and has a wrist widget that she is using will follow-up with orthopedic to see if she needs surgery.    Does have history of ulcer and bariatric surgery was told to avoid nonsteroidals has not been doing topicals.  Did get some nausea and vomiting decreased appetite has been using ibuprofen 2 times a day for the 5 days a week well at school.    Lower back pain  States that she feels swelling and discomfort into the SI region of the lower back has been trying gentle massage has not done any exercises or anything topical.  Did notice some urinary frequency without burning or urgency no fevers, chills or bodyaches  Nausea intermittent but thought it was from using the ibuprofen    Chronic tension-type headache, intractable  Tizanidine 4 mg at night and Fioricet 3 times a week does help with tension headaches for at least half the day.  Does get headaches daily during the school week less on the weekends.  Less anxiety and stress on the weekends.  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  Will be able to take the summer off has some vacations planned which will also help with tension   .        Alpha-1-antitrypsin deficiency (HCC)  Hypogammaglobulinemia (HCC)  Moderate persistent asthma without complication (HCC)  Continuing with infusions weekly doing well has not had any asthma symptoms occasionally congestion rarely  needing the promethazine cough syrup  Albuterol if needed has not needed to use it nebulizer ipratropium albuterol if needed not needed presently  Uses Spiriva 2 puffs daily, Symbicort 160/4.52 puffs twice a day  Follows pulmonologist as needed allergist/immunology as needed for hypogammaglobulinemia infections are less often.     History of  neuropathy intermittent chronic pain probably fibromyalgia  Overall feels Lyrica has been beneficial has not had the neuropathy symptoms prior had it with history of foot problems.  Is not experiencing neuropathy presently does not want to taper off of Lyrica feels the dose is beneficial is afraid that she will get neuropathy or fibromyalgia symptoms and feels overall symptoms are under control  Had increased the Lyrica after having abdominal pain prescribed by GI several years ago.  Presently feels it still works for chronic pain and neuropathy taking Lyrica 75 mg 3 times a day tolerated well no side effects.     Chronic right wrist pain   Following specialist and is in a wrist widget during the daytime  may need surgery presently doing hand therapy  Pain has been going on since 12/24/2024 after a fall off a counter has multiple sprains  External MRI right wrist 2/13/2025  partial tearing at the ulnar styloid attachment of the TFCC with a sprain and partial tear of the dorsal radial ulnar ligament, mild partial tearing of the dorsal band of the SL ligament with a tiny 1 mm ganglion, sprain of the membranous portion of the SL ligament.   External MRI right hand 2/13/2025 was read as mild sprain of the UCL of the fifth MCP joint, minimal degenerative changes of the thumb MCP joint. Status post right wrist TFCC cortisone injection 4/2/2025         Current Medications[1]   Past Medical History[2]   Social History:  Short Social Hx on File[3]     REVIEW OF SYSTEMS:   GENERAL HEALTH: feels well otherwise  SKIN: denies any unusual skin lesions or rashes  RESPIRATORY: denies shortness of breath with exertion  CARDIOVASCULAR: denies chest pain on exertion  GI: denies abdominal pain and denies heartburn  NEURO: denies headaches  Musculoskeletal: No motor deficits  EXAM:   BP 98/58 (BP Location: Left arm, Patient Position: Sitting, Cuff Size: adult)   Pulse 70   Temp 98 °F (36.7 °C)   Resp 16   Ht 5' 8.5\" (1.74 m)   Wt  172 lb (78 kg)   SpO2 100%   BMI 25.77 kg/m²   GENERAL: well developed, well nourished,in no apparent distress  SKIN: no rashes,no suspicious lesions  HEENT: TM clear bilaterally, nose no congestion, throat clear no erythema without mass.   EYES: PERRLA, EOM intact, sclera clear without injection  NECK: supple,no adenopathy, thyroid normal to palpation  LUNGS: clear to auscultation no rales, rhonchi or wheezes  CARDIO: RRR without murmur  GI: Normal bowel sounds ×4 quadrant, no hepatosplenomegaly or masses, and midepigastric to the left tenderness without guarding, rigidity or rebound no CVA tenderness  EXTREMITIES: no cyanosis, clubbing or edema  Musculoskeletal:  pain with palpation to the right SI joint with muscle spasm hip range of motion is normal  Neurological: nerves II through XII grossly intact no sensorimotor deficit  Psychological: Mood and affect are normal.  Good communication skills.  ASSESSMENT AND PLAN:     Encounter Diagnoses   Name Primary?    Chronic tension-type headache, intractable Yes    OMAR (generalized anxiety disorder)     Pain of right sacroiliac joint     Urinary frequency     Acute superficial gastritis without hemorrhage     Bilirubin in urine     Alpha-1-antitrypsin deficiency (HCC)     Moderate persistent asthma without complication (HCC)     Hypogammaglobulinemia (HCC)        Orders Placed This Encounter   Procedures    Urine Dip, auto without Micro    Hepatic Function Panel (7) [E]    Urine Culture, Routine [E]       Meds & Refills for this Visit:  Requested Prescriptions     Signed Prescriptions Disp Refills    Butalbital-Acetaminophen  MG Oral Tab 15 tablet 0     Sig: Take 1 tablet by mouth 2 (two) times daily as needed (headache). TAKE 1/2 TO 1 TABLET BY MOUTH DAILY AS NEEDED FOR TENSION HEADACHE    ALPRAZolam 0.25 MG Oral Tab 60 tablet 0     Sig: Take 0.5-1 tablets (0.125-0.25 mg total) by mouth 2 (two) times daily as needed for Anxiety or Sleep.       Imaging &  Consults:  None  1. Chronic tension-type headache, intractable  Use, risks, benefits and precautions of butalbital discussed. Including not to drive, operate machinery or drink alcohol when taking this medication.  Advised of risk of addiction to butalbital    [place]  Data Reviewed.    - Butalbital-Acetaminophen  MG Oral Tab; Take 1 tablet by mouth 2 (two) times daily as needed (headache). TAKE 1/2 TO 1 TABLET BY MOUTH DAILY AS NEEDED FOR TENSION HEADACHE  Dispense: 15 tablet; Refill: 0    2. OMAR (generalized anxiety disorder)  Reduction of Xanax once school is over 2-1/2 twice a day trying to wean off of long-term Xanax  - ALPRAZolam 0.25 MG Oral Tab; Take 0.5-1 tablets (0.125-0.25 mg total) by mouth 2 (two) times daily as needed for Anxiety or Sleep.  Dispense: 60 tablet; Refill: 0  Continue with seeing counselor twice a month   advised to try to exercise daily    3. Pain of right sacroiliac joint  Piriformis muscle exercises provided    4. Urinary frequency history of overactive bladder  Increase water intake urinate when urgent occurs.  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 Acute superficial gastritis without hemorrhage  Discontinue ibuprofen try topical CBD and Voltaren gel rubbed in for 10 minutes to the right wrist twice a day continue with hand therapy and follow-ups with orthopedic    6. Bilirubin in urine small  - Hepatic Function Panel (7) [E]; Future  Due to hydration affect liver function test was normal    7. Alpha-1-antitrypsin deficiency (HCC)  Moderate persistent asthma without complication (HCC)  Hypogammaglobulinemia (HCC)  Continue with allergist as needed has not had any significant  illnesses recently or exacerbations of asthma we will continue with pulmonologist as needed continue with Symbicort, Spiriva and DuoNeb as needed    Time spent was 40 minutes more than 50% was spent on counseling regarding medical conditions and treatment.  Rest of time was spent reviewing chart, reviewing blood work and radiology tests.   Provider patient relationship has had a longitudinal long term relationship to address and treat complex conditions.    The patient indicates understanding of these issues and agrees to the plan.  The patient is asked to return in 6/2/2025 appointment scheduled for follow-up.         [1]   Current Outpatient Medications   Medication Sig Dispense Refill    Butalbital-Acetaminophen  MG Oral Tab Take 1 tablet by mouth 2 (two) times daily as needed (headache). TAKE 1/2 TO 1 TABLET BY MOUTH DAILY AS NEEDED FOR TENSION HEADACHE 15 tablet 0    ALPRAZolam 0.25 MG Oral Tab Take 0.5-1 tablets (0.125-0.25 mg total) by mouth 2 (two) times daily as needed for Anxiety or Sleep. 60 tablet 0    levothyroxine (UNITHROID) 150 MCG Oral Tab Take 1 tablet (150 mcg total) by mouth before breakfast. 90 tablet 0    TIZANIDINE 4 MG Oral Tab TAKE 1 TABLET(4 MG) BY MOUTH EVERY NIGHT AS NEEDED FOR PAIN OR SPASM. DO NOT TAKE WITH VALTREX CAN. START AFTER FINISHING FLEXERIL 30 tablet 2    promethazine 6.25 MG/5ML Oral Solution Take 5 mL (6.25 mg total) by mouth every 6 (six) hours as needed. 240 mL 0    pregabalin 75 MG Oral Cap Take 1 capsule (75 mg total) by mouth 3 (three) times daily. 90 capsule 5    traZODone 50 MG Oral Tab Take 1-2 tablets ( mg total) by mouth nightly. 60 tablet 2    Lisdexamfetamine Dimesylate 60 MG Oral Cap       Lisdexamfetamine Dimesylate (VYVANSE) 60 MG Oral Cap Take 1 capsule (60 mg total) by mouth daily. 30 capsule 0    valACYclovir 1 G Oral Tab Take 2 tablets (2,000 mg total) by mouth Q12H. 30 tablet 0    ondansetron (ZOFRAN) 8 MG tablet TAKE 1 TABLET(8 MG) BY MOUTH  EVERY 12 HOURS AS NEEDED FOR NAUSEA 30 tablet 1    acidophilus-pectin Oral Cap Take 1 capsule by mouth daily.      oxybutynin ER 10 MG Oral Tablet 24 Hr TAKE 1 TABLET(10 MG) BY MOUTH DAILY 90 tablet 3    ipratropium-albuterol 0.5-2.5 (3) MG/3ML Inhalation Solution Take 3 mL by nebulization every 4 (four) hours as needed. Use only if the albuterol inhalation albuterol is not working 25 each 1    Multiple Vitamins-Minerals (BARIATRIC MULTIVITAMINS/IRON) Oral Cap Take by mouth As Directed.      pantoprazole 40 MG Oral Tab EC Take 1 tablet (40 mg total) by mouth every morning before breakfast.      Nystatin 603732 UNIT/GM External Powder Apply 1 Application. topically 4 (four) times daily. Apply to affected areas 30 g 1    ALBUTEROL 108 (90 Base) MCG/ACT Inhalation Aero Soln INHALE 2 PUFFS INTO THE LUNGS EVERY 4 HOURS AS NEEDED FOR WHEEZING OR SHORTNESS OF BREATH (Patient taking differently: Inhale 2 puffs into the lungs every 4 (four) hours as needed for Shortness of Breath or Wheezing.) 8.5 g 1    loratadine 10 MG Oral Tab Take 1 tablet (10 mg total) by mouth daily.  0    alpha 1-proteinase inhibitor 1000 MG/20ML Intravenous Solution Inject into the vein once. weekly      SPIRIVA RESPIMAT 2.5 MCG/ACT Inhalation Aero Soln INHALE 2 PUFFS INTO THE LUNGS DAILY 12 g 2    Calcium Carb-Cholecalciferol (CALCIUM 600/VITAMIN D3) 600-800 MG-UNIT Oral Tab Take 1 tablet by mouth 3 (three) times daily.      SYMBICORT 160-4.5 MCG/ACT Inhalation Aerosol INHALE 2 PUFFS INTO THE LUNGS TWICE DAILY 3 Inhaler 3    Cholecalciferol (VITAMIN D) 50 MCG (2000 UT) Oral Cap Take 1 capsule (2,000 Units total) by mouth in the morning and 1 capsule (2,000 Units total) before bedtime.      magnesium 250 MG Oral Tab Take 1 tablet (250 mg total) by mouth daily.      aspirin 81 MG Oral Tab Take 1 tablet (81 mg total) by mouth daily.     [2]   Past Medical History:   Abdominal discomfort in right lower quadrant    Abdominal hernia    Abdominal pain     Abnormal CT scan, esophagus    Acute deep vein thrombosis (DVT) of left lower extremity (HCC)    Acute left-sided low back pain without sciatica    Alpha-1-antitrypsin deficiency (HCC)    Anxiety    Arrhythmia    RIGHT BUNDLE BRACH BLOCK     Arthritis    Asherman syndrome    Asthma (HCC)    Back pain    I have Spinal Stenosis that has gotten worse over the years.    Bloating    Calculus of kidney    Cancer (HCC)    Thyroid    Change in hair    Chest pain    Chronic bronchiolitis (HCC)    Chronic cough    Constipation    COPD (chronic obstructive pulmonary disease) (HCC)    no Oxygen    COVID-19 virus infection    Depression    Depression, major, recurrent, moderate (HCC)    Diarrhea, unspecified    Disorder of thyroid    Diverticulosis    Diverticulosis of intestine    Easy bruising    Endometriosis    Esophageal reflux    Fatigue    Food intolerance    Frequent use of laxatives    Gastric ulcer    Headache disorder    Hemorrhoids    History of gastric bypass    Hoarseness, chronic    Hx of gastric bypass    Hx of motion sickness    Hydronephrosis    Hypokalemia    Hypothyroidism    Infertility, female    Intertrigo    Irregular bowel habits    KIDNEY STONE    Loss of appetite    Migraines    Nausea    Nephrolithiasis    Organic hypersomnia, unspecified    AHI 2 RDI 2 REM AHI 6 SaO2 curtis 89%    Osteoarthritis    Painful urination    Pancreatitis (HCC)    Pneumonia due to organism    PONV (postoperative nausea and vomiting)    Problems with swallowing    PUD (peptic ulcer disease)    Rash    Rib contusion, left, initial encounter    Severe persistent asthma with exacerbation (HCC)    Shortness of breath    Sleep disturbance    Stented coronary artery    Stress    Thyroid cancer (HCC)    Visual impairment    glasses    Vocal cord dysfunction    Wears glasses    Weight gain    Weight loss   [3]   Social History  Socioeconomic History    Marital status:    Tobacco Use    Smoking status: Never     Passive  exposure: Past    Smokeless tobacco: Never   Vaping Use    Vaping status: Never Used   Substance and Sexual Activity    Alcohol use: Not Currently    Drug use: No    Sexual activity: Yes     Partners: Male   Other Topics Concern     Service No    Blood Transfusions No    Caffeine Concern No    Occupational Exposure No    Hobby Hazards No    Sleep Concern No    Stress Concern No    Weight Concern No    Special Diet No    Back Care No    Exercise Yes    Bike Helmet No    Seat Belt Yes    Self-Exams No   Social History Narrative    ** Merged History Encounter **          Social Drivers of Health     Food Insecurity: No Food Insecurity (4/7/2025)    NCSS - Food Insecurity     Worried About Running Out of Food in the Last Year: No     Ran Out of Food in the Last Year: No   Transportation Needs: No Transportation Needs (4/7/2025)    NCSS - Transportation     Lack of Transportation: No   Housing Stability: Not At Risk (4/7/2025)    NCSS - Housing/Utilities     Has Housing: Yes     Worried About Losing Housing: No     Unable to Get Utilities: No

## 2025-05-13 ENCOUNTER — PATIENT MESSAGE (OUTPATIENT)
Dept: FAMILY MEDICINE CLINIC | Facility: CLINIC | Age: 45
End: 2025-05-13

## 2025-05-13 PROBLEM — N32.81 OVERACTIVE BLADDER: Status: ACTIVE | Noted: 2025-05-13

## 2025-05-13 NOTE — PROGRESS NOTES
Urinalysis no signs of infection possibly bilirubin but could be from color of urine added a liver panel to check liver function to blood that was just drawn.

## 2025-05-15 ENCOUNTER — PATIENT MESSAGE (OUTPATIENT)
Dept: RHEUMATOLOGY | Facility: CLINIC | Age: 45
End: 2025-05-15

## 2025-05-15 DIAGNOSIS — R76.8 ANA POSITIVE: Primary | ICD-10-CM

## 2025-05-15 DIAGNOSIS — M25.50 POLYARTHRALGIA: ICD-10-CM

## 2025-05-15 DIAGNOSIS — M25.531 RIGHT WRIST PAIN: ICD-10-CM

## 2025-05-17 ENCOUNTER — PATIENT MESSAGE (OUTPATIENT)
Dept: FAMILY MEDICINE CLINIC | Facility: CLINIC | Age: 45
End: 2025-05-17

## 2025-05-24 DIAGNOSIS — F51.01 PRIMARY INSOMNIA: ICD-10-CM

## 2025-05-27 RX ORDER — TRAZODONE HYDROCHLORIDE 50 MG/1
TABLET ORAL NIGHTLY
Qty: 60 TABLET | Refills: 2 | Status: SHIPPED | OUTPATIENT
Start: 2025-05-27

## 2025-05-30 ENCOUNTER — LAB ENCOUNTER (OUTPATIENT)
Dept: LAB | Age: 45
End: 2025-05-30
Attending: FAMILY MEDICINE
Payer: COMMERCIAL

## 2025-05-30 ENCOUNTER — TELEPHONE (OUTPATIENT)
Dept: FAMILY MEDICINE CLINIC | Facility: CLINIC | Age: 45
End: 2025-05-30

## 2025-05-30 DIAGNOSIS — M25.50 POLYARTHRALGIA: ICD-10-CM

## 2025-05-30 DIAGNOSIS — R30.0 DYSURIA: ICD-10-CM

## 2025-05-30 DIAGNOSIS — M25.531 RIGHT WRIST PAIN: ICD-10-CM

## 2025-05-30 DIAGNOSIS — R30.0 DYSURIA: Primary | ICD-10-CM

## 2025-05-30 DIAGNOSIS — R76.8 ANA POSITIVE: ICD-10-CM

## 2025-05-30 LAB
BILIRUB UR QL STRIP.AUTO: NEGATIVE
C3 SERPL-MCNC: 131.6 MG/DL (ref 90–170)
C4 SERPL-MCNC: 30.4 MG/DL (ref 12–36)
COLOR UR AUTO: YELLOW
CRP SERPL-MCNC: <0.4 MG/DL (ref ?–0.5)
ERYTHROCYTE [SEDIMENTATION RATE] IN BLOOD: 12 MM/HR (ref 0–20)
GLUCOSE UR STRIP.AUTO-MCNC: NORMAL MG/DL
KETONES UR STRIP.AUTO-MCNC: NEGATIVE MG/DL
LEUKOCYTE ESTERASE UR QL STRIP.AUTO: NEGATIVE
NITRITE UR QL STRIP.AUTO: NEGATIVE
PH UR STRIP.AUTO: 6 [PH] (ref 5–8)
PROT UR STRIP.AUTO-MCNC: 20 MG/DL
RBC UR QL AUTO: NEGATIVE
RHEUMATOID FACT SERPL-ACNC: <3.5 IU/ML (ref ?–14)
SP GR UR STRIP.AUTO: >1.03 (ref 1–1.03)
URATE SERPL-MCNC: 5.3 MG/DL (ref 3.1–7.8)
UROBILINOGEN UR STRIP.AUTO-MCNC: 2 MG/DL

## 2025-05-30 PROCEDURE — 87086 URINE CULTURE/COLONY COUNT: CPT | Performed by: FAMILY MEDICINE

## 2025-05-30 PROCEDURE — 81001 URINALYSIS AUTO W/SCOPE: CPT | Performed by: FAMILY MEDICINE

## 2025-05-30 PROCEDURE — 85652 RBC SED RATE AUTOMATED: CPT | Performed by: INTERNAL MEDICINE

## 2025-05-30 PROCEDURE — 86431 RHEUMATOID FACTOR QUANT: CPT | Performed by: INTERNAL MEDICINE

## 2025-05-30 PROCEDURE — 86200 CCP ANTIBODY: CPT | Performed by: INTERNAL MEDICINE

## 2025-05-30 PROCEDURE — 86160 COMPLEMENT ANTIGEN: CPT | Performed by: INTERNAL MEDICINE

## 2025-05-30 PROCEDURE — 84550 ASSAY OF BLOOD/URIC ACID: CPT | Performed by: INTERNAL MEDICINE

## 2025-05-30 PROCEDURE — 86038 ANTINUCLEAR ANTIBODIES: CPT | Performed by: INTERNAL MEDICINE

## 2025-05-30 PROCEDURE — 86140 C-REACTIVE PROTEIN: CPT | Performed by: INTERNAL MEDICINE

## 2025-05-30 NOTE — TELEPHONE ENCOUNTER
Pt at the lab and having blood drawn from Dr Pizano.  Feels she may have a UTI and would like to have that checked out.  Orders placed for urine and urine culture.    VERITO

## 2025-05-31 ENCOUNTER — HOSPITAL ENCOUNTER (EMERGENCY)
Age: 45
Discharge: HOME | End: 2025-05-31
Payer: COMMERCIAL

## 2025-05-31 ENCOUNTER — APPOINTMENT (OUTPATIENT)
Dept: CT IMAGING | Age: 45
End: 2025-05-31
Attending: NURSE PRACTITIONER
Payer: COMMERCIAL

## 2025-05-31 VITALS
RESPIRATION RATE: 18 BRPM | HEIGHT: 68 IN | HEART RATE: 88 BPM | WEIGHT: 169 LBS | BODY MASS INDEX: 25.61 KG/M2 | DIASTOLIC BLOOD PRESSURE: 61 MMHG | SYSTOLIC BLOOD PRESSURE: 93 MMHG | TEMPERATURE: 99 F | OXYGEN SATURATION: 100 %

## 2025-05-31 DIAGNOSIS — M54.9 BACK PAIN WITHOUT RADICULOPATHY: Primary | ICD-10-CM

## 2025-05-31 LAB
ALBUMIN SERPL-MCNC: 4.4 G/DL (ref 3.2–4.8)
ALBUMIN/GLOB SERPL: 2.1 {RATIO} (ref 1–2)
ALP LIVER SERPL-CCNC: 71 U/L (ref 37–98)
ALT SERPL-CCNC: 15 U/L (ref 10–49)
ANION GAP SERPL CALC-SCNC: 6 MMOL/L (ref 0–18)
AST SERPL-CCNC: 13 U/L (ref ?–34)
BASOPHILS # BLD AUTO: 0.04 X10(3) UL (ref 0–0.2)
BASOPHILS NFR BLD AUTO: 0.5 %
BILIRUB SERPL-MCNC: 0.3 MG/DL (ref 0.3–1.2)
BILIRUB UR QL STRIP.AUTO: NEGATIVE
BUN BLD-MCNC: 12 MG/DL (ref 9–23)
CALCIUM BLD-MCNC: 8.8 MG/DL (ref 8.7–10.6)
CHLORIDE SERPL-SCNC: 103 MMOL/L (ref 98–112)
CLARITY UR REFRACT.AUTO: CLEAR
CO2 SERPL-SCNC: 29 MMOL/L (ref 21–32)
COLOR UR AUTO: YELLOW
CREAT BLD-MCNC: 0.83 MG/DL (ref 0.55–1.02)
EGFRCR SERPLBLD CKD-EPI 2021: 89 ML/MIN/1.73M2 (ref 60–?)
EOSINOPHIL # BLD AUTO: 0.16 X10(3) UL (ref 0–0.7)
EOSINOPHIL NFR BLD AUTO: 2.1 %
ERYTHROCYTE [DISTWIDTH] IN BLOOD BY AUTOMATED COUNT: 12.1 %
GLOBULIN PLAS-MCNC: 2.1 G/DL (ref 2–3.5)
GLUCOSE BLD-MCNC: 102 MG/DL (ref 70–99)
GLUCOSE UR STRIP.AUTO-MCNC: NEGATIVE MG/DL
HCT VFR BLD AUTO: 41.3 % (ref 35–48)
HGB BLD-MCNC: 14.2 G/DL (ref 12–16)
IMM GRANULOCYTES # BLD AUTO: 0.01 X10(3) UL (ref 0–1)
IMM GRANULOCYTES NFR BLD: 0.1 %
LEUKOCYTE ESTERASE UR QL STRIP.AUTO: NEGATIVE
LYMPHOCYTES # BLD AUTO: 1.76 X10(3) UL (ref 1–4)
LYMPHOCYTES NFR BLD AUTO: 23.2 %
MCH RBC QN AUTO: 31.8 PG (ref 26–34)
MCHC RBC AUTO-ENTMCNC: 34.4 G/DL (ref 31–37)
MCV RBC AUTO: 92.6 FL (ref 80–100)
MONOCYTES # BLD AUTO: 0.54 X10(3) UL (ref 0.1–1)
MONOCYTES NFR BLD AUTO: 7.1 %
NEUTROPHILS # BLD AUTO: 5.06 X10 (3) UL (ref 1.5–7.7)
NEUTROPHILS # BLD AUTO: 5.06 X10(3) UL (ref 1.5–7.7)
NEUTROPHILS NFR BLD AUTO: 67 %
NITRITE UR QL STRIP.AUTO: NEGATIVE
OSMOLALITY SERPL CALC.SUM OF ELEC: 286 MOSM/KG (ref 275–295)
PH UR STRIP.AUTO: 5.5 [PH] (ref 5–8)
PLATELET # BLD AUTO: 207 10(3)UL (ref 150–450)
POTASSIUM SERPL-SCNC: 3.7 MMOL/L (ref 3.5–5.1)
PROT SERPL-MCNC: 6.5 G/DL (ref 5.7–8.2)
PROT UR STRIP.AUTO-MCNC: NEGATIVE MG/DL
RBC # BLD AUTO: 4.46 X10(6)UL (ref 3.8–5.3)
RBC UR QL AUTO: NEGATIVE
SODIUM SERPL-SCNC: 138 MMOL/L (ref 136–145)
SP GR UR STRIP.AUTO: 1.02 (ref 1–1.03)
UROBILINOGEN UR STRIP.AUTO-MCNC: 0.2 MG/DL
WBC # BLD AUTO: 7.6 X10(3) UL (ref 4–11)

## 2025-05-31 PROCEDURE — 80053 COMPREHEN METABOLIC PANEL: CPT | Performed by: NURSE PRACTITIONER

## 2025-05-31 PROCEDURE — 96374 THER/PROPH/DIAG INJ IV PUSH: CPT

## 2025-05-31 PROCEDURE — 96361 HYDRATE IV INFUSION ADD-ON: CPT

## 2025-05-31 PROCEDURE — 99285 EMERGENCY DEPT VISIT HI MDM: CPT

## 2025-05-31 PROCEDURE — 96376 TX/PRO/DX INJ SAME DRUG ADON: CPT

## 2025-05-31 PROCEDURE — 81003 URINALYSIS AUTO W/O SCOPE: CPT | Performed by: NURSE PRACTITIONER

## 2025-05-31 PROCEDURE — 74176 CT ABD & PELVIS W/O CONTRAST: CPT | Performed by: NURSE PRACTITIONER

## 2025-05-31 PROCEDURE — 96375 TX/PRO/DX INJ NEW DRUG ADDON: CPT

## 2025-05-31 PROCEDURE — 99284 EMERGENCY DEPT VISIT MOD MDM: CPT

## 2025-05-31 PROCEDURE — 85025 COMPLETE CBC W/AUTO DIFF WBC: CPT | Performed by: NURSE PRACTITIONER

## 2025-05-31 RX ORDER — ONDANSETRON 2 MG/ML
4 INJECTION INTRAMUSCULAR; INTRAVENOUS ONCE
Status: COMPLETED | OUTPATIENT
Start: 2025-05-31 | End: 2025-05-31

## 2025-05-31 RX ORDER — FAMOTIDINE 10 MG/ML
20 INJECTION, SOLUTION INTRAVENOUS ONCE
Status: COMPLETED | OUTPATIENT
Start: 2025-05-31 | End: 2025-05-31

## 2025-05-31 RX ORDER — HYDROMORPHONE HYDROCHLORIDE 1 MG/ML
0.5 INJECTION, SOLUTION INTRAMUSCULAR; INTRAVENOUS; SUBCUTANEOUS ONCE
Refills: 0 | Status: COMPLETED | OUTPATIENT
Start: 2025-05-31 | End: 2025-05-31

## 2025-05-31 RX ORDER — BACLOFEN 10 MG/1
10 TABLET ORAL 3 TIMES DAILY
Qty: 30 TABLET | Refills: 0 | Status: SHIPPED | OUTPATIENT
Start: 2025-05-31 | End: 2025-06-03

## 2025-05-31 NOTE — ED PROVIDER NOTES
Patient Seen in: [facility] In [place]        History  Chief Complaint   Patient presents with    Urinary Symptoms     Stated Complaint: urinary s/s- crystals in urine from yesterday test, back pain    Subjective:   45 yo female presents to the emergency department with c/o flank pain.  Patient states she has been having back pain for several weeks, but thought it was just her sciatica.  The pain has been intermittent and tends to come in waves.  She has been nauseated with this, but hadn't vomited until today.  She has lost 12 lbs since earlier this month when she was at she doctor's office.  She started having some changes to her urine earlier this week with urinary frequency and an odor to it.  She saw her PCP yesterday and had a urine specimen completed.  She states there were crystals seen on the microscopic and her doctor told her to come to the ER if anything changes.  When she started vomiting today she thought she should come in and be seen.  She took some Tylenol earlier today for her pain.  She denies any fever, chills, abdominal pain, diarrhea, hematuria, dysuria, or urinary urgency.      The history is provided by the patient.                   Objective:     Past Medical History:    Abdominal discomfort in right lower quadrant    Abdominal hernia    Abdominal pain    Abnormal CT scan, esophagus    Acute deep vein thrombosis (DVT) of left lower extremity (HCC)    Acute left-sided low back pain without sciatica    Alpha-1-antitrypsin deficiency (HCC)    Anxiety    Arrhythmia    RIGHT BUNDLE BRACH BLOCK     Arthritis    Asherman syndrome    Asthma (HCC)    Back pain    I have Spinal Stenosis that has gotten worse over the years.    Bloating    Calculus of kidney    Cancer (HCC)    Thyroid    Change in hair    Chest pain    Chronic bronchiolitis (HCC)    Chronic cough    Constipation    COPD (chronic obstructive pulmonary disease) (HCC)    no Oxygen    COVID-19 virus infection     Depression    Depression, major, recurrent, moderate (HCC)    Diarrhea, unspecified    Disorder of thyroid    Diverticulosis    Diverticulosis of intestine    Easy bruising    Endometriosis    Esophageal reflux    Fatigue    Food intolerance    Frequent use of laxatives    Gastric ulcer    Headache disorder    Hemorrhoids    History of gastric bypass    Hoarseness, chronic    Hx of gastric bypass    Hx of motion sickness    Hydronephrosis    Hypokalemia    Hypothyroidism    Infertility, female    Intertrigo    Irregular bowel habits    KIDNEY STONE    Loss of appetite    Migraines    Nausea    Nephrolithiasis    Organic hypersomnia, unspecified    AHI 2 RDI 2 REM AHI 6 SaO2 curtis 89%    Osteoarthritis    Painful urination    Pancreatitis (HCC)    Pneumonia due to organism    PONV (postoperative nausea and vomiting)    Problems with swallowing    PUD (peptic ulcer disease)    Rash    Rib contusion, left, initial encounter    Severe persistent asthma with exacerbation (HCC)    Shortness of breath    Sleep disturbance    Stented coronary artery    Stress    Thyroid cancer (HCC)    Visual impairment    glasses    Vocal cord dysfunction    Wears glasses    Weight gain    Weight loss              Past Surgical History:   Procedure Laterality Date    Ankle scope,part debridement Left 2015    Procedure: ARTHROSCOPY ANKLE WITH DEBRIDEMENT;  Surgeon: Marcial Evans MD;  Location: [facility]    Bronch thermoplasty 1 lobe Right 04/15/2021    Bronch thermoplasty 1 lobe Left 2021      2006    Cholecystectomy      Colonoscopy  2021    Colonoscopy      Colonoscopy,diagnostic  10/22/2013    Procedure: COLONOSCOPY, POSSIBLE BIOPSY, POSSIBLE POLYPECTOMY 65142;  Surgeon: Marcello Ferreira MD;  [id number]  2021    D & c      x4    Egd  2021    Gastric bypass,obese<100cm magy-en-y  2017    DR. SEGAL    Gastric bypass,obesity,sb  reconstruc      Gastrocnemius recession Left 06/19/2015    Procedure: GASTROC RECESSION FOOT;  Surgeon: Marcial Evans MD;  Location: [facility]    Hysterectomy  2013    Hysteroscopy  2011    Inj paravert f jnt l/s 1 lev Bilateral 12/14/2015    Procedure: FACET INJECTION UNDER FLUOROSCOPY;  Surgeon: Dusty Munson DO;  Location: [facility]    Laparoscopic cholecystectomy N/A 11/23/2016    Procedure: LAPAROSCOPIC CHOLECYSTECTOMY;  Surgeon: Dai Sanchez MD;  Location:  MAIN OR    Laparoscopy,diagnostic      Laparoscopy,diagnostic  06/25/2022    Lysis of adhesions  2010    M-sedaj by  phys perfrmg svc 5+ yr Bilateral 12/14/2015    Procedure: FACET INJECTION UNDER FLUOROSCOPY;  Surgeon: Dusty Munson DO;  Location: [facility]    Domonique biopsy stereo nodule 1 site right (cpt=19081)  07/2023    stromal fibrosis    Needle biopsy right  June 2023    Oophorectomy Left     Other  NECK SCAR REVISION    Other surgical history      laparoscopies x2    Removal of ovarian cyst(s) Right 07/08/2020    Stab phlebectomy, varicose veins, 1 extremity; <20 incisions      Stomach surgery procedure unlisted  2018    Surgical stent Bilateral 03/2015    Bilateral iliac stents    Thyroidectomy  04/2011    Total abdom hysterectomy      Upper gi endo no barretts  12/2015    normal    Upper gi endoscopy performed  01/25/2017    Rivera Donaldson M.D.    Upper gi endoscopy,biopsy N/A 12/19/2015    Procedure: ESOPHAGOGASTRODUODENOSCOPY, POSSIBLE BIOPSY, POSSIBLE POLYPECTOMY 89786;  Surgeon: Brayden Giordano MD;  Location: [facility]                Social History     Socioeconomic History    Marital status:    Tobacco Use    Smoking status: Never     Passive exposure: Past    Smokeless tobacco: Never   Vaping Use    Vaping status: Never Used   Substance and Sexual Activity    Alcohol use: Not Currently    Drug use: No    Sexual activity: Yes     Partners: Male   Other Topics  Concern     Service No    Blood Transfusions No    Caffeine Concern No    Occupational Exposure No    Hobby Hazards No    Sleep Concern No    Stress Concern No    Weight Concern No    Special Diet No    Back Care No    Exercise Yes    Bike Helmet No    Seat Belt Yes    Self-Exams No   Social History Narrative    ** Merged History Encounter **          Social Drivers of Health     Food Insecurity: No Food Insecurity (4/7/2025)    NCSS - Food Insecurity     Worried About Running Out of Food in the Last Year: No     Ran Out of Food in the Last Year: No   Transportation Needs: No Transportation Needs (4/7/2025)    NCSS - Transportation     Lack of Transportation: No   Housing Stability: Not At Risk (4/7/2025)    NCSS - Housing/Utilities     Has Housing: Yes     Worried About Losing Housing: No     Unable to Get Utilities: No                                Physical Exam    ED Triage Vitals [05/31/25 1423]   /69   Pulse 102   Resp 20   Temp 97.9 °F (36.6 °C)   Temp src Temporal   SpO2 98 %   O2 Device None (Room air)       Current Vitals:   Vital Signs  BP: 93/61  Pulse: 88  Resp: 18  Temp: 98.8 °F (37.1 °C)  Temp src: Oral    Oxygen Therapy  SpO2: 100 %  O2 Device: None (Room air)            Physical Exam  Vitals and nursing note reviewed.   Constitutional:       General: She is not in acute distress.     Appearance: Normal appearance. She is normal weight. She is ill-appearing.   HENT:      Head: Normocephalic and atraumatic.      Nose: Nose normal.      Mouth/Throat:      Mouth: Mucous membranes are moist.      Pharynx: Oropharynx is clear.   Eyes:      Conjunctiva/sclera: Conjunctivae normal.      Pupils: Pupils are equal, round, and reactive to light.   Cardiovascular:      Rate and Rhythm: Normal rate and regular rhythm.      Pulses: Normal pulses.      Heart sounds: Normal heart sounds.   Pulmonary:      Effort: Pulmonary effort is normal. No respiratory distress.      Breath sounds: Normal breath  sounds.   Abdominal:      General: Abdomen is flat. Bowel sounds are normal.      Palpations: Abdomen is soft.      Tenderness: There is no abdominal tenderness. There is left CVA tenderness. There is no right CVA tenderness.   Musculoskeletal:         General: Normal range of motion.   Skin:     General: Skin is warm and dry.   Neurological:      General: No focal deficit present.      Mental Status: She is alert and oriented to person, place, and time.   Psychiatric:         Mood and Affect: Mood normal.         Behavior: Behavior normal.               ED Course  Labs Reviewed   COMP METABOLIC PANEL (14) - Abnormal; Notable for the following components:       Result Value    Glucose 102 (*)     A/G Ratio 2.1 (*)     All other components within normal limits   URINALYSIS WITH CULTURE REFLEX - Abnormal; Notable for the following components:    Ketones Urine Trace (*)     All other components within normal limits   CBC WITH DIFFERENTIAL WITH PLATELET       ED Course as of 05/31/25 1719  ------------------------------------------------------------  Time: 05/31 1531  Value: CBC With Differential With Platelet  Comment: No elevated WBC or leukocytosis.  H&H is stable at 14.2 and 41.3.  ------------------------------------------------------------  Time: 05/31 1610  Value: CT ABDOMEN+PELVIS KIDNEYSTONE 2D RNDR(NO IV,NO ORAL)(CPT=74176)  Comment: Impression  CONCLUSION:  No acute process identified within the abdomen or pelvis.    ------------------------------------------------------------  Time: 05/31 1610  Value: Urinalysis with Culture Reflex(!)  Comment: Positive for trace ketones.  No other indication if infection or pathology.    ------------------------------------------------------------  Time: 05/31 1611  Value: Comp Metabolic Panel (14)(!)  Comment: Unremarkable and essentially normal.                       MDM       Medical Decision Making  43 yo female with left flank pain, nausea and vomiting.  CBC, CMP, UA,  CT stone, IV fluids, Zofran, and Dilaudid ordered.      See ED course for labs and CT report.  Patient did have some improvement with her pain after medications.  Discussed with patient that I feel this is likely back pain.  Will prescribe patient a muscle relaxer and she can continue to take Tylenol as needed help with pain control.  No evidence of sepsis, UTI, pyelonephritis, infected stone, nephrolithiasis, ureterolithiasis, colitis, or diverticulitis.  Recommended patient follow-up with her PCP in the next 1 week.    Amount and/or Complexity of Data Reviewed  Labs: ordered. Decision-making details documented in ED Course.  Radiology: ordered. Decision-making details documented in ED Course.    Risk  OTC drugs.  Prescription drug management.        Disposition and Plan     Clinical Impression:  1. Back pain without radiculopathy         Disposition:  Discharge  5/31/2025  4:54 pm    Follow-up:  Saul Zaidi MD  [address] [address]  [phone]    Follow up in 1 week(s)  As needed          Medications Prescribed:  Discharge Medication List as of 5/31/2025  5:06 PM        START taking these medications    Details   baclofen 10 MG Oral Tab Take 1 tablet (10 mg total) by mouth 3 (three) times daily for 30 doses., Normal, Disp-30 tablet, R-0                   Supplementary Documentation:

## 2025-05-31 NOTE — DISCHARGE INSTRUCTIONS
Rest and drink plenty of fluids.   Apply ice 20 minutes on and then 40 minutes off several times a day.  Alternate ice with warm, moist compresses.   Take Tylenol and/or ibuprofen as needed for pain.   Use the muscle relaxer for muscle stiffness or soreness.   After the acute pain improves, start with light stretching or yoga to help prevent further injury.   Follow up with your PCP in 1 week.     Thank you for choosing [facility] for your care.

## 2025-05-31 NOTE — ED INITIAL ASSESSMENT (HPI)
Pt to ED with dysuria, nausea and lower back pain. Reports abnormal urinalysis yesterday. Afebrile.

## 2025-05-31 NOTE — PROGRESS NOTES
Awaiting urine culture  Urinalysis negative for nitrates, blood and white blood cells.  Microscopic bacteria present but also squamous epithelial cells which could also indicate contamination.  Signs of dehydration elevated specific gravity, protein and calcium oxalate crystals which can also be seen with kidney stones.  Make sure you are having 64 ounces of water a day.    If you are experiencing fevers, chills, body aches, nausea, vomiting, diarrhea, abdominal pain or back pain or symptoms of kidney stone you will want to be seen.

## 2025-06-01 NOTE — PROGRESS NOTES
Reviewed test results from emergency department CMP normal, urinalysis normal, CBC white blood cell and red blood cell counts are normal

## 2025-06-02 ENCOUNTER — PATIENT MESSAGE (OUTPATIENT)
Dept: SURGERY | Facility: CLINIC | Age: 45
End: 2025-06-02

## 2025-06-02 ENCOUNTER — OFFICE VISIT (OUTPATIENT)
Dept: FAMILY MEDICINE CLINIC | Facility: CLINIC | Age: 45
End: 2025-06-02
Payer: COMMERCIAL

## 2025-06-02 VITALS
WEIGHT: 171 LBS | HEART RATE: 111 BPM | HEIGHT: 68 IN | DIASTOLIC BLOOD PRESSURE: 56 MMHG | BODY MASS INDEX: 25.91 KG/M2 | TEMPERATURE: 99 F | SYSTOLIC BLOOD PRESSURE: 96 MMHG | RESPIRATION RATE: 16 BRPM | OXYGEN SATURATION: 99 %

## 2025-06-02 DIAGNOSIS — Z87.898 HISTORY OF DIFFICULT VENOUS ACCESS: ICD-10-CM

## 2025-06-02 DIAGNOSIS — G44.221 CHRONIC TENSION-TYPE HEADACHE, INTRACTABLE: ICD-10-CM

## 2025-06-02 DIAGNOSIS — F41.1 GAD (GENERALIZED ANXIETY DISORDER): ICD-10-CM

## 2025-06-02 DIAGNOSIS — F33.0 DEPRESSION, MAJOR, RECURRENT, MILD: ICD-10-CM

## 2025-06-02 DIAGNOSIS — G62.9 NEUROPATHY: ICD-10-CM

## 2025-06-02 DIAGNOSIS — M54.16 CHRONIC LEFT-SIDED LUMBAR RADICULOPATHY: Primary | ICD-10-CM

## 2025-06-02 LAB — CCP IGG SERPL-ACNC: 0.7 U/ML (ref 0–6.9)

## 2025-06-02 PROCEDURE — 3008F BODY MASS INDEX DOCD: CPT | Performed by: FAMILY MEDICINE

## 2025-06-02 PROCEDURE — G2211 COMPLEX E/M VISIT ADD ON: HCPCS | Performed by: FAMILY MEDICINE

## 2025-06-02 PROCEDURE — 3074F SYST BP LT 130 MM HG: CPT | Performed by: FAMILY MEDICINE

## 2025-06-02 PROCEDURE — 99215 OFFICE O/P EST HI 40 MIN: CPT | Performed by: FAMILY MEDICINE

## 2025-06-02 PROCEDURE — 3078F DIAST BP <80 MM HG: CPT | Performed by: FAMILY MEDICINE

## 2025-06-02 RX ORDER — ACETAMINOPHEN AND CODEINE PHOSPHATE 300; 30 MG/1; MG/1
1 TABLET ORAL EVERY 6 HOURS PRN
COMMUNITY
End: 2025-06-02 | Stop reason: ALTCHOICE

## 2025-06-02 RX ORDER — POTASSIUM CHLORIDE 1.5 G/1.58G
20 POWDER, FOR SOLUTION ORAL DAILY
COMMUNITY
End: 2025-06-03 | Stop reason: ALTCHOICE

## 2025-06-02 RX ORDER — ACETAMINOPHEN AND CODEINE PHOSPHATE 300; 30 MG/1; MG/1
TABLET ORAL 2 TIMES DAILY PRN
Qty: 30 TABLET | Refills: 0 | Status: SHIPPED | OUTPATIENT
Start: 2025-06-02

## 2025-06-02 RX ORDER — TEMAZEPAM 15 MG/1
CAPSULE ORAL
COMMUNITY
Start: 2025-05-22

## 2025-06-02 RX ORDER — ARIPIPRAZOLE 2 MG/1
TABLET ORAL
COMMUNITY
Start: 2025-05-26

## 2025-06-02 RX ORDER — POTASSIUM CHLORIDE 750 MG/1
CAPSULE, EXTENDED RELEASE ORAL
COMMUNITY
End: 2025-06-03 | Stop reason: ALTCHOICE

## 2025-06-02 RX ORDER — POTASSIUM CHLORIDE 750 MG/1
TABLET, EXTENDED RELEASE ORAL
COMMUNITY
End: 2025-06-03 | Stop reason: ALTCHOICE

## 2025-06-02 RX ORDER — PREGABALIN 75 MG/1
75 CAPSULE ORAL 3 TIMES DAILY
Qty: 90 CAPSULE | Refills: 0 | Status: SHIPPED | OUTPATIENT
Start: 2025-06-04

## 2025-06-02 RX ORDER — ALPRAZOLAM 0.25 MG
TABLET ORAL 2 TIMES DAILY PRN
Qty: 60 TABLET | Refills: 0 | Status: SHIPPED | OUTPATIENT
Start: 2025-06-04

## 2025-06-02 RX ORDER — ACETAMINOPHEN AND CODEINE PHOSPHATE 300; 60 MG/1; MG/1
1 TABLET ORAL 2 TIMES DAILY PRN
COMMUNITY
End: 2025-06-02 | Stop reason: ALTCHOICE

## 2025-06-02 RX ORDER — LIDOCAINE HYDROCHLORIDE 20 MG/ML
SOLUTION OROPHARYNGEAL
COMMUNITY
End: 2025-06-03 | Stop reason: ALTCHOICE

## 2025-06-02 RX ORDER — BUTALBITAL AND ACETAMINOPHEN 325; 50 MG/1; MG/1
1 TABLET ORAL 2 TIMES DAILY PRN
Qty: 15 TABLET | Refills: 0 | Status: SHIPPED | OUTPATIENT
Start: 2025-06-04

## 2025-06-02 NOTE — PROGRESS NOTES
The following individual(s) verbally consented to be recorded using ambient AI listening technology and understand that they can each withdraw their consent to this listening technology at any point by asking the clinician to turn off or pause the recording:    Patient name: Debbi Prasad Shikha

## 2025-06-02 NOTE — PROGRESS NOTES
Debbi Trivedi is a 44 year old female.  HPI:          The following individual(s) verbally consented to be recorded using ambient AI listening technology and understand that they can each withdraw their consent to this listening technology at any point by asking the clinician to turn off or pause the recording:    Patient name: Debbi Trivedi  Additional names:  none  History of Present Illness  Debbi Trivedi is a 44 year old female with a history of kidney stones who presents with left lower back pain and sleep disturbances.  She also needs early refills is going to [place] next week requesting early refills for, Lyrica, Xanax and butalbital.  Would like to work with chiropractor again for back pain if covered by insurance.    She experiences persistent left lower back pain that began before her last visit on May 12, 2025. The pain is located in the left lower back, around the spine, and sometimes radiates to the left hip area. It worsens with movement, particularly when lifting her left leg, sitting, or lying on her left side. Significant discomfort occurs when twisting, turning, or extending her leg. The pain occasionally wakes her up at night, contributing to her sleep disturbances.    She has a history of kidney stones and recently underwent a urinalysis and urine culture due to the presence of crystals in her urine and back pain went to the emergency department to rule out kidney stone. No acute issues were identified in the emergency department besides lumbar radiculopathy, and imaging did not reveal any stones. An initial urinalysis on May 30, 2025, showed high specific gravity and elevated protein levels, with a repeat test performed on May 31, 2025.    She experiences numbness and tingling in her left leg, particularly in the hamstring area, which increases with activity. The tingling is more pronounced when sitting in certain positions. No bowel or urinary incontinence is  reported.    Her past medical history includes an MRI of the lumbar spine on November 29, 2024, which showed degenerative changes in the facet joints of L5-S1 and mild disc bulging at L2-L3, L3-L4, and L4-L5, with an annular tear at L4-L5. She has previously consulted a pain doctor and a vascular specialist due to a cystic lesion in the right iliac bifurcation which was not found to be pathogenic.  Overall no significant spinal canal stenosis or neural foraminal narrowing                                                                                                                                                          Current medications include tizanidine three times a day, which causes significant sedation, and she has been prescribed baclofen but has not filled it. She also uses CBD lotion, lidocaine, and diclofenac for pain management. She reports nausea, which she attributes to the pain, and has experienced weight loss due to the pain.  Avoiding nonsteroidals due to history of peptic ulcer disease and bariatric surgery also avoids prednisone only used during asthma exacerbations secondary to history of peptic ulcer disease worsening with NSAIDs and prednisone    She has a history of depression and anxiety, for which she takes Abilify and Xanax. She restarted Abilify on May 26, 2025, but has not noticed significant changes yet. She maintains her Xanax dosage at twice a day. She is concerned about her mental health due to family situations and pain so she initiated the Abilify.  PHQ-9 is at an 11 and OMAR-7 is at a 9.  Is doing her counseling on a regular basis her counselor advised that she discuss the addition of Abilify and ongoing life stressors.  She denies any suicidal or homicidal ideation.    She is planning a trip to [place] and is concerned about managing her pain during the trip. She has been doing lower back exercises and has received additional exercises from a neighbor who is a physical  therapist.  Is wanting something to help her sleep and to get through the trip.  Again unable to use nonsteroidals, prednisone due to history of peptic ulcer disease, topical and Tylenol no relief     6/2/2025   EEH AMB OMAR-7    Feeling nervous, anxious, or on edge 2    Not being able to stop or control worrying 2    Worrying too much about different things    2    Trouble relaxing 1    Being so restless that it's hard to sit still 1    Becoming easily annoyed or irritable 1    Feeling afraid as if something awful might happen 0    OMAR 7 Total Score 9    If you checked off any problems, how difficult have these made it for you to do your work, take care of things at home, or get along with other people? Somewhat difficult    PHQ9 FLOWSHEET    Little interest or pleasure in doing things 2    Feeling down, depressed, or hopeless 2    Trouble falling or staying asleep, or sleeping too much 3    Feeling tired or having little energy 2    Poor appetite or overeating 2    Feeling bad about yourself - or that you are a failure or have let yourself or your family down 0    Trouble concentrating on things, such as reading the newspaper or watching television 0    Moving or speaking so slowly that other people could have noticed. Or the opposite - being so fidgety or restless that you have been moving around a lot more than usual 0    Thoughts that you would be better off dead, or of hurting yourself in some way 0    PHQ-9 TOTAL SCORE 11    If you checked off any problems, how difficult have these problems made it for you to do your work, take care of things at home, or get along with other people? Somewhat difficult      Results  LABS  Urinalysis: Specific gravity high, protein elevated, urobilinogen elevated, calcium oxalate crystals present (05/30/2025)  Urinalysis: Ketones present (05/31/2025)  CBC: Normal (05/31/2025)      RADIOLOGY  MRI of the lumbar spine: 4 cm cystic area on right iliac bifurcation, degenerative  changes in facet joints of L5-S1, mild disc bulging at L2-L3, L3-L4, L4-L5, annular tear at L4-L5, no significant spinal stenosis or neuroforaminal narrowing (11/29/2024)  CT of the abdomen pelvis normal on 5/31/2025 emergency department visit  Results for orders placed or performed during the hospital encounter of 05/31/25   Comp Metabolic Panel (14)    Collection Time: 05/31/25  3:04 PM   Result Value Ref Range    Glucose 102 (H) 70 - 99 mg/dL    Sodium 138 136 - 145 mmol/L    Potassium 3.7 3.5 - 5.1 mmol/L    Chloride 103 98 - 112 mmol/L    CO2 29.0 21.0 - 32.0 mmol/L    Anion Gap 6 0 - 18 mmol/L    BUN 12 9 - 23 mg/dL    Creatinine 0.83 0.55 - 1.02 mg/dL    Calcium, Total 8.8 8.7 - 10.6 mg/dL    Calculated Osmolality 286 275 - 295 mOsm/kg    eGFR-Cr 89 >=60 mL/min/1.73m2    AST 13 <34 U/L    ALT 15 10 - 49 U/L    Alkaline Phosphatase 71 37 - 98 U/L    Bilirubin, Total 0.3 0.3 - 1.2 mg/dL    Total Protein 6.5 5.7 - 8.2 g/dL    Albumin 4.4 3.2 - 4.8 g/dL    Globulin  2.1 2.0 - 3.5 g/dL    A/G Ratio 2.1 (H) 1.0 - 2.0   CBC With Differential With Platelet    Collection Time: 05/31/25  3:04 PM   Result Value Ref Range    WBC 7.6 4.0 - 11.0 x10(3) uL    RBC 4.46 3.80 - 5.30 x10(6)uL    HGB 14.2 12.0 - 16.0 g/dL    HCT 41.3 35.0 - 48.0 %    .0 150.0 - 450.0 10(3)uL    MCV 92.6 80.0 - 100.0 fL    MCH 31.8 26.0 - 34.0 pg    MCHC 34.4 31.0 - 37.0 g/dL    RDW 12.1 %    Neutrophil Absolute Prelim 5.06 1.50 - 7.70 x10 (3) uL    Neutrophil Absolute 5.06 1.50 - 7.70 x10(3) uL    Lymphocyte Absolute 1.76 1.00 - 4.00 x10(3) uL    Monocyte Absolute 0.54 0.10 - 1.00 x10(3) uL    Eosinophil Absolute 0.16 0.00 - 0.70 x10(3) uL    Basophil Absolute 0.04 0.00 - 0.20 x10(3) uL    Immature Granulocyte Absolute 0.01 0.00 - 1.00 x10(3) uL    Neutrophil % 67.0 %    Lymphocyte % 23.2 %    Monocyte % 7.1 %    Eosinophil % 2.1 %    Basophil % 0.5 %    Immature Granulocyte % 0.1 %   Urinalysis with Culture Reflex    Collection Time:  05/31/25  3:04 PM    Specimen: Urine, clean catch   Result Value Ref Range    Urine Color Yellow Yellow    Clarity Urine Clear Clear    Spec Gravity 1.020 1.005 - 1.030    Glucose Urine Negative Negative mg/dL    Bilirubin Urine Negative Negative    Ketones Urine Trace (A) Negative mg/dL    Blood Urine Negative Negative    pH Urine 5.5 5.0 - 8.0    Protein Urine Negative Negative mg/dL    Urobilinogen Urine 0.2 <2.0 mg/dL    Nitrite Urine Negative Negative    Leukocyte Esterase Urine Negative Negative    Microscopic Microscopic not indicated      *Note: Due to a large number of results and/or encounters for the requested time period, some results have not been displayed. A complete set of results can be found in Results Review.              Current Medications[1]   Past Medical History[2]   Social History:  Short Social Hx on File[3]     REVIEW OF SYSTEMS:   GENERAL HEALTH: feels well otherwise  SKIN: denies any unusual skin lesions or rashes  RESPIRATORY: denies shortness of breath with exertion  CARDIOVASCULAR: denies chest pain on exertion  GI: denies abdominal pain and denies heartburn  NEURO: denies headaches  Musculoskeletal: See above  Psych see above  EXAM:   BP 96/56 (BP Location: Right arm, Patient Position: Sitting, Cuff Size: adult)   Pulse 111   Temp 98.7 °F (37.1 °C) (Temporal)   Resp 16   Ht 5' 8\" (1.727 m)   Wt 171 lb (77.6 kg)   SpO2 99%   BMI 26.00 kg/m²        GENERAL: Alert, cooperative, well developed, no acute distress.  NECK: Neck without swelling.  CHEST: Clear to auscultation bilaterally, no wheezes, rhonchi, or crackles.  CARDIOVASCULAR: Normal heart rate and rhythm, S1 and S2 normal without murmurs.  ABDOMEN: Soft, non-tender, non-distended, without organomegaly, normal bowel sounds.  EXTREMITIES: No cyanosis or edema.  MUSCULOSKELETAL: Grimaces when she moves from seat to table spine normal on inspection. Lumbar spine flexion limited to 30 degrees with pain. Lumbar spine extension  limited to 10 degrees with pain. Lumbar spine lateral flexion limited bilaterally. Hip range of motion normal. Knee range of motion normal. Patellar and Achilles reflexes 2+ bilaterally. Ankle dorsiflexion and plantarflexion normal. Hip abduction and adduction normal. Hip flexion limited with pain. Straight leg raise positive at 30 degrees on right, 10 degrees on left.  NEUROLOGICAL: Cranial nerves grossly intact, moves all extremities without gross motor or sensory deficit.  Psych patient's mood is anxious and her affect is blunted no suicidal homicidal ideations.  ASSESSMENT AND PLAN:     Encounter Diagnoses   Name Primary?    Chronic left-sided lumbar radiculopathy Yes    Neuropathy     OMAR (generalized anxiety disorder)     Depression, major, recurrent, mild     Chronic tension-type headache, intractable        No orders of the defined types were placed in this encounter.      Meds & Refills for this Visit:  Requested Prescriptions     Signed Prescriptions Disp Refills    acetaminophen-codeine 300-30 MG Oral Tab 30 tablet 0     Sig: Take 0.5-1 tablets by mouth 2 (two) times daily as needed for Pain. Dont take with tizanidine or xanax    pregabalin 75 MG Oral Cap 90 capsule 0     Sig: Take 1 capsule (75 mg total) by mouth 3 (three) times daily.    ALPRAZolam 0.25 MG Oral Tab 60 tablet 0     Sig: Take 0.5-1 tablets (0.125-0.25 mg total) by mouth 2 (two) times daily as needed for Anxiety or Sleep.    Butalbital-Acetaminophen  MG Oral Tab 15 tablet 0     Sig: Take 1 tablet by mouth 2 (two) times daily as needed (headache). TAKE 1/2 TO 1 TABLET BY MOUTH DAILY AS NEEDED FOR TENSION HEADACHE       Imaging & Consults:  CHIROPRACTIC OFFICE VISIT - EXTERNAL      Assessment & Plan   Chronic tension-type headache, intractable  Reviewed medication benefits and side effects.   Avoid combining butalbital with tizanidine, Xanax or codeine due to increased risk for respiratory distress  - Butalbital-Acetaminophen  MG  Oral Tab; Take 1 tablet by mouth 2 (two) times daily as needed (headache). TAKE 1/2 TO 1 TABLET BY MOUTH DAILY AS NEEDED FOR TENSION HEADACHE  Dispense: 15 tablet; Refill: 0    Neuropathy  Reviewed medication benefits and side effects.   Early refilled okay for Lyrica since going on medication  - pregabalin 75 MG Oral Cap; Take 1 capsule (75 mg total) by mouth 3 (three) times daily.  Dispense: 90 capsule; Refill: 0      Chronic left-sided lumbar radiculopathy  - Chiropractic Referral - External  - acetaminophen-codeine 300-30 MG Oral Tab; Take 0.5-1 tablets by mouth 2 (two) times daily as needed for Pain. Dont take with tizanidine or xanax  Dispense: 30 tablet; Refill: 0  Use, risks, benefits and precautions of codeine  discussed. Including not to drive, operate machinery or drink alcohol when taking this medication.  Advised of risk of addiction to codeine.    [facility] Data Reviewed.    Chronic left lumbar radiculopathy with pain radiating to the left leg, particularly the hamstring and quadriceps area. Symptoms include tingling, exacerbated by certain movements and positions. Previous MRI showed degenerative changes in the lumbar spine with mild disc bulging and an annular tear at L4-L5, but no significant spinal stenosis. Recent urinalysis and imaging ruled out kidney stones as a cause of pain. Current management includes muscle relaxants, but tizanidine causes significant sedation. Baclofen was considered as an alternative but not yet trialed. Tylenol with codeine is prescribed for pain management, with caution advised due to potential constipation and sedation.  - Prescribe Tylenol with codeine for pain management, with instructions to start with half a tablet and not exceed two tablets per day.  - Advise against heavy lifting, excessive bending, and twisting.  - Encourage continuation of lower back exercises and core strengthening.  - Refer to Dr. Castro chiropractor for further evaluation and  treatment.  - Discuss potential use of baclofen as an alternative muscle relaxant.  - Emphasize the importance of alternating ice and heat therapy and using topical treatments like lidocaine patches.    Depression mild recurrent/OMAR  Mild depression exacerbated by family situations and chronic pain. Currently on Abilify, which was restarted on May 26, 2025 via patient. No significant changes in mood reported since restarting medication. PHQ-9 indicates mild depression. Xanax is maintained at a stable dose due to chronic anxiety concerns.  - Continue Abilify as prescribed.  Reviewed risk of tardive dyskinesia  - Monitor mood and adjust treatment as necessary.  - Maintain current Xanax dosage and reassess as needed.  Patient is reluctant again to reduce the amount due to life stressors that are occurring is going to continue with counselor understands long-term risks of Xanax use  Continue with counseling   - ALPRAZolam 0.25 MG Oral Tab; Take 0.5-1 tablets (0.125-0.25 mg total) by mouth 2 (two) times daily as needed for Anxiety or Sleep.  Dispense: 60 tablet; Refill: 0 early refill okayed secondary to being on medication for next week and half  Avoid combining Xanax with codeine with butalbital or tizanidine due to increased risk for respiratory distress  Patient aware of the risks and tries to avoid them at the same time  Venous Access Difficulty  Discussed the potential need for a port due to difficulty with venous access. Advised to avoid port placement unless venous access becomes consistently difficult. Risks of infection and complications associated with port placement were considered.  - Consult with a surgeon regarding the risks and benefits of port placement if venous access becomes consistently difficult if requiring more than 2 IV attempts when getting infusions for alpha-1 antitrypsin deficiency    Recording duration: 35 minutes    Time spent was 40 minutes more than 50% was spent on counseling regarding  medical conditions and treatment.  Rest of time was spent reviewing chart, reviewing blood work and radiology tests.   Provider patient relationship has had a longitudinal long term relationship to address and treat complex conditions.    The patient indicates understanding of these issues and agrees to the plan.  The patient is asked to return in 4 to 6 weeks.             [1]   Current Outpatient Medications   Medication Sig Dispense Refill    ARIPiprazole 2 MG Oral Tab       Lidocaine Viscous HCl 2 % Mouth/Throat Solution SWISH AND SWALLOW 5 ML BY MOUTH 3 TIMES DAILY AS NEEDED FOR PAIN.      potassium chloride (KLOR-CON) 20 MEQ Oral Powd Pack Take 20 mEq by mouth daily.      Potassium Chloride ER 10 MEQ Oral Cap CR 10meq 1/day      Potassium Chloride ER 10 MEQ Oral Tab CR       temazepam 15 MG Oral Cap TAKE 1 CAPSULE BY MOUTH ONCE FOR 1 DOSE 30 MINUTES PRIOR TO PROCEDURE. DO NOT TAKE XANAX THE SAME DAY      acetaminophen-codeine 300-30 MG Oral Tab Take 0.5-1 tablets by mouth 2 (two) times daily as needed for Pain. Dont take with tizanidine or xanax 30 tablet 0    [START ON 6/4/2025] pregabalin 75 MG Oral Cap Take 1 capsule (75 mg total) by mouth 3 (three) times daily. 90 capsule 0    [START ON 6/4/2025] ALPRAZolam 0.25 MG Oral Tab Take 0.5-1 tablets (0.125-0.25 mg total) by mouth 2 (two) times daily as needed for Anxiety or Sleep. 60 tablet 0    [START ON 6/4/2025] Butalbital-Acetaminophen  MG Oral Tab Take 1 tablet by mouth 2 (two) times daily as needed (headache). TAKE 1/2 TO 1 TABLET BY MOUTH DAILY AS NEEDED FOR TENSION HEADACHE 15 tablet 0    TRAZODONE 50 MG Oral Tab TAKE 1 TO 2 TABLETS(50  MG) BY MOUTH EVERY NIGHT 60 tablet 2    levothyroxine (UNITHROID) 150 MCG Oral Tab Take 1 tablet (150 mcg total) by mouth before breakfast. 90 tablet 0    TIZANIDINE 4 MG Oral Tab TAKE 1 TABLET(4 MG) BY MOUTH EVERY NIGHT AS NEEDED FOR PAIN OR SPASM. DO NOT TAKE WITH VALTREX CAN. START AFTER FINISHING FLEXERIL 30  tablet 2    valACYclovir 1 G Oral Tab Take 2 tablets (2,000 mg total) by mouth Q12H. 30 tablet 0    ondansetron (ZOFRAN) 8 MG tablet TAKE 1 TABLET(8 MG) BY MOUTH EVERY 12 HOURS AS NEEDED FOR NAUSEA 30 tablet 1    acidophilus-pectin Oral Cap Take 1 capsule by mouth in the morning.      oxybutynin ER 10 MG Oral Tablet 24 Hr TAKE 1 TABLET(10 MG) BY MOUTH DAILY 90 tablet 3    ipratropium-albuterol 0.5-2.5 (3) MG/3ML Inhalation Solution Take 3 mL by nebulization every 4 (four) hours as needed. Use only if the albuterol inhalation albuterol is not working 25 each 1    Multiple Vitamins-Minerals (BARIATRIC MULTIVITAMINS/IRON) Oral Cap Take by mouth As Directed.      pantoprazole 40 MG Oral Tab EC Take 1 tablet (40 mg total) by mouth every morning before breakfast.      Nystatin 787898 UNIT/GM External Powder Apply 1 Application. topically 4 (four) times daily. Apply to affected areas 30 g 1    ALBUTEROL 108 (90 Base) MCG/ACT Inhalation Aero Soln INHALE 2 PUFFS INTO THE LUNGS EVERY 4 HOURS AS NEEDED FOR WHEEZING OR SHORTNESS OF BREATH (Patient taking differently: Inhale 2 puffs into the lungs every 4 (four) hours as needed for Shortness of Breath or Wheezing.) 8.5 g 1    loratadine 10 MG Oral Tab Take 1 tablet (10 mg total) by mouth in the morning.  0    alpha 1-proteinase inhibitor 1000 MG/20ML Intravenous Solution Inject into the vein once. weekly      SPIRIVA RESPIMAT 2.5 MCG/ACT Inhalation Aero Soln INHALE 2 PUFFS INTO THE LUNGS DAILY 12 g 2    Calcium Carb-Cholecalciferol (CALCIUM 600/VITAMIN D3) 600-800 MG-UNIT Oral Tab Take 1 tablet by mouth in the morning and 1 tablet in the evening and 1 tablet before bedtime.      SYMBICORT 160-4.5 MCG/ACT Inhalation Aerosol INHALE 2 PUFFS INTO THE LUNGS TWICE DAILY 3 Inhaler 3    Cholecalciferol (VITAMIN D) 50 MCG (2000 UT) Oral Cap Take 1 capsule (2,000 Units total) by mouth in the morning and 1 capsule (2,000 Units total) before bedtime.      magnesium 250 MG Oral Tab Take 1  tablet (250 mg total) by mouth in the morning.      aspirin 81 MG Oral Tab Take 1 tablet (81 mg total) by mouth in the morning.      baclofen 10 MG Oral Tab Take 1 tablet (10 mg total) by mouth 3 (three) times daily for 30 doses. (Patient not taking: Reported on 6/2/2025) 30 tablet 0    promethazine 6.25 MG/5ML Oral Solution Take 5 mL (6.25 mg total) by mouth every 6 (six) hours as needed. (Patient not taking: Reported on 6/2/2025) 240 mL 0    Lisdexamfetamine Dimesylate 60 MG Oral Cap  (Patient not taking: Reported on 6/2/2025)     [2]   Past Medical History:   Abdominal discomfort in right lower quadrant    Abdominal hernia    Abdominal pain    Abnormal CT scan, esophagus    Acute deep vein thrombosis (DVT) of left lower extremity (HCC)    Acute left-sided low back pain without sciatica    Alpha-1-antitrypsin deficiency (HCC)    Anxiety    Arrhythmia    RIGHT BUNDLE BRACH BLOCK     Arthritis    Asherman syndrome    Asthma (HCC)    Back pain    I have Spinal Stenosis that has gotten worse over the years.    Bloating    Calculus of kidney    Cancer (HCC)    Thyroid    Change in hair    Chest pain    Chronic bronchiolitis (HCC)    Chronic cough    Constipation    COPD (chronic obstructive pulmonary disease) (HCC)    no Oxygen    COVID-19 virus infection    Depression    Depression, major, recurrent, moderate (HCC)    Diarrhea, unspecified    Disorder of thyroid    Diverticulosis    Diverticulosis of intestine    Easy bruising    Endometriosis    Esophageal reflux    Fatigue    Food intolerance    Frequent use of laxatives    Gastric ulcer    Headache disorder    Hemorrhoids    History of gastric bypass    Hoarseness, chronic    Hx of gastric bypass    Hx of motion sickness    Hydronephrosis    Hypokalemia    Hypothyroidism    Infertility, female    Intertrigo    Irregular bowel habits    KIDNEY STONE    Loss of appetite    Migraines    Nausea    Nephrolithiasis    Organic hypersomnia, unspecified    AHI 2 RDI 2 REM  AHI 6 SaO2 curtis 89%    Osteoarthritis    Painful urination    Pancreatitis (HCC)    Pneumonia due to organism    PONV (postoperative nausea and vomiting)    Problems with swallowing    PUD (peptic ulcer disease)    Rash    Rib contusion, left, initial encounter    Severe persistent asthma with exacerbation (HCC)    Shortness of breath    Sleep disturbance    Stented coronary artery    Stress    Thyroid cancer (HCC)    Visual impairment    glasses    Vocal cord dysfunction    Wears glasses    Weight gain    Weight loss   [3]   Social History  Socioeconomic History    Marital status:    Tobacco Use    Smoking status: Never     Passive exposure: Past    Smokeless tobacco: Never   Vaping Use    Vaping status: Never Used   Substance and Sexual Activity    Alcohol use: Not Currently    Drug use: No    Sexual activity: Yes     Partners: Male   Other Topics Concern     Service No    Blood Transfusions No    Caffeine Concern No    Occupational Exposure No    Hobby Hazards No    Sleep Concern No    Stress Concern No    Weight Concern No    Special Diet No    Back Care No    Exercise Yes    Bike Helmet No    Seat Belt Yes    Self-Exams No   Social History Narrative    ** Merged History Encounter **          Social Drivers of Health     Food Insecurity: No Food Insecurity (6/2/2025)    NCSS - Food Insecurity     Worried About Running Out of Food in the Last Year: No     Ran Out of Food in the Last Year: No   Transportation Needs: No Transportation Needs (6/2/2025)    NCSS - Transportation     Lack of Transportation: No   Housing Stability: Not At Risk (6/2/2025)    NCSS - Housing/Utilities     Has Housing: Yes     Worried About Losing Housing: No     Unable to Get Utilities: No

## 2025-06-03 ENCOUNTER — PATIENT MESSAGE (OUTPATIENT)
Dept: SURGERY | Facility: CLINIC | Age: 45
End: 2025-06-03

## 2025-06-03 PROBLEM — F33.0 DEPRESSION, MAJOR, RECURRENT, MILD: Status: ACTIVE | Noted: 2025-06-03

## 2025-06-03 PROBLEM — M54.16 CHRONIC LEFT-SIDED LUMBAR RADICULOPATHY: Status: ACTIVE | Noted: 2025-06-03

## 2025-06-03 PROBLEM — Z87.898 HISTORY OF DIFFICULT VENOUS ACCESS: Status: ACTIVE | Noted: 2025-06-03

## 2025-06-03 LAB
ANA NUCLEOLAR TITR SER IF: 80 {TITER}
DSDNA AB TITR SER: <10 {TITER}
NUCLEAR IGG TITR SER IF: POSITIVE {TITER}

## 2025-06-04 DIAGNOSIS — R11.0 NAUSEA: ICD-10-CM

## 2025-06-04 DIAGNOSIS — R10.12 LEFT UPPER QUADRANT ABDOMINAL PAIN: ICD-10-CM

## 2025-06-04 LAB
CENTROMERE IGG SER-ACNC: <0.4 U/ML (ref ?–7)
ENA JO1 AB SER IA-ACNC: <0.3 U/ML (ref ?–7)
ENA RNP IGG SER IA-ACNC: 0.3 U/ML (ref ?–7)
ENA SCL70 IGG SER IA-ACNC: <0.6 U/ML (ref ?–7)
ENA SM IGG SER IA-ACNC: <0.7 U/ML (ref ?–7)
ENA SS-A IGG SER IA-ACNC: 0.6 U/ML (ref ?–7)
ENA SS-B IGG SER IA-ACNC: <0.4 U/ML (ref ?–7)
U1 SNRNP IGG SER IA-ACNC: 1.5 U/ML (ref ?–5)

## 2025-06-04 RX ORDER — ONDANSETRON 8 MG/1
8 TABLET, FILM COATED ORAL EVERY 12 HOURS PRN
Qty: 30 TABLET | Refills: 1 | OUTPATIENT
Start: 2025-06-04

## 2025-06-04 NOTE — TELEPHONE ENCOUNTER
Requested Prescriptions     Pending Prescriptions Disp Refills    ondansetron (ZOFRAN) 8 MG tablet [Pharmacy Med Name: ONDANSETRON 8MG TABLETS] 30 tablet 1     Sig: TAKE 1 TABLET(8 MG) BY MOUTH EVERY 12 HOURS AS NEEDED FOR NAUSEA       Last Refill: 11/4    Last OV: 6/2    Next OV: 7/9

## 2025-06-04 NOTE — TELEPHONE ENCOUNTER
[facility] ond[facility]ron (ZOFRAN) 8 MG tablet .    LOV: 6/2/2025   Prescribed By: Daniela More PA-C   Pharmacy Location: [facility]     Next Appointment: 7/9/2025 Daniela More PA-C      Informed patient to allow up to 24 to 48 business hours before checking with Pharmacy.

## 2025-06-05 ENCOUNTER — TELEMEDICINE (OUTPATIENT)
Dept: SURGERY | Facility: CLINIC | Age: 45
End: 2025-06-05
Payer: COMMERCIAL

## 2025-06-05 VITALS — WEIGHT: 170 LBS | HEIGHT: 68 IN | BODY MASS INDEX: 25.76 KG/M2

## 2025-06-05 DIAGNOSIS — F43.9 STRESS: ICD-10-CM

## 2025-06-05 DIAGNOSIS — R63.2 BINGE EATING: ICD-10-CM

## 2025-06-05 DIAGNOSIS — Z98.84 HISTORY OF ROUX-EN-Y GASTRIC BYPASS: ICD-10-CM

## 2025-06-05 DIAGNOSIS — E66.3 OVERWEIGHT WITH BODY MASS INDEX (BMI) 25.0-29.9: ICD-10-CM

## 2025-06-05 DIAGNOSIS — E44.1 MILD PROTEIN-CALORIE MALNUTRITION (HCC): Primary | ICD-10-CM

## 2025-06-05 PROCEDURE — 98005 SYNCH AUDIO-VIDEO EST LOW 20: CPT | Performed by: INTERNAL MEDICINE

## 2025-06-05 RX ORDER — LISDEXAMFETAMINE DIMESYLATE 60 MG/1
60 CAPSULE ORAL DAILY
Qty: 30 CAPSULE | Refills: 0 | Status: SHIPPED | OUTPATIENT
Start: 2025-06-05 | End: 2025-07-05

## 2025-06-05 RX ORDER — ONDANSETRON 8 MG/1
8 TABLET, FILM COATED ORAL EVERY 12 HOURS PRN
Qty: 30 TABLET | Refills: 1 | Status: SHIPPED | OUTPATIENT
Start: 2025-06-05

## 2025-06-05 NOTE — PROGRESS NOTES
[facility], [facility], [place]  [address]  [facility] [address]  Dept: [phone]      Virtual video Check-In    Debbi Trivedi verbally consents to a Virtual/Telephone Check-In visit on 25.  Patient has been referred to the [facility] website at [email or web address] to review the yearly Consent to Treat document.    Patient understands and accepts financial responsibility for any deductible, co-insurance and/or co-pays associated with this service.    Video visit          Patient:  Debbi Trivedi  :      1980  MRN:      [id number]    Referring Provider: HENRY JORDAN MD     Chief Complaint:     No chief complaint on file.      Date of Surgery:   2017  Surgery Type:   Laparoscopic gastric bypass surgery     Subjective     Satiety:  positive  Food Intake:  <1 cup(s)  Fluid Intake:  48 oz  Protein Intake:  inad grams  Vitamin:  Yes   bariatric  Exercise: Yes ADL's  Comorbidities:  Back pain-Improvement?  yes, Joint pain-Improvement?  yes and GUILLE-Improvement?  yes    Objective     Vitals: Ht 5' 8\" (1.727 m)   Wt 170 lb (77.1 kg)   BMI 25.85 kg/m²     Starting weight: 280   Current weight:    Interval weight loss:-06   Total weight loss:  -110    Last 3 Weights   25 0850 170 lb (77.1 kg)   25 0930 171 lb (77.6 kg)   25 1423 169 lb (76.7 kg)       Patient Medications:    Current Outpatient Medications:     ondansetron (ZOFRAN) 8 MG tablet, TAKE 1 TABLET(8 MG) BY MOUTH EVERY 12 HOURS AS NEEDED FOR NAUSEA, Disp: 30 tablet, Rfl: 1    ARIPiprazole 2 MG Oral Tab, , Disp: , Rfl:     temazepam 15 MG Oral Cap, TAKE 1 CAPSULE BY MOUTH ONCE FOR 1 DOSE 30 MINUTES PRIOR TO PROCEDURE. DO NOT TAKE XANAX THE SAME DAY, Disp: , Rfl:     acetaminophen-codeine 300-30 MG Oral Tab, Take 0.5-1 tablets by mouth 2 (two) times daily as needed for Pain. Dont take with tizanidine or xanax, Disp: 30 tablet, Rfl: 0    pregabalin 75 MG Oral  Cap, Take 1 capsule (75 mg total) by mouth 3 (three) times daily., Disp: 90 capsule, Rfl: 0    ALPRAZolam 0.25 MG Oral Tab, Take 0.5-1 tablets (0.125-0.25 mg total) by mouth 2 (two) times daily as needed for Anxiety or Sleep., Disp: 60 tablet, Rfl: 0    Butalbital-Acetaminophen  MG Oral Tab, Take 1 tablet by mouth 2 (two) times daily as needed (headache). TAKE 1/2 TO 1 TABLET BY MOUTH DAILY AS NEEDED FOR TENSION HEADACHE, Disp: 15 tablet, Rfl: 0    TRAZODONE 50 MG Oral Tab, TAKE 1 TO 2 TABLETS(50  MG) BY MOUTH EVERY NIGHT, Disp: 60 tablet, Rfl: 2    levothyroxine (UNITHROID) 150 MCG Oral Tab, Take 1 tablet (150 mcg total) by mouth before breakfast., Disp: 90 tablet, Rfl: 0    TIZANIDINE 4 MG Oral Tab, TAKE 1 TABLET(4 MG) BY MOUTH EVERY NIGHT AS NEEDED FOR PAIN OR SPASM. DO NOT TAKE WITH VALTREX CAN. START AFTER FINISHING FLEXERIL, Disp: 30 tablet, Rfl: 2    valACYclovir 1 G Oral Tab, Take 2 tablets (2,000 mg total) by mouth Q12H., Disp: 30 tablet, Rfl: 0    acidophilus-pectin Oral Cap, Take 1 capsule by mouth in the morning., Disp: , Rfl:     oxybutynin ER 10 MG Oral Tablet 24 Hr, TAKE 1 TABLET(10 MG) BY MOUTH DAILY, Disp: 90 tablet, Rfl: 3    ipratropium-albuterol 0.5-2.5 (3) MG/3ML Inhalation Solution, Take 3 mL by nebulization every 4 (four) hours as needed. Use only if the albuterol inhalation albuterol is not working, Disp: 25 each, Rfl: 1    Multiple Vitamins-Minerals (BARIATRIC MULTIVITAMINS/IRON) Oral Cap, Take by mouth As Directed., Disp: , Rfl:     pantoprazole 40 MG Oral Tab EC, Take 1 tablet (40 mg total) by mouth every morning before breakfast., Disp: , Rfl:     Nystatin 986393 UNIT/GM External Powder, Apply 1 Application. topically 4 (four) times daily. Apply to affected areas, Disp: 30 g, Rfl: 1    ALBUTEROL 108 (90 Base) MCG/ACT Inhalation Aero Soln, INHALE 2 PUFFS INTO THE LUNGS EVERY 4 HOURS AS NEEDED FOR WHEEZING OR SHORTNESS OF BREATH (Patient taking differently: Inhale 2 puffs into  the lungs every 4 (four) hours as needed for Shortness of Breath or Wheezing.), Disp: 8.5 g, Rfl: 1    loratadine 10 MG Oral Tab, Take 1 tablet (10 mg total) by mouth in the morning., Disp: , Rfl: 0    alpha 1-proteinase inhibitor 1000 MG/20ML Intravenous Solution, Inject into the vein once. weekly, Disp: , Rfl:     SPIRIVA RESPIMAT 2.5 MCG/ACT Inhalation Aero Soln, INHALE 2 PUFFS INTO THE LUNGS DAILY, Disp: 12 g, Rfl: 2    Calcium Carb-Cholecalciferol (CALCIUM 600/VITAMIN D3) 600-800 MG-UNIT Oral Tab, Take 1 tablet by mouth in the morning and 1 tablet in the evening and 1 tablet before bedtime., Disp: , Rfl:     SYMBICORT 160-4.5 MCG/ACT Inhalation Aerosol, INHALE 2 PUFFS INTO THE LUNGS TWICE DAILY, Disp: 3 Inhaler, Rfl: 3    Cholecalciferol (VITAMIN D) 50 MCG (2000 UT) Oral Cap, Take 1 capsule (2,000 Units total) by mouth in the morning and 1 capsule (2,000 Units total) before bedtime., Disp: , Rfl:     magnesium 250 MG Oral Tab, Take 1 tablet (250 mg total) by mouth in the morning., Disp: , Rfl:     aspirin 81 MG Oral Tab, Take 1 tablet (81 mg total) by mouth in the morning., Disp: , Rfl:     Allergies:  Adhesive tape, Cephalosporins, Chocolate, Doxycycline, Tramadol, Haloperidol, Metoclopramide, Toradol [ketorolac tromethamine], Norflex tablets [orphenadrine], Cheratussin ac [guaifenesin-codeine], and Nsaids     Social History:    Social History     Socioeconomic History    Marital status:    Tobacco Use    Smoking status: Never     Passive exposure: Past    Smokeless tobacco: Never   Vaping Use    Vaping status: Never Used   Substance and Sexual Activity    Alcohol use: Not Currently    Drug use: No    Sexual activity: Yes     Partners: Male   Other Topics Concern     Service No    Blood Transfusions No    Caffeine Concern No    Occupational Exposure No    Hobby Hazards No    Sleep Concern No    Stress Concern No    Weight Concern No    Special Diet No    Back Care No    Exercise Yes    Bike  Helmet No    Seat Belt Yes    Self-Exams No   Social History Narrative    ** Merged History Encounter **          Social Drivers of Health     Food Insecurity: No Food Insecurity (2025)    NCSS - Food Insecurity     Worried About Running Out of Food in the Last Year: No     Ran Out of Food in the Last Year: No   Transportation Needs: No Transportation Needs (2025)    NCSS - Transportation     Lack of Transportation: No   Housing Stability: Not At Risk (2025)    NCSS - Housing/Utilities     Has Housing: Yes     Worried About Losing Housing: No     Unable to Get Utilities: No     Surgical History:    Past Surgical History:   Procedure Laterality Date    Ankle scope,part debridement Left 2015    Procedure: ARTHROSCOPY ANKLE WITH DEBRIDEMENT;  Surgeon: Marical Evans MD;  Location: [facility]    Bronch thermoplasty 1 lobe Right 04/15/2021    Bronch thermoplasty 1 lobe Left 2021      2006    Cholecystectomy      Colonoscopy  2021    Colonoscopy      Colonoscopy,diagnostic  10/22/2013    Procedure: COLONOSCOPY, POSSIBLE BIOPSY, POSSIBLE POLYPECTOMY 14082;  Surgeon: Marcello Ferreira MD;  Location: [facility]    Cta chest (gqi=[id number])  2021    D & c      x4    Egd  2021    Gastric bypass,obese<100cm magy-en-y  2017    DR. SEGAL    Gastric bypass,obesity,sb reconstruc      Gastrocnemius recession Left 2015    Procedure: GASTROC RECESSION FOOT;  Surgeon: Marcial Evans MD;  Location: [facility]    Hysterectomy      Hysteroscopy      Inj paravert f jnt l/s 1 lev Bilateral 2015    Procedure: FACET INJECTION UNDER FLUOROSCOPY;  Surgeon: Dusty Munson DO;  Location: [facility]    Laparoscopic cholecystectomy N/A 2016    Procedure: LAPAROSCOPIC CHOLECYSTECTOMY;  Surgeon: Dai Sanchez MD;  Location:  MAIN OR    Laparoscopy,diagnostic      Laparoscopy,diagnostic  2022    Lysis of adhesions   2010    M-sedaj by kristin phys perfrmg svc 5+ yr Bilateral 12/14/2015    Procedure: FACET INJECTION UNDER FLUOROSCOPY;  Surgeon: Dusty Munson DO;  Location: [facility]    Domonique biopsy stereo nodule 1 site right (cpt=19081)  07/2023    stromal fibrosis    Needle biopsy right  June 2023    Oophorectomy Left     Other  NECK SCAR REVISION    Other surgical history      laparoscopies x2    Removal of ovarian cyst(s) Right 07/08/2020    Stab phlebectomy, varicose veins, 1 extremity; <20 incisions      Stomach surgery procedure unlisted  2018    Surgical stent Bilateral 03/2015    Bilateral iliac stents    Thyroidectomy  04/2011    Total abdom hysterectomy      Upper gi endo no barretts  12/2015    normal    Upper gi endoscopy performed  01/25/2017    Rivera Donaldson M.D.    Upper gi endoscopy,biopsy N/A 12/19/2015    Procedure: ESOPHAGOGASTRODUODENOSCOPY, POSSIBLE BIOPSY, POSSIBLE POLYPECTOMY 55917;  Surgeon: Brayden Giordano MD;  Location: [facility]       Family History:    Family History   Problem Relation Age of Onset    Heart Disorder Father     Heart Disease Father     Heart Attack Father     Other (Other) Mother         ALLIE    Breast Cancer Maternal Grandmother 75    Cancer Maternal Grandfather         blood cancer    Diabetes Paternal Grandmother     Heart Disorder Paternal Grandmother     Heart Disease Paternal Grandmother     Asthma Paternal Grandmother     Heart Attack Paternal Grandmother     Stroke Neg        Physical Exam:  Vital signs: Height 5' 8\" (1.727 m), weight 170 lb (77.1 kg), not currently breastfeeding.  Limited due to tele visit  Awake and alert  Speaking full sentences      ROS:    Constitutional: negative  Respiratory: negative  Cardiovascular: negative  Gastrointestinal: positive for abdominal pain  Musculoskeletal:negative  Neurological: negative  Behavioral/Psych: positive for anxiety  All other systems were reviewed and are negative    Assessment        Encounter Diagnosis(ses):   Encounter Diagnoses   Name Primary?    Mild protein-calorie malnutrition (HCC) Yes    History of Mingo-en-Y gastric bypass     Stress     Binge eating     Overweight with body mass index (BMI) 25.0-29.9        Plan     S/P mingo en y 12/18/2017      Continue PPI    No orders of the defined types were placed in this encounter.      Intertrigo:  Has irritation under skin folds.   Would benefit from skin removal   Skin has been affecting activities of daily living    Breast lift would help with back pain  Has DJD in joints  Irritation under skin folds    Recommend she cuts back on carb intake    Vyvanse at 60 mg  ekg done    Did not tolerate Wegovy    No orders of the defined types were placed in this encounter.        Chapito Haskins MD

## 2025-06-15 ENCOUNTER — PATIENT MESSAGE (OUTPATIENT)
Dept: FAMILY MEDICINE CLINIC | Facility: CLINIC | Age: 45
End: 2025-06-15

## 2025-06-15 DIAGNOSIS — M54.16 ACUTE LUMBAR RADICULOPATHY: Primary | ICD-10-CM

## 2025-06-15 DIAGNOSIS — M54.16 CHRONIC LEFT-SIDED LUMBAR RADICULOPATHY: ICD-10-CM

## 2025-06-16 RX ORDER — ACETAMINOPHEN AND CODEINE PHOSPHATE 300; 30 MG/1; MG/1
TABLET ORAL 2 TIMES DAILY PRN
Qty: 20 TABLET | Refills: 0 | Status: SHIPPED | OUTPATIENT
Start: 2025-06-16

## 2025-06-18 ENCOUNTER — OFFICE VISIT (OUTPATIENT)
Dept: RHEUMATOLOGY | Facility: CLINIC | Age: 45
End: 2025-06-18
Payer: COMMERCIAL

## 2025-06-18 ENCOUNTER — PATIENT MESSAGE (OUTPATIENT)
Dept: RHEUMATOLOGY | Facility: CLINIC | Age: 45
End: 2025-06-18

## 2025-06-18 VITALS
RESPIRATION RATE: 16 BRPM | BODY MASS INDEX: 27.16 KG/M2 | HEART RATE: 93 BPM | HEIGHT: 68 IN | TEMPERATURE: 98 F | OXYGEN SATURATION: 98 % | SYSTOLIC BLOOD PRESSURE: 100 MMHG | DIASTOLIC BLOOD PRESSURE: 60 MMHG | WEIGHT: 179.19 LBS

## 2025-06-18 DIAGNOSIS — E88.01 ALPHA-1-ANTITRYPSIN DEFICIENCY (HCC): ICD-10-CM

## 2025-06-18 DIAGNOSIS — G89.4 CHRONIC PAIN SYNDROME: ICD-10-CM

## 2025-06-18 DIAGNOSIS — H04.123 DRY EYE SYNDROME OF BOTH EYES: ICD-10-CM

## 2025-06-18 DIAGNOSIS — R76.8 ANA POSITIVE: Primary | ICD-10-CM

## 2025-06-18 DIAGNOSIS — M25.50 POLYARTHRALGIA: ICD-10-CM

## 2025-06-18 DIAGNOSIS — B99.9 RECURRENT INFECTIONS: ICD-10-CM

## 2025-06-18 DIAGNOSIS — R53.82 CHRONIC FATIGUE: ICD-10-CM

## 2025-06-18 DIAGNOSIS — R13.10 DYSPHAGIA, UNSPECIFIED TYPE: ICD-10-CM

## 2025-06-18 DIAGNOSIS — M25.531 RIGHT WRIST PAIN: ICD-10-CM

## 2025-06-18 PROCEDURE — 3074F SYST BP LT 130 MM HG: CPT | Performed by: INTERNAL MEDICINE

## 2025-06-18 PROCEDURE — 99215 OFFICE O/P EST HI 40 MIN: CPT | Performed by: INTERNAL MEDICINE

## 2025-06-18 PROCEDURE — G2211 COMPLEX E/M VISIT ADD ON: HCPCS | Performed by: INTERNAL MEDICINE

## 2025-06-18 PROCEDURE — 3008F BODY MASS INDEX DOCD: CPT | Performed by: INTERNAL MEDICINE

## 2025-06-18 PROCEDURE — 3078F DIAST BP <80 MM HG: CPT | Performed by: INTERNAL MEDICINE

## 2025-06-18 NOTE — PROGRESS NOTES
?  RHEUMATOLOGY FOLLOW UP   Date of visit: 06/18/2025  ?  Chief Complaint   Patient presents with    Follow - Up     LOV 11/25/2024  Joints hurt. Varicose veins hurt. She says her inflammatory markers were normal but she feels swollen. Very bad dry mouth. EVETTE positive with last blood draw.   Rapid 3 score is 4.0     ASSESSMENT, DISCUSSION & PLAN   Assessment:  1. EVETTE positive    2. Polyarthralgia    3. Right wrist pain    4. Dry eye syndrome of both eyes    5. Chronic fatigue    6. Alpha-1-antitrypsin deficiency (HCC)    7. Recurrent infections    8. Dysphagia, unspecified type    9. Chronic pain syndrome            Discussion:  Ms. Debbi Trivedi is a 45 yo woman with a somewhat complicated history. She has been suffering from chronic lung issues for several years, was told at one point that she had asthma but then that she didn't. She would require at least once yearly hospitalizations with IV steroids as well as oral steroids as outpatient with temporary relief.  She was sent to a specialty clinic in [place] who after further work-up diagnosed the patient with alpha-1 antitrypsin deficiency as well as small airway disease.  As part of her work-up she was found to have an EVETTE that was positive but apparently remainder of LIDIA panel was negative.  Per records the only specific LIDIA's that I could see checked were in SSA and and SCL 70.  ANCA studies were negative at that time.  High-resolution CT does not show any signs of interstitial lung disease per the records that I reviewed.    At her last visit, her autoimmune workup was grossly negative with the exception of borderline low IgG levels.  She has been seen by immunology and reports workup was grossly negative.  Was told not concerned.    At this time, she does continue to have fatigue, dry eyes and diffuse pain.  Previously thought that the majority of her other symptoms are related to her ongoing GI issues and likely malnutrition.  Recommended that she  continue following up with her PCP and GI. Seen by immunology in the past, no further recs. I recommended she consider re-evaluation due to lower levels and persistent symptoms. She can consider second opinion as well.   Recommended another opinion regarding her right wrist since she has significant ROM restriction.   Otherwise, discussed again possible trial of plaquenil but due to persistent oral thrush, I advise holding off for now.   Although her EVETTE was equivocal, the rest of the antibody testing, inflammatory markers and complements were all normal/negative.  Recommended she consider further therapy for the chronic pain. She will discuss LDN with her pulmonologist who is tx her AATD.   Xrays of cervical spine ordered to assess for etiology of arm and leg complaints.     Follow up in 6 months or sooner as needed  Encouraged her to keep me updated in the meantime.     Patient verbalized understanding of above instructions. No further questions at this time.    Code selection for this visit was based on time spent (45min) on date of service in preparing to see the patient, obtaining and/or reviewing separately obtained history, performing a medically appropriate examination, counseling and educating the patient/family/caregiver, ordering medications or testing, referring and communicating with other healthcare providers, documenting clinical information in the E HR, independently interpreting results and communicating results to the patient/family/caregiver and care coordination with the patient's other providers.  Additional time spent with message leading up to today's appt.     Plan:  Diagnoses and all orders for this visit:    EVETTE positive    Polyarthralgia    Right wrist pain    Dry eye syndrome of both eyes    Chronic fatigue  -     Immunoglobulin A/G/M, Quant; Future  -     XR CERVICAL SPINE COMPLETE W/FLEX + EXT (CPT=72052); Future  -     Complement, Total (Ch50); Future    Alpha-1-antitrypsin deficiency  (HCC)    Recurrent infections  -     Immunoglobulin A/G/M, Quant; Future  -     XR CERVICAL SPINE COMPLETE W/FLEX + EXT (CPT=72052); Future  -     Complement, Total (Ch50); Future    Dysphagia, unspecified type  -     XR CERVICAL SPINE COMPLETE W/FLEX + EXT (CPT=72052); Future    Chronic pain syndrome              No follow-ups on file.  ?  HPI   Debbi Trivedi is a 44 year old female with the following active problems who was seen initially for evaluation of EVETTE positivity and hx of alpha 1 antitrypsin deficiency.     She has been doing okay.  + severe dry mouth, worse than before.  + oral thrush recently that's recurrence. Soreness over the tongue  Denies oral ulcers   + dry eyes- not using drops currently. Continues to follow with eye doctor regularly. Denies inflammation of the eyes. Feels like she has to wear glasses more often   Denies any other recent infections    Pain located in the knees when bending. Feels calf pain too  Feels inflamed all over- increased bloating.   + weight fluctuations of 10#. Does have hx of gastric bypass. Has been able to introduce protein. Has very little muscle mass and weakness in general     Denies oral or nasal ulcers.   + significant dry skin. Uses alcohol and fragrance free soaps and lotions   + rashes over abdomen- itching and can scarring. Has not seen derm. Given topical through PCP that helps intermittently   + chronic fatigue, stable    On PPI 40mg now down to once daily - stable  Continues with weekly infusions for the AATD. Has not been hospitalized since starting infusions.   + easy bruising, stable  + increased nausea/vomiting the past few months- denies obvious triggers. Denies coffee ground or blood. States similar to prior symptoms so has not reached out to surgeon or GI.   Does feel like she has trouble swallowing.   Going through PT for left lower back pain. Thinks related to disc disease  Completed OT for the wrist- fell off counter when prepping for  salud. Told TFCC partial tear and no surgery recommended but recommended time to heal. Has had two different cortisone injections but no relief. Told inflammatory issue but her inflammatory markers are normal.       HPI from initial consultation  referred for rheumatologic evaluation due to EVETTE positivity.     Has been suffering from ongoing pulmonary issues- told throughout the years that she has diminished breath sounds without wheezing but shortness of breath. PFTs have shown asthma. Requires hospitalization yearly and requires steroids and would get worse when tapering steroids.   Tried dupixent without improvement.  Hx of gastric bypass surgery in 2017- lost over 100#. Has been able maintain weight (average 170 since that time, previously 280).   Was referred to specialty center in [place]- told inflammation in the lungs as well as small airway disease.   Had EVETTE 1:320 speckled but negative LIDIA panel.   Also found to have genetic marker for alpha 1 antitrypsin - started on prolastin infusions over the past one year. And has not required hospitalization since starting. Still gets some shortness of breath and feels infections take longer to heal from.      Fluctuates between constipation/diarrhea for years, unclear if worsened after surgery. Does have low protein levels.     + dry eyes and blurred vision, seen by eye doctor within 6 months but still having blurred vision. Dryness started after that appt. Can get eye pain without redness  + dry skin and brittle nails.     + hx of thyroid nodules s/p thyroidectomy and eventually dx with cancer in 2016  + Ford Glover syndrome- takes aspirin daily dx 2012- tilt table venogram due to pelvic pain  + neuropathy diffusely- takes lyrica, recently increased due to abdominal pain but has not helped.   + nausea without vomiting, takes zofran- ever since surgery.     + joint pain in knees, can get swelling in the R>L. Can be hard to ambulate at times and can get some  crepitus.   + stiffness in joints of hands, can swell as well  + swelling in the mornings, lasts for a few hours- puffiness over PIPs. Not tender to the touch.   + varicose veins in the legs   + hair loss/thinning- worsened over the past one year ?multifactorial   + some photosensitivity over the anterior chest and gets blotchy easily- never scarring   + hx of right pinky finger turned purple- told raynaud's. Only happened once and recently recurred in all fingers but not as severe   + had clot in cord with son, but otherwise he is healthy   + hx of miscarriages- one at 12w, one at 5w; not able to conceive after those. Require second d&c due to retained products.   + hx of Asherman's syndrome   + LUQ pain constant- has plans to see GI tomorrow   + hx of pancreatitis and required gall bladder removal month later   + hx of plantar fasciitis in 2016 required surgery. Symptoms resolved after surgery (was prior to gastric bypass).   + hx of dental issues- requires fillings, root canal etc   + right side submandibular firmness when yawning, lasts for 4-5 min then resolves on its own     The patient denies oral or nasal ulcers, elevated or scarring rashes, prior hematologic abnormality, prior renal or liver disease, or history of seizures.  No history of prior blood clot in the legs or lungs, strokes or ischemic phenomenon.  Denies nonhealing ulcers on the fingertips, trouble swallowing, or severe acid reflux. (swallow study previously normal)   The patient denies any history of uveitis, nodular painful shin bruises, Achilles heel pain, psoriatic lesions, spooning or pitting of the nails.   There are no symptoms of severe dry mouth.    No fevers, chills, lymphadenopathy, night sweats, unexpected weight loss, bony pain, easy bruising or bleeding, or unexplained weakness.      Family hx:   Paternal grandmother with severe asthma/copd/lung issues  Mother is carrier for alpha 1 antitrypsin   Maternal aunt and uncle with  Grave's disease       Past Medical History:  Past Medical History:    Abdominal discomfort in right lower quadrant    Abdominal hernia    Abdominal pain    Abnormal CT scan, esophagus    Acute deep vein thrombosis (DVT) of left lower extremity (HCC)    Acute left-sided low back pain without sciatica    Alpha-1-antitrypsin deficiency (HCC)    Anxiety    Arrhythmia    RIGHT BUNDLE BRACH BLOCK     Arthritis    Asherman syndrome    Asthma (HCC)    Back pain    I have Spinal Stenosis that has gotten worse over the years.    Bloating    Calculus of kidney    Cancer (HCC)    Thyroid    Change in hair    Chest pain    Chronic bronchiolitis (HCC)    Chronic cough    Constipation    COPD (chronic obstructive pulmonary disease) (HCC)    no Oxygen    COVID-19 virus infection    Depression    Depression, major, recurrent, moderate (HCC)    Diarrhea, unspecified    Disorder of thyroid    Diverticulosis    Diverticulosis of intestine    Easy bruising    Endometriosis    Esophageal reflux    Fatigue    Food intolerance    Frequent use of laxatives    Gastric ulcer    Headache disorder    Hemorrhoids    History of gastric bypass    Hoarseness, chronic    Hx of gastric bypass    Hx of motion sickness    Hydronephrosis    Hypokalemia    Hypothyroidism    Infertility, female    Intertrigo    Irregular bowel habits    KIDNEY STONE    Loss of appetite    Migraines    Nausea    Nephrolithiasis    Organic hypersomnia, unspecified    AHI 2 RDI 2 REM AHI 6 SaO2 curtis 89%    Osteoarthritis    Painful urination    Pancreatitis (HCC)    Pneumonia due to organism    PONV (postoperative nausea and vomiting)    Problems with swallowing    PUD (peptic ulcer disease)    Rash    Rib contusion, left, initial encounter    Severe persistent asthma with exacerbation (HCC)    Shortness of breath    Sleep disturbance    Stented coronary artery    Stress    Thyroid cancer (HCC)    Visual impairment    glasses    Vocal cord dysfunction    Wears glasses     Weight gain    Weight loss     Past Surgical History:  Past Surgical History:   Procedure Laterality Date    Ankle scope,part debridement Left 2015    Procedure: ARTHROSCOPY ANKLE WITH DEBRIDEMENT;  Surgeon: Marcial Evans MD;  Location: [facility]    Bronch thermoplasty 1 lobe Right 04/15/2021    Bronch thermoplasty 1 lobe Left 2021      2006    Cholecystectomy      Colonoscopy  2021    Colonoscopy      Colonoscopy,diagnostic  10/22/2013    Procedure: COLONOSCOPY, POSSIBLE BIOPSY, POSSIBLE POLYPECTOMY 55249;  Surgeon: Marcello Ferreira MD;  Location: [facility]    Cta chest (vkr=[id number])  2021    D & c      x4    Egd  2021    Gastric bypass,obese<100cm magy-en-y  2017    DR. SEGAL    Gastric bypass,obesity,sb reconstruc      Gastrocnemius recession Left 2015    Procedure: GASTROC RECESSION FOOT;  Surgeon: Marcial Evans MD;  Location: [facility]    Hysterectomy      Hysteroscopy      Inj paravert f jnt l/s 1 lev Bilateral 2015    Procedure: FACET INJECTION UNDER FLUOROSCOPY;  Surgeon: Dusty Munson DO;  Location: [facility]    Laparoscopic cholecystectomy N/A 2016    Procedure: LAPAROSCOPIC CHOLECYSTECTOMY;  Surgeon: Dai Sanchez MD;  Location:  MAIN OR    Laparoscopy,diagnostic      Laparoscopy,diagnostic  2022    Lysis of adhesions      M-sedaj by  phys perfrmg svc 5+ yr Bilateral 2015    Procedure: FACET INJECTION UNDER FLUOROSCOPY;  Surgeon: Dusty Munson DO;  Location: [facility]    Domonique biopsy stereo nodule 1 site right (cpt=19081)  2023    stromal fibrosis    Needle biopsy right  2023    Oophorectomy Left     Other  NECK SCAR REVISION    Other surgical history      laparoscopies x2    Removal of ovarian cyst(s) Right 2020    Stab phlebectomy, varicose veins, 1 extremity; <20 incisions      Stomach surgery procedure unlisted       Surgical stent Bilateral 03/2015    Bilateral iliac stents    Thyroidectomy  04/2011    Total abdom hysterectomy      Upper gi endo no barretts  12/2015    normal    Upper gi endoscopy performed  01/25/2017    Rivera Donaldson M.D.    Upper gi endoscopy,biopsy N/A 12/19/2015    Procedure: ESOPHAGOGASTRODUODENOSCOPY, POSSIBLE BIOPSY, POSSIBLE POLYPECTOMY 15715;  Surgeon: Brayden Giordano MD;  Location: [facility]     Family History:  Family History   Problem Relation Age of Onset    Heart Disorder Father     Heart Disease Father     Heart Attack Father     Other (Other) Mother         ALLIE    Breast Cancer Maternal Grandmother 75    Cancer Maternal Grandfather         blood cancer    Diabetes Paternal Grandmother     Heart Disorder Paternal Grandmother     Heart Disease Paternal Grandmother     Asthma Paternal Grandmother     Heart Attack Paternal Grandmother     Stroke Neg      Social History:  Social History     Socioeconomic History    Marital status:    Tobacco Use    Smoking status: Never     Passive exposure: Past    Smokeless tobacco: Never   Vaping Use    Vaping status: Never Used   Substance and Sexual Activity    Alcohol use: Not Currently    Drug use: No    Sexual activity: Yes     Partners: Male   Other Topics Concern     Service No    Blood Transfusions No    Caffeine Concern No    Occupational Exposure No    Hobby Hazards No    Sleep Concern No    Stress Concern No    Weight Concern No    Special Diet No    Back Care No    Exercise Yes    Bike Helmet No    Seat Belt Yes    Self-Exams No   Social History Narrative    ** Merged History Encounter **          Social Drivers of Health     Food Insecurity: No Food Insecurity (6/2/2025)    NCSS - Food Insecurity     Worried About Running Out of Food in the Last Year: No     Ran Out of Food in the Last Year: No   Transportation Needs: No Transportation Needs (6/2/2025)    NCSS - Transportation     Lack of Transportation: No    Housing Stability: Not At Risk (6/2/2025)    NCSS - Housing/Utilities     Has Housing: Yes     Worried About Losing Housing: No     Unable to Get Utilities: No     Medications:  Outpatient Medications Marked as Taking for the 6/18/25 encounter (Office Visit) with Annika Pizano, DO   Medication Sig Dispense Refill    acetaminophen-codeine 300-30 MG Oral Tab Take 0.5-1 tablets by mouth 2 (two) times daily as needed for Pain (lumbar radiculopathy). Dont take with tizanidine or xanax 20 tablet 0    ondansetron (ZOFRAN) 8 MG tablet TAKE 1 TABLET(8 MG) BY MOUTH EVERY 12 HOURS AS NEEDED FOR NAUSEA 30 tablet 1    Lisdexamfetamine Dimesylate (VYVANSE) 60 MG Oral Cap Take 1 capsule (60 mg total) by mouth daily. 30 capsule 0    ARIPiprazole 2 MG Oral Tab       pregabalin 75 MG Oral Cap Take 1 capsule (75 mg total) by mouth 3 (three) times daily. 90 capsule 0    ALPRAZolam 0.25 MG Oral Tab Take 0.5-1 tablets (0.125-0.25 mg total) by mouth 2 (two) times daily as needed for Anxiety or Sleep. 60 tablet 0    Butalbital-Acetaminophen  MG Oral Tab Take 1 tablet by mouth 2 (two) times daily as needed (headache). TAKE 1/2 TO 1 TABLET BY MOUTH DAILY AS NEEDED FOR TENSION HEADACHE 15 tablet 0    TRAZODONE 50 MG Oral Tab TAKE 1 TO 2 TABLETS(50  MG) BY MOUTH EVERY NIGHT 60 tablet 2    levothyroxine (UNITHROID) 150 MCG Oral Tab Take 1 tablet (150 mcg total) by mouth before breakfast. 90 tablet 0    TIZANIDINE 4 MG Oral Tab TAKE 1 TABLET(4 MG) BY MOUTH EVERY NIGHT AS NEEDED FOR PAIN OR SPASM. DO NOT TAKE WITH VALTREX CAN. START AFTER FINISHING FLEXERIL 30 tablet 2    valACYclovir 1 G Oral Tab Take 2 tablets (2,000 mg total) by mouth Q12H. 30 tablet 0    acidophilus-pectin Oral Cap Take 1 capsule by mouth in the morning.      oxybutynin ER 10 MG Oral Tablet 24 Hr TAKE 1 TABLET(10 MG) BY MOUTH DAILY 90 tablet 3    ipratropium-albuterol 0.5-2.5 (3) MG/3ML Inhalation Solution Take 3 mL by nebulization every 4 (four) hours as  needed. Use only if the albuterol inhalation albuterol is not working 25 each 1    Multiple Vitamins-Minerals (BARIATRIC MULTIVITAMINS/IRON) Oral Cap Take by mouth As Directed.      pantoprazole 40 MG Oral Tab EC Take 1 tablet (40 mg total) by mouth every morning before breakfast.      Nystatin 214178 UNIT/GM External Powder Apply 1 Application. topically 4 (four) times daily. Apply to affected areas 30 g 1    ALBUTEROL 108 (90 Base) MCG/ACT Inhalation Aero Soln INHALE 2 PUFFS INTO THE LUNGS EVERY 4 HOURS AS NEEDED FOR WHEEZING OR SHORTNESS OF BREATH 8.5 g 1    loratadine 10 MG Oral Tab Take 1 tablet (10 mg total) by mouth in the morning.  0    alpha 1-proteinase inhibitor 1000 MG/20ML Intravenous Solution Inject into the vein once. weekly      SPIRIVA RESPIMAT 2.5 MCG/ACT Inhalation Aero Soln INHALE 2 PUFFS INTO THE LUNGS DAILY 12 g 2    Calcium Carb-Cholecalciferol (CALCIUM 600/VITAMIN D3) 600-800 MG-UNIT Oral Tab Take 1 tablet by mouth in the morning and 1 tablet in the evening and 1 tablet before bedtime.      SYMBICORT 160-4.5 MCG/ACT Inhalation Aerosol INHALE 2 PUFFS INTO THE LUNGS TWICE DAILY 3 Inhaler 3    Cholecalciferol (VITAMIN D) 50 MCG (2000 UT) Oral Cap Take 1 capsule (2,000 Units total) by mouth in the morning and 1 capsule (2,000 Units total) before bedtime.      magnesium 250 MG Oral Tab Take 1 tablet (250 mg total) by mouth in the morning.      aspirin 81 MG Oral Tab Take 1 tablet (81 mg total) by mouth in the morning.       Modified Medications    No medications on file     There are no discontinued medications.      ?  ?  Allergies:  Allergies   Allergen Reactions    Adhesive Tape HIVES     Patient got welts when she use the adhesive tape for 48-hour Holter monitor.    Cephalosporins HIVES    Chocolate HIVES     Dark chocolate    Doxycycline HIVES     Hives and fever       Tramadol HIVES and RASH    Haloperidol ANXIETY    Metoclopramide ANXIETY     Feels like she is going to jump out of her skin     Toradol [Ketorolac Tromethamine] RASH    Norflex Tablets [Orphenadrine] OTHER (SEE COMMENTS)     Dilated [pupils ? / pt is not sure of reaction    Cheratussin Ac [Guaifenesin-Codeine] NAUSEA ONLY     Only in liquid form; tolerates gel caps    Nsaids OTHER (SEE COMMENTS)       Other reaction(s): GI UPSET  Hx of gastric bypass     ?  REVIEW OF SYSTEMS   ?  Review of Systems   Constitutional:  Positive for malaise/fatigue. Negative for chills, fever and weight loss.   Eyes:  Negative for pain and redness.   Respiratory:  Positive for cough (better) and shortness of breath (on exertion). Negative for hemoptysis and wheezing.         Stable   Cardiovascular:  Positive for leg swelling (varicose veins). Negative for chest pain and palpitations.   Gastrointestinal:  Positive for abdominal pain, blood in stool (intermittently), heartburn, nausea and vomiting. Negative for constipation and diarrhea.        Relates to ongoing GI issues; hx of gastroparesis   Genitourinary:  Positive for dysuria (intermittently), frequency and urgency. Negative for hematuria.   Musculoskeletal:  Positive for back pain (lower left pain) and joint pain. Negative for myalgias and neck pain.   Skin:  Positive for rash. Negative for itching.   Neurological:  Positive for weakness (generalized, but primarily noticeable in the hands) and headaches (stable). Negative for dizziness, tingling and focal weakness.   Endo/Heme/Allergies:  Positive for environmental allergies. Bruises/bleeds easily.   Psychiatric/Behavioral:  Negative for depression. The patient is nervous/anxious and has insomnia.      PHYSICAL EXAM   Today's Vitals:  Temperature Blood Pressure Heart Rate Resp Rate SpO2   Temp: 97.5 °F (36.4 °C) BP: 100/60 Pulse: 93 Resp: 16 SpO2: 98 %   ?  Current Weight Height BMI BSA Pain   Wt Readings from Last 1 Encounters:   06/18/25 179 lb 3.2 oz (81.3 kg)    Height: 5' 8\" (172.7 cm) Body mass index is 27.25 kg/m². Body surface area is 1.95  meters squared.         Physical Exam  Vitals and nursing note reviewed.   Constitutional:       General: She is not in acute distress.     Appearance: She is well-developed. She is not diaphoretic.   HENT:      Head: Normocephalic.   Eyes:      General: No scleral icterus.     Extraocular Movements: Extraocular movements intact.      Conjunctiva/sclera: Conjunctivae normal.   Neck:      Vascular: No JVD.      Trachea: No tracheal deviation.   Cardiovascular:      Rate and Rhythm: Normal rate and regular rhythm.      Heart sounds: Normal heart sounds. No murmur heard.  Pulmonary:      Effort: Pulmonary effort is normal. No respiratory distress.      Breath sounds: Normal breath sounds. No wheezing.   Musculoskeletal:         General: Tenderness and deformity present. No swelling.      Cervical back: Neck supple.      Comments: No evidence of heberden or daniella nodes of any of the fingers, no basilar joint tenderness of the 1st CMC bilaterally.  No swelling, tenderness, redness or restriction of motion of the DIPs, PIPs or MCPs, elbows, ankles  Bilateral shoulders with full ROM, no evidence of impingement with provocative maneuvers.  Bilateral knees with medial and lateral joint line tenderness (R>L), mild crepitus, no effusion. Some right knee bony enlargement noted  Left wrist benign  Right wrist with significant limitation in flexion/extension and tenderness over TFCC    Posterior Tender Point Exam  Occiput +/-  Trapezius -/-  Supraspinatus -/+  Gluteal deferred  Greater trochanter +/+  Anterior Tender Point Exam:  Low cervical +/+  Second rib +/+  Lateral epicondyle +/+  Knee +/+  12 tender points positive   Lymphadenopathy:      Cervical: No cervical adenopathy.   Skin:     General: Skin is warm and dry.      Findings: No erythema or rash.      Comments: No fingernail pitting or onycholysis  No digital pits but periungal erythema preset and previously around toes.    Neurological:      Mental Status: She is  alert and oriented to person, place, and time.      Cranial Nerves: No cranial nerve deficit.      Gait: Gait normal.   Psychiatric:         Mood and Affect: Mood normal.         Behavior: Behavior normal.       ?  Radiology review:       PROCEDURE:  XR CHEST PA + LAT CHEST (CPT=71046)     INDICATIONS:  Coughing  R06.89 Decreased lung sounds     COMPARISON:  [facility], XR, XR CHEST PA + LAT CHEST (SET=41420), 9/14/2023, 4:11 PM.     TECHNIQUE:  PA and lateral chest radiographs were obtained.     PATIENT STATED HISTORY: (As transcribed by Technologist)  Productive cough started about 2 weeks ago, now cough is both dry and productive.  Dimished breath sounds on the right, shortness of breath.  Pt has alpha-1 antitrypsin defeinciency.         FINDINGS:    LUNGS:  No focal consolidation.  Normal vascularity.  CARDIAC:  Normal size cardiac silhouette.  MEDIASTINUM:  Normal.  PLEURA:  Normal.  No pleural effusions.  BONES:  Normal for age.      Impression   CONCLUSION:  Normal        LOCATION:  [facility]        Dictated by (CST): Marcello Gonzalez MD on 11/15/2023 at 8:54 AM      Finalized by (CST): Marcello Gonzalez MD on 11/15/2023 at 8:54 AM      Narrative   PROCEDURE:  XR TIBIA + FIBULA (2 VIEWS), LEFT (CPT=73590)     TECHNIQUE:  AP and lateral views of the tibia and fibula were obtained.     COMPARISON:  None.     INDICATIONS:  W10.8XXA Fall down stairs, initial encounter S89.92XA Injury of left tibia M89.8X6 Pain in left tibia     PATIENT STATED HISTORY: (As transcribed by Technologist)  On 10/14/23 patient fell down the stairs and injured her left leg.  She was seen here and diagnosed with a sprain.  She was seen at [facility] a few days later due to continued pain and swelling and was   diagnosed with a fascial injury.  She has been wearing a boot but continues to have pain to the plantar aspect of the foot, the lateral malleolus and up the medial aspect of the lower leg.         FINDINGS:    No acute fracture or dislocation  is seen.  There is normal alignment.  Normal bone mineral density.  No periosteal reaction is seen.  Question mild soft tissue swelling noted of the distal aspect of the tibia anteriorly.  If clinical symptoms persist  then consider follow-up imaging.                   Impression   CONCLUSION:  See above.     A preliminary report was provided by [facility].           LOCATION:  [facility]        Dictated by (CST): Ryne Sanchez MD on 10/25/2023 at 9:05 AM      Finalized by (CST): Ryne Sanchez MD on 10/25/2023 at 9:07 AM       PROCEDURE:  XR FOOT, COMPLETE (MIN 3 VIEWS), LEFT (CPT=73630)     TECHNIQUE:  AP, oblique, and lateral views were obtained.     COMPARISON:  [facility], XR, XR ANKLE (MIN 3 VIEWS), LEFT (CPT=73610), 10/14/2023, 1:28 PM.  [facility], XR, XR FOOT, COMPLETE (MIN 3 VIEWS), LEFT (CPT=73630), 9/17/2013, 4:46 PM.     INDICATIONS:  fell down 3-5 steps left foot an lower back pain     PATIENT STATED HISTORY: (As transcribed by Technologist)  Patient has been having medial and plantar foot/ankle pain after falling down 3-5 steps of stairs prior to ER arrival. Rates pain at a 6 out of 10 in resting position and at a 9 out of 10 when  trying to ambulate.        Impression   CONCLUSION:  Normal joint spaces.  No acute fracture.  There is a 5 mm enthesophyte at the origin of plantar fascia and a small 3 mm enthesophyte at the insertion of the left Achilles tendon.        LOCATION:  [facility]        Dictated by (CST): Brandon Barnett MD on 10/14/2023 at 1:44 PM      Finalized by (CST): Brandon Barnett MD on 10/14/2023 at 1:46 PM        Labs:  Lab Results   Component Value Date    WBC 7.6 05/31/2025    RBC 4.46 05/31/2025    HGB 14.2 05/31/2025    HCT 41.3 05/31/2025    .0 05/31/2025    MPV 9.6 10/04/2018    MCV 92.6 05/31/2025    MCH 31.8 05/31/2025    MCHC 34.4 05/31/2025    RDW 12.1 05/31/2025    NEPRELIM 5.06 05/31/2025    NEUTABS 6.90 (H) 10/20/2015    LYMPHABS 2.58 10/20/2015    EOSABS 0.10  10/20/2015    BASABS 0.00 10/20/2015    NEUT 64 10/20/2015    LYMPH 25 10/20/2015    MON 7 10/20/2015    EOS 1 10/20/2015    BASO 0 10/20/2015    NEPERCENT 67.0 05/31/2025    LYPERCENT 23.2 05/31/2025    MOPERCENT 7.1 05/31/2025    EOPERCENT 2.1 05/31/2025    BAPERCENT 0.5 05/31/2025    NE 5.06 05/31/2025    LYMABS 1.76 05/31/2025    MOABSO 0.54 05/31/2025    EOABSO 0.16 05/31/2025    BAABSO 0.04 05/31/2025     Lab Results   Component Value Date     (H) 05/31/2025    BUN 12 05/31/2025    BUNCREA 4.7 (L) 07/28/2021    CREATSERUM 0.83 05/31/2025    ANIONGAP 6 05/31/2025     11/08/2021    GFRNAA 108 05/09/2022    GFRAA 125 05/09/2022    CA 8.8 05/31/2025    OSMOCALC 286 05/31/2025    ALKPHO 71 05/31/2025    AST 13 05/31/2025    ALT 15 05/31/2025    BILT 0.3 05/31/2025    TP 6.5 05/31/2025    ALB 4.4 05/31/2025    GLOBULIN 2.1 05/31/2025     05/31/2025    K 3.7 05/31/2025     05/31/2025    CO2 29.0 05/31/2025       Additional Labs:  Component      Latest Ref Rng 5/30/2025   Anti-SSA Antibody, IGG      <7 U/mL 0.6    Anti-SSB Antibody, IGG      <7 U/mL <0.4    Anti-Smith Antibody, IGG      <7 U/mL <0.7    Anti-U1RNP Antibody, IGG      <5 U/mL 1.5    Anti-RNP70 Antibody, IGG      <7 U/mL 0.3    Anti-Centromere Antibody, IGG      <7 U/mL <0.4    Anti-SCL70 Antibody, IGG      <7 U/mL <0.6    Anti-Rebecca-1 Antibody, IGG      <7 U/mL <0.3    EVETTE Titer/Pattern      <80  80 !    Reviewed By: Lang Arredondo M.D.    C-REACTIVE PROTEIN      <=0.50 mg/dL <0.40    SED RATE      0 - 20 mm/Hr 12    RHEUMATOID FACTOR      <14.0 IU/mL <3.5    C-Citrullinated Peptide IgG AB      0.0 - 6.9 U/mL 0.7    URIC ACID      3.1 - 7.8 mg/dL 5.3    COMPLEMENT C3      90.0 - 170.0 mg/dL 131.6    COMPLEMENT C4      12.0 - 36.0 mg/dL 30.4    EVETTE SCREEN WITH REFLEX (S)      Negative  Positive !    Anti Double Strand DNA      <10  <10       01/2023  Vitamin D 66 normal  Folate 42.3  B12 1200 elevated  U1 RNP negative  RNP 70  negative  EVETTE screen negative  Myositis antibody panel negative  C3 110 normal  C4 26.6  ESR 9 normal  CRP normal  IgG subclasses grossly normal specifically IgG4 30 normal  IgA, IgM normal  IgG 675 borderline low    08/2022 records reviewed from [facility] in [place]  CCP negative  Extractable nuclear antigens negative (no specifications as to which EVETTE done)  SCL 70 negative ANCA negative  Alpha-1 antitrypsin serum 131 normal  EVETTE dilution 1 1: 320 speckled  VEETTE dilution 2 negative  HIV nonreactive  TSH 0.02 low  CBC grossly normal  Alpha 1 antitrypsin phenotype PI*MZ  Respiratory viral panel negative  HCV nonreactive  Vitamin D80.6  HBV surface antibody reactive  SSA negative  MPO negative  [place] allergy panel grossly negative    VQ scan  Impression:  Low probability for pulmonary embolus.    US abdominal RUQ  Impression:  Normal right upper quadrant ultrasound    CT chest without contrast high resolution/extended  Impression:  1.  Normal bronchial wall thickness.  No significant air trapping on an expiratory images.  No significant mucous plugging.  2.  Few small pulmonary nodules measuring up to 3 mm in size.  The patient is low risk for pulmonary malignancy no further specific follow-up recommendations.  3.  Few areas of mild air feeling the thoracic esophagus, which would raise the possibility of esophageal dysmotility and/or reflux.  4.  Postsurgical changes from prior Mingo-en-Y type gastric bypass surgery and prior cholecystectomy    CT sinuses  Impression:  Mild mucosal thickening in the right maxillary sinus, and minimal mucosal thickening in the left maxillary sinus.  Otherwise patent paranasal sinuses.        Annika Pizano DO  [facility]  06/18/2025

## 2025-06-19 ENCOUNTER — TELEPHONE (OUTPATIENT)
Dept: ORTHOPEDICS CLINIC | Facility: CLINIC | Age: 45
End: 2025-06-19

## 2025-06-25 ENCOUNTER — TELEPHONE (OUTPATIENT)
Dept: FAMILY MEDICINE CLINIC | Facility: CLINIC | Age: 45
End: 2025-06-25

## 2025-06-27 ENCOUNTER — OFFICE VISIT (OUTPATIENT)
Dept: ORTHOPEDICS CLINIC | Facility: CLINIC | Age: 45
End: 2025-06-27
Payer: COMMERCIAL

## 2025-06-27 VITALS — BODY MASS INDEX: 26.67 KG/M2 | HEIGHT: 68 IN | WEIGHT: 176 LBS

## 2025-06-27 DIAGNOSIS — M77.8 WRIST TENDONITIS: ICD-10-CM

## 2025-06-27 DIAGNOSIS — S69.81XA INJURY OF TRIANGULAR FIBROCARTILAGE COMPLEX (TFCC) OF RIGHT WRIST, INITIAL ENCOUNTER: ICD-10-CM

## 2025-06-27 DIAGNOSIS — Z01.818 PRE-OP TESTING: Primary | ICD-10-CM

## 2025-06-27 DIAGNOSIS — Z01.818 PRE-OP EXAMINATION: ICD-10-CM

## 2025-06-27 PROCEDURE — 99214 OFFICE O/P EST MOD 30 MIN: CPT | Performed by: ORTHOPAEDIC SURGERY

## 2025-06-27 PROCEDURE — 3008F BODY MASS INDEX DOCD: CPT | Performed by: ORTHOPAEDIC SURGERY

## 2025-06-28 ENCOUNTER — LAB ENCOUNTER (OUTPATIENT)
Dept: LAB | Age: 45
End: 2025-06-28
Attending: INTERNAL MEDICINE
Payer: COMMERCIAL

## 2025-06-28 ENCOUNTER — HOSPITAL ENCOUNTER (OUTPATIENT)
Dept: GENERAL RADIOLOGY | Age: 45
Discharge: HOME | End: 2025-06-28
Attending: INTERNAL MEDICINE
Payer: COMMERCIAL

## 2025-06-28 DIAGNOSIS — R53.82 CHRONIC FATIGUE: ICD-10-CM

## 2025-06-28 DIAGNOSIS — B99.9 RECURRENT INFECTIONS: ICD-10-CM

## 2025-06-28 DIAGNOSIS — R13.10 DYSPHAGIA, UNSPECIFIED TYPE: ICD-10-CM

## 2025-06-28 LAB
IGA SERPL-MCNC: 285.8 MG/DL (ref 40–350)
IGM SERPL-MCNC: 40.4 MG/DL (ref 50–300)
IMMUNOGLOBULIN PNL SER-MCNC: 734 MG/DL (ref 650–1600)

## 2025-06-28 PROCEDURE — 86162 COMPLEMENT TOTAL (CH50): CPT

## 2025-06-28 PROCEDURE — 36415 COLL VENOUS BLD VENIPUNCTURE: CPT

## 2025-06-28 PROCEDURE — 82784 ASSAY IGA/IGD/IGG/IGM EACH: CPT

## 2025-06-28 PROCEDURE — 72052 X-RAY EXAM NECK SPINE 6/>VWS: CPT | Performed by: INTERNAL MEDICINE

## 2025-06-30 DIAGNOSIS — G44.221 CHRONIC TENSION-TYPE HEADACHE, INTRACTABLE: ICD-10-CM

## 2025-06-30 NOTE — H&P
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 Previous non-surgical management, including bracing and injections, has been exhausted without significant improvement. MRI indicates injury to both the foveal and ulnar styloid attachments of the TFCC. Surgical repair is recommended due to the failure of conservative treatments and ongoing significant pain and functional impairment.  - Schedule surgical repair of the TFCC on July 8th.  - Perform wrist arthroscopy to assess and repair the TFCC and debride inflammatory and scar tissue during surgery.  - Administer a nerve block prior to surgery for pain control.  - Prescribe Norco for postoperative pain management.  - Coordinate postoperative therapy to begin 5-7 days after surgery.  - Instruct to wear a [place] brace for 6 weeks postoperatively.    Stiffness of right wrist  Stiffness in the right wrist, particularly in pronation and supination, secondary to TFCC tear and prolonged immobilization. Stiffness is a related but secondary issue to the pain from the TFCC tear.  - Begin postoperative therapy 5-7 days after surgery    Gastric bypass surgery  Gastric bypass surgery with associated risk of gastric ulcers. This history contraindicates the use of NSAIDs and requires careful management of medications that may affect gastric health.  - Avoid NSAIDs and steroid packs due to risk of gastric ulcers.  - Ensure preoperative clearance by primary care physician    Recording duration: 27 minutes      Diagnostic Studies:     MRI Right Wrist: Concern for TFC tear. ECU tenosynovitis     Bedside ultrasound: Showed symmetric stability of the ECU tendon in pronation/supination.  No gross evidence of fluid around the       Physical Exam:     Ht 5' 8\" (1.727 m)   Wt 176 lb (79.8 kg)   BMI 26.76 kg/m²     Constitutional: NAD. AOx3.  Well-developed and Well-nourished.   Psychiatric: Normal mood/ affect/ behavior. Judgment and thought content normal.     Right Upper Extremity:     Inspection    Skin intact. No skin lesions. No obvious mass visualized.    Palpation    Tenderness palpation on the ECU tendon and the ulnar fovea      ROM    Full composite fist.    Pronation 80  Supination 50  WE 50 WF 50     Neurovascular    Normal sensation in the median, ulnar, and radial nerve distribution. Normal motor function of muscles innervated by median/AIN, ulnar, and radial/PIN nerves.    Normally perfused hand(s).     Special    Neg ECU synergy test.No gross instability          CC: Right Wrist Pain    History of Present Illness  Debbi Prasad [facility] is a 44 year old female who presents with persistent wrist pain following a fall.    She fell off a counter on [place] Day while cleaning, landing on a chair with her buttocks. She is unsure of the exact mechanism of injury to her wrist but recalls wearing socks and slipping. Initially, she wore a brace and attended a [place] event before visiting the hospital later that night. X-rays showed no fractures, and a scaphoid injury was ruled out.    She attempted to contact [facility] but was unable to get through, eventually seeing a provider at [facility] who placed her in a regular hand brace. She received an injection that worsened her symptoms, causing pain to radiate to her elbow and shoulder, leading to an emergency room visit. An MRI was performed, revealing three tears in the wrist, but the scaphoid was intact.    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 Post-brace, she underwent occupational therapy for six weeks, but her wrist mobility worsened. A subsequent MRI and CT scan at [facility] confirmed ongoing issues with the TFCC.    She experiences significant pain with wrist rotation, particularly  in the ulnar region, and has limited pronation. She has undergone two injections, both of which exacerbated her symptoms. She reports functional limitations, stating 'I like don't even use this hand that often.'    Her past medical history includes gastric bypass surgery, which limits her use of NSAIDs and steroids due to gastric ulcer risk. She is currently not on acetaminophen with codeine.      History/Other:   Past Medical History[1]  Past Surgical History[2]  Current Medications[3]  Allergies[4]  Family History[5]  Social History     Occupational History    Not on file   Tobacco Use    Smoking status: Never     Passive exposure: Past    Smokeless tobacco: Never   Vaping Use    Vaping status: Never Used   Substance and Sexual Activity    Alcohol use: Not Currently    Drug use: No    Sexual activity: Yes     Partners: Male          Review of Systems (negative unless bolded):  General: fevers, chills, fatigue  CV:  chest pain, palpitations, leg swelling  Msk: bodyaches, neck pain, neck stiffness  Skin: rashes, open wounds, nonhealing ulcers  Hem: bleeds easily, bruise easily, immunocompromised  Neuro: dizziness, light headedness, headaches  Psych: anxious, depressed, anger issues      Wilian Bo MD   Hand, Wrist, & Elbow Surgery  [email or web address]  t: [phone]  f: [phone]    The following individual(s) verbally consented to be recorded using ambient AI listening technology and understand that they can each withdraw their consent to this listening technology at any point by asking the clinician to turn off or pause the recording:    [facility]  Additional names:           [1]   Past Medical History:   Abdominal discomfort in right lower quadrant    Abdominal hernia    Abdominal pain    Abnormal CT scan, esophagus    Acute deep vein thrombosis (DVT) of left lower extremity (HCC)    Acute left-sided low back pain without sciatica    Alpha-1-antitrypsin deficiency (HCC)     Anxiety    Arrhythmia    RIGHT BUNDLE BRACH BLOCK     Arthritis    Asherman syndrome    Asthma (HCC)    Back pain    I have Spinal Stenosis that has gotten worse over the years.    Bloating    Calculus of kidney    Cancer (HCC)    Thyroid    Change in hair    Chest pain    Chronic bronchiolitis (HCC)    Chronic cough    Constipation    COPD (chronic obstructive pulmonary disease) (HCC)    no Oxygen    COVID-19 virus infection    Depression    Depression, major, recurrent, moderate (HCC)    Diarrhea, unspecified    Disorder of thyroid    Diverticulosis    Diverticulosis of intestine    Easy bruising    Endometriosis    Esophageal reflux    Fatigue    Food intolerance    Frequent use of laxatives    Gastric ulcer    Headache disorder    Hemorrhoids    History of gastric bypass    Hoarseness, chronic    Hx of gastric bypass    Hx of motion sickness    Hydronephrosis    Hypokalemia    Hypothyroidism    Infertility, female    Intertrigo    Irregular bowel habits    KIDNEY STONE    Loss of appetite    Migraines    Nausea    Nephrolithiasis    Organic hypersomnia, unspecified    AHI 2 RDI 2 REM AHI 6 SaO2 curtis 89%    Osteoarthritis    Painful urination    Pancreatitis (HCC)    Pneumonia due to organism    PONV (postoperative nausea and vomiting)    Problems with swallowing    PUD (peptic ulcer disease)    Rash    Rib contusion, left, initial encounter    Severe persistent asthma with exacerbation (HCC)    Shortness of breath    Sleep disturbance    Stented coronary artery    Stress    Thyroid cancer (HCC)    Visual impairment    glasses    Vocal cord dysfunction    Wears glasses    Weight gain    Weight loss   [2]   Past Surgical History:  Procedure Laterality Date    Ankle scope,part debridement Left 06/19/2015    Procedure: ARTHROSCOPY ANKLE WITH DEBRIDEMENT;  Surgeon: Marcial Evans MD;  Location: [facility]    Bronch thermoplasty 1 lobe Right 04/15/2021    Bronch thermoplasty 1 lobe Left 05/13/2021       2006    Cholecystectomy      Colonoscopy  2021    Colonoscopy      Colonoscopy,diagnostic  10/22/2013    Procedure: COLONOSCOPY, POSSIBLE BIOPSY, POSSIBLE POLYPECTOMY 23743;  Surgeon: Marcello Ferreira MD;  Location: [facility]    Cta chest (rvm=36170)  2021    D & c      x4    Egd  2021    Gastric bypass,obese<100cm magy-en-y  2017    DR. SEGAL    Gastric bypass,obesity,sb reconstruc      Gastrocnemius recession Left 2015    Procedure: GASTROC RECESSION FOOT;  Surgeon: Marcial Evans MD;  Location: [facility]    Hysterectomy      Hysteroscopy      Inj paravert f jnt l/s 1 lev Bilateral 2015    Procedure: FACET INJECTION UNDER FLUOROSCOPY;  Surgeon: Dusty Munson DO;  Location: [facility]    Laparoscopic cholecystectomy N/A 2016    Procedure: LAPAROSCOPIC CHOLECYSTECTOMY;  Surgeon: Dai Sanchez MD;  Location:  MAIN OR    Laparoscopy,diagnostic      Laparoscopy,diagnostic  2022    Lysis of adhesions      M-sedaj by  phys perfrmg svc 5+ yr Bilateral 2015    Procedure: FACET INJECTION UNDER FLUOROSCOPY;  Surgeon: Dusty Munson DO;  Location: [facility]    [place]omon[place] biopsy stereo nodule 1 site right (cpt=19081)  2023    stromal fibrosis    Needle biopsy right  2023    Oophorectomy Left     Other  NECK SCAR REVISION    Other surgical history      laparoscopies x2    Removal of ovarian cyst(s) Right 2020    Stab phlebectomy, varicose veins, 1 extremity; <20 incisions      Stomach surgery procedure unlisted  2018    Surgical stent Bilateral 2015    Bilateral iliac stents    Thyroidectomy  2011    Total abdom hysterectomy      Upper gi endo no barretts  2015    normal    Upper gi endoscopy performed  2017    Rivera Donaldson M.D.    Upper gi endoscopy,biopsy N/A 2015    Procedure: ESOPHAGOGASTRODUODENOSCOPY, POSSIBLE BIOPSY, POSSIBLE POLYPECTOMY  36652;  Surgeon: Brayden Giordano MD;  Location: [facility] [place]   [3]   Current Outpatient Medications   Medication Sig Dispense Refill    ondansetron (ZOFRAN) 8 MG tablet TAKE 1 TABLET(8 MG) BY MOUTH EVERY 12 HOURS AS NEEDED FOR NAUSEA 30 tablet 1    Lisdexamfetamine Dimesylate (VYVANSE) 60 MG Oral Cap Take 1 capsule (60 mg total) by mouth daily. 30 capsule 0    ARIPiprazole 2 MG Oral Tab       pregabalin 75 MG Oral Cap Take 1 capsule (75 mg total) by mouth 3 (three) times daily. 90 capsule 0    ALPRAZolam 0.25 MG Oral Tab Take 0.5-1 tablets (0.125-0.25 mg total) by mouth 2 (two) times daily as needed for Anxiety or Sleep. 60 tablet 0    Butalbital-Acetaminophen  MG Oral Tab Take 1 tablet by mouth 2 (two) times daily as needed (headache). TAKE 1/2 TO 1 TABLET BY MOUTH DAILY AS NEEDED FOR TENSION HEADACHE 15 tablet 0    TRAZODONE 50 MG Oral Tab TAKE 1 TO 2 TABLETS(50  MG) BY MOUTH EVERY NIGHT 60 tablet 2    levothyroxine (UNITHROID) 150 MCG Oral Tab Take 1 tablet (150 mcg total) by mouth before breakfast. 90 tablet 0    TIZANIDINE 4 MG Oral Tab TAKE 1 TABLET(4 MG) BY MOUTH EVERY NIGHT AS NEEDED FOR PAIN OR SPASM. DO NOT TAKE WITH VALTREX CAN. START AFTER FINISHING FLEXERIL 30 tablet 2    valACYclovir 1 G Oral Tab Take 2 tablets (2,000 mg total) by mouth Q12H. 30 tablet 0    acidophilus-pectin Oral Cap Take 1 capsule by mouth in the morning.      oxybutynin ER 10 MG Oral Tablet 24 Hr TAKE 1 TABLET(10 MG) BY MOUTH DAILY 90 tablet 3    ipratropium-albuterol 0.5-2.5 (3) MG/3ML Inhalation Solution Take 3 mL by nebulization every 4 (four) hours as needed. Use only if the albuterol inhalation albuterol is not working 25 each 1    Multiple Vitamins-Minerals (BARIATRIC MULTIVITAMINS/IRON) Oral Cap Take by mouth As Directed.      pantoprazole 40 MG Oral Tab EC Take 1 tablet (40 mg total) by mouth every morning before breakfast.      Nystatin 493244 UNIT/GM External Powder Apply 1 Application.  topically 4 (four) times daily. Apply to affected areas 30 g 1    ALBUTEROL 108 (90 Base) MCG/ACT Inhalation Aero Soln INHALE 2 PUFFS INTO THE LUNGS EVERY 4 HOURS AS NEEDED FOR WHEEZING OR SHORTNESS OF BREATH 8.5 g 1    loratadine 10 MG Oral Tab Take 1 tablet (10 mg total) by mouth in the morning.  0    alpha 1-proteinase inhibitor 1000 MG/20ML Intravenous Solution Inject into the vein once. weekly      SPIRIVA RESPIMAT 2.5 MCG/ACT Inhalation Aero Soln INHALE 2 PUFFS INTO THE LUNGS DAILY 12 g 2    Calcium Carb-Cholecalciferol (CALCIUM 600/VITAMIN D3) 600-800 MG-UNIT Oral Tab Take 1 tablet by mouth in the morning and 1 tablet in the evening and 1 tablet before bedtime.      SYMBICORT 160-4.5 MCG/ACT Inhalation Aerosol INHALE 2 PUFFS INTO THE LUNGS TWICE DAILY 3 Inhaler 3    Cholecalciferol (VITAMIN D) 50 MCG (2000 UT) Oral Cap Take 1 capsule (2,000 Units total) by mouth in the morning and 1 capsule (2,000 Units total) before bedtime.      magnesium 250 MG Oral Tab Take 1 tablet (250 mg total) by mouth in the morning.      aspirin 81 MG Oral Tab Take 1 tablet (81 mg total) by mouth in the morning.      acetaminophen-codeine 300-30 MG Oral Tab Take 0.5-1 tablets by mouth 2 (two) times daily as needed for Pain (lumbar radiculopathy). Dont take with tizanidine or xanax 20 tablet 0   [4]   Allergies  Allergen Reactions    Adhesive Tape HIVES     Patient got welts when she use the adhesive tape for 48-hour Holter monitor.    Cephalosporins HIVES    Chocolate HIVES     Dark chocolate    Doxycycline HIVES     Hives and fever       Tramadol HIVES and RASH    Haloperidol ANXIETY    Metoclopramide ANXIETY     Feels like she is going to jump out of her skin    Toradol [Ketorolac Tromethamine] RASH    Norflex Tablets [Orphenadrine] OTHER (SEE COMMENTS)     Dilated [pupils ? / pt is not sure of reaction    Cheratussin Ac [Guaifenesin-Codeine] NAUSEA ONLY     Only in liquid form; tolerates gel caps    Nsaids OTHER (SEE COMMENTS)        Other reaction(s): GI UPSET  Hx of gastric bypass   [5]   Family History  Problem Relation Age of Onset    Heart Disorder Father     Heart Disease Father     Heart Attack Father     Other (Other) Mother         ALLIE    Breast Cancer Maternal Grandmother 75    Cancer Maternal Grandfather         blood cancer    Diabetes Paternal Grandmother     Heart Disorder Paternal Grandmother     Heart Disease Paternal Grandmother     Asthma Paternal Grandmother     Heart Attack Paternal Grandmother     Stroke Neg

## 2025-06-30 NOTE — H&P (VIEW-ONLY)
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 Previous non-surgical management, including bracing and injections, has been exhausted without significant improvement. MRI indicates injury to both the foveal and ulnar styloid attachments of the TFCC. Surgical repair is recommended due to the failure of conservative treatments and ongoing significant pain and functional impairment.  - Schedule surgical repair of the TFCC on July 8th.  - Perform wrist arthroscopy to assess and repair the TFCC and debride inflammatory and scar tissue during surgery.  - Administer a nerve block prior to surgery for pain control.  - Prescribe Norco for postoperative pain management.  - Coordinate postoperative therapy to begin 5-7 days after surgery.  - Instruct to wear a [place] brace for 6 weeks postoperatively.    Stiffness of right wrist  Stiffness in the right wrist, particularly in pronation and supination, secondary to TFCC tear and prolonged immobilization. Stiffness is a related but secondary issue to the pain from the TFCC tear.  - Begin postoperative therapy 5-7 days after surgery    Gastric bypass surgery  Gastric bypass surgery with associated risk of gastric ulcers. This history contraindicates the use of NSAIDs and requires careful management of medications that may affect gastric health.  - Avoid NSAIDs and steroid packs due to risk of gastric ulcers.  - Ensure preoperative clearance by primary care physician    Recording duration: 27 minutes      Diagnostic Studies:     MRI Right Wrist: Concern for TFC tear. ECU tenosynovitis     Bedside ultrasound: Showed symmetric stability of the ECU tendon in pronation/supination.  No gross evidence of fluid around the       Physical Exam:     Ht 5' 8\" (1.727 m)   Wt 176 lb (79.8 kg)   BMI 26.76 kg/m²     Constitutional: NAD. AOx3.  Well-developed and Well-nourished.   Psychiatric: Normal mood/ affect/ behavior. Judgment and thought content normal.     Right Upper Extremity:     Inspection    Skin intact. No skin lesions. No obvious mass visualized.    Palpation    Tenderness palpation on the ECU tendon and the ulnar fovea      ROM    Full composite fist.    Pronation 80  Supination 50  WE 50 WF 50     Neurovascular    Normal sensation in the median, ulnar, and radial nerve distribution. Normal motor function of muscles innervated by median/AIN, ulnar, and radial/PIN nerves.    Normally perfused hand(s).     Special    Neg ECU synergy test.No gross instability          CC: Right Wrist Pain    History of Present Illness  Debbi Prasad [facility] is a 44 year old female who presents with persistent wrist pain following a fall.    She fell off a counter on Erick Day while cleaning, landing on a chair with her buttocks. She is unsure of the exact mechanism of injury to her wrist but recalls wearing socks and slipping. Initially, she wore a brace and attended a [place] event before visiting the hospital later that night. X-rays showed no fractures, and a scaphoid injury was ruled out.    She attempted to contact [facility] but was unable to get through, eventually seeing a provider at [facility] who placed her in a regular hand brace. She received an injection that worsened her symptoms, causing pain to radiate to her elbow and shoulder, leading to an emergency room visit. An MRI was performed, revealing three tears in the wrist, but the scaphoid was intact.    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 Post-brace, she underwent occupational therapy for six weeks, but her wrist mobility worsened. A subsequent MRI and CT scan at [facility] confirmed ongoing issues with the TFCC.    She experiences significant pain with wrist rotation, particularly  in the ulnar region, and has limited pronation. She has undergone two injections, both of which exacerbated her symptoms. She reports functional limitations, stating 'I like don't even use this hand that often.'    Her past medical history includes gastric bypass surgery, which limits her use of NSAIDs and steroids due to gastric ulcer risk. She is currently not on acetaminophen with codeine.      History/Other:   Past Medical History[1]  Past Surgical History[2]  Current Medications[3]  Allergies[4]  Family History[5]  Social History     Occupational History    Not on file   Tobacco Use    Smoking status: Never     Passive exposure: Past    Smokeless tobacco: Never   Vaping Use    Vaping status: Never Used   Substance and Sexual Activity    Alcohol use: Not Currently    Drug use: No    Sexual activity: Yes     Partners: Male          Review of Systems (negative unless bolded):  General: fevers, chills, fatigue  CV:  chest pain, palpitations, leg swelling  Msk: bodyaches, neck pain, neck stiffness  Skin: rashes, open wounds, nonhealing ulcers  Hem: bleeds easily, bruise easily, immunocompromised  Neuro: dizziness, light headedness, headaches  Psych: anxious, depressed, anger issues      Wilian Bo MD   Hand, Wrist, & Elbow Surgery  [email or web address]  t: [phone]  f: [phone]    The following individual(s) verbally consented to be recorded using ambient AI listening technology and understand that they can each withdraw their consent to this listening technology at any point by asking the clinician to turn off or pause the recording:    [facility]  Additional names:           [1]   Past Medical History:   Abdominal discomfort in right lower quadrant    Abdominal hernia    Abdominal pain    Abnormal CT scan, esophagus    Acute deep vein thrombosis (DVT) of left lower extremity (HCC)    Acute left-sided low back pain without sciatica    Alpha-1-antitrypsin deficiency (HCC)     Anxiety    Arrhythmia    RIGHT BUNDLE BRACH BLOCK     Arthritis    Asherman syndrome    Asthma (HCC)    Back pain    I have Spinal Stenosis that has gotten worse over the years.    Bloating    Calculus of kidney    Cancer (HCC)    Thyroid    Change in hair    Chest pain    Chronic bronchiolitis (HCC)    Chronic cough    Constipation    COPD (chronic obstructive pulmonary disease) (HCC)    no Oxygen    COVID-19 virus infection    Depression    Depression, major, recurrent, moderate (HCC)    Diarrhea, unspecified    Disorder of thyroid    Diverticulosis    Diverticulosis of intestine    Easy bruising    Endometriosis    Esophageal reflux    Fatigue    Food intolerance    Frequent use of laxatives    Gastric ulcer    Headache disorder    Hemorrhoids    History of gastric bypass    Hoarseness, chronic    Hx of gastric bypass    Hx of motion sickness    Hydronephrosis    Hypokalemia    Hypothyroidism    Infertility, female    Intertrigo    Irregular bowel habits    KIDNEY STONE    Loss of appetite    Migraines    Nausea    Nephrolithiasis    Organic hypersomnia, unspecified    AHI 2 RDI 2 REM AHI 6 SaO2 curtis 89%    Osteoarthritis    Painful urination    Pancreatitis (HCC)    Pneumonia due to organism    PONV (postoperative nausea and vomiting)    Problems with swallowing    PUD (peptic ulcer disease)    Rash    Rib contusion, left, initial encounter    Severe persistent asthma with exacerbation (HCC)    Shortness of breath    Sleep disturbance    Stented coronary artery    Stress    Thyroid cancer (HCC)    Visual impairment    glasses    Vocal cord dysfunction    Wears glasses    Weight gain    Weight loss   [2]   Past Surgical History:  Procedure Laterality Date    Ankle scope,part debridement Left 06/19/2015    Procedure: ARTHROSCOPY ANKLE WITH DEBRIDEMENT;  Surgeon: Marcial Evans MD;  Location: [facility]    Bronch thermoplasty 1 lobe Right 04/15/2021    Bronch thermoplasty 1 lobe Left 05/13/2021       2006    Cholecystectomy      Colonoscopy  2021    Colonoscopy      Colonoscopy,diagnostic  10/22/2013    Procedure: COLONOSCOPY, POSSIBLE BIOPSY, POSSIBLE POLYPECTOMY 47336;  Surgeon: Marcello Ferreira MD;  Location: [facility]    Cta chest (sgb=59204)  2021    D & c      x4    Egd  2021    Gastric bypass,obese<100cm magy-en-y  2017    DR. SEGAL    Gastric bypass,obesity,sb reconstruc      Gastrocnemius recession Left 2015    Procedure: GASTROC RECESSION FOOT;  Surgeon: Marcial Evans MD;  Location: [facility]    Hysterectomy      Hysteroscopy      Inj paravert f jnt l/s 1 lev Bilateral 2015    Procedure: FACET INJECTION UNDER FLUOROSCOPY;  Surgeon: Dusty Munson DO;  Location: [facility]    Laparoscopic cholecystectomy N/A 2016    Procedure: LAPAROSCOPIC CHOLECYSTECTOMY;  Surgeon: Dai Sanchez MD;  Location:  MAIN OR    Laparoscopy,diagnostic      Laparoscopy,diagnostic  2022    Lysis of adhesions      M-sedaj by  phys perfrmg svc 5+ yr Bilateral 2015    Procedure: FACET INJECTION UNDER FLUOROSCOPY;  Surgeon: Dusty Munson DO;  Location: [facility]    [place]omon[place] biopsy stereo nodule 1 site right (cpt=19081)  2023    stromal fibrosis    Needle biopsy right  2023    Oophorectomy Left     Other  NECK SCAR REVISION    Other surgical history      laparoscopies x2    Removal of ovarian cyst(s) Right 2020    Stab phlebectomy, varicose veins, 1 extremity; <20 incisions      Stomach surgery procedure unlisted  2018    Surgical stent Bilateral 2015    Bilateral iliac stents    Thyroidectomy  2011    Total abdom hysterectomy      Upper gi endo no barretts  2015    normal    Upper gi endoscopy performed  2017    Rivera Donaldson M.D.    Upper gi endoscopy,biopsy N/A 2015    Procedure: ESOPHAGOGASTRODUODENOSCOPY, POSSIBLE BIOPSY, POSSIBLE POLYPECTOMY  98646;  Surgeon: Brayden Giordano MD;  [facility] [place]   [3]   Current Outpatient Medications   Medication Sig Dispense Refill    ondansetron (ZOFRAN) 8 MG tablet TAKE 1 TABLET(8 MG) BY MOUTH EVERY 12 HOURS AS NEEDED FOR NAUSEA 30 tablet 1    Lisdexamfetamine Dimesylate (VYVANSE) 60 MG Oral Cap Take 1 capsule (60 mg total) by mouth daily. 30 capsule 0    ARIPiprazole 2 MG Oral Tab       pregabalin 75 MG Oral Cap Take 1 capsule (75 mg total) by mouth 3 (three) times daily. 90 capsule 0    ALPRAZolam 0.25 MG Oral Tab Take 0.5-1 tablets (0.125-0.25 mg total) by mouth 2 (two) times daily as needed for Anxiety or Sleep. 60 tablet 0    Butalbital-Acetaminophen  MG Oral Tab Take 1 tablet by mouth 2 (two) times daily as needed (headache). TAKE 1/2 TO 1 TABLET BY MOUTH DAILY AS NEEDED FOR TENSION HEADACHE 15 tablet 0    TRAZODONE 50 MG Oral Tab TAKE 1 TO 2 TABLETS(50  MG) BY MOUTH EVERY NIGHT 60 tablet 2    levothyroxine (UNITHROID) 150 MCG Oral Tab Take 1 tablet (150 mcg total) by mouth before breakfast. 90 tablet 0    TIZANIDINE 4 MG Oral Tab TAKE 1 TABLET(4 MG) BY MOUTH EVERY NIGHT AS NEEDED FOR PAIN OR SPASM. DO NOT TAKE WITH VALTREX CAN. START AFTER FINISHING FLEXERIL 30 tablet 2    valACYclovir 1 G Oral Tab Take 2 tablets (2,000 mg total) by mouth Q12H. 30 tablet 0    acidophilus-pectin Oral Cap Take 1 capsule by mouth in the morning.      oxybutynin ER 10 MG Oral Tablet 24 Hr TAKE 1 TABLET(10 MG) BY MOUTH DAILY 90 tablet 3    ipratropium-albuterol 0.5-2.5 (3) MG/3ML Inhalation Solution Take 3 mL by nebulization every 4 (four) hours as needed. Use only if the albuterol inhalation albuterol is not working 25 each 1    Multiple Vitamins-Minerals (BARIATRIC MULTIVITAMINS/IRON) Oral Cap Take by mouth As Directed.      pantoprazole 40 MG Oral Tab EC Take 1 tablet (40 mg total) by mouth every morning before breakfast.      Nystatin 924441 UNIT/GM External Powder Apply 1 Application.  topically 4 (four) times daily. Apply to affected areas 30 g 1    ALBUTEROL 108 (90 Base) MCG/ACT Inhalation Aero Soln INHALE 2 PUFFS INTO THE LUNGS EVERY 4 HOURS AS NEEDED FOR WHEEZING OR SHORTNESS OF BREATH 8.5 g 1    loratadine 10 MG Oral Tab Take 1 tablet (10 mg total) by mouth in the morning.  0    alpha 1-proteinase inhibitor 1000 MG/20ML Intravenous Solution Inject into the vein once. weekly      SPIRIVA RESPIMAT 2.5 MCG/ACT Inhalation Aero Soln INHALE 2 PUFFS INTO THE LUNGS DAILY 12 g 2    Calcium Carb-Cholecalciferol (CALCIUM 600/VITAMIN D3) 600-800 MG-UNIT Oral Tab Take 1 tablet by mouth in the morning and 1 tablet in the evening and 1 tablet before bedtime.      SYMBICORT 160-4.5 MCG/ACT Inhalation Aerosol INHALE 2 PUFFS INTO THE LUNGS TWICE DAILY 3 Inhaler 3    Cholecalciferol (VITAMIN D) 50 MCG (2000 UT) Oral Cap Take 1 capsule (2,000 Units total) by mouth in the morning and 1 capsule (2,000 Units total) before bedtime.      magnesium 250 MG Oral Tab Take 1 tablet (250 mg total) by mouth in the morning.      aspirin 81 MG Oral Tab Take 1 tablet (81 mg total) by mouth in the morning.      acetaminophen-codeine 300-30 MG Oral Tab Take 0.5-1 tablets by mouth 2 (two) times daily as needed for Pain (lumbar radiculopathy). Dont take with tizanidine or xanax 20 tablet 0   [4]   Allergies  Allergen Reactions    Adhesive Tape HIVES     Patient got welts when she use the adhesive tape for 48-hour Holter monitor.    Cephalosporins HIVES    Chocolate HIVES     Dark chocolate    Doxycycline HIVES     Hives and fever       Tramadol HIVES and RASH    Haloperidol ANXIETY    Metoclopramide ANXIETY     Feels like she is going to jump out of her skin    Toradol [Ketorolac Tromethamine] RASH    Norflex Tablets [Orphenadrine] OTHER (SEE COMMENTS)     Dilated [pupils ? / pt is not sure of reaction    Cheratussin Ac [Guaifenesin-Codeine] NAUSEA ONLY     Only in liquid form; tolerates gel caps    Nsaids OTHER (SEE COMMENTS)        Other reaction(s): GI UPSET  Hx of gastric bypass   [5]   Family History  Problem Relation Age of Onset    Heart Disorder Father     Heart Disease Father     Heart Attack Father     Other (Other) Mother         ALLIE    Breast Cancer Maternal Grandmother 75    Cancer Maternal Grandfather         blood cancer    Diabetes Paternal Grandmother     Heart Disorder Paternal Grandmother     Heart Disease Paternal Grandmother     Asthma Paternal Grandmother     Heart Attack Paternal Grandmother     Stroke Neg

## 2025-06-30 NOTE — TELEPHONE ENCOUNTER
Requested Prescriptions     Pending Prescriptions Disp Refills    BUTALBITAL-ACETAMINOPHEN  MG Oral Tab [Pharmacy Med Name: BUT/ACETAMINOPHEN 50-325MG TABS] 15 tablet 0     Sig: TAKE 1 TABLET BY MOUTH TWICE DAILY AS NEEDED FOR HEADACHE. TAKE 1/2 TO 1 TABLET BY MOUTH DAILY AS NEEDED FOR TENSION HEADACHE       Last Refill: 6/4    Last OV: 6/2    Next OV: 7/2

## 2025-06-30 NOTE — PROGRESS NOTES
SURGICAL BOOKING SHEET   [facility]  [id number]   : 1980    Surgical Date:   25   Surgical Consent:   Right wrist arthroscopy and debridement, possible TFC repair, possible ECU tendon tenosynovectomy   Diagnosis:      ICD-10-CM   1. Pre-op testing  Z01.818   2. Pre-op examination  Z01.818   3. Injury of triangular fibrocartilage complex (TFCC) of right wrist, initial encounter  S69.81XA   4. Wrist tendonitis  M77.8       Workers Comp: No   Procedure Codes:   Wrist arthroscopy with debridement/repair of triangular fibrocartilage (CPT 83404)  Arthrotomy of distal radial ulnar joint for repair of triangular fibrocartilage complex (CPT 31613)  Synovectomy of extensor tendon sheath of wrist (CPT 70540)   Disposition:   Outpatient   Operative Time:   2 hrs   Antibiotics:   Ancef 2g   Anesthesia Type:   Monitored Anesthesia Care (MAC) + Regional - Supraclavicular   Clearance:    MEDICAL CLEARANCE   Equipment:   WAS + Subsheath Repair +/- TFC Repair: Arthrex TFC Foveal Repair System, Ez Biomet JuggerKnot Suture Anna 1.0 mm with 3-0 suture, Linvatec Arthroscopy [place], 2.3 mm arthroscope, 2.5 mm small joint full-radius arthroscopic shaver, [employer] small joint RF wand (standby), small cannulated drill bits (standby), Kwires, [place] Blade, Mini- C-arm, 0 Vircryl, 3-0 moncryl, 4-0 moncryl, Exofin, 2 inch/3 inch ace wrap, Orthoglass 5x30    Assistant:   Anupam Assistant: Yes, Lynette Al    Follow Up:   7-14 days post op with Lynette   Pain Medication:   Norco 325-5mg   Therapy:   [facility]

## 2025-07-01 ENCOUNTER — TELEPHONE (OUTPATIENT)
Dept: FAMILY MEDICINE CLINIC | Facility: CLINIC | Age: 45
End: 2025-07-01

## 2025-07-01 LAB — CH50 COMPLEMENT: 49 U/ML

## 2025-07-01 RX ORDER — BUTALBITAL AND ACETAMINOPHEN 325; 50 MG/1; MG/1
TABLET ORAL
Qty: 13 TABLET | Refills: 0 | Status: SHIPPED | OUTPATIENT
Start: 2025-07-01

## 2025-07-01 NOTE — TELEPHONE ENCOUNTER
Pre Surgical Appointment      DOS: 7/8/2025  Location:  MAIN   Surgeon: Dr. Bo   Procedure:Right wrist arthroscopy and debridement, possible triangular fibrocartilage complex repair, possible extensor tendon sheath tendon tenosynovectomy   Pre-op Appointment Date: 7/2/2025    Orders received, check list started and placed in MA's basket.

## 2025-07-02 ENCOUNTER — OFFICE VISIT (OUTPATIENT)
Dept: FAMILY MEDICINE CLINIC | Facility: CLINIC | Age: 45
End: 2025-07-02
Payer: COMMERCIAL

## 2025-07-02 VITALS
RESPIRATION RATE: 16 BRPM | HEART RATE: 120 BPM | DIASTOLIC BLOOD PRESSURE: 76 MMHG | HEIGHT: 68 IN | BODY MASS INDEX: 26.43 KG/M2 | TEMPERATURE: 98 F | WEIGHT: 174.38 LBS | SYSTOLIC BLOOD PRESSURE: 118 MMHG | OXYGEN SATURATION: 98 %

## 2025-07-02 DIAGNOSIS — Z01.818 PREOP EXAMINATION: Primary | ICD-10-CM

## 2025-07-02 DIAGNOSIS — F41.1 GAD (GENERALIZED ANXIETY DISORDER): ICD-10-CM

## 2025-07-02 DIAGNOSIS — E89.0 POSTOPERATIVE HYPOTHYROIDISM: ICD-10-CM

## 2025-07-02 DIAGNOSIS — M79.7 FIBROMYALGIA: ICD-10-CM

## 2025-07-02 DIAGNOSIS — F13.20 SEDATIVE, HYPNOTIC OR ANXIOLYTIC DEPENDENCE, UNCOMPLICATED (HCC): ICD-10-CM

## 2025-07-02 DIAGNOSIS — F32.5 MAJOR DEPRESSION IN REMISSION: ICD-10-CM

## 2025-07-02 DIAGNOSIS — Z01.89 ENCOUNTER FOR PAIN MANAGEMENT PLANNING: ICD-10-CM

## 2025-07-02 DIAGNOSIS — I87.1 MAY-THURNER SYNDROME: ICD-10-CM

## 2025-07-02 DIAGNOSIS — E88.01 ALPHA-1-ANTITRYPSIN DEFICIENCY (HCC): ICD-10-CM

## 2025-07-02 DIAGNOSIS — S69.81XA INJURY OF TRIANGULAR FIBROCARTILAGE COMPLEX (TFCC) OF RIGHT WRIST, INITIAL ENCOUNTER: ICD-10-CM

## 2025-07-02 DIAGNOSIS — Z87.898 HISTORY OF ULCER DISEASE: ICD-10-CM

## 2025-07-02 DIAGNOSIS — Z98.84 HISTORY OF ROUX-EN-Y GASTRIC BYPASS: ICD-10-CM

## 2025-07-02 DIAGNOSIS — J45.40 MODERATE PERSISTENT ASTHMA WITHOUT COMPLICATION (HCC): ICD-10-CM

## 2025-07-02 LAB
ANION GAP SERPL CALC-SCNC: 9 MMOL/L (ref 0–18)
BUN BLD-MCNC: 10 MG/DL (ref 9–23)
CALCIUM BLD-MCNC: 8.9 MG/DL (ref 8.7–10.6)
CHLORIDE SERPL-SCNC: 104 MMOL/L (ref 98–112)
CO2 SERPL-SCNC: 30 MMOL/L (ref 21–32)
CREAT BLD-MCNC: 0.89 MG/DL (ref 0.55–1.02)
EGFRCR SERPLBLD CKD-EPI 2021: 82 ML/MIN/1.73M2 (ref 60–?)
ERYTHROCYTE [DISTWIDTH] IN BLOOD BY AUTOMATED COUNT: 12.2 %
FASTING STATUS PATIENT QL REPORTED: NO
GLUCOSE BLD-MCNC: 111 MG/DL (ref 70–99)
HCT VFR BLD AUTO: 41.4 % (ref 35–48)
HGB BLD-MCNC: 13.9 G/DL (ref 12–16)
MCH RBC QN AUTO: 31.3 PG (ref 26–34)
MCHC RBC AUTO-ENTMCNC: 33.6 G/DL (ref 31–37)
MCV RBC AUTO: 93.2 FL (ref 80–100)
OSMOLALITY SERPL CALC.SUM OF ELEC: 296 MOSM/KG (ref 275–295)
PLATELET # BLD AUTO: 224 10(3)UL (ref 150–450)
POTASSIUM SERPL-SCNC: 4 MMOL/L (ref 3.5–5.1)
RBC # BLD AUTO: 4.44 X10(6)UL (ref 3.8–5.3)
SODIUM SERPL-SCNC: 143 MMOL/L (ref 136–145)
WBC # BLD AUTO: 5.1 X10(3) UL (ref 4–11)

## 2025-07-02 PROCEDURE — 80048 BASIC METABOLIC PNL TOTAL CA: CPT | Performed by: ORTHOPAEDIC SURGERY

## 2025-07-02 PROCEDURE — 84439 ASSAY OF FREE THYROXINE: CPT | Performed by: ORTHOPAEDIC SURGERY

## 2025-07-02 PROCEDURE — 84443 ASSAY THYROID STIM HORMONE: CPT | Performed by: ORTHOPAEDIC SURGERY

## 2025-07-02 PROCEDURE — 85027 COMPLETE CBC AUTOMATED: CPT | Performed by: ORTHOPAEDIC SURGERY

## 2025-07-02 RX ORDER — HYDROCODONE BITARTRATE AND ACETAMINOPHEN 5; 325 MG/1; MG/1
TABLET ORAL
Qty: 30 TABLET | Refills: 0 | Status: SHIPPED | OUTPATIENT
Start: 2025-07-02 | End: 2025-07-10

## 2025-07-02 NOTE — PROGRESS NOTES
Debbi Prasad [facility] is a 44 year old female who presents for a pre-operative risk assessment.    Patient is to have right wrist arthroscopy and debridement, possible triangular fibrocartilage complex repair, possible extensor tendon sheath tendon tenosynovectomy , to be done by Dr. Wilian Bo MD at [facility]  on 7/8/25.      Pain management to be done by primary care provider per patient's request.    Prior anesthesia: no issues with general anesthesia in the past     PMH negative for:   angina, arrhythmia,, CAD, CHF, liver disease, kidney disease, no coagulation delay, no primary hypercoagulability, no PE or DVT history, no jaw problems, no seizure disorder.    PMH positive for: : Anxiety, depression, alpha-1 antitrypsin deficiency, hyperlipidemia, May Thurner syndrome present bilateral iliac stents no history of clots, Asthma stable, hypothyroidism, chronic tension headaches, GERD, fibromyalgia        SH:  Smoking history never;   Alcohol use none;   Caffeine 32 coke regular ounces per day   Exercise walk,  family support      ALLIE risk:   BMI 26  Snoring none no witnessed sleep apnea does get fatigue if insomnia is present     No family history of:   adverse reaction to anesthesia, no aneurysm, no bleeding problems, no clotting disorder, no sudden cardiac death, no stroke       Family history positive for:  Dad AMI stents     Allergies and Medications reviewed and as listed below     Preoperative testing with BMP and CBC normal  EKG mild sinus tachycardia heart rate 101, incomplete right bundle branch block stable from prior EKGs    Results for orders placed or performed in visit on 07/02/25   Basic Metabolic Panel (8)    Collection Time: 07/02/25  9:22 AM   Result Value Ref Range    Glucose 111 (H) 70 - 99 mg/dL    Sodium 143 136 - 145 mmol/L    Potassium 4.0 3.5 - 5.1 mmol/L    Chloride 104 98 - 112 mmol/L    CO2 30.0 21.0 - 32.0 mmol/L    Anion Gap 9 0 - 18 mmol/L    BUN 10 9 - 23 mg/dL    Creatinine  0.89 0.55 - 1.02 mg/dL    Calcium, Total 8.9 8.7 - 10.6 mg/dL    Calculated Osmolality 296 (H) 275 - 295 mOsm/kg    eGFR-Cr 82 >=60 mL/min/1.73m2    Patient Fasting for BMP? No    CBC, NO DIFFERENTIAL/PLATELET    Collection Time: 07/02/25  9:22 AM   Result Value Ref Range    WBC 5.1 4.0 - 11.0 x10(3) uL    RBC 4.44 3.80 - 5.30 x10(6)uL    HGB 13.9 12.0 - 16.0 g/dL    HCT 41.4 35.0 - 48.0 %    .0 150.0 - 450.0 10(3)uL    MCV 93.2 80.0 - 100.0 fL    MCH 31.3 26.0 - 34.0 pg    MCHC 33.6 31.0 - 37.0 g/dL    RDW 12.2 %     *Note: Due to a large number of results and/or encounters for the requested time period, some results have not been displayed. A complete set of results can be found in Results Review.       HPI:   History of present illness   right hand injury 12/24/24 fell off a countertop and has a tear of the triangular fibrocartilage complex of the right wrist failed conservative treatment with  bracing, injections and physical therapy proceeding with surgical intervention.      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   Exercise: minimal, walking.  Diet: watches minimally     REVIEW OF SYSTEMS:   GENERAL: feels well otherwise  SKIN: denies any unusual skin lesions or rashes  EYES:denies blurred vision, recent change in vision or double vision  HEENT: denies nasal congestion, ear pain, sinus pain or ST  LUNGS: denies shortness of breath with exertion, cough, wheezing, chest tightness or general shortness of breath  CARDIOVASCULAR: denies chest pain on exertion, palpitations, lightheadedness/fainting, swelling  GI: stable chronic low grade LUQ  abdominal pain for years, denies GERD symptoms stable on RX, no nausea, vomiting or diarrhea history of gastric bypass history peptic ulcer disease avoids nonsteroidals  : denies dysuria,  vaginal  discharge or itching,   ALBINO no vaginal bleeding  MUSCULOSKELETAL: going to chiropractor for back pain   No  neck pain no prior neck or back surgeries or injuries   NEURO: tension neck  headaches.  No numbness, tingling or weakness  PSYCHE: Chronic depression presently in remission and chronic daily anxiety   HEMATOLOGIC: denies hx of anemia, bleeding problems or clotting disorders  ENDOCRINE: Hypothyroid/thyroid cancer history.  No diabetes  ALL/ASTHMA: history of seasonal allergies alpha-1 antitrypsin deficiency needs and  asthma    EXAM:   /76 (BP Location: Left arm, Patient Position: Sitting, Cuff Size: adult)   Pulse 120   Temp 98 °F (36.7 °C) (Temporal)   Resp 16   Ht 5' 8\" (1.727 m)   Wt 174 lb 6.4 oz (79.1 kg)   SpO2 98%   BMI 26.52 kg/m²   GENERAL: well developed, well nourished,in no apparent distress  SKIN: no rashes,no suspicious lesions  HEENT: atraumatic, normocephalic, TM clear bilaterally, throat: no erythema, exudates or swelling.  Nose: no congestion turbinates normal   EYES:PERRLA, EOMI, conjunctiva are clear without signs of infection  NECK: supple,no adenopathy, thyroid normal to palpation no carotid bruits   CHEST: no chest tenderness with palpation  BREAST: no dominant or suspicious mass  LUNGS: clear to auscultation no rales, rhonchi or wheezes  CARDIO: RRR without murmur  GI: Normal bowel sounds, no masses, HSM or tenderness  : deferred   RECTAL: Deferred  MUSCULOSKELETAL: Range of motion back and neck are normal ; no weakness to the upper or lower extremities   EXTREMITIES: no cyanosis, clubbing or edema normal distal pulses  NEURO: Oriented times three,cranial nerves are intact,motor and sensory are grossly intact  Psych patient's mood mild anxiety and affect is normal  good communication skills  ASSESSMENT AND PLAN:   Debbi Prasad [facility] is a 44 year old female who presents for a pre-operative physical exam.   Encounter Diagnoses   Name Primary?    Preop examination Yes     Injury of triangular fibrocartilage complex (TFCC) of right wrist, initial encounter     Alpha-1-antitrypsin deficiency (HCC)     Moderate persistent asthma without complication (HCC)     Sedative, hypnotic or anxiolytic dependence, uncomplicated (HCC)     History of Mingo-en-Y gastric bypass     History of ulcer disease     Major depression in remission     OMAR (generalized anxiety disorder)     Postoperative hypothyroidism     May-Thurner syndrome        No orders of the defined types were placed in this encounter.      Meds & Refills for this Visit:  Requested Prescriptions     Signed Prescriptions Disp Refills    HYDROcodone-acetaminophen 5-325 MG Oral Tab 30 tablet 0     Sig: Take 1 tablet by mouth every 4 (four) hours as needed for Pain for 3 days, THEN 1 tablet every 6 (six) hours as needed for Pain for 2 days, THEN 1 tablet every 8 (eight) hours as needed for Pain.       Imaging & Consults:  ELECTROCARDIOGRAM, COMPLETE  Preop examination  Injury of triangular fibrocartilage complex (TFCC) of right wrist, initial encounter  Postsurgical pain management    Patient is to have right wrist arthroscopy and debridement, possible triangular fibrocartilage complex repair, possible extensor tendon sheath tendon tenosynovectomy , to be done by  Wilian Bo MD at [facility]  on 7/8/25.        - EKG with interpretation and Report -IN OFFICE [[id number]]  - Basic Metabolic Panel (8)  - CBC, NO DIFFERENTIAL/PLATELET    Use, risks, benefits and precautions of hydrocodone discussed. Including not to drive, operate machinery or drink alcohol when taking this medication.  Advised of risk of addiction to hydrocodone.    [facility] Data Reviewed.    Postsurgical pain management as below  - HYDROcodone-acetaminophen 5-325 MG Oral Tab; Take 1 tablet by mouth every 4 (four) hours as needed for Pain for 3 days, THEN 1 tablet every 6 (six) hours as needed for Pain for 2 days, THEN 1 tablet every 8 (eight) hours as needed for Pain.   Dispense: 30 tablet; Refill: 0      Patient is at an increased risk for surgery secondary to chronic health issues but acceptable risk for surgery at this time.    She can proceed on with surgery with increased risk for complications for surgery secondary to chronic health risk factors including alpha-1 antitrypsin deficiency and asthma      Recommend the following:      Given patient's upper airway history of  patient is at increased risk for complications related to anesthesia and intubation including atelectasis, pneumonia and respiratory failure.  Patient should have continuous pulse oximetry and Nasal intermittent positive pressure ventilation (NIPPV) available in the perioperative period for potential respiratory distress, also limit intubation time, limit sedating medications, aggressive pulmonary hygiene with frequent incentive spirometry use and ambulation once extubated.     Avoid nonsteroidals or over-the-counter supplements for 7 days prior to surgery.    This consult was sent back the referring physician, Dr. Wilian Bo       Alpha-1-antitrypsin deficiency (HCC)  Continue with weekly infusions    Moderate persistent asthma without complication (HCC)  Presently under good control with inhalers continue present regimen the low-dose Symbicort, Spiriva and Ventolin as needed      History of Mingo-en-Y gastric bypass  History of ulcer disease  Avoid nonsteroidals avoid prednisone given possible    Major depression in remission  OMAR (generalized anxiety disorder)  Sedative, hypnotic or anxiolytic dependence, uncomplicated (HCC)  On daily Xanax 0.25 mg twice a day this medication has been slowly tapered over time patient is not comfortable with tapering presently due to health situations  Abilify 2 mg daily doing well with medication mood stable    Postoperative hypothyroidism/history of thyroid cancer  TSH is due added to labs she had done yesterday awaiting test results prior elevated TSH thyroid medication  dose was decreased 2 months ago dose is presently 150 mcg daily    May-Thurner syndrome  No history of DVT/PE has iliac venous stents recent testing 12/26/2024 CTA confirms patent bilateral iliac venous stents       Fibromyalgia  Discussed if she gets exacerbations of fibromyalgia or sciatica an increase in Lyrica from 75 mg 3 times a day to 100 mg 3 times a day can be temporarily given.  This can be achieved by providing 25 mg Lyrica No. 90 when needed.  Presently patient is using 75 mg 3 times a day and does not need increase in dose.         [1]   Current Outpatient Medications   Medication Sig Dispense Refill    HYDROcodone-acetaminophen 5-325 MG Oral Tab Take 1 tablet by mouth every 4 (four) hours as needed for Pain for 3 days, THEN 1 tablet every 6 (six) hours as needed for Pain for 2 days, THEN 1 tablet every 8 (eight) hours as needed for Pain. 30 tablet 0    Butalbital-Acetaminophen  MG Oral Tab TAKE 1 TABLET BY MOUTH TWICE DAILY AS NEEDED FOR HEADACHE. TAKE 1/2 TO 1 TABLET BY MOUTH DAILY AS NEEDED FOR TENSION HEADACHE 13 tablet 0    ondansetron (ZOFRAN) 8 MG tablet TAKE 1 TABLET(8 MG) BY MOUTH EVERY 12 HOURS AS NEEDED FOR NAUSEA 30 tablet 1    Lisdexamfetamine Dimesylate (VYVANSE) 60 MG Oral Cap Take 1 capsule (60 mg total) by mouth daily. (Patient not taking: Reported on 7/2/2025) 30 capsule 0    ARIPiprazole 2 MG Oral Tab       pregabalin 75 MG Oral Cap Take 1 capsule (75 mg total) by mouth 3 (three) times daily. 90 capsule 0    ALPRAZolam 0.25 MG Oral Tab Take 0.5-1 tablets (0.125-0.25 mg total) by mouth 2 (two) times daily as needed for Anxiety or Sleep. 60 tablet 0    TRAZODONE 50 MG Oral Tab TAKE 1 TO 2 TABLETS(50  MG) BY MOUTH EVERY NIGHT 60 tablet 2    levothyroxine (UNITHROID) 150 MCG Oral Tab Take 1 tablet (150 mcg total) by mouth before breakfast. 90 tablet 0    TIZANIDINE 4 MG Oral Tab TAKE 1 TABLET(4 MG) BY MOUTH EVERY NIGHT AS NEEDED FOR PAIN OR SPASM. DO NOT TAKE WITH VALTREX CAN.  START AFTER FINISHING FLEXERIL 30 tablet 2    valACYclovir 1 G Oral Tab Take 2 tablets (2,000 mg total) by mouth Q12H. 30 tablet 0    acidophilus-pectin Oral Cap Take 1 capsule by mouth in the morning.      oxybutynin ER 10 MG Oral Tablet 24 Hr TAKE 1 TABLET(10 MG) BY MOUTH DAILY 90 tablet 3    ipratropium-albuterol 0.5-2.5 (3) MG/3ML Inhalation Solution Take 3 mL by nebulization every 4 (four) hours as needed. Use only if the albuterol inhalation albuterol is not working (Patient not taking: Reported on 7/2/2025) 25 each 1    Multiple Vitamins-Minerals (BARIATRIC MULTIVITAMINS/IRON) Oral Cap Take by mouth As Directed.      pantoprazole 40 MG Oral Tab EC Take 1 tablet (40 mg total) by mouth every morning before breakfast.      Nystatin 076799 UNIT/GM External Powder Apply 1 Application. topically 4 (four) times daily. Apply to affected areas (Patient not taking: Reported on 7/2/2025) 30 g 1    ALBUTEROL 108 (90 Base) MCG/ACT Inhalation Aero Soln INHALE 2 PUFFS INTO THE LUNGS EVERY 4 HOURS AS NEEDED FOR WHEEZING OR SHORTNESS OF BREATH 8.5 g 1    loratadine 10 MG Oral Tab Take 1 tablet (10 mg total) by mouth in the morning.  0    alpha 1-proteinase inhibitor 1000 MG/20ML Intravenous Solution Inject into the vein once. weekly      SPIRIVA RESPIMAT 2.5 MCG/ACT Inhalation Aero Soln INHALE 2 PUFFS INTO THE LUNGS DAILY 12 g 2    Calcium Carb-Cholecalciferol (CALCIUM 600/VITAMIN D3) 600-800 MG-UNIT Oral Tab Take 1 tablet by mouth in the morning and 1 tablet in the evening and 1 tablet before bedtime.      SYMBICORT 160-4.5 MCG/ACT Inhalation Aerosol INHALE 2 PUFFS INTO THE LUNGS TWICE DAILY 3 Inhaler 3    Cholecalciferol (VITAMIN D) 50 MCG (2000 UT) Oral Cap Take 1 capsule (2,000 Units total) by mouth in the morning and 1 capsule (2,000 Units total) before bedtime.      magnesium 250 MG Oral Tab Take 1 tablet (250 mg total) by mouth in the morning.      aspirin 81 MG Oral Tab Take 1 tablet (81 mg total) by mouth in the  morning.     [2]   Allergies  Allergen Reactions    Adhesive Tape HIVES     Patient got welts when she use the adhesive tape for 48-hour Holter monitor.    Cephalosporins HIVES    Chocolate HIVES     Dark chocolate    Doxycycline HIVES     Hives and fever       Flavoring Agent HIVES    Tramadol HIVES and RASH    Haloperidol ANXIETY    Metoclopramide ANXIETY     Feels like she is going to jump out of her skin    Toradol [Ketorolac Tromethamine] RASH    Molds & Smuts OTHER (SEE COMMENTS)     Verified by skin testing    Norflex Tablets [Orphenadrine] OTHER (SEE COMMENTS)     Dilated [pupils ? / pt is not sure of reaction    Cheratussin Ac [Guaifenesin-Codeine] NAUSEA ONLY     Only in liquid form; tolerates gel caps    Dextromethorphan-Guaifenesin OTHER (SEE COMMENTS)     Other Reaction(s): GI Intolerance    Nsaids OTHER (SEE COMMENTS)       Other reaction(s): GI UPSET  Hx of gastric bypass   [3]   Past Medical History:   Abdominal discomfort in right lower quadrant    Abdominal hernia    Abdominal pain    Abnormal CT scan, esophagus    Acute deep vein thrombosis (DVT) of left lower extremity (HCC)    Acute left-sided low back pain without sciatica    Alpha-1-antitrypsin deficiency (HCC)    Anxiety    Arrhythmia    RIGHT BUNDLE BRACH BLOCK     Arthritis    Asherman syndrome    Asthma (HCC)    Asthma exacerbation, non-allergic, moderate persistent (HCC)    Back pain    I have Spinal Stenosis that has gotten worse over the years.    Bloating    Calculus of kidney    Cancer (HCC)    Thyroid - 2010.2015    Change in hair    Chest pain    Chronic bronchiolitis (HCC)    Chronic cough    Constipation    COPD (chronic obstructive pulmonary disease) (HCC)    no Oxygen    COVID-19 virus infection    Depression    Depression, major, recurrent, moderate (HCC)    Diarrhea, unspecified    Disorder of thyroid    Diverticulosis    Diverticulosis of intestine    Easy bruising    Endometriosis    Esophageal reflux    Fatigue    Food  intolerance    Frequent use of laxatives    Gastric ulcer    Headache disorder    Hemorrhoids    History of gastric bypass    History of ulcer disease    Hoarseness, chronic    Hx of gastric bypass    Hx of motion sickness    Hydronephrosis    Hypokalemia    Hypothyroidism    Infertility, female    Intertrigo    Irregular bowel habits    KIDNEY STONE    Loss of appetite    Migraines    Mild protein-calorie malnutrition (HCC)    Nausea    Nephrolithiasis    Organic hypersomnia, unspecified    AHI 2 RDI 2 REM AHI 6 SaO2 curtis 89%    Osteoarthritis    Painful urination    Pancreatitis (HCC)    Pneumonia due to organism    4-5+ yrs ago    PONV (postoperative nausea and vomiting)    Problems with swallowing    PUD (peptic ulcer disease)    Rash    Rib contusion, left, initial encounter    Severe persistent asthma with exacerbation (HCC)    Shortness of breath    Sleep disturbance    Stented coronary artery    Stress    Thyroid cancer (HCC)    Visual impairment    glasses    Vocal cord dysfunction    Wears glasses    Weight gain    Weight loss   [4]   Past Surgical History:  Procedure Laterality Date    Ankle scope,part debridement Left 2015    Procedure: ARTHROSCOPY ANKLE WITH DEBRIDEMENT;  Surgeon: Marcial Evans MD;  Location: [facility]    Bronch thermoplasty 1 lobe Right 04/15/2021    Bronch thermoplasty 1 lobe Left 2021      2006    Cholecystectomy      Colonoscopy  2021    Colonoscopy      Colonoscopy,diagnostic  10/22/2013    Procedure: COLONOSCOPY, POSSIBLE BIOPSY, POSSIBLE POLYPECTOMY 49218;  Surgeon: Marcello Ferreira MD;  Location: [facility]    Cta chest (gpc=11576)  2021    D & c      x4    Egd  2021    Gastric bypass,obese<100cm magy-en-y  2017    DR. SEGAL    Gastric bypass,obesity,sb reconstruc      Gastrocnemius recession Left 2015    Procedure: GASTROC RECESSION FOOT;  Surgeon: Marcial Evans MD;  Location: [facility]     Hysterectomy  2013    Hysteroscopy  2011    Inj paravert f jnt l/s 1 lev Bilateral 12/14/2015    Procedure: FACET INJECTION UNDER FLUOROSCOPY;  Surgeon: Dusty Munson DO;  Location: [facility]    Laparoscopic cholecystectomy N/A 11/23/2016    Procedure: LAPAROSCOPIC CHOLECYSTECTOMY;  Surgeon: Dai Sanchez MD;  Location:  MAIN OR    Laparoscopy,diagnostic      Laparoscopy,diagnostic  06/25/2022    Lysis of adhesions  2010    M-sedaj by sm phys perfrmg svc 5+ yr Bilateral 12/14/2015    Procedure: FACET INJECTION UNDER FLUOROSCOPY;  Surgeon: Dusty Munson DO;  Location: [facility]    Domonique biopsy stereo nodule 1 site right (cpt=19081)  07/2023    stromal fibrosis    Needle biopsy right  June 2023    Oophorectomy Left     Other  NECK SCAR REVISION    Other surgical history      laparoscopies x2    Removal of ovarian cyst(s) Right 07/08/2020    Stab phlebectomy, varicose veins, 1 extremity; <20 incisions      Stomach surgery procedure unlisted  2018    Surgical stent Bilateral 03/2015    Bilateral iliac stents    Thyroidectomy  04/2011    [facility]    Total abdom hysterectomy      Upper gi endo no barretts  12/2015    normal    Upper gi endoscopy performed  01/25/2017    Rivera Donaldson M.D.    Upper gi endoscopy,biopsy N/A 12/19/2015    Procedure: ESOPHAGOGASTRODUODENOSCOPY, POSSIBLE BIOPSY, POSSIBLE POLYPECTOMY 12290;  Surgeon: Brayden Giordano MD;  Location: [facility]   [5]   Family History  Problem Relation Age of Onset    Heart Disorder Father     Heart Disease Father     Heart Attack Father     Other (Other) Mother         ALLIE    Breast Cancer Maternal Grandmother 75    Cancer Maternal Grandfather         blood cancer    Diabetes Paternal Grandmother     Heart Disorder Paternal Grandmother     Heart Disease Paternal Grandmother     Asthma Paternal Grandmother     Heart Attack Paternal Grandmother     Stroke Neg    [6]   Social History  Socioeconomic History     Marital status:    Tobacco Use    Smoking status: Never     Passive exposure: Past    Smokeless tobacco: Never   Vaping Use    Vaping status: Never Used   Substance and Sexual Activity    Alcohol use: Not Currently    Drug use: No    Sexual activity: Yes     Partners: Male   Other Topics Concern     Service No    Blood Transfusions No    Caffeine Concern No    Occupational Exposure No    Hobby Hazards No    Sleep Concern No    Stress Concern No    Weight Concern No    Special Diet No    Back Care No    Exercise Yes    Bike Helmet No    Seat Belt Yes    Self-Exams No   Social History Narrative    ** Merged History Encounter **          Social Drivers of Health     Food Insecurity: No Food Insecurity (7/2/2025)    NCSS - Food Insecurity     Worried About Running Out of Food in the Last Year: No     Ran Out of Food in the Last Year: No   Transportation Needs: No Transportation Needs (7/2/2025)    NCSS - Transportation     Lack of Transportation: No   Housing Stability: Not At Risk (7/2/2025)    NCSS - Housing/Utilities     Has Housing: Yes     Worried About Losing Housing: No     Unable to Get Utilities: No

## 2025-07-03 PROBLEM — J45.41: Status: RESOLVED | Noted: 2020-10-24 | Resolved: 2025-07-03

## 2025-07-03 PROBLEM — Z01.89 ENCOUNTER FOR PAIN MANAGEMENT PLANNING: Status: ACTIVE | Noted: 2025-07-03

## 2025-07-03 PROBLEM — S63.519A SPRAIN OF SCAPHOLUNATE LIGAMENT: Status: RESOLVED | Noted: 2025-02-18 | Resolved: 2025-07-03

## 2025-07-03 PROBLEM — E44.1 MILD PROTEIN-CALORIE MALNUTRITION (HCC): Status: RESOLVED | Noted: 2025-06-05 | Resolved: 2025-07-03

## 2025-07-03 PROBLEM — K27.9 PUD (PEPTIC ULCER DISEASE): Status: RESOLVED | Noted: 2023-08-10 | Resolved: 2025-07-03

## 2025-07-03 PROBLEM — E88.09 HYPOALBUMINEMIA: Status: RESOLVED | Noted: 2021-10-16 | Resolved: 2025-07-03

## 2025-07-03 PROBLEM — R63.2 BINGE EATING: Status: RESOLVED | Noted: 2017-06-19 | Resolved: 2025-07-03

## 2025-07-03 PROBLEM — F43.9 STRESS: Status: RESOLVED | Noted: 2025-06-05 | Resolved: 2025-07-03

## 2025-07-03 PROBLEM — S20.01XA POSTTRAUMATIC HEMATOMA OF RIGHT BREAST: Status: RESOLVED | Noted: 2024-02-19 | Resolved: 2025-07-03

## 2025-07-03 PROBLEM — Z87.898 HISTORY OF ULCER DISEASE: Status: ACTIVE | Noted: 2025-06-03

## 2025-07-03 PROBLEM — M79.7 FIBROMYALGIA: Status: ACTIVE | Noted: 2025-07-03

## 2025-07-03 LAB
ATRIAL RATE: 101 BPM
P AXIS: 86 DEGREES
P-R INTERVAL: 138 MS
Q-T INTERVAL: 340 MS
QRS DURATION: 116 MS
QTC CALCULATION (BEZET): 440 MS
R AXIS: 41 DEGREES
T AXIS: 77 DEGREES
T4 FREE SERPL-MCNC: 1.8 NG/DL (ref 0.8–1.7)
TSI SER-ACNC: <0.01 UIU/ML (ref 0.55–4.78)
VENTRICULAR RATE: 101 BPM

## 2025-07-03 NOTE — PROGRESS NOTES
Secondary TSH being elevated changing unithyroid from 150 mcg down to 125 mcg repeat thyroid testing in 6 to 8 weeks

## 2025-07-03 NOTE — PROGRESS NOTES
Normal nonfasting basic metabolic panel with normal kidney function and electrolytes    Normal white blood cell and red blood cell counts

## 2025-07-07 DIAGNOSIS — R63.2 BINGE EATING: ICD-10-CM

## 2025-07-07 DIAGNOSIS — F41.1 GAD (GENERALIZED ANXIETY DISORDER): ICD-10-CM

## 2025-07-07 DIAGNOSIS — G44.221 CHRONIC TENSION-TYPE HEADACHE, INTRACTABLE: ICD-10-CM

## 2025-07-07 RX ORDER — LISDEXAMFETAMINE DIMESYLATE 60 MG/1
60 CAPSULE ORAL DAILY
Qty: 30 CAPSULE | Refills: 0 | Status: SHIPPED | OUTPATIENT
Start: 2025-07-07 | End: 2025-08-06

## 2025-07-07 RX ORDER — ALPRAZOLAM 0.25 MG
TABLET ORAL
Qty: 60 TABLET | Refills: 0 | Status: SHIPPED | OUTPATIENT
Start: 2025-07-07

## 2025-07-07 NOTE — TELEPHONE ENCOUNTER
Requested Prescriptions     Pending Prescriptions Disp Refills    ALPRAZOLAM 0.25 MG Oral Tab [Pharmacy Med Name: ALPRAZOLAM 0.25MG TABLETS] 60 tablet 0     Sig: TAKE 1/2 TO 1 TABLET(0.125 TO 0.25 MG) BY MOUTH TWICE DAILY AS NEEDED FOR ANXIETY OR SLEEP    TIZANIDINE 4 MG Oral Tab [Pharmacy Med Name: TIZANIDINE 4MG TABLETS] 30 tablet 2     Sig: TAKE 1 TABLET(4 MG) BY MOUTH EVERY NIGHT AS NEEDED FOR PAIN OR SPASM. DO NOT TAKE WITH VALTREX CAN. START AFTER FINISHING FLEXERIL       Last Refill: 6/4, 4/2    Last OV: 7/2    Next OV: 7/30

## 2025-07-09 ENCOUNTER — PATIENT MESSAGE (OUTPATIENT)
Dept: FAMILY MEDICINE CLINIC | Facility: CLINIC | Age: 45
End: 2025-07-09

## 2025-07-09 ENCOUNTER — LAB ENCOUNTER (OUTPATIENT)
Dept: LAB | Age: 45
End: 2025-07-09
Attending: ALLERGY & IMMUNOLOGY
Payer: COMMERCIAL

## 2025-07-09 DIAGNOSIS — D80.1 COMMON VARIABLE AGAMMAGLOBULINEMIA (HCC): Primary | ICD-10-CM

## 2025-07-09 DIAGNOSIS — M54.16 CHRONIC LEFT-SIDED LUMBAR RADICULOPATHY: Primary | ICD-10-CM

## 2025-07-09 PROCEDURE — 86317 IMMUNOASSAY INFECTIOUS AGENT: CPT

## 2025-07-09 PROCEDURE — 86648 DIPHTHERIA ANTIBODY: CPT

## 2025-07-09 PROCEDURE — 36415 COLL VENOUS BLD VENIPUNCTURE: CPT

## 2025-07-09 PROCEDURE — 86774 TETANUS ANTIBODY: CPT

## 2025-07-09 PROCEDURE — 84134 ASSAY OF PREALBUMIN: CPT

## 2025-07-09 RX ORDER — PREGABALIN 25 MG/1
25 CAPSULE ORAL 3 TIMES DAILY
Qty: 90 CAPSULE | Refills: 0 | Status: SHIPPED | OUTPATIENT
Start: 2025-07-09

## 2025-07-10 LAB
PREALBUMIN: 17.2 MG/DL
PREALBUMIN: 17.2 MG/DL

## 2025-07-11 LAB
DIPHTHERIA ANTITOXOID AB: 1.83 IU/ML
TETANUS ANTITOXOID IGG AB: 3.15 IU/ML

## 2025-07-17 LAB
PNEU AB TYPE 1*: 0.2 UG/ML
PNEU AB TYPE 12 (12F)*: <0.1 UG/ML
PNEU AB TYPE 14*: 4.8 UG/ML
PNEU AB TYPE 19 (19F)*: 2.8 UG/ML
PNEU AB TYPE 23 (23F)*: 2.9 UG/ML
PNEU AB TYPE 26 (6B)*: 6.4 UG/ML
PNEU AB TYPE 3*: 0.6 UG/ML
PNEU AB TYPE 4*: 1.8 UG/ML
PNEU AB TYPE 5*: 0.9 UG/ML
PNEU AB TYPE 51 (7F)*: 2.4 UG/ML
PNEU AB TYPE 56 (18C)*: 0.6 UG/ML
PNEU AB TYPE 68 (9V)*: 1 UG/ML
PNEU AB TYPE 8*: 2.4 UG/ML
PNEU AB TYPE 9 (9N)*: 1.1 UG/ML

## 2025-07-20 DIAGNOSIS — G44.221 CHRONIC TENSION-TYPE HEADACHE, INTRACTABLE: ICD-10-CM

## 2025-07-21 RX ORDER — BUTALBITAL AND ACETAMINOPHEN 325; 50 MG/1; MG/1
TABLET ORAL
Qty: 13 TABLET | Refills: 0 | Status: SHIPPED | OUTPATIENT
Start: 2025-07-21

## 2025-07-24 ENCOUNTER — APPOINTMENT (OUTPATIENT)
Dept: GENERAL RADIOLOGY | Facility: HOSPITAL | Age: 45
End: 2025-07-24
Attending: ORTHOPAEDIC SURGERY

## 2025-07-24 ENCOUNTER — HOSPITAL ENCOUNTER (OUTPATIENT)
Facility: HOSPITAL | Age: 45
Setting detail: HOSPITAL OUTPATIENT SURGERY
Discharge: HOME | End: 2025-07-24
Attending: ORTHOPAEDIC SURGERY | Admitting: ORTHOPAEDIC SURGERY

## 2025-07-24 ENCOUNTER — ANESTHESIA EVENT (OUTPATIENT)
Dept: SURGERY | Facility: HOSPITAL | Age: 45
End: 2025-07-24
Payer: COMMERCIAL

## 2025-07-24 ENCOUNTER — ANESTHESIA (OUTPATIENT)
Dept: SURGERY | Facility: HOSPITAL | Age: 45
End: 2025-07-24
Payer: COMMERCIAL

## 2025-07-24 VITALS
WEIGHT: 174.63 LBS | SYSTOLIC BLOOD PRESSURE: 107 MMHG | TEMPERATURE: 97 F | BODY MASS INDEX: 26.16 KG/M2 | HEART RATE: 74 BPM | DIASTOLIC BLOOD PRESSURE: 65 MMHG | HEIGHT: 68.5 IN | RESPIRATION RATE: 16 BRPM | OXYGEN SATURATION: 100 %

## 2025-07-24 PROCEDURE — 76942 ECHO GUIDE FOR BIOPSY: CPT | Performed by: STUDENT IN AN ORGANIZED HEALTH CARE EDUCATION/TRAINING PROGRAM

## 2025-07-24 PROCEDURE — 76000 FLUOROSCOPY <1 HR PHYS/QHP: CPT | Performed by: ORTHOPAEDIC SURGERY

## 2025-07-24 DEVICE — IMPLANTABLE DEVICE: Type: IMPLANTABLE DEVICE | Site: WRIST | Status: FUNCTIONAL

## 2025-07-24 RX ORDER — HYDROMORPHONE HYDROCHLORIDE 1 MG/ML
0.6 INJECTION, SOLUTION INTRAMUSCULAR; INTRAVENOUS; SUBCUTANEOUS EVERY 5 MIN PRN
Status: DISCONTINUED | OUTPATIENT
Start: 2025-07-24 | End: 2025-07-24

## 2025-07-24 RX ORDER — HYDROMORPHONE HYDROCHLORIDE 1 MG/ML
0.4 INJECTION, SOLUTION INTRAMUSCULAR; INTRAVENOUS; SUBCUTANEOUS EVERY 5 MIN PRN
Status: DISCONTINUED | OUTPATIENT
Start: 2025-07-24 | End: 2025-07-24

## 2025-07-24 RX ORDER — SODIUM CHLORIDE, SODIUM LACTATE, POTASSIUM CHLORIDE, CALCIUM CHLORIDE 600; 310; 30; 20 MG/100ML; MG/100ML; MG/100ML; MG/100ML
INJECTION, SOLUTION INTRAVENOUS CONTINUOUS
Status: DISCONTINUED | OUTPATIENT
Start: 2025-07-24 | End: 2025-07-24

## 2025-07-24 RX ORDER — ACETAMINOPHEN 500 MG
1000 TABLET ORAL ONCE
Status: DISCONTINUED | OUTPATIENT
Start: 2025-07-24 | End: 2025-07-24 | Stop reason: HOSPADM

## 2025-07-24 RX ORDER — DEXAMETHASONE SODIUM PHOSPHATE 4 MG/ML
VIAL (ML) INJECTION AS NEEDED
Status: DISCONTINUED | OUTPATIENT
Start: 2025-07-24 | End: 2025-07-24 | Stop reason: SURG

## 2025-07-24 RX ORDER — HYDROMORPHONE HYDROCHLORIDE 1 MG/ML
0.2 INJECTION, SOLUTION INTRAMUSCULAR; INTRAVENOUS; SUBCUTANEOUS EVERY 5 MIN PRN
Status: DISCONTINUED | OUTPATIENT
Start: 2025-07-24 | End: 2025-07-24

## 2025-07-24 RX ORDER — ONDANSETRON 2 MG/ML
INJECTION INTRAMUSCULAR; INTRAVENOUS AS NEEDED
Status: DISCONTINUED | OUTPATIENT
Start: 2025-07-24 | End: 2025-07-24 | Stop reason: SURG

## 2025-07-24 RX ORDER — LIDOCAINE HYDROCHLORIDE 10 MG/ML
INJECTION, SOLUTION EPIDURAL; INFILTRATION; INTRACAUDAL; PERINEURAL AS NEEDED
Status: DISCONTINUED | OUTPATIENT
Start: 2025-07-24 | End: 2025-07-24 | Stop reason: SURG

## 2025-07-24 RX ORDER — GLYCOPYRROLATE 0.2 MG/ML
INJECTION, SOLUTION INTRAMUSCULAR; INTRAVENOUS AS NEEDED
Status: DISCONTINUED | OUTPATIENT
Start: 2025-07-24 | End: 2025-07-24 | Stop reason: SURG

## 2025-07-24 RX ORDER — HYDROCODONE BITARTRATE AND ACETAMINOPHEN 5; 325 MG/1; MG/1
1 TABLET ORAL ONCE AS NEEDED
Status: COMPLETED | OUTPATIENT
Start: 2025-07-24 | End: 2025-07-24

## 2025-07-24 RX ORDER — VANCOMYCIN HYDROCHLORIDE
15 ONCE
Status: DISCONTINUED | OUTPATIENT
Start: 2025-07-24 | End: 2025-07-24 | Stop reason: ALTCHOICE

## 2025-07-24 RX ORDER — HYDROCODONE BITARTRATE AND ACETAMINOPHEN 5; 325 MG/1; MG/1
2 TABLET ORAL ONCE AS NEEDED
Status: COMPLETED | OUTPATIENT
Start: 2025-07-24 | End: 2025-07-24

## 2025-07-24 RX ORDER — ACETAMINOPHEN 500 MG
1000 TABLET ORAL ONCE AS NEEDED
Status: COMPLETED | OUTPATIENT
Start: 2025-07-24 | End: 2025-07-24

## 2025-07-24 RX ORDER — MIDAZOLAM HYDROCHLORIDE 1 MG/ML
1 INJECTION INTRAMUSCULAR; INTRAVENOUS EVERY 5 MIN PRN
Status: DISCONTINUED | OUTPATIENT
Start: 2025-07-24 | End: 2025-07-24

## 2025-07-24 RX ORDER — LABETALOL HYDROCHLORIDE 5 MG/ML
5 INJECTION, SOLUTION INTRAVENOUS EVERY 5 MIN PRN
Status: DISCONTINUED | OUTPATIENT
Start: 2025-07-24 | End: 2025-07-24

## 2025-07-24 RX ORDER — MIDAZOLAM HYDROCHLORIDE 1 MG/ML
INJECTION INTRAMUSCULAR; INTRAVENOUS AS NEEDED
Status: DISCONTINUED | OUTPATIENT
Start: 2025-07-24 | End: 2025-07-24 | Stop reason: SURG

## 2025-07-24 RX ORDER — NALOXONE HYDROCHLORIDE 0.4 MG/ML
80 INJECTION, SOLUTION INTRAMUSCULAR; INTRAVENOUS; SUBCUTANEOUS AS NEEDED
Status: DISCONTINUED | OUTPATIENT
Start: 2025-07-24 | End: 2025-07-24

## 2025-07-24 RX ORDER — ONDANSETRON 2 MG/ML
4 INJECTION INTRAMUSCULAR; INTRAVENOUS EVERY 6 HOURS PRN
Status: DISCONTINUED | OUTPATIENT
Start: 2025-07-24 | End: 2025-07-24

## 2025-07-24 RX ADMIN — DEXAMETHASONE SODIUM PHOSPHATE 4 MG: 4 MG/ML VIAL (ML) INJECTION at 12:05:00

## 2025-07-24 RX ADMIN — SODIUM CHLORIDE, SODIUM LACTATE, POTASSIUM CHLORIDE, CALCIUM CHLORIDE: 600; 310; 30; 20 INJECTION, SOLUTION INTRAVENOUS at 11:45:00

## 2025-07-24 RX ADMIN — MIDAZOLAM HYDROCHLORIDE 1 MG: 1 INJECTION INTRAMUSCULAR; INTRAVENOUS at 11:50:00

## 2025-07-24 RX ADMIN — GLYCOPYRROLATE 0.2 MG: 0.2 INJECTION, SOLUTION INTRAMUSCULAR; INTRAVENOUS at 12:15:00

## 2025-07-24 RX ADMIN — ONDANSETRON 4 MG: 2 INJECTION INTRAMUSCULAR; INTRAVENOUS at 12:05:00

## 2025-07-24 RX ADMIN — LIDOCAINE HYDROCHLORIDE 50 MG: 10 INJECTION, SOLUTION EPIDURAL; INFILTRATION; INTRACAUDAL; PERINEURAL at 12:00:00

## 2025-07-24 RX ADMIN — MIDAZOLAM HYDROCHLORIDE 1 MG: 1 INJECTION INTRAMUSCULAR; INTRAVENOUS at 11:45:00

## 2025-07-24 NOTE — ANESTHESIA PREPROCEDURE EVALUATION
PRE-OP EVALUATION    Patient Name: Debbi Prasad [facility]    Admit Diagnosis: Pre-op testing [Z01.818]  Pre-op examination [Z01.818]  Injury of triangular fibrocartilage complex (TFCC) of right wrist, initial encounter [S69.81XA]  Wrist tendonitis [M77.8]    Pre-op Diagnosis: Pre-op testing [Z01.818]  Pre-op examination [Z01.818]  Injury of triangular fibrocartilage complex (TFCC) of right wrist, initial encounter [S69.81XA]  Wrist tendonitis [M77.8]    Right wrist arthroscopy and debridement, possible right triangular fibrocartilage complex repair, possible right extensor tendon sheath tendon tenosynovectomy    Anesthesia Procedure: Right wrist arthroscopy and debridement, possible right triangular fibrocartilage complex repair, possible right extensor tendon sheath tendon tenosynovectomy (Right)    Surgeons and Role:     * Wilian Bo MD - Primary    Pre-op vitals reviewed.  Temp: 97.6 °F (36.4 °C)  Pulse: 67  Resp: 16  BP: 120/74  SpO2: 100 %  Body mass index is 26.16 kg/m².    Current medications reviewed.  Hospital Medications:  Current Medications[1]    Outpatient Medications:   Prescriptions Prior to Admission[2]    Allergies: Adhesive tape, Cephalosporins, Chocolate, Doxycycline, Flavoring agent, Tramadol, Haloperidol, Metoclopramide, Toradol [ketorolac tromethamine], Molds & smuts, Norflex tablets [orphenadrine], Cheratussin ac [guaifenesin-codeine], Dextromethorphan-guaifenesin, and Nsaids      Anesthesia Evaluation    Patient summary reviewed.    Anesthetic Complications  (+) history of anesthetic complications  History of: PONV       GI/Hepatic/Renal      (+) GERD          (+) liver disease                 Cardiovascular        Exercise tolerance: good     MET: >4         (+) hyperlipidemia                                  Endo/Other    Negative endo/other ROS.                              Pulmonary      (+) asthma         (+) shortness of breath            Neuro/Psych      (+) depression  (+) anxiety          (+) neuromuscular disease                     Past Surgical History[3]  Social Hx on file[4]  History   Drug Use No     Available pre-op labs reviewed.  Lab Results   Component Value Date    WBC 5.1 07/02/2025    RBC 4.44 07/02/2025    HGB 13.9 07/02/2025    HCT 41.4 07/02/2025    MCV 93.2 07/02/2025    MCH 31.3 07/02/2025    MCHC 33.6 07/02/2025    RDW 12.2 07/02/2025    .0 07/02/2025     Lab Results   Component Value Date     07/02/2025    K 4.0 07/02/2025     07/02/2025    CO2 30.0 07/02/2025    BUN 10 07/02/2025    CREATSERUM 0.89 07/02/2025     (H) 07/02/2025    CA 8.9 07/02/2025            Airway      Mallampati: II  Mouth opening: >3 FB  TM distance: > 6 cm  Neck ROM: full Cardiovascular    Cardiovascular exam normal.  Rhythm: regular  Rate: normal     Dental    Dentition appears grossly intact         Pulmonary    Pulmonary exam normal.  Breath sounds clear to auscultation bilaterally.               Other findings              ASA: 2   Plan: MAC  NPO status verified and patient meets guidelines.  Patient has not taken beta blockers in last 24 hours.  Post-procedure pain management plan discussed with surgeon and patient.      Plan/risks discussed with: patient and spouse                Present on Admission:  **None**             [1]    [Transfer Hold] acetaminophen (Tylenol Extra Strength) tab 1,000 mg  1,000 mg Oral Once    lactated ringers infusion   Intravenous Continuous    ceFAZolin (Ancef) 2g in 10mL IV syringe premix  2 g Intravenous Once   [2]   Medications Prior to Admission   Medication Sig Dispense Refill Last Dose/Taking    pregabalin (LYRICA) 25 MG Oral Cap Take 1 capsule (25 mg total) by mouth 3 (three) times daily. Add 25 mg to the 75 mg equaling 100 mg 3 times daily 90 capsule 0 Past Week    ALPRAZOLAM 0.25 MG Oral Tab TAKE 1/2 TO 1 TABLET(0.125 TO 0.25 MG) BY MOUTH TWICE DAILY AS NEEDED FOR ANXIETY OR SLEEP 60 tablet 0 7/20/2025    TIZANIDINE 4 MG Oral Tab  TAKE 1 TABLET(4 MG) BY MOUTH EVERY NIGHT AS NEEDED FOR PAIN OR SPASM. DO NOT TAKE WITH VALTREX CAN. START AFTER FINISHING FLEXERIL 30 tablet 2 7/23/2025 Evening    levothyroxine (UNITHROID) 125 MCG Oral Tab Take 1 tablet (125 mcg total) by mouth before breakfast. 90 tablet 0 7/23/2025 at  7:00 AM    ondansetron (ZOFRAN) 8 MG tablet TAKE 1 TABLET(8 MG) BY MOUTH EVERY 12 HOURS AS NEEDED FOR NAUSEA 30 tablet 1 7/19/2025    ARIPiprazole 2 MG Oral Tab Take 1 tablet (2 mg total) by mouth.   7/23/2025 at  9:00 AM    pregabalin 75 MG Oral Cap Take 1 capsule (75 mg total) by mouth 3 (three) times daily. 90 capsule 0 7/23/2025 Noon    TRAZODONE 50 MG Oral Tab TAKE 1 TO 2 TABLETS(50  MG) BY MOUTH EVERY NIGHT 60 tablet 2 7/23/2025 Bedtime    oxybutynin ER 10 MG Oral Tablet 24 Hr TAKE 1 TABLET(10 MG) BY MOUTH DAILY 90 tablet 3 7/23/2025 at  9:00 AM    pantoprazole 40 MG Oral Tab EC Take 1 tablet (40 mg total) by mouth every morning before breakfast.   7/23/2025 at  9:00 AM    ALBUTEROL 108 (90 Base) MCG/ACT Inhalation Aero Soln INHALE 2 PUFFS INTO THE LUNGS EVERY 4 HOURS AS NEEDED FOR WHEEZING OR SHORTNESS OF BREATH 8.5 g 1 Past Month    loratadine 10 MG Oral Tab Take 1 tablet (10 mg total) by mouth in the morning.  0 7/23/2025 at  9:00 AM    alpha 1-proteinase inhibitor 1000 MG/20ML Intravenous Solution Inject into the vein once. weekly   7/22/2025    SPIRIVA RESPIMAT 2.5 MCG/ACT Inhalation Aero Soln INHALE 2 PUFFS INTO THE LUNGS DAILY 12 g 2 7/23/2025 at  9:00 AM    SYMBICORT 160-4.5 MCG/ACT Inhalation Aerosol INHALE 2 PUFFS INTO THE LUNGS TWICE DAILY 3 Inhaler 3 7/23/2025 Evening    aspirin 81 MG Oral Tab Take 1 tablet (81 mg total) by mouth in the morning.   7/9/2025    BUTALBITAL-ACETAMINOPHEN  MG Oral Tab TAKE ONE TABLET BY MOUTH TWICE DAILY AS NEEDED FOR HEADACHE. TAKE ONE-HALF TO ONE TABLET BY MOUTH DAILY AS NEEDED FOR TENSION HEADACHE 13 tablet 0 7/21/2025    Lisdexamfetamine Dimesylate (VYVANSE) 60 MG Oral  Cap Take 1 capsule (60 mg total) by mouth daily. (Patient not taking: Reported on 2025) 30 capsule 0 2025    [] HYDROcodone-acetaminophen 5-325 MG Oral Tab Take 1 tablet by mouth every 4 (four) hours as needed for Pain for 3 days, THEN 1 tablet every 6 (six) hours as needed for Pain for 2 days, THEN 1 tablet every 8 (eight) hours as needed for Pain. (Patient not taking: Reported on 2025) 30 tablet 0 Not Taking    valACYclovir 1 G Oral Tab Take 2 tablets (2,000 mg total) by mouth Q12H. 30 tablet 0 More than a month    acidophilus-pectin Oral Cap Take 1 capsule by mouth in the morning. (Patient not taking: Reported on 2025)   2025    ipratropium-albuterol 0.5-2.5 (3) MG/3ML Inhalation Solution Take 3 mL by nebulization every 4 (four) hours as needed. Use only if the albuterol inhalation albuterol is not working (Patient not taking: Reported on 2025) 25 each 1 More than a month    Multiple Vitamins-Minerals (BARIATRIC MULTIVITAMINS/IRON) Oral Cap Take by mouth As Directed. (Patient not taking: Reported on 2025)   2025    Nystatin 563627 UNIT/GM External Powder Apply 1 Application. topically 4 (four) times daily. Apply to affected areas (Patient not taking: Reported on 2025) 30 g 1 More than a month    Calcium Carb-Cholecalciferol (CALCIUM 600/VITAMIN D3) 600-800 MG-UNIT Oral Tab Take 1 tablet by mouth in the morning and 1 tablet in the evening and 1 tablet before bedtime. (Patient not taking: Reported on 2025)   2025    Cholecalciferol (VITAMIN D) 50 MCG (2000 UT) Oral Cap Take 1 capsule (2,000 Units total) by mouth in the morning and 1 capsule (2,000 Units total) before bedtime. (Patient not taking: Reported on 2025)   2025    magnesium 250 MG Oral Tab Take 1 tablet (250 mg total) by mouth in the morning. (Patient not taking: Reported on 2025)   2025   [3]   Past Surgical History:  Procedure Laterality Date    Ankle scope,part debridement Left  2015    Procedure: ARTHROSCOPY ANKLE WITH DEBRIDEMENT;  Surgeon: Marcial Evans MD;  [facility]    Bronch thermoplasty 1 lobe Right 04/15/2021    Bronch thermoplasty 1 lobe Left 2021      2006    Cholecystectomy      Colonoscopy  2021    Colonoscopy      Colonoscopy,diagnostic  10/22/2013    Procedure: COLONOSCOPY, POSSIBLE BIOPSY, POSSIBLE POLYPECTOMY 68022;  Surgeon: Marcello Ferreira MD;  Location: [facility]    Cta chest (rqd=[id number])  2021    D & c      x4    Egd  2021    Gastric bypass,obese<100cm magy-en-y  2017    DR. SEGAL    Gastric bypass,obesity,sb reconstruc  2017    Gastrocnemius recession Left 2015    Procedure: GASTROC RECESSION FOOT;  Surgeon: Marcial Evans MD;  Location: [facility]    Hysterectomy      partial hyst    Hysteroscopy      Inj paravert f jnt l/s 1 lev Bilateral 2015    Procedure: FACET INJECTION UNDER FLUOROSCOPY;  Surgeon: Dusty Munson DO;  Location: [facility]    Laparoscopic cholecystectomy N/A 2016    Procedure: LAPAROSCOPIC CHOLECYSTECTOMY;  Surgeon: Dai Sanchez MD;  Location:  MAIN OR    Laparoscopy,diagnostic      Laparoscopy,diagnostic  2022    Lysis of adhesions      M-sedaj by  phys perfrmg svc 5+ yr Bilateral 2015    Procedure: FACET INJECTION UNDER FLUOROSCOPY;  Surgeon: Dutsy Munson DO;  Location: [facility]    Domonique biopsy stereo nodule 1 site right (cpt=[id number])  2023    stromal fibrosis    Needle biopsy right  2023    Oophorectomy Left     Other  NECK SCAR REVISION    Other surgical history      laparoscopies x2    Removal of ovarian cyst(s) Right 2020    Stomach surgery procedure unlisted  2018    Surgical stent Bilateral 2015    Bilateral iliac stents    Thyroidectomy  2011    [facility] STATES PARTIAL  AND     Total abdom hysterectomy      Upper gi endo no barretts   12/2015    normal    Upper gi endoscopy performed  01/25/2017    Rivera Donaldson M.D.    Upper gi endoscopy,biopsy N/A 12/19/2015    Procedure: ESOPHAGOGASTRODUODENOSCOPY, POSSIBLE BIOPSY, POSSIBLE POLYPECTOMY 55466;  Surgeon: Brayden Giordano MD;  [place] [facility]   [4]   Social History  Socioeconomic History    Marital status:    Tobacco Use    Smoking status: Never     Passive exposure: Past    Smokeless tobacco: Never   Vaping Use    Vaping status: Never Used   Substance and Sexual Activity    Alcohol use: Not Currently     Comment: rare    Drug use: No    Sexual activity: Yes     Partners: Male   Other Topics Concern     Service No    Blood Transfusions No    Caffeine Concern No    Occupational Exposure No    Hobby Hazards No    Sleep Concern No    Stress Concern No    Weight Concern No    Special Diet No    Back Care No    Exercise Yes    Bike Helmet No    Seat Belt Yes    Self-Exams No

## 2025-07-24 NOTE — DISCHARGE INSTRUCTIONS
What to Expect After Your Surgery    Immediately after your surgery, keep your hand elevated (fingers up pointing to ceiling) as much as possible for the first two-seven days. Icing is good too, but not as important as elevation. It is normal for your fingers to swell and have discoloration (bruising) appear a couple days after surgery extending into your fingers and/or elbow. Move your fingers and make a fist ten times every hour while awake. NO lifting, pushing, or pulling with your surgical hand.    We recommend taking a dose of pain medication BEFORE you feel any pain. After the numbness from the anesthetic completely wears off, take the prescribed narcotic as needed. If you have minimal pain and feel you need something for your discomfort you may take an over the counter anti-inflammatory (ie: Aleve, Motrin, Ibuprofen) for your pain as long as you don’t have any contraindications.     Your dressing needs to stay in place and dry until your visit with occupational therapist. Please do not let the dressing get wet. You should cover it with a bag when you take a shower so it does not get wet. You can get rid of the sling as soon as the block wears off and you can control your arm again.    Expect a call from the occupational therapy department to set up an appointment with an OT for aboout 5 days after surgery to begin motion and get the correct splint. You should ideally see the OT before the first post op visit with us. If you have not heard from them within 48 hours of surgery you can reach them at [phone] for [place] locations or [phone] for [place] locations.    5-7 days after Surgery (visit with OT)    You will see the occupational therapist before the visit with us. Your postoperative dressing will be removed and the therapist will fabricate a splint and instruct you on a home therapy program. You will be able to remove the splint for showering and sedentary activity and gentle range of  motion. The OT will see you 2-3 times a week.    7-14 Days after your surgery (first post op visit)    You will see Lynette Al PA-C or Dr Bo at your post op visit approximately 7-14 days after surgery. An appointment was made for you but if you do not have an appointment or that time does not work please call the office. An x-ray will be taken and the clinician will see you after to check in on your recovery.    Please call with any questions or concerns [phone].     Hydrocodone was given at 3:30PM

## 2025-07-24 NOTE — INTERVAL H&P NOTE
Pre-op Diagnosis: Pre-op testing [Z01.818]  Pre-op examination [Z01.818]  Injury of triangular fibrocartilage complex (TFCC) of right wrist, initial encounter [S69.81XA]  Wrist tendonitis [M77.8]    The above referenced H&P was reviewed by BIA Ferguson on 7/24/2025, the patient was examined and no significant changes have occurred in the patient's condition since the H&P was performed.  I discussed with the patient and/or legal representative the potential benefits, risks and side effects of this procedure; the likelihood of the patient achieving goals; and potential problems that might occur during recuperation.  I discussed reasonable alternatives to the procedure, including risks, benefits and side effects related to the alternatives and risks related to not receiving this procedure.  We will proceed with procedure as planned.

## 2025-07-24 NOTE — OPERATIVE REPORT
Operative Report       [facility]    Preoperative Diagnosis:     Injury of triangular fibrocartilage complex (TFCC) of right wrist, initial encounter [S69.81XA]  Wrist tendonitis [M77.8]    Postoperative Diagnosis:     Injury of triangular fibrocartilage complex (TFCC) of right wrist, initial encounter [S69.81XA]  Wrist tendonitis [M77.8]    Surgeons and Role:     * Wilian Bo MD - Primary     Assistant: PA: Lynette Al PA    A PA was needed for the successful completion of this case. She was essential for the proper positioning of patient, manipulation of instruments, proper exposure, manipulation of soft tissue, and wound closure.    Procedures:       Right wrist arthroscopy with debridement/repair of triangular fibrocartilage (CPT 89951)  Arthrotomy of right distal radial ulnar joint for repair of triangular fibrocartilage complex (CPT 94798)  Synovectomy of right extensor carpi ulnaris tendon(CPT 44419)    Antibiotics: Ancef 2g    Surgical Findings:  synovitis of ulnocarpal joint, TFC foveal tear, mild ECU tenosynovitis    Anesthesia: Regional    Complications: None    Implants:   Implant Name Type Inv. Item Serial No.  Lot No. LRB No. Used Action   SYSTEM IMPL INT BRAC FOREFOOT - SNA  SYSTEM IMPL INT BRAC FOREFOOT NA [employer]  [id number] Right 1 Implanted   ANCHOR SUT 2.5X8MM BIOCOMPOSITE MINI PSHLCK - SNA  ANCHOR SUT 2.5X8MM BIOCOMPOSITE MINI PSHLCK NA [employer]  [id number] Right 1 Implanted   ANCHOR SUT 2.5X8MM BIOCOMPOSITE MINI PSHLCK - SNA  ANCHOR SUT 2.5X8MM BIOCOMPOSITE MINI PSHLCK NA [employer]  [id number] Right 1 Implanted       Specimen: None    Condition: Stable    Estimated Blood Loss: 1 mL    Indications:  44 year old female with persistent ulnar sided wrist pain that failed non-surgical management. Patient elected to proceed with surgical treatment after having understood the risks associated with the surgery, expected outcomes, the  expected time to recovery, and the need for therapy. Risks include but not limited to infection, nerve/tendon/artery injury, stiffness, repair failure, post-traumatic arthritis or fracture. All of the patient's questions were answered prior to consent.        Procedure:  Patient was met in the preoperative holding area where consent was verified, laterality was marked with the surgeon's initials, and the H&P was updated. Patient was brought to the operating room on a transport cart. atient was placed on the operating room table with an arm table.  The arm was prepped and draped in the usual sterile fashion.  Antibiotics were fully infused.  A surgical timeout was performed.  A sterile upper arm tourniquet was placed.  The limb was elevated and exsanguinated using an Esmarch bandage.  The tourniquet was raised to 250 mmHg.     The wrist was then distracted and secured by using the [email or web address] arthroscopy distraction tower and 10 lb distraction through the finger trap placed on the index and long finger. A 3-4 portal was created after making a horizontal skin incision using a 15 blade 1 cm distal to Jennifer's tubercle. A small hemostat was used to spread down to capsule. The hemostat was then pushed through the capsule into the radiocarpal joint. Hemostat was used to gently spread the to enlarge capsular opening.    A 2.3 mm 30 degree angled scope was then placed into the joint space.  A spinal needle was used to confirm the 6R portal trajectory.  The horizontal incision was then made with a 15 blade. A small hemostat was used to spread down to capsule. The hemostat was then pushed through the capsule into the radiocarpal joint. Hemostat was used to gently spread the to enlarge capsular opening.  A probe was inserted into the 6R portal. A diagnostic wrist arthroscopy was performed revealing:     Radiocarpal Joint Findings     Scaphoid Facet Normal Scaphoid Normal   Lunate Facet Normal Lunate Normal   Triangular  Fibrocartilage (TFC) Abnormal Triqeutrum Normal   TFC Trampoline Test Abnormal TFC Hook Test Abnormal   Ligament of Testut Normal Synovitis Abnormal - dorsal ulnocarpal joint   Radioscaphocapitate Ligament (RSC) Normal Long Radiolunate Ligament (LRL) Normal   Scapohlunate Ligament (SL) Normal Short Radiolunate Ligament (SRL) Normal   Ulnocarpal Ligament Normal Lunotriquetral Ligament (LTL) Normal     A shaver was brought into the radiocarpal joint and the synovitis was shaved down to a smooth base.     A 15 blade was then used to make a 3 cm incision along the ulnar aspect of the wrist starting from the ulnar styloid.  The incision was carried through the dermis.  Adson's and Avila scissors were used to dissect through the subcutaneous tissue down to the extensor retinaculum/fascia.  Dorsal cutaneous branches of the ulnar nerve were identified and protected.  The ECU tendon and tendon sheath was identied.     A guidewire was passed from the distal ulna into the TFC foveal attachment.  Fluoroscopy was used to confirm guidewire trajectory.  A cannulated drill bit was used to create a 3.0 mm ulnar tunnel.  A 18-gauge spinal needle in addition to a nitinol suture lasso was used to perform to horizontal mattress stitch  using 2-0 fiber wire. A second horizontal mattress stitch repairing ulnar edge of TFC to capsule was placed in a similar fashion.  The limbs of the suture were tensioned with the wrist in neutral rotation and the DRUJ joint reduced.  A normal hook test was noted with the limbs of the suture tensioned.  The limbs were then anchored using a Ar[employer]rex mini push lock anchor.    The wrist was placed in supination, flexion and ulnar deviation. Gross ECU subluxation was not noted - increased tendon movement was noted but similar to contralateral side. .A 15 blade was used to incise the ulnar proximal most border of the ECU tendon sheath dividing through the extensor retinaculum and subsheath.  The extensor carpi  ulnaris tendon was identified.   Mild tenosynovitis of the tendon was noted.  A tenosynovectomy was completed. The wound was then irrigated with saline prior to closure.  Tourniquet was let down.  Hemostasis was achieved with bipolar cautery.    Closure:   A 5-0 Monocryl was used in a subcuticular fashion to close the portal sites.   4-0 Monocryl was used to reapproximate the ulnar wrist incision.  4-0 Monocryl was run in a subcuticular fashion to close the ulnar wrist incision.  Skin glue and Steri-Strips were applied.     Dressing/Splint:  Adaptic, 4 x 4 gauze, and web roll was used for the dressing.  A long arm fiberglass splint with elbow at 90 degrees and forearm in neutral rotation was applied in held in place with ace wraps.  Patient's arm was placed in a sling.     Post Operative:  Patient was woken up from anesthesia and taken to PACU for further recovery and discharge home.    Wilian Bo MD   Hand, Wrist, & Elbow Surgery  [email or web address]  t: [phone]  f: [phone]

## 2025-07-24 NOTE — ANESTHESIA POSTPROCEDURE EVALUATION
[facility] Patient Status:  Hospital Outpatient Surgery   Age/Gender 44 year old female MRN [id number]   Location [facility] PERIOPERATIVE SERVICE Attending No att. providers found   Hosp Day # 0 PCP HENRY JORDAN MD       Anesthesia Post-op Note    Right wrist arthroscopy and debridement,  right triangular fibrocartilage complex repair, right extensor tendon sheath tendon tenosynovectomy    Procedure Summary       Date: 07/24/25 Room / Location:  MAIN OR 06 /  MAIN OR    Anesthesia Start: 1145 Anesthesia Stop: 1427    Procedure: Right wrist arthroscopy and debridement,  right triangular fibrocartilage complex repair, right extensor tendon sheath tendon tenosynovectomy (Right: Wrist) Diagnosis:       Pre-op testing      Pre-op examination      Injury of triangular fibrocartilage complex (TFCC) of right wrist, initial encounter      Wrist tendonitis      (Pre-op testing [Z01.818]Pre-op examination [Z01.818]Injury of triangular fibrocartilage complex (TFCC) of right wrist, initial encounter [S69.81XA]Wrist tendonitis [M77.8])    Surgeons: Wilian Bo MD Anesthesiologist: Eugene Ferreira MD    Anesthesia Type: MAC ASA Status: 2            Anesthesia Type: MAC    Vitals Value Taken Time   /65 07/24/25 14:28   Temp 97 °F (36.1 °C) 07/24/25 14:28   Pulse 79 07/24/25 14:28   Resp 16 07/24/25 14:28   SpO2 100 % 07/24/25 14:28   Vitals shown include unfiled device data.        Patient Location: Same Day Surgery    Anesthesia Type: general    Airway Patency: patent    Postop Pain Control: adequate    Mental Status: mildly sedated but able to meaningfully participate in the post-anesthesia evaluation    Nausea/Vomiting: none    Cardiopulmonary/Hydration status: stable euvolemic    Complications: no apparent anesthesia related complications    Postop vital signs: stable    Dental Exam: Unchanged from Preop    Patient to be discharged home when criteria met.

## 2025-07-24 NOTE — ANESTHESIA PROCEDURE NOTES
Regional Block    Date/Time: 7/24/2025 11:54 AM    Performed by: Eugene Ferreira MD  Authorized by: Eugene Ferreira MD      General Information and Staff    Start Time:  7/24/2025 11:54 AM  End Time:  7/24/2025 11:58 AM  Anesthesiologist:  Eugene Ferreira MD  Performed by:  Anesthesiologist  Patient Location:  OR    Block Placement: Pre Induction  Site Identification: real time ultrasound guided and image stored and retrievable    Block site/laterality marked before start: site marked  Reason for Block: at surgeon's request and post-op pain management    Preanesthetic Checklist: 2 patient identifers, IV checked, site marked, risks and benefits discussed, monitors and equipment checked, pre-op evaluation, timeout performed, anesthesia consent, sterile technique used, no prohibitive neurological deficits and no local skin infection at insertion site      Procedure Details    Patient Position:  Supine  Prep: ChloraPrep    Monitoring:  Cardiac monitor, continuous pulse ox, blood pressure cuff and heart rate  Block Type:  Supraclavicular  Laterality:  Right  Injection Technique:  Single-shot    Needle    Needle Type:  Short-bevel and echogenic  Needle Gauge:  21 G  Needle Length:  100 mm  Needle Localization:  Ultrasound guidance  Reason for Ultrasound Use: appropriate spread of the medication was noted in real time and no ultrasound evidence of intravascular and/or intraneural injection            Assessment    Injection Assessment:  Good spread noted, negative resistance, negative aspiration for heme, incremental injection, low pressure, local visualized surrounding nerve on ultrasound and no pain on injection  Paresthesia Pain:  None  Heart Rate Change: No    - Patient tolerated block procedure well without evidence of immediate block related complications.     Medications  7/24/2025 11:54 AM      Additional Comments    Medication:  Ropivacaine 0.5% 20 mL

## 2025-07-25 ENCOUNTER — HOSPITAL ENCOUNTER (EMERGENCY)
Age: 45
Discharge: HOME | End: 2025-07-25
Attending: EMERGENCY MEDICINE

## 2025-07-25 ENCOUNTER — TELEPHONE (OUTPATIENT)
Dept: ORTHOPEDICS CLINIC | Facility: CLINIC | Age: 45
End: 2025-07-25

## 2025-07-25 ENCOUNTER — TELEPHONE (OUTPATIENT)
Dept: FAMILY MEDICINE CLINIC | Facility: CLINIC | Age: 45
End: 2025-07-25

## 2025-07-25 VITALS
WEIGHT: 174 LBS | HEART RATE: 68 BPM | TEMPERATURE: 98 F | SYSTOLIC BLOOD PRESSURE: 106 MMHG | RESPIRATION RATE: 16 BRPM | OXYGEN SATURATION: 98 % | BODY MASS INDEX: 26.37 KG/M2 | HEIGHT: 68 IN | DIASTOLIC BLOOD PRESSURE: 96 MMHG

## 2025-07-25 DIAGNOSIS — G89.18 POST-OPERATIVE PAIN: Primary | ICD-10-CM

## 2025-07-25 DIAGNOSIS — G89.18 POST-OPERATIVE PAIN: ICD-10-CM

## 2025-07-25 DIAGNOSIS — S69.81XA INJURY OF TRIANGULAR FIBROCARTILAGE COMPLEX (TFCC) OF RIGHT WRIST, INITIAL ENCOUNTER: Primary | ICD-10-CM

## 2025-07-25 PROCEDURE — 96374 THER/PROPH/DIAG INJ IV PUSH: CPT

## 2025-07-25 PROCEDURE — 99284 EMERGENCY DEPT VISIT MOD MDM: CPT

## 2025-07-25 PROCEDURE — 96376 TX/PRO/DX INJ SAME DRUG ADON: CPT

## 2025-07-25 PROCEDURE — 96375 TX/PRO/DX INJ NEW DRUG ADDON: CPT

## 2025-07-25 RX ORDER — ONDANSETRON 2 MG/ML
4 INJECTION INTRAMUSCULAR; INTRAVENOUS ONCE
Status: COMPLETED | OUTPATIENT
Start: 2025-07-25 | End: 2025-07-25

## 2025-07-25 RX ORDER — OXYCODONE HYDROCHLORIDE 5 MG/1
TABLET ORAL EVERY 6 HOURS PRN
Qty: 10 TABLET | Refills: 0 | Status: SHIPPED | OUTPATIENT
Start: 2025-07-25 | End: 2025-07-30

## 2025-07-25 RX ORDER — HYDROMORPHONE HYDROCHLORIDE 1 MG/ML
1 INJECTION, SOLUTION INTRAMUSCULAR; INTRAVENOUS; SUBCUTANEOUS EVERY 30 MIN PRN
Refills: 0 | Status: DISCONTINUED | OUTPATIENT
Start: 2025-07-25 | End: 2025-07-26

## 2025-07-25 RX ORDER — OXYCODONE HYDROCHLORIDE 5 MG/1
5 TABLET, COATED ORAL EVERY 6 HOURS PRN
Qty: 30 TABLET | Refills: 0 | Status: SHIPPED | OUTPATIENT
Start: 2025-07-25 | End: 2025-07-30 | Stop reason: ALTCHOICE

## 2025-07-25 NOTE — TELEPHONE ENCOUNTER
Spoke with patient. Advised patient to call surgeon's office for pain management as Daniela More PA-C is out of the office. Patient will call back if any issues.

## 2025-07-25 NOTE — TELEPHONE ENCOUNTER
Patient states she was prescribed pain medication by Daniela More PA-C for  wrist came out of surgery yesterday, pain is still there level pain is a 7. Patient was asked to call office to prescribe medication or advise, aware Daniela More PA-C is out of office, ok to be advised by Dr. Latonya Menezes.

## 2025-07-26 NOTE — DISCHARGE INSTRUCTIONS
Take 2 extra strength acetaminophen every 6 hours.    Take two 5 mg oxycodone tablets when you get home tonight.  Use 1 to 2 tablets every 6 hours for pain.    Take a mild laxative if you are not having bowel movements every day or every other day.    Orthopedic follow-up on Monday.

## 2025-07-26 NOTE — ED PROVIDER NOTES
Patient Seen in: [facility] In [place]        History  Chief Complaint   Patient presents with    Postop/Procedure Problem     Stated Complaint: Unable to control post-op pain with Oxycodone and Norco. She had surgery on her*    Subjective:   HPI            44-year-old female status post wrist surgery yesterday presents to the emergency department complaining of severe pain to the wrist area.  She stated that she initially received a block postoperatively and then was unable to catch up with pain control.  She was initially prescribed Norco and was taking 1 tablet every 6 hours.  That was switched to oxycodone 5 mg tablets today again without symptom relief.  The patient has been taking extra strength Tylenol and did so several hours ago.  She is unable to take NSAIDs due to a history of ulcers.      Objective:     Past Medical History:    Abdominal discomfort in right lower quadrant    Abdominal hernia    Abdominal pain    Abnormal CT scan, esophagus    Acute left-sided low back pain without sciatica    Alpha-1-antitrypsin deficiency (HCC)    Anxiety    Arrhythmia    RIGHT BUNDLE BRANCH BLOCK    Arthritis    Asherman syndrome    Asthma (HCC)    Asthma exacerbation, non-allergic, moderate persistent (HCC)    Back pain    I have Spinal Stenosis that has gotten worse over the years.    Bloating    Calculus of kidney    Cancer (HCC)    Thyroid - 2010.2015    Change in hair    Chest pain    Chronic bronchiolitis (HCC)    Chronic cough    Constipation    COPD (chronic obstructive pulmonary disease) (HCC)    no Oxygen- 7/11/25- pt denies COPD    COVID-19 virus infection    Depression    Depression, major, recurrent, moderate (HCC)    Diarrhea, unspecified    Disorder of thyroid    Diverticulosis    Diverticulosis of intestine    Easy bruising    Endometriosis    Esophageal reflux    Fatigue    Food intolerance    Frequent use of laxatives    Gastric ulcer    Headache disorder    Hemorrhoids     History of gastric bypass    History of ulcer disease    Hoarseness, chronic    Hx of gastric bypass    Hx of motion sickness    Hydronephrosis    Hypokalemia    Hypothyroidism    Infertility, female    Intertrigo    Irregular bowel habits    KIDNEY STONE    Loss of appetite    Migraines    Mild protein-calorie malnutrition (HCC)    Nausea    Nephrolithiasis    Organic hypersomnia, unspecified    AHI 2 RDI 2 REM AHI 6 SaO2 curtis 89%    Osteoarthritis    Painful urination    Pancreatitis (HCC)    Pneumonia due to organism    4-5+ yrs ago    PONV (postoperative nausea and vomiting)    Problems with swallowing    PUD (peptic ulcer disease)    Rash    Rib contusion, left, initial encounter    Severe persistent asthma with exacerbation (HCC)    Shortness of breath    Sleep disturbance    Stented coronary artery    Stress    Thyroid cancer (HCC)    Visual impairment    glasses    Vocal cord dysfunction    Wears glasses    Weight gain    Weight loss              Past Surgical History:   Procedure Laterality Date    Ankle scope,part debridement Left 2015    Procedure: ARTHROSCOPY ANKLE WITH DEBRIDEMENT;  Surgeon: Marcial Evans MD;  Location: [facility]    Bronch thermoplasty 1 lobe Right 04/15/2021    Bronch thermoplasty 1 lobe Left 2021      2006    Cholecystectomy      Colonoscopy  2021    Colonoscopy      Colonoscopy,diagnostic  10/22/2013    Procedure: COLONOSCOPY, POSSIBLE BIOPSY, POSSIBLE POLYPECTOMY 82566;  Surgeon: Marcello Ferreira MD;  [place] [facility] [facility] chest ([id number])  2021    D & c      x4    Egd  2021    Gastric bypass,obese<100cm magy-en-y  2017    DR. SEGAL    Gastric bypass,obesity,sb reconstruc  2017    Gastrocnemius recession Left 2015    Procedure: GASTROC RECESSION FOOT;  Surgeon: Marcial Evans MD;  Location: [facility]    Hysterectomy  2013    partial hyst    Hysteroscopy      Inj paravert f jnt l/s 1  lev Bilateral 12/14/2015    Procedure: FACET INJECTION UNDER FLUOROSCOPY;  Surgeon: Dusty Munson DO;  Location: [facility]    Laparoscopic cholecystectomy N/A 11/23/2016    Procedure: LAPAROSCOPIC CHOLECYSTECTOMY;  Surgeon: Dai Sanchez MD;  Location: [facility] OR    Laparoscopy,diagnostic      Laparoscopy,diagnostic  06/25/2022    Lysis of adhesions  2010    M-sedaj by sm phys perfrmg svc 5+ yr Bilateral 12/14/2015    Procedure: FACET INJECTION UNDER FLUOROSCOPY;  Surgeon: Dusty Munson DO;  Location: [facility]    Domonique biopsy stereo nodule 1 site right (cpt=[id number])  07/2023    stromal fibrosis    Needle biopsy right  June 2023    Oophorectomy Left     Other  NECK SCAR REVISION    Other surgical history      laparoscopies x2    Removal of ovarian cyst(s) Right 07/08/2020    Stomach surgery procedure unlisted  2018    Surgical stent Bilateral 03/2015    Bilateral iliac stents    Thyroidectomy  04/2011    [facility] PT STATES PARTIAL 2010 AND 2015    Total abdom hysterectomy      Upper gi endo no barretts  12/2015    normal    Upper gi endoscopy performed  01/25/2017    Rivera Donaldson M.D.    Upper gi endoscopy,biopsy N/A 12/19/2015    Procedure: ESOPHAGOGASTRODUODENOSCOPY, POSSIBLE BIOPSY, POSSIBLE POLYPECTOMY 62654;  Surgeon: Brayden Giordano MD;  Location: [facility]                Social History     Socioeconomic History    Marital status:    Tobacco Use    Smoking status: Never     Passive exposure: Past    Smokeless tobacco: Never   Vaping Use    Vaping status: Never Used   Substance and Sexual Activity    Alcohol use: Not Currently     Comment: rare    Drug use: No    Sexual activity: Yes     Partners: Male   Other Topics Concern     Service No    Blood Transfusions No    Caffeine Concern No    Occupational Exposure No    Hobby Hazards No    Sleep Concern No    Stress Concern No    Weight Concern No    Special Diet No    Back Care No     Exercise Yes    Bike Helmet No    Seat Belt Yes    Self-Exams No   Social History Narrative    ** Merged History Encounter **          Social Drivers of Health     Food Insecurity: No Food Insecurity (7/2/2025)    NCSS - Food Insecurity     Worried About Running Out of Food in the Last Year: No     Ran Out of Food in the Last Year: No   Transportation Needs: No Transportation Needs (7/2/2025)    NCSS - Transportation     Lack of Transportation: No   Housing Stability: Not At Risk (7/2/2025)    NCSS - Housing/Utilities     Has Housing: Yes     Worried About Losing Housing: No     Unable to Get Utilities: No                                Physical Exam    ED Triage Vitals [07/25/25 2038]   /80   Pulse 81   Resp 18   Temp 97.8 °F (36.6 °C)   Temp src Temporal   SpO2 97 %   O2 Device None (Room air)       Current Vitals:   Vital Signs  BP: 108/76  Pulse: 66  Resp: 12  Temp: 97.8 °F (36.6 °C)  Temp src: Temporal    Oxygen Therapy  SpO2: 97 %  O2 Device: None (Room air)            Physical Exam     General Appearance: This is a young adult female sitting on a gurney.  Vital signs were reviewed per nurses notes.  Right upper extremity: Long-arm posterior mold is on.  Radial pulses present.  Capillary refill to the digits is brisk.  Skin: No rashes or lesions are noted.      ED Course  Labs Reviewed - No data to display       Intravenous access was obtained.  Patient was given 2 doses of Dilaudid 1 mg IV push each.  Pain diminished from 8-4 on a scale of 1-10.    Discharge instructions were rendered.  Patient has additional oxycodone at home.  Medications were optimized.                  MDM     #1.  Postoperative pain.  Likely a result of inability to achieve adequate pain control postoperatively at home on prescribed dosing of opioids.  Patient is also unable to take NSAIDs.  Symptomatically improved with IV analgesics.  Opioid analgesics optimized at home.  Adequate circulation and do not suspect any direct  operative pathology to cause significant pain such as compartment syndrome.        Medical Decision Making      Disposition and Plan     Clinical Impression:  1. Post-operative pain         Disposition:  Discharge  7/25/2025 10:57 pm    Follow-up:  Wilian Bo MD  [address] [place] [id number]  [phone]    Call in 3 day(s)            Medications Prescribed:  Current Discharge Medication List        START taking these medications    Details   oxyCODONE 5 MG Oral Tab Take 1-2 tablets (5-10 mg total) by mouth every 6 (six) hours as needed for Pain.  Qty: 10 tablet, Refills: 0    Associated Diagnoses: Post-operative pain      Naloxone HCl 4 MG/0.1ML Nasal Liquid 4 mg by Nasal route as needed. If patient remains unresponsive, repeat dose in other nostril 2-5 minutes after first dose.  Qty: 1 kit, Refills: 0                   Supplementary Documentation:

## 2025-07-26 NOTE — ED INITIAL ASSESSMENT (HPI)
Unable to control post-op pain with Oxycodone and Norco. She had surgery on her right wrist yesterday.

## 2025-07-26 NOTE — ED QUICK NOTES
To ER for eval of pain to the right arm s/p surgery yesterday. The pt states she underestimated the need to take the pain med continuously.because she had received a block. Sl swelling is noted to the right hand and the pt is able to wiggle her fingers with an increase in pain.

## 2025-07-26 NOTE — ED QUICK NOTES
The pts pain is a \"5\". The heplock was dc'd with the tip intact. DC instr were given to take the med as prescribed. To cont to ice and elevate the right arm. To keep her appointment on Monday with her orthopedist. To return to the nearest ER if any problems develop. She expressed an understanding and was dc'd home,in nad.

## 2025-07-28 ENCOUNTER — PATIENT MESSAGE (OUTPATIENT)
Dept: ORTHOPEDICS CLINIC | Facility: CLINIC | Age: 45
End: 2025-07-28

## 2025-07-30 ENCOUNTER — TELEPHONE (OUTPATIENT)
Dept: ORTHOPEDICS CLINIC | Facility: CLINIC | Age: 45
End: 2025-07-30

## 2025-07-30 DIAGNOSIS — G89.18 POST-OPERATIVE PAIN: ICD-10-CM

## 2025-07-30 DIAGNOSIS — S69.81XA INJURY OF TRIANGULAR FIBROCARTILAGE COMPLEX (TFCC) OF RIGHT WRIST, INITIAL ENCOUNTER: Primary | ICD-10-CM

## 2025-07-30 RX ORDER — OXYCODONE HYDROCHLORIDE 5 MG/1
TABLET ORAL EVERY 6 HOURS PRN
Qty: 30 TABLET | Refills: 0 | Status: SHIPPED | OUTPATIENT
Start: 2025-07-30

## 2025-07-31 ENCOUNTER — PATIENT MESSAGE (OUTPATIENT)
Dept: FAMILY MEDICINE CLINIC | Facility: CLINIC | Age: 45
End: 2025-07-31

## 2025-07-31 ENCOUNTER — TELEPHONE (OUTPATIENT)
Dept: FAMILY MEDICINE CLINIC | Facility: CLINIC | Age: 45
End: 2025-07-31

## 2025-07-31 DIAGNOSIS — R10.12 LEFT UPPER QUADRANT ABDOMINAL PAIN: ICD-10-CM

## 2025-07-31 DIAGNOSIS — R11.0 NAUSEA: ICD-10-CM

## 2025-07-31 PROBLEM — G89.18 PAIN FOLLOWING SURGERY OR PROCEDURE: Status: ACTIVE | Noted: 2021-05-17

## 2025-07-31 RX ORDER — ONDANSETRON 8 MG/1
8 TABLET, FILM COATED ORAL EVERY 12 HOURS PRN
Qty: 30 TABLET | Refills: 1 | OUTPATIENT
Start: 2025-07-31

## 2025-07-31 RX ORDER — ONDANSETRON 8 MG/1
8 TABLET, FILM COATED ORAL EVERY 12 HOURS PRN
Qty: 30 TABLET | Refills: 1 | Status: SHIPPED | OUTPATIENT
Start: 2025-07-31

## 2025-08-01 ENCOUNTER — LAB ENCOUNTER (OUTPATIENT)
Dept: LAB | Age: 45
End: 2025-08-01
Attending: FAMILY MEDICINE

## 2025-08-01 ENCOUNTER — OFFICE VISIT (OUTPATIENT)
Dept: ORTHOPEDICS CLINIC | Facility: CLINIC | Age: 45
End: 2025-08-01

## 2025-08-01 VITALS — WEIGHT: 175 LBS | BODY MASS INDEX: 26.52 KG/M2 | HEIGHT: 68 IN

## 2025-08-01 DIAGNOSIS — S69.81XA INJURY OF TRIANGULAR FIBROCARTILAGE COMPLEX (TFCC) OF RIGHT WRIST, INITIAL ENCOUNTER: Primary | ICD-10-CM

## 2025-08-01 DIAGNOSIS — E89.0 POSTOPERATIVE HYPOTHYROIDISM: ICD-10-CM

## 2025-08-01 LAB
T4 FREE SERPL-MCNC: 1.2 NG/DL (ref 0.8–1.7)
TSI SER-ACNC: 0.11 UIU/ML (ref 0.55–4.78)

## 2025-08-01 PROCEDURE — 36415 COLL VENOUS BLD VENIPUNCTURE: CPT

## 2025-08-01 PROCEDURE — 99024 POSTOP FOLLOW-UP VISIT: CPT | Performed by: PHYSICIAN ASSISTANT

## 2025-08-01 PROCEDURE — 84439 ASSAY OF FREE THYROXINE: CPT

## 2025-08-01 PROCEDURE — 84443 ASSAY THYROID STIM HORMONE: CPT

## 2025-08-01 PROCEDURE — 3008F BODY MASS INDEX DOCD: CPT | Performed by: PHYSICIAN ASSISTANT

## 2025-08-01 RX ORDER — OXYCODONE HYDROCHLORIDE 5 MG/1
10 TABLET ORAL EVERY 6 HOURS PRN
Qty: 50 TABLET | Refills: 0 | Status: SHIPPED | OUTPATIENT
Start: 2025-08-01

## 2025-08-02 ENCOUNTER — PATIENT MESSAGE (OUTPATIENT)
Dept: FAMILY MEDICINE CLINIC | Facility: CLINIC | Age: 45
End: 2025-08-02

## 2025-08-02 ENCOUNTER — RESULTS FOLLOW-UP (OUTPATIENT)
Dept: FAMILY MEDICINE CLINIC | Facility: CLINIC | Age: 45
End: 2025-08-02

## 2025-08-02 DIAGNOSIS — Z98.890 HISTORY OF HAND SURGERY: ICD-10-CM

## 2025-08-02 DIAGNOSIS — G89.18 POST-OPERATIVE PAIN: Primary | ICD-10-CM

## 2025-08-02 DIAGNOSIS — E89.0 POSTOPERATIVE HYPOTHYROIDISM: Primary | ICD-10-CM

## 2025-08-02 RX ORDER — LEVOTHYROXINE SODIUM 112 UG/1
112 TABLET ORAL
Qty: 90 TABLET | Refills: 0 | Status: SHIPPED | OUTPATIENT
Start: 2025-08-02

## 2025-08-05 RX ORDER — HYDROCODONE BITARTRATE AND ACETAMINOPHEN 5; 325 MG/1; MG/1
TABLET ORAL
Qty: 48 TABLET | Refills: 0 | Status: SHIPPED | OUTPATIENT
Start: 2025-08-10 | End: 2025-08-25

## 2025-08-08 DIAGNOSIS — G44.221 CHRONIC TENSION-TYPE HEADACHE, INTRACTABLE: ICD-10-CM

## 2025-08-10 ENCOUNTER — PATIENT MESSAGE (OUTPATIENT)
Dept: FAMILY MEDICINE CLINIC | Facility: CLINIC | Age: 45
End: 2025-08-10

## 2025-08-11 DIAGNOSIS — R63.2 BINGE EATING: Primary | ICD-10-CM

## 2025-08-11 RX ORDER — LISDEXAMFETAMINE DIMESYLATE 60 MG/1
60 CAPSULE ORAL DAILY
Qty: 30 CAPSULE | Refills: 0 | Status: SHIPPED | OUTPATIENT
Start: 2025-08-11 | End: 2025-09-10

## 2025-08-11 RX ORDER — LISDEXAMFETAMINE DIMESYLATE 60 MG/1
60 CAPSULE ORAL DAILY
Qty: 30 CAPSULE | Refills: 0 | Status: SHIPPED | OUTPATIENT
Start: 2025-09-11 | End: 2025-10-11

## 2025-08-11 RX ORDER — LISDEXAMFETAMINE DIMESYLATE 60 MG/1
60 CAPSULE ORAL DAILY
Qty: 30 CAPSULE | Refills: 0 | Status: SHIPPED | OUTPATIENT
Start: 2025-10-12 | End: 2025-11-11

## 2025-08-12 RX ORDER — BUTALBITAL AND ACETAMINOPHEN 325; 50 MG/1; MG/1
TABLET ORAL
Qty: 13 TABLET | Refills: 0 | Status: SHIPPED | OUTPATIENT
Start: 2025-08-12

## 2025-08-13 ENCOUNTER — MED REC SCAN ONLY (OUTPATIENT)
Dept: ORTHOPEDICS CLINIC | Facility: CLINIC | Age: 45
End: 2025-08-13

## 2025-08-13 ENCOUNTER — PATIENT MESSAGE (OUTPATIENT)
Dept: FAMILY MEDICINE CLINIC | Facility: CLINIC | Age: 45
End: 2025-08-13

## 2025-08-13 DIAGNOSIS — Z98.890 HISTORY OF HAND SURGERY: ICD-10-CM

## 2025-08-13 DIAGNOSIS — G89.18 POST-OPERATIVE PAIN: ICD-10-CM

## 2025-08-15 RX ORDER — HYDROCODONE BITARTRATE AND ACETAMINOPHEN 5; 325 MG/1; MG/1
TABLET ORAL
Qty: 33 TABLET | Refills: 0 | Status: SHIPPED | OUTPATIENT
Start: 2025-08-16 | End: 2025-08-28

## 2025-08-20 RX ORDER — ARIPIPRAZOLE 2 MG/1
2 TABLET ORAL DAILY
Qty: 90 TABLET | Refills: 0 | Status: SHIPPED | OUTPATIENT
Start: 2025-08-20

## 2025-08-22 ENCOUNTER — OFFICE VISIT (OUTPATIENT)
Dept: ORTHOPEDICS CLINIC | Facility: CLINIC | Age: 45
End: 2025-08-22

## 2025-08-22 ENCOUNTER — TELEPHONE (OUTPATIENT)
Dept: ORTHOPEDICS CLINIC | Facility: CLINIC | Age: 45
End: 2025-08-22

## 2025-08-22 ENCOUNTER — PATIENT MESSAGE (OUTPATIENT)
Dept: ORTHOPEDICS CLINIC | Facility: CLINIC | Age: 45
End: 2025-08-22

## 2025-08-22 VITALS — HEIGHT: 68 IN | BODY MASS INDEX: 26.52 KG/M2 | WEIGHT: 175 LBS

## 2025-08-22 DIAGNOSIS — S69.81XA INJURY OF TRIANGULAR FIBROCARTILAGE COMPLEX (TFCC) OF RIGHT WRIST, INITIAL ENCOUNTER: Primary | ICD-10-CM

## 2025-08-22 PROCEDURE — 99024 POSTOP FOLLOW-UP VISIT: CPT | Performed by: PHYSICIAN ASSISTANT

## 2025-08-22 PROCEDURE — 3008F BODY MASS INDEX DOCD: CPT | Performed by: PHYSICIAN ASSISTANT

## 2025-08-22 RX ORDER — METHYLPREDNISOLONE 4 MG/1
TABLET ORAL
Qty: 1 EACH | Refills: 0 | Status: SHIPPED | OUTPATIENT
Start: 2025-08-22

## 2025-08-22 RX ORDER — HYDROCODONE BITARTRATE AND ACETAMINOPHEN 5; 325 MG/1; MG/1
1 TABLET ORAL EVERY 4 HOURS PRN
Qty: 30 TABLET | Refills: 0 | Status: SHIPPED | OUTPATIENT
Start: 2025-08-22

## 2025-08-25 ENCOUNTER — MED REC SCAN ONLY (OUTPATIENT)
Dept: ORTHOPEDICS CLINIC | Facility: CLINIC | Age: 45
End: 2025-08-25

## 2025-08-25 DIAGNOSIS — G62.9 NEUROPATHY: ICD-10-CM

## 2025-08-25 DIAGNOSIS — M79.7 FIBROMYALGIA: ICD-10-CM

## 2025-08-25 DIAGNOSIS — M54.16 CHRONIC LEFT-SIDED LUMBAR RADICULOPATHY: ICD-10-CM

## 2025-08-25 RX ORDER — PREGABALIN 75 MG/1
75 CAPSULE ORAL 3 TIMES DAILY
Qty: 90 CAPSULE | Refills: 0 | Status: SHIPPED | OUTPATIENT
Start: 2025-08-25

## 2025-08-25 RX ORDER — OXYBUTYNIN CHLORIDE 10 MG/1
10 TABLET, EXTENDED RELEASE ORAL DAILY
Qty: 90 TABLET | Refills: 3 | OUTPATIENT
Start: 2025-08-25

## 2025-08-27 ENCOUNTER — PATIENT MESSAGE (OUTPATIENT)
Dept: FAMILY MEDICINE CLINIC | Facility: CLINIC | Age: 45
End: 2025-08-27

## 2025-08-27 DIAGNOSIS — S69.81XA INJURY OF TRIANGULAR FIBROCARTILAGE COMPLEX (TFCC) OF RIGHT WRIST, INITIAL ENCOUNTER: ICD-10-CM

## 2025-08-27 DIAGNOSIS — G89.18 POSTOPERATIVE PAIN: Primary | ICD-10-CM

## 2025-08-28 DIAGNOSIS — F41.1 GAD (GENERALIZED ANXIETY DISORDER): ICD-10-CM

## 2025-08-28 RX ORDER — ALPRAZOLAM 0.25 MG
TABLET ORAL
Qty: 60 TABLET | Refills: 0 | Status: SHIPPED | OUTPATIENT
Start: 2025-08-28

## 2025-08-28 RX ORDER — HYDROCODONE BITARTRATE AND ACETAMINOPHEN 5; 325 MG/1; MG/1
TABLET ORAL
Qty: 18 TABLET | Refills: 0 | Status: SHIPPED | OUTPATIENT
Start: 2025-08-28 | End: 2025-09-03

## (undated) DIAGNOSIS — R19.7 NAUSEA VOMITING AND DIARRHEA: ICD-10-CM

## (undated) DIAGNOSIS — E66.9 OBESITY (BMI 30-39.9): ICD-10-CM

## (undated) DIAGNOSIS — G44.221 CHRONIC TENSION-TYPE HEADACHE, INTRACTABLE: ICD-10-CM

## (undated) DIAGNOSIS — R63.2 BINGE EATING: ICD-10-CM

## (undated) DIAGNOSIS — R10.13 EPIGASTRIC PAIN: Primary | ICD-10-CM

## (undated) DIAGNOSIS — R73.9 HYPERGLYCEMIA: Primary | ICD-10-CM

## (undated) DIAGNOSIS — R10.13 EPIGASTRIC ABDOMINAL PAIN: ICD-10-CM

## (undated) DIAGNOSIS — F41.1 GAD (GENERALIZED ANXIETY DISORDER): ICD-10-CM

## (undated) DIAGNOSIS — E44.1 MILD PROTEIN-CALORIE MALNUTRITION (HCC): ICD-10-CM

## (undated) DIAGNOSIS — R11.0 NAUSEA: ICD-10-CM

## (undated) DIAGNOSIS — R10.13 EPIGASTRIC PAIN: ICD-10-CM

## (undated) DIAGNOSIS — R11.2 NAUSEA VOMITING AND DIARRHEA: ICD-10-CM

## (undated) DIAGNOSIS — F51.01 PRIMARY INSOMNIA: ICD-10-CM

## (undated) DIAGNOSIS — R10.12 LEFT UPPER QUADRANT ABDOMINAL PAIN: ICD-10-CM

## (undated) DIAGNOSIS — R82.2 BILIRUBIN IN URINE: ICD-10-CM

## (undated) DEVICE — DEVICE CLSR CRTR-THOMASON CLSR

## (undated) DEVICE — TROCAR: Brand: KII FIOS FIRST ENTRY

## (undated) DEVICE — Device

## (undated) DEVICE — PACK UPPER EXTREMITY

## (undated) DEVICE — GAMMEX® PI HYBRID SIZE 7.5, STERILE POWDER-FREE SURGICAL GLOVE, POLYISOPRENE AND NEOPRENE BLEND: Brand: GAMMEX

## (undated) DEVICE — SUTURE PASSOR WITH GUIDE

## (undated) DEVICE — SLEEVE COMPR MD KNEE LEN SGL USE KENDALL SCD

## (undated) DEVICE — LAWSON - TROCAR V2 BLDLS OPTCL 5MM STD

## (undated) DEVICE — PROXIMATE SKIN STAPLERS (35 WIDE) CONTAINS 35 STAINLESS STEEL STAPLES (FIXED HEAD): Brand: PROXIMATE

## (undated) DEVICE — DISSECTOR SONICISION CORDLESS

## (undated) DEVICE — [HIGH FLOW INSUFFLATOR,  DO NOT USE IF PACKAGE IS DAMAGED,  KEEP DRY,  KEEP AWAY FROM SUNLIGHT,  PROTECT FROM HEAT AND RADIOACTIVE SOURCES.]: Brand: PNEUMOSURE

## (undated) DEVICE — LAWSON - CANISTER SUCT W/LID 2000CC

## (undated) DEVICE — SLING SHLDR XL ULT SFT STRP ESSENTIAL

## (undated) DEVICE — ZZDISC - USE 405166-SUT COAT VCRL 3-0 27IN SH ABSRB UD 26MM 1/2

## (undated) DEVICE — ANTISEPTIC 4OZ 70% ISO ALC

## (undated) DEVICE — SUT PDS II 4-0 27IN RB-1 ABSRB VLT L17MM 1/2

## (undated) DEVICE — NEEDLE SPNL 18GA L3.5IN PNK QNCKE STYL DISP

## (undated) DEVICE — DRAPE SHEET LAPCHOLE 124X100X7

## (undated) DEVICE — GLOVE SUR 6.5 SENSICARE PI PIP CRM PWD F

## (undated) DEVICE — FORCEP RADIAL JAW 4

## (undated) DEVICE — SOLUTION IV 1000ML 0.9% NACL PRESERVATIVE

## (undated) DEVICE — GAUZE - RF AND X-RAY DETECTABLE USP TYPE VII, 16 PLY: Brand: SITUATE

## (undated) DEVICE — SUT MCRYL 4-0 18IN PS-2 ABSRB UD 19MM 3/8 CIR

## (undated) DEVICE — #15 STERILE STAINLESS BLADE: Brand: STERILE STAINLESS BLADES

## (undated) DEVICE — HOVERMATT 34IN SINGLE USE

## (undated) DEVICE — SUTURING DEVICE: Brand: ENDO STITCH

## (undated) DEVICE — LAP CHOLE: Brand: MEDLINE INDUSTRIES, INC.

## (undated) DEVICE — SOL  .9 1000ML BTL

## (undated) DEVICE — STERILE LATEX POWDER-FREE SURGICAL GLOVESWITH NITRILE COATING: Brand: PROTEXIS

## (undated) DEVICE — Device: Brand: DEFENDO AIR/WATER/SUCTION AND BIOPSY VALVE

## (undated) DEVICE — PACK GYNE CUSTOM

## (undated) DEVICE — ADHESIVE LIQ 2/3ML VI MASTISOL

## (undated) DEVICE — ENDOSTITCH SURGIDAC 0

## (undated) DEVICE — LAWSON - GOWN SURG STD XL STL DISP

## (undated) DEVICE — ENDOSCOPY PACK UPPER: Brand: MEDLINE INDUSTRIES, INC.

## (undated) DEVICE — GLOVE SUR 7.5 SENSICARE PI PIP GRN PWD F

## (undated) DEVICE — ENDOSCOPY PORT CONNECTOR FOR OLYMPUS® SCOPES: Brand: ERBE

## (undated) DEVICE — HYBRID TUBING/CAP SET FOR OLYMPUS® SCOPES: Brand: ERBE

## (undated) DEVICE — BANDAGE COBAN 4IN X 5YD TAN E POR SLF ADH COBAN

## (undated) DEVICE — TROCAR: Brand: KII® SLEEVE

## (undated) DEVICE — SOLUTION  .9 1000ML BTL

## (undated) DEVICE — GAMMEX® PI HYBRID SIZE 7, STERILE POWDER-FREE SURGICAL GLOVE, POLYISOPRENE AND NEOPRENE BLEND: Brand: GAMMEX

## (undated) DEVICE — KIT STAPLER BARIATRIC BYPASS

## (undated) DEVICE — LIGAMAX 5 MM ENDOSCOPIC MULTIPLE CLIP APPLIER: Brand: LIGAMAX

## (undated) DEVICE — CUFF TRNQT 3IN X 18IN RED CYL SGL BLDR 2 PRT

## (undated) DEVICE — WOUND RETRACTOR AND PROTECTOR: Brand: ALEXIS WOUND PROTECTOR-RETRACTOR

## (undated) DEVICE — TROCARS: Brand: KII® BALLOON BLUNT TIP SYSTEM

## (undated) DEVICE — PENCIL SMK EVAC L10FT MPLR BLDE JAW OPN

## (undated) DEVICE — GLOVE SUR 7.5 SENSICARE PI PIP CRM PWD F

## (undated) DEVICE — LAWSON - CONTAINER SPCMN STL 5OZ

## (undated) DEVICE — SPLINT ONESTEP 4X30IN FBRGLS MOLD

## (undated) DEVICE — STANDARD HYPODERMIC NEEDLE,POLYPROPYLENE HUB: Brand: MONOJECT

## (undated) DEVICE — PREMIUM WET SKIN PREP TRAY: Brand: MEDLINE INDUSTRIES, INC.

## (undated) DEVICE — GLOVE SUR 7 SENSICARE PI PIP CRM PWD F

## (undated) DEVICE — SET CYSTO IRRIG L77IN DIA0.241IN BLDR NVENT

## (undated) DEVICE — LAWSON - DEVICE PNEUVIEW XE SMK ELMIN

## (undated) DEVICE — SYRINGE 10ML LL CONTRL SYRINGE

## (undated) DEVICE — SUTURE PDS II 1 CT-1

## (undated) DEVICE — SUT MCRYL 5-0 18IN P-3 ABSRB UD 13MM 3/8 CIR

## (undated) DEVICE — TISSUE RETRIEVAL SYSTEM: Brand: INZII RETRIEVAL SYSTEM

## (undated) DEVICE — Device: Brand: JELCO

## (undated) DEVICE — UNDYED BRAIDED (POLYGLACTIN 910), SYNTHETIC ABSORBABLE SUTURE: Brand: COATED VICRYL

## (undated) DEVICE — LAWSON - CANISTER SUCT W/LID 3000CC

## (undated) DEVICE — PADDING CAST 3X144IN HT COT SYN RL N ADH

## (undated) DEVICE — 3M™ STERI-DRAPE™ INSTRUMENT POUCH 1018L: Brand: STERI-DRAPE™

## (undated) DEVICE — 1200CC GUARDIAN II: Brand: GUARDIAN

## (undated) DEVICE — CLOSURE EXOFIN 1.0ML

## (undated) DEVICE — SOL  .9 1000ML BAG

## (undated) DEVICE — DRAPE SHEET LG

## (undated) DEVICE — SOLUTION IRRIG 1000ML 0.9% NACL USP BTL

## (undated) DEVICE — VIOLET BRAIDED (POLYGLACTIN 910), SYNTHETIC ABSORBABLE SUTURE: Brand: COATED VICRYL

## (undated) DEVICE — NEEDLE HPO 18GA 1.5IN ECLPS

## (undated) DEVICE — KIT ORTH TENODESIS DISP FOR 3X8MM SCR SYS

## (undated) DEVICE — LAWSON - WATER STL IRR PIC 1000ML

## (undated) NOTE — MR AVS SNAPSHOT
[facility]  [phone]               Thank you for choosing us for your health care visit with Chloe Murray PA-C.   We are glad to serve you and happy to provide you with [facility] · Ask your doctor about the risks of ERCP. These include pancreatitis, infection, bleeding, and tearing the bowel. · Be sure your doctor knows about all medicines you take.  You may be told to stop taking some or all of them before the test. This includes: · If a cut was made in the duct, avoid blood-thinning medicines such as aspirin for 5 to 7 days. · Call your doctor right away if you have a fever or abdominal pain. These may be signs of an infection or torn bowel.    Date Last Reviewed: 6/19/2015 © 2000 aspirin 81 MG Tabs   Take 81 mg by mouth daily. Budesonide-Formoterol Fumarate 160-4.5 MCG/ACT Aero   Inhale 2 puffs into the lungs 2 (two) times daily.    Commonly known as:  SYMBICORT           calciTRIOL 0.25 MCG Caps   Take 1 capsule (0.25 mc You can get these medications from any pharmacy     Bring a paper prescription for each of these medications    - HYDROcodone-acetaminophen 5-325 MG Tabs            MyChart     Visit MyChart  You can access your MyChart to more actively manage your health

## (undated) NOTE — ED AVS SNAPSHOT
Marcello Alvarenga   MRN: [id number]    Department:  [address] [facility] in [place]   Date of Visit:  6/5/2018           Disclosure     Insurance plans vary and the physician(s) referred by the ER may not be covered by your plan.  Please cont tell this physician (or your personal doctor if your instructions are to return to your personal doctor) about any new or lasting problems. The primary care or specialist physician will see patients referred from the [facility] Emergency Department.  Mackenzie Meade

## (undated) NOTE — Clinical Note
HI    I am seeing Ronel Tamayo today for her UGI discomfort. A few months ago she had an elevated lipase, and has even had pancreatitis in the past.     Would it be helpful to look into this again?    I did not order blood work, thinking you might want to order

## (undated) NOTE — Clinical Note
2017    Patient: Kiara Carolina  : 1980 Visit date: 2017    Dear  Dr. Alexi Ortega, PA-C,    Anna Cruz is a pleasant but unfortunate 39year old,, female, who was referred to us for weight management recommendations.   Anna Cruz is i

## (undated) NOTE — ED AVS SNAPSHOT
THE [facility] Emergency Department in [address] [facility]    Phone:  [phone]    Fax:  [phone]   MRN: [id number]    Department:  THE [facility] Emergency Department in [place]   Date of Vis IF THERE IS ANY CHANGE OR WORSENING OF YOUR CONDITION, CALL YOUR PRIMARY CARE PHYSICIAN AT ONCE OR RETURN IMMEDIATELY TO THE EMERGENCY DEPARTMENT.     If you have been prescribed any medication(s), please fill your prescription right away and begin taking t

## (undated) NOTE — LETTER
11/10/20      Re: Deja Salomon  (09/29/20)      To Whom It May Concern: The above patient is currently under my medical care. I recommend she work from home intermittently secondary to health condition.  This is recommended until recovery is complete

## (undated) NOTE — ED AVS SNAPSHOT
THE [facility] Department in [address] [facility]    Phone:  [phone]    Fax:  [phone]   MRN: [id number]    Department:  THE [facility] in [place]   Date of Vis Admin Date Administration Dose                   03/01/2017  16:04 morphINE sulfate (PF) 4 MG/ML injection 4 mg 4 mg                Admin Date Administration Dose                   03/01/2017  17:35 ibuprofen (MOTRIN) tab 600 mg 600 mg self-assessment the day after your visit. You may also receive a call from our patient liason soon after your visit. Also, some patients receive a detailed feedback survey mailed to them a week after the visit.   If you receive this, we would really apprec [facility] [address] [facility] [id number] ([address]) [facility] [address] [facility]. ([address]) Critical access hospital pain to the left side of her chest on 2/28/17. She states the pain is dull and sharp at times. FINDINGS:  Lung volumes are satisfactory. No new consolidation or pleural effusion.  Heart and pulmonary vessels appear stable, with heart size upper nor

## (undated) NOTE — MR AVS SNAPSHOT
[facility] [place] [id number] [phone]               Thank you for choosing us for your health care visit with Lily Simental MD.  We are glad to serve you and happy to provide you with this summary of you Norco [apap-fd&c Yellow #10 Al Alejo-hydrocodone] Nausea only    Reglan [metoclopramide]     Reglan with benadryl, feels like she is going to jump out of her skin    Toradol [ketorolac Tromethamine] Rash    Tramadol Rash    Azithromycin Restless    tinglin mcg to total 175 mcg.         * UNITHROID 175 MCG Tabs  Generic drug:  Levothyroxine Sodium  Take 1 tablet (175 mcg total) by mouth before breakfast.        * TIROSINT 25 MCG Caps  Generic drug:  Levothyroxine Sodium  TAKE ONE CAPSULE BY MOUTH EVERY DAY BEF

## (undated) NOTE — MR AVS SNAPSHOT
[facility] [place] [phone]               Thank you for choosing us for your health care visit with Mir Cruz MD.  We are glad to serve you and happy to provide you with this summary of you Mold     Verified by skin testing    Norco [Apap-Fd&C Yellow #10 Al Alejo-Hydrocodone] Nausea only    Reglan [Metoclopramide]     Reglan with benadryl, feels like she is going to jump out of her skin    Toradol [Ketorolac Tromethamine] Rash    Tramadol Travis Take 1 tablet by mouth daily. Zolpidem Tartrate ER 12.5 MG Tbcr   Take 12.5 mg by mouth nightly.    Commonly known as:  AMBIEN CR                   MyChart     Visit MyChart  You can access your MyChart to more actively manage your health care and

## (undated) NOTE — LETTER
[employer] for Marcello Alvarenga     : 1980     Date: 1/15/2019  Provider:  Karen Hahn PA-C  Phone for doctor or clinic: [facility], [address] [place] [address], [address] [place] [address]  [phone]

## (undated) NOTE — MR AVS SNAPSHOT
[facility] [place]  [facility]  [phone]               Thank you for choosing us for your health care visit with Alea De Dios PA-C.   We are glad to serve you and happy to provide you with [facility] · Click \"Lab services\"  · Click \"Schedule Your Test Online\"  · Follow the prompts  · If you have questions, contact Central Scheduling at [phone] at [phone] a and this prescription was added. Make sure you understand how and when to take each. Commonly known as:  ADIPEX-P           SOLUBLE FIBER/PROBIOTICS OR   Take 1 capsule by mouth daily. Vitamin C 500 MG Tabs   Take 500 mg by mouth daily.    Commo

## (undated) NOTE — MR AVS SNAPSHOT
After Visit Summary   7/16/2019    Agusto Jaimes    MRN: [id number]           Visit Information     Date & Time  7/16/2019 10:15 AM Provider  Estrada Domingo MD [address] [place] [address],[address], [facility]  Phone Fluticasone Propionate HFA (FLOVENT HFA) 110 MCG/ACT Inhalation Aerosol Inhale 2 puffs into the lungs 2 (two) times daily. topiramate 25 MG Oral Tab Take 1 tablet (25 mg total) by mouth every evening.     Lisdexamfetamine Dimesylate (VYVANSE) 50 MG Oral complete it and provide feedback. We strive to deliver the best patient experience and are looking for ways to make improvements. Your feedback will help us do so. For more information on [employer], please visit [email or web address]

## (undated) NOTE — MR AVS SNAPSHOT
[address] [facility] [facility]  [phone]               Thank you for choosing us for your health care visit with Bahman De PA-C.   We are glad to serve you and happy to provide you with [facility] Wednesday every 6 hours            Allergies as of Jun 12, 2017     Cephalosporins Hives    Cheratussin Ac [Guaifenesin-Codeine] Nausea only    Chocolate Hives    Doxycycline     Hives and fever     Mold     Verified by skin testing    Norco [Apap-Fd&C Yel TK 1 T PO AOS OF HEADACHE. MAY REPEAT 1 T IN 2 HOURS. MAX DOSE 2 TS / 24 HOURS           SOLUBLE FIBER/PROBIOTICS OR   Take 1 capsule by mouth daily. TIROSINT 150 MCG Caps   Generic drug:  Levothyroxine Sodium   Take 1 capsule by mouth daily.

## (undated) NOTE — LETTER
[facility]  Administration of Anesthesia  1. Adeline Chavis, or _________________________________ acting on her behalf, (Patient) (Dependent/Representative) request to receive anesthesia for my pending procedure/operation/treatment. infections, high spinal block, spinal bleeding, seizure, cardiac arrest and death. 7. AWARENESS: I understand that it is possible (but unlikely) to have explicit memory of events from the operating room while under general anesthesia.   8. ELECTROCONVULSIV unconscious pt /Relationship    My signature below affirms that prior to the time of the procedure, I have explained to the patient and/or his/her guardian, the risks and benefits of undergoing anesthesia, as well as any reasonable alternatives.     _______

## (undated) NOTE — ED AVS SNAPSHOT
Susanajeffery Caldwell   MRN: [id number]    Department:  [facility] Emergency Department   Date of Visit:  3/29/2018           Disclosure     Insurance plans vary and the physician(s) referred by the ER may not be covered by your plan.  Please conta CARE PHYSICIAN AT ONCE OR RETURN IMMEDIATELY TO THE EMERGENCY DEPARTMENT. If you have been prescribed any medication(s), please fill your prescription right away and begin taking the medication(s) as directed.   If you believe that any of the medications

## (undated) NOTE — ED AVS SNAPSHOT
[facility] Emergency Department in [address] [facility]    Phone:  [phone]    Fax:  [address]   MRN: [id number]    Department:  [facility] Emergency Department in [facility]   Date of Vis Jun 19, 2017  2:00 PM   Non Surgical Consult with Tyrell Bryant MD   [address], [facility]    [address]             Jul 25, 2017  1:00 PM   FOLLOW U Bring a paper prescription for each of these medications    - HYDROcodone-acetaminophen 5-325 MG Tabs  - ondansetron 4 MG Tbdp            Discharge References/Attachments     ABDOMINAL PAIN, UNKNOWN CAUSE, (FEMALE) (ENGLISH)    ENDOMETRIOSIS (ENGLISH) IF THERE IS ANY CHANGE OR WORSENING OF YOUR CONDITION, CALL YOUR PRIMARY CARE PHYSICIAN AT ONCE OR RETURN IMMEDIATELY TO THE EMERGENCY DEPARTMENT.     If you have been prescribed any medication(s), please fill your prescription right away and begin taking t [address] to help you get signed up for insurance coverage. Patient [address] is a Federal Navigator program that can help with your Affordable Care Act coverage, as well as all types of Medicaid plans.   To get signed up and covere SPLEEN:  Normal.  No enlargement or focal lesion. KIDNEYS:  Normal.  No mass, obstruction, or calcification. ADRENALS:  Normal.  No mass or enlargement. AORTA/VASCULAR:  Normal appearance of the aorta and iliac vessels.  There is a stable venous st

## (undated) NOTE — MR AVS SNAPSHOT
[facility]  [phone]               Thank you for choosing us for your health care visit with Moon Dunlap MD.  We are glad to serve you and happy to provide you with this turner Intractable migraine without aura and without status migrainosus    -  Primary      Instructions and Information about Your Health    -- take one dose of sumatriptan 50mg when you get home, can repeat in 2 hours if still having symptoms  -- start indometh SOLUBLE FIBER/PROBIOTICS OR   Take 1 capsule by mouth daily. SUMAtriptan Succinate 50 MG Tabs   Take 1 tablet (50 mg total) by mouth every 2 (two) hours as needed for Migraine.  Do not exceed more than 2 tabs in 24h period   Commonly known

## (undated) NOTE — MR AVS SNAPSHOT
[facility]  [phone]               Thank you for choosing us for your health care visit with Abena King MD.  We are glad to serve you and happy to provide you with this turner Azithromycin Restless    tingling                Today's Vital Signs     BP Pulse Temp Height Weight BMI    114/68 mmHg 88 97.8 °F (36.6 °C) (Oral) 69\" 264 lb 38.97 kg/m2         Current Medications          This list is accurate as of: 1/30/17  3:28 PM. Where to Get Your Medications      These medications [facility], [address] [place] [address] -[address] [facility], [phone], [phone]  [address] [place] [address], [address] [place] [address] Road [address]     Phone:  [phone]

## (undated) NOTE — MR AVS SNAPSHOT
After Visit Summary   12/11/2018    Lucien James    MRN: [id number]           Visit Information     Date & Time  12/11/2018 10:00 AM Provider  Dot Rascon MD [address] [place] [address] Dept.  Phone Zolpidem Tartrate 5 MG Oral Tab Take 0.5-1 tablets (2.5-5 mg total) by mouth nightly. pregabalin (LYRICA) 25 MG Oral Cap Take 1 capsule (25 mg total) by mouth 2 (two) times daily.     CALCITRIOL 0.25 MCG Oral Cap TAKE 1 CAPSULE(0.25 MCG) BY MOUTH TWICE If you receive a survey from [employer] please take a few minutes to complete it and provide feedback. We strive to deliver the best patient experience and are looking for ways to make improvements. Your feedback will help us do so.  For more information

## (undated) NOTE — LETTER
21    Patient: Lorena Catherine  : 1980 Visit date: 2020    Dear  No Recipients    Thank you for referring Lorena Catherine to my practice. Please find my assessment and plan below.        History of Present Illness   36year-old fe We will plan for the patient to have an ultrasound of her abdomen completed for evaluation of possible hernia. She will follow-up following the scan.     Should ultrasound be negative, repeat CT scan abdomen pelvis could be considered        Sincerely,

## (undated) NOTE — MR AVS SNAPSHOT
[address]  [phone]               Thank you for choosing us for your health care visit with [facility],[address], [address] [place] [address].   We are glad to serve you and happy to provide you with this summary of your Chocolate Hives    Doxycycline     Hives and fever     Mold     Verified by skin testing    Norco [Apap-Fd&C Yellow #10 Al Alejo-Hydrocodone] Nausea only    Reglan [Metoclopramide]     Reglan with benadryl, feels like she is going to jump out of her skin TIROSINT 150 MCG Caps   Generic drug:  Levothyroxine Sodium   Take 1 capsule by mouth daily. Vitamin C 500 MG Tabs   Take 500 mg by mouth daily.    Commonly known as:  VITAMIN C           Vitamin D 1000 units Tabs   Take 1 tablet by mouth stevie

## (undated) NOTE — MR AVS SNAPSHOT
[facility] Angelique  Sam Herrera [facility]  [phone]               Thank you for choosing us for your health care visit with Joaquín Ribeiro MD.  We are glad to serve you and happy to provide you with this turner Reglan [Metoclopramide]     Reglan with benadryl, feels like she is going to jump out of her skin    Tramadol Rash    Azithromycin Restless    tingling                Today's Vital Signs     BP Pulse Height Weight BMI    128/66 mmHg 94 69\" 267 lb 39.41 k [email or web address]

## (undated) NOTE — MR AVS SNAPSHOT
[address] [facility]  [phone]           Thank you for choosing [address] for your health care visit with Terrance Morrison PsyD.   We are glad to serve you and happy to provide you with this turner TAKE 1 CAPSULE BY MOUTH DAILY           PROAIR  (90 Base) MCG/ACT Aers   Generic drug:  Albuterol Sulfate HFA   Inhale into the lungs every 6 (six) hours as needed for Wheezing.            RELPAX 40 MG Tabs   Generic drug:  Eletriptan Hydrobromide [facility]              Demographic Information     Date Of Birth Race Ethnicity Preferred Language       Sep 29, 1980 White NON  OR  OR  ETHNICITY [place]       Ayush           Visit MyChart  You ca

## (undated) NOTE — MR AVS SNAPSHOT
[facility]  [phone]               Thank you for choosing us for your health care visit with Shelby Gallego PA-C.   We are glad to serve you and happy to provide you with [facility] Preop examination    -  Primary      Instructions and Information about Your Health    [address] [place] [address] LAB APPOINTMENTS ONLINE    Lab appointments can now be scheduled online at [email or web address]    · Go to [email or web address]  · In Search type Lab  · Click TAKE 1 CAPSULE BY MOUTH DAILY           PROAIR  (90 Base) MCG/ACT Aers   Generic drug:  Albuterol Sulfate HFA   Inhale into the lungs every 6 (six) hours as needed for Wheezing.            RELPAX 40 MG Tabs   Generic drug:  Eletriptan Hydrobromide Visit [facility] online at  [email or web address]

## (undated) NOTE — Clinical Note
FYI, TCM call made, see notes. OSWALDO confirmed with patient that she has a HFU scheduled today 11/3/2020  At 3:30 with Dr. Jamilah Michelle, OSWALDO changed visit type to Thanh Song 1137, patient has concerns that the dosage of prednisone is not enough.

## (undated) NOTE — ED AVS SNAPSHOT
Alis Crisostomo   MRN: [id number]    Department:  [address] Emergency Department in [facility]   Date of Visit:  9/28/2018           Disclosure     Insurance plans vary and the physician(s) referred by the ER may not be covered by your plan.  Please con tell this physician (or your personal doctor if your instructions are to return to your personal doctor) about any new or lasting problems. The primary care or specialist physician will see patients referred from the [facility] Emergency Department.  Severo Pastures

## (undated) NOTE — ED AVS SNAPSHOT
[facility] in [address] [facility]    Phone:  [phone]    Fax:  [address]   MRN: [id number]    Department:  [facility] in [place]   Date of Vis 02/07/2017  14:01 HYDROmorphone HCl PF (DILAUDID) 1 MG/ML injection 0.5 mg 0.5 mg                Admin Date Administration Dose                   02/07/2017  14:33 ondansetron HCl (ZOFRAN) injection 4 mg 4 mg                Admin Date Administration Dose self-assessment the day after your visit. You may also receive a call from our patient liason soon after your visit. Also, some patients receive a detailed feedback survey mailed to them a week after the visit.   If you receive this, we would really apprec [facility] [address] [facility] (68 [facility] [id number] 2064 Lamont Bermudez 139 ([address]) [facility] R[address]. 97. [phone] [facility]. ([address]) [facility] Jaimes TECHNIQUE:  Ultrasound of the pelvis was performed with a transvaginal and transabdominal probe. Doppler evaluation of the ovaries was performed of the ovarian arteries and veins.   B-mode images, Doppler color flow, and spectral waveform analysis were   p Visit [employer]  You can access your MyChart to more actively manage your health care and view more details from this visit by going to [email or web address].   If you've recently had a stay at the Hospital you can access your discharge instructions i

## (undated) NOTE — ED AVS SNAPSHOT
THE [facility] Emergency Department in [address] [facility]    Phone:  [phone]    Fax:  [address]   MRN: [id number]    Department:  THE [facility] Emergency Department in [place]   Date of Vis IF THERE IS ANY CHANGE OR WORSENING OF YOUR CONDITION, CALL YOUR PRIMARY CARE PHYSICIAN AT ONCE OR RETURN IMMEDIATELY TO THE EMERGENCY DEPARTMENT.     If you have been prescribed any medication(s), please fill your prescription right away and begin taking t

## (undated) NOTE — LETTER
Patient Name: Kiara Carolina  YOB: 1980          MRN number:  [id number]  Date:  11/14/2017  Referring Physician:  Larry Bobo MD     Discharge Summary    Dx: Gastrocnemius equinus, right (M21.6X1)  Authorized # of Visits: 7/8   Nex

## (undated) NOTE — LETTER
[address], [place]  Authorization for Invasive Procedures  1.  I hereby authorize Dr. William Xavier , my physician and whomever may be designated as the doctor's assistant, to perform the following operation and/or procedure:  Esophagog performed for the purposes of advancing medicine, science, and/or education, provided my identity is not revealed. If the procedure has been videotaped, the physician/surgeon will obtain the original videotape.  The hospital will not be responsible for stor My signature below affirms that prior to the time of the procedure, I have explained to the patient and/or her legal representative, the risks and benefits involved in the proposed treatment and any reasonable alternative to the proposed treatment.  I have

## (undated) NOTE — ED AVS SNAPSHOT
Marcello Alvarenga   MRN: [id number]    Department:  [facility] Emergency Department   Date of Visit:  5/12/2018           Disclosure     Insurance plans vary and the physician(s) referred by the ER may not be covered by your plan.  Please conta CARE PHYSICIAN AT ONCE OR RETURN IMMEDIATELY TO THE EMERGENCY DEPARTMENT. If you have been prescribed any medication(s), please fill your prescription right away and begin taking the medication(s) as directed.   If you believe that any of the medications

## (undated) NOTE — ED AVS SNAPSHOT
[facility] Emergency Department    [facility]  [address] [address]    Phone:  [phone]    Fax:  [address]   MRN: [id number]    Department:  [facility] Emergency Department   Date of Visit:  1 01/02/2017  15:53 HYDROmorphone HCl PF (DILAUDID) 1 MG/ML injection 1 mg 1 mg                Admin Date Administration Dose                   01/02/2017  16:51 HYDROcodone-acetaminophen (NORCO) 5-325 MG per tab 1 tablet 1 tablet                     Med[employer] Click [email or web address]      Or call [phone]    If you have any problems with your follow-up, please call our  at [phone]    Si usted tiene algun problema con turner sequimiento, por favor llame a nuestro adminstrador de casos al (12 24-Hour Pharmacies        Pharmacy Address Phone Number   [address] [facility] ([address] [facility]) [address] [facility] [address] [place] [address] 289. (900 [facility]) [address] [facility]. Tiny 7 mm hypodense lesion within the inferior pole of the left kidney which is too small to characterize but may represent a small cyst.         Dictated by: Jessi Tadeo MD on 1/02/2017 at 15:57       Approved by: Jessi Tadeo MD              N MyChart     Visit Artsicle  You can access your MyChart to more actively manage your health care and view more details from this visit by going to [email or web address].   If you've recently had a stay at the Hospital you can access your discha

## (undated) NOTE — LETTER
Date: 7/28/2019    Patient Name: Nunu Starkey          To Whom it may concern: This letter has been written at the patient's request. The above patient was seen at the [facility] for treatment of a medical condition.     Patient is st

## (undated) NOTE — ED AVS SNAPSHOT
THE [facility] Emergency Department in [address] [facility]    Phone:  [phone]    Fax:  [address]   MRN: [id number]    Department:  THE [facility] Emergency Department in [place]   Date of Vis 04/25/2017  23:31 iohexol (OMNIPAQUE) 350 MG/ML injection 100 mL 100 mL                Admin Date Administration Dose                   04/25/2017  23:56 LORazepam (ATIVAN) injection 1 mg 1 mg                Admin Date Administration Dose [phone]       To Check ER Wait Times:  TEXT 'ERwait' to [phone]      Click [email or web address]      Or call [phone]    If you have any problems with your follow-up, please call our  at [phone]    Francisco marin problema con I have read and understand the instructions given to me by my caregivers. 24-Hour Pharmacies        Pharmacy Address Phone Number   [facility] [address] ([address]) Julia Hernandez CONCLUSION:  No acute process. Status post hysterectomy. Status post cholecystectomy. Noted are bilateral common iliac vein stents . 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          Dictated by: Lore Jimenez MD on 4/25/2017 at 23:56       Approved by: patient age. Hysterectomy. BONES:  Normal.  No bony lesion or fracture. Benign-appearing sclerotic lesion of the left iliac bone lateral to the SI joint. LUNG BASES:  Normal.  No visible pulmonary or pleural disease. OTHER:  Negative.

## (undated) NOTE — MR AVS SNAPSHOT
[facility] [address]  [phone]             Thank you for choosing us for your health care visit with Lily Simental MD.  We are glad to serve you and happy to provide you with this summary of your vi leg syndrome  Restless leg syndrome    Metoclopramide ANXIETY    Reglan with benadryl, feels like she is going to jump out of her skin    Toradol [ketorolac Tromethamine] RASH    Cheratussin Ac [guaifenesin-codeine] NAUSEA ONLY    Mold     Verified by skin this  reasons to take this  Commonly known as: Pepcid        hydrOXYzine 25 MG Tabs  Take 1-2 tablets (25-50 mg total) by mouth 2 (two) times daily as needed.   Commonly known as: ATARAX        * Lisdexamfetamine Dimesylate 50 MG Caps  Take 1 capsule (50 mg BEFORE BREAKFAST        Vitamin D 50 MCG (2000 UT) Caps  Take 2,000 Units by mouth 2 (two) times a day. * This list has 6 medication(s) that are the same as other medications prescribed for you.  Read the directions carefully, and ask your doctor

## (undated) NOTE — Clinical Note
Dg Abler! Good news is that sunil's updated autoimmune studies were negative. She would benefit from early follow up to be sure she hasn't had a change in symptoms.   Let me know if you have any other questions, Annika

## (undated) NOTE — Clinical Note
ASTHMA ACTION PLAN for Kiara Carolina     : 1980     Date: 2017  Provider:  Felice Rodrigues PA-C  Phone for doctor or clinic: [facility], [address] [place] [address], [address] [place] [address]  [phone]

## (undated) NOTE — MR AVS SNAPSHOT
[facility] [address] [facility] [phone]               Thank you for choosing us for your health care visit with Rylee Comer MD.  We are glad to serve you and happy to provide you with this summary of you Norco [apap-fd&c Yellow #10 Al Alejo-hydrocodone] Nausea only    Reglan [metoclopramide]     Reglan with benadryl, feels like she is going to jump out of her skin    Toradol [ketorolac Tromethamine] Rash    Tramadol Rash    Azithromycin Restless    tinglin Take 1 capsule by mouth daily. * TIROSINT 150 MCG Caps  Generic drug:  Levothyroxine Sodium  Take 1 capsule by mouth daily before breakfast with 1 capsule Tirosint 25 mcg to total 175 mcg.         * TIROSINT 25 MCG Caps  Generic drug:  Levothyroxine

## (undated) NOTE — ED AVS SNAPSHOT
Marva Head   MRN: [id number]    Department:  [facility] Emergency Department   Date of Visit:  3/27/2018           Disclosure     Insurance plans vary and the physician(s) referred by the ER may not be covered by your plan.  Please conta CARE PHYSICIAN AT ONCE OR RETURN IMMEDIATELY TO THE EMERGENCY DEPARTMENT. If you have been prescribed any medication(s), please fill your prescription right away and begin taking the medication(s) as directed.   If you believe that any of the medications

## (undated) NOTE — MR AVS SNAPSHOT
[facility]  [phone]               Thank you for choosing us for your health care visit with Brian Gutierres PA-C.   We are glad to serve you and happy to provide you with [facility] Status post right foot surgery    Umbilicus discharge    -  Primary      Instructions and Information about Your Health     None      Allergies as of Jun 20, 2017     Cephalosporins Hives    Cheratussin Ac [Guaifenesin-Codeine] Nausea only    Chocolate Hi What changed:  Another medication with the same name was removed. Continue taking this medication, and follow the directions you see here.    Commonly known as:  VYVANSE           Omeprazole 40 MG Cpdr   TAKE 1 CAPSULE BY MOUTH DAILY           PROAIR HFA 10 Call [phone] for help. Dish.fmhart is NOT to be used for urgent needs. For medical emergencies, dial 911.            Visit [facility] online at  Studio [email or web address]

## (undated) NOTE — ED AVS SNAPSHOT
THE [facility] Emergency Department in [address] [facility]    Phone:  [phone]    Fax:  [address]   MRN: [id number]    Department:  THE [facility] Emergency Department in [place]   Date of Vis IF THERE IS ANY CHANGE OR WORSENING OF YOUR CONDITION, CALL YOUR PRIMARY CARE PHYSICIAN AT ONCE OR RETURN IMMEDIATELY TO THE EMERGENCY DEPARTMENT.     If you have been prescribed any medication(s), please fill your prescription right away and begin taking t

## (undated) NOTE — MR AVS SNAPSHOT
[facility] Angelique  [facility]  [phone]               Thank you for choosing us for your health care visit with Ciera Yin PA-C.   We are glad to serve you and happy to provide you with [facility] Verified by skin testing    Norco [Apap-Fd&C Yellow #10 Al Alejo-Hydrocodone] Nausea only    Nsaids Other (See Comments)    Upset stomach    Reglan [Metoclopramide]     Reglan with benadryl, feels like she is going to jump out of her skin    Tramadol Rash Take 1 tablet by mouth daily. Zolpidem Tartrate ER 12.5 MG Tbcr   Take 12.5 mg by mouth nightly.    Commonly known as:  AMBIEN CR                Where to Get Your Medications      You can get these medications from any pharmacy     Bring a paper p

## (undated) NOTE — ED AVS SNAPSHOT
Laura Radha   MRN: [id number]    Department:  THE [facility] Emergency Department in [place]   Date of Visit:  1/18/2018           Disclosure     Insurance plans vary and the physician(s) referred by the ER may not be covered by your plan.  Please con tell this physician (or your personal doctor if your instructions are to return to your personal doctor) about any new or lasting problems. The primary care or specialist physician will see patients referred from the [facility] Emergency Department.  Julio Mueller

## (undated) NOTE — LETTER
Date: 10/21/2018    Patient Name: Gabi Boo          To Whom it may concern:    Ronel Tamayo has a history of hydrocodone addiction, this occurred after being over prescribed by a physicain several years ago for an orthopedic issue.   Patient is aware of

## (undated) NOTE — ED AVS SNAPSHOT
Marva Head   MRN: [id number]    Department:  THE [facility] Emergency Department in [place]   Date of Visit:  3/11/2018           Disclosure     Insurance plans vary and the physician(s) referred by the ER may not be covered by your plan.  Please con tell this physician (or your personal doctor if your instructions are to return to your personal doctor) about any new or lasting problems. The primary care or specialist physician will see patients referred from the [facility] Emergency Department.  Nadege Tilley

## (undated) NOTE — MR AVS SNAPSHOT
[facility] [place] [address] [address] [facility] [phone]               Thank you for choosing us for your health care visit with Armen Jama MD.  We are glad to serve you and happy to provide you with this summary of you Norco [apap-fd&c Yellow #10 Al Alejo-hydrocodone] Nausea only    Reglan [metoclopramide]     Reglan with benadryl, feels like she is going to jump out of her skin    Toradol [ketorolac Tromethamine] Rash    Tramadol Rash    Azithromycin Restless    tinglin Vitamin D 1000 units Tabs  Take 1 tablet by mouth daily. Zolpidem Tartrate ER 12.5 MG Tbcr  Take 6.25 mg by mouth nightly.   Commonly known as:  AMBIEN CR                 MyCrasheed    Visit Ooshot  You can access your MyChart to more actively [address] [facility]

## (undated) NOTE — LETTER
ASTHMA ACTION PLAN for Alis Hernadez     : 1980     Date: 2021  Provider:  Ephraim Bustillo PA-C  Phone for doctor or clinic: [address] [place] [address], [address] [place] [address] 6  [phone]

## (undated) NOTE — LETTER
To Whom It May Concern:    Nia Yuan has been under our care regarding ongoing medical issues. Because of this, she has been required to restrict her physical activities. She should be excused from work from 10/3/18-10/5/18.     She may resume h

## (undated) NOTE — ED AVS SNAPSHOT
[facility] in [facility] [address]  Phone:  [phone]  Fax:  [address]   MRN: [id number]    Department:  [facility] in [place]   Date of Visit:  7/18/ IF THERE IS ANY CHANGE OR WORSENING OF YOUR CONDITION, CALL YOUR PRIMARY CARE PHYSICIAN AT ONCE OR RETURN IMMEDIATELY TO THE EMERGENCY DEPARTMENT.     If you have been prescribed any medication(s), please fill your prescription right away and begin taking t

## (undated) NOTE — Clinical Note
FYI, TCM call made, see notes. NCM attempted to schedule TCM HFU patient states she will call herself to schedule.

## (undated) NOTE — MR AVS SNAPSHOT
[facility] [employer] [place] [phone]               Thank you for choosing us for your health care visit with Rocio Dior MD.  We are glad to serve you and happy to provide you with this summary of you Hives and fever     Mold     Verified by skin testing    Norco [Apap-Fd&C Yellow #10 Al Alejo-Hydrocodone] Nausea only    Reglan [Metoclopramide]     Reglan with benadryl, feels like she is going to jump out of her skin    Toradol [Ketorolac Tromethamine] Generic drug:  Levothyroxine Sodium   Take 1 capsule by mouth daily. Vitamin C 500 MG Tabs   Take 500 mg by mouth daily. Commonly known as:  VITAMIN C           Vitamin D 1000 units Tabs   Take 1 tablet by mouth daily.            Shane Mazariegos

## (undated) NOTE — MR AVS SNAPSHOT
[facility] [employer] [place] [phone]               Thank you for choosing us for your health care visit with Damari Rodriguez MD.  We are glad to serve you and happy to provide you with this summary of you Today's Vital Signs     BP   120/81    Pulse   102    Height   68\"    Weight   214 lb 3.2 oz (97.2 kg)             Current Medications          Accurate as of 2/20/18 10:15 AM. Always use your most recent med list.               ALPRAZolam 0.25 You can access your MyChart to more actively manage your health care and view more details from this visit by going to [email or web address].   If you've recently had a stay at the Hospital you can access your discharge instructions in [address] by tonia

## (undated) NOTE — MR AVS SNAPSHOT
[facility] [employer] [place] [phone]               Thank you for choosing us for your health care visit with Mir Cruz MD.  We are glad to serve you and happy to provide you with this summary of you Reglan with benadryl, feels like she is going to jump out of her skin    Toradol [ketorolac Tromethamine] Rash    Tramadol Rash    Azithromycin Restless    tingling                Today's Vital Signs     BP   114/83    Pulse   93    Height   68.5\"    Figueroa Anderson Generic drug:  Levothyroxine Sodium  Take 1 capsule by mouth daily before breakfast with 1 capsule Tirosint 150 mcg to total 175 mcg.        topiramate 25 MG Tabs  Take 1 tablet (25 mg total) by mouth every evening. Commonly known as:   Topamax        Malina

## (undated) NOTE — LETTER
[facility]  Administration of Anesthesia  1. Alana Banks, or _________________________________ acting on her behalf, (Patient) (Dependent/Representative) request to receive anesthesia for my pending procedure/operation/treatment. infections, high spinal block, spinal bleeding, seizure, cardiac arrest and death. 7. AWARENESS: I understand that it is possible (but unlikely) to have explicit memory of events from the operating room while under general anesthesia.   8. ELECTROCONVULSIV (Date) (Time)                                                                                               (Responsible person in case of minor/ unconscious pt) /Relationship    My signature below affirms that prior to the time of the procedure, I have ex

## (undated) NOTE — LETTER
22    Patient: Javier Espinoza  : 1980 Visit date: 3/29/2022    Dear  Asya Hahn MD    Thank you for referring Javier Espinoza to my practice. Please find my assessment and plan below. History of Present Illness   Daily Yoder is a 70-year-old female presents for evaluation of chronic left upper quadrant abdominal pain and nausea. The patient states she has experienced left upper quadrant abdominal pain that radiates to her back for approximately 2 to 3 years. This pain has been associated with dysphagia to solid and liquid food. Due to her decreased caloric intake the patient has lost about 20 pounds in the past 3 to 4 months. The patient describes the pain as crampy with sharp exacerbations. The patient has been seen by Dr. Shani Salinas and Dr. Latrice Allan. She has had an endoscopy and colonoscopy completed to evaluate her symptoms further. No abnormalities were identified. Biopsy was benign. Dr. Latrice Allan was to order MRCP with sedation however as CT scan was negative, he states that MRCP will not be necessary. The patient was switched from Protonix to Pepcid and her pregabalin dose was increased. The patient states she has noticed improvement in her symptoms with the pregabalin, however she is still not able to eat without pain. The patient denies cough, heartburn, or reflux. The patient does have chronic nausea and states that she has \"Zofran with her at all  times\" this nausea has been present since her bypass surgery. She states she has occasional constipation. The patient was seen at [facility] emergency department for fecal impaction. A CT of the abdomen and pelvis was completed on 3/8/2022 and revealed equal impaction. No other acute findings were noted. Today the patient states she continues to have left upper quadrant abdominal pain and difficulty with eating.   She states she has worsening anxiety and fatigue as her caloric intake is about 500-800 marquis a day. The patient also states she has abdominal lumps that come and go and are tender to palpation. The patient has had multiple abdominal surgeries including gastric bypass, laparoscopic cholecystectomy, total hysterectomy, and . She has past medical history of GERD, hypothyroidism, and generalized anxiety disorder. She denies taking blood thinning medications. She denies family history of intestinal disease. The patient denies tobacco, alcohol, or drug use    No significant physical findings on examination  Previous imaging including CT scan abdomen pelvis has been negative    Assessment  Abdominal pain, unspecified abdominal location  (primary encounter diagnosis)  Nausea        Plan     The etiology of the patient's left upper quadrant abdominal pain is unclear. This does not appear to be related to his surgical etiology.   The patient will consider evaluation at a tertiary care facility as diagnostic work-up has thus far been negative            Sincerely,       Ryan Michelle MD   CC: No Recipients

## (undated) NOTE — LETTER
03/22/18        Shante Dyson  [address] [address]      Dear Amie Bellamy,    [address] [facility] records indicate that you have outstanding lab work and or testing that was ordered for you and has not yet been completed:         lipid     cmp  To pro

## (undated) NOTE — MR AVS SNAPSHOT
[address] [facility]a [facility]  [phone]               Thank you for choosing us for your health care visit with Bahman De PA-C.   We are glad to serve you and happy to provide you with [facility] Lab appointments can now be scheduled online at [email or web address]    · Go to [email or web address]  · In Search type Lab  · Click \"Lab services\"  · Click \"Schedule Your Test Online\"  · Follow the prompts  · If you have questions, contact Central Scheduling a take each. Commonly known as:  NORCO           * HYDROcodone-acetaminophen 5-325 MG Tabs   Take 1 tablet by mouth every 6 (six) hours as needed for Pain (to be done saturday, sunday and Monday). What changed:   You were already taking a medication with If you've recently had a stay at the Hospital you can access your discharge instructions in [employer] by going to Visits < Admission Summaries.  If you've been to the Emergency Department or your doctor's office, you can view your past visit information in My Visit [facility] online at  [email or web address]

## (undated) NOTE — ED AVS SNAPSHOT
THE [facility] Emergency Department in [address]    Phone:  [phone]    Fax:  [address]   MRN: [id number]    Department:  THE [facility] Emergency Department in [place]   Date of Vis 03/18/2017  15:48 dexamethasone Sodium Phosphate (DECADRON) 4 MG/ML injection 10 mg 10 mg                Admin Date Administration Dose                   03/18/2017  15:48 HYDROmorphone HCl PF (DILAUDID) 1 MG/ML injection 0.5 mg 0.5 mg a substitute for ongoing medical care. Often, one Emergency Department visit does not uncover every injury or illness.  If you have been referred to a primary care or a specialist physician for a follow-up visit, please tell this physician (or your personal Kerrie Shipley (Samantha Baptise) [phone] ([address]) [phone]                Additional Information       We are concerned for your overall well being:    - If you are a smoker or have smoked in the las VENTRICLES/SULCI:  Ventricles and sulci are normal in size. INTRACRANIAL:  There are no abnormal extraaxial fluid collections. There is no midline shift. There are no intraparenchymal brain abnormalities. There is nothing specific for acute infarct.

## (undated) NOTE — MR AVS SNAPSHOT
[facility] [employer] [place] [phone]               Thank you for choosing us for your health care visit with Adama Deshpande MD.  We are glad to serve you and happy to provide you with this summary of you Take 1/2--1 twice daily as needed for anxiety  Commonly known as:  XANAX        aspirin 81 MG Tabs  Take 81 mg by mouth daily. Budesonide-Formoterol Fumarate 160-4.5 MCG/ACT Aero  Inhale 2 puffs into the lungs 2 (two) times daily.   Commonly known as Visit [facility] online at  [email or web address]

## (undated) NOTE — ED AVS SNAPSHOT
[facility] in [facility] [address]  Phone:  [phone]  Fax:  [address]   MRN: [id number]    Department:  [facility] Emergency Department in [place]   Date of Visit:  7/5/2 IF THERE IS ANY CHANGE OR WORSENING OF YOUR CONDITION, CALL YOUR PRIMARY CARE PHYSICIAN AT ONCE OR RETURN IMMEDIATELY TO THE EMERGENCY DEPARTMENT.     If you have been prescribed any medication(s), please fill your prescription right away and begin taking t

## (undated) NOTE — ED AVS SNAPSHOT
Bryson Walters   MRN: [id number]    Department:  [facility] Emergency Department   Date of Visit:  4/28/2018           Disclosure     Insurance plans vary and the physician(s) referred by the ER may not be covered by your plan.  Please contact tell this physician (or your personal doctor if your instructions are to return to your personal doctor) about any new or lasting problems. The primary care or specialist physician will see patients referred from the [facility] Emergency Department.  Milton Vargas

## (undated) NOTE — MR AVS SNAPSHOT
[facility] [employer] [place] [phone]               Thank you for choosing us for your health care visit with Victoria Kilpatrick MD.  We are glad to serve you and happy to provide you with this summary of your v Morbid obesity with BMI of 40.0-44.9, adult    Osteoarthritis      Instructions and Information about Your Health    Please review assessment and plan.           Allergies as of Jun 19, 2017     Cephalosporins Hives    Cheratussin Ac [Guaifenesin-Codeine] PROAIR  (90 Base) MCG/ACT Aers   Generic drug:  Albuterol Sulfate HFA   Inhale into the lungs every 6 (six) hours as needed for Wheezing. RELPAX 40 MG Tabs   Generic drug:  Eletriptan Hydrobromide   TK 1 T PO AOS OF HEADACHE.  MAY REPEAT 1

## (undated) NOTE — LETTER
Date: 12/7/2018    Patient Name: Greg Rosales          To Whom it may concern: The above patient was seen at the [facility] for treatment of a medical condition.     This patient should be excused from attending work  from 12/5/18 thr

## (undated) NOTE — ED AVS SNAPSHOT
Cara Travis   MRN: [id number]    Department:  THE [facility] Emergency Department in [place]   Date of Visit:  10/26/2019           Disclosure     Insurance plans vary and the physician(s) referred by the ER may not be covered by your plan.  Please co tell this physician (or your personal doctor if your instructions are to return to your personal doctor) about any new or lasting problems. The primary care or specialist physician will see patients referred from the [facility] Emergency Department.  Reese Garnett

## (undated) NOTE — MR AVS SNAPSHOT
[facility] [employer] [place] [phone]               Thank you for choosing us for your health care visit with Sydney Hoover MD.  We are glad to serve you and happy to provide you with this summary of you TAKE 1/2 TO 1 TABLET BY MOUTH TWICE DAILY AS NEEDED FOR ANXIETY  Commonly known as:  XANAX        * ALPRAZolam 0.5 MG Tabs  Take 1 tablet (0.5 mg total) by mouth 2 (two) times daily as needed for Anxiety (vomiting, nausea).   Commonly known as:  Julissa Rosenberg * This list has 2 medication(s) that are the same as other medications prescribed for you. Read the directions carefully, and ask your doctor or other care provider to review them with you.                   MyChart    Visit [facility]  You can access your [facility]

## (undated) NOTE — Clinical Note
VERITO, TCM call made, see notes. Patient confirms she has an appointment on 11/9/2020 with Ronald Oliver PA-C, OSWALDO will send message to office requesting that they change the visit type to a TCM HFU.

## (undated) NOTE — MR AVS SNAPSHOT
[facility] [place]  [address] [facility]  [phone]               Thank you for choosing us for your health care visit with oCleen Cancino PA-C.   We are glad to serve you and happy to provide you with [facility] · Click \"Lab services\"  · Click \"Schedule Your Test Online\"  · Follow the prompts  · If you have questions, contact Central Scheduling at [phone] at [phone] th Commonly known as:  Agustín Downey   Start taking on:  6/10/2017           LEXAPRO 20 MG Tabs   Generic drug:  escitalopram   Take 40 mg by mouth nightly.            Omeprazole 40 MG Cpdr   TAKE 1 CAPSULE BY MOUTH DAILY           PROAIR  (90 Base) MCG/ACT Aer Call [phone] for help. [employer] is NOT to be used for urgent needs. For medical emergencies, dial 911.            Visit [facility] online at  [email or web address]

## (undated) NOTE — LETTER
Date: 11/3/2020    Patient Name: Branden Larios          To Whom it may concern: The above patient was seen at the [facility] for treatment of a medical condition.     The patient may return to work/school on 11/4/20 with the following

## (undated) NOTE — Clinical Note
Can I have a scheduled for 12/26/17 the day after Erick? Barrie Kaplan would like to be seen on that day since she is having bariatric surgery the week before and I am doing pain management. She is willing to come in at the 9:00 slot.   Please call her if this

## (undated) NOTE — ED AVS SNAPSHOT
[facility] Emergency Department in [address] [facility]    Phone:  [phone]    Fax:  [address]   MRN: [id number]    Department:  [facility] Emergency Department in [place]   Date of Vis IF THERE IS ANY CHANGE OR WORSENING OF YOUR CONDITION, CALL YOUR PRIMARY CARE PHYSICIAN AT ONCE OR RETURN IMMEDIATELY TO THE EMERGENCY DEPARTMENT.     If you have been prescribed any medication(s), please fill your prescription right away and begin taking t

## (undated) NOTE — ED AVS SNAPSHOT
[address] Emergency Department in [facility]    Phone:  [phone]    Fax:  [address]   MRN: [id number]    Department:  [address] Emergency Department in [place]   Date of Vis IF THERE IS ANY CHANGE OR WORSENING OF YOUR CONDITION, CALL YOUR PRIMARY CARE PHYSICIAN AT ONCE OR RETURN IMMEDIATELY TO THE EMERGENCY DEPARTMENT.     If you have been prescribed any medication(s), please fill your prescription right away and begin taking t

## (undated) NOTE — LETTER
[address]  [address]  [place] [address]  [facility]: [phone]  FAX: [phone]   Consent to Procedure/Sedation    Date: __6/3/2020_____    Time: ___9:31 AM ___    1.  I authorize the performance ___________________________    Signature of person authorized to consent for patient: Relationship to patient:  ___________________________    ___________________    Witness: ____________________     Date: ______________    Printed: 6/3/2020   9:31 AM    P

## (undated) NOTE — LETTER
07/15/19    Patient: Kiara Carolina  : 1980 Visit date: 7/15/2019    Dear  Dr. Kayla Garrett MD,    Thank you for referring Kiara Carolina to my practice. Please find my assessment and plan below.              History of Present Illness  38-year-o Will proceed with treatment once insurance authorization is obtained                 Sincerely,       John Rosenthal MD   CC: No Recipients

## (undated) NOTE — MR AVS SNAPSHOT
[facility]  [phone]               Thank you for choosing us for your health care visit with Radha Fregoso MD.  We are glad to serve you and happy to provide you with this turner Reglan with benadryl, feels like she is going to jump out of her skin    Toradol [Ketorolac Tromethamine] Rash    Tramadol Rash    Azithromycin Restless    tingling                Today's Vital Signs     BP Pulse Temp Height Weight BMI    118/70 mmHg 102 Take 12.5 mg by mouth nightly.    Commonly known as:  AMBIEN CR                Where to Get Your Medications      These medications were sent to [address] [place] [address], [phone], 61 [phone]

## (undated) NOTE — LETTER
Date: 11/15/2017    Patient Name: Schuyler Guevara          To Whom it may concern: This letter has been written at the patient's request. The above patient was seen at the [facility] for treatment of a medical condition.     This patient

## (undated) NOTE — LETTER
[employer] for Musa Ty     : 1980     Date: 2018  Provider:  Anisha Prieto PA-C  Phone for doctor or clinic: [facility], [address] [place] [address], [address] [place] [address]  [phone]

## (undated) NOTE — ED AVS SNAPSHOT
THE [facility] Emergency Department in [address] [facility]    Phone:  [phone]    Fax:  [address]   MRN: [id number]    Department:  THE [facility] Emergency Department in [place]   Date of Vis IF THERE IS ANY CHANGE OR WORSENING OF YOUR CONDITION, CALL YOUR PRIMARY CARE PHYSICIAN AT ONCE OR RETURN IMMEDIATELY TO THE EMERGENCY DEPARTMENT.     If you have been prescribed any medication(s), please fill your prescription right away and begin taking t

## (undated) NOTE — ED AVS SNAPSHOT
THE [facility] Emergency Department in [address] [facility]    Phone:  [phone]    Fax:  [address]   MRN: [id number]    Department:  THE [facility] Emergency Department in [place]   Date of Vis Admin Date Administration Dose                   01/07/2017  00:19 HYDROmorphone HCl PF (DILAUDID) 1 MG/ML injection 1 mg 1 mg                     Medication Information       Follow the directions for taking your medications provided by your doctor.  Yi a substitute for ongoing medical care. Often, one Emergency Department visit does not uncover every injury or illness.  If you have been referred to a primary care or a specialist physician for a follow-up visit, please tell this physician (or your personal Elio Forward John Orin Marcano ([address]) [phone] [address]) [phone]                Additional Information       We are concerned for your overall well being:    - If you are a smoker o

## (undated) NOTE — LETTER
Date & Time: 10/14/2023, 2:24 PM  Patient: Yves Roa  Encounter Provider(s):    ANDREA Rain       To Whom It May Concern:    Filipe Fontaine was seen and treated in our department on 10/14/2023. She can return to work without limitations. If you have any questions or concerns, please do not hesitate to call.       Pedrito MENDEZ   Nurse Practitioner    This note has been electronically signed

## (undated) NOTE — MR AVS SNAPSHOT
[facility]  [phone]               Thank you for choosing us for your health care visit with Paula Brady MD.  We are glad to serve you and happy to provide you with this turner Atypical chest pain    -  Primary    Shortness of breath          Instructions and Information about Your Health    -- increase omeprazole to 40mg 2x/day for next 2 wks  -- continue to monitor symptoms - call us if worsening despite above  -- continue alb Inhale into the lungs every 6 (six) hours as needed for Wheezing. SOLUBLE FIBER/PROBIOTICS OR   Take 1 capsule by mouth daily. TIROSINT 150 MCG Caps   Generic drug:  Levothyroxine Sodium   Take 1 capsule by mouth daily.    What changed:

## (undated) NOTE — LETTER
Date: 4/18/2023    Patient Name: Samaria Fuentes          To Whom it may concern: This letter has been written at the patient's request. The above patient was seen at the [facility] for treatment of a medical condition. Patient is under my care and has a significant medical condition called alpha 1 antitrypsin deficiency. This medical condition requires that she receive an infusion through IV weekly at her home. Without the infusion she can develop a severe asthma attack, she would be at an increased risk for infection causing significant shortness of breath and respiratory distress. 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 Please call if there are any concerns.       Sincerely,    Nae Viera PA-C

## (undated) NOTE — MR AVS SNAPSHOT
[facility]  [phone]               Thank you for choosing us for your health care visit with Sarah Leonardo PA-C.   We are glad to serve you and happy to provide you with [facility] · Click \"Lab services\"  · Click \"Schedule Your Test Online\"  · Follow the prompts  · If you have questions, contact Central Scheduling at [phone] at [phone] a [employer] OR   Take 1 capsule by mouth daily. TIROSINT 150 MCG Caps   Generic drug:  Levothyroxine Sodium   Take 1 capsule by mouth daily. Vitamin C 500 MG Tabs   Take 500 mg by mouth daily.    Commonly known as:  VITAMIN Call [phone] for help. [facility] is NOT to be used for urgent needs. For medical emergencies, dial 911.            Visit [facility] online at  [email or web address]

## (undated) NOTE — LETTER
[address]  [address]  [place] [address]  [facility]: [phone]  FAX: [phone]   Consent to Procedure/Sedation    Date: __6/3/2020_____    Time: ___9:35 AM ___    1.  I authorize the performance ___________________________    Signature of person authorized to consent for patient: Relationship to patient:  ___________________________    ___________________    Witness: ____________________     Date: ______________    Printed: 6/3/2020   9:35 AM    P

## (undated) NOTE — LETTER
19    Patient: Bryson Walters  : 1980 Visit date: 2019    Dear  Dr. Manya Mcardle, MD,    Thank you for referring Bryson Walters to my practice. Please find my assessment and plan below.                   Sincerely,       Gisselle Gayle,

## (undated) NOTE — LETTER
[facility]  Administration of Anesthesia  1. Anthony Ala, or _________________________________ acting on her behalf, (Patient) (Dependent/Representative) request to receive anesthesia for my pending procedure/operation/treatment. infections, high spinal block, spinal bleeding, seizure, cardiac arrest and death. 7. AWARENESS: I understand that it is possible (but unlikely) to have explicit memory of events from the operating room while under general anesthesia.   8. ELECTROCONVULSIV unconscious pt /Relationship    My signature below affirms that prior to the time of the procedure, I have explained to the patient and/or his/her guardian, the risks and benefits of undergoing anesthesia, as well as any reasonable alternatives.     _______

## (undated) NOTE — MR AVS SNAPSHOT
[facility]  [phone]               Thank you for choosing us for your health care visit with Dena Conner PA-C.   We are glad to serve you and happy to provide you with [facility] most commonly prescribed drugs in the [place] In response, the Food and Drug Administration (FDA) recently proposed tighter controls on drugs that contain hydrocodone, including popular prescription cough and pain drugs.  The new rules would mean less conve “What concerns me is that there is no clear evidence that people who take opioids over the long term can do more or get around more easily,” said Jenna Montano M.D., research professor of environmental and occupational health sciences at [employer] People who’ve built up a tolerance to opioids can often take higher doses without serious side effects. But when you stop taking the drug, you’re back to square one. It’s also a bad idea to take someone else’s pills.  Many people who die of overdoses were research shows.   Among the most dangerous types of drugs to combine with an opioid are benzodiazapines, which are used as anticonvulsants, anti-anxiety medications, muscle relaxants, and sedatives—for example, alprazolam (Xanax and generic), clonazepam (Kl convenience is not worth the increased risk.  The long-acting versions are far more likely to be stolen, misused, and abused, so if your doctor does wind up prescribing them for you, he or she may take special precautions to monitor your use of the drugs, s stopping them even if the drugs are hurting them physically or mentally. They often ratchet up their dose, taking more than the doctor prescribes. Over time, obtaining and taking the drugs may grow to dominate their lives.   Many doctors might also mistaken that you may have become dependent, ask for a referral to a pain specialist who can help wean you off the drug and help you find other ways to help manage your pain.     Manage pain without drugs  Studies show that nondrug treatments, including exercise, li Benadryl [Diphenhydramine]     Benadryl with reglan, feels like she is going to jump out of her skin    Cephalosporins Hives    Cheratussin Ac [Guaifenesin-Codeine] Nausea only    Chocolate Hives    Doxycycline     Hives and fever     Mold     Verified by Commonly known as:  ZENPEP           SOLUBLE FIBER/PROBIOTICS OR   Take 1 capsule by mouth daily. Vitamin C 500 MG Tabs   Take 500 mg by mouth daily.    Commonly known as:  VITAMIN C           Vitamin D 1000 UNITS Tabs   Take 1 tablet by mouth judy

## (undated) NOTE — ED AVS SNAPSHOT
Klarissaherlinda Brian   MRN: [id number]    Department:  [facility] Emergency Department   Date of Visit:  1/24/2020           Disclosure     Insurance plans vary and the physician(s) referred by the ER may not be covered by your plan.  Please contact tell this physician (or your personal doctor if your instructions are to return to your personal doctor) about any new or lasting problems. The primary care or specialist physician will see patients referred from the [facility] Emergency Department.  Jesusita Soler

## (undated) NOTE — ED AVS SNAPSHOT
[facility] Emergency Department    [place] [address]    Phone:  [phone]    Fax:  [phone]   MRN: [id number]    Department:  [facility] Emergency Department   Date of Visit:  1 IF THERE IS ANY CHANGE OR WORSENING OF YOUR CONDITION, CALL YOUR PRIMARY CARE PHYSICIAN AT ONCE OR RETURN IMMEDIATELY TO THE EMERGENCY DEPARTMENT.     If you have been prescribed any medication(s), please fill your prescription right away and begin taking t

## (undated) NOTE — Clinical Note
CHUCK Preciado! I hope you are enjoying the holidays. I just saw Sully Argueta this morning. Seems like her symptoms are more related to the malnutrition/lack of protein. Do think she be a candidate for TPN? Even if it is just weekly when she does her infusions for the alpha-1 antitrypsin deficiency, this might help. She is lost over 15 pounds since she saw me in January.  Thanks for your help,  Deep Dodd

## (undated) NOTE — ED AVS SNAPSHOT
Laura Garcia   MRN: [id number]    Department:  [facility] [place]   Date of Visit:  3/25/2018           Disclosure     Insurance plans vary and the physician(s) referred by the ER may not be covered by your plan.  Please con tell this physician (or your personal doctor if your instructions are to return to your personal doctor) about any new or lasting problems. The primary care or specialist physician will see patients referred from the [facility] Emergency Department.  Shelton Lombard

## (undated) NOTE — MR AVS SNAPSHOT
[facility] [employer] [place] [id number] [phone]               Thank you for choosing us for your health care visit with Josiane Mendez MD.  We are glad to serve you and happy to provide you with this summary of you [facility] [address]  [phone]   Drink 32 ounces of clear liquid, preferably water. Begin drinking approximately 90 minutes prior to your appointment time. Please finish all 32 ounces 60 minutes before your appointment time.   DO NOT VOID/ELIMINATE THE W Azithromycin RESTLESSNESS    tingling                Today's Vital Signs     BP   117/74    Pulse   106    Height   68\"    Weight   188 lb 6.4 oz (85.5 kg)             Current Medications          Accurate as of 6/5/18 11:42 AM. Always use your most rece TAKE 1 TABLET BY MOUTH EVERY NIGHT AT BEDTIME AS NEEDED FOR SLEEP  Commonly known as:  [facility]                 MyChart    Visit MyChart  You can access your MyChart to more actively manage your health care and view more details from this visit by going to h